# Patient Record
Sex: FEMALE | Race: WHITE | Employment: OTHER | URBAN - METROPOLITAN AREA
[De-identification: names, ages, dates, MRNs, and addresses within clinical notes are randomized per-mention and may not be internally consistent; named-entity substitution may affect disease eponyms.]

---

## 2018-02-07 PROBLEM — G47.33 OBSTRUCTIVE SLEEP APNEA SYNDROME: Status: ACTIVE | Noted: 2018-02-07

## 2018-02-07 PROBLEM — M54.16 LUMBAR RADICULAR PAIN: Status: ACTIVE | Noted: 2018-02-07

## 2018-02-07 PROBLEM — G44.229 CHRONIC TENSION-TYPE HEADACHE, NOT INTRACTABLE: Status: ACTIVE | Noted: 2018-02-07

## 2018-02-07 PROBLEM — F33.41 RECURRENT MAJOR DEPRESSIVE DISORDER, IN PARTIAL REMISSION (HCC): Status: ACTIVE | Noted: 2018-02-07

## 2018-02-07 PROBLEM — E11.9 TYPE 2 DIABETES MELLITUS WITHOUT COMPLICATION, WITHOUT LONG-TERM CURRENT USE OF INSULIN (HCC): Status: ACTIVE | Noted: 2018-02-07

## 2018-02-07 PROBLEM — K21.9 GASTROESOPHAGEAL REFLUX DISEASE: Status: ACTIVE | Noted: 2018-02-07

## 2018-05-09 PROBLEM — E66.09 CLASS 2 OBESITY DUE TO EXCESS CALORIES WITHOUT SERIOUS COMORBIDITY WITH BODY MASS INDEX (BMI) OF 37.0 TO 37.9 IN ADULT: Status: ACTIVE | Noted: 2018-05-09

## 2018-05-09 PROBLEM — I10 ESSENTIAL HYPERTENSION: Status: ACTIVE | Noted: 2018-05-09

## 2018-05-09 PROBLEM — E66.812 CLASS 2 OBESITY DUE TO EXCESS CALORIES WITHOUT SERIOUS COMORBIDITY WITH BODY MASS INDEX (BMI) OF 37.0 TO 37.9 IN ADULT: Status: ACTIVE | Noted: 2018-05-09

## 2019-01-14 ENCOUNTER — APPOINTMENT (OUTPATIENT)
Dept: GENERAL RADIOLOGY | Age: 69
DRG: 253 | End: 2019-01-14
Payer: COMMERCIAL

## 2019-01-14 ENCOUNTER — HOSPITAL ENCOUNTER (INPATIENT)
Age: 69
LOS: 4 days | Discharge: HOME HEALTH | DRG: 253 | End: 2019-01-18
Attending: EMERGENCY MEDICINE | Admitting: INTERNAL MEDICINE
Payer: COMMERCIAL

## 2019-01-14 DIAGNOSIS — M54.16 LUMBAR RADICULAR PAIN: ICD-10-CM

## 2019-01-14 DIAGNOSIS — K92.2 GASTROINTESTINAL HEMORRHAGE, UNSPECIFIED GASTROINTESTINAL HEMORRHAGE TYPE: ICD-10-CM

## 2019-01-14 DIAGNOSIS — D64.9 SYMPTOMATIC ANEMIA: Primary | ICD-10-CM

## 2019-01-14 DIAGNOSIS — K29.01 GASTROINTESTINAL HEMORRHAGE ASSOCIATED WITH ACUTE GASTRITIS: ICD-10-CM

## 2019-01-14 LAB
ABO/RH: NORMAL
ALBUMIN SERPL-MCNC: 3.9 G/DL (ref 3.5–5.2)
ALP BLD-CCNC: 61 U/L (ref 35–104)
ALT SERPL-CCNC: 23 U/L (ref 0–32)
ANION GAP SERPL CALCULATED.3IONS-SCNC: 15 MMOL/L (ref 7–16)
ANISOCYTOSIS: ABNORMAL
ANISOCYTOSIS: ABNORMAL
ANTIBODY SCREEN: NORMAL
AST SERPL-CCNC: 59 U/L (ref 0–31)
BASOPHILS ABSOLUTE: 0 E9/L (ref 0–0.2)
BASOPHILS ABSOLUTE: 0 E9/L (ref 0–0.2)
BASOPHILS RELATIVE PERCENT: 0 % (ref 0–2)
BASOPHILS RELATIVE PERCENT: 0.4 % (ref 0–2)
BILIRUB SERPL-MCNC: 0.2 MG/DL (ref 0–1.2)
BUN BLDV-MCNC: 18 MG/DL (ref 8–23)
BURR CELLS: ABNORMAL
CALCIUM SERPL-MCNC: 9.2 MG/DL (ref 8.6–10.2)
CHLORIDE BLD-SCNC: 100 MMOL/L (ref 98–107)
CO2: 17 MMOL/L (ref 22–29)
CREAT SERPL-MCNC: 0.7 MG/DL (ref 0.5–1)
EKG ATRIAL RATE: 81 BPM
EKG P AXIS: 56 DEGREES
EKG P-R INTERVAL: 166 MS
EKG Q-T INTERVAL: 382 MS
EKG QRS DURATION: 66 MS
EKG QTC CALCULATION (BAZETT): 443 MS
EKG T AXIS: 14 DEGREES
EKG VENTRICULAR RATE: 81 BPM
EOSINOPHILS ABSOLUTE: 0.49 E9/L (ref 0.05–0.5)
EOSINOPHILS ABSOLUTE: 0.82 E9/L (ref 0.05–0.5)
EOSINOPHILS RELATIVE PERCENT: 3.5 % (ref 0–6)
EOSINOPHILS RELATIVE PERCENT: 6 % (ref 0–6)
FERRITIN: 6 NG/ML
FOLATE: 17 NG/ML (ref 4.8–24.2)
GFR AFRICAN AMERICAN: >60
GFR NON-AFRICAN AMERICAN: >60 ML/MIN/1.73
GLUCOSE BLD-MCNC: 171 MG/DL (ref 74–99)
HBA1C MFR BLD: 7.8 % (ref 4–5.6)
HCT VFR BLD CALC: 18.1 % (ref 34–48)
HCT VFR BLD CALC: 19.3 % (ref 34–48)
HCT VFR BLD CALC: 19.4 % (ref 34–48)
HEMOGLOBIN: 4.9 G/DL (ref 11.5–15.5)
HEMOGLOBIN: 5.4 G/DL (ref 11.5–15.5)
HEMOGLOBIN: 5.6 G/DL (ref 11.5–15.5)
HYPOCHROMIA: ABNORMAL
HYPOCHROMIA: ABNORMAL
INR BLD: 1.1
IRON SATURATION: 21 % (ref 15–50)
IRON: 101 MCG/DL (ref 37–145)
LYMPHOCYTES ABSOLUTE: 0.84 E9/L (ref 1.5–4)
LYMPHOCYTES ABSOLUTE: 1.22 E9/L (ref 1.5–4)
LYMPHOCYTES RELATIVE PERCENT: 6.1 % (ref 20–42)
LYMPHOCYTES RELATIVE PERCENT: 9 % (ref 20–42)
MAGNESIUM: 1.8 MG/DL (ref 1.6–2.6)
MCH RBC QN AUTO: 17.9 PG (ref 26–35)
MCH RBC QN AUTO: 19.1 PG (ref 26–35)
MCHC RBC AUTO-ENTMCNC: 27.1 % (ref 32–34.5)
MCHC RBC AUTO-ENTMCNC: 27.8 % (ref 32–34.5)
MCV RBC AUTO: 66.3 FL (ref 80–99.9)
MCV RBC AUTO: 68.6 FL (ref 80–99.9)
METER GLUCOSE: 132 MG/DL (ref 74–99)
METER GLUCOSE: 204 MG/DL (ref 74–99)
MONOCYTES ABSOLUTE: 0.56 E9/L (ref 0.1–0.95)
MONOCYTES ABSOLUTE: 0.68 E9/L (ref 0.1–0.95)
MONOCYTES RELATIVE PERCENT: 4.3 % (ref 2–12)
MONOCYTES RELATIVE PERCENT: 5 % (ref 2–12)
NEUTROPHILS ABSOLUTE: 10.88 E9/L (ref 1.8–7.3)
NEUTROPHILS ABSOLUTE: 12.04 E9/L (ref 1.8–7.3)
NEUTROPHILS RELATIVE PERCENT: 80 % (ref 43–80)
NEUTROPHILS RELATIVE PERCENT: 86.1 % (ref 43–80)
NUCLEATED RED BLOOD CELLS: 0.9 /100 WBC
NUCLEATED RED BLOOD CELLS: 3 /100 WBC
OVALOCYTES: ABNORMAL
OVALOCYTES: ABNORMAL
PDW BLD-RTO: 18.9 FL (ref 11.5–15)
PDW BLD-RTO: 21.6 FL (ref 11.5–15)
PLATELET # BLD: 304 E9/L (ref 130–450)
PLATELET # BLD: 349 E9/L (ref 130–450)
PMV BLD AUTO: 9.6 FL (ref 7–12)
PMV BLD AUTO: 9.9 FL (ref 7–12)
POIKILOCYTES: ABNORMAL
POIKILOCYTES: ABNORMAL
POLYCHROMASIA: ABNORMAL
POLYCHROMASIA: ABNORMAL
POTASSIUM SERPL-SCNC: 4.9 MMOL/L (ref 3.5–5)
PROTHROMBIN TIME: 13 SEC (ref 9.3–12.4)
RBC # BLD: 2.73 E12/L (ref 3.5–5.5)
RBC # BLD: 2.83 E12/L (ref 3.5–5.5)
SCHISTOCYTES: ABNORMAL
SCHISTOCYTES: ABNORMAL
SODIUM BLD-SCNC: 132 MMOL/L (ref 132–146)
TARGET CELLS: ABNORMAL
TOTAL IRON BINDING CAPACITY: 485 MCG/DL (ref 250–450)
TOTAL PROTEIN: 7.5 G/DL (ref 6.4–8.3)
TROPONIN: <0.01 NG/ML (ref 0–0.03)
TSH SERPL DL<=0.05 MIU/L-ACNC: 2.68 UIU/ML (ref 0.27–4.2)
VITAMIN B-12: 713 PG/ML (ref 211–946)
WBC # BLD: 13.6 E9/L (ref 4.5–11.5)
WBC # BLD: 14 E9/L (ref 4.5–11.5)

## 2019-01-14 PROCEDURE — 86900 BLOOD TYPING SEROLOGIC ABO: CPT

## 2019-01-14 PROCEDURE — 82607 VITAMIN B-12: CPT

## 2019-01-14 PROCEDURE — 2580000003 HC RX 258

## 2019-01-14 PROCEDURE — 85025 COMPLETE CBC W/AUTO DIFF WBC: CPT

## 2019-01-14 PROCEDURE — 2580000003 HC RX 258: Performed by: NURSE PRACTITIONER

## 2019-01-14 PROCEDURE — 83735 ASSAY OF MAGNESIUM: CPT

## 2019-01-14 PROCEDURE — 85018 HEMOGLOBIN: CPT

## 2019-01-14 PROCEDURE — 82746 ASSAY OF FOLIC ACID SERUM: CPT

## 2019-01-14 PROCEDURE — 6360000002 HC RX W HCPCS: Performed by: INTERNAL MEDICINE

## 2019-01-14 PROCEDURE — 83550 IRON BINDING TEST: CPT

## 2019-01-14 PROCEDURE — 82962 GLUCOSE BLOOD TEST: CPT

## 2019-01-14 PROCEDURE — 36415 COLL VENOUS BLD VENIPUNCTURE: CPT

## 2019-01-14 PROCEDURE — 82728 ASSAY OF FERRITIN: CPT

## 2019-01-14 PROCEDURE — 74019 RADEX ABDOMEN 2 VIEWS: CPT

## 2019-01-14 PROCEDURE — 85610 PROTHROMBIN TIME: CPT

## 2019-01-14 PROCEDURE — C9113 INJ PANTOPRAZOLE SODIUM, VIA: HCPCS | Performed by: INTERNAL MEDICINE

## 2019-01-14 PROCEDURE — 84484 ASSAY OF TROPONIN QUANT: CPT

## 2019-01-14 PROCEDURE — 86901 BLOOD TYPING SEROLOGIC RH(D): CPT

## 2019-01-14 PROCEDURE — 93005 ELECTROCARDIOGRAM TRACING: CPT | Performed by: NURSE PRACTITIONER

## 2019-01-14 PROCEDURE — 2580000003 HC RX 258: Performed by: INTERNAL MEDICINE

## 2019-01-14 PROCEDURE — P9016 RBC LEUKOCYTES REDUCED: HCPCS

## 2019-01-14 PROCEDURE — 6370000000 HC RX 637 (ALT 250 FOR IP): Performed by: INTERNAL MEDICINE

## 2019-01-14 PROCEDURE — 85014 HEMATOCRIT: CPT

## 2019-01-14 PROCEDURE — 86850 RBC ANTIBODY SCREEN: CPT

## 2019-01-14 PROCEDURE — 2060000000 HC ICU INTERMEDIATE R&B

## 2019-01-14 PROCEDURE — 83540 ASSAY OF IRON: CPT

## 2019-01-14 PROCEDURE — 84443 ASSAY THYROID STIM HORMONE: CPT

## 2019-01-14 PROCEDURE — 83036 HEMOGLOBIN GLYCOSYLATED A1C: CPT

## 2019-01-14 PROCEDURE — 80053 COMPREHEN METABOLIC PANEL: CPT

## 2019-01-14 PROCEDURE — 86923 COMPATIBILITY TEST ELECTRIC: CPT

## 2019-01-14 PROCEDURE — 99285 EMERGENCY DEPT VISIT HI MDM: CPT

## 2019-01-14 RX ORDER — ACETAMINOPHEN 325 MG/1
650 TABLET ORAL EVERY 4 HOURS PRN
Status: DISCONTINUED | OUTPATIENT
Start: 2019-01-14 | End: 2019-01-18 | Stop reason: HOSPADM

## 2019-01-14 RX ORDER — ONDANSETRON 2 MG/ML
4 INJECTION INTRAMUSCULAR; INTRAVENOUS EVERY 6 HOURS PRN
Status: DISCONTINUED | OUTPATIENT
Start: 2019-01-14 | End: 2019-01-18 | Stop reason: HOSPADM

## 2019-01-14 RX ORDER — DEXTROSE MONOHYDRATE 25 G/50ML
12.5 INJECTION, SOLUTION INTRAVENOUS PRN
Status: DISCONTINUED | OUTPATIENT
Start: 2019-01-14 | End: 2019-01-18 | Stop reason: HOSPADM

## 2019-01-14 RX ORDER — DOCUSATE SODIUM 100 MG/1
100 CAPSULE, LIQUID FILLED ORAL NIGHTLY
Status: DISCONTINUED | OUTPATIENT
Start: 2019-01-14 | End: 2019-01-18 | Stop reason: HOSPADM

## 2019-01-14 RX ORDER — SODIUM CHLORIDE 0.9 % (FLUSH) 0.9 %
SYRINGE (ML) INJECTION
Status: COMPLETED
Start: 2019-01-14 | End: 2019-01-14

## 2019-01-14 RX ORDER — LISINOPRIL 20 MG/1
20 TABLET ORAL DAILY
Status: DISCONTINUED | OUTPATIENT
Start: 2019-01-14 | End: 2019-01-18 | Stop reason: HOSPADM

## 2019-01-14 RX ORDER — ROPINIROLE 0.5 MG/1
0.5 TABLET, FILM COATED ORAL 3 TIMES DAILY
Status: DISCONTINUED | OUTPATIENT
Start: 2019-01-14 | End: 2019-01-18 | Stop reason: HOSPADM

## 2019-01-14 RX ORDER — PANTOPRAZOLE SODIUM 40 MG/10ML
40 INJECTION, POWDER, LYOPHILIZED, FOR SOLUTION INTRAVENOUS DAILY
Status: DISCONTINUED | OUTPATIENT
Start: 2019-01-14 | End: 2019-01-18 | Stop reason: HOSPADM

## 2019-01-14 RX ORDER — 0.9 % SODIUM CHLORIDE 0.9 %
250 INTRAVENOUS SOLUTION INTRAVENOUS ONCE
Status: DISCONTINUED | OUTPATIENT
Start: 2019-01-14 | End: 2019-01-18 | Stop reason: HOSPADM

## 2019-01-14 RX ORDER — GABAPENTIN 300 MG/1
1 CAPSULE ORAL 2 TIMES DAILY
Refills: 3 | COMMUNITY
Start: 2019-01-07 | End: 2019-01-25 | Stop reason: DRUGHIGH

## 2019-01-14 RX ORDER — DEXTROSE MONOHYDRATE 50 MG/ML
100 INJECTION, SOLUTION INTRAVENOUS PRN
Status: DISCONTINUED | OUTPATIENT
Start: 2019-01-14 | End: 2019-01-18 | Stop reason: HOSPADM

## 2019-01-14 RX ORDER — TOPIRAMATE 25 MG/1
25 TABLET ORAL 2 TIMES DAILY
Status: DISCONTINUED | OUTPATIENT
Start: 2019-01-14 | End: 2019-01-18 | Stop reason: HOSPADM

## 2019-01-14 RX ORDER — FLUOXETINE HYDROCHLORIDE 20 MG/1
40 CAPSULE ORAL DAILY
Status: DISCONTINUED | OUTPATIENT
Start: 2019-01-14 | End: 2019-01-18 | Stop reason: HOSPADM

## 2019-01-14 RX ORDER — OMEGA-3S/DHA/EPA/FISH OIL/D3 300MG-1000
400 CAPSULE ORAL DAILY
Status: DISCONTINUED | OUTPATIENT
Start: 2019-01-14 | End: 2019-01-18 | Stop reason: HOSPADM

## 2019-01-14 RX ORDER — 0.9 % SODIUM CHLORIDE 0.9 %
250 INTRAVENOUS SOLUTION INTRAVENOUS ONCE
Status: COMPLETED | OUTPATIENT
Start: 2019-01-14 | End: 2019-01-15

## 2019-01-14 RX ORDER — NICOTINE POLACRILEX 4 MG
15 LOZENGE BUCCAL PRN
Status: DISCONTINUED | OUTPATIENT
Start: 2019-01-14 | End: 2019-01-18 | Stop reason: HOSPADM

## 2019-01-14 RX ORDER — 0.9 % SODIUM CHLORIDE 0.9 %
10 VIAL (ML) INJECTION DAILY
Status: DISCONTINUED | OUTPATIENT
Start: 2019-01-14 | End: 2019-01-18 | Stop reason: HOSPADM

## 2019-01-14 RX ADMIN — ROPINIROLE HYDROCHLORIDE 0.5 MG: 0.5 TABLET, FILM COATED ORAL at 23:04

## 2019-01-14 RX ADMIN — ACETAMINOPHEN 650 MG: 325 TABLET ORAL at 23:02

## 2019-01-14 RX ADMIN — CHOLECALCIFEROL TAB 10 MCG (400 UNIT) 400 UNITS: 10 TAB at 17:17

## 2019-01-14 RX ADMIN — LISINOPRIL 20 MG: 20 TABLET ORAL at 17:17

## 2019-01-14 RX ADMIN — TOPIRAMATE 25 MG: 25 TABLET, FILM COATED ORAL at 23:04

## 2019-01-14 RX ADMIN — Medication 10 ML: at 23:01

## 2019-01-14 RX ADMIN — ROPINIROLE HYDROCHLORIDE 0.5 MG: 0.5 TABLET, FILM COATED ORAL at 17:17

## 2019-01-14 RX ADMIN — Medication 10 ML: at 23:05

## 2019-01-14 RX ADMIN — PANTOPRAZOLE SODIUM 40 MG: 40 INJECTION, POWDER, FOR SOLUTION INTRAVENOUS at 23:01

## 2019-01-14 RX ADMIN — DOCUSATE SODIUM 100 MG: 100 CAPSULE, LIQUID FILLED ORAL at 23:02

## 2019-01-14 RX ADMIN — LINAGLIPTIN 5 MG: 5 TABLET, FILM COATED ORAL at 17:17

## 2019-01-14 RX ADMIN — INSULIN LISPRO 4 UNITS: 100 INJECTION, SOLUTION INTRAVENOUS; SUBCUTANEOUS at 18:12

## 2019-01-14 RX ADMIN — SODIUM CHLORIDE 250 ML: 9 INJECTION, SOLUTION INTRAVENOUS at 17:50

## 2019-01-14 ASSESSMENT — PAIN SCALES - GENERAL
PAINLEVEL_OUTOF10: 4
PAINLEVEL_OUTOF10: 4
PAINLEVEL_OUTOF10: 0

## 2019-01-14 ASSESSMENT — PAIN DESCRIPTION - LOCATION
LOCATION: GENERALIZED
LOCATION: HEAD

## 2019-01-14 ASSESSMENT — PAIN DESCRIPTION - FREQUENCY: FREQUENCY: INTERMITTENT

## 2019-01-14 ASSESSMENT — PAIN DESCRIPTION - DESCRIPTORS
DESCRIPTORS: HEADACHE
DESCRIPTORS: ACHING

## 2019-01-14 ASSESSMENT — PAIN DESCRIPTION - PAIN TYPE
TYPE: ACUTE PAIN
TYPE: ACUTE PAIN;CHRONIC PAIN

## 2019-01-15 ENCOUNTER — APPOINTMENT (OUTPATIENT)
Dept: NUCLEAR MEDICINE | Age: 69
DRG: 253 | End: 2019-01-15
Payer: COMMERCIAL

## 2019-01-15 LAB
AMMONIA: 40 UMOL/L (ref 11–51)
ANION GAP SERPL CALCULATED.3IONS-SCNC: 15 MMOL/L (ref 7–16)
BACTERIA: NORMAL /HPF
BILIRUBIN URINE: NEGATIVE
BLOOD BANK DISPENSE STATUS: NORMAL
BLOOD BANK PRODUCT CODE: NORMAL
BLOOD, URINE: NEGATIVE
BPU ID: NORMAL
BUN BLDV-MCNC: 13 MG/DL (ref 8–23)
CALCIUM SERPL-MCNC: 8.5 MG/DL (ref 8.6–10.2)
CHLORIDE BLD-SCNC: 103 MMOL/L (ref 98–107)
CLARITY: CLEAR
CO2: 18 MMOL/L (ref 22–29)
COLOR: YELLOW
CREAT SERPL-MCNC: 0.7 MG/DL (ref 0.5–1)
DESCRIPTION BLOOD BANK: NORMAL
EPITHELIAL CELLS, UA: NORMAL /HPF
GFR AFRICAN AMERICAN: >60
GFR NON-AFRICAN AMERICAN: >60 ML/MIN/1.73
GLUCOSE BLD-MCNC: 143 MG/DL (ref 74–99)
GLUCOSE URINE: NEGATIVE MG/DL
HCT VFR BLD CALC: 21.1 % (ref 34–48)
HCT VFR BLD CALC: 24.8 % (ref 34–48)
HEMOGLOBIN: 6.2 G/DL (ref 11.5–15.5)
HEMOGLOBIN: 7.5 G/DL (ref 11.5–15.5)
KETONES, URINE: NEGATIVE MG/DL
LEUKOCYTE ESTERASE, URINE: ABNORMAL
MAGNESIUM: 2.1 MG/DL (ref 1.6–2.6)
METER GLUCOSE: 157 MG/DL (ref 74–99)
METER GLUCOSE: 171 MG/DL (ref 74–99)
METER GLUCOSE: 181 MG/DL (ref 74–99)
METER GLUCOSE: 250 MG/DL (ref 74–99)
NITRITE, URINE: NEGATIVE
PATHOLOGIST REVIEW: NORMAL
PH UA: 7 (ref 5–9)
POTASSIUM SERPL-SCNC: 4.3 MMOL/L (ref 3.5–5)
PROTEIN UA: NEGATIVE MG/DL
RBC UA: NORMAL /HPF (ref 0–2)
SODIUM BLD-SCNC: 136 MMOL/L (ref 132–146)
SPECIFIC GRAVITY UA: <=1.005 (ref 1–1.03)
UROBILINOGEN, URINE: 0.2 E.U./DL
WBC UA: NORMAL /HPF (ref 0–5)

## 2019-01-15 PROCEDURE — 81001 URINALYSIS AUTO W/SCOPE: CPT

## 2019-01-15 PROCEDURE — 6360000002 HC RX W HCPCS: Performed by: INTERNAL MEDICINE

## 2019-01-15 PROCEDURE — C9113 INJ PANTOPRAZOLE SODIUM, VIA: HCPCS | Performed by: INTERNAL MEDICINE

## 2019-01-15 PROCEDURE — P9016 RBC LEUKOCYTES REDUCED: HCPCS

## 2019-01-15 PROCEDURE — 82140 ASSAY OF AMMONIA: CPT

## 2019-01-15 PROCEDURE — 85018 HEMOGLOBIN: CPT

## 2019-01-15 PROCEDURE — 6370000000 HC RX 637 (ALT 250 FOR IP): Performed by: INTERNAL MEDICINE

## 2019-01-15 PROCEDURE — 85014 HEMATOCRIT: CPT

## 2019-01-15 PROCEDURE — 86923 COMPATIBILITY TEST ELECTRIC: CPT

## 2019-01-15 PROCEDURE — A9560 TC99M LABELED RBC: HCPCS | Performed by: RADIOLOGY

## 2019-01-15 PROCEDURE — 2580000003 HC RX 258: Performed by: INTERNAL MEDICINE

## 2019-01-15 PROCEDURE — 3430000000 HC RX DIAGNOSTIC RADIOPHARMACEUTICAL: Performed by: RADIOLOGY

## 2019-01-15 PROCEDURE — 2060000000 HC ICU INTERMEDIATE R&B

## 2019-01-15 PROCEDURE — 2580000003 HC RX 258: Performed by: HOSPITALIST

## 2019-01-15 PROCEDURE — 6370000000 HC RX 637 (ALT 250 FOR IP): Performed by: HOSPITALIST

## 2019-01-15 PROCEDURE — 80048 BASIC METABOLIC PNL TOTAL CA: CPT

## 2019-01-15 PROCEDURE — 6370000000 HC RX 637 (ALT 250 FOR IP): Performed by: FAMILY MEDICINE

## 2019-01-15 PROCEDURE — 36430 TRANSFUSION BLD/BLD COMPNT: CPT

## 2019-01-15 PROCEDURE — 83735 ASSAY OF MAGNESIUM: CPT

## 2019-01-15 PROCEDURE — 78278 ACUTE GI BLOOD LOSS IMAGING: CPT

## 2019-01-15 PROCEDURE — 82962 GLUCOSE BLOOD TEST: CPT

## 2019-01-15 PROCEDURE — 36415 COLL VENOUS BLD VENIPUNCTURE: CPT

## 2019-01-15 RX ORDER — FUROSEMIDE 10 MG/ML
20 INJECTION INTRAMUSCULAR; INTRAVENOUS ONCE
Status: DISCONTINUED | OUTPATIENT
Start: 2019-01-15 | End: 2019-01-18 | Stop reason: HOSPADM

## 2019-01-15 RX ORDER — BENZONATATE 100 MG/1
100 CAPSULE ORAL 3 TIMES DAILY PRN
Status: DISCONTINUED | OUTPATIENT
Start: 2019-01-15 | End: 2019-01-18 | Stop reason: HOSPADM

## 2019-01-15 RX ORDER — HYDROCODONE BITARTRATE AND ACETAMINOPHEN 5; 325 MG/1; MG/1
1 TABLET ORAL EVERY 6 HOURS PRN
Status: DISCONTINUED | OUTPATIENT
Start: 2019-01-15 | End: 2019-01-18 | Stop reason: HOSPADM

## 2019-01-15 RX ORDER — HYDROCODONE BITARTRATE AND ACETAMINOPHEN 5; 325 MG/1; MG/1
1 TABLET ORAL ONCE
Status: COMPLETED | OUTPATIENT
Start: 2019-01-15 | End: 2019-01-15

## 2019-01-15 RX ORDER — LANOLIN ALCOHOL/MO/W.PET/CERES
3 CREAM (GRAM) TOPICAL NIGHTLY PRN
Status: DISCONTINUED | OUTPATIENT
Start: 2019-01-15 | End: 2019-01-18 | Stop reason: HOSPADM

## 2019-01-15 RX ORDER — TRAMADOL HYDROCHLORIDE 50 MG/1
50 TABLET ORAL ONCE
Status: COMPLETED | OUTPATIENT
Start: 2019-01-15 | End: 2019-01-15

## 2019-01-15 RX ORDER — LACTULOSE 10 G/15ML
20 SOLUTION ORAL 3 TIMES DAILY
Status: DISCONTINUED | OUTPATIENT
Start: 2019-01-15 | End: 2019-01-18 | Stop reason: HOSPADM

## 2019-01-15 RX ORDER — 0.9 % SODIUM CHLORIDE 0.9 %
250 INTRAVENOUS SOLUTION INTRAVENOUS ONCE
Status: COMPLETED | OUTPATIENT
Start: 2019-01-15 | End: 2019-01-16

## 2019-01-15 RX ORDER — 0.9 % SODIUM CHLORIDE 0.9 %
250 INTRAVENOUS SOLUTION INTRAVENOUS ONCE
Status: COMPLETED | OUTPATIENT
Start: 2019-01-15 | End: 2019-01-15

## 2019-01-15 RX ADMIN — LISINOPRIL 20 MG: 20 TABLET ORAL at 10:10

## 2019-01-15 RX ADMIN — HYDROCODONE BITARTRATE AND ACETAMINOPHEN 1 TABLET: 5; 325 TABLET ORAL at 21:51

## 2019-01-15 RX ADMIN — HYDROCODONE BITARTRATE AND ACETAMINOPHEN 1 TABLET: 5; 325 TABLET ORAL at 13:08

## 2019-01-15 RX ADMIN — Medication 26 MILLICURIE: at 11:14

## 2019-01-15 RX ADMIN — ROPINIROLE HYDROCHLORIDE 0.5 MG: 0.5 TABLET, FILM COATED ORAL at 14:05

## 2019-01-15 RX ADMIN — SODIUM CHLORIDE 250 ML: 9 INJECTION, SOLUTION INTRAVENOUS at 02:15

## 2019-01-15 RX ADMIN — SODIUM CHLORIDE 100 ML: 9 INJECTION, SOLUTION INTRAVENOUS at 14:02

## 2019-01-15 RX ADMIN — HYDROCODONE BITARTRATE AND ACETAMINOPHEN 1 TABLET: 5; 325 TABLET ORAL at 06:36

## 2019-01-15 RX ADMIN — HYDROCODONE BITARTRATE AND ACETAMINOPHEN 1 TABLET: 5; 325 TABLET ORAL at 13:07

## 2019-01-15 RX ADMIN — ROPINIROLE HYDROCHLORIDE 0.5 MG: 0.5 TABLET, FILM COATED ORAL at 10:11

## 2019-01-15 RX ADMIN — INSULIN LISPRO 2 UNITS: 100 INJECTION, SOLUTION INTRAVENOUS; SUBCUTANEOUS at 13:14

## 2019-01-15 RX ADMIN — BENZONATATE 100 MG: 100 CAPSULE ORAL at 21:51

## 2019-01-15 RX ADMIN — INSULIN LISPRO 4 UNITS: 100 INJECTION, SOLUTION INTRAVENOUS; SUBCUTANEOUS at 21:18

## 2019-01-15 RX ADMIN — ROPINIROLE HYDROCHLORIDE 0.5 MG: 0.5 TABLET, FILM COATED ORAL at 21:18

## 2019-01-15 RX ADMIN — TRAMADOL HYDROCHLORIDE 50 MG: 50 TABLET, FILM COATED ORAL at 01:30

## 2019-01-15 RX ADMIN — TOPIRAMATE 25 MG: 25 TABLET, FILM COATED ORAL at 10:10

## 2019-01-15 RX ADMIN — INSULIN LISPRO 2 UNITS: 100 INJECTION, SOLUTION INTRAVENOUS; SUBCUTANEOUS at 16:45

## 2019-01-15 RX ADMIN — TOPIRAMATE 25 MG: 25 TABLET, FILM COATED ORAL at 21:18

## 2019-01-15 RX ADMIN — PANTOPRAZOLE SODIUM 40 MG: 40 INJECTION, POWDER, FOR SOLUTION INTRAVENOUS at 10:10

## 2019-01-15 RX ADMIN — Medication 10 ML: at 14:11

## 2019-01-15 RX ADMIN — BENZONATATE 100 MG: 100 CAPSULE ORAL at 01:30

## 2019-01-15 RX ADMIN — BENZONATATE 100 MG: 100 CAPSULE ORAL at 10:11

## 2019-01-15 RX ADMIN — Medication 3 MG: at 22:14

## 2019-01-15 RX ADMIN — LINAGLIPTIN 5 MG: 5 TABLET, FILM COATED ORAL at 10:11

## 2019-01-15 RX ADMIN — CHOLECALCIFEROL TAB 10 MCG (400 UNIT) 400 UNITS: 10 TAB at 10:11

## 2019-01-15 ASSESSMENT — PAIN SCALES - GENERAL
PAINLEVEL_OUTOF10: 8
PAINLEVEL_OUTOF10: 8
PAINLEVEL_OUTOF10: 0
PAINLEVEL_OUTOF10: 8
PAINLEVEL_OUTOF10: 0
PAINLEVEL_OUTOF10: 8

## 2019-01-15 ASSESSMENT — PAIN - FUNCTIONAL ASSESSMENT: PAIN_FUNCTIONAL_ASSESSMENT: ACTIVITIES ARE NOT PREVENTED

## 2019-01-15 ASSESSMENT — PAIN DESCRIPTION - PROGRESSION: CLINICAL_PROGRESSION: GRADUALLY WORSENING

## 2019-01-15 ASSESSMENT — PAIN DESCRIPTION - FREQUENCY
FREQUENCY: INTERMITTENT
FREQUENCY: CONTINUOUS

## 2019-01-15 ASSESSMENT — PAIN DESCRIPTION - PAIN TYPE
TYPE: ACUTE PAIN
TYPE: CHRONIC PAIN

## 2019-01-15 ASSESSMENT — PAIN DESCRIPTION - ONSET: ONSET: ON-GOING

## 2019-01-15 ASSESSMENT — PAIN DESCRIPTION - LOCATION
LOCATION: BACK;FACE
LOCATION: EAR;EYE

## 2019-01-15 ASSESSMENT — PAIN DESCRIPTION - DESCRIPTORS
DESCRIPTORS: ACHING;SORE;DISCOMFORT
DESCRIPTORS: ACHING;HEADACHE

## 2019-01-16 LAB
ANION GAP SERPL CALCULATED.3IONS-SCNC: 13 MMOL/L (ref 7–16)
BUN BLDV-MCNC: 9 MG/DL (ref 8–23)
CALCIUM SERPL-MCNC: 8.7 MG/DL (ref 8.6–10.2)
CHLORIDE BLD-SCNC: 103 MMOL/L (ref 98–107)
CO2: 20 MMOL/L (ref 22–29)
CREAT SERPL-MCNC: 0.7 MG/DL (ref 0.5–1)
GFR AFRICAN AMERICAN: >60
GFR NON-AFRICAN AMERICAN: >60 ML/MIN/1.73
GLUCOSE BLD-MCNC: 149 MG/DL (ref 74–99)
HCT VFR BLD CALC: 24.7 % (ref 34–48)
HEMOGLOBIN: 7.5 G/DL (ref 11.5–15.5)
MAGNESIUM: 2 MG/DL (ref 1.6–2.6)
METER GLUCOSE: 152 MG/DL (ref 74–99)
METER GLUCOSE: 158 MG/DL (ref 74–99)
METER GLUCOSE: 164 MG/DL (ref 74–99)
METER GLUCOSE: 197 MG/DL (ref 74–99)
POTASSIUM SERPL-SCNC: 3.6 MMOL/L (ref 3.5–5)
SODIUM BLD-SCNC: 136 MMOL/L (ref 132–146)

## 2019-01-16 PROCEDURE — 83735 ASSAY OF MAGNESIUM: CPT

## 2019-01-16 PROCEDURE — 36415 COLL VENOUS BLD VENIPUNCTURE: CPT

## 2019-01-16 PROCEDURE — 2060000000 HC ICU INTERMEDIATE R&B

## 2019-01-16 PROCEDURE — 7100000001 HC PACU RECOVERY - ADDTL 15 MIN: Performed by: SURGERY

## 2019-01-16 PROCEDURE — 82962 GLUCOSE BLOOD TEST: CPT

## 2019-01-16 PROCEDURE — 85018 HEMOGLOBIN: CPT

## 2019-01-16 PROCEDURE — 6360000002 HC RX W HCPCS: Performed by: SURGERY

## 2019-01-16 PROCEDURE — 2709999900 HC NON-CHARGEABLE SUPPLY: Performed by: SURGERY

## 2019-01-16 PROCEDURE — 6370000000 HC RX 637 (ALT 250 FOR IP): Performed by: SURGERY

## 2019-01-16 PROCEDURE — 88342 IMHCHEM/IMCYTCHM 1ST ANTB: CPT

## 2019-01-16 PROCEDURE — 2580000003 HC RX 258: Performed by: SURGERY

## 2019-01-16 PROCEDURE — 6370000000 HC RX 637 (ALT 250 FOR IP): Performed by: INTERNAL MEDICINE

## 2019-01-16 PROCEDURE — C9113 INJ PANTOPRAZOLE SODIUM, VIA: HCPCS | Performed by: INTERNAL MEDICINE

## 2019-01-16 PROCEDURE — 88305 TISSUE EXAM BY PATHOLOGIST: CPT

## 2019-01-16 PROCEDURE — 2580000003 HC RX 258: Performed by: INTERNAL MEDICINE

## 2019-01-16 PROCEDURE — 6370000000 HC RX 637 (ALT 250 FOR IP): Performed by: FAMILY MEDICINE

## 2019-01-16 PROCEDURE — 0DB78ZX EXCISION OF STOMACH, PYLORUS, VIA NATURAL OR ARTIFICIAL OPENING ENDOSCOPIC, DIAGNOSTIC: ICD-10-PCS | Performed by: SURGERY

## 2019-01-16 PROCEDURE — 7100000000 HC PACU RECOVERY - FIRST 15 MIN: Performed by: SURGERY

## 2019-01-16 PROCEDURE — 80048 BASIC METABOLIC PNL TOTAL CA: CPT

## 2019-01-16 PROCEDURE — 6370000000 HC RX 637 (ALT 250 FOR IP): Performed by: HOSPITALIST

## 2019-01-16 PROCEDURE — 3609012400 HC EGD TRANSORAL BIOPSY SINGLE/MULTIPLE: Performed by: SURGERY

## 2019-01-16 PROCEDURE — 85014 HEMATOCRIT: CPT

## 2019-01-16 PROCEDURE — 6360000002 HC RX W HCPCS: Performed by: INTERNAL MEDICINE

## 2019-01-16 PROCEDURE — 99152 MOD SED SAME PHYS/QHP 5/>YRS: CPT | Performed by: SURGERY

## 2019-01-16 RX ORDER — MIDAZOLAM HYDROCHLORIDE 1 MG/ML
INJECTION INTRAMUSCULAR; INTRAVENOUS PRN
Status: DISCONTINUED | OUTPATIENT
Start: 2019-01-16 | End: 2019-01-16 | Stop reason: HOSPADM

## 2019-01-16 RX ORDER — POTASSIUM CHLORIDE 20 MEQ/1
20 TABLET, EXTENDED RELEASE ORAL ONCE
Status: COMPLETED | OUTPATIENT
Start: 2019-01-16 | End: 2019-01-16

## 2019-01-16 RX ORDER — SODIUM CHLORIDE 9 MG/ML
INJECTION, SOLUTION INTRAVENOUS CONTINUOUS PRN
Status: COMPLETED | OUTPATIENT
Start: 2019-01-16 | End: 2019-01-16

## 2019-01-16 RX ORDER — MEPERIDINE HYDROCHLORIDE 50 MG/ML
INJECTION INTRAMUSCULAR; INTRAVENOUS; SUBCUTANEOUS PRN
Status: DISCONTINUED | OUTPATIENT
Start: 2019-01-16 | End: 2019-01-16 | Stop reason: HOSPADM

## 2019-01-16 RX ADMIN — ROPINIROLE HYDROCHLORIDE 0.5 MG: 0.5 TABLET, FILM COATED ORAL at 15:29

## 2019-01-16 RX ADMIN — BENZONATATE 100 MG: 100 CAPSULE ORAL at 07:54

## 2019-01-16 RX ADMIN — CHOLECALCIFEROL TAB 10 MCG (400 UNIT) 400 UNITS: 10 TAB at 07:55

## 2019-01-16 RX ADMIN — LINAGLIPTIN 5 MG: 5 TABLET, FILM COATED ORAL at 07:55

## 2019-01-16 RX ADMIN — INSULIN LISPRO 2 UNITS: 100 INJECTION, SOLUTION INTRAVENOUS; SUBCUTANEOUS at 17:59

## 2019-01-16 RX ADMIN — FLUOXETINE HYDROCHLORIDE 40 MG: 20 CAPSULE ORAL at 07:55

## 2019-01-16 RX ADMIN — Medication 10 ML: at 20:15

## 2019-01-16 RX ADMIN — LACTULOSE 20 G: 20 SOLUTION ORAL at 15:29

## 2019-01-16 RX ADMIN — Medication 10 ML: at 07:56

## 2019-01-16 RX ADMIN — TOPIRAMATE 25 MG: 25 TABLET, FILM COATED ORAL at 07:55

## 2019-01-16 RX ADMIN — PANTOPRAZOLE SODIUM 40 MG: 40 INJECTION, POWDER, FOR SOLUTION INTRAVENOUS at 07:55

## 2019-01-16 RX ADMIN — POTASSIUM CHLORIDE 20 MEQ: 20 TABLET, EXTENDED RELEASE ORAL at 10:23

## 2019-01-16 RX ADMIN — HYDROCODONE BITARTRATE AND ACETAMINOPHEN 1 TABLET: 5; 325 TABLET ORAL at 03:54

## 2019-01-16 RX ADMIN — ROPINIROLE HYDROCHLORIDE 0.5 MG: 0.5 TABLET, FILM COATED ORAL at 07:55

## 2019-01-16 RX ADMIN — INSULIN LISPRO 2 UNITS: 100 INJECTION, SOLUTION INTRAVENOUS; SUBCUTANEOUS at 10:27

## 2019-01-16 RX ADMIN — ROPINIROLE HYDROCHLORIDE 0.5 MG: 0.5 TABLET, FILM COATED ORAL at 20:15

## 2019-01-16 RX ADMIN — HYDROCODONE BITARTRATE AND ACETAMINOPHEN 1 TABLET: 5; 325 TABLET ORAL at 10:27

## 2019-01-16 RX ADMIN — INSULIN LISPRO 2 UNITS: 100 INJECTION, SOLUTION INTRAVENOUS; SUBCUTANEOUS at 06:59

## 2019-01-16 RX ADMIN — HYDROCODONE BITARTRATE AND ACETAMINOPHEN 1 TABLET: 5; 325 TABLET ORAL at 22:40

## 2019-01-16 RX ADMIN — BENZONATATE 100 MG: 100 CAPSULE ORAL at 20:24

## 2019-01-16 RX ADMIN — Medication 3 MG: at 22:40

## 2019-01-16 RX ADMIN — LISINOPRIL 20 MG: 20 TABLET ORAL at 07:54

## 2019-01-16 RX ADMIN — LIDOCAINE HYDROCHLORIDE 15 ML: 20 SOLUTION ORAL; TOPICAL at 20:24

## 2019-01-16 RX ADMIN — INSULIN LISPRO 2 UNITS: 100 INJECTION, SOLUTION INTRAVENOUS; SUBCUTANEOUS at 20:24

## 2019-01-16 RX ADMIN — TOPIRAMATE 25 MG: 25 TABLET, FILM COATED ORAL at 20:15

## 2019-01-16 ASSESSMENT — PAIN SCALES - GENERAL
PAINLEVEL_OUTOF10: 0
PAINLEVEL_OUTOF10: 0
PAINLEVEL_OUTOF10: 7
PAINLEVEL_OUTOF10: 3
PAINLEVEL_OUTOF10: 7
PAINLEVEL_OUTOF10: 0
PAINLEVEL_OUTOF10: 0
PAINLEVEL_OUTOF10: 8
PAINLEVEL_OUTOF10: 0

## 2019-01-17 LAB
ANION GAP SERPL CALCULATED.3IONS-SCNC: 15 MMOL/L (ref 7–16)
BLOOD BANK DISPENSE STATUS: NORMAL
BLOOD BANK PRODUCT CODE: NORMAL
BPU ID: NORMAL
BUN BLDV-MCNC: 13 MG/DL (ref 8–23)
CALCIUM SERPL-MCNC: 9.2 MG/DL (ref 8.6–10.2)
CHLORIDE BLD-SCNC: 102 MMOL/L (ref 98–107)
CO2: 20 MMOL/L (ref 22–29)
CREAT SERPL-MCNC: 0.7 MG/DL (ref 0.5–1)
DESCRIPTION BLOOD BANK: NORMAL
FILM ARRAY ADENOVIRUS: NORMAL
FILM ARRAY BORDETELLA PERTUSSIS: NORMAL
FILM ARRAY CHLAMYDOPHILIA PNEUMONIAE: NORMAL
FILM ARRAY CORONAVIRUS 229E: NORMAL
FILM ARRAY CORONAVIRUS HKU1: NORMAL
FILM ARRAY CORONAVIRUS NL63: NORMAL
FILM ARRAY CORONAVIRUS OC43: NORMAL
FILM ARRAY INFLUENZA A VIRUS 09H1: NORMAL
FILM ARRAY INFLUENZA A VIRUS H1: NORMAL
FILM ARRAY INFLUENZA A VIRUS H3: NORMAL
FILM ARRAY INFLUENZA A VIRUS: NORMAL
FILM ARRAY INFLUENZA B: NORMAL
FILM ARRAY METAPNEUMOVIRUS: NORMAL
FILM ARRAY MYCOPLASMA PNEUMONIAE: NORMAL
FILM ARRAY PARAINFLUENZA VIRUS 1: NORMAL
FILM ARRAY PARAINFLUENZA VIRUS 2: NORMAL
FILM ARRAY PARAINFLUENZA VIRUS 3: NORMAL
FILM ARRAY PARAINFLUENZA VIRUS 4: NORMAL
FILM ARRAY RESPIRATORY SYNCITIAL VIRUS: NORMAL
FILM ARRAY RHINOVIRUS/ENTEROVIRUS: NORMAL
GFR AFRICAN AMERICAN: >60
GFR NON-AFRICAN AMERICAN: >60 ML/MIN/1.73
GLUCOSE BLD-MCNC: 155 MG/DL (ref 74–99)
HCT VFR BLD CALC: 24.7 % (ref 34–48)
HEMOGLOBIN: 7.4 G/DL (ref 11.5–15.5)
MAGNESIUM: 2 MG/DL (ref 1.6–2.6)
MCH RBC QN AUTO: 22.2 PG (ref 26–35)
MCHC RBC AUTO-ENTMCNC: 30 % (ref 32–34.5)
MCV RBC AUTO: 74.2 FL (ref 80–99.9)
METER GLUCOSE: 223 MG/DL (ref 74–99)
METER GLUCOSE: 231 MG/DL (ref 74–99)
METER GLUCOSE: 251 MG/DL (ref 74–99)
METER GLUCOSE: 275 MG/DL (ref 74–99)
PDW BLD-RTO: 24.9 FL (ref 11.5–15)
PLATELET # BLD: 262 E9/L (ref 130–450)
PMV BLD AUTO: 9.8 FL (ref 7–12)
POTASSIUM SERPL-SCNC: 3.8 MMOL/L (ref 3.5–5)
RBC # BLD: 3.33 E12/L (ref 3.5–5.5)
SODIUM BLD-SCNC: 137 MMOL/L (ref 132–146)
WBC # BLD: 9.9 E9/L (ref 4.5–11.5)

## 2019-01-17 PROCEDURE — 97165 OT EVAL LOW COMPLEX 30 MIN: CPT

## 2019-01-17 PROCEDURE — 87798 DETECT AGENT NOS DNA AMP: CPT

## 2019-01-17 PROCEDURE — 80048 BASIC METABOLIC PNL TOTAL CA: CPT

## 2019-01-17 PROCEDURE — 2060000000 HC ICU INTERMEDIATE R&B

## 2019-01-17 PROCEDURE — 85027 COMPLETE CBC AUTOMATED: CPT

## 2019-01-17 PROCEDURE — 6370000000 HC RX 637 (ALT 250 FOR IP): Performed by: INTERNAL MEDICINE

## 2019-01-17 PROCEDURE — 87486 CHLMYD PNEUM DNA AMP PROBE: CPT

## 2019-01-17 PROCEDURE — P9016 RBC LEUKOCYTES REDUCED: HCPCS

## 2019-01-17 PROCEDURE — 97161 PT EVAL LOW COMPLEX 20 MIN: CPT

## 2019-01-17 PROCEDURE — 6370000000 HC RX 637 (ALT 250 FOR IP): Performed by: FAMILY MEDICINE

## 2019-01-17 PROCEDURE — 97530 THERAPEUTIC ACTIVITIES: CPT

## 2019-01-17 PROCEDURE — 87633 RESP VIRUS 12-25 TARGETS: CPT

## 2019-01-17 PROCEDURE — 83735 ASSAY OF MAGNESIUM: CPT

## 2019-01-17 PROCEDURE — C9113 INJ PANTOPRAZOLE SODIUM, VIA: HCPCS | Performed by: INTERNAL MEDICINE

## 2019-01-17 PROCEDURE — 6370000000 HC RX 637 (ALT 250 FOR IP): Performed by: HOSPITALIST

## 2019-01-17 PROCEDURE — 36415 COLL VENOUS BLD VENIPUNCTURE: CPT

## 2019-01-17 PROCEDURE — 86923 COMPATIBILITY TEST ELECTRIC: CPT

## 2019-01-17 PROCEDURE — 82962 GLUCOSE BLOOD TEST: CPT

## 2019-01-17 PROCEDURE — 87581 M.PNEUMON DNA AMP PROBE: CPT

## 2019-01-17 PROCEDURE — 6360000002 HC RX W HCPCS: Performed by: INTERNAL MEDICINE

## 2019-01-17 PROCEDURE — 2580000003 HC RX 258: Performed by: INTERNAL MEDICINE

## 2019-01-17 RX ORDER — BENZONATATE 100 MG/1
100 CAPSULE ORAL 3 TIMES DAILY PRN
Qty: 30 CAPSULE | Refills: 0 | Status: SHIPPED | OUTPATIENT
Start: 2019-01-17 | End: 2019-01-24

## 2019-01-17 RX ORDER — LIDOCAINE 50 MG/G
1 PATCH TOPICAL DAILY
Qty: 30 PATCH | Refills: 0 | Status: SHIPPED | OUTPATIENT
Start: 2019-01-17 | End: 2019-02-16

## 2019-01-17 RX ORDER — 0.9 % SODIUM CHLORIDE 0.9 %
250 INTRAVENOUS SOLUTION INTRAVENOUS ONCE
Status: DISCONTINUED | OUTPATIENT
Start: 2019-01-17 | End: 2019-01-18 | Stop reason: HOSPADM

## 2019-01-17 RX ORDER — GUAIFENESIN/DEXTROMETHORPHAN 100-10MG/5
5 SYRUP ORAL EVERY 4 HOURS PRN
Status: DISCONTINUED | OUTPATIENT
Start: 2019-01-17 | End: 2019-01-18 | Stop reason: HOSPADM

## 2019-01-17 RX ADMIN — INSULIN LISPRO 4 UNITS: 100 INJECTION, SOLUTION INTRAVENOUS; SUBCUTANEOUS at 11:53

## 2019-01-17 RX ADMIN — LACTULOSE 20 G: 20 SOLUTION ORAL at 08:11

## 2019-01-17 RX ADMIN — INSULIN LISPRO 4 UNITS: 100 INJECTION, SOLUTION INTRAVENOUS; SUBCUTANEOUS at 17:06

## 2019-01-17 RX ADMIN — BENZONATATE 100 MG: 100 CAPSULE ORAL at 03:19

## 2019-01-17 RX ADMIN — HYDROCODONE BITARTRATE AND ACETAMINOPHEN 1 TABLET: 5; 325 TABLET ORAL at 18:17

## 2019-01-17 RX ADMIN — TOPIRAMATE 25 MG: 25 TABLET, FILM COATED ORAL at 21:18

## 2019-01-17 RX ADMIN — BENZONATATE 100 MG: 100 CAPSULE ORAL at 21:18

## 2019-01-17 RX ADMIN — INSULIN LISPRO 6 UNITS: 100 INJECTION, SOLUTION INTRAVENOUS; SUBCUTANEOUS at 06:58

## 2019-01-17 RX ADMIN — FLUOXETINE HYDROCHLORIDE 40 MG: 20 CAPSULE ORAL at 08:11

## 2019-01-17 RX ADMIN — LISINOPRIL 20 MG: 20 TABLET ORAL at 08:11

## 2019-01-17 RX ADMIN — HYDROCODONE BITARTRATE AND ACETAMINOPHEN 1 TABLET: 5; 325 TABLET ORAL at 05:25

## 2019-01-17 RX ADMIN — INSULIN LISPRO 6 UNITS: 100 INJECTION, SOLUTION INTRAVENOUS; SUBCUTANEOUS at 22:32

## 2019-01-17 RX ADMIN — BENZONATATE 100 MG: 100 CAPSULE ORAL at 18:17

## 2019-01-17 RX ADMIN — ROPINIROLE HYDROCHLORIDE 0.5 MG: 0.5 TABLET, FILM COATED ORAL at 14:04

## 2019-01-17 RX ADMIN — TOPIRAMATE 25 MG: 25 TABLET, FILM COATED ORAL at 08:12

## 2019-01-17 RX ADMIN — LIDOCAINE HYDROCHLORIDE 15 ML: 20 SOLUTION ORAL; TOPICAL at 03:20

## 2019-01-17 RX ADMIN — GUAIFENESIN AND DEXTROMETHORPHAN 5 ML: 100; 10 SYRUP ORAL at 00:31

## 2019-01-17 RX ADMIN — LINAGLIPTIN 5 MG: 5 TABLET, FILM COATED ORAL at 08:11

## 2019-01-17 RX ADMIN — CHOLECALCIFEROL TAB 10 MCG (400 UNIT) 400 UNITS: 10 TAB at 08:11

## 2019-01-17 RX ADMIN — Medication 10 ML: at 08:12

## 2019-01-17 RX ADMIN — ROPINIROLE HYDROCHLORIDE 0.5 MG: 0.5 TABLET, FILM COATED ORAL at 08:12

## 2019-01-17 RX ADMIN — HYDROCODONE BITARTRATE AND ACETAMINOPHEN 1 TABLET: 5; 325 TABLET ORAL at 11:55

## 2019-01-17 RX ADMIN — ROPINIROLE HYDROCHLORIDE 0.5 MG: 0.5 TABLET, FILM COATED ORAL at 21:18

## 2019-01-17 RX ADMIN — PANTOPRAZOLE SODIUM 40 MG: 40 INJECTION, POWDER, FOR SOLUTION INTRAVENOUS at 08:12

## 2019-01-17 RX ADMIN — LACTULOSE 20 G: 20 SOLUTION ORAL at 14:04

## 2019-01-17 ASSESSMENT — PAIN DESCRIPTION - LOCATION
LOCATION: GENERALIZED
LOCATION: GENERALIZED

## 2019-01-17 ASSESSMENT — PAIN SCALES - GENERAL
PAINLEVEL_OUTOF10: 6
PAINLEVEL_OUTOF10: 0
PAINLEVEL_OUTOF10: 0
PAINLEVEL_OUTOF10: 8
PAINLEVEL_OUTOF10: 6
PAINLEVEL_OUTOF10: 8
PAINLEVEL_OUTOF10: 0
PAINLEVEL_OUTOF10: 6
PAINLEVEL_OUTOF10: 2

## 2019-01-17 ASSESSMENT — PAIN DESCRIPTION - ONSET
ONSET: ON-GOING
ONSET: ON-GOING

## 2019-01-17 ASSESSMENT — PAIN DESCRIPTION - DESCRIPTORS
DESCRIPTORS: ACHING;DISCOMFORT
DESCRIPTORS: ACHING;DISCOMFORT

## 2019-01-17 ASSESSMENT — PAIN DESCRIPTION - PAIN TYPE
TYPE: CHRONIC PAIN
TYPE: CHRONIC PAIN

## 2019-01-17 ASSESSMENT — PAIN DESCRIPTION - FREQUENCY
FREQUENCY: INTERMITTENT
FREQUENCY: INTERMITTENT

## 2019-01-18 VITALS
OXYGEN SATURATION: 97 % | TEMPERATURE: 98.8 F | HEIGHT: 64 IN | WEIGHT: 220.5 LBS | DIASTOLIC BLOOD PRESSURE: 63 MMHG | SYSTOLIC BLOOD PRESSURE: 138 MMHG | RESPIRATION RATE: 18 BRPM | BODY MASS INDEX: 37.65 KG/M2 | HEART RATE: 97 BPM

## 2019-01-18 LAB
ANION GAP SERPL CALCULATED.3IONS-SCNC: 16 MMOL/L (ref 7–16)
BUN BLDV-MCNC: 12 MG/DL (ref 8–23)
CALCIUM SERPL-MCNC: 9.5 MG/DL (ref 8.6–10.2)
CHLORIDE BLD-SCNC: 102 MMOL/L (ref 98–107)
CO2: 19 MMOL/L (ref 22–29)
CREAT SERPL-MCNC: 0.7 MG/DL (ref 0.5–1)
GFR AFRICAN AMERICAN: >60
GFR NON-AFRICAN AMERICAN: >60 ML/MIN/1.73
GLUCOSE BLD-MCNC: 189 MG/DL (ref 74–99)
HCT VFR BLD CALC: 29.1 % (ref 34–48)
HEMOGLOBIN: 9 G/DL (ref 11.5–15.5)
MAGNESIUM: 1.8 MG/DL (ref 1.6–2.6)
METER GLUCOSE: 170 MG/DL (ref 74–99)
METER GLUCOSE: 290 MG/DL (ref 74–99)
POTASSIUM SERPL-SCNC: 3.8 MMOL/L (ref 3.5–5)
SODIUM BLD-SCNC: 137 MMOL/L (ref 132–146)

## 2019-01-18 PROCEDURE — 82962 GLUCOSE BLOOD TEST: CPT

## 2019-01-18 PROCEDURE — 80048 BASIC METABOLIC PNL TOTAL CA: CPT

## 2019-01-18 PROCEDURE — 85014 HEMATOCRIT: CPT

## 2019-01-18 PROCEDURE — C9113 INJ PANTOPRAZOLE SODIUM, VIA: HCPCS | Performed by: INTERNAL MEDICINE

## 2019-01-18 PROCEDURE — 6370000000 HC RX 637 (ALT 250 FOR IP): Performed by: HOSPITALIST

## 2019-01-18 PROCEDURE — 36415 COLL VENOUS BLD VENIPUNCTURE: CPT

## 2019-01-18 PROCEDURE — 6360000002 HC RX W HCPCS: Performed by: INTERNAL MEDICINE

## 2019-01-18 PROCEDURE — 85018 HEMOGLOBIN: CPT

## 2019-01-18 PROCEDURE — 83735 ASSAY OF MAGNESIUM: CPT

## 2019-01-18 PROCEDURE — 6370000000 HC RX 637 (ALT 250 FOR IP): Performed by: INTERNAL MEDICINE

## 2019-01-18 PROCEDURE — 2580000003 HC RX 258: Performed by: INTERNAL MEDICINE

## 2019-01-18 PROCEDURE — 6370000000 HC RX 637 (ALT 250 FOR IP): Performed by: FAMILY MEDICINE

## 2019-01-18 RX ORDER — DIPHENHYDRAMINE HCL 25 MG
25 TABLET ORAL EVERY 6 HOURS PRN
Status: DISCONTINUED | OUTPATIENT
Start: 2019-01-18 | End: 2019-01-18 | Stop reason: HOSPADM

## 2019-01-18 RX ADMIN — BENZONATATE 100 MG: 100 CAPSULE ORAL at 01:26

## 2019-01-18 RX ADMIN — HYDROCODONE BITARTRATE AND ACETAMINOPHEN 1 TABLET: 5; 325 TABLET ORAL at 08:44

## 2019-01-18 RX ADMIN — LISINOPRIL 20 MG: 20 TABLET ORAL at 08:44

## 2019-01-18 RX ADMIN — LINAGLIPTIN 5 MG: 5 TABLET, FILM COATED ORAL at 08:44

## 2019-01-18 RX ADMIN — ROPINIROLE HYDROCHLORIDE 0.5 MG: 0.5 TABLET, FILM COATED ORAL at 08:44

## 2019-01-18 RX ADMIN — DIPHENHYDRAMINE HCL 25 MG: 25 TABLET ORAL at 05:54

## 2019-01-18 RX ADMIN — HYDROCODONE BITARTRATE AND ACETAMINOPHEN 1 TABLET: 5; 325 TABLET ORAL at 01:26

## 2019-01-18 RX ADMIN — FLUOXETINE HYDROCHLORIDE 40 MG: 20 CAPSULE ORAL at 08:44

## 2019-01-18 RX ADMIN — TOPIRAMATE 25 MG: 25 TABLET, FILM COATED ORAL at 08:44

## 2019-01-18 RX ADMIN — INSULIN LISPRO 6 UNITS: 100 INJECTION, SOLUTION INTRAVENOUS; SUBCUTANEOUS at 11:56

## 2019-01-18 RX ADMIN — INSULIN LISPRO 2 UNITS: 100 INJECTION, SOLUTION INTRAVENOUS; SUBCUTANEOUS at 06:47

## 2019-01-18 RX ADMIN — CHOLECALCIFEROL TAB 10 MCG (400 UNIT) 400 UNITS: 10 TAB at 08:44

## 2019-01-18 RX ADMIN — Medication 10 ML: at 08:43

## 2019-01-18 RX ADMIN — PANTOPRAZOLE SODIUM 40 MG: 40 INJECTION, POWDER, FOR SOLUTION INTRAVENOUS at 08:43

## 2019-01-18 ASSESSMENT — PAIN DESCRIPTION - ONSET: ONSET: ON-GOING

## 2019-01-18 ASSESSMENT — PAIN DESCRIPTION - ORIENTATION: ORIENTATION: MID

## 2019-01-18 ASSESSMENT — PAIN SCALES - GENERAL
PAINLEVEL_OUTOF10: 4
PAINLEVEL_OUTOF10: 8
PAINLEVEL_OUTOF10: 3
PAINLEVEL_OUTOF10: 0

## 2019-01-18 ASSESSMENT — PAIN DESCRIPTION - PAIN TYPE
TYPE: ACUTE PAIN
TYPE: ACUTE PAIN

## 2019-01-18 ASSESSMENT — PAIN DESCRIPTION - FREQUENCY: FREQUENCY: INTERMITTENT

## 2019-01-18 ASSESSMENT — PAIN DESCRIPTION - PROGRESSION: CLINICAL_PROGRESSION: GRADUALLY WORSENING

## 2019-01-18 ASSESSMENT — PAIN DESCRIPTION - LOCATION
LOCATION: HEAD
LOCATION: HEAD

## 2019-01-18 ASSESSMENT — PAIN DESCRIPTION - DESCRIPTORS: DESCRIPTORS: ACHING;DULL;DISCOMFORT

## 2020-06-30 PROBLEM — G44.229 CHRONIC TENSION-TYPE HEADACHE, NOT INTRACTABLE: Status: RESOLVED | Noted: 2018-02-07 | Resolved: 2020-06-30

## 2020-06-30 PROBLEM — K92.2 GIB (GASTROINTESTINAL BLEEDING): Status: RESOLVED | Noted: 2019-01-14 | Resolved: 2020-06-30

## 2020-08-13 ENCOUNTER — HOSPITAL ENCOUNTER (OUTPATIENT)
Age: 70
Discharge: HOME | End: 2020-08-15
Payer: MEDICARE

## 2020-08-13 ENCOUNTER — OFFICE VISIT (OUTPATIENT)
Dept: PAIN MANAGEMENT | Age: 70
End: 2020-08-13
Payer: MEDICARE

## 2020-08-13 VITALS
DIASTOLIC BLOOD PRESSURE: 78 MMHG | OXYGEN SATURATION: 98 % | HEIGHT: 64 IN | TEMPERATURE: 97.8 F | BODY MASS INDEX: 33.46 KG/M2 | WEIGHT: 196 LBS | RESPIRATION RATE: 16 BRPM | SYSTOLIC BLOOD PRESSURE: 122 MMHG | HEART RATE: 88 BPM

## 2020-08-13 PROBLEM — M47.816 LUMBAR FACET ARTHROPATHY: Status: ACTIVE | Noted: 2020-08-13

## 2020-08-13 PROBLEM — M51.9 LUMBAR DISC DISORDER: Status: ACTIVE | Noted: 2020-08-13

## 2020-08-13 PROBLEM — G89.4 CHRONIC PAIN SYNDROME: Status: ACTIVE | Noted: 2020-08-13

## 2020-08-13 PROBLEM — M47.816 LUMBAR SPONDYLOSIS: Status: ACTIVE | Noted: 2020-08-13

## 2020-08-13 PROBLEM — G43.009 MIGRAINE WITHOUT AURA AND WITHOUT STATUS MIGRAINOSUS, NOT INTRACTABLE: Status: ACTIVE | Noted: 2020-08-13

## 2020-08-13 PROBLEM — M48.061 SPINAL STENOSIS OF LUMBAR REGION WITHOUT NEUROGENIC CLAUDICATION: Status: ACTIVE | Noted: 2020-08-13

## 2020-08-13 PROCEDURE — G8400 PT W/DXA NO RESULTS DOC: HCPCS | Performed by: PAIN MEDICINE

## 2020-08-13 PROCEDURE — 3017F COLORECTAL CA SCREEN DOC REV: CPT | Performed by: PAIN MEDICINE

## 2020-08-13 PROCEDURE — 99204 OFFICE O/P NEW MOD 45 MIN: CPT | Performed by: PAIN MEDICINE

## 2020-08-13 PROCEDURE — 1090F PRES/ABSN URINE INCON ASSESS: CPT | Performed by: PAIN MEDICINE

## 2020-08-13 PROCEDURE — 1036F TOBACCO NON-USER: CPT | Performed by: PAIN MEDICINE

## 2020-08-13 PROCEDURE — 1123F ACP DISCUSS/DSCN MKR DOCD: CPT | Performed by: PAIN MEDICINE

## 2020-08-13 PROCEDURE — G8427 DOCREV CUR MEDS BY ELIG CLIN: HCPCS | Performed by: PAIN MEDICINE

## 2020-08-13 PROCEDURE — G8417 CALC BMI ABV UP PARAM F/U: HCPCS | Performed by: PAIN MEDICINE

## 2020-08-13 PROCEDURE — 4040F PNEUMOC VAC/ADMIN/RCVD: CPT | Performed by: PAIN MEDICINE

## 2020-08-13 NOTE — PROGRESS NOTES
Do you currently have any of the following:    Fever: No  Headache:  No  Cough: No  Shortness of breath: No  Exposed to anyone with these symptoms: No                                                                                                                Alfredito Jimenez presents to the [facility] on 8/13/2020. Sonja Burgos is complaining of pain is from the neck down with bilateral sciatica. . The pain is constant. The pain is described as aching, throbbing, shooting, stabbing, sharp, burning, nagging and numb. Pain is rated on her best day at a 7, on her worst day at a 9, and on average at a 8 on the VAS scale. She took her last dose of Neurontin this morning. Lizzy Castillo does not have issues with constipation. Any procedures since your last visit: No,       She is not on NSAIDS and  is not on anticoagulation medications to include none and is managed by NA. Pacemaker or defibrilator: No Physician managing device is NA.       /78   Pulse 88   Temp 97.8 °F (36.6 °C) (Infrared)   Resp 16   Ht 5' 3.5\" (1.613 m)   Wt 196 lb (88.9 kg)   LMP  (LMP Unknown)   SpO2 98%   BMI 34.18 kg/m²      No LMP recorded (lmp unknown).  Patient is postmenopausal.

## 2020-08-13 NOTE — PROGRESS NOTES
[facility]        [address] [facility], [address] [facility] ([place]      [phone]          Consult Note      Patient:  Tosha Gil DOB 1950    Date of Service:  20    Requesting Physician:  Vita Bishop DO    Reason for Consult:      Patient presents with complaints of low back pain that started a long time ago and has been progressively getting worse. HISTORY OF PRESENT ILLNESS:      Pain does radiate to both lower extremities in a non dermatomal pattern. She  does not have numbness, tingling and does not have bladder or bowel dysfunction. She has not been on anticoagulation medications to include none. The patient  has not been on herbal supplements. The patient is diabetic. Imaging:   Lumbar spine MRI   1.  Marked disc degeneration at L2-3, L3-4 and L4-5. 2.  Central/right subarticular disc protrusion at L2-3, displacing the   right L3 nerve root.  Mild to moderate spinal stenosis at this level. 3.  Right extraforaminal disc protrusion at L3-4, displacing the right L3   nerve root. 4.  Left lateral recess stenosis at L4-5 with medial displacement of the   left L5 nerve root. 5.  Multilevel facet arthropathy. 6.  Lumbar levoscoliosis. 7.  Neural foraminal stenosis as above. Previous treatments: Physical Therapy, Epidural Steroid Injection, facet RFA and medications. .      Past Medical History:   Diagnosis Date    Arthritis     Cataract     Depression     Diabetes mellitus ([address]     Eczema     Hypertension     Memory loss     Vertigo      Past Surgical History:   Procedure Laterality Date    CARPAL TUNNEL RELEASE      TONSILLECTOMY AND ADENOIDECTOMY      UPPER GASTROINTESTINAL ENDOSCOPY N/A 2019    EGD BIOPSY performed by Marcia Smith MD at [address] [place] [address]     Prior to Admission medications    Medication Sig Start Date End Date Taking?  Authorizing Provider   FLUoxetine (PROZAC) 40 MG capsule TAKE ONE CAPSULE Last attempt to quit:      Years since quittin.6    Smokeless tobacco: Never Used   Substance and Sexual Activity    Alcohol use: Yes     Alcohol/week: 1.0 standard drinks     Types: 1 Glasses of wine per week    Drug use: No    Sexual activity: Never   Lifestyle    Physical activity     Days per week: Not on file     Minutes per session: Not on file    Stress: Not on file   Relationships    Social connections     Talks on phone: Not on file     Gets together: Not on file     Attends [employer] service: Not on file     Active member of club or organization: Not on file     Attends meetings of clubs or organizations: Not on file     Relationship status: Not on file    Intimate partner violence     Fear of current or ex partner: Not on file     Emotionally abused: Not on file     Physically abused: Not on file     Forced sexual activity: Not on file   Other Topics Concern    Not on file   Social History Narrative    Not on file       Family History   Problem Relation Age of Onset    Hypertension Father     Heart Disease Father     Hypertension Paternal Grandfather     Heart Disease Paternal Grandfather        REVIEW OF SYSTEMS:     Patient specifically denies fever/chills, chest pain, shortness of breath, new bowel or bladder complaints. All other review of systems was negative. PHYSICAL EXAMINATION:      /78   Pulse 88   Temp 97.8 °F (36.6 °C) (Infrared)   Resp 16   Ht 5' 3.5\" (1.613 m)   Wt 196 lb (88.9 kg)   LMP  (LMP Unknown)   SpO2 98%   BMI 34.18 kg/m²     General:      General appearance:   elderly, pleasant and well-hydrated.    , in moderate discomfort and A & O x3  Build:Overweight    HEENT:    Head:normocephalic and atraumatic  Sclera: icterus absent,     Lungs:    Breathing:Breathing Pattern: regular, no distress    Abdomen:    Shape:obese, non-distended and normal  Tenderness:none    Lumbar spine:    Spine inspection:normal   CVA tenderness:No Palpation:tenderness paravertebral muscles, facet loading, left, right and positive. Right worse than Left  Range of motion:abnormal moderately Lateral bending, flexion, extension rotation bilateral and is  painful. Musculoskeletal:    Trigger points in Paraveteral:absent bilaterally  SI joint tenderness:negative right, negative left              GAL test:negative right, negative             left  Piriformis tenderness:negative right, negative left  Trochanteric bursa tenderness:negative right, negative left  SLR:negative right, negative left, sitting     Extremities:    Tremors:None bilaterally upper and lower  Range of motion:pain with internal rotation of hips negative. Intact:Yes  Edema:Normal    Neurological:    Sensory:normal to light touch bilateral lower extremities. Motor:                             Right Quadriceps5/5          Left Quadriceps5/5           Right Gastrocnemius5/5    Left Gastrocnemius5/5  Right Ant Tibialis5/5  Left Ant Tibialis5/5  Reflexes:    Right Quadriceps reflex2+  Left Quadriceps reflex2+  Right Achilles reflex2+  Left Achilles reflex2+  Gait:normal    Dermatology:    Skin:no unusual rashes and no skin lesions    Impression:  Chronic low back pain with non dermatomal radiation to bilateral lower extremities. Lumbar spine MRI 2014 multilevel degenerative disc disease with multilevel facet arthropathy. Patient had LESI and lumbar facet RFA, per patient RFA had lasted for 5 month. Plan:  Axial low back pain. Reviewed lumbar spine [id number]) and discussed with the patient. Will schedule patient for lumbar spine MRI. Patient is scheduled to see a neurologist because of migraines. Urine screen today. OARRS report reviewed 08/2020. Patient encouraged to stay active and to lose weight. Patient mentioned that she had physical therapy before which had aggravated her pain. Treatment plan discussed with the patient.     We discussed with the patient that combining opioids, benzodiazepines, alcohol, illicit drugs or sleep aids increases the risk of respiratory depression including death. We discussed that these medications may cause drowsiness, sedation or dizziness and have counseled the patient not to drive or operate machinery. We have discussed that these medications will be prescribed only by one provider. We have discussed with the patient about age related risk factors and have thoroughly discussed the importance of taking these medications as prescribed. The patient verbalizes understanding. ирина Rangel M.D.

## 2020-09-10 ENCOUNTER — OFFICE VISIT (OUTPATIENT)
Dept: PAIN MANAGEMENT | Age: 70
End: 2020-09-10
Payer: MEDICARE

## 2020-09-10 ENCOUNTER — TELEPHONE (OUTPATIENT)
Dept: PAIN MANAGEMENT | Age: 70
End: 2020-09-10

## 2020-09-10 VITALS
BODY MASS INDEX: 33.46 KG/M2 | RESPIRATION RATE: 16 BRPM | TEMPERATURE: 96 F | WEIGHT: 196 LBS | DIASTOLIC BLOOD PRESSURE: 68 MMHG | OXYGEN SATURATION: 97 % | SYSTOLIC BLOOD PRESSURE: 114 MMHG | HEIGHT: 64 IN | HEART RATE: 97 BPM

## 2020-09-10 PROCEDURE — 3017F COLORECTAL CA SCREEN DOC REV: CPT | Performed by: PAIN MEDICINE

## 2020-09-10 PROCEDURE — 1123F ACP DISCUSS/DSCN MKR DOCD: CPT | Performed by: PAIN MEDICINE

## 2020-09-10 PROCEDURE — 4040F PNEUMOC VAC/ADMIN/RCVD: CPT | Performed by: PAIN MEDICINE

## 2020-09-10 PROCEDURE — G8427 DOCREV CUR MEDS BY ELIG CLIN: HCPCS | Performed by: PAIN MEDICINE

## 2020-09-10 PROCEDURE — 1090F PRES/ABSN URINE INCON ASSESS: CPT | Performed by: PAIN MEDICINE

## 2020-09-10 PROCEDURE — 1036F TOBACCO NON-USER: CPT | Performed by: PAIN MEDICINE

## 2020-09-10 PROCEDURE — 99213 OFFICE O/P EST LOW 20 MIN: CPT | Performed by: PAIN MEDICINE

## 2020-09-10 PROCEDURE — G8400 PT W/DXA NO RESULTS DOC: HCPCS | Performed by: PAIN MEDICINE

## 2020-09-10 PROCEDURE — G8417 CALC BMI ABV UP PARAM F/U: HCPCS | Performed by: PAIN MEDICINE

## 2020-09-10 PROCEDURE — 99214 OFFICE O/P EST MOD 30 MIN: CPT | Performed by: PAIN MEDICINE

## 2020-09-10 RX ORDER — HYDROCODONE BITARTRATE AND ACETAMINOPHEN 5; 325 MG/1; MG/1
1 TABLET ORAL DAILY PRN
Qty: 30 TABLET | Refills: 0 | Status: SHIPPED | OUTPATIENT
Start: 2020-09-10 | End: 2020-10-10

## 2020-09-10 NOTE — PROGRESS NOTES
Do you currently have any of the following:    Fever: No  Headache:  No  Cough: No  Shortness of breath: No  Exposed to anyone with these symptoms: No                                                                                                                Jaylon Galan presents to the [facility] on 9/10/2020. Grecia Villanueva is complaining of pain in lower back and upper back. . The pain is constant. The pain is described as shooting, burning and feeels electrical.. Pain is rated on her best day at a 5, on her worst day at a 10, and on average at a 7 on the VAS scale. She took her last dose of Tylenol and Ibuprofen. Aramis Marc does not have issues with constipation. Any procedures since your last visit: No,    She is  on NSAIDS and  is not on anticoagulation medications to include none and is managed by Verito Moreno DO  . Pacemaker or defibrilator: No Physician managing device is NA.       /68   Pulse 97   Temp 96 °F (35.6 °C) (Infrared)   Resp 16   Ht 5' 3.5\" (1.613 m)   Wt 196 lb (88.9 kg)   LMP  (LMP Unknown)   SpO2 97%   BMI 34.18 kg/m²      No LMP recorded (lmp unknown).  Patient is postmenopausal.

## 2020-09-10 NOTE — PROGRESS NOTES
mellitus ([address]     Eczema     Hypertension     Memory loss     Vertigo      Past Surgical History:   Procedure Laterality Date    CARPAL TUNNEL RELEASE      TONSILLECTOMY AND ADENOIDECTOMY      UPPER GASTROINTESTINAL ENDOSCOPY N/A 1/16/2019    EGD BIOPSY performed by Rodney Delatorre MD at [address] [place] [address]     Prior to Admission medications    Medication Sig Start Date End Date Taking? Authorizing Provider   FLUoxetine (PROZAC) 40 MG capsule TAKE ONE CAPSULE BY MOUTH DAILY 8/4/20  Yes Danny Grace, DO   metFORMIN (GLUCOPHAGE) 500 MG tablet TAKE ONE TABLET BY MOUTH TWICE A DAY WITH MEALS 8/4/20  Yes Danny Grace, DO   omeprazole (PRILOSEC) 20 MG delayed release capsule Take 1 capsule by mouth Daily 8/4/20  Yes Danny Grace, DO   TRULICITY 1.5 EL/2.4CZ SOPN INJECT 1.5MG SUBCUTANEOUSLY ONCE WEEKLY. 7/14/20  Yes Danny Grace, DO   rOPINIRole (REQUIP) 0.5 MG tablet TAKE ONE TABLET BY MOUTH THREE TIMES A DAY 12/2/19  Yes Danny Grace, DO   diclofenac sodium 1 % GEL Apply 2 g topically 4 times daily 8/20/19  Yes Danny Grace, DO   lisinopril (PRINIVIL;ZESTRIL) 20 MG tablet TAKE ONE TABLET BY MOUTH EVERY DAY 1/7/19  Yes Cristiano Andersonosta, DO   vitamin D3 (CHOLECALCIFEROL) 400 units TABS tablet Take 1 tablet by mouth daily 12/12/18  Yes Danny Grace, DO   gabapentin (NEURONTIN) 300 MG capsule Take 1 capsule by mouth 4 times daily for 30 days.  8/4/20 9/3/20  Graydon Cheng, DO   SUMAtriptan (IMITREX) 100 MG tablet Take 1 tablet by mouth once as needed for Migraine 8/23/19 6/30/20  Graydon Cheng, DO     Allergies   Allergen Reactions    Baclofen      Dry mouth    Ibuprofen      Acute bleeding    Nickel      Surgical steel    Penicillins Hives     Social History     Socioeconomic History    Marital status:      Spouse name: Not on file    Number of children: Not on file    Years of education: Not on file    Highest education level: Not on file   Occupational History    Not on file Social Needs    Financial resource strain: Not on file    Food insecurity     Worry: Not on file     Inability: Not on file    Transportation needs     Medical: Not on file     Non-medical: Not on file   Tobacco Use    Smoking status: Former Smoker     Packs/day: 2.00     Years: 10.00     Pack years: 20.00     Types: Cigarettes     Start date:      Last attempt to quit:      Years since quittin.7    Smokeless tobacco: Never Used   Substance and Sexual Activity    Alcohol use: Yes     Alcohol/week: 1.0 standard drinks     Types: 1 Glasses of wine per week    Drug use: No    Sexual activity: Never   Lifestyle    Physical activity     Days per week: Not on file     Minutes per session: Not on file    Stress: Not on file   Relationships    Social connections     Talks on phone: Not on file     Gets together: Not on file     Attends Congregation service: Not on file     Active member of club or organization: Not on file     Attends meetings of clubs or organizations: Not on file     Relationship status: Not on file    Intimate partner violence     Fear of current or ex partner: Not on file     Emotionally abused: Not on file     Physically abused: Not on file     Forced sexual activity: Not on file   Other Topics Concern    Not on file   Social History Narrative    Not on file     Family History   Problem Relation Age of Onset    Hypertension Father     Heart Disease Father     Hypertension Paternal Grandfather     Heart Disease Paternal Grandfather      REVIEW OF SYSTEMS:     Bisi Kumardominic denies fever/chills, chest pain, shortness of breath, new bowel or bladder complaints. All other review of systems was negative. PHYSICAL EXAMINATION:      /68   Pulse 97   Temp 96 °F (35.6 °C) (Infrared)   Resp 16   Ht 5' 3.5\" (1.613 m)   Wt 196 lb (88.9 kg)   LMP  (LMP Unknown)   SpO2 97%   BMI 34.18 kg/m²     General:       General appearance:   elderly, pleasant and well-hydrated.    , in moderate discomfort and A & O x3  Build:Overweight     HEENT:     Head:normocephalic and atraumatic  Sclera: icterus absent,      Lungs:     Breathing:Breathing Pattern: regular, no distress     Abdomen:     Shape:obese, non-distended and normal  Tenderness:none     Lumbar spine:     Spine inspection:normal   CVA tenderness:No   Palpation:tenderness paravertebral muscles, facet loading, left, right and positive. Right worse than Left  Range of motion:abnormal moderately Lateral bending, flexion, extension rotation bilateral and is  painful.     Musculoskeletal:     Trigger points in Paraveteral:absent bilaterally  SI joint tenderness:negative right, negative left              GAL test:negative right, negative             left  Piriformis tenderness:negative right, negative left  Trochanteric bursa tenderness:negative right, negative left  SLR:negative right, negative left, sitting      Extremities:     Tremors:None bilaterally upper and lower  Range of motion:pain with internal rotation of hips negative. Intact:Yes  Edema:Normal     Neurological:     Sensory:normal to light touch bilateral lower extremities. Motor:                             Right Quadriceps5/5          Left Quadriceps5/5           Right Gastrocnemius5/5    Left Gastrocnemius5/5  Right Ant Tibialis5/5  Left Ant Tibialis5/5  Reflexes:    Right Quadriceps reflex2+  Left Quadriceps reflex2+  Right Achilles reflex2+  Left Achilles reflex2+  Gait:normal     Dermatology:     Skin:no unusual rashes and no skin lesions     Impression:  Chronic low back pain with non dermatomal radiation to bilateral lower extremities. Lumbar spine MRI 2014 multilevel degenerative disc disease with multilevel facet arthropathy. Patient had LESI and lumbar facet RFA, per patient RFA had lasted for 5 month. Plan:  Follow up on her low back pain with no acute issues. Results of lumbar spine MRI were discussed with the patient.   Will schedule patient for bilateral lower extremity EMG. Will consider referral to neurosurgery vs possible repeating interventional procedures. Will revisit next office visit. Will start patient on [place] 5/325 QD PRN. Re-discussed long term side effects of opioids and expectations from pain management. Patient is scheduled to see a neurologist because of migraines. Awaiting results of first office visit urine screen. OARRS report reviewed 08/2020. Patient encouraged to stay active and to lose weight. Patient mentioned that she had physical therapy before which had aggravated her pain. Treatment plan discussed with the patient including medications side effects. We discussed with the patient that combining opioids, benzodiazepines, alcohol, illicit drugs or sleep aids increases the risk of respiratory depression including death. We discussed that these medications may cause drowsiness, sedation or dizziness and have counseled the patient not to drive or operate machinery. We have discussed that these medications will be prescribed only by one provider. We have discussed with the patient about age related risk factors and have thoroughly discussed the importance of taking these medications as prescribed. The patient verbalizes understanding. ccropal Rangel M.D.

## 2020-10-07 ENCOUNTER — TELEPHONE (OUTPATIENT)
Dept: FAMILY MEDICINE CLINIC | Age: 70
End: 2020-10-07

## 2020-10-07 NOTE — TELEPHONE ENCOUNTER
Respectfully decline; refer to [facility] where there are two female physicians accepting new patients

## 2020-10-07 NOTE — TELEPHONE ENCOUNTER
Pt is requesting to establish Dr Daljit Santos as PCP. She is being referred by Ileana Sandoval. Please Advise.

## 2020-10-08 ENCOUNTER — OFFICE VISIT (OUTPATIENT)
Dept: NEUROLOGY | Age: 70
End: 2020-10-08
Payer: MEDICARE

## 2020-10-08 VITALS
HEART RATE: 104 BPM | WEIGHT: 194 LBS | TEMPERATURE: 97.9 F | DIASTOLIC BLOOD PRESSURE: 113 MMHG | BODY MASS INDEX: 34.38 KG/M2 | OXYGEN SATURATION: 97 % | SYSTOLIC BLOOD PRESSURE: 149 MMHG | HEIGHT: 63 IN

## 2020-10-08 PROCEDURE — G8417 CALC BMI ABV UP PARAM F/U: HCPCS | Performed by: NURSE PRACTITIONER

## 2020-10-08 PROCEDURE — G8400 PT W/DXA NO RESULTS DOC: HCPCS | Performed by: NURSE PRACTITIONER

## 2020-10-08 PROCEDURE — G8484 FLU IMMUNIZE NO ADMIN: HCPCS | Performed by: NURSE PRACTITIONER

## 2020-10-08 PROCEDURE — 1036F TOBACCO NON-USER: CPT | Performed by: NURSE PRACTITIONER

## 2020-10-08 PROCEDURE — G8427 DOCREV CUR MEDS BY ELIG CLIN: HCPCS | Performed by: NURSE PRACTITIONER

## 2020-10-08 PROCEDURE — 99205 OFFICE O/P NEW HI 60 MIN: CPT | Performed by: NURSE PRACTITIONER

## 2020-10-08 PROCEDURE — 1090F PRES/ABSN URINE INCON ASSESS: CPT | Performed by: NURSE PRACTITIONER

## 2020-10-08 PROCEDURE — 3017F COLORECTAL CA SCREEN DOC REV: CPT | Performed by: NURSE PRACTITIONER

## 2020-10-08 PROCEDURE — 1123F ACP DISCUSS/DSCN MKR DOCD: CPT | Performed by: NURSE PRACTITIONER

## 2020-10-08 PROCEDURE — 4040F PNEUMOC VAC/ADMIN/RCVD: CPT | Performed by: NURSE PRACTITIONER

## 2020-10-08 RX ORDER — LORATADINE 10 MG/1
10 TABLET ORAL DAILY
Qty: 30 TABLET | Refills: 0 | Status: SHIPPED | OUTPATIENT
Start: 2020-10-08 | End: 2020-11-07

## 2020-10-08 RX ORDER — TIZANIDINE HYDROCHLORIDE 2 MG/1
2 CAPSULE, GELATIN COATED ORAL 2 TIMES DAILY
Qty: 60 CAPSULE | Refills: 0 | Status: SHIPPED
Start: 2020-10-08 | End: 2020-10-28

## 2020-10-08 NOTE — PROGRESS NOTES
[facility]. Ramirez Escobar M.D., F.A.C.P. Zaira Reid, ROULA, APRN, CNS  George Bobbyr. Ayala Eastman, MSN, APRN-FNP-C  Karrie Agrawal MSN, APRN, FNP-C  Priscilla HUMPHREYS PA-C  [address] MSN, APRN, FNP-C  [address], [facility]  [place], [address]  Phone: [phone]  Fax: [phone]       Marleni Luis is a 72 y.o. right handed female     Patient presents to neurology office today for evaluation and management of headaches and dizziness. Patient presents alone and is deemed a good historian. Past Medical History:     Past Medical History:   Diagnosis Date    Arthritis     Cataract     right    Chronic fatigue syndrome     Depression     Diabetes mellitus (HCC)     Eczema     Fibromyalgia     Hypertension     Memory loss     Mononucleosis     Sciatica     Vertigo        Past Surgical History:       Past Surgical History:   Procedure Laterality Date    CARPAL TUNNEL RELEASE Bilateral     CATARACT REMOVAL Right     TONSILLECTOMY AND ADENOIDECTOMY      UPPER GASTROINTESTINAL ENDOSCOPY N/A 1/16/2019    EGD BIOPSY performed by Lord Aftab [facility]       Allergies:       Baclofen; Ibuprofen; Nickel; and Penicillins    Medications:     Prior to Admission medications    Medication Sig Start Date End Date Taking? Authorizing Provider   loratadine (CLARITIN) 10 MG tablet Take 1 tablet by mouth daily 10/8/20 11/7/20 Yes MORALES Lama - NP   tiZANidine (ZANAFLEX) 2 MG capsule Take 1 capsule by mouth 2 times daily 10/8/20 11/7/20 Yes MORALES Lama - JAMI   HYDROcodone-acetaminophen (NORCO) 5-325 MG per tablet Take 1 tablet by mouth daily as needed for Pain for up to 30 days.  9/10/20 10/10/20 Yes Leonor Rojo MD   FLUoxetine (PROZAC) 40 MG capsule TAKE ONE CAPSULE BY MOUTH DAILY 8/4/20  Yes Danny Grace DO   metFORMIN (GLUCOPHAGE) 500 MG tablet TAKE ONE TABLET BY MOUTH TWICE A DAY WITH MEALS 8/4/20  Yes Issac Dickinson DO Les[place]   omeprazole (PRILOSEC) 20 MG delayed release capsule Take 1 capsule by mouth Daily 8/4/20  Yes Danny Grace DO   TRULICITY 1.5 NS/5.8GL SOPN INJECT 1.5MG SUBCUTANEOUSLY ONCE WEEKLY. 7/14/20  Yes Danny Grace DO   rOPINIRole (REQUIP) 0.5 MG tablet TAKE ONE TABLET BY MOUTH THREE TIMES A DAY 12/2/19  Yes Danny Grace DO   diclofenac sodium 1 % GEL Apply 2 g topically 4 times daily 8/20/19  Yes Danny Grace DO   lisinopril (PRINIVIL;ZESTRIL) 20 MG tablet TAKE ONE TABLET BY MOUTH EVERY DAY 1/7/19  Yes Danny Grace DO   gabapentin (NEURONTIN) 300 MG capsule Take 1 capsule by mouth 4 times daily for 30 days. 8/4/20 9/3/20  Ted Daly DO   SUMAtriptan (IMITREX) 100 MG tablet Take 1 tablet by mouth once as needed for Migraine 8/23/19 6/30/20  Ted Daly DO       Social History:       Patient reports that she quit smoking about 35 years ago, approximately 1985. Her smoking use included cigarettes. She started smoking about 48 years ago. She has a 20.00 pack-year smoking history. She has never used smokeless tobacco. She reports current alcohol use of about 1.0 standard drinks of alcohol per week. She reports that she does not use drugs. Patient is a retired nurse. Patient is . Patient has 1 son this 36 that lives in [place]; no grandkids.     Review of Systems:     (+) Lightheadedness, room spinning, unbalanced  (+) Headache  No chest pain or palpitations  No SOB  No loss of consciousness  No falls, tripping or stumbling  No incontinence of bowels or bladder  No itching or bruising appreciated  No numbness, tingling or focal arm/leg weakness    ROS is otherwise negative    Family History:     Family History   Problem Relation Age of Onset    Hypertension Father     Heart Disease Father     Hypertension Paternal Grandfather     Heart Disease Paternal Grandfather         History of Present Illness:     Patient presents to neurology office today for evaluation of years ago. This dizziness does not occur daily and now occurs randomly with no association to anything except standing or walking. Patient has chronic back pain, sciatica, chronic pain syndrome and fibromyalgia. Patient currently follows with Dr. Portia Connell in [facility] for pain management; repeat intervention versus neurosurgery is being debated. EMG studies of BLE are pending. Patient also suffers from hypertension, diabetes mellitus 2, memory loss, chronic arthritis, history of mononucleosis, eczema and depression. Patient admits today she is depressed due to loss of inability to help her self and chronic pain. Patient says her house is filthy as she is unable to clean it and she qualifies for assistance in her home but due to COVID-19 she is unable to get help. Patient does get Meals on Wheels daily to eat. Patient reports at least 8 hours of sleep per night. Patient drinks 2 to 3 cups of tea a day. Patient drinks 1 L water a day. Patient reports 2-3 meals through Me[employer] Whe[employer]. Patient lost over 70 pounds in 2015 after going to have gastric bypass evaluation--- ultimately losing the weight on her own and not requiring gastric bypass. Patient reports a high stress level due to her pain. Patient does not exercise. Patient's last physical therapy was approximately 3 years ago. Objective:       BP (!) 149/113 (Site: Right Upper Arm)   Pulse 104   Temp 97.9 °F (36.6 °C)   Ht 5' 3\" (1.6 m)   Wt 194 lb (88 kg)   LMP  (LMP Unknown)   SpO2 97%   BMI 34.37 kg/m²     General appearance: alert, obese, appears stated age, cooperative and in no distress  Head: normocephalic, without obvious abnormality, atraumatic. No sinus or frontal tenderness; Bilateral temple and occipital tenderness  Eyes: conjunctivae/corneas clear; no drainage  Neck: supple, symmetrical, trachea midline. Bilateral trapezius tenderness  Back: symmetric. ROM normal.  Mild kyphosis.   Tenderness throughout to palpation  Lungs: clear to auscultation bilaterally  Heart: regular rate and rhythm  Abdomen: soft, non-tender; bowel sounds normal  Extremities: normal, atraumatic, no cyanosis or edema  Pulses: 2+ and symmetric  Skin:  color, texture, turgor normal--no rashes or lesions      Mental Status: alert and oriented x 4, follows commands well, very conversant    Has to be redirected multiple times during visit--- strays off topic and does not answer directly    Appropriate attention/concentration  Intact fundus of knowledge  Memories intact    Speech: no dysarthria  Language: no aphasias---reading, writing, repetition, and object identification intact    Cranial Nerves:  I: smell  intact   II: visual acuity     II: visual fields Full to finger counting bilaterally   II: pupils PERRL   III,VII: ptosis None   III,IV,VI: extraocular muscles  EOMI without nystagmus   V: mastication Normal   V: facial light touch sensation  Normal   V,VII: corneal reflex     VII: facial muscle function - upper  Normal   VII: facial muscle function - lower Normal   VIII: hearing Normal   IX: soft palate elevation  Normal   IX,X: gag reflex    XI: trapezius strength  5/5   XI: sternocleidomastoid strength 5/5   XI: neck extension strength  5/5   XII: tongue strength  Normal     Motor:  5/5 to RUE and RLE  LUE -5/5, LLE +4/5  Normal bulk and tone  No drift   No abnormal movements    Sensory:  LT and PP normal  Vibration normal    Coordination:   FN, FFM and TORREY normal  HS normal    Gait:  Normal    DTR:   Right Brachioradialis reflex 1+  Left Brachioradialis reflex 1+  Right Biceps reflex 1+  Left Biceps reflex 1+  Right Triceps reflex 1+  Left Triceps reflex 1+  Right Quadriceps reflex 1+  Left Quadriceps reflex 1+  Right Achilles reflex 1+  Left Achilles reflex 1+    L Babinskis  No Burgess's    No other pathological reflexes    Laboratory/Radiology:  ry/Radiology:     CBC:   Lab Results   Component Value Date    WBC 9.9 01/17/2019    RBC 3.33 01/17/2019    HGB 9.0 01/18/2019    HCT 29.1 01/18/2019    MCV 74.2 01/17/2019    MCH 22.2 01/17/2019    MCHC 30.0 01/17/2019    RDW 24.9 01/17/2019     01/17/2019    MPV 9.8 01/17/2019     CMP:    Lab Results   Component Value Date     01/18/2019    K 3.8 01/18/2019     01/18/2019    CO2 19 01/18/2019    BUN 12 01/18/2019    CREATININE 0.7 01/18/2019    GFRAA >60 01/18/2019    LABGLOM >60 01/18/2019    GLUCOSE 364 08/20/2019    PROT 7.5 01/14/2019    LABALBU 3.9 01/14/2019    CALCIUM 9.5 01/18/2019    BILITOT 0.2 01/14/2019    ALKPHOS 61 01/14/2019    AST 59 01/14/2019    ALT 23 01/14/2019     Hepatic Function Panel:    Lab Results   Component Value Date    ALKPHOS 61 01/14/2019    ALT 23 01/14/2019    AST 59 01/14/2019    PROT 7.5 01/14/2019    BILITOT 0.2 01/14/2019    LABALBU 3.9 01/14/2019     U/A:    Lab Results   Component Value Date    COLORU Yellow 01/15/2019    PROTEINU Negative 01/15/2019    PHUR 7.0 01/15/2019    WBCUA 0-1 01/15/2019    RBCUA 0-1 01/15/2019    BACTERIA NONE 01/15/2019    CLARITYU Clear 01/15/2019    SPECGRAV <=1.005 01/15/2019    LEUKOCYTESUR TRACE 01/15/2019    UROBILINOGEN 0.2 01/15/2019    BILIRUBINUR Negative 01/15/2019    BLOODU Negative 01/15/2019    GLUCOSEU Negative 01/15/2019     HgBA1c:    Lab Results   Component Value Date    LABA1C 7.8 01/14/2019     Lab Results   Component Value Date    TSH 2.680 01/14/2019   FOLATE:    Lab Results   Component Value Date    FOLATE 17.0 01/14/2019     IRON:    Lab Results   Component Value Date    IRON 101 01/14/2019   TIBC:    Lab Results   Component Value Date    TIBC 485 01/14/2019     FERRITIN:    Lab Results   Component Value Date    FERRITIN 6 01/14/2019     MRI lumbar spine 9/3/2020: Mild levoconvex lumbar scoliosis. Mild central canal stenosis and moderate right foraminal stenosis at L2-L3. Moderate right foraminal stenosis and mild left foraminal stenosis at L3-L4.   Severe left foraminal stenosis and left lateral diary  Call with any issues  Return office in 3 months      MORALES Linares NP-C  3:53 PM  10/8/2020    I spent 60 minutes with this patient obtaining the HPI and discussing the exam with greater than 50% of the time providing counseling and education on medications and other treatment plans. All questions were answered prior to leaving my office.

## 2020-10-08 NOTE — PATIENT INSTRUCTIONS
Patient Education        loratadine  Pronunciation:  rony AT a jim  Brand:  Alavert, Claritin, Claritin Reditab, Clear-Atadine, Dimetapp ND, ohm Allergy Relief, QlearQuil All Day & Night, Tavist ND, Juliaitin  What is the most important information I should know about loratadine? Follow all directions on your medicine label and package. Tell each of your healthcare providers about all your medical conditions, allergies, and all medicines you use. What is loratadine? Loratadine is an antihistamine that reduces the effects of natural chemical histamine in the body. Histamine can produce symptoms of sneezing, itching, watery eyes, and runny nose. Loratadine is used to treat sneezing, runny nose, watery eyes, hives, skin rash, itching, and other cold or allergy symptoms. Loratadine is also used to treat skin hives and itching in people with chronic skin reactions. Loratadine may also be used for purposes not listed in this medication guide. What should I discuss with my healthcare provider before taking loratadine? You should not take this medicine if you are allergic to loratadine or to desloratadine (Clarinex). Ask a doctor or pharmacist if it is safe for you to use this medicine if you have other medical conditions, especially:  · asthma;  · kidney disease; or  · liver disease. This medicine is not expected to harm an unborn baby. Tell your doctor if you are pregnant or plan to become pregnant. Loratadine can pass into breast milk and may harm a nursing baby. Tell your doctor if you are breast-feeding a baby. Some forms of loratadine may contain phenylalanine. Talk to your doctor before taking loratadine if you have phenylketonuria (PKU). Do not give this medicine to a child younger than 10years old without the advice of a doctor. How should I take loratadine? Use exactly as directed on the label, or as prescribed by your doctor. Do not use in larger or smaller amounts or for longer than recommended. Cold or allergy medicine is usually taken only for a short time until your symptoms clear up. Do not give this medicine to a child younger than 3years old. Always ask a doctor before giving a cough or cold medicine to a child. Death can occur from the misuse of cough and cold medicines in very young children. Loratadine is usually taken once per day. Follow your doctor's instructions. Do not crush, chew, or break the regular tablet. Swallow the pill whole. Measure liquid medicine with the dosing syringe provided, or with a special dose-measuring spoon or medicine cup. If you do not have a dose-measuring device, ask your pharmacist for one. The chewable tablet must be chewed before you swallow it. To take the orally disintegrating tablet (Claritin RediTab, Alavert):  · Keep the tablet in its blister pack until you are ready to take it. Open the package and peel back the foil. Do not push a tablet through the foil or you may damage the tablet. · Use dry hands to remove the tablet and place it in your mouth. · Do not swallow the tablet whole. Allow it to dissolve in your mouth without chewing. If desired, you may drink liquid to help swallow the dissolved tablet. Call your doctor if your symptoms do not improve, or if they get worse. Store at room temperature away from moisture and heat. What happens if I miss a dose? Take the missed dose as soon as you remember. Skip the missed dose if it is almost time for your next scheduled dose. Do not  take extra medicine to make up the missed dose. What happens if I overdose? Seek emergency medical attention or call the Poison Help line at 1-661.985.2408. Overdose symptoms may include headache, drowsiness, and fast or pounding heartbeat. What should I avoid while taking loratadine? Follow your doctor's instructions about any restrictions on food, beverages, or activity. What are the possible side effects of loratadine?   Get emergency medical help if you have signs of an allergic reaction:  hives; difficult breathing; swelling of your face, lips, tongue, or throat. Stop using loratadine and call your doctor at once if you have:  · fast or uneven heart rate;  · severe headache; or  · a light-headed feeling, like you might pass out;  Common side effects may include:  · headache;  · feeling tired or drowsy;  · stomach pain, vomiting;  · dry mouth; or  · feeling nervous or hyperactive. This is not a complete list of side effects and others may occur. Call your doctor for medical advice about side effects. You may report side effects to FDA at 3-096-FDA-9631. What other drugs will affect loratadine? Other drugs may interact with loratadine, including prescription and over-the-counter medicines, vitamins, and herbal products. Tell each of your health care providers about all medicines you use now and any medicine you start or stop using. Where can I get more information? Your pharmacist can provide more information about loratadine. Remember, keep this and all other medicines out of the reach of children, never share your medicines with others, and use this medication only for the indication prescribed. Every effort has been made to ensure that the information provided by [address] is accurate, up-to-date, and complete, but no guarantee is made to that effect. Drug information contained herein may be time sensitive. [employer] information has been compiled for use by healthcare practitioners and consumers in the [place] and therefore [employer] does not warrant that uses outside of the [place] are appropriate, unless specifically indicated otherwise. [employer]'s drug information does not endorse drugs, diagnose patients or recommend therapy.  [employer] drug information is an informational resource designed to assist licensed healthcare practitioners in caring for their patients and/or to serve consumers viewing this service as a supplement to, and not a substitute for, the expertise, skill, knowledge and judgment of healthcare practitioners. The absence of a warning for a given drug or drug combination in no way should be construed to indicate that the drug or drug combination is safe, effective or appropriate for any given patient. [facility] does not assume any responsibility for any aspect of healthcare administered with the aid of information [facility] provides. The information contained herein is not intended to cover all possible uses, directions, precautions, warnings, drug interactions, allergic reactions, or adverse effects. If you have questions about the drugs you are taking, check with your doctor, nurse or pharmacist.  Copyright 9340-5448 [facility] [address] [place]. Version: 9.02. Revision date: 2/20/2015. Care instructions adapted under license by [facility] ([facility]). If you have questions about a medical condition or this instruction, always ask your healthcare professional. [place] [address] any warranty or liability for your use of this information.

## 2020-10-12 ENCOUNTER — TELEPHONE (OUTPATIENT)
Dept: NEUROLOGY | Age: 70
End: 2020-10-12

## 2020-10-12 NOTE — TELEPHONE ENCOUNTER
[facility] @ [facility] Help [phone] MRI Brain Approved for [facility] 0 #[id number], Valid 10/12/2020 - 11/11/2020. Scheduled by JEFFERSON Long @ [place] for 10/26 @ 2:30 pm.  Electronically signed by Sweta Perez on 10/12/20 at 3:35 PM EDT

## 2020-10-12 NOTE — TELEPHONE ENCOUNTER
Jorje denied the Tizanidine stating before this can be approved, they need and explanation as to why the preferred drugs have not worked for her condition and/or would have bad side effects  In Media  Electronically signed by Edouard Wang on 10/12/20 at 2:06 PM EDT

## 2020-10-15 NOTE — TELEPHONE ENCOUNTER
Tizanidine was ordered as patient has allergy to baclofen. Flexeril comes with more side effects then baclofen and tizanidine so tizanidine was the preferred choice for now. Please have tizanidine authorized for approval.  Thank you.

## 2020-10-16 ENCOUNTER — HOSPITAL ENCOUNTER (OUTPATIENT)
Age: 70
Discharge: HOME | End: 2020-10-18
Payer: MEDICARE

## 2020-10-16 ENCOUNTER — OFFICE VISIT (OUTPATIENT)
Dept: FAMILY MEDICINE CLINIC | Age: 70
End: 2020-10-16
Payer: MEDICARE

## 2020-10-16 VITALS
OXYGEN SATURATION: 96 % | WEIGHT: 202.8 LBS | RESPIRATION RATE: 16 BRPM | DIASTOLIC BLOOD PRESSURE: 58 MMHG | HEART RATE: 93 BPM | TEMPERATURE: 97.2 F | SYSTOLIC BLOOD PRESSURE: 110 MMHG | BODY MASS INDEX: 35.93 KG/M2 | HEIGHT: 63 IN

## 2020-10-16 LAB
ALBUMIN SERPL-MCNC: 4.1 G/DL (ref 3.5–5.2)
ALP BLD-CCNC: 64 U/L (ref 35–104)
ALT SERPL-CCNC: 47 U/L (ref 0–32)
ANION GAP SERPL CALCULATED.3IONS-SCNC: 16 MMOL/L (ref 7–16)
AST SERPL-CCNC: 65 U/L (ref 0–31)
BASOPHILS ABSOLUTE: 0.06 E9/L (ref 0–0.2)
BASOPHILS RELATIVE PERCENT: 0.7 % (ref 0–2)
BILIRUB SERPL-MCNC: 0.3 MG/DL (ref 0–1.2)
BUN BLDV-MCNC: 16 MG/DL (ref 8–23)
CALCIUM SERPL-MCNC: 9.7 MG/DL (ref 8.6–10.2)
CHLORIDE BLD-SCNC: 99 MMOL/L (ref 98–107)
CHOLESTEROL, FASTING: 186 MG/DL (ref 0–199)
CO2: 18 MMOL/L (ref 22–29)
CREAT SERPL-MCNC: 0.7 MG/DL (ref 0.5–1)
CREATININE URINE: 125 MG/DL (ref 29–226)
EOSINOPHILS ABSOLUTE: 0.37 E9/L (ref 0.05–0.5)
EOSINOPHILS RELATIVE PERCENT: 4.1 % (ref 0–6)
GFR AFRICAN AMERICAN: >60
GFR NON-AFRICAN AMERICAN: >60 ML/MIN/1.73
GLUCOSE BLD-MCNC: 167 MG/DL (ref 74–99)
HBA1C MFR BLD: 8.3 % (ref 4–5.6)
HCT VFR BLD CALC: 29.2 % (ref 34–48)
HDLC SERPL-MCNC: 59 MG/DL
HEMOGLOBIN: 8.6 G/DL (ref 11.5–15.5)
IMMATURE GRANULOCYTES #: 0.03 E9/L
IMMATURE GRANULOCYTES %: 0.3 % (ref 0–5)
LDL CHOLESTEROL CALCULATED: 104 MG/DL (ref 0–99)
LYMPHOCYTES ABSOLUTE: 2.5 E9/L (ref 1.5–4)
LYMPHOCYTES RELATIVE PERCENT: 27.6 % (ref 20–42)
MCH RBC QN AUTO: 22.5 PG (ref 26–35)
MCHC RBC AUTO-ENTMCNC: 29.5 % (ref 32–34.5)
MCV RBC AUTO: 76.2 FL (ref 80–99.9)
MICROALBUMIN UR-MCNC: 14.8 MG/L
MICROALBUMIN/CREAT UR-RTO: 11.8 (ref 0–30)
MONOCYTES ABSOLUTE: 0.8 E9/L (ref 0.1–0.95)
MONOCYTES RELATIVE PERCENT: 8.8 % (ref 2–12)
NEUTROPHILS ABSOLUTE: 5.3 E9/L (ref 1.8–7.3)
NEUTROPHILS RELATIVE PERCENT: 58.5 % (ref 43–80)
PDW BLD-RTO: 18.1 FL (ref 11.5–15)
PLATELET # BLD: 277 E9/L (ref 130–450)
PMV BLD AUTO: 10.3 FL (ref 7–12)
POTASSIUM SERPL-SCNC: 4.1 MMOL/L (ref 3.5–5)
RBC # BLD: 3.83 E12/L (ref 3.5–5.5)
SODIUM BLD-SCNC: 133 MMOL/L (ref 132–146)
TOTAL PROTEIN: 7.6 G/DL (ref 6.4–8.3)
TRIGLYCERIDE, FASTING: 116 MG/DL (ref 0–149)
VLDLC SERPL CALC-MCNC: 23 MG/DL
WBC # BLD: 9.1 E9/L (ref 4.5–11.5)

## 2020-10-16 PROCEDURE — 90694 VACC AIIV4 NO PRSRV 0.5ML IM: CPT | Performed by: STUDENT IN AN ORGANIZED HEALTH CARE EDUCATION/TRAINING PROGRAM

## 2020-10-16 PROCEDURE — 86703 HIV-1/HIV-2 1 RESULT ANTBDY: CPT

## 2020-10-16 PROCEDURE — G0008 ADMIN INFLUENZA VIRUS VAC: HCPCS | Performed by: STUDENT IN AN ORGANIZED HEALTH CARE EDUCATION/TRAINING PROGRAM

## 2020-10-16 PROCEDURE — G0444 DEPRESSION SCREEN ANNUAL: HCPCS | Performed by: STUDENT IN AN ORGANIZED HEALTH CARE EDUCATION/TRAINING PROGRAM

## 2020-10-16 PROCEDURE — 82044 UR ALBUMIN SEMIQUANTITATIVE: CPT

## 2020-10-16 PROCEDURE — 86803 HEPATITIS C AB TEST: CPT

## 2020-10-16 PROCEDURE — 83036 HEMOGLOBIN GLYCOSYLATED A1C: CPT

## 2020-10-16 PROCEDURE — 99204 OFFICE O/P NEW MOD 45 MIN: CPT | Performed by: STUDENT IN AN ORGANIZED HEALTH CARE EDUCATION/TRAINING PROGRAM

## 2020-10-16 PROCEDURE — 82570 ASSAY OF URINE CREATININE: CPT

## 2020-10-16 PROCEDURE — 80053 COMPREHEN METABOLIC PANEL: CPT

## 2020-10-16 PROCEDURE — 80061 LIPID PANEL: CPT

## 2020-10-16 PROCEDURE — 85025 COMPLETE CBC W/AUTO DIFF WBC: CPT

## 2020-10-16 RX ORDER — FLUOXETINE HYDROCHLORIDE 20 MG/1
60 CAPSULE ORAL DAILY
Qty: 270 CAPSULE | Refills: 1 | Status: SHIPPED
Start: 2020-10-16 | End: 2021-04-06 | Stop reason: SDUPTHER

## 2020-10-16 RX ORDER — GLIPIZIDE 5 MG/1
5 TABLET ORAL DAILY
Qty: 30 TABLET | Refills: 3 | Status: SHIPPED
Start: 2020-10-16 | End: 2020-11-17 | Stop reason: SINTOL

## 2020-10-16 RX ORDER — LANOLIN ALCOHOL/MO/W.PET/CERES
3 CREAM (GRAM) TOPICAL NIGHTLY
COMMUNITY
End: 2022-09-27

## 2020-10-16 RX ORDER — HYDROCODONE BITARTRATE AND ACETAMINOPHEN 5; 325 MG/1; MG/1
1 TABLET ORAL 2 TIMES DAILY
COMMUNITY
End: 2020-10-23 | Stop reason: SDUPTHER

## 2020-10-16 ASSESSMENT — ENCOUNTER SYMPTOMS
BACK PAIN: 1
RHINORRHEA: 0
DIARRHEA: 0
VOMITING: 0
SORE THROAT: 0
ABDOMINAL PAIN: 0
EYE REDNESS: 0
SINUS PRESSURE: 0
SINUS PAIN: 0
CONSTIPATION: 0
EYE PAIN: 0
COUGH: 0
NAUSEA: 0
SHORTNESS OF BREATH: 0

## 2020-10-16 ASSESSMENT — PATIENT HEALTH QUESTIONNAIRE - PHQ9
10. IF YOU CHECKED OFF ANY PROBLEMS, HOW DIFFICULT HAVE THESE PROBLEMS MADE IT FOR YOU TO DO YOUR WORK, TAKE CARE OF THINGS AT HOME, OR GET ALONG WITH OTHER PEOPLE: 3
9. THOUGHTS THAT YOU WOULD BE BETTER OFF DEAD, OR OF HURTING YOURSELF: 0
SUM OF ALL RESPONSES TO PHQ9 QUESTIONS 1 & 2: 6
1. LITTLE INTEREST OR PLEASURE IN DOING THINGS: 3
7. TROUBLE CONCENTRATING ON THINGS, SUCH AS READING THE NEWSPAPER OR WATCHING TELEVISION: 1
4. FEELING TIRED OR HAVING LITTLE ENERGY: 3
3. TROUBLE FALLING OR STAYING ASLEEP: 3
8. MOVING OR SPEAKING SO SLOWLY THAT OTHER PEOPLE COULD HAVE NOTICED. OR THE OPPOSITE, BEING SO FIGETY OR RESTLESS THAT YOU HAVE BEEN MOVING AROUND A LOT MORE THAN USUAL: 1
6. FEELING BAD ABOUT YOURSELF - OR THAT YOU ARE A FAILURE OR HAVE LET YOURSELF OR YOUR FAMILY DOWN: 0
2. FEELING DOWN, DEPRESSED OR HOPELESS: 3
5. POOR APPETITE OR OVEREATING: 0
SUM OF ALL RESPONSES TO PHQ QUESTIONS 1-9: 14

## 2020-10-16 NOTE — PATIENT INSTRUCTIONS
Patient Education        Recovering From Depression: Care Instructions  Your Care Instructions     Taking good care of yourself is important as you recover from depression. In time, your symptoms will fade as your treatment takes hold. Do not give up. Instead, focus your energy on getting better. Your mood will improve. It just takes some time. Focus on things that can help you feel better, such as being with friends and family, eating well, and getting enough rest. But take things slowly. Do not do too much too soon. You will begin to feel better gradually. Follow-up care is a key part of your treatment and safety. Be sure to make and go to all appointments, and call your doctor if you are having problems. It's also a good idea to know your test results and keep a list of the medicines you take. How can you care for yourself at home? Be realistic  · If you have a large task to do, break it up into smaller steps you can handle, and just do what you can. · You may want to put off important decisions until your depression has lifted. If you have plans that will have a major impact on your life, such as marriage, divorce, or a job change, try to wait a bit. Talk it over with friends and loved ones who can help you look at the overall picture first.  · Reaching out to people for help is important. Do not isolate yourself. Let your family and friends help you. Find someone you can trust and confide in, and talk to that person. · Be patient, and be kind to yourself. Remember that depression is not your fault and is not something you can overcome with willpower alone. Treatment is important for depression, just like for any other illness. Feeling better takes time, and your mood will improve little by little. Stay active  · Stay busy and get outside. Take a walk, or try some other light exercise. · Talk with your doctor about an exercise program. Exercise can help with mild depression.   · Go to a movie or concert. Take part in a Hinduism activity or other social gathering. Go to a [employer] game. · Ask a friend to have dinner with you. Take care of yourself  · Eat a balanced diet with plenty of fresh fruits and vegetables, whole grains, and lean protein. If you have lost your appetite, eat small snacks rather than large meals. · Avoid using illegal drugs or marijuana and drinking alcohol. Do not take medicines that have not been prescribed for you. They may interfere with medicines you may be taking for depression, or they may make your depression worse. · Take your medicines exactly as they are prescribed. You may start to feel better within 1 to 3 weeks of taking antidepressant medicine. But it can take as many as 6 to 8 weeks to see more improvement. If you have questions or concerns about your medicines, or if you do not notice any improvement by 3 weeks, talk to your doctor. · Continue to take your medicine after your symptoms improve. Taking your medicine for at least 6 months after you feel better can help keep you from getting depressed again. If this isn't the first time you have been depressed, your doctor may recommend you to take medicine even longer. · If you have any side effects from your medicine, tell your doctor. Many side effects are mild and will go away on their own after you have been taking the medicine for a few weeks. Some may last longer. Talk to your doctor if side effects are bothering you too much. You might be able to try a different medicine. · Continue counseling. It may help prevent depression from returning, especially if you've had multiple episodes of depression. Talk with your counselor if you are having a hard time attending your sessions or you think the sessions aren't working. Don't just stop going. · Get enough sleep. Talk to your doctor if you are having problems sleeping. · Avoid sleeping pills unless they are prescribed by the doctor treating your depression.  Sleeping pills may make you groggy during the day, and they may interact with other medicine you are taking. · If you have any other illnesses, such as diabetes, heart disease, or high blood pressure, make sure to continue with your treatment. Tell your doctor about all of the medicines you take, including those with or without a prescription. · If you or someone you know talks about suicide, self-harm, or feeling hopeless, get help right away. Call the [address] [place] [address] at 1-800-273-talk (9-591.983.2817) or text HOME to 503600 to access the Crisis Text Line. Consider saving these numbers in your phone. When should you call for help? Call 021 anytime you think you may need emergency care. For example, call if:    · You feel like hurting yourself or someone else.     · Someone you know has depression and is about to attempt or is attempting suicide. Call your doctor now or seek immediate medical care if:    · You hear voices.     · Someone you know has depression and:  ? Starts to give away his or her possessions. ? Uses illegal drugs or drinks alcohol heavily. ? Talks or writes about death, including writing suicide notes or talking about guns, knives, or pills. ? Starts to spend a lot of time alone. ? Acts very aggressively or suddenly appears calm. Watch closely for changes in your health, and be sure to contact your doctor if:    · You do not get better as expected. Where can you learn more? Go to https://Ayla NetworksalishaCrowdFeed.[employer]. org and sign in to your [employer] account. Enter E722 in the [facility] box to learn more about \"Recovering From Depression: Care Instructions. \"     If you do not have an account, please click on the \"Sign Up Now\" link. Current as of: January 31, 2020               Content Version: 12.6  © 8894-6002 [employer]. Care instructions adapted under license by [facility] ([facility]).  If you have questions about a medical condition or this instruction, always ask your healthcare professional. [place] [address] any warranty or liability for your use of this information. Patient Education        Learning About Diabetes Food Guidelines  Your Care Instructions     Meal planning is important to manage diabetes. It helps keep your blood sugar at a target level (which you set with your doctor). You don't have to eat special foods. You can eat what your family eats, including sweets once in a while. But you do have to pay attention to how often you eat and how much you eat of certain foods. You may want to work with a dietitian or a certified diabetes educator (CDE) to help you plan meals and snacks. A dietitian or CDE can also help you lose weight if that is one of your goals. What should you know about eating carbs? Managing the amount of carbohydrate (carbs) you eat is an important part of healthy meals when you have diabetes. Carbohydrate is found in many foods. · Learn which foods have carbs. And learn the amounts of carbs in different foods. ? Bread, cereal, pasta, and rice have about 15 grams of carbs in a serving. A serving is 1 slice of bread (1 ounce), ½ cup of cooked cereal, or 1/3 cup of cooked pasta or rice. ? Fruits have 15 grams of carbs in a serving. A serving is 1 small fresh fruit, such as an apple or orange; ½ of a banana; ½ cup of cooked or canned fruit; ½ cup of fruit juice; 1 cup of melon or raspberries; or 2 tablespoons of dried fruit. ? Milk and no-sugar-added yogurt have 15 grams of carbs in a serving. A serving is 1 cup of milk or 2/3 cup of no-sugar-added yogurt. ? Starchy vegetables have 15 grams of carbs in a serving. A serving is ½ cup of mashed potatoes or sweet potato; 1 cup winter squash; ½ of a small baked potato; ½ cup of cooked beans; or ½ cup cooked corn or green peas.   · Learn how much carbs to eat each day and at each meal. A dietitian or CDE can teach you how to keep track of the amount of carbs you eat. This is called carbohydrate counting. · If you are not sure how to count carbohydrate grams, use the Plate Method to plan meals. It is a good, quick way to make sure that you have a balanced meal. It also helps you spread carbs throughout the day. ? Divide your plate by types of foods. Put non-starchy vegetables on half the plate, meat or other protein food on one-quarter of the plate, and a grain or starchy vegetable in the final quarter of the plate. To this you can add a small piece of fruit and 1 cup of milk or yogurt, depending on how many carbs you are supposed to eat at a meal.  · Try to eat about the same amount of carbs at each meal. Do not \"save up\" your daily allowance of carbs to eat at one meal.  · Proteins have very little or no carbs per serving. Examples of proteins are beef, chicken, turkey, fish, eggs, tofu, cheese, cottage cheese, and peanut butter. A serving size of meat is 3 ounces, which is about the size of a deck of cards. Examples of meat substitute serving sizes (equal to 1 ounce of meat) are 1/4 cup of cottage cheese, 1 egg, 1 tablespoon of peanut butter, and ½ cup of tofu. How can you eat out and still eat healthy? · Learn to estimate the serving sizes of foods that have carbohydrate. If you measure food at home, it will be easier to estimate the amount in a serving of restaurant food. · If the meal you order has too much carbohydrate (such as potatoes, corn, or baked beans), ask to have a low-carbohydrate food instead. Ask for a salad or green vegetables. · If you use insulin, check your blood sugar before and after eating out to help you plan how much to eat in the future. · If you eat more carbohydrate at a meal than you had planned, take a walk or do other exercise. This will help lower your blood sugar. What else should you know? · Limit saturated fat, such as the fat from meat and dairy products.  This is a healthy choice because people who have diabetes are at higher risk of heart disease. So choose lean cuts of meat and nonfat or low-fat dairy products. Use olive or canola oil instead of butter or shortening when cooking. · Don't skip meals. Your blood sugar may drop too low if you skip meals and take insulin or certain medicines for diabetes. · Check with your doctor before you drink alcohol. Alcohol can cause your blood sugar to drop too low. Alcohol can also cause a bad reaction if you take certain diabetes medicines. Follow-up care is a key part of your treatment and safety. Be sure to make and go to all appointments, and call your doctor if you are having problems. It's also a good idea to know your test results and keep a list of the medicines you take. Where can you learn more? Go to https://[email or web address] [employer]mit [employer]band.[email or web address]. org and sign in to your [employer] account. Enter X990 in the [employer] box to learn more about \"Learning About Diabetes Food Guidelines. \"     If you do not have an account, please click on the \"Sign Up Now\" link. Current as of: December 20, 2019               Content Version: 12.6  © 3133-3873 [employer]. Care instructions adapted under license by [facility] ([facility]). If you have questions about a medical condition or this instruction, always ask your healthcare professional. [address] any warranty or liability for your use of this information.

## 2020-10-16 NOTE — PROGRESS NOTES
10/16/2020    Cal Barber (:  1950) is a 72 y.o. female, here for evaluation of the following medical concerns:    Patient is a 72year old female with a PMH of depression fibromyalgia HTN chronic back pain migraines diabetes and GERD. Patient saw neurology on 10/8 for migraines which they ordered an MRI for her. They also gave her claritin and zanaflex. She is also on imitrex for abortive therapy. zanaflex is not covered by her insurance and she has to call neuro. She sees them again after the MRI     Patient saw pain management on 9/10 which she was started on norco for the pain. They have ordered an EMG for the patient as well. This is going to be done next week also. They discussed possibly sending to neurosurgery as well in the future. She sees pain management once a month. Last foot exam was in July. She has to get her eyes checked and she will make an appointment. Her last colonoscopy was 10 years ago and she does not want one at this time. Discussed cologuard with the patient. She is not interested in having a mammogram and DEXA scan. Patient declines the shingles vaccine. Will print script for the tetanus vaccine. She does want flu shot today. Patient thinks that she has indigestion with metformin and has hyper-parastalsis with the metformin. She would like to try something else. Chief Complaint   Patient presents with    New Patient    Migraine     having MRI next week    Back Pain       Review of Systems   Constitutional: Negative for chills, fatigue and fever. HENT: Negative for congestion, ear pain, postnasal drip, rhinorrhea, sinus pressure, sinus pain and sore throat. Eyes: Negative for pain and redness. Respiratory: Negative for cough and shortness of breath. Cardiovascular: Negative for chest pain. Gastrointestinal: Negative for abdominal pain, constipation, diarrhea, nausea and vomiting. Genitourinary: Negative for difficulty urinating and dysuria. partner: Not on file     Emotionally abused: Not on file     Physically abused: Not on file     Forced sexual activity: Not on file   Other Topics Concern    Not on file   Social History Narrative    Not on file        Family History   Problem Relation Age of Onset    Hypertension Father     Heart Disease Father     Hypertension Paternal Grandfather     Heart Disease Paternal Grandfather            Vitals:    10/16/20 1149   BP: (!) 110/58   Pulse: 93   Resp: 16   Temp: 97.2 °F (36.2 °C)   TempSrc: Infrared   SpO2: 96%   Weight: 202 lb 12.8 oz (92 kg)   Height: 5' 3\" (1.6 m)     Estimated body mass index is 35.92 kg/m² as calculated from the following:    Height as of this encounter: 5' 3\" (1.6 m). Weight as of this encounter: 202 lb 12.8 oz (92 kg).     Most Recent Labs  CBC  Lab Results   Component Value Date    WBC 9.9 01/17/2019    WBC 14.0 01/14/2019    WBC 13.6 01/14/2019    RBC 3.33 01/17/2019    RBC 2.83 01/14/2019    RBC 2.73 01/14/2019    HGB 9.0 01/18/2019    HGB 7.4 01/17/2019    HGB 7.5 01/16/2019    HCT 29.1 01/18/2019    HCT 24.7 01/17/2019    HCT 24.7 01/16/2019    MCV 74.2 01/17/2019    MCV 68.6 01/14/2019    MCV 66.3 01/14/2019     01/17/2019     01/14/2019     01/14/2019      CMP  Lab Results   Component Value Date     01/18/2019     01/17/2019     01/16/2019    K 3.8 01/18/2019    K 3.8 01/17/2019    K 3.6 01/16/2019     01/18/2019     01/17/2019     01/16/2019    CO2 19 01/18/2019    CO2 20 01/17/2019    CO2 20 01/16/2019    ANIONGAP 16 01/18/2019    ANIONGAP 15 01/17/2019    ANIONGAP 13 01/16/2019    GLUCOSE 364 08/20/2019    GLUCOSE 151 03/25/2019    GLUCOSE 319 03/08/2019    BUN 12 01/18/2019    BUN 13 01/17/2019    BUN 9 01/16/2019    CREATININE 0.7 01/18/2019    CREATININE 0.7 01/17/2019    CREATININE 0.7 01/16/2019    LABGLOM >60 01/18/2019    LABGLOM >60 01/17/2019    LABGLOM >60 01/16/2019    GFRAA >60 01/18/2019    GFRAA >60 01/17/2019    GFRAA >60 01/16/2019    CALCIUM 9.5 01/18/2019    CALCIUM 9.2 01/17/2019    CALCIUM 8.7 01/16/2019    PROT 7.5 01/14/2019    LABALBU 3.9 01/14/2019    BILITOT 0.2 01/14/2019    ALKPHOS 61 01/14/2019    AST 59 01/14/2019    ALT 23 01/14/2019     A1C  Lab Results   Component Value Date    LABA1C 7.8 01/14/2019    LABA1C 9.8 11/29/2018    LABA1C 7.6 12/01/2017     TSH  Lab Results   Component Value Date    TSH 2.680 01/14/2019     FREET4  No results found for: F6LREUU  LIPID  Lab Results   Component Value Date    CHOL 169 11/29/2018    HDL 46 11/29/2018    LDLCALC 94 11/29/2018    TRIG 147 11/29/2018    CHOLHDLRATIO 3.7 11/29/2018    VLDL 29 11/29/2018     VITAMIN D  No results found for: VITD25  MAGNESIUM  Lab Results   Component Value Date    MG 1.8 01/18/2019    MG 2.0 01/17/2019    MG 2.0 01/16/2019      PHOS  No results found for: PHOS   APRYL   No results found for: APRYL  RHEUMATOID FACTOR  No results found for: RF   HEPATITIS C  No results found for: HCVABI  HIV  No results found for: QNB6VQO, HIV1QT  UA  Lab Results   Component Value Date    COLORU Yellow 01/15/2019    CLARITYU Clear 01/15/2019    GLUCOSEU Negative 01/15/2019    BILIRUBINUR Negative 01/15/2019    KETUA Negative 01/15/2019    SPECGRAV <=1.005 01/15/2019    BLOODU Negative 01/15/2019    PHUR 7.0 01/15/2019    PROTEINU Negative 01/15/2019    UROBILINOGEN 0.2 01/15/2019    NITRU Negative 01/15/2019    LEUKOCYTESUR TRACE 01/15/2019     Urine Micro/Albumin Ratio  No results found for: MALBCR     Physical Exam  Constitutional:       Appearance: Normal appearance. HENT:      Head: Normocephalic and atraumatic. Right Ear: Tympanic membrane, ear canal and external ear normal.      Left Ear: Tympanic membrane, ear canal and external ear normal.      Nose: Nose normal.      Mouth/Throat:      Mouth: Mucous membranes are moist.      Pharynx: Oropharynx is clear. Eyes:      Extraocular Movements: Extraocular movements intact. Conjunctiva/sclera: Conjunctivae normal.      Pupils: Pupils are equal, round, and reactive to light. Neck:      Musculoskeletal: Normal range of motion and neck supple. Cardiovascular:      Rate and Rhythm: Normal rate and regular rhythm. Pulses: Normal pulses. Heart sounds: Normal heart sounds. Pulmonary:      Effort: Pulmonary effort is normal.      Breath sounds: Normal breath sounds. Abdominal:      General: Bowel sounds are normal.      Palpations: Abdomen is soft. Musculoskeletal: Normal range of motion. Skin:     General: Skin is warm and dry. Capillary Refill: Capillary refill takes less than 2 seconds. Neurological:      General: No focal deficit present. Mental Status: She is alert and oriented to person, place, and time. Psychiatric:         Mood and Affect: Mood normal.         Behavior: Behavior normal.         Thought Content: Thought content normal.         ASSESSMENT/PLAN:  Inga Osgood was seen today for new patient, migraine and back pain. Diagnoses and all orders for this visit:    Type 2 diabetes mellitus without complication, without long-term current use of insulin (HCC)  Will d/c metformin and start glipizide due to side effects  -     Hemoglobin A1C; Future  -     Microalbumin / creatinine urine ratio; Future  -     CBC Auto Differential; Future  -     Comprehensive Metabolic Panel; Future  -     glipiZIDE (GLUCOTROL) 5 MG tablet; Take 1 tablet by mouth daily    Intractable migraine without aura and without status migrainosus  Follow neurology     Essential hypertension  Stable     Recurrent major depressive disorder, in partial remission (HCC)  -     FLUoxetine (PROZAC) 20 MG capsule; Take 3 capsules by mouth daily    Screening for hyperlipidemia  -     Lipid, Fasting; Future    Need for prophylactic vaccination against diphtheria-tetanus-pertussis (DTP)  -     Tdap (ADACEL) 5-2-15.5 LF-MCG/0.5 injection;  Inject 0.5 mLs into the muscle once for 1 dose    Need for prophylactic vaccination and inoculation against varicella  Patient declines shingles vaccine     Colon cancer screening  -     Cologuard (For External Results Only); Future    Flu vaccine need    Encounter for hepatitis C screening test for low risk patient  -     HEPATITIS C ANTIBODY; Future    Encounter for screening mammogram for malignant neoplasm of breast  Patient declines     Osteoporosis screening  Patient declines    BMI 35.0-35.9,adult  Discussed healthy eating habits     Encounter for screening for HIV  -     HIV-1 AND HIV-2 ANTIBODIES; Future    Arthritis    patient declines shingles vaccine; patient declines mammogram and DEXA scan      Will stop metformin due to side effects and will start glipizide       Educational materials and/or home exercises printed for patient's review and were included in patient instructions on his/her After Visit Summary and given to patient at the end of visit. Counseled regarding above diagnosis, including possible risks and complications,  especially if left uncontrolled. Counseled regarding the possible side effects, risks, benefits and alternatives to treatment; patient and/or guardian verbalizes understanding, agrees, feels comfortable with and wishes to proceed with above treatment plan. Advised patient to call with any new medication issues, and read all Rx info from pharmacy to assure aware of all possible risks and side effects of medication before taking. Reviewed age and gender appropriate health screening exams and vaccinations. Advised patient regarding importance of keeping up with recommended health maintenance and to schedule as soon as possible if overdue, as this is important in assessing for undiagnosed pathology, especially cancer, as well as protecting against potentially harmful/life threatening disease. Patient and/or guardian verbalizes understanding and agrees with above counseling, assessment and plan.      All questions answered. Return in about 4 weeks (around 11/13/2020) for depression. An  electronic signature was used to authenticate this note.     --Tono Ghotra DO on 10/16/2020 at 12:40 PM

## 2020-10-19 LAB
HEPATITIS C ANTIBODY INTERPRETATION: NORMAL
HIV-1 AND HIV-2 ANTIBODIES: NORMAL

## 2020-10-22 ENCOUNTER — TELEPHONE (OUTPATIENT)
Dept: NEUROLOGY | Age: 70
End: 2020-10-22

## 2020-10-22 NOTE — TELEPHONE ENCOUNTER
Spoke to [employer] @ [facility] gave new order for Tizanidine 2 mg tablet, take 1 tablet by mouth 2 times daily. Script will be on hold until next due date.   Electronically signed by Edouard Wang on 10/22/20 at 3:41 PM EDT

## 2020-10-22 NOTE — TELEPHONE ENCOUNTER
Message left to call office to set up Jan. 2021 w/Teresa Prather in [place]  Electronically signed by Shahbaz Cat on 10/22/20 at 10:11 AM EDT

## 2020-10-23 ENCOUNTER — OFFICE VISIT (OUTPATIENT)
Dept: PAIN MANAGEMENT | Age: 70
End: 2020-10-23
Payer: MEDICARE

## 2020-10-23 ENCOUNTER — PREP FOR PROCEDURE (OUTPATIENT)
Dept: PAIN MANAGEMENT | Age: 70
End: 2020-10-23

## 2020-10-23 VITALS
SYSTOLIC BLOOD PRESSURE: 124 MMHG | DIASTOLIC BLOOD PRESSURE: 66 MMHG | RESPIRATION RATE: 18 BRPM | HEART RATE: 76 BPM | TEMPERATURE: 97.7 F

## 2020-10-23 PROCEDURE — 99213 OFFICE O/P EST LOW 20 MIN: CPT

## 2020-10-23 PROCEDURE — 1036F TOBACCO NON-USER: CPT | Performed by: PAIN MEDICINE

## 2020-10-23 PROCEDURE — 1123F ACP DISCUSS/DSCN MKR DOCD: CPT | Performed by: PAIN MEDICINE

## 2020-10-23 PROCEDURE — 1090F PRES/ABSN URINE INCON ASSESS: CPT | Performed by: PAIN MEDICINE

## 2020-10-23 PROCEDURE — G8427 DOCREV CUR MEDS BY ELIG CLIN: HCPCS | Performed by: PAIN MEDICINE

## 2020-10-23 PROCEDURE — G8400 PT W/DXA NO RESULTS DOC: HCPCS | Performed by: PAIN MEDICINE

## 2020-10-23 PROCEDURE — 99213 OFFICE O/P EST LOW 20 MIN: CPT | Performed by: PAIN MEDICINE

## 2020-10-23 PROCEDURE — 4040F PNEUMOC VAC/ADMIN/RCVD: CPT | Performed by: PAIN MEDICINE

## 2020-10-23 PROCEDURE — G8417 CALC BMI ABV UP PARAM F/U: HCPCS | Performed by: PAIN MEDICINE

## 2020-10-23 PROCEDURE — 3017F COLORECTAL CA SCREEN DOC REV: CPT | Performed by: PAIN MEDICINE

## 2020-10-23 PROCEDURE — G8484 FLU IMMUNIZE NO ADMIN: HCPCS | Performed by: PAIN MEDICINE

## 2020-10-23 RX ORDER — HYDROCODONE BITARTRATE AND ACETAMINOPHEN 5; 325 MG/1; MG/1
1 TABLET ORAL DAILY PRN
Qty: 30 TABLET | Refills: 0 | Status: SHIPPED
Start: 2020-10-30 | End: 2020-12-16 | Stop reason: SDUPTHER

## 2020-10-23 NOTE — PROGRESS NOTES
[facility], [address] [place] [address] Jhon  [phone]    Follow up Note      Beau Barber     Date of Visit:  10/23/2020    CC:  Patient presents for follow up   Chief Complaint   Patient presents with    Follow-up    Lower Back Pain     from feet to her neck    Neck Pain     HPI:    Follow up on her low back pain with complaints of increased low back pain with no radiculopathy  Appropriate analgesia with current medications regimen:Fair  Change in quality of symptoms:no. Medication side effects:none  Recent diagnostic testing:none  Recent interventional procedures:none. She has not been on anticoagulation medications to include none. The patient  has not been on herbal supplements. The patient is diabetic.     Imaging:   Lumbar spine MRI 2020:    Mild levoconvex lumbar scoliosis.         Mild central canal stenosis and moderate right foraminal stenosis at L2-L3.         Moderate right foraminal stenosis and mild left foraminal stenosis at L3-L4.         Severe left foraminal stenosis and left lateral recess stenosis at L4-L5.         Mild bilateral foraminal stenosis at L5-S1 greater on the right. Lumbar spine MRI 2014  1.  Marked disc degeneration at L2-3, L3-4 and L4-5. 2.  Central/right subarticular disc protrusion at L2-3, displacing the   right L3 nerve root.  Mild to moderate spinal stenosis at this level. 3.  Right extraforaminal disc protrusion at L3-4, displacing the right L3   nerve root. 4.  Left lateral recess stenosis at L4-5 with medial displacement of the   left L5 nerve root. 5.  Multilevel facet arthropathy. 6.  Lumbar levoscoliosis. 7.  Neural foraminal stenosis as above.      Previous treatments: Physical Therapy, Epidural Steroid Injection, facet RFA and medications. .         Potential Aberrant Drug-Related Behavior:    None    Urine Drug Screening:  Saliva screen 08/2020 showed no narcotics which is consistent    OARRS report:  10/2020 consistent. Past Medical History:   Diagnosis Date    Arthritis     Cataract     right    Chronic fatigue syndrome     Depression     Diabetes mellitus (HCC)     Eczema     Fibromyalgia     Hypertension     Memory loss     Mononucleosis     Sciatica     Vertigo      Past Surgical History:   Procedure Laterality Date    CARPAL TUNNEL RELEASE Bilateral     CATARACT REMOVAL Right     TONSILLECTOMY AND ADENOIDECTOMY      UPPER GASTROINTESTINAL ENDOSCOPY N/A 1/16/2019    EGD BIOPSY performed by Shanna Bravo MD at [address] [place] [address]     Prior to Admission medications    Medication Sig Start Date End Date Taking? Authorizing Provider   HYDROcodone-acetaminophen (NORCO) 5-325 MG per tablet Take 1 tablet by mouth 2 times daily. Yes Historical Provider, MD   melatonin 3 MG TABS tablet Take 3 mg by mouth nightly   Yes Historical Provider, MD   FLUoxetine (PROZAC) 20 MG capsule Take 3 capsules by mouth daily 10/16/20 11/15/20 Yes Kimberley Berman DO   glipiZIDE (GLUCOTROL) 5 MG tablet Take 1 tablet by mouth daily 10/16/20  Yes Kimberley Berman DO   loratadine (CLARITIN) 10 MG tablet Take 1 tablet by mouth daily 10/8/20 11/7/20 Yes MORALES Mcdermott - NP   omeprazole (PRILOSEC) 20 MG delayed release capsule Take 1 capsule by mouth Daily 8/4/20  Yes Danny Grace, DO   TRULICITY 1.5 DQ/3.8PY SOPN INJECT 1.5MG SUBCUTANEOUSLY ONCE WEEKLY.   7/14/20  Yes Danny Grace, DO   rOPINIRole (REQUIP) 0.5 MG tablet TAKE ONE TABLET BY MOUTH THREE TIMES A DAY 12/2/19  Yes Danny Grace, DO   diclofenac sodium 1 % GEL Apply 2 g topically 4 times daily 8/20/19  Yes Danny Grace, DO   lisinopril (PRINIVIL;ZESTRIL) 20 MG tablet TAKE ONE TABLET BY MOUTH EVERY DAY 1/7/19  Yes Annie Amaya, DO   tiZANidine (ZANAFLEX) 2 MG capsule Take 1 capsule by mouth 2 times daily  Patient not taking: Reported on 10/23/2020 10/8/20 11/7/20  MORALES Gonzalez NP   gabapentin (NEURONTIN) 300 MG capsule Take 1 capsule by mouth 4 times daily for 30 days. 8/4/20 10/16/20  Gama Ballard,    SUMAtriptan (IMITREX) 100 MG tablet Take 1 tablet by mouth once as needed for Migraine 8/23/19 10/16/20  Gama Ballard DO     Allergies   Allergen Reactions    Baclofen Other (See Comments)     Dry mouth, abd pain    Ibuprofen      Acute bleeding    Nickel      Surgical steel    Penicillins Hives     Social History     Socioeconomic History    Marital status:      Spouse name: Not on file    Number of children: Not on file    Years of education: Not on file    Highest education level: Not on file   Occupational History    Not on file   Social Needs    Financial resource strain: Not on file    Food insecurity     Worry: Not on file     Inability: Not on file   [employer] needs     Medical: Not on file     Non-medical: Not on file   Tobacco Use    Smoking status: Former Smoker     Packs/day: 2.00     Years: 10.00     Pack years: 20.00     Types: Cigarettes     Start date:      Last attempt to quit:      Years since quittin.8    Smokeless tobacco: Never Used   Substance and Sexual Activity    Alcohol use:  Yes     Alcohol/week: 1.0 standard drinks     Types: 1 Glasses of wine per week    Drug use: No    Sexual activity: Never   Lifestyle    Physical activity     Days per week: Not on file     Minutes per session: Not on file    Stress: Not on file   Relationships    Social connections     Talks on phone: Not on file     Gets together: Not on file     Attends Jew service: Not on file     Active member of club or organization: Not on file     Attends meetings of clubs or organizations: Not on file     Relationship status: Not on file    Intimate partner violence     Fear of current or ex partner: Not on file     Emotionally abused: Not on file     Physically abused: Not on file     Forced sexual activity: Not on file   Other Topics Concern    Not on file   Social History Narrative    Not on file     Family History   Problem Relation Age of Onset    Hypertension Father     Heart Disease Father     Hypertension Paternal Grandfather     Heart Disease Paternal Grandfather      REVIEW OF SYSTEMS:     Swathi Perry denies fever/chills, chest pain, shortness of breath, new bowel or bladder complaints. All other review of systems was negative. PHYSICAL EXAMINATION:      /66   Pulse 76   Temp 97.7 °F (36.5 °C)   Resp 18   LMP  (LMP Unknown)     General:       General appearance:   elderly, pleasant and well-hydrated. , in moderate discomfort and A & O x3  Build:Overweight     HEENT:     Head:normocephalic and atraumatic  Sclera: icterus absent,      Lungs:     Breathing:Breathing Pattern: regular, no distress     Abdomen:     Shape:obese, non-distended and normal  Tenderness:none     Lumbar spine:     Spine inspection:normal   CVA tenderness:No   Palpation:tenderness paravertebral muscles, facet loading, left, right and positive. Right worse than Left  Range of motion:abnormal moderately Lateral bending, flexion, extension rotation bilateral and is  painful.     Musculoskeletal:     Trigger points in Paraveteral:absent bilaterally  SI joint tenderness:negative right, negative left              GAL test:negative right, negative  left  Piriformis tenderness:negative right, negative left  Trochanteric bursa tenderness:negative right, negative left  SLR:negative right, negative left, sitting      Extremities:     Tremors:None bilaterally upper and lower  Range of motion:pain with internal rotation of hips negative. Intact:Yes  Edema:Normal     Neurological:     Sensory:normal to light touch bilateral lower extremities.   Motor:                             Right Quadriceps5/5          Left Quadriceps5/5           Right Gastrocnemius5/5    Left Gastrocnemius5/5  Right Ant Tibialis5/5  Left Ant Tibialis5/5  Reflexes:    Right Quadriceps reflex2+  Left Quadriceps reflex2+  Right Achilles

## 2020-10-23 NOTE — PROGRESS NOTES
Do you currently have any of the following:    Fever: No  Headache:  No  Cough: No  Shortness of breath: No  Exposed to anyone with these symptoms: No                                                                                                                Maddy Jane presents to the [facility] 10/23/2020. Adair Ganser is complaining of pain from her feet to her neck. The pain is persistent. The pain is described as aching, throbbing, shooting, stabbing, sharp and burning. Pain is rated on her best day at a 4, on her worst day at a 8, and on average at a 6 on the VAS scale. She took her last dose of Norco yesterday. Adair Ganser does not have issues with constipation. Any procedures since your last visit: No.    She is  on NSAIDS and  is not on anticoagulation medications. Pacemaker or defibrilator: No Physician managing device is N/A.       /66   Pulse 76   Temp 97.7 °F (36.5 °C)   Resp 18   LMP  (LMP Unknown)      No LMP recorded (lmp unknown).  Patient is postmenopausal.

## 2020-10-26 ENCOUNTER — TELEPHONE (OUTPATIENT)
Dept: PAIN MANAGEMENT | Age: 70
End: 2020-10-26

## 2020-10-26 NOTE — TELEPHONE ENCOUNTER
Patient called in because she said doctor forgot to order her medication. Upon review I instructed the patient that he did order it to start on the 30th. She will try to wait until the 30th, but would appreciate it being released early if possible. Please advise.

## 2020-10-28 RX ORDER — METHOCARBAMOL 500 MG/1
500 TABLET, FILM COATED ORAL 2 TIMES DAILY
Qty: 60 TABLET | Refills: 0 | Status: SHIPPED | OUTPATIENT
Start: 2020-10-28 | End: 2021-01-26

## 2020-11-02 ENCOUNTER — TELEPHONE (OUTPATIENT)
Dept: NEUROLOGY | Age: 70
End: 2020-11-02

## 2020-11-02 NOTE — TELEPHONE ENCOUNTER
Letter sent to call office to set up Jan 2021 appointment w/UNRULY Rand in [place]  Electronically signed by Gloria Bajwa on 11/2/20 at 2:45 PM EST

## 2020-11-03 PROBLEM — M47.816 LUMBAR SPONDYLOSIS: Status: RESOLVED | Noted: 2020-08-13 | Resolved: 2020-11-03

## 2020-11-05 ENCOUNTER — TELEPHONE (OUTPATIENT)
Dept: PAIN MANAGEMENT | Age: 70
End: 2020-11-05

## 2020-11-05 NOTE — TELEPHONE ENCOUNTER
11-3-20-received a call from [facility] that they need the noted from the pt's last Radiofrequency in 2015. Call to Matthew Briceno, she states she went to Dr Anabell Campo in 2015 and had a Radiofrequency. Call to their office, Matthew Briceno was a pt there in 2015, will need a medical release to get further information. Matthew Briceno states she goes to Physical therapy at the [facility], will stop in tomorrow and fill out release. 11-4-20-[facility] did not stop to sign medical release. Called her and left voice mail message. 11-5-20-Received a letter from [facility] that they still need the surgical notes from 2015, call to Matthew Briceno, left voice mail message that procedure will be denied if the insurance company does not get the paperwork they want. Deadline is 11-6-20.     Edil Mcclellan RN  Pain Management

## 2020-11-11 ENCOUNTER — TELEPHONE (OUTPATIENT)
Dept: PAIN MANAGEMENT | Age: 70
End: 2020-11-11

## 2020-11-11 NOTE — TELEPHONE ENCOUNTER
11-10-20-received a call again from [facility]. Advised her that Jorgito Garcia is not returning phone calls and she has not come into the office to sign release of information. The [facility]  states \"I need the information by 11-12-20, if not I will close the case\". Will call Jorgito Garcia and discuss this.     Paco Mejia RN  Pain Management

## 2020-11-12 ENCOUNTER — TELEPHONE (OUTPATIENT)
Dept: PAIN MANAGEMENT | Age: 70
End: 2020-11-12

## 2020-11-17 ENCOUNTER — OFFICE VISIT (OUTPATIENT)
Dept: FAMILY MEDICINE CLINIC | Age: 70
End: 2020-11-17
Payer: MEDICARE

## 2020-11-17 VITALS
SYSTOLIC BLOOD PRESSURE: 120 MMHG | TEMPERATURE: 97.2 F | HEIGHT: 63 IN | HEART RATE: 92 BPM | RESPIRATION RATE: 16 BRPM | OXYGEN SATURATION: 97 % | DIASTOLIC BLOOD PRESSURE: 62 MMHG | BODY MASS INDEX: 35.75 KG/M2 | WEIGHT: 201.8 LBS

## 2020-11-17 PROCEDURE — 3052F HG A1C>EQUAL 8.0%<EQUAL 9.0%: CPT | Performed by: STUDENT IN AN ORGANIZED HEALTH CARE EDUCATION/TRAINING PROGRAM

## 2020-11-17 PROCEDURE — G0438 PPPS, INITIAL VISIT: HCPCS | Performed by: STUDENT IN AN ORGANIZED HEALTH CARE EDUCATION/TRAINING PROGRAM

## 2020-11-17 RX ORDER — CANAGLIFLOZIN 100 MG/1
100 TABLET, FILM COATED ORAL
Qty: 90 TABLET | Refills: 1 | Status: SHIPPED
Start: 2020-11-17 | End: 2020-12-03

## 2020-11-17 RX ORDER — DULAGLUTIDE 1.5 MG/.5ML
INJECTION, SOLUTION SUBCUTANEOUS
Qty: 2 ML | Refills: 5 | Status: SHIPPED
Start: 2020-11-17 | End: 2020-12-01 | Stop reason: DRUGHIGH

## 2020-11-17 ASSESSMENT — ANXIETY QUESTIONNAIRES
GAD7 TOTAL SCORE: 0
1. FEELING NERVOUS, ANXIOUS, OR ON EDGE: 0-NOT AT ALL
3. WORRYING TOO MUCH ABOUT DIFFERENT THINGS: 0-NOT AT ALL
5. BEING SO RESTLESS THAT IT IS HARD TO SIT STILL: 0-NOT AT ALL
7. FEELING AFRAID AS IF SOMETHING AWFUL MIGHT HAPPEN: 0-NOT AT ALL
2. NOT BEING ABLE TO STOP OR CONTROL WORRYING: 0-NOT AT ALL
4. TROUBLE RELAXING: 0-NOT AT ALL
6. BECOMING EASILY ANNOYED OR IRRITABLE: 0-NOT AT ALL

## 2020-11-17 ASSESSMENT — PATIENT HEALTH QUESTIONNAIRE - PHQ9
SUM OF ALL RESPONSES TO PHQ QUESTIONS 1-9: 1
SUM OF ALL RESPONSES TO PHQ QUESTIONS 1-9: 1
SUM OF ALL RESPONSES TO PHQ9 QUESTIONS 1 & 2: 1
1. LITTLE INTEREST OR PLEASURE IN DOING THINGS: 1
2. FEELING DOWN, DEPRESSED OR HOPELESS: 0
SUM OF ALL RESPONSES TO PHQ QUESTIONS 1-9: 1

## 2020-11-17 ASSESSMENT — LIFESTYLE VARIABLES: HOW OFTEN DO YOU HAVE A DRINK CONTAINING ALCOHOL: 0

## 2020-11-17 NOTE — PROGRESS NOTES
Medicare Annual Wellness Visit  Name: Angel Garcia Date: 2020   MRN: <[id number]> Sex: Female   Age: 72 y.o. Ethnicity: Non-/Non    : 1950 Race: Jessica Barber is here for Medicare AWV (diabetic meds giving diarrhea)    Screenings for behavioral, psychosocial and functional/safety risks, and cognitive dysfunction are all negative except as indicated below. These results, as well as other patient data from the [address] form, are documented in Flowsheets linked to this Encounter. Allergies   Allergen Reactions    Baclofen Other (See Comments)     Dry mouth, abd pain    Ibuprofen      Acute bleeding    Nickel      Surgical steel    Penicillins Hives         Prior to Visit Medications    Medication Sig Taking? Authorizing Provider   canagliflozin (INVOKANA) 100 MG TABS tablet Take 1 tablet by mouth every morning (before breakfast) Yes Kimberley Berman DO   Dulaglutide (TRULICITY) 1.5 UO/8.3JC SOPN INJECT 1.5MG SUBCUTANEOUSLY ONCE WEEKLY. Yes Kimberley Berman DO   ROBAXIN 500 MG tablet Take 1 tablet by mouth 2 times daily Yes MORALES Elise NP   HYDROcodone-acetaminophen (NORCO) 5-325 MG per tablet Take 1 tablet by mouth daily as needed for Pain for up to 30 days. Yes Johnny Glez MD   melatonin 3 MG TABS tablet Take 3 mg by mouth nightly Yes Historical Provider, MD   FLUoxetine (PROZAC) 20 MG capsule Take 3 capsules by mouth daily Yes Kimberley Berman DO   gabapentin (NEURONTIN) 300 MG capsule Take 1 capsule by mouth 4 times daily for 30 days.  Yes Bruce Wayne Claude,    omeprazole (PRILOSEC) 20 MG delayed release capsule Take 1 capsule by mouth Daily Yes Danny Grace,    rOPINIRole (REQUIP) 0.5 MG tablet TAKE ONE TABLET BY MOUTH THREE TIMES A DAY Yes Danny Grace DO   diclofenac sodium 1 % GEL Apply 2 g topically 4 times daily Yes Danny Grace,    lisinopril (PRINIVIL;ZESTRIL) 20 MG tablet TAKE ONE TABLET BY MOUTH EVERY DAY Yes Leo Wang DO   SUMAtriptan (IMITREX) 100 MG tablet Take 1 tablet by mouth once as needed for Migraine  Patient not taking: Reported on 11/17/2020  Leo Wang DO         Past Medical History:   Diagnosis Date    Arthritis     Cataract     right    Chronic fatigue syndrome     Depression     Diabetes mellitus ([address]     Eczema     Fibromyalgia     Hypertension     Memory loss     Mononucleosis     Sciatica     Vertigo        Past Surgical History:   Procedure Laterality Date    CARPAL TUNNEL RELEASE Bilateral     CATARACT REMOVAL Right     TONSILLECTOMY AND ADENOIDECTOMY      UPPER GASTROINTESTINAL ENDOSCOPY N/A 1/16/2019    EGD BIOPSY performed by Igor Plummer MD at [facility] END[facility]COPY         Family History   Problem Relation Age of Onset    Hypertension Father     Heart Disease Father     Hypertension Paternal Grandfather     Heart Disease Paternal Grandfather        CareTeam (Including outside providers/suppliers regularly involved in providing care):   Patient Care Team:  Neisha Fair DO as PCP - General (Family Medicine)  Neisha Fair DO as PCP - [facility] Edmundo Dangled Provider    Wt Readings from Last 3 Encounters:   11/17/20 201 lb 12.8 oz (91.5 kg)   10/16/20 202 lb 12.8 oz (92 kg)   10/08/20 194 lb (88 kg)     Vitals:    11/17/20 1347   BP: 120/62   Pulse: 92   Resp: 16   Temp: 97.2 °F (36.2 °C)   TempSrc: Infrared   SpO2: 97%   Weight: 201 lb 12.8 oz (91.5 kg)   Height: 5' 3\" (1.6 m)     Body mass index is 35.75 kg/m². Based upon direct observation of the patient, evaluation of cognition reveals patient has history of short term memory loss.     General Appearance: alert and oriented to person, place and time, well developed and well- nourished, in no acute distress  Skin: warm and dry, no rash or erythema  Head: normocephalic and atraumatic  Eyes: pupils equal, round, and reactive to light, extraocular eye movements intact, conjunctivae normal  ENT: tympanic membrane, external ear and ear canal normal bilaterally, nose without deformity, nasal mucosa and turbinates normal without polyps  Neck: supple and non-tender without mass, no thyromegaly or thyroid nodules, no cervical lymphadenopathy  Pulmonary/Chest: clear to auscultation bilaterally- no wheezes, rales or rhonchi, normal air movement, no respiratory distress  Cardiovascular: normal rate, regular rhythm, normal S1 and S2, no murmurs, rubs, clicks, or gallops, distal pulses intact, no carotid bruits  Abdomen: soft, non-tender, non-distended, normal bowel sounds, no masses or organomegaly  Extremities: no cyanosis, clubbing or edema  Musculoskeletal: normal range of motion, no joint swelling, deformity or tenderness  Neurologic: reflexes normal and symmetric, no cranial nerve deficit, gait, coordination and speech normal    Patient's complete Health Risk Assessment and screening values have been reviewed and are found in Flowsheets. The following problems were reviewed today and where indicated follow up appointments were made and/or referrals ordered. Positive Risk Factor Screenings with Interventions:     Cognitive: Words recalled: 0 Words Recalled  Clock Drawing Test (CDT) Score: Normal  Total Score Interpretation: Positive Mini-Cog  Cognitive Impairment Interventions:  · geovanna going to see neurology for this and migraines    General Health and ACP:  General  In general, how would you say your health is?: Fair  In the past 7 days, have you experienced any of the following?  New or Increased Pain, New or Increased Fatigue, Loneliness, Social Isolation, Stress or Anger?: (!) New or Increased Pain  Do you get the social and emotional support that you need?: Yes  Do you have a Living Will?: (!) No  Advance Directives     Power of  Living Will ACP-Advance Directive ACP-Power of     Not on File Not on File [address] [facility] Risk Interventions:  · Pain issues: home exercises provided    Health Habits/Nutrition:  Health Habits/Nutrition  Do you exercise for at least 20 minutes 2-3 times per week?: (!) No  Have you lost any weight without trying in the past 3 months?: (!) Yes  Do you eat fewer than 2 meals per day?: No  Have you seen a dentist within the past year?: (!) No  Body mass index: (!) 35.74  Health Habits/Nutrition Interventions:  · Inadequate physical activity:  patient agrees to exercise for at least 150 minutes/week    Hearing/Vision:  No exam data present  Hearing/Vision  Do you or your family notice any trouble with your hearing?: No  Do you have difficulty driving, watching TV, or doing any of your daily activities because of your eyesight?: No  Have you had an eye exam within the past year?: (!) No  Hearing/Vision Interventions:  · Vision concerns:  patient encouraged to make appointment with his/her eye specialist, she wants to go after the holidays     Safety:  Safety  Do you have working smoke detectors?: Yes  Have all throw rugs been removed or fastened?: Yes  Do you have non-slip mats or surfaces in all bathtubs/showers?: Yes  Do all of your stairways have a railing or banister?: (!) No  Are your doorways, halls and stairs free of clutter?: Yes  Do you always fasten your seatbelt when you are in a car?: Yes  Safety Interventions:  · Home safety tips provided    ADL:  ADLs  In the past 7 days, did you need help from others to perform any of the following everyday activities? Eating, dressing, grooming, bathing, toileting, or walking/balance?: None  In the past 7 days, did you need help from others to take care of any of the following? Laundry, housekeeping, banking/finances, shopping, telephone use, food preparation, transportation, or taking medications?: [employer], Housekeeping  ADL Interventions:  · Patient does need help, patient states she cannot get home care due to covid. She is going to try to call them again.      Personalized Preventive Plan   Current Health Maintenance Status  Immunization History   Administered Date(s) Administered    Influenza Virus Vaccine 02/07/2018, 12/12/2018    Influenza, Quadv, adjuvanted, 65 yrs +, IM, PF (Fluad) 10/16/2020    Pneumococcal Conjugate 13-valent ([id number]) 02/07/2018    Pneumococcal Polysaccharide ([id number]) 01/01/2016        Health Maintenance   Topic Date Due    Diabetic retinal exam  03/26/1961    DTaP/Tdap/Td vaccine (1 - Tdap) 03/26/1970    Breast cancer screen  03/26/2001    Colon cancer screen colonoscopy  03/26/2001    Annual Wellness Visit (AWV)  06/30/2020    DEXA (modify frequency per FRAX score)  10/16/2021 (Originally 3/26/2006)    Shingles Vaccine (1 of 2) 10/16/2021 (Originally 3/26/2001)    Pneumococcal 65+ years Vaccine (2 of 2 - PPSV23) 01/01/2021    Diabetic foot exam  06/30/2021    A1C test (Diabetic or Prediabetic)  10/16/2021    Diabetic microalbuminuria test  10/16/2021    Lipid screen  10/16/2021    Potassium monitoring  10/16/2021    Creatinine monitoring  10/16/2021    Flu vaccine  Completed    Hepatitis C screen  Completed    Hepatitis A vaccine  Aged Out    Hib vaccine  Aged Out    Meningococcal (ACWY) vaccine  Aged Out     Recommendations for [employer] Due: see orders and patient instructions/AVS.  . Recommended screening schedule for the next 5-10 years is provided to the patient in written form: see Patient Instructions/AVS.    Tobias Flores was seen today for medicare awv. Diagnoses and all orders for this visit:    Routine general medical examination at a health care facility    Type 2 diabetes mellitus without complication, without long-term current use of insulin (HCC)  -     canagliflozin (INVOKANA) 100 MG TABS tablet; Take 1 tablet by mouth every morning (before breakfast)  -     Dulaglutide (TRULICITY) 1.5 RQ/6.2BX SOPN; INJECT 1.5MG SUBCUTANEOUSLY ONCE WEEKLY. patient had diarrhea with both metformin and glipizide, will switch to invokana and see if this help.  Side effects were explained to patient     **addendum 12/3/2020- due to insurance not covering invokana and patient having side effects to other medications, I will need to increase her trulicity to 3 mg rather than 1.5. Hopefully this increase will decrease her A1c. Educational materials and/or home exercises printed for patient's review and were included in patient instructions on his/her After Visit Summary and given to patient at the end of visit. Counseled regarding above diagnosis, including possible risks and complications,  especially if left uncontrolled. Counseled regarding the possible side effects, risks, benefits and alternatives to treatment; patient and/or guardian verbalizes understanding, agrees, feels comfortable with and wishes to proceed with above treatment plan. Advised patient to call with any new medication issues, and read all Rx info from pharmacy to assure aware of all possible risks and side effects of medication before taking. Reviewed age and gender appropriate health screening exams and vaccinations. Advised patient regarding importance of keeping up with recommended health maintenance and to schedule as soon as possible if overdue, as this is important in assessing for undiagnosed pathology, especially cancer, as well as protecting against potentially harmful/life threatening disease. Patient and/or guardian verbalizes understanding and agrees with above counseling, assessment and plan. All questions answered.     Kimberley Berman DO

## 2020-11-17 NOTE — PATIENT INSTRUCTIONS
Personalized Preventive Plan for Lavell Barber - 11/17/2020  Medicare offers a range of preventive health benefits. Some of the tests and screenings are paid in full while other may be subject to a deductible, co-insurance, and/or copay. Some of these benefits include a comprehensive review of your medical history including lifestyle, illnesses that may run in your family, and various assessments and screenings as appropriate. After reviewing your medical record and screening and assessments performed today your provider may have ordered immunizations, labs, imaging, and/or referrals for you. A list of these orders (if applicable) as well as your Preventive Care list are included within your After Visit Summary for your review. Other Preventive Recommendations:    · A preventive eye exam performed by an eye specialist is recommended every 1-2 years to screen for glaucoma; cataracts, macular degeneration, and other eye disorders. · A preventive dental visit is recommended every 6 months. · Try to get at least 150 minutes of exercise per week or 10,000 steps per day on a pedometer . · Order or download the FREE \"Exercise & Physical Activity: Your Everyday Guide\" from [employer]. Call 8-950.346.7165 or search [employer] online. · You need 0909-5313 mg of calcium and 1178-4393 IU of vitamin D per day. It is possible to meet your calcium requirement with diet alone, but a vitamin D supplement is usually necessary to meet this goal.  · When exposed to the sun, use a sunscreen that protects against both UVA and UVB radiation with an SPF of 30 or greater. Reapply every 2 to 3 hours or after sweating, drying off with a towel, or swimming. · Always wear a seat belt when traveling in a car. Always wear a helmet when riding a bicycle or motorcycle.   Patient Education        DASH Diet: Care Instructions  Your Care Instructions     The DASH diet is an eating plan that can help lower your blood pressure. DASH stands for Dietary Approaches to Stop Hypertension. Hypertension is high blood pressure. The DASH diet focuses on eating foods that are high in calcium, potassium, and magnesium. These nutrients can lower blood pressure. The foods that are highest in these nutrients are fruits, vegetables, low-fat dairy products, nuts, seeds, and legumes. But taking calcium, potassium, and magnesium supplements instead of eating foods that are high in those nutrients does not have the same effect. The DASH diet also includes whole grains, fish, and poultry. The DASH diet is one of several lifestyle changes your doctor may recommend to lower your high blood pressure. Your doctor may also want you to decrease the amount of sodium in your diet. Lowering sodium while following the DASH diet can lower blood pressure even further than just the DASH diet alone. Follow-up care is a key part of your treatment and safety. Be sure to make and go to all appointments, and call your doctor if you are having problems. It's also a good idea to know your test results and keep a list of the medicines you take. How can you care for yourself at home? Following the DASH diet  Eat 4 to 5 servings of fruit each day. A serving is 1 medium-sized piece of fruit, ½ cup chopped or canned fruit, 1/4 cup dried fruit, or 4 ounces (½ cup) of fruit juice. Choose fruit more often than fruit juice. Eat 4 to 5 servings of vegetables each day. A serving is 1 cup of lettuce or raw leafy vegetables, ½ cup of chopped or cooked vegetables, or 4 ounces (½ cup) of vegetable juice. Choose vegetables more often than vegetable juice. Get 2 to 3 servings of low-fat and fat-free dairy each day. A serving is 8 ounces of milk, 1 cup of yogurt, or 1 ½ ounces of cheese. Eat 6 to 8 servings of grains each day.  A serving is 1 slice of bread, 1 ounce of dry cereal, or ½ cup of cooked rice, pasta, or cooked cereal. Try to choose whole-grain products as much as possible. Limit lean meat, poultry, and fish to 2 servings each day. A serving is 3 ounces, about the size of a deck of cards. Eat 4 to 5 servings of nuts, seeds, and legumes (cooked dried beans, lentils, and split peas) each week. A serving is 1/3 cup of nuts, 2 tablespoons of seeds, or ½ cup of cooked beans or peas. Limit fats and oils to 2 to 3 servings each day. A serving is 1 teaspoon of vegetable oil or 2 tablespoons of salad dressing. Limit sweets and added sugars to 5 servings or less a week. A serving is 1 tablespoon jelly or jam, ½ cup sorbet, or 1 cup of lemonade. Eat less than 2,300 milligrams (mg) of sodium a day. If you limit your sodium to 1,500 mg a day, you can lower your blood pressure even more. Tips for success  Start small. Do not try to make dramatic changes to your diet all at once. You might feel that you are missing out on your favorite foods and then be more likely to not follow the plan. Make small changes, and stick with them. Once those changes become habit, add a few more changes. Try some of the following:  Make it a goal to eat a fruit or vegetable at every meal and at snacks. This will make it easy to get the recommended amount of fruits and vegetables each day. Try yogurt topped with fruit and nuts for a snack or healthy dessert. Add lettuce, tomato, cucumber, and onion to sandwiches. Combine a ready-made pizza crust with low-fat mozzarella cheese and lots of vegetable toppings. Try using tomatoes, squash, spinach, broccoli, carrots, cauliflower, and onions. Have a variety of cut-up vegetables with a low-fat dip as an appetizer instead of chips and dip. Sprinkle sunflower seeds or chopped almonds over salads. Or try adding chopped walnuts or almonds to cooked vegetables. Try some vegetarian meals using beans and peas. Add garbanzo or kidney beans to salads. Make burritos and tacos with mashed zamorano beans or black beans.   Where can you learn more? Go to [email or web address] org and sign in to your gdgt account. Enter J431 in the [employer] box to learn more about \"DASH Diet: Care Instructions. \"     If you do not have an account, please click on the \"Sign Up Now\" link. Current as of: December 16, 2019               Content Version: 12.6  © 1995-4607 [employer]. Care instructions adapted under license by [facility] ([facility]). If you have questions about a medical condition or this instruction, always ask your healthcare professional. [place] [address] any warranty or liability for your use of this information.

## 2020-11-20 ENCOUNTER — TELEPHONE (OUTPATIENT)
Dept: FAMILY MEDICINE CLINIC | Age: 70
End: 2020-11-20

## 2020-11-20 NOTE — TELEPHONE ENCOUNTER
Pt called stating Invokana script is too expensive $400 copay will need to switch medication, pt is also inquiring concerning samples of Trulicity please advise

## 2020-12-01 RX ORDER — DULAGLUTIDE 3 MG/.5ML
3 INJECTION, SOLUTION SUBCUTANEOUS WEEKLY
Qty: 4 PEN | Refills: 2 | Status: SHIPPED | OUTPATIENT
Start: 2020-12-01 | End: 2020-12-31

## 2020-12-02 ENCOUNTER — TELEPHONE (OUTPATIENT)
Dept: FAMILY MEDICINE CLINIC | Age: 70
End: 2020-12-02

## 2020-12-02 NOTE — TELEPHONE ENCOUNTER
Dr. Fuchs Backer please put addendum in [address] from 11/23/20 for increase in Trulicity needing to do prior auth and there isn't reason increase to Trulicity 5ZU/1.8VI

## 2020-12-03 ENCOUNTER — OFFICE VISIT (OUTPATIENT)
Dept: NEUROLOGY | Age: 70
End: 2020-12-03
Payer: MEDICARE

## 2020-12-03 VITALS
HEART RATE: 96 BPM | SYSTOLIC BLOOD PRESSURE: 130 MMHG | DIASTOLIC BLOOD PRESSURE: 74 MMHG | OXYGEN SATURATION: 97 % | TEMPERATURE: 97.2 F

## 2020-12-03 PROCEDURE — 1123F ACP DISCUSS/DSCN MKR DOCD: CPT | Performed by: NURSE PRACTITIONER

## 2020-12-03 PROCEDURE — 3017F COLORECTAL CA SCREEN DOC REV: CPT | Performed by: NURSE PRACTITIONER

## 2020-12-03 PROCEDURE — 4040F PNEUMOC VAC/ADMIN/RCVD: CPT | Performed by: NURSE PRACTITIONER

## 2020-12-03 PROCEDURE — G8417 CALC BMI ABV UP PARAM F/U: HCPCS | Performed by: NURSE PRACTITIONER

## 2020-12-03 PROCEDURE — 1090F PRES/ABSN URINE INCON ASSESS: CPT | Performed by: NURSE PRACTITIONER

## 2020-12-03 PROCEDURE — G8484 FLU IMMUNIZE NO ADMIN: HCPCS | Performed by: NURSE PRACTITIONER

## 2020-12-03 PROCEDURE — 1036F TOBACCO NON-USER: CPT | Performed by: NURSE PRACTITIONER

## 2020-12-03 PROCEDURE — G8400 PT W/DXA NO RESULTS DOC: HCPCS | Performed by: NURSE PRACTITIONER

## 2020-12-03 PROCEDURE — G8427 DOCREV CUR MEDS BY ELIG CLIN: HCPCS | Performed by: NURSE PRACTITIONER

## 2020-12-03 PROCEDURE — 99215 OFFICE O/P EST HI 40 MIN: CPT | Performed by: NURSE PRACTITIONER

## 2020-12-03 RX ORDER — LORATADINE 10 MG/1
10 TABLET ORAL DAILY
Qty: 30 TABLET | Refills: 0 | Status: SHIPPED | OUTPATIENT
Start: 2020-12-03 | End: 2021-01-02

## 2020-12-03 RX ORDER — METOPROLOL SUCCINATE 25 MG/1
25 TABLET, EXTENDED RELEASE ORAL NIGHTLY
Qty: 30 TABLET | Refills: 0 | Status: SHIPPED
Start: 2020-12-03 | End: 2021-01-12 | Stop reason: ALTCHOICE

## 2020-12-03 NOTE — PROGRESS NOTES
changes with these headaches. In the past patient has overdosed herself taking ibuprofen due to her pain requiring multiple transfusions due to low hemoglobin; now on Vicodin through pain management doing well. Patient also complains of dizziness described as lightheaded, room spinning and unbalanced. Patient reports the feeling of inner ear pain and fluid at times. Patient notices the dizziness provoked mostly when standing and she begins to walk; denies orthostatic hypotension and does not associate this dizziness with lying, sitting and standing. In the past patient has fallen however last fall was over 3 years ago. This dizziness does not occur daily and now occurs randomly with no association to anything except standing or walking. Patient was started on tizanidine in October during her initial visit and since that she was \"loopy, barely able to function and it felt unsafe to walk\" however she continues to take tizanidine nightly as it helps her sleep. Since her last visit, she has had headaches every day except 3 days--- no headache diary presented today. Patient's last A1c 8.3--- she states she goes next week for repeat lab. MRI brain without contrast completed October 29 showed no acute intracranial process with mild chronic white matter ischemic changes consistent with partial empty sella syndrome. Patient denies any new medical issues or falls. Patient has chronic back pain, sciatica, chronic pain syndrome and fibromyalgia. Patient currently follows with [facility] Vonnie Anderson in [facility] for pain management; repeat intervention versus neurosurgery is being debated. EMG studies of BLE are pending. Patient also suffers from hypertension, uncontrolled diabetes mellitus 2, memory loss, chronic arthritis, history of mononucleosis, eczema and depression. Patient admitted on initial visit she is depressed due to loss of inability to help her self and chronic pain.   Patient says her house is filthy as kyphosis.   Tenderness throughout to palpation  Lungs: clear to auscultation bilaterally  Heart: regular rate and rhythm  Abdomen: soft, non-tender; bowel sounds normal  Extremities: normal, atraumatic, no cyanosis or edema  Pulses: 2+ and symmetric  Skin:  color, texture, turgor normal--no rashes or lesions      Mental Status: alert and oriented x 4, follows commands well, very conversant yet irritable today compared to last visit    Has to be redirected multiple times during visit--- strays off topic and does not answer directly; interrupts this examiner frequently during visit    Appropriate attention/concentration  Intact fundus of knowledge  Memories intact    Speech: no dysarthria  Language: no aphasias---reading, writing, repetition, and object identification intact    Cranial Nerves:  I: smell  intact   II: visual acuity     II: visual fields Full to finger counting bilaterally   II: pupils PERRL   III,VII: ptosis None   III,IV,VI: extraocular muscles  EOMI without nystagmus   V: mastication Normal   V: facial light touch sensation  Normal   V,VII: corneal reflex     VII: facial muscle function - upper  Normal   VII: facial muscle function - lower Normal   VIII: hearing Normal   IX: soft palate elevation  Normal   IX,X: gag reflex    XI: trapezius strength  5/5   XI: sternocleidomastoid strength 5/5   XI: neck extension strength  5/5   XII: tongue strength  Normal     Motor:   strong equally   5/5 to RUE and RLE  LUE -5/5, LLE +4/5  Normal bulk and tone  No drift   No abnormal movements    Sensory:  LT and PP normal  Vibration normal    Coordination:   FN, FFM and TORREY normal  HS normal    Gait:  Normal    DTR:   Right Brachioradialis reflex 1+  Left Brachioradialis reflex 1+  Right Biceps reflex 1+  Left Biceps reflex 1+  Right Triceps reflex 1+  Left Triceps reflex 1+  Right Quadriceps reflex 1+  Left Quadriceps reflex 1+  Right Achilles reflex 1+  Left Achilles reflex 1+    L Babinskis  No Burgess's    No other pathological reflexes    Laboratory/Radiology:  ry/Radiology:     CBC:   Lab Results   Component Value Date    WBC 9.1 10/16/2020    RBC 3.83 10/16/2020    HGB 8.6 10/16/2020    HCT 29.2 10/16/2020    MCV 76.2 10/16/2020    MCH 22.5 10/16/2020    MCHC 29.5 10/16/2020    RDW 18.1 10/16/2020     10/16/2020    MPV 10.3 10/16/2020     CMP:    Lab Results   Component Value Date     10/16/2020    K 4.1 10/16/2020    CL 99 10/16/2020    CO2 18 10/16/2020    BUN 16 10/16/2020    CREATININE 0.7 10/16/2020    GFRAA >60 10/16/2020    LABGLOM >60 10/16/2020    GLUCOSE 167 10/16/2020    PROT 7.6 10/16/2020    LABALBU 4.1 10/16/2020    CALCIUM 9.7 10/16/2020    BILITOT 0.3 10/16/2020    ALKPHOS 64 10/16/2020    AST 65 10/16/2020    ALT 47 10/16/2020     Hepatic Function Panel:    Lab Results   Component Value Date    ALKPHOS 64 10/16/2020    ALT 47 10/16/2020    AST 65 10/16/2020    PROT 7.6 10/16/2020    BILITOT 0.3 10/16/2020    LABALBU 4.1 10/16/2020     U/A:    Lab Results   Component Value Date    COLORU Yellow 01/15/2019    PROTEINU Negative 01/15/2019    PHUR 7.0 01/15/2019    WBCUA 0-1 01/15/2019    RBCUA 0-1 01/15/2019    BACTERIA NONE 01/15/2019    CLARITYU Clear 01/15/2019    SPECGRAV <=1.005 01/15/2019    LEUKOCYTESUR TRACE 01/15/2019    UROBILINOGEN 0.2 01/15/2019    BILIRUBINUR Negative 01/15/2019    BLOODU Negative 01/15/2019    GLUCOSEU Negative 01/15/2019     HgBA1c:    Lab Results   Component Value Date    LABA1C 8.3 10/16/2020     Lab Results   Component Value Date    TSH 2.680 01/14/2019   FOLATE:    Lab Results   Component Value Date    FOLATE 17.0 01/14/2019     IRON:    Lab Results   Component Value Date    IRON 101 01/14/2019   TIBC:    Lab Results   Component Value Date    TIBC 485 01/14/2019     FERRITIN:    Lab Results   Component Value Date    FERRITIN 6 01/14/2019     MRI brain without contrast 10/29/2020: 1. No evidence of acute intracranial process.   2.  Mild chronic white matter ischemic changes. Findings consistent with partial empty sella syndrome. MRI lumbar spine 9/3/2020: Mild levoconvex lumbar scoliosis. Mild central canal stenosis and moderate right foraminal stenosis at L2-L3. Moderate right foraminal stenosis and mild left foraminal stenosis at L3-L4. Severe left foraminal stenosis and left lateral recess stenosis at L4-L5. Mild bilateral foraminal stenosis at L5- S1 greater on the right.     All labs and images were personally reviewed at the time of this visit    Assessment:     Chronic daily headaches   Provoked with weather changes--- worsening in the summer and fall improving in the winter   Associated symptoms of ear fullness; no nausea, vomiting, light or sound sensitivity or vision changes   Described as sinus and occipital pain radiating up to mouth and teeth--achy and throbbing    starting mild and increasing throughout the day   Has tried Topamax, baclofen, and Imitrex in the past which were unsuccessful; now OTCs with minimal relief   On exam left-sided weakness and left Babinski with sinus, frontal, temporal, occipital, back and trapezius muscle tenderness    MRI brain completed ruled out other etiologies and acute processes   Unfortunately patient was unable to tolerate tizanidine so it is discontinued now;     allergy to baclofen and due to history of depression currently on Prozac which limits other modalities   I suspect these headaches are from chronic sinus and allergies in addition to tension from chronic pain    Will start daily Claritin and low-dose metoprolol --- side effects reviewed and oarrs report done;      patient did not start daily Claritin last visit as recommended      Dizziness   Described as room spinning, lightheaded and unbalanced   Provoked mostly when standing and walking   Dizziness occurs more in the summer months; these occurrences are not daily   I suspect dizziness is from chronic sinus and allergies with intermittent ear fullness reported    Chronic back pain   On exam tenderness to palpation throughout entire back, occipital, trapezius and temporal areas   Follows with pain management   MRI L-spine confirms multiple areas of central canal stenosis, foraminal stenosis and scoliosis   EMG studies from September and October are pending    Uncontrolled diabetes   I suspect is contributing to daily headaches and dizziness   Last A1c 8.3--- to be checked next week per patient   Continue management per PCP    Depression    Currently on Prozac 40 mg daily   Monitor for worsening   Patient admits she is depressed due to chronic pain and decline in physical condition    Plan:     Start low-dose metoprolol  Start Claritin daily  Discontinue tizanidine due to side effects reported  Unfortunately due to Prozac unable to start other antidepressants  Unfortunately patient has poor tolerance to muscle relaxants  Keep headache diary  Call with any issues  Return office in 3 months      MORALES Breen NP-C  12:18 PM  12/3/2020    I spent 40 minutes with this patient obtaining the HPI and discussing the exam with greater than 50% of the time providing counseling and education on medications and other treatment plans. All questions were answered prior to leaving my office.

## 2020-12-04 ENCOUNTER — TELEPHONE (OUTPATIENT)
Dept: PAIN MANAGEMENT | Age: 70
End: 2020-12-04

## 2020-12-04 NOTE — TELEPHONE ENCOUNTER
[id number] Horseman came into the office to sign a medical release to send to Dr Shilpi Henriquez for previous office notes. Release sent to Dr Shilpi Henriquez.     Nathan Guidry RN  Pain Management

## 2020-12-15 ENCOUNTER — TELEPHONE (OUTPATIENT)
Dept: PAIN MANAGEMENT | Age: 70
End: 2020-12-15

## 2020-12-15 ENCOUNTER — TELEPHONE (OUTPATIENT)
Dept: NEUROLOGY | Age: 70
End: 2020-12-15

## 2020-12-15 NOTE — TELEPHONE ENCOUNTER
Ke Palomo called in requesting a refill of Norco  Last refilled on 10-27-20 for a 30 day supply. OARRS is consistent. Last office visit 10-23-20  Future office visit is 12-23-20  Reason for refill:  Out of medication  Patient also was checking on approval for her procedure. I did check with [facility], they are awaiting notes from Dr. Moi Carrillo who had done the previous ablation and insurance will not give approval without them. I did make the patient aware.

## 2020-12-15 NOTE — TELEPHONE ENCOUNTER
Patient called in today. She states that her migraines are worse. Meds are not working. Please advise.   Electronically signed by Óscar Guan MA on 12/15/20 at 3:13 PM EST

## 2020-12-16 RX ORDER — HYDROCODONE BITARTRATE AND ACETAMINOPHEN 5; 325 MG/1; MG/1
1 TABLET ORAL DAILY PRN
Qty: 30 TABLET | Refills: 0 | Status: SHIPPED
Start: 2020-12-16 | End: 2021-01-22 | Stop reason: SDUPTHER

## 2020-12-16 NOTE — TELEPHONE ENCOUNTER
Yes I will send refill to pharmacy on file. She can keep appointment with Dr Altamese Sever next week if she has any concerns that need to be addressed or reschedule. But next refill will require a virtual or in office visit.  Thank you

## 2020-12-16 NOTE — TELEPHONE ENCOUNTER
Please advise patient to take 2--- 25 tablets nightly. Please have her call back with tolerance/effectiveness. Also advise her she has to give medications time to work and work on lifestyle modifications as advised at her last visit. Thanks.

## 2020-12-22 ENCOUNTER — TELEPHONE (OUTPATIENT)
Dept: FAMILY MEDICINE CLINIC | Age: 70
End: 2020-12-22

## 2020-12-22 NOTE — TELEPHONE ENCOUNTER
Spoke with patient re: letter we received from [facility] stating that they unable to reach her. She states that she will follow thru with it.

## 2020-12-23 ENCOUNTER — VIRTUAL VISIT (OUTPATIENT)
Dept: PAIN MANAGEMENT | Age: 70
End: 2020-12-23
Payer: MEDICARE

## 2020-12-23 PROCEDURE — G8400 PT W/DXA NO RESULTS DOC: HCPCS | Performed by: PAIN MEDICINE

## 2020-12-23 PROCEDURE — 1123F ACP DISCUSS/DSCN MKR DOCD: CPT | Performed by: PAIN MEDICINE

## 2020-12-23 PROCEDURE — 99213 OFFICE O/P EST LOW 20 MIN: CPT | Performed by: PAIN MEDICINE

## 2020-12-23 PROCEDURE — 1090F PRES/ABSN URINE INCON ASSESS: CPT | Performed by: PAIN MEDICINE

## 2020-12-23 PROCEDURE — G8427 DOCREV CUR MEDS BY ELIG CLIN: HCPCS | Performed by: PAIN MEDICINE

## 2020-12-23 PROCEDURE — 3017F COLORECTAL CA SCREEN DOC REV: CPT | Performed by: PAIN MEDICINE

## 2020-12-23 PROCEDURE — 4040F PNEUMOC VAC/ADMIN/RCVD: CPT | Performed by: PAIN MEDICINE

## 2020-12-23 NOTE — PROGRESS NOTES
[facility]  [address], [address] [place] [address]  [phone]    Tele health follow up Note      Juan Barber     Date of Visit:  12/23/2020    CC:  Tele health follow up   Chief Complaint   Patient presents with    Back Pain     pain level 0     Consent:  Telehealth Visit due to [place] 19 pandemic  The patient and/or health care decision maker is aware that he/she may receive a bill for this tele-health service D[facility] Me, depending on his insurance coverage, and has provided verbal consent to proceed: Yes  I have considered the risks of abuse, dependence, addiction and diversion. My patient is aware that they will need a follow-up visit (in-person or virtually) at the appropriate time indicated for continued medications. Further, my patient is aware that when this acute crisis has lifted, they will be expected to return for an in-person visit and all elements of standard local and hospital guidelines in order to continue this medication. Patient Location:Home   Physician Location: Other address in [place]    HPI:  Follow up on her low back pain with complaints of increased low back pain radiating down the right lower extremity. Appropriate analgesia with current medications regimen:Fair  Change in quality of symptoms:no. Medication side effects:none  Recent diagnostic testing:none  Recent interventional procedures:none.     She has not been on anticoagulation medications to include none. The patient  has not been on herbal supplements.  The patient is diabetic.     Imaging:   Lumbar spine MRI 2020:     Mild levoconvex lumbar scoliosis.         Mild central canal stenosis and moderate right foraminal stenosis at L2-L3.         Moderate right foraminal stenosis and mild left foraminal stenosis at L3-L4.         Severe left foraminal stenosis and left lateral recess stenosis at L4-L5.         Mild bilateral foraminal stenosis at L5-S1 greater on the right.

## 2021-01-05 NOTE — TELEPHONE ENCOUNTER
Last Appointment:  11/17/2020  Future Appointments   Date Time Provider Ana Basilio   1/12/2021 11:00 AM MORALES Boo NP [facility]   1/18/2021  9:45 AM DO JENIFFER Kate [facility]

## 2021-01-06 RX ORDER — OMEPRAZOLE 20 MG/1
20 CAPSULE, DELAYED RELEASE ORAL DAILY
Qty: 30 CAPSULE | Refills: 3 | Status: SHIPPED
Start: 2021-01-06 | End: 2021-04-06 | Stop reason: SDUPTHER

## 2021-01-12 ENCOUNTER — OFFICE VISIT (OUTPATIENT)
Dept: NEUROLOGY | Age: 71
End: 2021-01-12
Payer: MEDICARE

## 2021-01-12 VITALS
HEIGHT: 63 IN | OXYGEN SATURATION: 97 % | DIASTOLIC BLOOD PRESSURE: 74 MMHG | BODY MASS INDEX: 35.61 KG/M2 | RESPIRATION RATE: 20 BRPM | SYSTOLIC BLOOD PRESSURE: 154 MMHG | WEIGHT: 201 LBS | HEART RATE: 114 BPM

## 2021-01-12 DIAGNOSIS — G89.29 CHRONIC INTRACTABLE HEADACHE, UNSPECIFIED HEADACHE TYPE: ICD-10-CM

## 2021-01-12 DIAGNOSIS — R42 DIZZINESS: Primary | ICD-10-CM

## 2021-01-12 DIAGNOSIS — R51.9 CHRONIC DAILY HEADACHE: ICD-10-CM

## 2021-01-12 DIAGNOSIS — R51.9 INTRACTABLE HEADACHE, UNSPECIFIED CHRONICITY PATTERN, UNSPECIFIED HEADACHE TYPE: ICD-10-CM

## 2021-01-12 DIAGNOSIS — R51.9 CHRONIC INTRACTABLE HEADACHE, UNSPECIFIED HEADACHE TYPE: ICD-10-CM

## 2021-01-12 PROCEDURE — 1036F TOBACCO NON-USER: CPT | Performed by: NURSE PRACTITIONER

## 2021-01-12 PROCEDURE — 1090F PRES/ABSN URINE INCON ASSESS: CPT | Performed by: NURSE PRACTITIONER

## 2021-01-12 PROCEDURE — G8400 PT W/DXA NO RESULTS DOC: HCPCS | Performed by: NURSE PRACTITIONER

## 2021-01-12 PROCEDURE — G8417 CALC BMI ABV UP PARAM F/U: HCPCS | Performed by: NURSE PRACTITIONER

## 2021-01-12 PROCEDURE — G8427 DOCREV CUR MEDS BY ELIG CLIN: HCPCS | Performed by: NURSE PRACTITIONER

## 2021-01-12 PROCEDURE — G8484 FLU IMMUNIZE NO ADMIN: HCPCS | Performed by: NURSE PRACTITIONER

## 2021-01-12 PROCEDURE — 99215 OFFICE O/P EST HI 40 MIN: CPT | Performed by: NURSE PRACTITIONER

## 2021-01-12 PROCEDURE — 3017F COLORECTAL CA SCREEN DOC REV: CPT | Performed by: NURSE PRACTITIONER

## 2021-01-12 PROCEDURE — 4040F PNEUMOC VAC/ADMIN/RCVD: CPT | Performed by: NURSE PRACTITIONER

## 2021-01-12 PROCEDURE — 1123F ACP DISCUSS/DSCN MKR DOCD: CPT | Performed by: NURSE PRACTITIONER

## 2021-01-12 RX ORDER — ERENUMAB-AOOE 140 MG/ML
140 INJECTION, SOLUTION SUBCUTANEOUS
Qty: 1 PEN | Refills: 0
Start: 2021-01-12 | End: 2021-01-13

## 2021-01-12 NOTE — PROGRESS NOTES
[address] [facility]. Bhavesh Wise M.D., F.A.C.P. Janusz Lyon, ROULA, APRN, CNS  Saarh Reagan. Pinky Manriquez, MSN, APRN-FNP-C  Maik Salter MSN, APRN, FNP-C  Jaxon HUMPHREYS PA-C  [address] MSN, APRN, FNP-C  [address], [address]  L' [facility], [address] [address]  Phone: [phone]  Fax: [phone]       Krish Pastrana is a 72 y.o. right handed female     Patient presents to neurology office today for evaluation and management of headaches and dizziness. Patient presents alone and is deemed a good historian. Patient presents to neurology office today for evaluation of chronic headaches and dizziness. Patient states she began having headaches in the early 2000's. Patient describes her headaches as sinus pain and occipital pain that radiates all the way up to her mouth and her teeth. Pain is described as aching and throbbing normally starting off mild and worsening throughout the day. Patient said initially back in the early 2000's, it was so severe when she turns her head too fast she would almost blackout. Over the years patient has noticed that her headaches are worsened with \"pressure changes\" and weather changes. When it is going to storm or snow these headaches increase. During the summer and fall these headaches are daily; they improve significantly in the winter. These headaches also typically start in the afternoons. Patient has no auras with these headaches. These headaches can last up to 24 hours in various degrees of pain. Patient's last MRI was in 2003 and was reportedly negative. Over the years patient has tried Imitrex which was unsuccessful and Topamax which initially worked for approximately 1 year and then it stopped working. Patient takes over-the-counter such as Advil Cold and Sinus or Advil migraine and has some relief with these. Patient has no nausea, vomiting, light or sound sensitivity, speech or swallowing difficulties, or vision changes with these headaches. In the past patient has overdosed herself taking ibuprofen due to her pain requiring multiple transfusions due to low hemoglobin; now on Vicodin through pain management. Patient also complains of dizziness described as lightheaded, room spinning and unbalanced. Patient reports the feeling of inner ear pain and fluid at times. Patient notices the dizziness provoked mostly when standing and when she begins to walk; denies orthostatic hypotension and does not associate this dizziness with lying, sitting and standing. In the past patient has fallen however last fall was over 3 years ago. This dizziness does not occur daily and now occurs randomly with no association to anything except standing or walking. Patient was started on tizanidine in October during her initial visit and since that she was \"loopy, barely able to function and it felt unsafe to walk\" however she continues to take tizanidine nightly as it helps her sleep. During her last visit, she continued to have headaches every day except 3 days--- no headache diary presented today. Patient's last A1c 8.3--- she states she goes next week for repeat lab. MRI brain without contrast completed October 29 showed no acute intracranial process with mild chronic white matter ischemic changes consistent with partial empty sella syndrome. Patient denies any new medical issues or falls. Today patient complains of continued headaches described as generalized headaches including a frontal, occipital and sinuses which happens to be 8/10 in pain today. Patient says these headaches are daily. These headaches are not associated with dizziness that occurs with them described as lightheaded and imbalance. Last visit patient states she had side effects due to tizanidine but continue to take it at night. Patient was put on metoprolol for migraine prophylaxis and that was increased after she had no side effects with this. Today patient states that she stopped taking metoprolol few days ago as she was concerned with taking lisinopril and metoprolol together although no side effects were reported when doing this. Patient reports she has been taking Claritin daily but it is not helping. Patient has not been to the dentist in several years. Patient has not seen ophthalmology in quite a while however has an appointment in a few weeks. Patient has not seen ENT in several years. Patient's last sleep study was over 5 years ago. Patient has chronic back pain, sciatica, chronic pain syndrome and fibromyalgia. Patient currently follows with Dr. Baudilio Salas in [facility] for pain management; repeat intervention versus neurosurgery is being debated. Patient reports her last injection with bilateral ablation was approximately 2015. EMG studies of BLE were pending however she states that that has been decided against.  Patient also suffers from hypertension, uncontrolled diabetes mellitus 2, memory loss, chronic arthritis, history of mononucleosis, eczema and depression. Patient admitted on initial visit she is depressed due to loss of inability to help her self and chronic pain. Patient says her house is filthy as she is unable to clean it and she qualifies for assistance in her home but due to COVID-19 she is unable to get help. Patient does get Meals on Wheels from the [facility] daily to eat. Patient reports at least 8 hours of sleep per night. Patient drinks 2 to 3 cups of tea a day. Patient drinks 1 L water a day. Patient reports 2-3 meals, meals obtained through the [facility]. Patient lost over 70 pounds in 2015 after going to have gastric bypass evaluation--- ultimately losing the weight on her own and not requiring gastric bypass. Patient reports a high stress level due to her pain. Patient does not exercise. Patient's last physical therapy was approximately 3 years ago. Patient reports that she quit smoking about 35 years ago, approximately 1985. Her smoking use included cigarettes. She started smoking about 48 years ago. She has a 20.00 pack-year smoking history. She has never used smokeless tobacco. She reports current alcohol use of about 1.0 standard drinks of alcohol per week. She reports that she does not use drugs. Patient is a retired nurse. Patient is .   Patient has 1 son this 36 that lives in [place]; no grandkids.    (+) Lightheadedness, room spinning, unbalanced (+) Headache  No chest pain or palpitations  No SOB  No loss of consciousness  No falls, tripping or stumbling  No incontinence of bowels or bladder  No itching or bruising appreciated  No numbness, tingling or focal arm/leg weakness    ROS is otherwise negative    Objective:       BP (!) 154/74 (Site: Left Lower Arm, Position: Sitting, Cuff Size: Medium Adult)   Pulse 114   Resp 20   Ht 5' 3\" (1.6 m)   Wt 201 lb (91.2 kg)   LMP  (LMP Unknown)   SpO2 97%   BMI 35.61 kg/m²     General appearance: alert, obese, appears stated age, cooperative and in no distress  Head: normocephalic, without obvious abnormality, atraumatic. Bilateral frontal, sinus, tem no poral and occipital tenderness  Eyes: conjunctivae/corneas clear; no drainage  Neck: supple, symmetrical, trachea midline. Bilateral trapezius tenderness  Back: symmetric. ROM normal.  Mild kyphosis.   Tenderness throughout to palpation  Lungs: clear to auscultation bilaterally  Heart: regular rate and rhythm  Abdomen: soft, non-tender; bowel sounds normal  Extremities: normal, atraumatic, no cyanosis or edema  Pulses: 2+ and symmetric  Skin:  color, texture, turgor normal--no rashes or lesions      Mental Status: alert and oriented x 4, follows commands well, very conversant yet irritable again today    Has to be redirected multiple times during visit--- strays off topic and does not answer directly; interrupts this examiner frequently during visit Very agitated insisting that she is in pain and that nothing is working, raises voice quite often during visit    Appropriate attention/concentration  Intact fundus of knowledge  Memories intact    Speech: no dysarthria  Language: no aphasias---reading, writing, repetition, and object identification intact    Cranial Nerves:  I: smell  intact   II: visual acuity     II: visual fields Full to finger counting bilaterally   II: pupils PERRL   III,VII: ptosis None III,IV,VI: extraocular muscles  EOMI without nystagmus   V: mastication Normal   V: facial light touch sensation  Normal   V,VII: corneal reflex     VII: facial muscle function - upper  Normal   VII: facial muscle function - lower Normal   VIII: hearing Normal   IX: soft palate elevation  Normal   IX,X: gag reflex    XI: trapezius strength  5/5   XI: sternocleidomastoid strength 5/5   XI: neck extension strength  5/5   XII: tongue strength  Normal     Motor:   strong equally   5/5 to RUE and RLE  LUE -5/5, LLE +4/5  Normal bulk and tone  No drift   No abnormal movements    Sensory:  LT and PP normal  Vibration normal however markedly decreased to LUE    Coordination:   FN, FFM and TORREY normal  HS normal    Gait:  Normal    DTR:   Right Brachioradialis reflex 1+  Left Brachioradialis reflex 1+  Right Biceps reflex 1+  Left Biceps reflex 1+  Right Triceps reflex 1+  Left Triceps reflex 1+  Right Quadriceps reflex 1+  Left Quadriceps reflex 1+  Right Achilles reflex 1+  Left Achilles reflex 1+    L Babinskis  No Burgess's    No other pathological reflexes    Laboratory/Radiology:  ry/Radiology:     CBC:   Lab Results   Component Value Date    WBC 9.1 10/16/2020    RBC 3.83 10/16/2020    HGB 8.6 10/16/2020    HCT 29.2 10/16/2020    MCV 76.2 10/16/2020    MCH 22.5 10/16/2020    MCHC 29.5 10/16/2020    RDW 18.1 10/16/2020     10/16/2020    MPV 10.3 10/16/2020     CMP:    Lab Results   Component Value Date     10/16/2020    K 4.1 10/16/2020    CL 99 10/16/2020    CO2 18 10/16/2020    BUN 16 10/16/2020    CREATININE 0.7 10/16/2020    GFRAA >60 10/16/2020    LABGLOM >60 10/16/2020    GLUCOSE 167 10/16/2020    PROT 7.6 10/16/2020    LABALBU 4.1 10/16/2020    CALCIUM 9.7 10/16/2020    BILITOT 0.3 10/16/2020    ALKPHOS 64 10/16/2020    AST 65 10/16/2020    ALT 47 10/16/2020     Hepatic Function Panel:    Lab Results   Component Value Date    ALKPHOS 64 10/16/2020    ALT 47 10/16/2020    AST 65 10/16/2020 Categorized as achy and throbbing    Starting mild and increasing throughout the day   Has tried Topamax, baclofen, metoprolol and Imitrex in the past which were unsuccessful; now OTCs with minimal relief   On exam, left Babinski with sinus, frontal, temporal, occipital, back and trapezius muscle tenderness    MRI brain completed ruled out other etiologies and acute processes   Patient takes tizanidine at night due to the side effects however it does help her sleep    allergy to baclofen and due to history of depression currently on Prozac which limits other modalities   I suspect these headaches are from chronic sinus and allergies in addition to tension from chronic pain    Patient takes daily Claritin which she states does not help   Patient was started on metoprolol last visit and increased over the phone however today patient states she does not take it anymore due to concerns of taking it with lisinopril   Will obtain TSH, CRP, ESR   Aimovig sample provided today--- patient will call with tolerability   Again patient will keep headache diary (provided today)--- not to brought in the last 2 visits   Will also obtain sleep study as I suspect daily headaches are from poor sleep quality, medication overuse, stress, etc.      Dizziness   Described as room spinning, lightheaded and unbalanced   Provoked mostly when standing and walking   Dizziness occurs more in the summer months;     these occurrences were initially were reported as not daily however today they are reported as daily and with headaches   I suspect dizziness is from chronic sinus and allergies with intermittent ear fullness reported   Will obtain CTA head and neck to rule out intracranial pathology   ENT referral may be needed in the near future    Chronic back pain   On exam tenderness to palpation throughout entire back, occipital, trapezius and temporal areas   Follows with pain management

## 2021-01-15 ENCOUNTER — TELEPHONE (OUTPATIENT)
Dept: NEUROLOGY | Age: 71
End: 2021-01-15

## 2021-01-15 ENCOUNTER — HOSPITAL ENCOUNTER (OUTPATIENT)
Age: 71
Discharge: HOME | End: 2021-01-17
Payer: MEDICARE

## 2021-01-15 DIAGNOSIS — M43.06 LUMBAR SPONDYLOLYSIS: ICD-10-CM

## 2021-01-15 PROCEDURE — U0003 INFECTIOUS AGENT DETECTION BY NUCLEIC ACID (DNA OR RNA); SEVERE ACUTE RESPIRATORY SYNDROME CORONAVIRUS 2 (SARS-COV-2) (CORONAVIRUS DISEASE [COVID-19]), AMPLIFIED PROBE TECHNIQUE, MAKING USE OF HIGH THROUGHPUT TECHNOLOGIES AS DESCRIBED BY CMS-2020-01-R: HCPCS

## 2021-01-15 RX ORDER — DULAGLUTIDE 3 MG/.5ML
3 INJECTION, SOLUTION SUBCUTANEOUS WEEKLY
COMMUNITY
End: 2021-03-26

## 2021-01-15 NOTE — TELEPHONE ENCOUNTER
Patient called in stating that she wanted to let Jordan Ambriz know that she took the sample of Aimovig on Tuesday and she has not had a headache or vertigo since. Are you sending order to pharmacy?

## 2021-01-16 LAB
SARS-COV-2: NOT DETECTED
SOURCE: NORMAL

## 2021-01-20 ENCOUNTER — ANESTHESIA EVENT (OUTPATIENT)
Dept: OPERATING ROOM | Age: 71
End: 2021-01-20
Payer: MEDICARE

## 2021-01-20 NOTE — ANESTHESIA PRE PROCEDURE
 gabapentin (NEURONTIN) 300 MG capsule Take 1 capsule by mouth 4 times daily for 30 days. 360 capsule 3    rOPINIRole (REQUIP) 0.5 MG tablet TAKE ONE TABLET BY MOUTH THREE TIMES A DAY 90 tablet 2    lisinopril (PRINIVIL;ZESTRIL) 20 MG tablet TAKE ONE TABLET BY MOUTH EVERY DAY (Patient taking differently: am) 30 tablet 3    omeprazole (PRILOSEC) 20 MG delayed release capsule Take 1 capsule by mouth Daily (Patient taking differently: Take 20 mg by mouth Daily am) 30 capsule 3    ROBAXIN 500 MG tablet Take 1 tablet by mouth 2 times daily 60 tablet 0       Allergies:     Allergies   Allergen Reactions    Baclofen Other (See Comments)     Dry mouth, abd pain    Ibuprofen      Acute bleeding    Nickel      Surgical steel    Penicillins Hives       Problem List:    Patient Active Problem List   Diagnosis Code    Obstructive sleep apnea syndrome G47.33    Lumbar radicular pain M54.16    Recurrent major depressive disorder, in partial remission (HCC) F33.41    Type 2 diabetes mellitus without complication, without long-term current use of insulin (HCC) E11.9    Gastroesophageal reflux disease K21.9    Essential hypertension I10    Class 2 obesity due to excess calories without serious comorbidity with body mass index (BMI) of 37.0 to 37.9 in adult E66.09, Z68.37    Chronic pain syndrome G89.4    Lumbar disc disorder M51.9    Lumbar facet arthropathy M47.816    Spinal stenosis of lumbar region without neurogenic claudication M48.061    Migraine without aura and without status migrainosus, not intractable G43.009       Past Medical History:        Diagnosis Date    Arthritis     Cataract     right    Chronic fatigue syndrome     Depression     Diabetes mellitus (HCC)     SC injection weekly    Eczema     Fibromyalgia     Hypertension     Memory loss     Mononucleosis     Sciatica     Sleep apnea     no c-pap    Vertigo        Past Surgical History:        Procedure Laterality Date  CARPAL TUNNEL RELEASE Bilateral     CATARACT REMOVAL Right     TONSILLECTOMY AND ADENOIDECTOMY      UPPER GASTROINTESTINAL ENDOSCOPY N/A 2019    EGD BIOPSY performed by Mele Avalos MD at [facility] History:    Social History     Tobacco Use    Smoking status: Former Smoker     Packs/day: 2.00     Years: 10.00     Pack years: 20.00     Types: Cigarettes     Start date: 0     Quit date:      Years since quittin.0    Smokeless tobacco: Never Used   Substance Use Topics    Alcohol use: Not Currently     Alcohol/week: 1.0 standard drinks     Types: 1 Glasses of wine per week                                Counseling given: Not Answered      Vital Signs (Current):   Vitals:    01/15/21 1104   Weight: 201 lb (91.2 kg)   Height: 5' 3\" (1.6 m)                                              BP Readings from Last 3 Encounters:   21 (!) 154/74   20 130/74   20 120/62       NPO Status:                                                                                 BMI:   Wt Readings from Last 3 Encounters:   21 201 lb (91.2 kg)   20 201 lb 12.8 oz (91.5 kg)   10/16/20 202 lb 12.8 oz (92 kg)     Body mass index is 35.61 kg/m². CBC:   Lab Results   Component Value Date    WBC 9.1 10/16/2020    RBC 3.83 10/16/2020    HGB 8.6 10/16/2020    HCT 29.2 10/16/2020    MCV 76.2 10/16/2020    RDW 18.1 10/16/2020     10/16/2020       CMP:   Lab Results   Component Value Date     10/16/2020    K 4.1 10/16/2020    CL 99 10/16/2020    CO2 18 10/16/2020    BUN 16 10/16/2020    CREATININE 0.7 10/16/2020    GFRAA >60 10/16/2020    LABGLOM >60 10/16/2020    GLUCOSE 167 10/16/2020    PROT 7.6 10/16/2020    CALCIUM 9.7 10/16/2020    BILITOT 0.3 10/16/2020    ALKPHOS 64 10/16/2020    AST 65 10/16/2020    ALT 47 10/16/2020       POC Tests: No results for input(s): POCGLU, POCNA, POCK, POCCL, POCBUN, POCHEMO, POCHCT in the last 72 hours.     Coags:   Lab Results Component Value Date    PROTIME 13.0 01/14/2019    INR 1.1 01/14/2019       HCG (If Applicable): No results found for: PREGTESTUR, PREGSERUM, HCG, HCGQUANT     ABGs: No results found for: PHART, PO2ART, VSY2BQV, UZT4VRA, BEART, L6GYUNIW     Type & Screen (If Applicable):  No results found for: LABABO, LABRH    Drug/Infectious Status (If Applicable):  No results found for: HIV, HEPCAB    COVID-19 Screening (If Applicable):   Lab Results   Component Value Date    COVID19 Not Detected 01/15/2021         Anesthesia Evaluation  Patient summary reviewed no history of anesthetic complications:   Airway: Mallampati: II  TM distance: >3 FB   Neck ROM: full  Mouth opening: > = 3 FB Dental: normal exam         Pulmonary: breath sounds clear to auscultation  (+) sleep apnea: on noncompliant and CPAP,                             Cardiovascular:    (+) hypertension:,         Rhythm: regular  Rate: normal                    Neuro/Psych:   (+) neuromuscular disease ( Lumbar disc disorder, Lumbar facet arthropathy):, headaches: migraine headaches, psychiatric history:depression/anxiety             GI/Hepatic/Renal:   (+) GERD:,      (-) no morbid obesity       Endo/Other:    (+) DiabetesType II DM, , : arthritis:., .                  ROS comment: Chronic fatigue syndrome Abdominal:   (+) obese,         Vascular: negative vascular ROS. Anesthesia Plan      MAC     ASA 3       Induction: intravenous. Plan discussed with CRNA. PAT Chart Review:  Chart reviewed per routine by Madison Blake MD.  Above represents information available via shared medical record including previous anesthesia history, drug and allergy history. Confirmation of above and final plan per Day of Surgery (DOS) anesthesiologist.        Madison Blake MD   1/20/2021      PT SEEN QUESTIONS ANSWERED PLAN OUTLINED ACCEPTS MAC. Rashaun Persaud M.D 01/21/2021.

## 2021-01-21 ENCOUNTER — HOSPITAL ENCOUNTER (OUTPATIENT)
Dept: OPERATING ROOM | Age: 71
Setting detail: OUTPATIENT SURGERY
Discharge: HOME | End: 2021-01-21
Attending: PAIN MEDICINE
Payer: MEDICARE

## 2021-01-21 ENCOUNTER — HOSPITAL ENCOUNTER (OUTPATIENT)
Age: 71
Setting detail: OUTPATIENT SURGERY
Discharge: HOME | End: 2021-01-21
Attending: PAIN MEDICINE | Admitting: PAIN MEDICINE
Payer: MEDICARE

## 2021-01-21 ENCOUNTER — ANESTHESIA (OUTPATIENT)
Dept: OPERATING ROOM | Age: 71
End: 2021-01-21
Payer: MEDICARE

## 2021-01-21 VITALS
OXYGEN SATURATION: 97 % | TEMPERATURE: 98.6 F | DIASTOLIC BLOOD PRESSURE: 77 MMHG | SYSTOLIC BLOOD PRESSURE: 101 MMHG | RESPIRATION RATE: 19 BRPM

## 2021-01-21 VITALS
WEIGHT: 194 LBS | DIASTOLIC BLOOD PRESSURE: 78 MMHG | OXYGEN SATURATION: 98 % | HEART RATE: 87 BPM | SYSTOLIC BLOOD PRESSURE: 116 MMHG | RESPIRATION RATE: 16 BRPM | BODY MASS INDEX: 34.38 KG/M2 | HEIGHT: 63 IN | TEMPERATURE: 98 F

## 2021-01-21 DIAGNOSIS — M43.06 LUMBAR SPONDYLOLYSIS: Primary | ICD-10-CM

## 2021-01-21 DIAGNOSIS — M47.896 OTHER OSTEOARTHRITIS OF SPINE, LUMBAR REGION: ICD-10-CM

## 2021-01-21 LAB — METER GLUCOSE: 133 MG/DL (ref 74–99)

## 2021-01-21 PROCEDURE — 82962 GLUCOSE BLOOD TEST: CPT | Performed by: PAIN MEDICINE

## 2021-01-21 PROCEDURE — 82962 GLUCOSE BLOOD TEST: CPT

## 2021-01-21 PROCEDURE — 3700000000 HC ANESTHESIA ATTENDED CARE: Performed by: PAIN MEDICINE

## 2021-01-21 PROCEDURE — 3600000015 HC SURGERY LEVEL 5 ADDTL 15MIN: Performed by: PAIN MEDICINE

## 2021-01-21 PROCEDURE — 6360000002 HC RX W HCPCS: Performed by: PAIN MEDICINE

## 2021-01-21 PROCEDURE — 2500000003 HC RX 250 WO HCPCS: Performed by: PAIN MEDICINE

## 2021-01-21 PROCEDURE — 7100000011 HC PHASE II RECOVERY - ADDTL 15 MIN: Performed by: PAIN MEDICINE

## 2021-01-21 PROCEDURE — 3700000001 HC ADD 15 MINUTES (ANESTHESIA): Performed by: PAIN MEDICINE

## 2021-01-21 PROCEDURE — 3600000005 HC SURGERY LEVEL 5 BASE: Performed by: PAIN MEDICINE

## 2021-01-21 PROCEDURE — 64636 DESTROY L/S FACET JNT ADDL: CPT | Performed by: PAIN MEDICINE

## 2021-01-21 PROCEDURE — 3209999900 FLUORO FOR SURGICAL PROCEDURES

## 2021-01-21 PROCEDURE — 2580000003 HC RX 258: Performed by: ANESTHESIOLOGY

## 2021-01-21 PROCEDURE — 7100000010 HC PHASE II RECOVERY - FIRST 15 MIN: Performed by: PAIN MEDICINE

## 2021-01-21 PROCEDURE — 64635 DESTROY LUMB/SAC FACET JNT: CPT | Performed by: PAIN MEDICINE

## 2021-01-21 PROCEDURE — 6360000002 HC RX W HCPCS: Performed by: NURSE ANESTHETIST, CERTIFIED REGISTERED

## 2021-01-21 PROCEDURE — C1713 ANCHOR/SCREW BN/BN,TIS/BN: HCPCS | Performed by: PAIN MEDICINE

## 2021-01-21 PROCEDURE — 2709999900 HC NON-CHARGEABLE SUPPLY: Performed by: PAIN MEDICINE

## 2021-01-21 RX ORDER — SODIUM CHLORIDE, SODIUM LACTATE, POTASSIUM CHLORIDE, CALCIUM CHLORIDE 600; 310; 30; 20 MG/100ML; MG/100ML; MG/100ML; MG/100ML
INJECTION, SOLUTION INTRAVENOUS CONTINUOUS
Status: DISCONTINUED | OUTPATIENT
Start: 2021-01-21 | End: 2021-01-21 | Stop reason: HOSPADM

## 2021-01-21 RX ORDER — PROPOFOL 10 MG/ML
INJECTION, EMULSION INTRAVENOUS PRN
Status: DISCONTINUED | OUTPATIENT
Start: 2021-01-21 | End: 2021-01-21 | Stop reason: SDUPTHER

## 2021-01-21 RX ORDER — LIDOCAINE HYDROCHLORIDE 20 MG/ML
INJECTION, SOLUTION EPIDURAL; INFILTRATION; INTRACAUDAL; PERINEURAL PRN
Status: DISCONTINUED | OUTPATIENT
Start: 2021-01-21 | End: 2021-01-21 | Stop reason: ALTCHOICE

## 2021-01-21 RX ORDER — MIDAZOLAM HYDROCHLORIDE 1 MG/ML
INJECTION INTRAMUSCULAR; INTRAVENOUS PRN
Status: DISCONTINUED | OUTPATIENT
Start: 2021-01-21 | End: 2021-01-21 | Stop reason: SDUPTHER

## 2021-01-21 RX ORDER — FENTANYL CITRATE 50 UG/ML
INJECTION, SOLUTION INTRAMUSCULAR; INTRAVENOUS PRN
Status: DISCONTINUED | OUTPATIENT
Start: 2021-01-21 | End: 2021-01-21 | Stop reason: SDUPTHER

## 2021-01-21 RX ADMIN — SODIUM CHLORIDE, POTASSIUM CHLORIDE, SODIUM LACTATE AND CALCIUM CHLORIDE: 600; 310; 30; 20 INJECTION, SOLUTION INTRAVENOUS at 13:37

## 2021-01-21 RX ADMIN — FENTANYL CITRATE 50 MCG: 50 INJECTION, SOLUTION INTRAMUSCULAR; INTRAVENOUS at 14:16

## 2021-01-21 RX ADMIN — FENTANYL CITRATE 50 MCG: 50 INJECTION, SOLUTION INTRAMUSCULAR; INTRAVENOUS at 14:17

## 2021-01-21 RX ADMIN — MIDAZOLAM 2 MG: 1 INJECTION INTRAMUSCULAR; INTRAVENOUS at 14:15

## 2021-01-21 RX ADMIN — PROPOFOL 30 MG: 10 INJECTION, EMULSION INTRAVENOUS at 14:50

## 2021-01-21 ASSESSMENT — PAIN SCALES - GENERAL
PAINLEVEL_OUTOF10: 0
PAINLEVEL_OUTOF10: 0

## 2021-01-21 ASSESSMENT — PAIN - FUNCTIONAL ASSESSMENT: PAIN_FUNCTIONAL_ASSESSMENT: 0-10

## 2021-01-21 NOTE — H&P
[address]  [address] [facility], [address] [facility]  [phone]    Procedure History & Physical      Joanne Hausersashaon     HPI:    Patient  is here for axial low back pain for bilateral L3,4 MB and L5 DR CHANELLE  Labs/imaging studies reviewed   All question and concerns addressed including R/B/A associated with the procedure    Past Medical History:   Diagnosis Date    Arthritis     Cataract     right    Chronic fatigue syndrome     Depression     Diabetes mellitus (HCC)     SC injection weekly    Eczema     Fibromyalgia     Hypertension     Memory loss     Mononucleosis     Sciatica     Sleep apnea     no c-pap    Vertigo        Past Surgical History:   Procedure Laterality Date    CARPAL TUNNEL RELEASE Bilateral     CATARACT REMOVAL Right     TONSILLECTOMY AND ADENOIDECTOMY      UPPER GASTROINTESTINAL ENDOSCOPY N/A 1/16/2019    EGD BIOPSY performed by Meera Cleaning MD at [address] [place] [address]       Prior to Admission medications    Medication Sig Start Date End Date Taking? Authorizing Provider   Dulaglutide (TRULICITY) 3 VG/5.9HE SOPN Inject 3 mg into the skin once a week Every Thursday   Yes Historical Provider, MD   melatonin 3 MG TABS tablet Take 3 mg by mouth nightly   Yes Historical Provider, MD   FLUoxetine (PROZAC) 20 MG capsule Take 3 capsules by mouth daily 10/16/20 1/15/21 Yes Kimberley Berman DO   gabapentin (NEURONTIN) 300 MG capsule Take 1 capsule by mouth 4 times daily for 30 days.  8/4/20 1/15/21 Yes Danny Grace DO   rOPINIRole (REQUIP) 0.5 MG tablet TAKE ONE TABLET BY MOUTH THREE TIMES A DAY 12/2/19  Yes Danny Grace DO   lisinopril (PRINIVIL;ZESTRIL) 20 MG tablet TAKE ONE TABLET BY MOUTH EVERY DAY  Patient taking differently: am 1/7/19  Yes Danny Grace DO   omeprazole (PRILOSEC) 20 MG delayed release capsule Take 1 capsule by mouth Daily  Patient taking differently: Take 20 mg by mouth Daily am 1/6/21   Kimberley Berman DO   ROBAXIN 500 MG tablet Take 1 tablet by mouth 2 times daily 10/28/20 11/27/20  MORALES Hatch NP       Allergies   Allergen Reactions    Baclofen Other (See Comments)     Dry mouth, abd pain    Ibuprofen      Acute bleeding    Nickel      Surgical steel    Penicillins Hives       Social History     Socioeconomic History    Marital status:      Spouse name: Not on file    Number of children: Not on file    Years of education: Not on file    Highest education level: Not on file   Occupational History    Not on file   Social Needs    Financial resource strain: Not on file    Food insecurity     Worry: Not on file     Inability: Not on file    Transportation needs     Medical: Not on file     Non-medical: Not on file   Tobacco Use    Smoking status: Former Smoker     Packs/day: 2.00     Years: 10.00     Pack years: 20.00     Types: Cigarettes     Start date: 0     Quit date:      Years since quittin.0    Smokeless tobacco: Never Used   Substance and Sexual Activity    Alcohol use: Not Currently     Alcohol/week: 1.0 standard drinks     Types: 1 Glasses of wine per week    Drug use: No    Sexual activity: Never   Lifestyle    Physical activity     Days per week: Not on file     Minutes per session: Not on file    Stress: Not on file   Relationships    Social connections     Talks on phone: Not on file     Gets together: Not on file     Attends Orthodox service: Not on file     Active member of club or organization: Not on file     Attends meetings of clubs or organizations: Not on file     Relationship status: Not on file    Intimate partner violence     Fear of current or ex partner: Not on file     Emotionally abused: Not on file     Physically abused: Not on file     Forced sexual activity: Not on file   Other Topics Concern    Not on file   Social History Narrative    Not on file       Family History   Problem Relation Age of Onset    Hypertension Father     Heart Disease Father     Hypertension Paternal Grandfather     Heart Disease Paternal Grandfather          REVIEW OF SYSTEMS:    CONSTITUTIONAL:  negative for  fevers, chills, sweats and fatigue    RESPIRATORY:  negative for  dry cough, cough with sputum, dyspnea, wheezing and chest pain    CARDIOVASCULAR:  negative for chest pain, dyspnea, palpitations, syncope    GASTROINTESTINAL:  negative for nausea, vomiting, change in bowel habits, diarrhea, constipation and abdominal pain    MUSCULOSKELETAL: negative for muscle weakness    SKIN: negative for itching or rashes. BEHAVIOR/PSYCH:  negative for poor appetite, increased appetite, decreased sleep and poor concentration    All other systems negative      PHYSICAL EXAM:    VITALS:  BP (!) 129/47   Pulse 100   Temp 96.8 °F (36 °C) (Skin)   Resp 16   Ht 5' 3\" (1.6 m)   Wt 194 lb (88 kg)   LMP  (LMP Unknown)   SpO2 98%   BMI 34.37 kg/m²     CONSTITUTIONAL:  awake, alert, cooperative, no apparent distress, and appears stated age    EYES: PERRLA, EOMI    LUNGS:  No increased work of breathing, no audible wheezing    CARDIOVASCULAR:  regular rate and rhythm    ABDOMEN:  Soft non tender non distended     EXTREMITIES: no signs of clubbing or cyanosis. MUSCULOSKELETAL: negative for flaccid muscle tone or spastic movements. SKIN: gross examination reveals no signs of rashes, or diaphoresis. NEURO: Cranial nerves II-XII grossly intact. No signs of agitated mood. Assessment/Plan:    Axial low back pain for bilateral L3,4 MB and L5 DR RFA.

## 2021-01-21 NOTE — OP NOTE
performed for 90 seconds at 90 degrees centigrade. Once the lesions were complete, a solution of 0.25% marcaine 3 cc and 40 mg DepoMedrol was injected and distributed equally through each probe. The probes were removed . The patient's back was cleaned and bandages were placed over the needle insertion sites. Disposition the patient tolerated the procedure well and there were no complications . Vital signs remained stable throughout the procedure. The patient was escorted to the recovery area where they remained until discharge and written discharge instructions for the procedure were given. Plan: Rudy Molina will return to our pain management center as scheduled.      Rosie Bacon MD

## 2021-01-22 ENCOUNTER — TELEPHONE (OUTPATIENT)
Dept: PAIN MANAGEMENT | Age: 71
End: 2021-01-22

## 2021-01-22 DIAGNOSIS — M47.816 LUMBAR FACET ARTHROPATHY: ICD-10-CM

## 2021-01-22 DIAGNOSIS — M48.061 SPINAL STENOSIS OF LUMBAR REGION WITHOUT NEUROGENIC CLAUDICATION: ICD-10-CM

## 2021-01-22 RX ORDER — HYDROCODONE BITARTRATE AND ACETAMINOPHEN 5; 325 MG/1; MG/1
1 TABLET ORAL DAILY PRN
Qty: 30 TABLET | Refills: 0 | Status: SHIPPED
Start: 2021-01-22 | End: 2021-02-17

## 2021-01-25 ENCOUNTER — TELEPHONE (OUTPATIENT)
Dept: NEUROLOGY | Age: 71
End: 2021-01-25

## 2021-01-26 ENCOUNTER — OFFICE VISIT (OUTPATIENT)
Dept: FAMILY MEDICINE CLINIC | Age: 71
End: 2021-01-26
Payer: MEDICARE

## 2021-01-26 ENCOUNTER — TELEPHONE (OUTPATIENT)
Dept: NEUROLOGY | Age: 71
End: 2021-01-26

## 2021-01-26 VITALS
RESPIRATION RATE: 18 BRPM | TEMPERATURE: 97.2 F | BODY MASS INDEX: 34.38 KG/M2 | DIASTOLIC BLOOD PRESSURE: 74 MMHG | HEIGHT: 63 IN | WEIGHT: 194 LBS | OXYGEN SATURATION: 98 % | HEART RATE: 85 BPM | SYSTOLIC BLOOD PRESSURE: 136 MMHG

## 2021-01-26 DIAGNOSIS — R42 DIZZINESS: ICD-10-CM

## 2021-01-26 DIAGNOSIS — M54.16 LUMBAR RADICULAR PAIN: ICD-10-CM

## 2021-01-26 DIAGNOSIS — G89.29 CHRONIC INTRACTABLE HEADACHE, UNSPECIFIED HEADACHE TYPE: Primary | ICD-10-CM

## 2021-01-26 DIAGNOSIS — R51.9 CHRONIC INTRACTABLE HEADACHE, UNSPECIFIED HEADACHE TYPE: Primary | ICD-10-CM

## 2021-01-26 DIAGNOSIS — M54.16 LUMBAR RADICULOPATHY: ICD-10-CM

## 2021-01-26 DIAGNOSIS — E11.9 TYPE 2 DIABETES MELLITUS WITHOUT COMPLICATION, WITHOUT LONG-TERM CURRENT USE OF INSULIN (HCC): Primary | ICD-10-CM

## 2021-01-26 LAB — HBA1C MFR BLD: 6.7 %

## 2021-01-26 PROCEDURE — G8417 CALC BMI ABV UP PARAM F/U: HCPCS | Performed by: STUDENT IN AN ORGANIZED HEALTH CARE EDUCATION/TRAINING PROGRAM

## 2021-01-26 PROCEDURE — G8400 PT W/DXA NO RESULTS DOC: HCPCS | Performed by: STUDENT IN AN ORGANIZED HEALTH CARE EDUCATION/TRAINING PROGRAM

## 2021-01-26 PROCEDURE — G8427 DOCREV CUR MEDS BY ELIG CLIN: HCPCS | Performed by: STUDENT IN AN ORGANIZED HEALTH CARE EDUCATION/TRAINING PROGRAM

## 2021-01-26 PROCEDURE — 83036 HEMOGLOBIN GLYCOSYLATED A1C: CPT | Performed by: STUDENT IN AN ORGANIZED HEALTH CARE EDUCATION/TRAINING PROGRAM

## 2021-01-26 PROCEDURE — 3017F COLORECTAL CA SCREEN DOC REV: CPT | Performed by: STUDENT IN AN ORGANIZED HEALTH CARE EDUCATION/TRAINING PROGRAM

## 2021-01-26 PROCEDURE — 2022F DILAT RTA XM EVC RTNOPTHY: CPT | Performed by: STUDENT IN AN ORGANIZED HEALTH CARE EDUCATION/TRAINING PROGRAM

## 2021-01-26 PROCEDURE — G8484 FLU IMMUNIZE NO ADMIN: HCPCS | Performed by: STUDENT IN AN ORGANIZED HEALTH CARE EDUCATION/TRAINING PROGRAM

## 2021-01-26 PROCEDURE — 4040F PNEUMOC VAC/ADMIN/RCVD: CPT | Performed by: STUDENT IN AN ORGANIZED HEALTH CARE EDUCATION/TRAINING PROGRAM

## 2021-01-26 PROCEDURE — 3044F HG A1C LEVEL LT 7.0%: CPT | Performed by: STUDENT IN AN ORGANIZED HEALTH CARE EDUCATION/TRAINING PROGRAM

## 2021-01-26 PROCEDURE — 99213 OFFICE O/P EST LOW 20 MIN: CPT | Performed by: STUDENT IN AN ORGANIZED HEALTH CARE EDUCATION/TRAINING PROGRAM

## 2021-01-26 PROCEDURE — 1090F PRES/ABSN URINE INCON ASSESS: CPT | Performed by: STUDENT IN AN ORGANIZED HEALTH CARE EDUCATION/TRAINING PROGRAM

## 2021-01-26 PROCEDURE — 1123F ACP DISCUSS/DSCN MKR DOCD: CPT | Performed by: STUDENT IN AN ORGANIZED HEALTH CARE EDUCATION/TRAINING PROGRAM

## 2021-01-26 PROCEDURE — 1036F TOBACCO NON-USER: CPT | Performed by: STUDENT IN AN ORGANIZED HEALTH CARE EDUCATION/TRAINING PROGRAM

## 2021-01-26 RX ORDER — TIZANIDINE 2 MG/1
2 TABLET ORAL NIGHTLY PRN
Qty: 30 TABLET | Refills: 3 | Status: SHIPPED
Start: 2021-01-26 | End: 2021-02-08 | Stop reason: SDUPTHER

## 2021-01-26 RX ORDER — GABAPENTIN 300 MG/1
300 CAPSULE ORAL 4 TIMES DAILY
Qty: 360 CAPSULE | Refills: 3 | Status: SHIPPED
Start: 2021-01-26 | End: 2022-02-17 | Stop reason: SDUPTHER

## 2021-01-26 RX ORDER — TIZANIDINE 2 MG/1
2 TABLET ORAL EVERY 6 HOURS PRN
COMMUNITY
End: 2021-01-26 | Stop reason: ALTCHOICE

## 2021-01-26 RX ORDER — ROPINIROLE 0.5 MG/1
TABLET, FILM COATED ORAL
Qty: 90 TABLET | Refills: 2 | Status: SHIPPED
Start: 2021-01-26 | End: 2021-04-06 | Stop reason: SDUPTHER

## 2021-01-26 ASSESSMENT — ENCOUNTER SYMPTOMS
EYE PAIN: 0
DIARRHEA: 0
VOMITING: 0
COUGH: 0
ABDOMINAL PAIN: 0
NAUSEA: 0
EYE REDNESS: 0
SHORTNESS OF BREATH: 0
RHINORRHEA: 0
CONSTIPATION: 0
SINUS PAIN: 0
SINUS PRESSURE: 0
SORE THROAT: 0
BACK PAIN: 1

## 2021-01-26 NOTE — PROGRESS NOTES
2021    [place] Sher[place] Barber (:  1950) is a 72 y.o. female, here for evaluation of the following medical concerns:    HPI  Chief Complaint   Patient presents with    Check-Up    Diabetes       DM2:   Patient is here to fu regarding DM2. Patient is  Controlled with A1c of 6.7% today. Taking all medications and tolerating well. Patient is not taking ASA and is taking an Ace Inhibitor/ARB. Patient is not  on appropriately-dosed statin. LDL is not at goal.  BP is  controlled. No hypoglycemic episodes. Patient does not see Podiatry regularly. Saw an Eye Dr, she will make an eye appointment. Patient is aware that it is necessary to see an Eye Dr yearly. Patient does not smoke. Most recent labs reviewed with patient. Patient does not have complaints or concerns today. Lab Results   Component Value Date    LABA1C 6.7 2021       Lab Results   Component Value Date    LDLCALC 104 (H) 10/16/2020          Review of Systems   Constitutional: Negative for chills, fatigue and fever. HENT: Negative for congestion, ear pain, postnasal drip, rhinorrhea, sinus pressure, sinus pain and sore throat. Eyes: Negative for pain and redness. Respiratory: Negative for cough and shortness of breath. Cardiovascular: Negative for chest pain. Gastrointestinal: Negative for abdominal pain, constipation, diarrhea, nausea and vomiting. Genitourinary: Negative for difficulty urinating and dysuria. Musculoskeletal: Positive for back pain. Negative for myalgias and neck pain. Skin: Negative for rash. Neurological: Positive for numbness. Negative for dizziness and light-headedness. Psychiatric/Behavioral: The patient is not nervous/anxious. Prior to Visit Medications    Medication Sig Taking? Authorizing Provider   gabapentin (NEURONTIN) 300 MG capsule Take 1 capsule by mouth 4 times daily for 30 days.  Yes Kimberley Berman DO   rOPINIRole (REQUIP) 0.5 MG tablet TAKE ONE TABLET BY MOUTH THREE TIMES A DAY Yes Kimberley Berman DO   Erenumab-aooe (AIMOVIG SC) Inject into the skin Yes Historical Provider, MD   tiZANidine (ZANAFLEX) 2 MG tablet Take 1 tablet by mouth nightly as needed (spasm) Yes Kimberley Berman DO   HYDROcodone-acetaminophen (NORCO) 5-325 MG per tablet Take 1 tablet by mouth daily as needed for Pain for up to 30 days.  Yes Ina Hawkins, APRN - CNP   Dulaglutide (TRULICITY) 3 VG/1.2QG SOPN Inject 3 mg into the skin once a week Every Thursday Yes Historical Provider, MD   omeprazole (PRILOSEC) 20 MG delayed release capsule Take 1 capsule by mouth Daily  Patient taking differently: Take 20 mg by mouth Daily am Yes Kimberley Berman DO   melatonin 3 MG TABS tablet Take 3 mg by mouth nightly Yes Historical Provider, MD   FLUoxetine (PROZAC) 20 MG capsule Take 3 capsules by mouth daily Yes Kimberley Berman DO   lisinopril (PRINIVIL;ZESTRIL) 20 MG tablet TAKE ONE TABLET BY MOUTH EVERY DAY  Patient taking differently: am Yes Verlon Bran, DO        Allergies   Allergen Reactions    Baclofen Other (See Comments)     Dry mouth, abd pain    Ibuprofen      Acute bleeding    Nickel      Surgical steel    Penicillins Hives       Past Medical History:   Diagnosis Date    Arthritis     Cataract     right    Chronic fatigue syndrome     Depression     Diabetes mellitus (HCC)     SC injection weekly    Eczema     Fibromyalgia     Hypertension     Memory loss     Mononucleosis     Sciatica     Sleep apnea     no c-pap    Vertigo        Past Surgical History:   Procedure Laterality Date    CARPAL TUNNEL RELEASE Bilateral     CATARACT REMOVAL Right     OTHER SURGICAL HISTORY Bilateral 01/21/2021    BILATERAL L3,L4 MEDIAL BRANCH AND L5 DORSAL RAMUS RADIOFREQUENCY    PAIN MANAGEMENT PROCEDURE Bilateral 1/21/2021    BILATERAL L3,4 MEDIAL BRANCH AND L5 DORSAL RAMUS  RADIOFREQUENCY ABLATION WITH SEDATION  (CPT Z4308060) performed by Shae Oswald MD at [address] [place] [address] ADENOIDECTOMY      UPPER GASTROINTESTINAL ENDOSCOPY N/A 2019    EGD BIOPSY performed by Lisseth Forbes MD at [facility] History     Socioeconomic History    Marital status:      Spouse name: Not on file    Number of children: Not on file    Years of education: Not on file    Highest education level: Not on file   Occupational History    Not on file   Social Needs    Financial resource strain: Not on file    Food insecurity     Worry: Not on file     Inability: Not on file    Transportation needs     Medical: Not on file     Non-medical: Not on file   Tobacco Use    Smoking status: Former Smoker     Packs/day: 2.00     Years: 10.00     Pack years: 20.00     Types: Cigarettes     Start date: 0     Quit date:      Years since quittin.0    Smokeless tobacco: Never Used   Substance and Sexual Activity    Alcohol use: Not Currently     Alcohol/week: 1.0 standard drinks     Types: 1 Glasses of wine per week    Drug use: No    Sexual activity: Never   Lifestyle    Physical activity     Days per week: Not on file     Minutes per session: Not on file    Stress: Not on file   Relationships    Social connections     Talks on phone: Not on file     Gets together: Not on file     Attends Congregation service: Not on file     Active member of club or organization: Not on file     Attends meetings of clubs or organizations: Not on file     Relationship status: Not on file    Intimate partner violence     Fear of current or ex partner: Not on file     Emotionally abused: Not on file     Physically abused: Not on file     Forced sexual activity: Not on file   Other Topics Concern    Not on file   Social History Narrative    Not on file        Family History   Problem Relation Age of Onset    Hypertension Father     Heart Disease Father     Hypertension Paternal Grandfather     Heart Disease Paternal Grandfather            Vitals:    21 1306   BP: 136/74   Site: Left Upper Arm   Position: Sitting   Cuff Size: Large Adult   Pulse: 85   Resp: 18   Temp: 97.2 °F (36.2 °C)   TempSrc: Temporal   SpO2: 98%   Weight: 194 lb (88 kg)   Height: 5' 3\" (1.6 m)     Estimated body mass index is 34.37 kg/m² as calculated from the following:    Height as of this encounter: 5' 3\" (1.6 m). Weight as of this encounter: 194 lb (88 kg).     Most Recent Labs  CBC  Lab Results   Component Value Date    WBC 9.1 10/16/2020    WBC 9.9 01/17/2019    WBC 14.0 01/14/2019    RBC 3.83 10/16/2020    RBC 3.33 01/17/2019    RBC 2.83 01/14/2019    HGB 8.6 10/16/2020    HGB 9.0 01/18/2019    HGB 7.4 01/17/2019    HCT 29.2 10/16/2020    HCT 29.1 01/18/2019    HCT 24.7 01/17/2019    MCV 76.2 10/16/2020    MCV 74.2 01/17/2019    MCV 68.6 01/14/2019     10/16/2020     01/17/2019     01/14/2019      CMP  Lab Results   Component Value Date     10/16/2020     01/18/2019     01/17/2019    K 4.1 10/16/2020    K 3.8 01/18/2019    K 3.8 01/17/2019    CL 99 10/16/2020     01/18/2019     01/17/2019    CO2 18 10/16/2020    CO2 19 01/18/2019    CO2 20 01/17/2019    ANIONGAP 16 10/16/2020    ANIONGAP 16 01/18/2019    ANIONGAP 15 01/17/2019    GLUCOSE 167 10/16/2020    GLUCOSE 364 08/20/2019    GLUCOSE 151 03/25/2019    BUN 16 10/16/2020    BUN 12 01/18/2019    BUN 13 01/17/2019    CREATININE 0.7 10/16/2020    CREATININE 0.7 01/18/2019    CREATININE 0.7 01/17/2019    LABGLOM >60 10/16/2020    LABGLOM >60 01/18/2019    LABGLOM >60 01/17/2019    GFRAA >60 10/16/2020    GFRAA >60 01/18/2019    GFRAA >60 01/17/2019    CALCIUM 9.7 10/16/2020    CALCIUM 9.5 01/18/2019    CALCIUM 9.2 01/17/2019    PROT 7.6 10/16/2020    PROT 7.5 01/14/2019    LABALBU 4.1 10/16/2020    LABALBU 3.9 01/14/2019    BILITOT 0.3 10/16/2020    BILITOT 0.2 01/14/2019    ALKPHOS 64 10/16/2020    ALKPHOS 61 01/14/2019    AST 65 10/16/2020    AST 59 01/14/2019    ALT 47 10/16/2020    ALT 23 01/14/2019 A1C  Lab Results   Component Value Date    LABA1C 6.7 01/26/2021    LABA1C 8.3 10/16/2020    LABA1C 7.8 01/14/2019     TSH  Lab Results   Component Value Date    TSH 2.680 01/14/2019     LIPID  Lab Results   Component Value Date    CHOL 169 11/29/2018    HDL 59 10/16/2020    HDL 46 11/29/2018    LDLCALC 104 10/16/2020    LDLCALC 94 11/29/2018    TRIG 147 11/29/2018    CHOLHDLRATIO 3.7 11/29/2018    VLDL 29 11/29/2018     MAGNESIUM  Lab Results   Component Value Date    MG 1.8 01/18/2019    MG 2.0 01/17/2019    MG 2.0 01/16/2019       HEPATITIS C  Lab Results   Component Value Date    HCVABI Non-Reactive 10/16/2020     UA  Lab Results   Component Value Date    COLORU Yellow 01/15/2019    CLARITYU Clear 01/15/2019    GLUCOSEU Negative 01/15/2019    BILIRUBINUR Negative 01/15/2019    KETUA Negative 01/15/2019    SPECGRAV <=1.005 01/15/2019    BLOODU Negative 01/15/2019    PHUR 7.0 01/15/2019    PROTEINU Negative 01/15/2019    UROBILINOGEN 0.2 01/15/2019    NITRU Negative 01/15/2019    LEUKOCYTESUR TRACE 01/15/2019     Urine Micro/Albumin Ratio  Lab Results   Component Value Date    MALBCR 11.8 10/16/2020        Physical Exam  Vitals signs and nursing note reviewed. Constitutional:       Appearance: Normal appearance. HENT:      Head: Normocephalic and atraumatic. Right Ear: Tympanic membrane, ear canal and external ear normal.      Left Ear: Tympanic membrane, ear canal and external ear normal.      Nose: Nose normal.      Mouth/Throat:      Mouth: Mucous membranes are moist.      Pharynx: Oropharynx is clear. Eyes:      Extraocular Movements: Extraocular movements intact. Conjunctiva/sclera: Conjunctivae normal.      Pupils: Pupils are equal, round, and reactive to light. Neck:      Musculoskeletal: Normal range of motion and neck supple. Cardiovascular:      Rate and Rhythm: Normal rate and regular rhythm. Pulses: Normal pulses. Heart sounds: Normal heart sounds.    Pulmonary: Effort: Pulmonary effort is normal.      Breath sounds: Normal breath sounds. Abdominal:      General: Bowel sounds are normal.      Palpations: Abdomen is soft. Musculoskeletal: Normal range of motion. Skin:     General: Skin is warm and dry. Capillary Refill: Capillary refill takes less than 2 seconds. Neurological:      General: No focal deficit present. Mental Status: She is alert and oriented to person, place, and time. Psychiatric:         Mood and Affect: Mood normal.         Behavior: Behavior normal.         Thought Content: Thought content normal.         ASSESSMENT/PLAN:  Asif Davis was seen today for check-up and diabetes. Diagnoses and all orders for this visit:    Type 2 diabetes mellitus without complication, without long-term current use of insulin (HCC)  -     POCT glycosylated hemoglobin (Hb A1C)    Lumbar radiculopathy  -     gabapentin (NEURONTIN) 300 MG capsule; Take 1 capsule by mouth 4 times daily for 30 days. Lumbar radicular pain  -     rOPINIRole (REQUIP) 0.5 MG tablet; TAKE ONE TABLET BY MOUTH THREE TIMES A DAY  -     tiZANidine (ZANAFLEX) 2 MG tablet; Take 1 tablet by mouth nightly as needed (spasm)       Will do a foot exam at next visit. Educational materials and/or home exercises printed for patient's review and were included in patient instructions on his/her After Visit Summary and given to patient at the end of visit. Counseled regarding above diagnosis, including possible risks and complications,  especially if left uncontrolled. Counseled regarding the possible side effects, risks, benefits and alternatives to treatment; patient and/or guardian verbalizes understanding, agrees, feels comfortable with and wishes to proceed with above treatment plan. Advised patient to call with any new medication issues, and read all Rx info from pharmacy to assure aware of all possible risks and side effects of medication before taking.      Reviewed age and gender appropriate health screening exams and vaccinations. Advised patient regarding importance of keeping up with recommended health maintenance and to schedule as soon as possible if overdue, as this is important in assessing for undiagnosed pathology, especially cancer, as well as protecting against potentially harmful/life threatening disease. Patient and/or guardian verbalizes understanding and agrees with above counseling, assessment and plan. All questions answered. Return in about 3 months (around 4/26/2021) for diabetes. An  electronic signature was used to authenticate this note.     --Madhu Whaley, DO on 1/26/2021 at 1:43 PM

## 2021-01-26 NOTE — TELEPHONE ENCOUNTER
Please place Lab order for BUN/Creatinine having scans done tomorrow at [facility]  Electronically signed by Dandre Pineda on 1/26/21 at 9:36 AM EST

## 2021-01-29 ENCOUNTER — TELEPHONE (OUTPATIENT)
Dept: FAMILY MEDICINE CLINIC | Age: 71
End: 2021-01-29

## 2021-01-29 NOTE — TELEPHONE ENCOUNTER
Gary Ma forgot to tell you Monday that she has a sore and tender tongue. She said it has felt like that for 2 weeks or so. Also dry mouth. She wants to know if you have any suggestions?

## 2021-02-01 NOTE — TELEPHONE ENCOUNTER
She should probably be seen as I am not sure if there is anything on the tongue or not.  She can come to the walk in if she has to

## 2021-02-01 NOTE — TELEPHONE ENCOUNTER
Per Dr. Darci Murillo patient to gargle with salt and water.   She is unable to come in due to a fall and hurting her back

## 2021-02-04 ENCOUNTER — TELEPHONE (OUTPATIENT)
Dept: PAIN MANAGEMENT | Age: 71
End: 2021-02-04

## 2021-02-04 NOTE — TELEPHONE ENCOUNTER
Patient called and stated that someone had called her. I did call her back and explain that it was a reminder for her appointment tomorrow.

## 2021-02-05 ENCOUNTER — VIRTUAL VISIT (OUTPATIENT)
Dept: PAIN MANAGEMENT | Age: 71
End: 2021-02-05
Payer: MEDICARE

## 2021-02-05 DIAGNOSIS — M47.816 LUMBAR FACET ARTHROPATHY: ICD-10-CM

## 2021-02-05 DIAGNOSIS — M47.816 LUMBAR SPONDYLOSIS: ICD-10-CM

## 2021-02-05 DIAGNOSIS — M48.061 SPINAL STENOSIS OF LUMBAR REGION WITHOUT NEUROGENIC CLAUDICATION: ICD-10-CM

## 2021-02-05 DIAGNOSIS — G89.4 CHRONIC PAIN SYNDROME: ICD-10-CM

## 2021-02-05 DIAGNOSIS — M51.9 LUMBAR DISC DISORDER: Primary | ICD-10-CM

## 2021-02-05 PROCEDURE — G8400 PT W/DXA NO RESULTS DOC: HCPCS | Performed by: PAIN MEDICINE

## 2021-02-05 PROCEDURE — 4040F PNEUMOC VAC/ADMIN/RCVD: CPT | Performed by: PAIN MEDICINE

## 2021-02-05 PROCEDURE — 3017F COLORECTAL CA SCREEN DOC REV: CPT | Performed by: PAIN MEDICINE

## 2021-02-05 PROCEDURE — G8427 DOCREV CUR MEDS BY ELIG CLIN: HCPCS | Performed by: PAIN MEDICINE

## 2021-02-05 PROCEDURE — 99213 OFFICE O/P EST LOW 20 MIN: CPT | Performed by: PAIN MEDICINE

## 2021-02-05 PROCEDURE — 1123F ACP DISCUSS/DSCN MKR DOCD: CPT | Performed by: PAIN MEDICINE

## 2021-02-05 PROCEDURE — 1090F PRES/ABSN URINE INCON ASSESS: CPT | Performed by: PAIN MEDICINE

## 2021-02-05 NOTE — PROGRESS NOTES
Kumar Pozo was read the following message We want to confirm that, for purposes of billing, this is a virtual visit with your provider for which we will submit a claim for reimbursement with your insurance company. You will be responsible for any copays, coinsurance amounts or other amounts not covered by your insurance company. If you do not accept this, unfortunately we will not be able to schedule or proceed with a virtual visit with the provider. Do you accept? Joanne Marie responded Yes .

## 2021-02-05 NOTE — PROGRESS NOTES
[address] [facility], [address] [place] [address] Jhon  [phone]    Tele health follow up Note      Andrew Barebr     Date of Visit:  2/5/2021    CC:  Tele health follow up   Chief Complaint   Patient presents with    Other     upper back due to fall pain level 8     Consent:  Telehealth Visit due to [place] 19 pandemic  The patient and/or health care decision maker is aware that he/she may receive a bill for this tele-health service Doxy Me, depending on his insurance coverage, and has provided verbal consent to proceed: Yes  I have considered the risks of abuse, dependence, addiction and diversion. My patient is aware that they will need a follow-up visit (in-person or virtually) at the appropriate time indicated for continued medications. Further, my patient is aware that when this acute crisis has lifted, they will be expected to return for an in-person visit and all elements of standard local and hospital guidelines in order to continue this medication. Patient Location:Home   Physician Location: Other address in [place]    HPI:  Follow up on her low back pain with complaints of increased mid back pain after a fall  Appropriate analgesia with current medications regimen:Fair  Change in quality of symptoms:no. Medication side effects:none  Recent diagnostic testing:none  Recent interventional procedures:Bilateral L3,4 MB and L5 DR RFA excellent relief     She has not been on anticoagulation medications to include none.  The patient  has not been on herbal supplements.  The patient is diabetic.     Imaging:   Lumbar spine MRI 2020:     Mild levoconvex lumbar scoliosis.         Mild central canal stenosis and moderate right foraminal stenosis at L2-L3.         Moderate right foraminal stenosis and mild left foraminal stenosis at L3-L4.         Severe left foraminal stenosis and left lateral recess stenosis at L4-L5.      Mild bilateral foraminal stenosis at L5-S1 greater on the right. Lumbar spine MRI 2014  1.  Marked disc degeneration at L2-3, L3-4 and L4-5. 2.  Central/right subarticular disc protrusion at L2-3, displacing the   right L3 nerve root.  Mild to moderate spinal stenosis at this level. 3.  Right extraforaminal disc protrusion at L3-4, displacing the right L3   nerve root. 4.  Left lateral recess stenosis at L4-5 with medial displacement of the   left L5 nerve root. 5.  Multilevel facet arthropathy. 6.  Lumbar levoscoliosis. 7.  Neural foraminal stenosis as above.      Previous treatments: Physical Therapy, Epidural Steroid Injection, facet RFA and medications. Arsh Santos                                        Potential Aberrant Drug-Related Behavior:    None     Urine Drug Screening:  Saliva screen 08/2020 showed no narcotics which is consistent     OARRS report:  02/2021 consistent. Opioid agreement:  Date started 08/2020  Renewal date:08/2021    Past Medical History: Reviewed    Past Surgical History: Reviewed     Home Medications: Reviewed    Allergies: Reviewed     Social History: Reviewed     REVIEW OF SYSTEMS:     Tr kee denies fever/chills, chest pain, shortness of breath, new bowel or bladder complaints. All other review of systems was negative. PHYSICAL EXAMINATION:      General:       A & O x3    Lungs:    Breathing:Breathing Pattern: regular, no distress    Lumbar spine:    Range of motion:not done    Musculoskeletal:    SLR:not done right, not done left, sitting     Extremities:    Tremors:None bilaterally upper and lower  Intact:Yes    Neurological:     Motor:          No apparent weakness per patient         Impression:    Chronic low back pain with non dermatomal radiation to bilateral lower extremities. Lumbar spine MRI 2014 multilevel degenerative disc disease with multilevel facet arthropathy. Patient had LESI and lumbar facet RFA, per patient RFA had lasted for 5 month. Patient mentioned that she had physical therapy before which had aggravated her pain. Plan:  Follow up on her low back pain. Patient is s/p bilateral L3,4 MB and L5 DR ROCA with excellent relief. Patient is complaining of increased mid back pain s/p fall. Advised patient she will need an xray. Patient mentioned that she will discuss it with her PCP. Continue with Norco 5/325 QD PRN (no refill needed)  Currently on Gabapentin 300 mg QID  OARRS report reviewed 02/2021. Patient encouraged to stay active and to lose weight. Treatment plan discussed with the patient including medications side effects. We discussed with the patient that combining opioids, benzodiazepines, alcohol, illicit drugs or sleep aids increases the risk of respiratory depression including death. We discussed that these medications may cause drowsiness, sedation or dizziness and have counseled the patient not to drive or operate machinery. We have discussed that these medications will be prescribed only by one provider. We have discussed with the patient about age related risk factors and have thoroughly discussed the importance of taking these medications as prescribed. The patient verbalizes understanding. Patient advised regarding steps to help prevent the spread of COVID-19   SOURCE - https://padron-stern.info/. html     1-Stay home except to get medical care  2-Clean your hands often for atleast 20 seconds, avoid touching: Avoid touching your eyes, nose, and mouth with unwashed hands. 3-Seek medical attention: Seek prompt medical attention if your illness is worsening (e.g., difficulty breathing). Call you doctor first.  3-Wear a facemask if you are sick   4-Cover your coughs and sneezes         I affirm this is a Patient Initiated Episode with an established Patient who has not had a related appointment within my department in the past 7 days or scheduled within the next 24 hours.

## 2021-02-08 ENCOUNTER — OFFICE VISIT (OUTPATIENT)
Dept: FAMILY MEDICINE CLINIC | Age: 71
End: 2021-02-08
Payer: MEDICARE

## 2021-02-08 VITALS
HEART RATE: 97 BPM | RESPIRATION RATE: 19 BRPM | OXYGEN SATURATION: 98 % | SYSTOLIC BLOOD PRESSURE: 138 MMHG | TEMPERATURE: 97 F | HEIGHT: 64 IN | DIASTOLIC BLOOD PRESSURE: 82 MMHG | BODY MASS INDEX: 33.12 KG/M2 | WEIGHT: 194 LBS

## 2021-02-08 DIAGNOSIS — M54.16 LUMBAR RADICULAR PAIN: ICD-10-CM

## 2021-02-08 DIAGNOSIS — M54.6 ACUTE THORACIC BACK PAIN, UNSPECIFIED BACK PAIN LATERALITY: Primary | ICD-10-CM

## 2021-02-08 DIAGNOSIS — R39.9 UTI SYMPTOMS: ICD-10-CM

## 2021-02-08 LAB
BILIRUBIN, POC: NORMAL
BLOOD URINE, POC: NORMAL
CLARITY, POC: CLEAR
COLOR, POC: YELLOW
GLUCOSE URINE, POC: NORMAL
KETONES, POC: NORMAL
LEUKOCYTE EST, POC: NORMAL
NITRITE, POC: NORMAL
PH, POC: 5
PROTEIN, POC: NORMAL
SPECIFIC GRAVITY, POC: 1.01
UROBILINOGEN, POC: NORMAL

## 2021-02-08 PROCEDURE — 1036F TOBACCO NON-USER: CPT | Performed by: STUDENT IN AN ORGANIZED HEALTH CARE EDUCATION/TRAINING PROGRAM

## 2021-02-08 PROCEDURE — 1123F ACP DISCUSS/DSCN MKR DOCD: CPT | Performed by: STUDENT IN AN ORGANIZED HEALTH CARE EDUCATION/TRAINING PROGRAM

## 2021-02-08 PROCEDURE — G8400 PT W/DXA NO RESULTS DOC: HCPCS | Performed by: STUDENT IN AN ORGANIZED HEALTH CARE EDUCATION/TRAINING PROGRAM

## 2021-02-08 PROCEDURE — G8417 CALC BMI ABV UP PARAM F/U: HCPCS | Performed by: STUDENT IN AN ORGANIZED HEALTH CARE EDUCATION/TRAINING PROGRAM

## 2021-02-08 PROCEDURE — 96372 THER/PROPH/DIAG INJ SC/IM: CPT | Performed by: STUDENT IN AN ORGANIZED HEALTH CARE EDUCATION/TRAINING PROGRAM

## 2021-02-08 PROCEDURE — 81002 URINALYSIS NONAUTO W/O SCOPE: CPT | Performed by: STUDENT IN AN ORGANIZED HEALTH CARE EDUCATION/TRAINING PROGRAM

## 2021-02-08 PROCEDURE — G8427 DOCREV CUR MEDS BY ELIG CLIN: HCPCS | Performed by: STUDENT IN AN ORGANIZED HEALTH CARE EDUCATION/TRAINING PROGRAM

## 2021-02-08 PROCEDURE — G8484 FLU IMMUNIZE NO ADMIN: HCPCS | Performed by: STUDENT IN AN ORGANIZED HEALTH CARE EDUCATION/TRAINING PROGRAM

## 2021-02-08 PROCEDURE — 3017F COLORECTAL CA SCREEN DOC REV: CPT | Performed by: STUDENT IN AN ORGANIZED HEALTH CARE EDUCATION/TRAINING PROGRAM

## 2021-02-08 PROCEDURE — 99213 OFFICE O/P EST LOW 20 MIN: CPT | Performed by: STUDENT IN AN ORGANIZED HEALTH CARE EDUCATION/TRAINING PROGRAM

## 2021-02-08 PROCEDURE — 1090F PRES/ABSN URINE INCON ASSESS: CPT | Performed by: STUDENT IN AN ORGANIZED HEALTH CARE EDUCATION/TRAINING PROGRAM

## 2021-02-08 PROCEDURE — 4040F PNEUMOC VAC/ADMIN/RCVD: CPT | Performed by: STUDENT IN AN ORGANIZED HEALTH CARE EDUCATION/TRAINING PROGRAM

## 2021-02-08 RX ORDER — KETOROLAC TROMETHAMINE 30 MG/ML
30 INJECTION, SOLUTION INTRAMUSCULAR; INTRAVENOUS ONCE
Status: COMPLETED | OUTPATIENT
Start: 2021-02-08 | End: 2021-02-08

## 2021-02-08 RX ORDER — TRIAMCINOLONE ACETONIDE 40 MG/ML
40 INJECTION, SUSPENSION INTRA-ARTICULAR; INTRAMUSCULAR ONCE
Status: COMPLETED | OUTPATIENT
Start: 2021-02-08 | End: 2021-02-08

## 2021-02-08 RX ORDER — TIZANIDINE 2 MG/1
2 TABLET ORAL 2 TIMES DAILY
Qty: 60 TABLET | Refills: 3 | Status: SHIPPED | OUTPATIENT
Start: 2021-02-08 | End: 2021-03-10

## 2021-02-08 RX ORDER — SULFAMETHOXAZOLE AND TRIMETHOPRIM 800; 160 MG/1; MG/1
1 TABLET ORAL 2 TIMES DAILY
Qty: 6 TABLET | Refills: 0 | Status: SHIPPED | OUTPATIENT
Start: 2021-02-08 | End: 2021-02-11

## 2021-02-08 RX ORDER — METHYLPREDNISOLONE 4 MG/1
TABLET ORAL
Qty: 1 KIT | Refills: 0 | Status: SHIPPED
Start: 2021-02-08 | End: 2021-02-19

## 2021-02-08 RX ADMIN — TRIAMCINOLONE ACETONIDE 40 MG: 40 INJECTION, SUSPENSION INTRA-ARTICULAR; INTRAMUSCULAR at 14:55

## 2021-02-08 RX ADMIN — KETOROLAC TROMETHAMINE 30 MG: 30 INJECTION, SOLUTION INTRAMUSCULAR; INTRAVENOUS at 14:53

## 2021-02-08 ASSESSMENT — ENCOUNTER SYMPTOMS
NAUSEA: 0
BACK PAIN: 1
ABDOMINAL PAIN: 0
EYE REDNESS: 0
EYE PAIN: 0
COUGH: 0
DIARRHEA: 0
SHORTNESS OF BREATH: 0

## 2021-02-08 NOTE — PATIENT INSTRUCTIONS
Patient Education        Back Pain, Emergency or Urgent Symptoms: Care Instructions  Your Care Instructions     Many people have back pain at one time or another. In most cases, pain gets better with self-care that includes over-the-counter pain medicine, ice, heat, and exercises. Unless you have symptoms of a severe injury or heart attack, you may be able to give yourself a few days before you call a doctor. But some back problems are very serious. Do not ignore symptoms that need to be checked right away. Follow-up care is a key part of your treatment and safety. Be sure to make and go to all appointments, and call your doctor if you are having problems. It's also a good idea to know your test results and keep a list of the medicines you take. How can you care for yourself at home? · Sit or lie in positions that are most comfortable and that reduce your pain. Try one of these positions when you lie down:  ? Lie on your back with your knees bent and supported by large pillows. ? Lie on the floor with your legs on the seat of a sofa or chair. ? Lie on your side with your knees and hips bent and a pillow between your legs. ? Lie on your stomach if it does not make pain worse. · Do not sit up in bed, and avoid soft couches and twisted positions. Bed rest can help relieve pain at first, but it delays healing. Avoid bed rest after the first day. · Change positions every 30 minutes. If you must sit for long periods of time, take breaks from sitting. Get up and walk around, or lie flat. · Try using a heating pad on a low or medium setting, for 15 to 20 minutes every 2 or 3 hours. Try a warm shower in place of one session with the heating pad. You can also buy single-use heat wraps that last up to 8 hours. You can also try ice or cold packs on your back for 10 to 20 minutes at a time, several times a day.  (Put a thin cloth between the ice pack and your skin.) This reduces pain and makes it easier to be active and exercise. · Take pain medicines exactly as directed. ? If the doctor gave you a prescription medicine for pain, take it as prescribed. ? If you are not taking a prescription pain medicine, ask your doctor if you can take an over-the-counter medicine. When should you call for help? Call 911 anytime you think you may need emergency care. For example, call if:    · You are unable to move a leg at all.     · You have back pain with severe belly pain.     · You have symptoms of a heart attack. These may include:  ? Chest pain or pressure, or a strange feeling in the chest.  ? Sweating. ? Shortness of breath. ? Nausea or vomiting. ? Pain, pressure, or a strange feeling in the back, neck, jaw, or upper belly or in one or both shoulders or arms. ? Lightheadedness or sudden weakness. ? A fast or irregular heartbeat. After you call 911, the  may tell you to chew 1 adult-strength or 2 to 4 low-dose aspirin. Wait for an ambulance. Do not try to drive yourself. Call your doctor now or seek immediate medical care if:    · You have new or worse symptoms in your arms, legs, chest, belly, or buttocks. Symptoms may include:  ? Numbness or tingling. ? Weakness. ? Pain.     · You lose bladder or bowel control.     · You have back pain and:  ? You have injured your back while lifting or doing some other activity. Call if the pain is severe, has not gone away after 1 or 2 days, and you cannot do your normal daily activities. ? You have had a back injury before that needed treatment. ? Your pain has lasted longer than 4 weeks. ? You have had weight loss you cannot explain. ? You have a fever. ? You are age 48 or older. ? You have cancer now or have had it before. Watch closely for changes in your health, and be sure to contact your doctor if you are not getting better as expected. Where can you learn more? Go to https://debi.[employer]. org and sign in to your [employer] account.  Enter Y320 in the Search Health Information box to learn more about \"Back Pain, Emergency or Urgent Symptoms: Care Instructions. \"     If you do not have an account, please click on the \"Sign Up Now\" link. Current as of: June 26, 2019               Content Version: 12.6  © 0941-4531 [employer]. Care instructions adapted under license by [facility] ([facility]). If you have questions about a medical condition or this instruction, always ask your healthcare professional. [address] any warranty or liability for your use of this information. Patient Education        Therapeutic Ball: Back Exercises  Introduction  Here are some examples of typical exercises for your condition. Start each exercise slowly. Ease off the exercise if you start to have pain. Your doctor or physical therapist will tell you when you can start these exercises and which ones will work best for you. To prepare, make sure that your ball is the right size for you. When inflated and firm, it should allow you to sit with your hips and knees bent at about a 90-degree angle (like the letter L). How to do the exercises  Seated position on ball   1. Use this exercise to get used to moving on the ball and to find your best sitting position. 2. Sit comfortably on the ball with your feet about hip-width apart. If you feel unsteady, rest your hands on the ball near your hips. 3. As you do this exercise, try to keep your shoulders and upper body relaxed and still. 4. Using your stomach and back muscles to move your pelvis, roll the ball forward. This will round your back. 5. Still using your stomach and back muscles, roll the ball back. You will arch your back. 6. Repeat this rounding-arching motion a few times. 7. Stop in between the two positions, where your back is not rounded or arched. This is called your neutral position. Pelvic rotation   1. Sit tall on the ball. 2. Slowly rotate your hips in a [place] pattern.  Keep the movement focused at your hips. 3. Repeat, but [place] in the other direction. 4. Repeat 8 to 12 times. Postural sitting   1. Use this position to find a stable, relaxed posture on the ball. You can use this position as your starting point for other ball exercises. If you feel unsteady on the ball, start on a chair first.  2. Sit on a ball or chair, with your feet planted straight in front of you. 3. Imagine that a string at the top of your head is pulling you straight up. Think of yourself as 2 inches taller than you are.  4. Slightly tuck your chin. 5. Keep your shoulders back and relaxed. Knee extension   1. Sit tall on the ball with your feet planted in front of you, hip-width apart. As you do this exercise, avoid slumping your shoulders and arching your back. 2. Rest your hands on the ball near your hip or a steady object next to you. (If you feel very stable on the ball, rest your hands in your lap or at your side.)  3. Slowly straighten one leg at the knee. Slowly lower it back down. Repeat with the other leg. 4. Repeat this exercise 8 to 12 times. Roll-ups   1. Lie on your back with your knees bent, feet resting on the floor. 2. Lay the ball on your thighs. Rest your hands up high on the ball. 3. Raising your head and shoulder blades, roll the ball up your thighs. Exhale as you roll up. 4. If this is hard on your neck, gently support your lower head and upper neck with one hand. Don't use that hand to pull your head up. 5. Repeat 8 to 12 times. Ball curls   1. Lie on your back with your ankles resting on the ball, knees straight. 2. Use your legs to roll the exercise ball toward you. Allow your knees to bend and move closer to your chest.  3. Pause briefly, and then roll the ball to the starting position. Try to keep the ball rolling straight. You will feel the muscles in your lower belly working. 4. Repeat 8 to 12 times. Bridge with ball under legs   1.  Lie on your back with your hands together. Rest them on the ball in front of you. 3. As you do this exercise, keep your back and hips straight and tighten your belly and buttocks muscles. Keep your knees in place. 4. Press on the ball with your arms. Lean forward from the knees. This rolls the ball forward. You will bear most of your weight on your arms. 5. If your back starts to ache, you've gone too far. Pull back a bit. 6. Roll back to the start position. 7. Repeat 8 to 12 times. Walk-out plank on ball   1. Kneel over the ball. Place your hands on the floor in front of you. 2. Walk your hands forward until your legs are straight on the ball. This is the plank position. 3. When in plank position, hold your body straight and tighten your belly and buttocks muscles. Keep your chin slightly tucked. 4. Roll as far forward as you can without losing your balance or letting your hips drop. You may stop with the ball under your thighs, or even under your knees or shins. 5. Hold a few seconds, then walk your hands back and return to the start position. 6. Repeat 8 to 12 times. Push-up with thighs on ball   1. Kneel over the ball. Place your hands on the floor in front of you. 2. Walk your hands forward until your legs are straight on the ball. This is the plank position. 3. When in plank position, hold your body straight and tighten your belly and buttocks muscles. Keep your chin slightly tucked. 4. Roll as far forward as you can without losing your balance or letting your hips drop. You may stop with the ball under your thighs, or even under your knees or shins. 5. Bend your elbows. Slowly lower your body toward the ground as far as you can without losing your balance. 6. If your wrists hurt, try moving your hands a little farther apart so they're not right under your shoulders. 7. Slowly straighten your arms. 8. Do 8 to 12 of these push-ups. Wall squat with ball   1. Stand facing away from a wall.  Place your feet about shoulder-width apart. 2. Place the ball between your middle back and the wall. Move your feet out in front of you so they are about a foot in front of your hips. 3. Keep your arms at your sides, or put your hands on your hips. 4. Slowly squat down as if you are going to sit in a chair, rolling your back over the ball as you squat. The ball should move with you but stay pressed into the wall. 5. Be sure that your knees do not go in front of your toes as you squat. 6. Hold for 6 seconds. 7. Slowly rise to your standing position. 8. Repeat 8 to 12 times. Child's pose with ball   1. Kneeling upright with your back straight, rest your hands on the ball in front of you. 2. Breathe out as you bend at the hips, and roll the ball forward. Lower your chest toward the ground, and drop your hips back toward your heels. 3. To stretch your upper back and shoulders, hold this position for 15 to 30 seconds. 4. Repeat 2 to 4 times. Follow-up care is a key part of your treatment and safety. Be sure to make and go to all appointments, and call your doctor if you are having problems. It's also a good idea to know your test results and keep a list of the medicines you take. Where can you learn more? Go to https://StatSocialromeoeb.[employer]. org and sign in to your [employer] account. Enter X671 in the [employer] box to learn more about \"Therapeutic Ball: Back Exercises. \"     If you do not have an account, please click on the \"Sign Up Now\" link. Current as of: March 2, 2020               Content Version: 12.6  © 1531-0670 [employer]. Care instructions adapted under license by [facility] [employer] [facility] ([facility]). If you have questions about a medical condition or this instruction, always ask your healthcare professional. [address] any warranty or liability for your use of this information.          Patient Education        Low Back Pain: Exercises  Introduction  Here are some examples of exercises for you to try. The exercises may be suggested for a condition or for rehabilitation. Start each exercise slowly. Ease off the exercises if you start to have pain. You will be told when to start these exercises and which ones will work best for you. How to do the exercises  Press-up   1. Lie on your stomach, supporting your body with your forearms. 2. Press your elbows down into the floor to raise your upper back. As you do this, relax your stomach muscles and allow your back to arch without using your back muscles. As your press up, do not let your hips or pelvis come off the floor. 3. Hold for 15 to 30 seconds, then relax. 4. Repeat 2 to 4 times. Alternate arm and leg (bird dog) exercise   Do this exercise slowly. Try to keep your body straight at all times, and do not let one hip drop lower than the other. 1. Start on the floor, on your hands and knees. 2. Tighten your belly muscles. 3. Raise one leg off the floor, and hold it straight out behind you. Be careful not to let your hip drop down, because that will twist your trunk. 4. Hold for about 6 seconds, then lower your leg and switch to the other leg. 5. Repeat 8 to 12 times on each leg. 6. Over time, work up to holding for 10 to 30 seconds each time. 7. If you feel stable and secure with your leg raised, try raising the opposite arm straight out in front of you at the same time. Knee-to-chest exercise   1. Lie on your back with your knees bent and your feet flat on the floor. 2. Bring one knee to your chest, keeping the other foot flat on the floor (or keeping the other leg straight, whichever feels better on your lower back). 3. Keep your lower back pressed to the floor. Hold for at least 15 to 30 seconds. 4. Relax, and lower the knee to the starting position. 5. Repeat with the other leg. Repeat 2 to 4 times with each leg.   6. To get more stretch, put your other leg flat on the floor while pulling your knee to your chest. Curl-ups   1. Lie on the floor on your back with your knees bent at a 90-degree angle. Your feet should be flat on the floor, about 12 inches from your buttocks. 2. Cross your arms over your chest. If this bothers your neck, try putting your hands behind your neck (not your head), with your elbows spread apart. 3. Slowly tighten your belly muscles and raise your shoulder blades off the floor. 4. Keep your head in line with your body, and do not press your chin to your chest.  5. Hold this position for 1 or 2 seconds, then slowly lower yourself back down to the floor. 6. Repeat 8 to 12 times. Pelvic tilt exercise   1. Lie on your back with your knees bent. 2. \"Brace\" your stomach. This means to tighten your muscles by pulling in and imagining your belly button moving toward your spine. You should feel like your back is pressing to the floor and your hips and pelvis are rocking back. 3. Hold for about 6 seconds while you breathe smoothly. 4. Repeat 8 to 12 times. Heel dig bridging   1. Lie on your back with both knees bent and your ankles bent so that only your heels are digging into the floor. Your knees should be bent about 90 degrees. 2. Then push your heels into the floor, squeeze your buttocks, and lift your hips off the floor until your shoulders, hips, and knees are all in a straight line. 3. Hold for about 6 seconds as you continue to breathe normally, and then slowly lower your hips back down to the floor and rest for up to 10 seconds. 4. Do 8 to 12 repetitions. Hamstring stretch in doorway   1. Lie on your back in a doorway, with one leg through the open door. 2. Slide your leg up the wall to straighten your knee. You should feel a gentle stretch down the back of your leg. 3. Hold the stretch for at least 15 to 30 seconds. Do not arch your back, point your toes, or bend either knee. Keep one heel touching the floor and the other heel touching the wall.   4. Repeat with your other leg.  5. Do 2 to 4 times for each leg. Hip flexor stretch   1. Kneel on the floor with one knee bent and one leg behind you. Place your forward knee over your foot. Keep your other knee touching the floor. 2. Slowly push your hips forward until you feel a stretch in the upper thigh of your rear leg. 3. Hold the stretch for at least 15 to 30 seconds. Repeat with your other leg. 4. Do 2 to 4 times on each side. Wall sit   1. Stand with your back 10 to 12 inches away from a wall. 2. Lean into the wall until your back is flat against it. 3. Slowly slide down until your knees are slightly bent, pressing your lower back into the wall. 4. Hold for about 6 seconds, then slide back up the wall. 5. Repeat 8 to 12 times. Follow-up care is a key part of your treatment and safety. Be sure to make and go to all appointments, and call your doctor if you are having problems. It's also a good idea to know your test results and keep a list of the medicines you take. Where can you learn more? Go to https://[employer].Listiki. org and sign in to your [employer] account. Enter L641 in the [facility] box to learn more about \"Low Back Pain: Exercises. \"     If you do not have an account, please click on the \"Sign Up Now\" link. Current as of: March 2, 2020               Content Version: 12.6  © 6276-2138 [employer]. Care instructions adapted under license by [facility] ([facility]). If you have questions about a medical condition or this instruction, always ask your healthcare professional. [place] [address] any warranty or liability for your use of this information.

## 2021-02-08 NOTE — PROGRESS NOTES
2021    Juan M Barber (:  1950) is a 72 y.o. female, here for evaluation of the following medical concerns:    HPI  Chief Complaint   Patient presents with    Back Pain     Thoracic pain x 1 week / slipped and fell in back yard    Mart Benítez for \"thudding sensation\" Lt > RT    Oral Swelling     Not swelling but very dry    Urinary Tract Infection     Possible uti \"urine is just falling out of her\" x since last Tuesday     Back Pain  Patient presents for evaluation of low back problems. Symptoms have been present for 1 week and include pain in thoracic spine (aching in character; 8/10 in severity). Initial inciting event: fall in her backyard. Symptoms are worse in the afternoon. Alleviating factors identifiable by the patient are taking her vicodin and zanaflex. Aggravating factors identifiable by the patient are bending backwards, bending forwards, bending sideways, sitting, standing and walking. Treatments initiated by the patient: vicodin and zanaflex. Previous lower back problems: lumbar radiculopathy and severe disc disease. Previous work up: none. Previous treatments: vicodin and zanaflex. Urinary Tract Infection  Patient complains of having to go and woke her up and it falls out of her. She has had symptoms for 6 days. Patient also complains of back pain. Patient denies congestion, cough, fever and headache. Patient does not have a history of recurrent UTI. Patient does not have a history of pyelonephritis. Review of Systems   Constitutional: Negative for chills, fatigue and fever. HENT:        Dry tongue   Eyes: Negative for pain and redness. Respiratory: Negative for cough and shortness of breath. Cardiovascular: Negative for chest pain. Gastrointestinal: Negative for abdominal pain, diarrhea and nausea. Genitourinary: Negative for difficulty urinating and dysuria.         Increased urination with it \"falling out\"   Musculoskeletal: Positive for back pain. Negative for myalgias and neck pain. Skin: Negative for rash. Neurological: Negative for dizziness, light-headedness and numbness. Psychiatric/Behavioral: The patient is not nervous/anxious. Prior to Visit Medications    Medication Sig Taking? Authorizing Provider   sulfamethoxazole-trimethoprim (BACTRIM DS;SEPTRA DS) 800-160 MG per tablet Take 1 tablet by mouth 2 times daily for 3 days Yes Kimberley Berman DO   tiZANidine (ZANAFLEX) 2 MG tablet Take 1 tablet by mouth 2 times daily Yes Kimberley Berman DO   methylPREDNISolone (MEDROL DOSEPACK) 4 MG tablet Take by mouth. Yes Kimberley Berman DO   gabapentin (NEURONTIN) 300 MG capsule Take 1 capsule by mouth 4 times daily for 30 days. Kimberley Berman DO   rOPINIRole (REQUIP) 0.5 MG tablet TAKE ONE TABLET BY MOUTH THREE TIMES A DAY  Kimberley Berman DO   Erenumab-aooe (AIMOVIG SC) Inject into the skin  Historical Provider, MD   HYDROcodone-acetaminophen (NORCO) 5-325 MG per tablet Take 1 tablet by mouth daily as needed for Pain for up to 30 days.   Wade Hernandez, APRN - CNP   Dulaglutide (TRULICITY) 3 YM/0.6XX SOPN Inject 3 mg into the skin once a week Every Thursday  Historical Provider, MD   omeprazole (PRILOSEC) 20 MG delayed release capsule Take 1 capsule by mouth Daily  Patient taking differently: Take 20 mg by mouth Daily am  Kimberley Berman DO   melatonin 3 MG TABS tablet Take 3 mg by mouth nightly  Historical Provider, MD   FLUoxetine (PROZAC) 20 MG capsule Take 3 capsules by mouth daily  Kimberley Berman DO   lisinopril (PRINIVIL;ZESTRIL) 20 MG tablet TAKE ONE TABLET BY MOUTH EVERY DAY  Patient taking differently: am  Frosty Jodie, DO        Allergies   Allergen Reactions    Baclofen Other (See Comments)     Dry mouth, abd pain    Ibuprofen      Acute bleeding    Nickel      Surgical steel    Penicillins Hives       Past Medical History:   Diagnosis Date    Arthritis     Cataract     right    Chronic fatigue syndrome Not on file     Active member of club or organization: Not on file     Attends meetings of clubs or organizations: Not on file     Relationship status: Not on file    Intimate partner violence     Fear of current or ex partner: Not on file     Emotionally abused: Not on file     Physically abused: Not on file     Forced sexual activity: Not on file   Other Topics Concern    Not on file   Social History Narrative    Not on file        Family History   Problem Relation Age of Onset    Hypertension Father     Heart Disease Father     Hypertension Paternal Grandfather     Heart Disease Paternal Grandfather            Vitals:    02/08/21 1359   BP: 138/82   Pulse: 97   Resp: 19   Temp: 97 °F (36.1 °C)   SpO2: 98%   Weight: 194 lb (88 kg)  Comment: per patient last o.v./ declined today   Height: 5' 4\" (1.626 m)     Estimated body mass index is 33.3 kg/m² as calculated from the following:    Height as of this encounter: 5' 4\" (1.626 m). Weight as of this encounter: 194 lb (88 kg).     Most Recent Labs  CBC  Lab Results   Component Value Date    WBC 9.1 10/16/2020    WBC 9.9 01/17/2019    WBC 14.0 01/14/2019    RBC 3.83 10/16/2020    RBC 3.33 01/17/2019    RBC 2.83 01/14/2019    HGB 8.6 10/16/2020    HGB 9.0 01/18/2019    HGB 7.4 01/17/2019    HCT 29.2 10/16/2020    HCT 29.1 01/18/2019    HCT 24.7 01/17/2019    MCV 76.2 10/16/2020    MCV 74.2 01/17/2019    MCV 68.6 01/14/2019     10/16/2020     01/17/2019     01/14/2019      CMP  Lab Results   Component Value Date     10/16/2020     01/18/2019     01/17/2019    K 4.1 10/16/2020    K 3.8 01/18/2019    K 3.8 01/17/2019    CL 99 10/16/2020     01/18/2019     01/17/2019    CO2 18 10/16/2020    CO2 19 01/18/2019    CO2 20 01/17/2019    ANIONGAP 16 10/16/2020    ANIONGAP 16 01/18/2019    ANIONGAP 15 01/17/2019    GLUCOSE 167 10/16/2020    GLUCOSE 364 08/20/2019    GLUCOSE 151 03/25/2019    BUN 16 10/16/2020    BUN 12 01/18/2019    BUN 13 01/17/2019    CREATININE 0.7 10/16/2020    CREATININE 0.7 01/18/2019    CREATININE 0.7 01/17/2019    LABGLOM >60 10/16/2020    LABGLOM >60 01/18/2019    LABGLOM >60 01/17/2019    GFRAA >60 10/16/2020    GFRAA >60 01/18/2019    GFRAA >60 01/17/2019    CALCIUM 9.7 10/16/2020    CALCIUM 9.5 01/18/2019    CALCIUM 9.2 01/17/2019    PROT 7.6 10/16/2020    PROT 7.5 01/14/2019    LABALBU 4.1 10/16/2020    LABALBU 3.9 01/14/2019    BILITOT 0.3 10/16/2020    BILITOT 0.2 01/14/2019    ALKPHOS 64 10/16/2020    ALKPHOS 61 01/14/2019    AST 65 10/16/2020    AST 59 01/14/2019    ALT 47 10/16/2020    ALT 23 01/14/2019     A1C  Lab Results   Component Value Date    LABA1C 6.7 01/26/2021    LABA1C 8.3 10/16/2020    LABA1C 7.8 01/14/2019     TSH  Lab Results   Component Value Date    TSH 2.680 01/14/2019     LIPID  Lab Results   Component Value Date    CHOL 169 11/29/2018    HDL 59 10/16/2020    HDL 46 11/29/2018    LDLCALC 104 10/16/2020    LDLCALC 94 11/29/2018    TRIG 147 11/29/2018    CHOLHDLRATIO 3.7 11/29/2018    VLDL 29 11/29/2018     MAGNESIUM  Lab Results   Component Value Date    MG 1.8 01/18/2019    MG 2.0 01/17/2019    MG 2.0 01/16/2019       HEPATITIS C  Lab Results   Component Value Date    HCVABI Non-Reactive 10/16/2020     UA  Lab Results   Component Value Date    COLORU yellow 02/08/2021    COLORU Yellow 01/15/2019    CLARITYU clear 02/08/2021    CLARITYU Clear 01/15/2019    GLUCOSEU 250mg/dl 02/08/2021    GLUCOSEU Negative 01/15/2019    BILIRUBINUR neg 02/08/2021    BILIRUBINUR Negative 01/15/2019    KETUA neg 02/08/2021    KETUA Negative 01/15/2019    SPECGRAV 1.015 02/08/2021    SPECGRAV <=1.005 01/15/2019    BLOODU neg 02/08/2021    BLOODU Negative 01/15/2019    PHUR 5.0 02/08/2021    PHUR 7.0 01/15/2019    PROTEINU neg 02/08/2021    PROTEINU Negative 01/15/2019    UROBILINOGEN 0.2 01/15/2019    NITRU Negative 01/15/2019    LEUKOCYTESUR small 02/08/2021    LEUKOCYTESUR TRACE 01/15/2019 Urine Micro/Albumin Ratio  Lab Results   Component Value Date    MALBCR 11.8 10/16/2020        Physical Exam  Vitals signs and nursing note reviewed. Constitutional:       Appearance: Normal appearance. HENT:      Head: Normocephalic and atraumatic. Right Ear: Tympanic membrane, ear canal and external ear normal.      Left Ear: Tympanic membrane, ear canal and external ear normal.      Nose: Nose normal.      Mouth/Throat:      Mouth: Mucous membranes are moist.      Pharynx: Oropharynx is clear. Eyes:      Extraocular Movements: Extraocular movements intact. Conjunctiva/sclera: Conjunctivae normal.      Pupils: Pupils are equal, round, and reactive to light. Neck:      Musculoskeletal: Normal range of motion and neck supple. Cardiovascular:      Rate and Rhythm: Normal rate and regular rhythm. Pulses: Normal pulses. Heart sounds: Murmur present. Pulmonary:      Effort: Pulmonary effort is normal.      Breath sounds: Normal breath sounds. Abdominal:      General: Bowel sounds are normal.      Palpations: Abdomen is soft. Musculoskeletal:         General: Tenderness present. Comments: Tenderness to palpation, paraspinal muscle spasm and tenderness   Skin:     General: Skin is warm and dry. Capillary Refill: Capillary refill takes less than 2 seconds. Neurological:      General: No focal deficit present. Mental Status: She is alert and oriented to person, place, and time. Psychiatric:         Mood and Affect: Mood normal.         Behavior: Behavior normal.         Thought Content: Thought content normal.         ASSESSMENT/PLAN:  Hardy Marmolejo was seen today for back pain, ear problem, oral swelling and urinary tract infection. Diagnoses and all orders for this visit:    Acute thoracic back pain, unspecified back pain laterality  -     XR THORACIC SPINE (3 VIEWS);  Future  -     triamcinolone acetonide (KENALOG-40) injection 40 mg  -     ketorolac (TORADOL) injection 30 mg  -     methylPREDNISolone (MEDROL DOSEPACK) 4 MG tablet; Take by mouth. UTI symptoms  -     POCT Urinalysis no Micro  -     sulfamethoxazole-trimethoprim (BACTRIM DS;SEPTRA DS) 800-160 MG per tablet; Take 1 tablet by mouth 2 times daily for 3 days    Lumbar radicular pain  -     tiZANidine (ZANAFLEX) 2 MG tablet; Take 1 tablet by mouth 2 times daily    patient states that she can tolerate the ibuprofen if she does not take too much. Patient declines ECHO at this time for possible murmur heard on physical exam     Educational materials and/or home exercises printed for patient's review and were included in patient instructions on his/her After Visit Summary and given to patient at the end of visit. Counseled regarding above diagnosis, including possible risks and complications,  especially if left uncontrolled. Counseled regarding the possible side effects, risks, benefits and alternatives to treatment; patient and/or guardian verbalizes understanding, agrees, feels comfortable with and wishes to proceed with above treatment plan. Advised patient to call with any new medication issues, and read all Rx info from pharmacy to assure aware of all possible risks and side effects of medication before taking. Reviewed age and gender appropriate health screening exams and vaccinations. Advised patient regarding importance of keeping up with recommended health maintenance and to schedule as soon as possible if overdue, as this is important in assessing for undiagnosed pathology, especially cancer, as well as protecting against potentially harmful/life threatening disease. Patient and/or guardian verbalizes understanding and agrees with above counseling, assessment and plan. All questions answered. Return if symptoms worsen or fail to improve. An  electronic signature was used to authenticate this note.     --Tae Daigle DO on 2/8/2021 at 2:40 PM

## 2021-02-09 ENCOUNTER — TELEPHONE (OUTPATIENT)
Dept: NEUROLOGY | Age: 71
End: 2021-02-09

## 2021-02-09 ENCOUNTER — TELEPHONE (OUTPATIENT)
Dept: PAIN MANAGEMENT | Age: 71
End: 2021-02-09

## 2021-02-09 RX ORDER — ERENUMAB-AOOE 70 MG/ML
70 INJECTION SUBCUTANEOUS
Qty: 1 PEN | Refills: 3 | Status: SHIPPED | OUTPATIENT
Start: 2021-02-09 | End: 2021-02-10

## 2021-02-10 ENCOUNTER — TELEPHONE (OUTPATIENT)
Dept: NEUROLOGY | Age: 71
End: 2021-02-10

## 2021-02-10 NOTE — TELEPHONE ENCOUNTER
[employer] Approval [id number] Valid 2/9/2021 - 5/10/2021.   In Media  Electronically signed by Julio Moody on 2/10/21 at 7:41 AM EST

## 2021-02-11 DIAGNOSIS — E11.9 TYPE 2 DIABETES MELLITUS WITHOUT COMPLICATION, WITHOUT LONG-TERM CURRENT USE OF INSULIN (HCC): ICD-10-CM

## 2021-02-11 DIAGNOSIS — I10 ESSENTIAL HYPERTENSION: Primary | ICD-10-CM

## 2021-02-12 RX ORDER — LISINOPRIL 20 MG/1
TABLET ORAL
Qty: 30 TABLET | Refills: 3 | Status: SHIPPED
Start: 2021-02-12 | End: 2021-08-18 | Stop reason: SDUPTHER

## 2021-02-16 ENCOUNTER — TELEPHONE (OUTPATIENT)
Dept: PAIN MANAGEMENT | Age: 71
End: 2021-02-16

## 2021-02-17 ENCOUNTER — TELEPHONE (OUTPATIENT)
Dept: PAIN MANAGEMENT | Age: 71
End: 2021-02-17

## 2021-02-17 DIAGNOSIS — M47.816 LUMBAR FACET ARTHROPATHY: ICD-10-CM

## 2021-02-17 DIAGNOSIS — M48.061 SPINAL STENOSIS OF LUMBAR REGION WITHOUT NEUROGENIC CLAUDICATION: ICD-10-CM

## 2021-02-17 RX ORDER — HYDROCODONE BITARTRATE AND ACETAMINOPHEN 5; 325 MG/1; MG/1
0.5 TABLET ORAL EVERY 12 HOURS PRN
Qty: 21 TABLET | Refills: 0 | Status: SHIPPED | OUTPATIENT
Start: 2021-02-17 | End: 2021-03-03

## 2021-02-17 NOTE — TELEPHONE ENCOUNTER
Wilfredo January states she will be out of her medication before her appt on the 19th of Feb.  Please advise.

## 2021-02-17 NOTE — TELEPHONE ENCOUNTER
I sent in medication Norco 1/2 tab to take tid prn pain temporary x 14 day supply temporarily due to increased pain s/p fall. Pt was previously taking one tablet po daily. Pt okay to fill today. Discussed with Dr Farhat Ma who will see the patient in office as scheduled 2/19/21.

## 2021-02-19 ENCOUNTER — OFFICE VISIT (OUTPATIENT)
Dept: PAIN MANAGEMENT | Age: 71
End: 2021-02-19
Payer: MEDICARE

## 2021-02-19 VITALS
HEART RATE: 90 BPM | BODY MASS INDEX: 33.12 KG/M2 | HEIGHT: 64 IN | WEIGHT: 194 LBS | DIASTOLIC BLOOD PRESSURE: 68 MMHG | OXYGEN SATURATION: 98 % | TEMPERATURE: 96.8 F | RESPIRATION RATE: 16 BRPM | SYSTOLIC BLOOD PRESSURE: 150 MMHG

## 2021-02-19 DIAGNOSIS — M47.894 THORACIC FACET JOINT SYNDROME: ICD-10-CM

## 2021-02-19 DIAGNOSIS — M48.061 SPINAL STENOSIS OF LUMBAR REGION WITHOUT NEUROGENIC CLAUDICATION: Primary | ICD-10-CM

## 2021-02-19 DIAGNOSIS — M51.9 LUMBAR DISC DISORDER: ICD-10-CM

## 2021-02-19 DIAGNOSIS — M51.9 THORACIC DISC DISEASE: ICD-10-CM

## 2021-02-19 DIAGNOSIS — M47.816 LUMBAR FACET ARTHROPATHY: ICD-10-CM

## 2021-02-19 DIAGNOSIS — M47.814 THORACIC SPONDYLOSIS: ICD-10-CM

## 2021-02-19 DIAGNOSIS — G89.4 CHRONIC PAIN SYNDROME: ICD-10-CM

## 2021-02-19 DIAGNOSIS — M47.816 LUMBAR SPONDYLOSIS: ICD-10-CM

## 2021-02-19 PROCEDURE — 99213 OFFICE O/P EST LOW 20 MIN: CPT | Performed by: PAIN MEDICINE

## 2021-02-19 PROCEDURE — 1090F PRES/ABSN URINE INCON ASSESS: CPT | Performed by: PAIN MEDICINE

## 2021-02-19 PROCEDURE — 1123F ACP DISCUSS/DSCN MKR DOCD: CPT | Performed by: PAIN MEDICINE

## 2021-02-19 PROCEDURE — G8484 FLU IMMUNIZE NO ADMIN: HCPCS | Performed by: PAIN MEDICINE

## 2021-02-19 PROCEDURE — 3017F COLORECTAL CA SCREEN DOC REV: CPT | Performed by: PAIN MEDICINE

## 2021-02-19 PROCEDURE — G8417 CALC BMI ABV UP PARAM F/U: HCPCS | Performed by: PAIN MEDICINE

## 2021-02-19 PROCEDURE — 1036F TOBACCO NON-USER: CPT | Performed by: PAIN MEDICINE

## 2021-02-19 PROCEDURE — 4040F PNEUMOC VAC/ADMIN/RCVD: CPT | Performed by: PAIN MEDICINE

## 2021-02-19 PROCEDURE — G8400 PT W/DXA NO RESULTS DOC: HCPCS | Performed by: PAIN MEDICINE

## 2021-02-19 PROCEDURE — 99214 OFFICE O/P EST MOD 30 MIN: CPT | Performed by: PAIN MEDICINE

## 2021-02-19 PROCEDURE — G8427 DOCREV CUR MEDS BY ELIG CLIN: HCPCS | Performed by: PAIN MEDICINE

## 2021-02-19 RX ORDER — ERENUMAB-AOOE 70 MG/ML
INJECTION SUBCUTANEOUS
COMMUNITY
Start: 2021-02-10 | End: 2021-05-11 | Stop reason: SDUPTHER

## 2021-02-19 NOTE — PROGRESS NOTES
Do you currently have any of the following:    Fever: No  Headache:  No  Cough: No  Shortness of breath: No  Exposed to anyone with these symptoms: No                                                                                                                Edel Whiteside presents to the [facility] on 2/19/2021. Ines Barrera is complaining of pain in lower back. . The pain is constant. The pain is described as aching, throbbing, shooting, stabbing and sharp. Pain is rated on her best day at a 5, on her worst day at a 9, and on average at a 7 on the VAS scale. She took her last dose of [place] this AM.. Ines Barrera does have issues with constipation. Any procedures since your last visit: No,      She is not on NSAIDS and  is  on anticoagulation medications to include none and is managed by Kevin Francois DO  . Pacemaker or defibrillator: No Physician managing device is NA. Medication Contract and Consent for Opioid Use Documents Filed      No documents found                   BP (!) 150/68   Pulse 90   Temp 96.8 °F (36 °C) (Infrared)   Resp 16   Ht 5' 4\" (1.626 m)   Wt 194 lb (88 kg)   LMP  (LMP Unknown)   SpO2 98%   BMI 33.30 kg/m²      No LMP recorded (lmp unknown).  Patient is postmenopausal.

## 2021-02-19 NOTE — PROGRESS NOTES
[facility], [address] [place] [address]  [phone]    Follow up Note      Patito Barber     Date of Visit:  2/19/2021    CC:  Patient presents for follow up   Chief Complaint   Patient presents with    Lower Back Pain     fell 3 weeks ago     HPI:  Increased mid back pain s/p fall  Appropriate analgesia with current medications regimen:Fair  Change in quality of symptoms:no.    Medication side effects:none  Recent diagnostic testing:Thoracic spine Xray  Recent interventional procedures:none    She has not been on anticoagulation medications to include none. The patient  has not been on herbal supplements.  The patient is diabetic. Imaging:   Lumbar spine MRI 2020:  Mild levoconvex lumbar scoliosis.      Mild central canal stenosis and moderate right foraminal stenosis at L2-L3.         Moderate right foraminal stenosis and mild left foraminal stenosis at L3-L4.         Severe left foraminal stenosis and left lateral recess stenosis at L4-L5.         Mild bilateral foraminal stenosis at L5-S1 greater on the right. Lumbar spine MRI 2014  1.  Marked disc degeneration at L2-3, L3-4 and L4-5. 2.  Central/right subarticular disc protrusion at L2-3, displacing the   right L3 nerve root.  Mild to moderate spinal stenosis at this level. 3.  Right extraforaminal disc protrusion at L3-4, displacing the right L3   nerve root. 4.  Left lateral recess stenosis at L4-5 with medial displacement of the   left L5 nerve root. 5.  Multilevel facet arthropathy. 6.  Lumbar levoscoliosis. 7.  Neural foraminal stenosis as above. Thoracic spine Xray 2021:   Multilevel degenerative disc disease. Previous treatments: Physical Therapy, Epidural Steroid Injection, facet RFA and medications. Renetta Rossi    Potential Aberrant Drug-Related Behavior:    None    Urine Drug Screening:  Saliva screen 08/2020 showed no narcotics which is consistent    OARRS report:  02/2021 consistent. Opioid agreement:  Date started 08/2020  Renewal date:08/2021    Past Medical History:   Diagnosis Date    Arthritis     Cataract     right    Chronic fatigue syndrome     Depression     Diabetes mellitus (HCC)     SC injection weekly    Eczema     Fibromyalgia     Hypertension     Memory loss     Mononucleosis     Sciatica     Sleep apnea     no c-pap    Vertigo      Past Surgical History:   Procedure Laterality Date    CARPAL TUNNEL RELEASE Bilateral     CATARACT REMOVAL Right     OTHER SURGICAL HISTORY Bilateral 01/21/2021    BILATERAL L3,L4 MEDIAL BRANCH AND L5 DORSAL RAMUS RADIOFREQUENCY    PAIN MANAGEMENT PROCEDURE Bilateral 1/21/2021    BILATERAL L3,4 MEDIAL BRANCH AND L5 DORSAL RAMUS  RADIOFREQUENCY ABLATION WITH SEDATION  (CPT R7269212) performed by Gwen Gallardo MD at [facility] ENDOSCOPY N/A 1/16/2019    EGD BIOPSY performed by Angel Dallas MD at [facility]     Prior to Admission medications    Medication Sig Start Date End Date Taking? Authorizing Provider   AIMOVIG 70 MG/ML SOAJ inject 70mg into the skin once every 30 days 2/10/21  Yes Historical Provider, MD   HYDROcodone-acetaminophen (NORCO) 5-325 MG per tablet Take 0.5 tablets by mouth every 12 hours as needed for Pain for up to 14 days. 2/17/21 3/3/21 Yes Tildon Duane, APRN - CNP   lisinopril (PRINIVIL;ZESTRIL) 20 MG tablet Take one tablet po daily in the a.m. 2/12/21  Yes Kimberley Berman DO   tiZANidine (ZANAFLEX) 2 MG tablet Take 1 tablet by mouth 2 times daily 2/8/21 3/10/21 Yes Kimberley Berman DO   gabapentin (NEURONTIN) 300 MG capsule Take 1 capsule by mouth 4 times daily for 30 days.  1/26/21 2/25/21 Yes Kimberley Berman DO   rOPINIRole (REQUIP) 0.5 MG tablet TAKE ONE TABLET BY MOUTH THREE TIMES A DAY 1/26/21  Yes Kimberley Berman DO   Dulaglutide (TRULICITY) 3 XY/1.0JM SOPN Inject 3 mg into the skin once a week Every Thursday   Yes Historical Provider, MD   omeprazole (PRILOSEC) 20 MG delayed release capsule Take 1 capsule by mouth Daily  Patient taking differently: Take 20 mg by mouth Daily am 21  Yes Kimberley Berman DO   melatonin 3 MG TABS tablet Take 3 mg by mouth nightly   Yes Historical Provider, MD   FLUoxetine (PROZAC) 20 MG capsule Take 3 capsules by mouth daily 10/16/20 1/26/21  Kimberley Berman DO     Allergies   Allergen Reactions    Baclofen Other (See Comments)     Dry mouth, abd pain    Ibuprofen      Acute bleeding    Nickel      Surgical steel    Penicillins Hives     Social History     Socioeconomic History    Marital status:      Spouse name: Not on file    Number of children: Not on file    Years of education: Not on file    Highest education level: Not on file   Occupational History    Not on file   Social Needs    Financial resource strain: Not on file    Food insecurity     Worry: Not on file     Inability: Not on file   [employer] needs     Medical: Not on file     Non-medical: Not on file   Tobacco Use    Smoking status: Former Smoker     Packs/day: 2.00     Years: 10.00     Pack years: 20.00     Types: Cigarettes     Start date: 0     Quit date:      Years since quittin.1    Smokeless tobacco: Never Used   Substance and Sexual Activity    Alcohol use: Not Currently     Alcohol/week: 1.0 standard drinks     Types: 1 Glasses of wine per week    Drug use: No    Sexual activity: Never   Lifestyle    Physical activity     Days per week: Not on file     Minutes per session: Not on file    Stress: Not on file   Relationships    Social connections     Talks on phone: Not on file     Gets together: Not on file     Attends Zoroastrian service: Not on file     Active member of club or organization: Not on file     Attends meetings of clubs or organizations: Not on file     Relationship status: Not on file    Intimate partner violence     Fear of current or ex partner: Not on file Emotionally abused: Not on file     Physically abused: Not on file     Forced sexual activity: Not on file   Other Topics Concern    Not on file   Social History Narrative    Not on file     Family History   Problem Relation Age of Onset    Hypertension Father     Heart Disease Father     Hypertension Paternal Grandfather     Heart Disease Paternal Grandfather      REVIEW OF SYSTEMS:     Zahira Mccall denies fever/chills, chest pain, shortness of breath, new bowel or bladder complaints. All other review of systems was negative. PHYSICAL EXAMINATION:      BP (!) 150/68   Pulse 90   Temp 96.8 °F (36 °C) (Infrared)   Resp 16   Ht 5' 4\" (1.626 m)   Wt 194 lb (88 kg)   LMP  (LMP Unknown)   SpO2 98%   BMI 33.30 kg/m²     General:       General appearance:   elderly, pleasant and well-hydrated. , in moderate discomfort and A & O x3  Build:Overweight     HEENT:     Head:normocephalic and atraumatic  Sclera: icterus absent,      Lungs:     Breathing:Breathing Pattern: regular, no distress     Abdomen:     Shape:obese, non-distended and normal  Tenderness:none    Thoracic spine:    Spine inspection:exaggerated kyphosis   pationPal:tenderness paravertebral muscles, midline tenderness, facet loading, left, right, positive and tenderness. Range of motion:abnormal moderately flexion, extension rotation bilateral and is  painful.     Lumbar spine:     Spine inspection:normal   CVA tenderness:No   Palpation:tenderness paravertebral muscles, facet loading, left, right and positive. Right worse than Left  Range of motion:abnormal moderately Lateral bending, flexion, extension rotation bilateral and is  painful.     Musculoskeletal:     Trigger points in Paraveteral:absent bilaterally  SI joint tenderness:negative right, negative left              GAL test:negative right, negative  left  Piriformis tenderness:negative right, negative left  Trochanteric bursa tenderness:negative right, negative left  SLR:negative right, the importance of taking these medications as prescribed. The patient verbalizes understanding. ирина Bose M.D.

## 2021-03-01 ENCOUNTER — HOSPITAL ENCOUNTER (OUTPATIENT)
Dept: MRI IMAGING | Age: 71
Discharge: HOME | End: 2021-03-03
Payer: MEDICARE

## 2021-03-01 DIAGNOSIS — M47.814 THORACIC SPONDYLOSIS: ICD-10-CM

## 2021-03-01 PROCEDURE — 72146 MRI CHEST SPINE W/O DYE: CPT

## 2021-03-02 ENCOUNTER — PREP FOR PROCEDURE (OUTPATIENT)
Dept: PAIN MANAGEMENT | Age: 71
End: 2021-03-02

## 2021-03-08 ENCOUNTER — HOSPITAL ENCOUNTER (OUTPATIENT)
Age: 71
Discharge: HOME | End: 2021-03-10
Payer: MEDICARE

## 2021-03-08 ENCOUNTER — HOSPITAL ENCOUNTER (INPATIENT)
Age: 71
LOS: 2 days | Discharge: HOME | DRG: 812 | End: 2021-03-10
Attending: EMERGENCY MEDICINE | Admitting: INTERNAL MEDICINE
Payer: MEDICARE

## 2021-03-08 ENCOUNTER — OFFICE VISIT (OUTPATIENT)
Dept: FAMILY MEDICINE CLINIC | Age: 71
End: 2021-03-08
Payer: MEDICARE

## 2021-03-08 ENCOUNTER — HOSPITAL ENCOUNTER (OUTPATIENT)
Dept: PREADMISSION TESTING | Age: 71
Discharge: HOME | End: 2021-03-08
Payer: MEDICARE

## 2021-03-08 ENCOUNTER — APPOINTMENT (OUTPATIENT)
Dept: GENERAL RADIOLOGY | Age: 71
DRG: 812 | End: 2021-03-08
Payer: MEDICARE

## 2021-03-08 VITALS
RESPIRATION RATE: 20 BRPM | TEMPERATURE: 97.3 F | BODY MASS INDEX: 32.78 KG/M2 | DIASTOLIC BLOOD PRESSURE: 82 MMHG | OXYGEN SATURATION: 98 % | WEIGHT: 192 LBS | HEIGHT: 64 IN | HEART RATE: 85 BPM | SYSTOLIC BLOOD PRESSURE: 134 MMHG

## 2021-03-08 VITALS
DIASTOLIC BLOOD PRESSURE: 58 MMHG | BODY MASS INDEX: 32.44 KG/M2 | WEIGHT: 190 LBS | HEART RATE: 85 BPM | RESPIRATION RATE: 16 BRPM | TEMPERATURE: 97.4 F | SYSTOLIC BLOOD PRESSURE: 120 MMHG | HEIGHT: 64 IN

## 2021-03-08 DIAGNOSIS — D64.9 ANEMIA, UNSPECIFIED TYPE: Primary | ICD-10-CM

## 2021-03-08 DIAGNOSIS — Z01.818 PREOP EXAMINATION: Primary | ICD-10-CM

## 2021-03-08 DIAGNOSIS — Z01.818 PRE-OP TESTING: Primary | ICD-10-CM

## 2021-03-08 DIAGNOSIS — Z01.818 PREOP TESTING: ICD-10-CM

## 2021-03-08 LAB
ABO/RH: NORMAL
ALBUMIN SERPL-MCNC: 3.7 G/DL (ref 3.5–5.2)
ALP BLD-CCNC: 101 U/L (ref 35–104)
ALT SERPL-CCNC: 26 U/L (ref 0–32)
ANION GAP SERPL CALCULATED.3IONS-SCNC: 11 MMOL/L (ref 7–16)
ANION GAP SERPL CALCULATED.3IONS-SCNC: 12 MMOL/L (ref 7–16)
ANTIBODY SCREEN: NORMAL
APTT: 23.7 SEC (ref 24.5–35.1)
APTT: 25.8 SEC (ref 24.5–35.1)
AST SERPL-CCNC: 46 U/L (ref 0–31)
BASOPHILS ABSOLUTE: 0.04 E9/L (ref 0–0.2)
BASOPHILS RELATIVE PERCENT: 0.4 % (ref 0–2)
BILIRUB SERPL-MCNC: 0.2 MG/DL (ref 0–1.2)
BUN BLDV-MCNC: 18 MG/DL (ref 8–23)
BUN BLDV-MCNC: 18 MG/DL (ref 8–23)
CALCIUM SERPL-MCNC: 9 MG/DL (ref 8.6–10.2)
CALCIUM SERPL-MCNC: 9.5 MG/DL (ref 8.6–10.2)
CHLORIDE BLD-SCNC: 103 MMOL/L (ref 98–107)
CHLORIDE BLD-SCNC: 99 MMOL/L (ref 98–107)
CO2: 20 MMOL/L (ref 22–29)
CO2: 21 MMOL/L (ref 22–29)
CREAT SERPL-MCNC: 0.7 MG/DL (ref 0.5–1)
CREAT SERPL-MCNC: 0.8 MG/DL (ref 0.5–1)
EOSINOPHILS ABSOLUTE: 0.36 E9/L (ref 0.05–0.5)
EOSINOPHILS RELATIVE PERCENT: 3.8 % (ref 0–6)
GFR AFRICAN AMERICAN: >60
GFR AFRICAN AMERICAN: >60
GFR NON-AFRICAN AMERICAN: >60 ML/MIN/1.73
GFR NON-AFRICAN AMERICAN: >60 ML/MIN/1.73
GLUCOSE BLD-MCNC: 225 MG/DL (ref 74–99)
GLUCOSE BLD-MCNC: 237 MG/DL (ref 74–99)
HCT VFR BLD CALC: 19.8 % (ref 34–48)
HCT VFR BLD CALC: 19.9 % (ref 34–48)
HEMOGLOBIN: 5.5 G/DL (ref 11.5–15.5)
HEMOGLOBIN: 5.5 G/DL (ref 11.5–15.5)
IMMATURE GRANULOCYTES #: 0.07 E9/L
IMMATURE GRANULOCYTES %: 0.7 % (ref 0–5)
INR BLD: 1.1
INR BLD: 1.2
LYMPHOCYTES ABSOLUTE: 2.47 E9/L (ref 1.5–4)
LYMPHOCYTES RELATIVE PERCENT: 25.9 % (ref 20–42)
MCH RBC QN AUTO: 18.6 PG (ref 26–35)
MCH RBC QN AUTO: 18.6 PG (ref 26–35)
MCHC RBC AUTO-ENTMCNC: 27.6 % (ref 32–34.5)
MCHC RBC AUTO-ENTMCNC: 27.8 % (ref 32–34.5)
MCV RBC AUTO: 66.9 FL (ref 80–99.9)
MCV RBC AUTO: 67.2 FL (ref 80–99.9)
MONOCYTES ABSOLUTE: 1.06 E9/L (ref 0.1–0.95)
MONOCYTES RELATIVE PERCENT: 11.1 % (ref 2–12)
NEUTROPHILS ABSOLUTE: 5.55 E9/L (ref 1.8–7.3)
NEUTROPHILS RELATIVE PERCENT: 58.1 % (ref 43–80)
PDW BLD-RTO: 19.1 FL (ref 11.5–15)
PDW BLD-RTO: 19.5 FL (ref 11.5–15)
PLATELET # BLD: 255 E9/L (ref 130–450)
PLATELET # BLD: 258 E9/L (ref 130–450)
PMV BLD AUTO: 9.8 FL (ref 7–12)
PMV BLD AUTO: 9.8 FL (ref 7–12)
POTASSIUM REFLEX MAGNESIUM: 3.6 MMOL/L (ref 3.5–5)
POTASSIUM REFLEX MAGNESIUM: 3.9 MMOL/L (ref 3.5–5)
PROTHROMBIN TIME: 12.3 SEC (ref 9.3–12.4)
PROTHROMBIN TIME: 13.6 SEC (ref 9.3–12.4)
RBC # BLD: 2.96 E12/L (ref 3.5–5.5)
RBC # BLD: 2.96 E12/L (ref 3.5–5.5)
SODIUM BLD-SCNC: 132 MMOL/L (ref 132–146)
SODIUM BLD-SCNC: 134 MMOL/L (ref 132–146)
TOTAL PROTEIN: 7 G/DL (ref 6.4–8.3)
WBC # BLD: 8.7 E9/L (ref 4.5–11.5)
WBC # BLD: 9.6 E9/L (ref 4.5–11.5)

## 2021-03-08 PROCEDURE — 4040F PNEUMOC VAC/ADMIN/RCVD: CPT | Performed by: STUDENT IN AN ORGANIZED HEALTH CARE EDUCATION/TRAINING PROGRAM

## 2021-03-08 PROCEDURE — 99213 OFFICE O/P EST LOW 20 MIN: CPT | Performed by: STUDENT IN AN ORGANIZED HEALTH CARE EDUCATION/TRAINING PROGRAM

## 2021-03-08 PROCEDURE — 86901 BLOOD TYPING SEROLOGIC RH(D): CPT

## 2021-03-08 PROCEDURE — 93005 ELECTROCARDIOGRAM TRACING: CPT | Performed by: ANESTHESIOLOGY

## 2021-03-08 PROCEDURE — G8400 PT W/DXA NO RESULTS DOC: HCPCS | Performed by: STUDENT IN AN ORGANIZED HEALTH CARE EDUCATION/TRAINING PROGRAM

## 2021-03-08 PROCEDURE — 3017F COLORECTAL CA SCREEN DOC REV: CPT | Performed by: STUDENT IN AN ORGANIZED HEALTH CARE EDUCATION/TRAINING PROGRAM

## 2021-03-08 PROCEDURE — 2060000000 HC ICU INTERMEDIATE R&B

## 2021-03-08 PROCEDURE — 82728 ASSAY OF FERRITIN: CPT

## 2021-03-08 PROCEDURE — 36415 COLL VENOUS BLD VENIPUNCTURE: CPT

## 2021-03-08 PROCEDURE — 1090F PRES/ABSN URINE INCON ASSESS: CPT | Performed by: STUDENT IN AN ORGANIZED HEALTH CARE EDUCATION/TRAINING PROGRAM

## 2021-03-08 PROCEDURE — U0003 INFECTIOUS AGENT DETECTION BY NUCLEIC ACID (DNA OR RNA); SEVERE ACUTE RESPIRATORY SYNDROME CORONAVIRUS 2 (SARS-COV-2) (CORONAVIRUS DISEASE [COVID-19]), AMPLIFIED PROBE TECHNIQUE, MAKING USE OF HIGH THROUGHPUT TECHNOLOGIES AS DESCRIBED BY CMS-2020-01-R: HCPCS

## 2021-03-08 PROCEDURE — 80053 COMPREHEN METABOLIC PANEL: CPT

## 2021-03-08 PROCEDURE — 85610 PROTHROMBIN TIME: CPT

## 2021-03-08 PROCEDURE — G8417 CALC BMI ABV UP PARAM F/U: HCPCS | Performed by: STUDENT IN AN ORGANIZED HEALTH CARE EDUCATION/TRAINING PROGRAM

## 2021-03-08 PROCEDURE — 86850 RBC ANTIBODY SCREEN: CPT

## 2021-03-08 PROCEDURE — 71045 X-RAY EXAM CHEST 1 VIEW: CPT

## 2021-03-08 PROCEDURE — G8427 DOCREV CUR MEDS BY ELIG CLIN: HCPCS | Performed by: STUDENT IN AN ORGANIZED HEALTH CARE EDUCATION/TRAINING PROGRAM

## 2021-03-08 PROCEDURE — 85025 COMPLETE CBC W/AUTO DIFF WBC: CPT

## 2021-03-08 PROCEDURE — 85730 THROMBOPLASTIN TIME PARTIAL: CPT

## 2021-03-08 PROCEDURE — P9016 RBC LEUKOCYTES REDUCED: HCPCS

## 2021-03-08 PROCEDURE — 86900 BLOOD TYPING SEROLOGIC ABO: CPT

## 2021-03-08 PROCEDURE — 1036F TOBACCO NON-USER: CPT | Performed by: STUDENT IN AN ORGANIZED HEALTH CARE EDUCATION/TRAINING PROGRAM

## 2021-03-08 PROCEDURE — G8484 FLU IMMUNIZE NO ADMIN: HCPCS | Performed by: STUDENT IN AN ORGANIZED HEALTH CARE EDUCATION/TRAINING PROGRAM

## 2021-03-08 PROCEDURE — 80048 BASIC METABOLIC PNL TOTAL CA: CPT

## 2021-03-08 PROCEDURE — 85045 AUTOMATED RETICULOCYTE COUNT: CPT

## 2021-03-08 PROCEDURE — 2580000003 HC RX 258: Performed by: EMERGENCY MEDICINE

## 2021-03-08 PROCEDURE — 99283 EMERGENCY DEPT VISIT LOW MDM: CPT

## 2021-03-08 PROCEDURE — 1123F ACP DISCUSS/DSCN MKR DOCD: CPT | Performed by: STUDENT IN AN ORGANIZED HEALTH CARE EDUCATION/TRAINING PROGRAM

## 2021-03-08 PROCEDURE — 83540 ASSAY OF IRON: CPT

## 2021-03-08 PROCEDURE — 85027 COMPLETE CBC AUTOMATED: CPT

## 2021-03-08 PROCEDURE — 86923 COMPATIBILITY TEST ELECTRIC: CPT

## 2021-03-08 PROCEDURE — 84466 ASSAY OF TRANSFERRIN: CPT

## 2021-03-08 PROCEDURE — 83550 IRON BINDING TEST: CPT

## 2021-03-08 PROCEDURE — 36430 TRANSFUSION BLD/BLD COMPNT: CPT

## 2021-03-08 RX ORDER — SODIUM CHLORIDE 0.9 % (FLUSH) 0.9 %
10 SYRINGE (ML) INJECTION PRN
Status: DISCONTINUED | OUTPATIENT
Start: 2021-03-08 | End: 2021-03-10 | Stop reason: HOSPADM

## 2021-03-08 RX ORDER — LANOLIN ALCOHOL/MO/W.PET/CERES
3 CREAM (GRAM) TOPICAL NIGHTLY
Status: DISCONTINUED | OUTPATIENT
Start: 2021-03-08 | End: 2021-03-10 | Stop reason: HOSPADM

## 2021-03-08 RX ORDER — ONDANSETRON 2 MG/ML
4 INJECTION INTRAMUSCULAR; INTRAVENOUS EVERY 6 HOURS PRN
Status: DISCONTINUED | OUTPATIENT
Start: 2021-03-08 | End: 2021-03-10 | Stop reason: HOSPADM

## 2021-03-08 RX ORDER — POLYETHYLENE GLYCOL 3350 17 G/17G
17 POWDER, FOR SOLUTION ORAL DAILY PRN
Status: DISCONTINUED | OUTPATIENT
Start: 2021-03-08 | End: 2021-03-10 | Stop reason: HOSPADM

## 2021-03-08 RX ORDER — ROPINIROLE 0.5 MG/1
0.5 TABLET, FILM COATED ORAL 3 TIMES DAILY
Status: DISCONTINUED | OUTPATIENT
Start: 2021-03-08 | End: 2021-03-10 | Stop reason: HOSPADM

## 2021-03-08 RX ORDER — FLUOXETINE HYDROCHLORIDE 20 MG/1
60 CAPSULE ORAL DAILY
Status: DISCONTINUED | OUTPATIENT
Start: 2021-03-09 | End: 2021-03-10 | Stop reason: HOSPADM

## 2021-03-08 RX ORDER — ACETAMINOPHEN 650 MG/1
650 SUPPOSITORY RECTAL EVERY 6 HOURS PRN
Status: DISCONTINUED | OUTPATIENT
Start: 2021-03-08 | End: 2021-03-10 | Stop reason: HOSPADM

## 2021-03-08 RX ORDER — GABAPENTIN 300 MG/1
300 CAPSULE ORAL 4 TIMES DAILY
Status: DISCONTINUED | OUTPATIENT
Start: 2021-03-08 | End: 2021-03-10 | Stop reason: HOSPADM

## 2021-03-08 RX ORDER — PANTOPRAZOLE SODIUM 40 MG/1
40 TABLET, DELAYED RELEASE ORAL
Status: DISCONTINUED | OUTPATIENT
Start: 2021-03-09 | End: 2021-03-10 | Stop reason: HOSPADM

## 2021-03-08 RX ORDER — TIZANIDINE 4 MG/1
2 TABLET ORAL 2 TIMES DAILY
Status: DISCONTINUED | OUTPATIENT
Start: 2021-03-08 | End: 2021-03-10 | Stop reason: HOSPADM

## 2021-03-08 RX ORDER — ACETAMINOPHEN 325 MG/1
650 TABLET ORAL EVERY 6 HOURS PRN
Status: DISCONTINUED | OUTPATIENT
Start: 2021-03-08 | End: 2021-03-10 | Stop reason: HOSPADM

## 2021-03-08 RX ORDER — HYDROCODONE BITARTRATE AND ACETAMINOPHEN 5; 325 MG/1; MG/1
1 TABLET ORAL EVERY 6 HOURS PRN
COMMUNITY
End: 2021-03-11 | Stop reason: SDUPTHER

## 2021-03-08 RX ORDER — SODIUM CHLORIDE 9 MG/ML
INJECTION, SOLUTION INTRAVENOUS PRN
Status: DISCONTINUED | OUTPATIENT
Start: 2021-03-08 | End: 2021-03-10 | Stop reason: HOSPADM

## 2021-03-08 RX ORDER — 0.9 % SODIUM CHLORIDE 0.9 %
1000 INTRAVENOUS SOLUTION INTRAVENOUS ONCE
Status: COMPLETED | OUTPATIENT
Start: 2021-03-08 | End: 2021-03-08

## 2021-03-08 RX ORDER — PROMETHAZINE HYDROCHLORIDE 25 MG/1
12.5 TABLET ORAL EVERY 6 HOURS PRN
Status: DISCONTINUED | OUTPATIENT
Start: 2021-03-08 | End: 2021-03-10 | Stop reason: HOSPADM

## 2021-03-08 RX ORDER — SODIUM CHLORIDE 0.9 % (FLUSH) 0.9 %
10 SYRINGE (ML) INJECTION EVERY 12 HOURS SCHEDULED
Status: DISCONTINUED | OUTPATIENT
Start: 2021-03-08 | End: 2021-03-10 | Stop reason: HOSPADM

## 2021-03-08 RX ORDER — HYDROCODONE BITARTRATE AND ACETAMINOPHEN 5; 325 MG/1; MG/1
1 TABLET ORAL EVERY 6 HOURS PRN
Status: DISCONTINUED | OUTPATIENT
Start: 2021-03-08 | End: 2021-03-09

## 2021-03-08 RX ORDER — LISINOPRIL 10 MG/1
20 TABLET ORAL DAILY
Status: DISCONTINUED | OUTPATIENT
Start: 2021-03-09 | End: 2021-03-10 | Stop reason: HOSPADM

## 2021-03-08 RX ADMIN — SODIUM CHLORIDE 1000 ML: 9 INJECTION, SOLUTION INTRAVENOUS at 21:06

## 2021-03-08 ASSESSMENT — PAIN SCALES - GENERAL: PAINLEVEL_OUTOF10: 6

## 2021-03-08 ASSESSMENT — PAIN DESCRIPTION - FREQUENCY: FREQUENCY: CONTINUOUS

## 2021-03-08 NOTE — PROGRESS NOTES
Subjective:      Mariano Alvarez is a 72 y.o.  female who presents to the office today for a preoperative consultation at the request of surgeon Dr. Zev Tam who plans on performing kyphoplasty on March 12. Planned anesthesia is General.  The patient has the following known anesthesia issues: none  Patient has a bleeding risk of : no recent abnormal bleeding  Patient does not have objection to receiving blood products if needed. Chief Complaint   Patient presents with    Pre-op Exam     Scheduled at [facility] this pm for EKG and labs / Kyphoplasty  and T11 FX      Patient's medications, allergies, past medical, surgical, social and family histories were reviewed and updated as appropriate. Review of Systems  Pertinent items are noted in HPI. Current Outpatient Medications on File Prior to Visit   Medication Sig Dispense Refill    AIMOVIG 70 MG/ML SOAJ inject 70mg into the skin once every 30 days      lisinopril (PRINIVIL;ZESTRIL) 20 MG tablet Take one tablet po daily in the a.m. 30 tablet 3    tiZANidine (ZANAFLEX) 2 MG tablet Take 1 tablet by mouth 2 times daily 60 tablet 3    gabapentin (NEURONTIN) 300 MG capsule Take 1 capsule by mouth 4 times daily for 30 days. 360 capsule 3    rOPINIRole (REQUIP) 0.5 MG tablet TAKE ONE TABLET BY MOUTH THREE TIMES A DAY 90 tablet 2    Dulaglutide (TRULICITY) 3 WL/2.4FX SOPN Inject 3 mg into the skin once a week Every Thursday      omeprazole (PRILOSEC) 20 MG delayed release capsule Take 1 capsule by mouth Daily (Patient taking differently: Take 20 mg by mouth Daily am) 30 capsule 3    melatonin 3 MG TABS tablet Take 3 mg by mouth nightly      FLUoxetine (PROZAC) 20 MG capsule Take 3 capsules by mouth daily 270 capsule 1     No current facility-administered medications on file prior to visit.           Objective:      Physical Exam  /82   Pulse 85   Temp 97.3 °F (36.3 °C) (Temporal)   Resp 20   Ht 5' 3.5\" (1.613 m)   Wt 192 lb (87.1 kg) 02/08/2021 neg     Spec Grav, UA 02/08/2021 1.015     Blood, UA POC 02/08/2021 neg     pH, UA 02/08/2021 5.0     Protein, UA POC 02/08/2021 neg     Urobilinogen, UA 02/08/2021 0.2.E.U./dl     Leukocytes, UA 02/08/2021 small     Nitrite, UA 02/08/2021 neg    Office Visit on 01/26/2021   Component Date Value    Hemoglobin A1C 01/26/2021 6.7    Admission on 01/21/2021, Discharged on 01/21/2021   Component Date Value    Meter Glucose 01/21/2021 [address] Outpatient Visit on 01/15/2021   Component Date Value    SARS-CoV-2 01/15/2021 Not Detected     Source 01/15/2021 OP swab           Assessment:      72 y.o. female with planned surgery as above. Known risk factors for perioperative complications: Diabetes mellitus    Difficulty with intubation is not anticipated. Cardiac Risk Estimation: per the Revised Cardiac Risk Index (Circ. 100:1043, 1999), the patient's risk factors for cardiac complications include none putting her in: RCI RISK CLASS I        Plan:      1. Preoperative workup as follows ECG, hemoglobin, hematocrit, coagulation studies  2. Prophylaxis for cardiac events with perioperative beta-blockers: not indicated  3. Invasive hemodynamic monitoring perioperatively: not indicated  4. Deep vein thrombosis prophylaxis postoperatively:regimen to be chosen by surgical team  5. Surveillance for postoperative MI with ECG immediately postoperatively and on postoperative days 1 and 2 AND troponin levels 24 hours postoperatively and on day 4 or hospital discharge (whichever comes first): not indicated  6. Other measures: none     CANNOT CLEAR PATIENT FOR SURGERY WITH HGB of 5.5.  Patient was notified and sent to the [facility], DO

## 2021-03-08 NOTE — PROGRESS NOTES
[facility]                                                                                                                    PRE OP INSTRUCTIONS FOR  Carlos Barber        Date: 3/8/2021    Date of surgery: 3/12/21   Arrival Time: Hospital will call you between 5pm and 7pm with your final arrival time for surgery    1. Do not eat or drink anything after midnight prior to surgery. This includes no water, chewing gum, mints or ice chips. 2. Take the following medications with a small sip of water on the morning of Surgery: gabapentin, prozac, muscle relaxant as needed, omeprazole , vicodin as needed     3. Diabetics may take evening dose of insulin but none after midnight. If you feel symptomatic or low blood sugar morning of surgery drink 1-2 ounces of apple juice only. 4. Aspirin, Ibuprofen, Advil, Naproxen, Vitamin E and other Anti-inflammatory products should be stopped  before surgery  as directed by your physician. Take Tylenol only unless instructed otherwise by your surgeon. 5. Check with your Doctor regarding stopping Plavix, Coumadin, Lovenox, Eliquis, Effient, or other blood thinners. 6. Do not smoke,use illicit drugs and do not drink any alcoholic beverages 24 hours prior to surgery. 7. You may brush your teeth the morning of surgery. DO NOT SWALLOW WATER    8. You MUST make arrangements for a responsible adult to take you home after your surgery. You will not be allowed to leave alone or drive yourself home. It is strongly suggested someone stay with you the first 24 hrs. Your surgery will be cancelled if you do not have a ride home. 9. Please wear simple, loose fitting clothing to the hospital.  Sofiya Ceballosa not bring valuables (money, credit cards, checkbooks, etc.) Do not wear any makeup (including no eye makeup) or nail polish on your fingers or toes. 10. DO NOT wear any jewelry or piercings on day of surgery.   All body piercing jewelry must be removed. 11. Shower the night before surgery with ___Antibacterial soap    12. If you have a Living Will and Durable Power of  for Healthcare, please bring in a copy. 13. If appropriate bring crutches, inspirex, WALKER, CANE etc...    15. Notify your Surgeon if you develop any illness between now and surgery time, cough, cold, fever, sore throat, nausea, vomiting, etc.  Please notify your surgeon if you experience dizziness, shortness of breath or blurred vision between now & the time of your surgery. 15. If you have ___dentures, they will be removed before going to the OR; we will provide you a container. If you wear ___contact lenses or ___glasses, they will be removed; please bring a case for them. 16. To provide excellent care visitors will be limited to 1 in the room at any given time. 17. Please bring picture ID and insurance card. 18. Sleep apnea patients need to bring CPAP AND SETTINGS to hospital on day of surgery. 23. During flu season no children under the age of 15 are permitted in the hospital for the safety of all patients. 20. Other                  Please call AMBULATORY CARE if you have any further questions.    [address] [facility]     [address]

## 2021-03-09 LAB
ALBUMIN SERPL-MCNC: 3.4 G/DL (ref 3.5–5.2)
ALP BLD-CCNC: 90 U/L (ref 35–104)
ALT SERPL-CCNC: 24 U/L (ref 0–32)
ANION GAP SERPL CALCULATED.3IONS-SCNC: 9 MMOL/L (ref 7–16)
AST SERPL-CCNC: 32 U/L (ref 0–31)
BILIRUB SERPL-MCNC: 0.5 MG/DL (ref 0–1.2)
BLOOD BANK DISPENSE STATUS: NORMAL
BLOOD BANK DISPENSE STATUS: NORMAL
BLOOD BANK PRODUCT CODE: NORMAL
BLOOD BANK PRODUCT CODE: NORMAL
BPU ID: NORMAL
BPU ID: NORMAL
BUN BLDV-MCNC: 15 MG/DL (ref 8–23)
CALCIUM SERPL-MCNC: 8.7 MG/DL (ref 8.6–10.2)
CHLORIDE BLD-SCNC: 109 MMOL/L (ref 98–107)
CHOLESTEROL, TOTAL: 148 MG/DL (ref 0–199)
CO2: 22 MMOL/L (ref 22–29)
CREAT SERPL-MCNC: 0.7 MG/DL (ref 0.5–1)
DESCRIPTION BLOOD BANK: NORMAL
DESCRIPTION BLOOD BANK: NORMAL
EKG ATRIAL RATE: 69 BPM
EKG P AXIS: 62 DEGREES
EKG P-R INTERVAL: 178 MS
EKG Q-T INTERVAL: 416 MS
EKG QRS DURATION: 84 MS
EKG QTC CALCULATION (BAZETT): 445 MS
EKG R AXIS: -6 DEGREES
EKG T AXIS: 15 DEGREES
EKG VENTRICULAR RATE: 69 BPM
FERRITIN: 8 NG/ML
FOLATE: 8.3 NG/ML (ref 4.8–24.2)
GFR AFRICAN AMERICAN: >60
GFR NON-AFRICAN AMERICAN: >60 ML/MIN/1.73
GLUCOSE BLD-MCNC: 187 MG/DL (ref 74–99)
HBA1C MFR BLD: 7 % (ref 4–5.6)
HCT VFR BLD CALC: 26.1 % (ref 34–48)
HCT VFR BLD CALC: 26.5 % (ref 34–48)
HCT VFR BLD CALC: 26.5 % (ref 34–48)
HCT VFR BLD CALC: 26.8 % (ref 34–48)
HCT VFR BLD CALC: 27 % (ref 34–48)
HDLC SERPL-MCNC: 52 MG/DL
HEMOGLOBIN: 7.8 G/DL (ref 11.5–15.5)
HEMOGLOBIN: 7.8 G/DL (ref 11.5–15.5)
HEMOGLOBIN: 7.9 G/DL (ref 11.5–15.5)
HEMOGLOBIN: 8 G/DL (ref 11.5–15.5)
IMMATURE RETIC FRACT: 27.6 % (ref 3–15.9)
IMMATURE RETIC FRACT: 35.6 % (ref 3–15.9)
IRON SATURATION: 5 % (ref 15–50)
IRON: 23 MCG/DL (ref 37–145)
LDL CHOLESTEROL CALCULATED: 85 MG/DL (ref 0–99)
MAGNESIUM: 2.2 MG/DL (ref 1.6–2.6)
MCH RBC QN AUTO: 21.4 PG (ref 26–35)
MCHC RBC AUTO-ENTMCNC: 29.8 % (ref 32–34.5)
MCV RBC AUTO: 71.6 FL (ref 80–99.9)
PDW BLD-RTO: 22.1 FL (ref 11.5–15)
PHOSPHORUS: 3.9 MG/DL (ref 2.5–4.5)
PLATELET # BLD: 214 E9/L (ref 130–450)
PMV BLD AUTO: 9.7 FL (ref 7–12)
POTASSIUM SERPL-SCNC: 4.1 MMOL/L (ref 3.5–5)
RBC # BLD: 3.7 E12/L (ref 3.5–5.5)
RETIC HGB EQUIVALENT: 16.7 PG (ref 28.2–36.6)
RETIC HGB EQUIVALENT: 17.1 PG (ref 28.2–36.6)
RETICULOCYTE ABSOLUTE COUNT: 0.04 E12/L
RETICULOCYTE ABSOLUTE COUNT: 0.05 E12/L
RETICULOCYTE COUNT PCT: 1 % (ref 0.4–1.9)
RETICULOCYTE COUNT PCT: 1.7 % (ref 0.4–1.9)
SODIUM BLD-SCNC: 140 MMOL/L (ref 132–146)
TOTAL IRON BINDING CAPACITY: 498 MCG/DL (ref 250–450)
TOTAL PROTEIN: 6.5 G/DL (ref 6.4–8.3)
TRANSFERRIN: 398 MG/DL (ref 200–360)
TRIGL SERPL-MCNC: 54 MG/DL (ref 0–149)
TSH SERPL DL<=0.05 MIU/L-ACNC: 1.63 UIU/ML (ref 0.27–4.2)
VITAMIN B-12: 635 PG/ML (ref 211–946)
VLDLC SERPL CALC-MCNC: 11 MG/DL
WBC # BLD: 7.6 E9/L (ref 4.5–11.5)

## 2021-03-09 PROCEDURE — 82607 VITAMIN B-12: CPT

## 2021-03-09 PROCEDURE — 2580000003 HC RX 258: Performed by: NURSE PRACTITIONER

## 2021-03-09 PROCEDURE — 80053 COMPREHEN METABOLIC PANEL: CPT

## 2021-03-09 PROCEDURE — 84443 ASSAY THYROID STIM HORMONE: CPT

## 2021-03-09 PROCEDURE — 82746 ASSAY OF FOLIC ACID SERUM: CPT

## 2021-03-09 PROCEDURE — 6370000000 HC RX 637 (ALT 250 FOR IP): Performed by: INTERNAL MEDICINE

## 2021-03-09 PROCEDURE — 6360000002 HC RX W HCPCS: Performed by: NURSE PRACTITIONER

## 2021-03-09 PROCEDURE — 85018 HEMOGLOBIN: CPT

## 2021-03-09 PROCEDURE — 83036 HEMOGLOBIN GLYCOSYLATED A1C: CPT

## 2021-03-09 PROCEDURE — 85014 HEMATOCRIT: CPT

## 2021-03-09 PROCEDURE — 2060000000 HC ICU INTERMEDIATE R&B

## 2021-03-09 PROCEDURE — 85027 COMPLETE CBC AUTOMATED: CPT

## 2021-03-09 PROCEDURE — 80061 LIPID PANEL: CPT

## 2021-03-09 PROCEDURE — 84100 ASSAY OF PHOSPHORUS: CPT

## 2021-03-09 PROCEDURE — 2580000003 HC RX 258: Performed by: INTERNAL MEDICINE

## 2021-03-09 PROCEDURE — 36415 COLL VENOUS BLD VENIPUNCTURE: CPT

## 2021-03-09 PROCEDURE — 83735 ASSAY OF MAGNESIUM: CPT

## 2021-03-09 PROCEDURE — 85045 AUTOMATED RETICULOCYTE COUNT: CPT

## 2021-03-09 PROCEDURE — 84165 PROTEIN E-PHORESIS SERUM: CPT

## 2021-03-09 RX ORDER — HYDROCODONE BITARTRATE AND ACETAMINOPHEN 5; 325 MG/1; MG/1
2 TABLET ORAL EVERY 4 HOURS PRN
Status: DISCONTINUED | OUTPATIENT
Start: 2021-03-09 | End: 2021-03-10 | Stop reason: HOSPADM

## 2021-03-09 RX ORDER — HYDROCODONE BITARTRATE AND ACETAMINOPHEN 5; 325 MG/1; MG/1
1 TABLET ORAL EVERY 4 HOURS PRN
Status: DISCONTINUED | OUTPATIENT
Start: 2021-03-09 | End: 2021-03-10 | Stop reason: HOSPADM

## 2021-03-09 RX ADMIN — HYDROCODONE BITARTRATE AND ACETAMINOPHEN 1 TABLET: 5; 325 TABLET ORAL at 19:06

## 2021-03-09 RX ADMIN — GABAPENTIN 300 MG: 300 CAPSULE ORAL at 13:58

## 2021-03-09 RX ADMIN — GABAPENTIN 300 MG: 300 CAPSULE ORAL at 17:52

## 2021-03-09 RX ADMIN — ROPINIROLE HYDROCHLORIDE 0.5 MG: 0.5 TABLET, FILM COATED ORAL at 23:07

## 2021-03-09 RX ADMIN — ROPINIROLE HYDROCHLORIDE 0.5 MG: 0.5 TABLET, FILM COATED ORAL at 12:12

## 2021-03-09 RX ADMIN — Medication 3 MG: at 23:07

## 2021-03-09 RX ADMIN — HYDROCODONE BITARTRATE AND ACETAMINOPHEN 2 TABLET: 5; 325 TABLET ORAL at 02:46

## 2021-03-09 RX ADMIN — SODIUM CHLORIDE, PRESERVATIVE FREE 10 ML: 5 INJECTION INTRAVENOUS at 19:58

## 2021-03-09 RX ADMIN — LISINOPRIL 20 MG: 10 TABLET ORAL at 10:33

## 2021-03-09 RX ADMIN — SODIUM CHLORIDE 125 MG: 9 INJECTION, SOLUTION INTRAVENOUS at 18:37

## 2021-03-09 RX ADMIN — TIZANIDINE 2 MG: 4 TABLET ORAL at 23:07

## 2021-03-09 RX ADMIN — SODIUM CHLORIDE, PRESERVATIVE FREE 10 ML: 5 INJECTION INTRAVENOUS at 23:07

## 2021-03-09 RX ADMIN — SODIUM CHLORIDE, PRESERVATIVE FREE 10 ML: 5 INJECTION INTRAVENOUS at 10:37

## 2021-03-09 RX ADMIN — GABAPENTIN 300 MG: 300 CAPSULE ORAL at 21:21

## 2021-03-09 RX ADMIN — FLUOXETINE HYDROCHLORIDE 60 MG: 20 CAPSULE ORAL at 10:36

## 2021-03-09 RX ADMIN — PANTOPRAZOLE SODIUM 40 MG: 40 TABLET, DELAYED RELEASE ORAL at 10:33

## 2021-03-09 ASSESSMENT — PAIN DESCRIPTION - ONSET: ONSET: ON-GOING

## 2021-03-09 ASSESSMENT — ENCOUNTER SYMPTOMS
SHORTNESS OF BREATH: 0
EYE PAIN: 0
ABDOMINAL PAIN: 0
VOMITING: 0
CONSTIPATION: 0
NAUSEA: 0
BLOOD IN STOOL: 0
RHINORRHEA: 0
COUGH: 0
ABDOMINAL DISTENTION: 0
BACK PAIN: 0
DIARRHEA: 0
EYE REDNESS: 0
WHEEZING: 0

## 2021-03-09 ASSESSMENT — PAIN DESCRIPTION - ORIENTATION
ORIENTATION: UPPER;LOWER;LEFT;RIGHT
ORIENTATION: RIGHT;LEFT
ORIENTATION: MID

## 2021-03-09 ASSESSMENT — PAIN DESCRIPTION - LOCATION
LOCATION: BACK

## 2021-03-09 ASSESSMENT — PAIN SCALES - GENERAL
PAINLEVEL_OUTOF10: 2
PAINLEVEL_OUTOF10: 6

## 2021-03-09 ASSESSMENT — PAIN - FUNCTIONAL ASSESSMENT: PAIN_FUNCTIONAL_ASSESSMENT: PREVENTS OR INTERFERES SOME ACTIVE ACTIVITIES AND ADLS

## 2021-03-09 ASSESSMENT — PAIN DESCRIPTION - PROGRESSION
CLINICAL_PROGRESSION: GRADUALLY IMPROVING
CLINICAL_PROGRESSION: GRADUALLY IMPROVING

## 2021-03-09 ASSESSMENT — PAIN DESCRIPTION - DESCRIPTORS: DESCRIPTORS: CONSTANT;DISCOMFORT;SORE

## 2021-03-09 ASSESSMENT — PAIN DESCRIPTION - PAIN TYPE: TYPE: NEUROPATHIC PAIN

## 2021-03-09 ASSESSMENT — PAIN DESCRIPTION - DIRECTION: RADIATING_TOWARDS: DOWNWARD

## 2021-03-09 NOTE — PROGRESS NOTES
Hospitalist Progress Note      Synopsis: Patient admitted on 3/8/2021 for anemia requiring 2 units prbc. She has a hx of chronic anemia, GERD, gastritis and esophagitis. Surgery is consulted and plans on endoscopy tomorrow. Hem/onc consulted, awaiting recs     Subjective    Pt reports intermittent blurred vision and lightheadedness for the week leading up to her hospitalization. 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 Obese   HEENT: Pupils equal, round, and reactive to light. Conjunctivae/corneas clear. Neck: Supple. No jugular venous distention. Trachea midline. Respiratory:  Normal respiratory effort. Clear to auscultation, bilaterally without Rales/Wheezes/Rhonchi. Cardiovascular: systolic murmur   Abdomen: Soft, non-tender, non-distended with normal bowel sounds. Musculoskeletal: No clubbing, cyanosis or edema bilaterally. Brisk capillary refill. 2+ lower extremity pulses (dorsalis pedis).    Skin:  No rashes; pale   Neurologic: awake, alert and following commands     Medications:  Reviewed    Infusion Medications    sodium chloride       Scheduled Medications    insulin lispro  0-12 Units Subcutaneous TID WC    insulin lispro  0-6 Units Subcutaneous Nightly    sodium chloride flush  10 mL Intravenous 2 times per day    FLUoxetine  60 mg Oral Daily    gabapentin  300 mg Oral 4x Daily    lisinopril  20 mg Oral Daily    melatonin  3 mg Oral Nightly    pantoprazole  40 mg Oral QAM AC    tiZANidine  2 mg Oral BID    rOPINIRole  0.5 mg Oral TID     PRN Meds: HYDROcodone 5 mg - acetaminophen **OR** HYDROcodone 5 mg - acetaminophen, sodium chloride, sodium chloride flush, promethazine **OR** ondansetron, polyethylene glycol, acetaminophen **OR** acetaminophen    I/O    Intake/Output Summary (Last 24 hours) at 3/9/2021 1520  Last data filed at 3/9/2021 8067  Gross per 24 hour   Intake 1010 ml   Output --   Net 1010 ml       Labs:   Recent Labs     03/08/21  1330 03/08/21 2025 03/09/21  0540 03/09/21  0843 03/09/21  1220   WBC 8.7 9.6 7.6  --   --    HGB 5.5* 5.5* 7.9* 7.8* 7.8*   HCT 19.8* 19.9* 26.5* 26.1* 27.0*    258 214  --   --        Recent Labs     03/08/21  1330 03/08/21 1955 03/09/21  0540    134 140   K 3.6 3.9 4.1   CL 99 103 109*   CO2 21* 20* 22   BUN 18 18 15   CREATININE 0.8 0.7 0.7   CALCIUM 9.0 9.5 8.7   PHOS  --   --  3.9       Recent Labs     03/08/21 1955 03/09/21  0540   PROT 7.0 6.5   ALKPHOS 101 90   ALT 26 24   AST 46* 32*   BILITOT 0.2 0.5       Recent Labs     03/08/21  1330 03/08/21 1955   INR 1.2 1.1       No results for input(s): Sharon Grumet in the last 72 hours. Chronic labs:  Lab Results   Component Value Date    CHOL 148 03/09/2021    TRIG 54 03/09/2021    HDL 52 03/09/2021    LDLCALC 85 03/09/2021    TSH 1.630 03/09/2021    INR 1.1 03/08/2021    LABA1C 7.0 (H) 03/09/2021       Radiology:  Imaging studies reviewed today. ASSESSMENT:  Acute on chronic anemia  HTN  DM2  T11 compression fracture  GERD  ISABEL  Fibromyalgia    PLAN:  Surgery plans on EGD in am   Transfuse as indicated, monitor H&H  Await hem/onc recs  Glycemic control  PT/OT      Diet: Diet NPO, After Midnight Exceptions are: Sips with Meds  DIET CARB CONTROL;  Code Status: Full Code  PT/OT Eval Status:   ordered  DVT Prophylaxis:   scd  Recommended disposition at discharge:  pending medical progression     +++++++++++++++++++++++++++++++++++++++++++++++++  Cheyenne Schultz MD   [facility].  +++++++++++++++++++++++++++++++++++++++++++++++++  NOTE: This report was transcribed using voice recognition software.  Every effort was made to ensure accuracy; however, inadvertent computerized transcription errors may be present.

## 2021-03-09 NOTE — CONSULTS
GENERAL SURGERY  CONSULT NOTE  3/9/2021    Physician Consulted: Dr. Marylou Friedman  Reason for Consult: Anemia    CC: Abnormal lab value    HPI  Edith Dahl is a 72 y.o. female who presents for evaluation of of low hemoglobin found on outside labs in preparation for spinal kyphoplasty. Patient has a history of chronic anemia and last underwent an EGD in 2014 which demonstrated mild active gastritis with reactive changes. Negative for H. pylori. Mild chronic esophagitis with features compatible with GERD. Patient reports that she had a colonoscopy greater than 5 years ago states it was normal.  States that she takes Prilosec daily which manages her GERD. Patient denies any nausea or vomiting, hematemesis, dark tarry stool, frankly bloody stool. Patient currently has no abdominal pain. Patient has a history of diabetes mellitus, her last hemoglobin A1c was approximately 7. Cr is 0.7, she does not have CKD.        Past Medical History:   Diagnosis Date    Arthritis     Cataract     right    Chronic fatigue syndrome     Depression     Diabetes mellitus (HCC)     SC injection weekly    Eczema     Fibromyalgia     Hypertension     Memory loss     Mononucleosis     Sciatica     Sleep apnea     no c-pap    Vertigo        Past Surgical History:   Procedure Laterality Date    CARPAL TUNNEL RELEASE Bilateral     CATARACT REMOVAL Right     OTHER SURGICAL HISTORY Bilateral 01/21/2021    BILATERAL L3,L4 MEDIAL BRANCH AND L5 DORSAL RAMUS RADIOFREQUENCY    PAIN MANAGEMENT PROCEDURE Bilateral 1/21/2021    BILATERAL L3,4 MEDIAL BRANCH AND L5 DORSAL RAMUS  RADIOFREQUENCY ABLATION WITH SEDATION  (CPT [id number]) performed by Desi Robertson MD at [address] [facility] ENDOSCOPY N/A 1/16/2019    EGD BIOPSY performed by Dominique Maxwell MD at [facility]       Medications Prior to Admission:    Prior to Admission medications    Medication Sig Start Date End Reason for exam:->Crackles at right lung base What reading provider will be dictating this exam?->CRC FINDINGS: The lungs are without acute focal process. There is no effusion or pneumothorax. The cardiomediastinal silhouette is without acute process. The osseous structures are without acute process. No acute process. Mild cardiomegaly.          ASSESSMENT:  72 y.o. female with acute on chronic anemia, GERD, Hx of gastritis and esophagititis    PLAN:    -EGD tomorrow, 3/10  -NPO after midnight  -Cont prilosec  -Overall care per primary    Plan discussed with Dr. David Cheng    Electronically signed by Jerry Gallardo DO on 3/9/21 at 2:29 PM EST

## 2021-03-09 NOTE — ED PROVIDER NOTES
BROOKE Cervantes is a 72 y.o. female with a PMHx significant for patient hypertension, ISABEL, GERD, type 2 diabetes mellitus, migraines and fibromyalgia who presents with anemia. The patient states that she was supposed to get a kyphoplasty to her T11 vertebra this week and she came in for her preprocedure labs and was found to be anemic. At that point she was directed to come to the ER for further evaluation. On arrival, the patient states that she has no complaints. She denies recent fatigue and any blood in the stool or urine. She does report having had 1 blood transfusion in the past.  She states no cause of her low blood count was ever been found and she has never been diagnosed with iron deficiency anemia or anemia of chronic disease. She states that she currently has no complaints. The patient denies recent trauma, fever, chills, fatigue, HA, dizziness, vision changes, congestion, rhinorrhea, neck pain, chest pain, palpitations, hx of MI, SOB, cough, wheezing, abdominal pain, N/V/D/C, hematemesis, hematochezia, melena, dysuria, hematuria, generalized weakness and paresthesias. The patient is currently taking no blood thinners. Tobacco Hx:   reports that she quit smoking about 36 years ago. Her smoking use included cigarettes. She started smoking about 49 years ago. She has a 20.00 pack-year smoking history. She has never used smokeless tobacco.    Alcohol Hx:   reports previous alcohol use of about 1.0 standard drinks of alcohol per week. Illicit Drug Hx:  Reports no history of illicit drug use. The history is provided by the patient. Last Tetanus (if applicable): N/A    Review of Systems   Constitutional: Negative for chills, diaphoresis, fatigue and fever. HENT: Negative for congestion, postnasal drip and rhinorrhea. Eyes: Negative for pain, redness and visual disturbance. Respiratory: Negative for cough, shortness of breath and wheezing.     Cardiovascular: Negative for chest pain, palpitations and leg swelling. Gastrointestinal: Negative for abdominal distention, abdominal pain, blood in stool, constipation, diarrhea, nausea and vomiting. Genitourinary: Negative for difficulty urinating, dysuria, flank pain, frequency and hematuria. Musculoskeletal: Negative for arthralgias, back pain, myalgias, neck pain and neck stiffness. Skin: Positive for pallor. Negative for rash and wound. Neurological: Negative for dizziness, syncope, speech difficulty, weakness, light-headedness, numbness and headaches. Hematological: Negative for adenopathy. All other systems reviewed and are negative. Physical Exam  Vitals signs and nursing note reviewed. Constitutional:       General: She is awake. She is not in acute distress. Appearance: Normal appearance. She is well-developed. She is not ill-appearing or diaphoretic. HENT:      Head: Normocephalic and atraumatic. Right Ear: External ear normal.      Left Ear: External ear normal.      Nose: Nose normal. No congestion or rhinorrhea. Mouth/Throat:      Mouth: Mucous membranes are moist.      Pharynx: Oropharynx is clear. No oropharyngeal exudate or posterior oropharyngeal erythema. Eyes:      General: No scleral icterus. Right eye: No discharge. Left eye: No discharge. Extraocular Movements: Extraocular movements intact. Conjunctiva/sclera: Conjunctivae normal.      Pupils: Pupils are equal, round, and reactive to light. Comments: Pale conjunctiva   Neck:      Musculoskeletal: Normal range of motion and neck supple. No neck rigidity or muscular tenderness. Cardiovascular:      Rate and Rhythm: Normal rate and regular rhythm. Heart sounds: Normal heart sounds. No murmur. No friction rub. No gallop. Comments: Upper extremity and lower extremity distal pulses intact bilaterally +2/4  Pulmonary:      Effort: Pulmonary effort is normal. No respiratory distress.       Breath sounds: Rales (Faint crackles noted at the right lung base) present. No wheezing or rhonchi. Comments: Good air movement noted throughout. Chest:      Chest wall: No tenderness. Abdominal:      General: Bowel sounds are normal. There is no distension. Palpations: Abdomen is soft. Tenderness: There is no abdominal tenderness. There is no guarding or rebound. Musculoskeletal: Normal range of motion. General: No tenderness or deformity. Right lower leg: No edema. Left lower leg: No edema. Lymphadenopathy:      Cervical: No cervical adenopathy. Skin:     General: Skin is warm and dry. Capillary Refill: Capillary refill takes less than 2 seconds. Coloration: Skin is pale. Findings: No erythema or rash. Neurological:      General: No focal deficit present. Mental Status: She is alert and oriented to person, place, and time. Cranial Nerves: No cranial nerve deficit. Sensory: No sensory deficit. Motor: No weakness. Coordination: Coordination normal.        --------------------------------------------- PAST HISTORY ---------------------------------------------  Past Medical History:  has a past medical history of Arthritis, Cataract, Chronic fatigue syndrome, Depression, Diabetes mellitus ([facility] [place] [address] Eczema, Fibromyalgia, Hypertension, Memory loss, Mononucleosis, Sciatica, Sleep apnea, and Vertigo. Past Surgical History:  has a past surgical history that includes Upper gastrointestinal endoscopy (N/A, 1/16/2019); Carpal tunnel release (Bilateral); Tonsillectomy and adenoidectomy; Cataract removal (Right); other surgical history (Bilateral, 01/21/2021); and Pain management procedure (Bilateral, 1/21/2021). Social History:  reports that she quit smoking about 36 years ago. Her smoking use included cigarettes. She started smoking about 49 years ago. She has a 20.00 pack-year smoking history.  She has never used smokeless tobacco. She reports previous alcohol use of about 1.0 standard drinks of alcohol per week. She reports that she does not use drugs. Family History: family history includes Heart Disease in her father and paternal grandfather; Hypertension in her father and paternal grandfather. Home Meds: Not in a hospital admission. The patients home medications have been reviewed. Allergies: Baclofen, Ibuprofen, Nickel, and Penicillins    ------------------------- NURSING NOTES AND VITALS REVIEWED ---------------------------  Date / Time Roomed:  3/8/2021  7:27 PM  ED Bed Assignment:  14A/14A-14    The nursing notes within the ED encounter and vital signs as below have been reviewed. BP (!) 161/79   Pulse 79   Temp 97.6 °F (36.4 °C)   Resp 20   Ht 5' 3\" (1.6 m)   Wt 190 lb (86.2 kg)   LMP  (LMP Unknown)   SpO2 100%   BMI 33.66 kg/m²   -------------------------------------------------- RESULTS / INTERVENTIONS -------------------------------------------------  All laboratory and radiology tests have been reviewed by this physician.     LABS:  Results for orders placed or performed during the hospital encounter of 03/08/21   Comprehensive Metabolic Panel w/ Reflex to MG   Result Value Ref Range    Sodium 134 132 - 146 mmol/L    Potassium reflex Magnesium 3.9 3.5 - 5.0 mmol/L    Chloride 103 98 - 107 mmol/L    CO2 20 (L) 22 - 29 mmol/L    Anion Gap 11 7 - 16 mmol/L    Glucose 225 (H) 74 - 99 mg/dL    BUN 18 8 - 23 mg/dL    CREATININE 0.7 0.5 - 1.0 mg/dL    GFR Non-African American >60 >=60 mL/min/1.73    GFR African American >60     Calcium 9.5 8.6 - 10.2 mg/dL    Total Protein 7.0 6.4 - 8.3 g/dL    Albumin 3.7 3.5 - 5.2 g/dL    Total Bilirubin 0.2 0.0 - 1.2 mg/dL    Alkaline Phosphatase 101 35 - 104 U/L    ALT 26 0 - 32 U/L    AST 46 (H) 0 - 31 U/L   APTT   Result Value Ref Range    aPTT 25.8 24.5 - 35.1 sec   Protime-INR   Result Value Ref Range    Protime 12.3 9.3 - 12.4 sec    INR 1.1    CBC auto differential   Result Value Ref Range    WBC 9.6 4.5 - 11.5 E9/L    RBC 2.96 (L) 3.50 - 5.50 E12/L    Hemoglobin 5.5 (LL) 11.5 - 15.5 g/dL    Hematocrit 19.9 (L) 34.0 - 48.0 %    MCV 67.2 (L) 80.0 - 99.9 fL    MCH 18.6 (L) 26.0 - 35.0 pg    MCHC 27.6 (L) 32.0 - 34.5 %    RDW 19.5 (H) 11.5 - 15.0 fL    Platelets 922 364 - 400 E9/L    MPV 9.8 7.0 - 12.0 fL    Neutrophils % 58.1 43.0 - 80.0 %    Immature Granulocytes % 0.7 0.0 - 5.0 %    Lymphocytes % 25.9 20.0 - 42.0 %    Monocytes % 11.1 2.0 - 12.0 %    Eosinophils % 3.8 0.0 - 6.0 %    Basophils % 0.4 0.0 - 2.0 %    Neutrophils Absolute 5.55 1.80 - 7.30 E9/L    Immature Granulocytes # 0.07 E9/L    Lymphocytes Absolute 2.47 1.50 - 4.00 E9/L    Monocytes Absolute 1.06 (H) 0.10 - 0.95 E9/L    Eosinophils Absolute 0.36 0.05 - 0.50 E9/L    Basophils Absolute 0.04 0.00 - 0.20 E9/L   TYPE AND SCREEN   Result Value Ref Range    ABO/Rh A POS     Antibody Screen NEG    PREPARE RBC (CROSSMATCH), 2 Units   Result Value Ref Range    Product Code Blood Bank [id number]     Description Blood Bank Red Blood Cells, Leuko-reduced     Unit Number [id number]     Dispense Status Blood Bank issued     Product Code Blood Bank [id number]     Description Blood Bank Red Blood Cells, Leuko-reduced     Unit Number [id number]     Dispense Status Blood Bank selected        RADIOLOGY: Interpreted by Radiologist unless otherwise noted. XR CHEST PORTABLE   Final Result   No acute process. Mild cardiomegaly.              Oxygen Saturation Interpretation: Normal    Meds Given:  Medications   0.9 % sodium chloride infusion (has no administration in time range)   sodium chloride flush 0.9 % injection 10 mL (has no administration in time range)   sodium chloride flush 0.9 % injection 10 mL (has no administration in time range)   promethazine (PHENERGAN) tablet 12.5 mg (has no administration in time range)     Or   ondansetron (ZOFRAN) injection 4 mg (has no administration in time range)   polyethylene glycol (GLYCOLAX) packet 17 g (has no administration in time range)   acetaminophen (TYLENOL) tablet 650 mg (has no administration in time range)     Or   acetaminophen (TYLENOL) suppository 650 mg (has no administration in time range)   FLUoxetine (PROZAC) capsule 60 mg (has no administration in time range)   gabapentin (NEURONTIN) capsule 300 mg (has no administration in time range)   HYDROcodone-acetaminophen (NORCO) 5-325 MG per tablet 1 tablet (has no administration in time range)   lisinopril (PRINIVIL;ZESTRIL) tablet 20 mg (has no administration in time range)   melatonin tablet 3 mg (has no administration in time range)   pantoprazole (PROTONIX) tablet 40 mg (has no administration in time range)   tiZANidine (ZANAFLEX) tablet 2 mg (has no administration in time range)   rOPINIRole (REQUIP) tablet 0.5 mg (has no administration in time range)   0.9 % sodium chloride bolus (0 mLs Intravenous Stopped 3/8/21 2248)       Procedures:  No procedures performed. --------------------------------- PROGRESS NOTES / ADDITIONAL PROVIDER NOTES ---------------------------------  Consultations:  As outlined below. ED Course:    ED Course as of Mar 08 2355   Juarez Canela Mar 08, 2021   2320 Reevaluation, the patient is resting comfortably in bed. I updated her on the status of her work-up and the fact that we are going to need to give her blood transfusion. Patient verbalized her understanding and agreement with the plan. To internal medicine to discuss. [ML]   6582 Case discussed with internal medicine who agreed to admit the patient for further evaluation and management. [ML]   2230 Patient is Hemoccult negative. [ML]      ED Course User Index  [ML] Jose Metz,        3029: All results were discussed with the patient and I have provided specific details regarding the plan to admit the patient. The patient was stable at the time of admission and was without objective evidence of hemodynamic instability.  She was seen in the emergency department by myself and the assigned attending physician, Dr. Valerie Crook, who agreed with the assessment, plan and decision to admit as laid out herein. The patient verbalized an understanding and agreement with the plan for admission. All questions were answered and the patient was deemed to be in stable condition at the time of transport. MDM:  Patient presented from home with an abnormal lab value showing that she was anemic. On arrival, she was hypertensive with remaining vital signs within normal limits. Physical exam was as documented above. She was Hemoccult negative. A work-up was initiated to further evaluate her anemia. Hemoglobin here was 5.5. Leukos was mildly elevated at 225. Remaining labs were within normal limits. Chest x-ray showed mild cardiomegaly, but no other acute cardiopulmonary pathology was identified. The patient was ordered 2 units to be transfused. Case discussed with internal medicine who agreed to admit. The patient was stable at the time of her disposition. This patient's ED course included: a personal history and physicial examination, re-evaluation prior to disposition, multiple bedside re-evaluations, IV medications, cardiac monitoring and continuous pulse oximetry    Diagnosis:  1. Anemia, unspecified type        Disposition:  Patient's disposition: Admit to telemetry  Patient's condition is stable. This patient was seen, examined and treated with Dr. Valerie Crook. All pertinent aspects of the patient's care were discussed with the attending physician.        Jaiden Carreon DO  Resident  03/09/21 [id number]

## 2021-03-09 NOTE — H&P
Inpatient H&P      PCP:  Agustín Dewey DO  Admitting Physician:  Son Hein DO  Consultants: Hematology  Chief Complaint:    Chief Complaint   Patient presents with    Anemia     sent in for blood transfusion, hgb 5.5       History of Present Illness  Mounika Murillo is a 72 y.o. female who presents to [facility] ER complaining of anemia. Mounika Murillo has a past medical history that includes hypertension, depression, fibromyalgia, diabetes mellitus, sleep apnea. Katherine Angel presents to the ER after being found to be anemic. She does have a T11 vertebral compression fracture for which she was undergoing preoperative clearance for kyphoplasty and she was found to have a hemoglobin of 5.5. She was told to be evaluated in the ER. She was Hemoccult negative in the ER. She is a former ICU nurse. She admits to intermittent shortness of breath. She does complain of back pain from compression fracture. She states she originally had a fall that caused this compression fracture. She states in the past she has had to have blood transfusions. She says during prior admission she was found to have a hemoglobin she says during prior admission she was found to have a hemoglobin around 4 and underwent an EGD which showed a small erosion. She says at this time she was taking a lot of Advil. She states she is not up-to-date on her colonoscopy but did have a clean colonoscopy in the past.  She denies any Advil use now. In the ER she is found to have a hemoglobin of 5.5 and Hemoccult negative and was ordered 2 units PRBC to transfuse. She denies chest pain or abdominal pain, melena. she will be admitted for acute on chronic anemia. ER Course  Upon presentation to the ER, routine labwork was performed which revealed hemoglobin 5.5, CO2 20, glucose 225. Imaging results are as outlined below in the Imaging section of this note. EKG performed yesterday showed normal sinus rhythm.   Upon arrival to the ER, patient was 130/54. The patient received 2 units PRBC in the emergency room and was admitted under the care of [place] [id number] [place] [facility] Admission -1/14/2019  SIRS  epistaxis  IIDM(UNCONTROLLED)   HTN   RLS  DEPRESSION   Chronic low back pain  Constipation   acute blood loss anemia   apthous ulcers in mouth  Weakness     Last Echocardiogram -   None in EMR     ED TRIAGE VITALS  BP: (!) 130/54, Temp: 96.9 °F (36.1 °C), Pulse: 92, Resp: 20, SpO2: 98 %    Vitals:    03/08/21 1916 03/08/21 1922   BP:  (!) 130/54   Pulse: 92    Resp:  20   Temp: 96.9 °F (36.1 °C)    SpO2: 98%    Weight:  190 lb (86.2 kg)   Height:  5' 3\" (1.6 m)         Histories  Past Medical History:   Diagnosis Date    Arthritis     Cataract     right    Chronic fatigue syndrome     Depression     Diabetes mellitus (HCC)     SC injection weekly    Eczema     Fibromyalgia     Hypertension     Memory loss     Mononucleosis     Sciatica     Sleep apnea     no c-pap    Vertigo      Past Surgical History:   Procedure Laterality Date    CARPAL TUNNEL RELEASE Bilateral     CATARACT REMOVAL Right     OTHER SURGICAL HISTORY Bilateral 01/21/2021    BILATERAL L3,L4 MEDIAL BRANCH AND L5 DORSAL RAMUS RADIOFREQUENCY    PAIN MANAGEMENT PROCEDURE Bilateral 1/21/2021    BILATERAL L3,4 MEDIAL BRANCH AND L5 DORSAL RAMUS  RADIOFREQUENCY ABLATION WITH SEDATION  (CPT [id number]) performed by Elan Kelley MD at [facility] [employer] [facility] ENDOSCOPY N/A 1/16/2019    EGD BIOPSY performed by Milana Pereyra MD at [facility] ENDOSCOPY     Family History   Problem Relation Age of Onset    Hypertension Father     Heart Disease Father     Hypertension Paternal Grandfather     Heart Disease Paternal Grandfather        Home Medications  Prior to Admission medications    Medication Sig Start Date End Date Taking?  Authorizing Provider   HYDROcodone-acetaminophen (NORCO) 5-325 MG per tablet Take 1 tablet by mouth every 6 hours as needed for Pain. Historical Provider, MD   AIMOVIG 70 MG/ML SOAJ inject 70mg into the skin once every 30 days 2/10/21   Historical Provider, MD   lisinopril (PRINIVIL;ZESTRIL) 20 MG tablet Take one tablet po daily in the a.m. 21   Kimberley Berman DO   tiZANidine (ZANAFLEX) 2 MG tablet Take 1 tablet by mouth 2 times daily 2/8/21 3/10/21  Kimberley Berman DO   gabapentin (NEURONTIN) 300 MG capsule Take 1 capsule by mouth 4 times daily for 30 days.  1/26/21 3/8/21  Kimberley Berman DO   rOPINIRole (REQUIP) 0.5 MG tablet TAKE ONE TABLET BY MOUTH THREE TIMES A DAY 21   Kimberley Berman DO   Dulaglutide (TRULICITY) 3 RV/1.8PH SOPN Inject 3 mg into the skin once a week Every Thursday    Historical Provider, MD   omeprazole (PRILOSEC) 20 MG delayed release capsule Take 1 capsule by mouth Daily  Patient taking differently: Take 20 mg by mouth Daily am 21   Kimberley Berman DO   melatonin 3 MG TABS tablet Take 3 mg by mouth nightly    Historical Provider, MD   FLUoxetine (PROZAC) 20 MG capsule Take 3 capsules by mouth daily 10/16/20 3/8/21  Kimberley Berman DO       Allergies  Baclofen, Ibuprofen, Nickel, and Penicillins    Social Hx  Social History     Socioeconomic History    Marital status:      Spouse name: Not on file    Number of children: Not on file    Years of education: Not on file    Highest education level: Not on file   Occupational History    Not on file   Social Needs    Financial resource strain: Not on file    Food insecurity     Worry: Not on file     Inability: Not on file   [employer] needs     Medical: Not on file     Non-medical: Not on file   Tobacco Use    Smoking status: Former Smoker     Packs/day: 2.00     Years: 10.00     Pack years: 20.00     Types: Cigarettes     Start date: 0     Quit date:      Years since quittin.2    Smokeless tobacco: Never Used   Substance and Sexual Activity    Alcohol use: Not Currently     Alcohol/week: 1.0 oriented to person, place, time, and purpose; appears stated age and cooperative; no apparent distress no labored breathing  HEENT:  NCAT; PERRL; conjunctiva pink, sclera clear  Neck:  no adenopathy, bruit, JVD, tenderness, masses, or nodules; supple, symmetrical, trachea midline, thyroid not enlarged  Lung:  clear to auscultation bilaterally; no use of accessory muscles; no rhonchi, rales, or crackles  Heart:  regular rate and regular rhythm without , rub, or gallop +systolic murmur  Abdomen:  soft, nontender, nondistended; normoactive bowel sounds; no organomegaly  Extremities:  extremities normal, atraumatic, no cyanosis or edema  Musculokeletal:  no joint swelling, no muscle tenderness. ROM normal in all joints of extremities.    Neurologic:  mental status A&Ox3, thought content appropriate; CN II-XII grossly intact; sensation intact, motor strength 5/5 globally; no slurred speech      Laboratory Data  Recent Results (from the past 24 hour(s))   Covid-19 Ambulatory    Collection Time: 03/08/21 10:51 AM    Specimen: Nasopharyngeal Swab   Result Value Ref Range    Source OP swab    APTT    Collection Time: 03/08/21  1:30 PM   Result Value Ref Range    aPTT 23.7 (L) 24.5 - 35.1 sec   CBC    Collection Time: 03/08/21  1:30 PM   Result Value Ref Range    WBC 8.7 4.5 - 11.5 E9/L    RBC 2.96 (L) 3.50 - 5.50 E12/L    Hemoglobin 5.5 (L) 11.5 - 15.5 g/dL    Hematocrit 19.8 (L) 34.0 - 48.0 %    MCV 66.9 (L) 80.0 - 99.9 fL    MCH 18.6 (L) 26.0 - 35.0 pg    MCHC 27.8 (L) 32.0 - 34.5 %    RDW 19.1 (H) 11.5 - 15.0 fL    Platelets 982 309 - 224 E9/L    MPV 9.8 7.0 - 12.0 fL   Protime-INR    Collection Time: 03/08/21  1:30 PM   Result Value Ref Range    Protime 13.6 (H) 9.3 - 12.4 sec    INR 1.2    Basic Metabolic Panel w/ Reflex to MG    Collection Time: 03/08/21  1:30 PM   Result Value Ref Range    Sodium 132 132 - 146 mmol/L    Potassium reflex Magnesium 3.6 3.5 - 5.0 mmol/L    Chloride 99 98 - 107 mmol/L    CO2 21 (L) 22 - 29 mmol/L    Anion Gap 12 7 - 16 mmol/L    Glucose 237 (H) 74 - 99 mg/dL    BUN 18 8 - 23 mg/dL    CREATININE 0.8 0.5 - 1.0 mg/dL    GFR Non-African American >60 >=60 mL/min/1.73    GFR African American >60     Calcium 9.0 8.6 - 10.2 mg/dL   EKG 12 lead    Collection Time: 03/08/21  2:06 PM   Result Value Ref Range    Ventricular Rate 69 BPM    Atrial Rate 69 BPM    P-R Interval 178 ms    QRS Duration 84 ms    Q-T Interval 416 ms    QTc Calculation (Bazett) 445 ms    P Axis 62 degrees    R Axis -6 degrees    T Axis 15 degrees   Comprehensive Metabolic Panel w/ Reflex to MG    Collection Time: 03/08/21  7:55 PM   Result Value Ref Range    Sodium 134 132 - 146 mmol/L    Potassium reflex Magnesium 3.9 3.5 - 5.0 mmol/L    Chloride 103 98 - 107 mmol/L    CO2 20 (L) 22 - 29 mmol/L    Anion Gap 11 7 - 16 mmol/L    Glucose 225 (H) 74 - 99 mg/dL    BUN 18 8 - 23 mg/dL    CREATININE 0.7 0.5 - 1.0 mg/dL    GFR Non-African American >60 >=60 mL/min/1.73    GFR African American >60     Calcium 9.5 8.6 - 10.2 mg/dL    Total Protein 7.0 6.4 - 8.3 g/dL    Albumin 3.7 3.5 - 5.2 g/dL    Total Bilirubin 0.2 0.0 - 1.2 mg/dL    Alkaline Phosphatase 101 35 - 104 U/L    ALT 26 0 - 32 U/L    AST 46 (H) 0 - 31 U/L   APTT    Collection Time: 03/08/21  7:55 PM   Result Value Ref Range    aPTT 25.8 24.5 - 35.1 sec   Protime-INR    Collection Time: 03/08/21  7:55 PM   Result Value Ref Range    Protime 12.3 9.3 - 12.4 sec    INR 1.1    CBC auto differential    Collection Time: 03/08/21  8:25 PM   Result Value Ref Range    WBC 9.6 4.5 - 11.5 E9/L    RBC 2.96 (L) 3.50 - 5.50 E12/L    Hemoglobin 5.5 (LL) 11.5 - 15.5 g/dL    Hematocrit 19.9 (L) 34.0 - 48.0 %    MCV 67.2 (L) 80.0 - 99.9 fL    MCH 18.6 (L) 26.0 - 35.0 pg    MCHC 27.6 (L) 32.0 - 34.5 %    RDW 19.5 (H) 11.5 - 15.0 fL    Platelets 339 178 - 111 E9/L    MPV 9.8 7.0 - 12.0 fL    Neutrophils % 58.1 43.0 - 80.0 %    Immature Granulocytes % 0.7 0.0 - 5.0 %    Lymphocytes % 25.9 20.0 - 42.0 % Monocytes % 11.1 2.0 - 12.0 %    Eosinophils % 3.8 0.0 - 6.0 %    Basophils % 0.4 0.0 - 2.0 %    Neutrophils Absolute 5.55 1.80 - 7.30 E9/L    Immature Granulocytes # 0.07 E9/L    Lymphocytes Absolute 2.47 1.50 - 4.00 E9/L    Monocytes Absolute 1.06 (H) 0.10 - 0.95 E9/L    Eosinophils Absolute 0.36 0.05 - 0.50 E9/L    Basophils Absolute 0.04 0.00 - 0.20 E9/L   TYPE AND SCREEN    Collection Time: 03/08/21  8:25 PM   Result Value Ref Range    ABO/Rh A POS     Antibody Screen NEG    PREPARE RBC (CROSSMATCH), 2 Units    Collection Time: 03/08/21  8:25 PM   Result Value Ref Range    Product Code Blood Bank [id number]     Description Blood Bank Red Blood Cells, Leuko-reduced     Unit Number [id number]     Dispense Status Blood Bank selected     Product Code Blood Bank [id number]     Description Blood Bank Red Blood Cells, Leuko-reduced     Unit Number [id number]     Dispense Status Blood Bank selected        Imaging  Xr Thoracic Spine (3 Views)    Result Date: 2/12/2021  EXAMINATION: THREE XRAY VIEWS OF THE THORACIC SPINE 2/12/2021 2:07 pm COMPARISON: None. HISTORY: ORDERING SYSTEM PROVIDED HISTORY: Acute thoracic back pain, unspecified back pain laterality FINDINGS: There is moderate degenerative disc disease most apparent in the mid T-spine. No fracture or dislocation. Normal soft tissues. Multilevel degenerative disc disease. Mri Thoracic Spine Wo Contrast    Result Date: 3/1/2021  EXAMINATION: MRI OF THE THORACIC SPINE WITHOUT CONTRAST  3/1/2021 1:21 pm TECHNIQUE: Multiplanar multisequence MRI of the thoracic spine was performed without the administration of intravenous contrast. COMPARISON: Thoracic spine radiographs 02/12/2021. HISTORY: ORDERING SYSTEM PROVIDED HISTORY: Thoracic spondylosis TECHNOLOGIST PROVIDED HISTORY: Reason for exam:->rule out compression fracture s/p fall FINDINGS: BONES/ALIGNMENT: There is normal alignment of the spine. The vertebral body heights are maintained.  The bone marrow signal appears unremarkable. Subacute compression fracture is identified at T11 with loss of 10% of vertebral body height. There is an associated hemangioma at this level. SPINAL CORD: No abnormal cord signal is seen. SOFT TISSUES: No paraspinal mass identified. DEGENERATIVE CHANGES: Small right paracentral disc protrusion is identified at T7-8. Subacute compression fracture of T11 with loss of 10% of the vertebral body height. Small right paracentral disc protrusion at T7-8. Xr Chest Portable    Result Date: 3/8/2021  EXAMINATION: ONE XRAY VIEW OF THE CHEST 3/8/2021 8:51 pm COMPARISON: None. HISTORY: ORDERING SYSTEM PROVIDED HISTORY: Crackles at right lung base TECHNOLOGIST PROVIDED HISTORY: Reason for exam:->Crackles at right lung base What reading provider will be dictating this exam?->CRC FINDINGS: The lungs are without acute focal process. There is no effusion or pneumothorax. The cardiomediastinal silhouette is without acute process. The osseous structures are without acute process. No acute process. Mild cardiomegaly. Assessment and Plan  Patient is a 72 y.o. female who presented with anemia. The active problem list is as follows:    · Acute on chronic anemia- Hemoccult negative in the ER. Ordered 2 units PRBC to transfuse from the ER. Follow H&H. Iron studies. Consult hematology. · T11 compression fracture-originally scheduled for surgery this Friday  · +Murmur- recommend outpatient echocardiogram  · Hypertension  · Non-insulin-dependent diabetes mellitus-check hemoglobin A1c. Normally takes Trulicity. Sliding scale insulin. · GERD-protonix   · ISABEL  · Fibromyalgia    · Routine labs in the morning. · DVT prophylaxis. SCDs  · Please see orders for further management and care.         Jian Rider  DO  11:35 PM  3/8/2021

## 2021-03-09 NOTE — PROGRESS NOTES
Duel RN skin check completed upon admission. No open areas or areas of potential concern noted. Form placed in pt soft chart.

## 2021-03-09 NOTE — ED TRIAGE NOTES
FIRST PROVIDER CONTACT ASSESSMENT NOTE      Department of Emergency Medicine   Admit Date: No admission date for patient encounter. Chief Complaint: Anemia (sent in for blood transfusion, hgb 5.5)      History of Present Illness:    Desire Arauz is a 72 y.o. female who presents to the ED for low hemoglobin.  She was found to have hemoglobin of 5.5  Doing pre op testing.         -----------------END OF FIRST PROVIDER CONTACT ASSESSMENT NOTE--------------  Electronically signed by Ashly Dunbar PA-C   DD: 3/8/21

## 2021-03-09 NOTE — PROGRESS NOTES
Zanaflex came up from pharmacy. Pt states she will hold off right now because she isn't having any nerve pain at this time.

## 2021-03-09 NOTE — CONSULTS
Department of Medicine  Division of Hematology/Oncology  Attending Consult Note      Reason for Consult:  Anemia, Hemoccult negative  Requesting Physician:  Rah Gee     CHIEF COMPLAINT:  Abnormal labs low hemoglobin    History Obtained From:  Patient and EMR    HISTORY OF PRESENT ILLNESS:      The patient is a 72 y.o. female with significant past medical history of depression, HTN who presented to her PCP office to obtain preoperative check-up upon the request of her surgeon . She was scheduled for kyphoplasty on March 12th, she has a T11 vertebral compression fracture. Labs revealed hemoglobin 5.5. Labs on admission show Hemoglobin WBC 9.6, 5.5, Platelets 873. CXR was negative. She is S/P 2 units of PRBCs. General surgery was consulted. Patient is alert and oriented in bed. Per patient, 3 years ago she was found with a low hemoglobin and was given 5 units of blood. She had an EGD done at that time in 2014 showing mild active gastritis with reactive changes. She has not had any problems otherwise. She admits to some lightheadedness leading up to her admission. Denies any blood in urine or stool. She denies any cancer or blood disorders in the family. Denies any alcohol use, Denies tobacco use. She is a retired ICU RN. No weight loss. No changes in appetite.       Past Medical History:        Diagnosis Date    Arthritis     Cataract     right    Chronic fatigue syndrome     Depression     Diabetes mellitus (HCC)     SC injection weekly    Eczema     Fibromyalgia     Hypertension     Memory loss     Mononucleosis     Sciatica     Sleep apnea     no c-pap    Vertigo        Past Surgical History:        Procedure Laterality Date    CARPAL TUNNEL RELEASE Bilateral     CATARACT REMOVAL Right     OTHER SURGICAL HISTORY Bilateral 01/21/2021    BILATERAL L3,L4 MEDIAL BRANCH AND L5 DORSAL RAMUS RADIOFREQUENCY    PAIN MANAGEMENT PROCEDURE Bilateral 1/21/2021    BILATERAL L3,4 MEDIAL BRANCH AND L5 DORSAL RAMUS  RADIOFREQUENCY ABLATION WITH SEDATION  (CPT 62501) performed by Braxton Amos MD at [address] [place] [address] ENDOSCOPY N/A 1/16/2019    EGD BIOPSY performed by Taylor Ludwig MD at [facility] ENDOSCOPY       Current Medications:    Current Facility-Administered Medications: HYDROcodone-acetaminophen (NORCO) 5-325 MG per tablet 1 tablet, 1 tablet, Oral, Q4H PRN **OR** HYDROcodone-acetaminophen (NORCO) 5-325 MG per tablet 2 tablet, 2 tablet, Oral, Q4H PRN  insulin lispro (HUMALOG) injection vial 0-12 Units, 0-12 Units, Subcutaneous, TID WC  insulin lispro (HUMALOG) injection vial 0-6 Units, 0-6 Units, Subcutaneous, Nightly  0.9 % sodium chloride infusion, , Intravenous, PRN  sodium chloride flush 0.9 % injection 10 mL, 10 mL, Intravenous, 2 times per day  sodium chloride flush 0.9 % injection 10 mL, 10 mL, Intravenous, PRN  promethazine (PHENERGAN) tablet 12.5 mg, 12.5 mg, Oral, Q6H PRN **OR** ondansetron (ZOFRAN) injection 4 mg, 4 mg, Intravenous, Q6H PRN  polyethylene glycol (GLYCOLAX) packet 17 g, 17 g, Oral, Daily PRN  acetaminophen (TYLENOL) tablet 650 mg, 650 mg, Oral, Q6H PRN **OR** acetaminophen (TYLENOL) suppository 650 mg, 650 mg, Rectal, Q6H PRN  FLUoxetine (PROZAC) capsule 60 mg, 60 mg, Oral, Daily  gabapentin (NEURONTIN) capsule 300 mg, 300 mg, Oral, 4x Daily  lisinopril (PRINIVIL;ZESTRIL) tablet 20 mg, 20 mg, Oral, Daily  melatonin tablet 3 mg, 3 mg, Oral, Nightly  pantoprazole (PROTONIX) tablet 40 mg, 40 mg, Oral, QAM AC  tiZANidine (ZANAFLEX) tablet 2 mg, 2 mg, Oral, BID  rOPINIRole (REQUIP) tablet 0.5 mg, 0.5 mg, Oral, TID    Allergies:  Baclofen, Ibuprofen, Nickel, and Penicillins    Social History:   Social History     Socioeconomic History    Marital status:      Spouse name: Not on file    Number of children: Not on file    Years of education: Not on file    Highest education level: Not on file   Occupational History    Not on file   Social Needs    Financial resource strain: Not on file    Food insecurity     Worry: Not on file     Inability: Not on file    Transportation needs     Medical: Not on file     Non-medical: Not on file   Tobacco Use    Smoking status: Former Smoker     Packs/day: 2.00     Years: 10.00     Pack years: 20.00     Types: Cigarettes     Start date: 0     Quit date:      Years since quittin.2    Smokeless tobacco: Never Used   Substance and Sexual Activity    Alcohol use: Not Currently     Alcohol/week: 1.0 standard drinks     Types: 1 Glasses of wine per week    Drug use: No    Sexual activity: Never   Lifestyle    Physical activity     Days per week: Not on file     Minutes per session: Not on file    Stress: Not on file   Relationships    Social connections     Talks on phone: Not on file     Gets together: Not on file     Attends Cheondoism service: Not on file     Active member of club or organization: Not on file     Attends meetings of clubs or organizations: Not on file     Relationship status: Not on file    Intimate partner violence     Fear of current or ex partner: Not on file     Emotionally abused: Not on file     Physically abused: Not on file     Forced sexual activity: Not on file   Other Topics Concern    Not on file   Social History Narrative    Not on file       Family History:         Problem Relation Age of Onset    Hypertension Father     Heart Disease Father     Hypertension Paternal Grandfather     Heart Disease Paternal Grandfather        REVIEW OF SYSTEMS:    CONSTITUTIONAL:  negative for fatigue, fevers, chills, sweats and weight loss  HEENT:  negative for hearing loss, epistaxis and sore throat  RESPIRATORY:  negative for cough with sputum, dyspnea, wheezing, hemoptysis and chest pain  CARDIOVASCULAR:  negative for chest pain, palpitations, PND, edema, syncope, dyspnea  GASTROINTESTINAL:  negative for nausea, vomiting, diarrhea, constipation, abdominal pain, jaundice, reflux, hematemesis and hemtochezia  GENITOURINARY:  negative for frequency, dysuria and urinary incontinence  INTEGUMENT/BREAST:  negative for rash and pruritus  HEMATOLOGIC/LYMPHATIC:  negative for easy bruising, bleeding, lymphadenopathy and petechiae  MUSCULOSKELETAL:  negative for myalgias, arthralgias and pain  NEUROLOGICAL:  negative for headaches, dizziness, seizures, gait problems and syncope  BEHAVIOR/PSYCH:  negative for depressed mood and anxiety    PHYSICAL EXAM:      Vitals:  /61   Pulse 76   Temp 97.4 °F (36.3 °C) (Temporal)   Resp 21   Ht 5' 3\" (1.6 m)   Wt 190 lb (86.2 kg)   LMP  (LMP Unknown)   SpO2 98%   BMI 33.66 kg/m²     CONSTITUTIONAL:  awake, alert, cooperative, no apparent distress, and appears stated age, Obese  HEENT:  Normocepalic, atraumatic, no obvious abnormality. Lids and lashes normal, PERRLA, EOMI, sclera clear, conjunctiva normal  NECK:  Supple, full ROM, symmetrical, trachea midline, no adenopathy, thyroid symmetric, not enlarged and no tenderness, skin normal  HEMATOLOGIC/LYMPHATICS:  no cervical or supraclavicular lymphadenopathy  LUNGS:  No increased work of breathing, good air exchange, clear to auscultation bilaterally, no crackles or wheezing  CARDIOVASCULAR:  Normal apical impulse, regular rate and rhythm, normal S1 and S2, no S3 or S4, and no murmur noted  ABDOMEN:  No scars, normal BS, soft, non-distended, non-tender, no masses palpated, no hepatosplenomegaly  MUSCULOSKELETAL:  No redness, warmth, or swelling of the joints; motor strength is 5 out of 5 in all extremities bilaterally; tone is normal  NEUROLOGIC:  Awake, alert, oriented to name, place and time. Cranial nerves II-XII are grossly intact. Motor is 5 out of 5 bilaterally. Sensory is intact.       DATA:      CMP:    Lab Results   Component Value Date     03/09/2021    K 4.1 03/09/2021    K 3.9 03/08/2021     03/09/2021    CO2 22 03/09/2021 BUN 15 03/09/2021    PROT 6.5 03/09/2021       CBC:    Recent Labs     03/08/21  1330 03/08/21  2025 03/09/21  0540 03/09/21  0843 03/09/21  1220   WBC 8.7 9.6 7.6  --   --    HGB 5.5* 5.5* 7.9* 7.8* 7.8*    258 214  --   --        Radiology:    Xr Thoracic Spine (3 Views)    Result Date: 2/12/2021  EXAMINATION: THREE XRAY VIEWS OF THE THORACIC SPINE 2/12/2021 2:07 pm COMPARISON: None. HISTORY: ORDERING SYSTEM PROVIDED HISTORY: Acute thoracic back pain, unspecified back pain laterality FINDINGS: There is moderate degenerative disc disease most apparent in the mid T-spine. No fracture or dislocation. Normal soft tissues. Multilevel degenerative disc disease. Mri Thoracic Spine Wo Contrast    Result Date: 3/1/2021  EXAMINATION: MRI OF THE THORACIC SPINE WITHOUT CONTRAST  3/1/2021 1:21 pm TECHNIQUE: Multiplanar multisequence MRI of the thoracic spine was performed without the administration of intravenous contrast. COMPARISON: Thoracic spine radiographs 02/12/2021. HISTORY: ORDERING SYSTEM PROVIDED HISTORY: Thoracic spondylosis TECHNOLOGIST PROVIDED HISTORY: Reason for exam:->rule out compression fracture s/p fall FINDINGS: BONES/ALIGNMENT: There is normal alignment of the spine. The vertebral body heights are maintained. The bone marrow signal appears unremarkable. Subacute compression fracture is identified at T11 with loss of 10% of vertebral body height. There is an associated hemangioma at this level. SPINAL CORD: No abnormal cord signal is seen. SOFT TISSUES: No paraspinal mass identified. DEGENERATIVE CHANGES: Small right paracentral disc protrusion is identified at T7-8. Subacute compression fracture of T11 with loss of 10% of the vertebral body height. Small right paracentral disc protrusion at T7-8. Xr Chest Portable    Result Date: 3/8/2021  EXAMINATION: ONE XRAY VIEW OF THE CHEST 3/8/2021 8:51 pm COMPARISON: None.  HISTORY: ORDERING SYSTEM PROVIDED HISTORY: Crackles at right lung base TECHNOLOGIST PROVIDED HISTORY: Reason for exam:->Crackles at right lung base What reading provider will be dictating this exam?->CRC FINDINGS: The lungs are without acute focal process. There is no effusion or pneumothorax. The cardiomediastinal silhouette is without acute process. The osseous structures are without acute process. No acute process. Mild cardiomegaly. IMPRESSION:     52-year-old female was found to have a low hemoglobin 5.5 during preoperative check-up and was sent in for evaluation. We were subsequently consulted for Anemia, Hemoccult negative. RECOMMENDATIONS:  General surgery consulted for evaluation for EGD 3/10  FOBT was negative  Maintain hemoglobin >7g/dL transfuse as needed  Iron profile showing Iron stat 5%, Iron 23  She would benefit from IV Ferrlecit x 3 doses  Vitamin B12 and Folate in normal range  Check peripheral blood smear, Retic, Check SPEP  Discussed with      Electronically signed by MORALES Madera NP on 3/9/2021 at 2:12 PM     Attending Addendum:      Patient seen and examined personally. All pertinent labs and imaging reviewed. Case discussed with NP. Agree with the consult note as above which has been updated to reflect my changes. Anemia work up as above. Thank you for allowing the [facility] for [address] to participate in care of your patient.     Tory Oro, [address] for [address]

## 2021-03-09 NOTE — CARE COORDINATION
Met with patient at bedside to discuss transition of care. She lives alone independently. 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 She is independent with ADL's. PCP Dr. Seth Travis. Pharmacy [facility]. Pt is planning to return home at discharge with no anticipated needs. She is scheduled for a Kypho 3/12 @ [facility]. Awaiting surgery consult for anemia.  CM will continue to follow for any needs that arise    Chance ORTEZN, RN  [facility] Case Management  [phone]

## 2021-03-10 ENCOUNTER — ANESTHESIA EVENT (OUTPATIENT)
Dept: ENDOSCOPY | Age: 71
DRG: 812 | End: 2021-03-10
Payer: MEDICARE

## 2021-03-10 ENCOUNTER — ANESTHESIA (OUTPATIENT)
Dept: ENDOSCOPY | Age: 71
DRG: 812 | End: 2021-03-10
Payer: MEDICARE

## 2021-03-10 VITALS — SYSTOLIC BLOOD PRESSURE: 112 MMHG | DIASTOLIC BLOOD PRESSURE: 55 MMHG | OXYGEN SATURATION: 93 %

## 2021-03-10 VITALS
BODY MASS INDEX: 34.41 KG/M2 | RESPIRATION RATE: 16 BRPM | HEART RATE: 74 BPM | WEIGHT: 194.22 LBS | HEIGHT: 63 IN | OXYGEN SATURATION: 97 % | DIASTOLIC BLOOD PRESSURE: 60 MMHG | SYSTOLIC BLOOD PRESSURE: 114 MMHG | TEMPERATURE: 97.9 F

## 2021-03-10 LAB
ALBUMIN SERPL-MCNC: 2.8 G/DL (ref 3.5–4.7)
ALBUMIN SERPL-MCNC: 3.2 G/DL (ref 3.5–5.2)
ALP BLD-CCNC: 83 U/L (ref 35–104)
ALPHA-1-GLOBULIN: 0.3 G/DL (ref 0.2–0.4)
ALPHA-2-GLOBULIN: 0.8 G/FL (ref 0.5–1)
ALT SERPL-CCNC: 23 U/L (ref 0–32)
ANION GAP SERPL CALCULATED.3IONS-SCNC: 8 MMOL/L (ref 7–16)
AST SERPL-CCNC: 35 U/L (ref 0–31)
BETA GLOBULIN: 1.2 G/DL (ref 0.8–1.3)
BILIRUB SERPL-MCNC: 0.4 MG/DL (ref 0–1.2)
BUN BLDV-MCNC: 12 MG/DL (ref 8–23)
CALCIUM SERPL-MCNC: 8.9 MG/DL (ref 8.6–10.2)
CHLORIDE BLD-SCNC: 105 MMOL/L (ref 98–107)
CO2: 25 MMOL/L (ref 22–29)
CREAT SERPL-MCNC: 0.7 MG/DL (ref 0.5–1)
ELECTROPHORESIS: ABNORMAL
GAMMA GLOBULIN: 1 G/DL (ref 0.7–1.6)
GFR AFRICAN AMERICAN: >60
GFR NON-AFRICAN AMERICAN: >60 ML/MIN/1.73
GLUCOSE BLD-MCNC: 152 MG/DL (ref 74–99)
HCT VFR BLD CALC: 25.3 % (ref 34–48)
HCT VFR BLD CALC: 25.6 % (ref 34–48)
HCT VFR BLD CALC: 26 % (ref 34–48)
HEMOGLOBIN: 7.4 G/DL (ref 11.5–15.5)
HEMOGLOBIN: 7.6 G/DL (ref 11.5–15.5)
HEMOGLOBIN: 7.6 G/DL (ref 11.5–15.5)
MCH RBC QN AUTO: 21.3 PG (ref 26–35)
MCHC RBC AUTO-ENTMCNC: 29.7 % (ref 32–34.5)
MCV RBC AUTO: 71.9 FL (ref 80–99.9)
METER GLUCOSE: 195 MG/DL (ref 74–99)
PDW BLD-RTO: 21.9 FL (ref 11.5–15)
PLATELET # BLD: 180 E9/L (ref 130–450)
PMV BLD AUTO: 9.6 FL (ref 7–12)
POTASSIUM SERPL-SCNC: 3.9 MMOL/L (ref 3.5–5)
RBC # BLD: 3.56 E12/L (ref 3.5–5.5)
SARS-COV-2: NOT DETECTED
SODIUM BLD-SCNC: 138 MMOL/L (ref 132–146)
SOURCE: NORMAL
TOTAL PROTEIN: 6 G/DL (ref 6.4–8.3)
TOTAL PROTEIN: 6 G/DL (ref 6.4–8.3)
WBC # BLD: 6.3 E9/L (ref 4.5–11.5)

## 2021-03-10 PROCEDURE — 2580000003 HC RX 258: Performed by: NURSE PRACTITIONER

## 2021-03-10 PROCEDURE — 7100000001 HC PACU RECOVERY - ADDTL 15 MIN: Performed by: SURGERY

## 2021-03-10 PROCEDURE — 85014 HEMATOCRIT: CPT

## 2021-03-10 PROCEDURE — 3700000000 HC ANESTHESIA ATTENDED CARE: Performed by: SURGERY

## 2021-03-10 PROCEDURE — 7100000000 HC PACU RECOVERY - FIRST 15 MIN: Performed by: SURGERY

## 2021-03-10 PROCEDURE — 2580000003 HC RX 258: Performed by: NURSE ANESTHETIST, CERTIFIED REGISTERED

## 2021-03-10 PROCEDURE — 3700000001 HC ADD 15 MINUTES (ANESTHESIA): Performed by: SURGERY

## 2021-03-10 PROCEDURE — 85027 COMPLETE CBC AUTOMATED: CPT

## 2021-03-10 PROCEDURE — 80053 COMPREHEN METABOLIC PANEL: CPT

## 2021-03-10 PROCEDURE — 6360000002 HC RX W HCPCS: Performed by: NURSE ANESTHETIST, CERTIFIED REGISTERED

## 2021-03-10 PROCEDURE — 2580000003 HC RX 258: Performed by: INTERNAL MEDICINE

## 2021-03-10 PROCEDURE — 2709999900 HC NON-CHARGEABLE SUPPLY: Performed by: SURGERY

## 2021-03-10 PROCEDURE — 0DJ08ZZ INSPECTION OF UPPER INTESTINAL TRACT, VIA NATURAL OR ARTIFICIAL OPENING ENDOSCOPIC: ICD-10-PCS | Performed by: SURGERY

## 2021-03-10 PROCEDURE — 82962 GLUCOSE BLOOD TEST: CPT

## 2021-03-10 PROCEDURE — 36415 COLL VENOUS BLD VENIPUNCTURE: CPT

## 2021-03-10 PROCEDURE — 85018 HEMOGLOBIN: CPT

## 2021-03-10 PROCEDURE — 3609017100 HC EGD: Performed by: SURGERY

## 2021-03-10 PROCEDURE — 6360000002 HC RX W HCPCS: Performed by: NURSE PRACTITIONER

## 2021-03-10 PROCEDURE — 6370000000 HC RX 637 (ALT 250 FOR IP): Performed by: INTERNAL MEDICINE

## 2021-03-10 RX ORDER — SODIUM CHLORIDE 9 MG/ML
INJECTION, SOLUTION INTRAVENOUS CONTINUOUS PRN
Status: DISCONTINUED | OUTPATIENT
Start: 2021-03-10 | End: 2021-03-10 | Stop reason: SDUPTHER

## 2021-03-10 RX ORDER — PROPOFOL 10 MG/ML
INJECTION, EMULSION INTRAVENOUS PRN
Status: DISCONTINUED | OUTPATIENT
Start: 2021-03-10 | End: 2021-03-10 | Stop reason: SDUPTHER

## 2021-03-10 RX ADMIN — LISINOPRIL 20 MG: 10 TABLET ORAL at 09:46

## 2021-03-10 RX ADMIN — ROPINIROLE HYDROCHLORIDE 0.5 MG: 0.5 TABLET, FILM COATED ORAL at 14:49

## 2021-03-10 RX ADMIN — SODIUM CHLORIDE 125 MG: 9 INJECTION, SOLUTION INTRAVENOUS at 09:46

## 2021-03-10 RX ADMIN — GABAPENTIN 300 MG: 300 CAPSULE ORAL at 09:45

## 2021-03-10 RX ADMIN — PROPOFOL 25 MG: 10 INJECTION, EMULSION INTRAVENOUS at 07:37

## 2021-03-10 RX ADMIN — PROPOFOL 50 MG: 10 INJECTION, EMULSION INTRAVENOUS at 07:34

## 2021-03-10 RX ADMIN — HYDROCODONE BITARTRATE AND ACETAMINOPHEN 1 TABLET: 5; 325 TABLET ORAL at 15:02

## 2021-03-10 RX ADMIN — GABAPENTIN 300 MG: 300 CAPSULE ORAL at 17:14

## 2021-03-10 RX ADMIN — ROPINIROLE HYDROCHLORIDE 0.5 MG: 0.5 TABLET, FILM COATED ORAL at 09:46

## 2021-03-10 RX ADMIN — TIZANIDINE 2 MG: 4 TABLET ORAL at 09:46

## 2021-03-10 RX ADMIN — SODIUM CHLORIDE: 9 INJECTION, SOLUTION INTRAVENOUS at 07:30

## 2021-03-10 RX ADMIN — GABAPENTIN 300 MG: 300 CAPSULE ORAL at 12:58

## 2021-03-10 RX ADMIN — PROPOFOL 25 MG: 10 INJECTION, EMULSION INTRAVENOUS at 07:40

## 2021-03-10 RX ADMIN — FLUOXETINE HYDROCHLORIDE 60 MG: 20 CAPSULE ORAL at 09:46

## 2021-03-10 RX ADMIN — PANTOPRAZOLE SODIUM 40 MG: 40 TABLET, DELAYED RELEASE ORAL at 05:07

## 2021-03-10 RX ADMIN — SODIUM CHLORIDE, PRESERVATIVE FREE 10 ML: 5 INJECTION INTRAVENOUS at 09:47

## 2021-03-10 ASSESSMENT — PAIN SCALES - GENERAL
PAINLEVEL_OUTOF10: 0

## 2021-03-10 NOTE — ANESTHESIA PRE PROCEDURE
PRELIMINARY BLOOD CULTURE SHOWS GRAM NEGATIVE RODS. FAXED RESULTS TO PTS FLOOR
@ [facility] FAX# [phone]. SPOKE WITH NURSE CONCEPCION Oral Q4H PRN Gleen Board, DO   2 tablet at 03/09/21 0246    insulin lispro (HUMALOG) injection vial 0-12 Units  0-12 Units Subcutaneous TID WC [facility] Patricia DO        insulin lispro (HUMALOG) injection vial 0-6 Units  0-6 Units Subcutaneous Nightly [facility] Patricia, DO        ferric gluconate (FERRLECIT) 125 mg in sodium chloride 0.9 % 100 mL IVPB  125 mg Intravenous Daily Michelle Crystal, APRN - NP   Stopped at 03/09/21 1954    0.9 % sodium chloride infusion   Intravenous PRN Chantal Schaeffer,         sodium chloride flush 0.9 % injection 10 mL  10 mL Intravenous 2 times per day [facility] Board, DO   10 mL at 03/09/21 2307    sodium chloride flush 0.9 % injection 10 mL  10 mL Intravenous PRN [facility] Board, DO   10 mL at 03/09/21 1958    promethazine (PHENERGAN) tablet 12.5 mg  12.5 mg Oral Q6H PRN Zaida Monet, DO        Or    [facility] injection 4 mg  4 mg Intravenous Q6H PRN [facility] DO Patricia        polyethylene glycol (GLYCOLAX) packet 17 g  17 g Oral Daily PRN Zaida Board, DO        acetaminophen (TYLENOL) tablet 650 mg  650 mg Oral Q6H PRN [facility] Board, DO        Or    acetaminophen (TYLENOL) suppository 650 mg  650 mg Rectal Q6H PRN [facility] Board, DO        FLUoxetine (PROZAC) capsule 60 mg  60 mg Oral Daily [facility] Patricia, DO   60 mg at 03/09/21 1036    gabapentin (NEURONTIN) capsule 300 mg  300 mg Oral 4x Daily [facility] Patricia, DO   300 mg at 03/09/21 2121    lisinopril (PRINIVIL;ZESTRIL) tablet 20 mg  20 mg Oral Daily [facility] Patricia, DO   20 mg at 03/09/21 1033    melatonin tablet 3 mg  3 mg Oral Nightly [facility] Patricia, DO   3 mg at 03/09/21 2307    pantoprazole (PROTONIX) tablet 40 mg  40 mg Oral QAM AC [facility] Patricia, DO   40 mg at 03/10/21 0507    tiZANidine (ZANAFLEX) tablet 2 mg  2 mg Oral BID Hungjuice Gomez DO   2 mg at 03/09/21 2021    rOPINIRole (REQUIP) tablet 0.5 mg  0.5 mg Oral TID Zaida Monet DO 0.5 mg at 03/09/21 5347       Allergies:     Allergies   Allergen Reactions    Baclofen Other (See Comments)     Dry mouth, abd pain    Ibuprofen Other (See Comments)     Acute bleeding    Nickel      Surgical steel    Penicillins Hives       Problem List:    Patient Active Problem List   Diagnosis Code    Obstructive sleep apnea syndrome G47.33    Lumbar radicular pain M54.16    Recurrent major depressive disorder, in partial remission (HCC) F33.41    Type 2 diabetes mellitus without complication, without long-term current use of insulin (HCC) E11.9    Gastroesophageal reflux disease K21.9    Essential hypertension I10    Class 2 obesity due to excess calories without serious comorbidity with body mass index (BMI) of 37.0 to 37.9 in adult E66.09, Z68.37    Chronic pain syndrome G89.4    Lumbar spondylosis M47.816    Thoracic disc disease M51.9    Lumbar facet arthropathy M47.816    Spinal stenosis of lumbar region without neurogenic claudication M48.061    Migraine without aura and without status migrainosus, not intractable G43.009    Lumbar spondylolysis M43.06    Thoracic facet joint syndrome M47.894    Thoracic spondylosis M47.814    Acute anemia D64.9       Past Medical History:        Diagnosis Date    Arthritis     Cataract     right    Chronic fatigue syndrome     Depression     Diabetes mellitus (HCC)     SC injection weekly    Eczema     Fibromyalgia     Hypertension     Memory loss     Mononucleosis     Sciatica     Sleep apnea     no c-pap    Vertigo        Past Surgical History:        Procedure Laterality Date    CARPAL TUNNEL RELEASE Bilateral     CATARACT REMOVAL Right     OTHER SURGICAL HISTORY Bilateral 01/21/2021    BILATERAL L3,L4 MEDIAL BRANCH AND L5 DORSAL RAMUS RADIOFREQUENCY    PAIN MANAGEMENT PROCEDURE Bilateral 1/21/2021    BILATERAL L3,4 MEDIAL BRANCH AND L5 DORSAL RAMUS  RADIOFREQUENCY ABLATION WITH SEDATION  (CPT V727281) performed by Dandre Read MD at [address] [facility]      UPPER GASTROINTESTINAL ENDOSCOPY N/A 2019    EGD BIOPSY performed by Glennda Cogan, MD at [facility] History:    Social History     Tobacco Use    Smoking status: Former Smoker     Packs/day: 2.00     Years: 10.00     Pack years: 20.00     Types: Cigarettes     Start date: 0     Quit date:      Years since quittin.2    Smokeless tobacco: Never Used   Substance Use Topics    Alcohol use: Not Currently     Alcohol/week: 1.0 standard drinks     Types: 1 Glasses of wine per week                                Counseling given: Not Answered      Vital Signs (Current):   Vitals:    21 1604 21 1930 21 2305 03/10/21 0515   BP: (!) 115/57 (!) 141/64 (!) 129/59    Pulse: 81 84 77    Resp:  18 16    Temp: 36.2 °C (97.1 °F) 36.4 °C (97.5 °F) 36.3 °C (97.3 °F)    TempSrc: Oral Infrared Infrared    SpO2: 98% 98% 97%    Weight:    194 lb 3.6 oz (88.1 kg)   Height:                                                  BP Readings from Last 3 Encounters:   21 (!) 129/59   21 (!) 120/58   21 134/82       NPO Status:                                                                                 BMI:   Wt Readings from Last 3 Encounters:   03/10/21 194 lb 3.6 oz (88.1 kg)   21 190 lb (86.2 kg)   21 192 lb (87.1 kg)     Body mass index is 34.41 kg/m².     CBC:   Lab Results   Component Value Date    WBC 6.3 03/10/2021    RBC 3.56 03/10/2021    HGB 7.6 03/10/2021    HCT 25.6 03/10/2021    MCV 71.9 03/10/2021    RDW 21.9 03/10/2021     03/10/2021       CMP:   Lab Results   Component Value Date     03/10/2021    K 3.9 03/10/2021    K 3.9 2021     03/10/2021    CO2 25 03/10/2021    BUN 12 03/10/2021    CREATININE 0.7 03/10/2021    GFRAA >60 03/10/2021    LABGLOM >60 03/10/2021    GLUCOSE 152 03/10/2021    PROT 6.0 03/10/2021    CALCIUM 8.9 03/10/2021    BILITOT 0.4 03/10/2021    ALKPHOS 83 03/10/2021    AST 35 03/10/2021    ALT 23 03/10/2021       POC Tests: No results for input(s): POCGLU, POCNA, POCK, POCCL, POCBUN, POCHEMO, POCHCT in the last 72 hours. Coags:   Lab Results   Component Value Date    PROTIME 12.3 03/08/2021    INR 1.1 03/08/2021    APTT 25.8 03/08/2021       HCG (If Applicable): No results found for: PREGTESTUR, PREGSERUM, HCG, HCGQUANT     ABGs: No results found for: PHART, PO2ART, CJK2NDQ, SOI7IBX, BEART, H6CMJZAO     Type & Screen (If Applicable):  No results found for: LABABO, LABRH    Drug/Infectious Status (If Applicable):  No results found for: HIV, HEPCAB    COVID-19 Screening (If Applicable):   Lab Results   Component Value Date    COVID19 Not Detected 01/15/2021         Anesthesia Evaluation  Patient summary reviewed and Nursing notes reviewed no history of anesthetic complications:   Airway: Mallampati: III  TM distance: >3 FB   Neck ROM: full  Mouth opening: > = 3 FB Dental:      Comment: None loose    Pulmonary:   (+) sleep apnea:  decreased breath sounds,                             Cardiovascular:    (+) hypertension:,         Rhythm: regular  Rate: normal                    Neuro/Psych:   (+) neuromuscular disease:, headaches: cluster headaches, psychiatric history:            GI/Hepatic/Renal:   (+) GERD:,          ROS comment: Gi BLEED  Obesity. Endo/Other:    (+) Diabetes, blood dyscrasia: anemia:., .                 Abdominal:           Vascular:                                    Anesthesia Plan      MAC     ASA 4       Induction: intravenous. Anesthetic plan and risks discussed with patient. Plan discussed with attending and CRNA. MORALES Martinez - CRNA   3/10/2021    Patient seen and examined, chart reviewed, agree with above findings. Anesthetic plan, risks, benefits, alternatives, and personnel involved discussed with patient. Patient verbalized an understanding and agreed to proceed.   NPO status confirmed. Anesthetic plan discussed with care team members and agreed upon.     Caty Esqueda DO   3/10/2021  7:39 AM

## 2021-03-10 NOTE — PROGRESS NOTES
Lab here and joy am labs. Pt refusing to get OOB to bath at this time,states she was told her EGD is at 1500 and wants to get more sleep. Checked the schedule and pt is currently not listed. States she doesn't want to wash up until later on day shift. Also refusing BS checks and insulin administrations. Will continue to monitor.  Electronically signed by Charlie Burks RN on 3/10/2021 at 5:14 AM

## 2021-03-10 NOTE — PROGRESS NOTES
Consent for EGD signed and placed on chart, verbalizes understanding regarding NPO after midnight.  Electronically signed by Kori Britt RN on 3/9/2021 at 9:58 PM

## 2021-03-10 NOTE — PROGRESS NOTES
([facility]) 5-325 MG per tablet 2 tablet, 2 tablet, Oral, Q4H PRN, Harry Arellano DO, 2 tablet at 03/09/21 0246    insulin lispro (HUMALOG) injection vial 0-12 Units, 0-12 Units, Subcutaneous, TID WC, Allan Gomez DO    insulin lispro (HUMALOG) injection vial 0-6 Units, 0-6 Units, Subcutaneous, Nightly, Allan Gomez DO    ferric gluconate (FERRLECIT) 125 mg in sodium chloride 0.9 % 100 mL IVPB, 125 mg, Intravenous, Daily, Maggi Teixeira APRN - JAMI, Last Rate: 110 mL/hr at 03/10/21 1104, Restarted at 03/10/21 1104    0.9 % sodium chloride infusion, , Intravenous, PRN, Burgess Juarez,     sodium chloride flush 0.9 % injection 10 mL, 10 mL, Intravenous, 2 times per day, Narcisa Gomez DO, 10 mL at 03/10/21 0947    sodium chloride flush 0.9 % injection 10 mL, 10 mL, Intravenous, PRN, Marko Gomez DO, 10 mL at 03/09/21 1958    promethazine (PHENERGAN) tablet 12.5 mg, 12.5 mg, Oral, Q6H PRN **OR** ondansetron (ZOFRAN) injection 4 mg, 4 mg, Intravenous, Q6H PRN, Allan Gomez DO    polyethylene glycol (GLYCOLAX) packet 17 g, 17 g, Oral, Daily PRN, Harry Arellano DO    acetaminophen (TYLENOL) tablet 650 mg, 650 mg, Oral, Q6H PRN **OR** acetaminophen (TYLENOL) suppository 650 mg, 650 mg, Rectal, Q6H PRN, Allan Gomez DO    FLUoxetine (PROZAC) capsule 60 mg, 60 mg, Oral, Daily, Narcisa Gomez DO, 60 mg at 03/10/21 0092    gabapentin (NEURONTIN) capsule 300 mg, 300 mg, Oral, 4x Daily, Narcisa Gomez DO, 300 mg at 03/10/21 0945    lisinopril (PRINIVIL;ZESTRIL) tablet 20 mg, 20 mg, Oral, Daily, Narcisa Gomez DO, 20 mg at 03/10/21 0946    melatonin tablet 3 mg, 3 mg, Oral, Nightly, Narcisa Gomez DO, 3 mg at 03/09/21 2307    pantoprazole (PROTONIX) tablet 40 mg, 40 mg, Oral, QAM AC, Narcisa Gomez DO, 40 mg at 03/10/21 0507    tiZANidine (ZANAFLEX) tablet 2 mg, 2 mg, Oral, BID, Marko Gomez DO, 2 mg at 03/10/21 0946    rOPINIRole (REQUIP) tablet 0.5 mg, 0.5 mg, Oral, TID, Jennyfer Gomez DO, 0.5 mg at 03/10/21 4896    Assessment:    Active Problems:    Acute anemia  Resolved Problems:    * No resolved hospital problems. *    58-year-old female was found to have a low hemoglobin 5.5 during preoperative check-up and was sent in for evaluation. We were subsequently consulted for Anemia, Hemoccult negative. EGD was negative for any active bleeding. Plan:  Maintain hemoglobin >7g/dL transfuse as needed  Iron profile showing Iron stat 5%, Iron 23  Continue IV Ferrlecit 125mg x 3 doses  Vitamin B12 and Folate in normal range  Check peripheral blood smear  SPEP-pending   She can [facility] at the [facility] week with -patient is aware.          Electronically signed by MORALES Crow NP on 3/10/2021 at 12:54 PM

## 2021-03-10 NOTE — OP NOTE
ESOPHAGOGASTRODUODENOSCOPY PROCEDURE NOTE    DATE OF PROCEDURE: 3/10/2021    PREOPERATIVE DIAGNOSIS:  Anemia     POSTOPERATIVE DIAGNOSIS/FINDINGS:  Retained food, schatzkin ring, small hiatal hernia, no evidence of bleeding    SURGEON: Sukumar Raphael MD    ASSISTANT: None    OPERATION: Esophagogastroduodenoscopy     ANESTHESIA: Local monitored anesthesia. CONDITION: Stable    COMPLICATIONS: None. Specimens Removed: as above    EBL: None    BRIEF HISTORY:  This is a 72 y.o. female who presents with the complaint of anemia. It was recommended the patient undergo an esophagogastrduodenoscopy. The risks/benefits/alternatives/expected outcomes were explained the the patient. The patient verbalized understanding and agreed to proceed. PROCEDURE:  The patient was brought into the endoscopy suite and placed in the left lateral decubitus position. A bite block was placed in the patients mouth. After the initiation of LMAC anesthesia, a gastroscope was inserted into the patient's mouth and passed into the esophagus and into the stomach. Immediately upon entering the stomach the note of Schatzki ring, small hiatal hernia and retained food was made. The scope was advanced through the pylorus into the first and second portion of the duodenum making the note of normal mucosa. The scope was then withdrawn back into the stomach where it was retroflexed. There was small hiatal hernia noted. The scope was then straightened out. The scope was then withdrawn the entire length of the esophagus, making the note of a normal esophagus without masses, ulcerations, or lesions noted. The scope was withdrawn entirely. The patient tolerated the procedure well and there were no complications. GEJ at 38 cm.      Sukumar Raphael MD  3/10/2021

## 2021-03-10 NOTE — PLAN OF CARE
Problem: Pain:  Goal: Pain level will decrease  Description: Pain level will decrease  3/10/2021 1611 by Adelia Jolley RN  Outcome: Completed  3/10/2021 1515 by Adelia Jolley RN  Outcome: Met This Shift  Goal: Control of acute pain  Description: Control of acute pain  3/10/2021 1611 by Adelia Jolley RN  Outcome: Completed  3/10/2021 1515 by Adelia Jolley RN  Outcome: Met This Shift  Goal: Control of chronic pain  Description: Control of chronic pain  Outcome: Completed     Problem: Falls - Risk of:  Goal: Will remain free from falls  Description: Will remain free from falls  3/10/2021 1611 by Adelia Jolley RN  Outcome: Completed  3/10/2021 1515 by Adelia Jolley RN  Outcome: Met This Shift  Goal: Absence of physical injury  Description: Absence of physical injury  3/10/2021 1611 by Adelia Jolley RN  Outcome: Completed  3/10/2021 1515 by Adelia Jolley RN  Outcome: Met This Shift

## 2021-03-11 ENCOUNTER — TELEPHONE (OUTPATIENT)
Dept: PAIN MANAGEMENT | Age: 71
End: 2021-03-11

## 2021-03-11 DIAGNOSIS — G89.4 CHRONIC PAIN SYNDROME: Primary | ICD-10-CM

## 2021-03-11 DIAGNOSIS — M47.816 LUMBAR FACET ARTHROPATHY: ICD-10-CM

## 2021-03-11 DIAGNOSIS — M48.061 SPINAL STENOSIS OF LUMBAR REGION WITHOUT NEUROGENIC CLAUDICATION: ICD-10-CM

## 2021-03-11 DIAGNOSIS — M47.816 LUMBAR SPONDYLOSIS: ICD-10-CM

## 2021-03-11 RX ORDER — HYDROCODONE BITARTRATE AND ACETAMINOPHEN 5; 325 MG/1; MG/1
1 TABLET ORAL EVERY 6 HOURS PRN
Qty: 30 TABLET | Refills: 0 | Status: SHIPPED | OUTPATIENT
Start: 2021-03-11 | End: 2021-04-10

## 2021-03-11 NOTE — TELEPHONE ENCOUNTER
Racquel Mix called in for a refill of her Norco, she has 1.5 tablets left.   She just came home from the hospital.

## 2021-03-15 ENCOUNTER — OFFICE VISIT (OUTPATIENT)
Dept: FAMILY MEDICINE CLINIC | Age: 71
End: 2021-03-15
Payer: MEDICARE

## 2021-03-15 VITALS
OXYGEN SATURATION: 97 % | DIASTOLIC BLOOD PRESSURE: 74 MMHG | BODY MASS INDEX: 34.54 KG/M2 | RESPIRATION RATE: 18 BRPM | SYSTOLIC BLOOD PRESSURE: 126 MMHG | WEIGHT: 195 LBS | TEMPERATURE: 97 F | HEART RATE: 75 BPM

## 2021-03-15 DIAGNOSIS — M43.06 LUMBAR SPONDYLOLYSIS: Primary | ICD-10-CM

## 2021-03-15 DIAGNOSIS — D64.9 ACUTE ANEMIA: ICD-10-CM

## 2021-03-15 DIAGNOSIS — R01.1 MURMUR, CARDIAC: ICD-10-CM

## 2021-03-15 PROCEDURE — 99214 OFFICE O/P EST MOD 30 MIN: CPT | Performed by: STUDENT IN AN ORGANIZED HEALTH CARE EDUCATION/TRAINING PROGRAM

## 2021-03-15 PROCEDURE — 4040F PNEUMOC VAC/ADMIN/RCVD: CPT | Performed by: STUDENT IN AN ORGANIZED HEALTH CARE EDUCATION/TRAINING PROGRAM

## 2021-03-15 PROCEDURE — 1123F ACP DISCUSS/DSCN MKR DOCD: CPT | Performed by: STUDENT IN AN ORGANIZED HEALTH CARE EDUCATION/TRAINING PROGRAM

## 2021-03-15 PROCEDURE — 1111F DSCHRG MED/CURRENT MED MERGE: CPT | Performed by: STUDENT IN AN ORGANIZED HEALTH CARE EDUCATION/TRAINING PROGRAM

## 2021-03-15 PROCEDURE — 1090F PRES/ABSN URINE INCON ASSESS: CPT | Performed by: STUDENT IN AN ORGANIZED HEALTH CARE EDUCATION/TRAINING PROGRAM

## 2021-03-15 PROCEDURE — 3017F COLORECTAL CA SCREEN DOC REV: CPT | Performed by: STUDENT IN AN ORGANIZED HEALTH CARE EDUCATION/TRAINING PROGRAM

## 2021-03-15 PROCEDURE — G8417 CALC BMI ABV UP PARAM F/U: HCPCS | Performed by: STUDENT IN AN ORGANIZED HEALTH CARE EDUCATION/TRAINING PROGRAM

## 2021-03-15 PROCEDURE — 1036F TOBACCO NON-USER: CPT | Performed by: STUDENT IN AN ORGANIZED HEALTH CARE EDUCATION/TRAINING PROGRAM

## 2021-03-15 PROCEDURE — G8427 DOCREV CUR MEDS BY ELIG CLIN: HCPCS | Performed by: STUDENT IN AN ORGANIZED HEALTH CARE EDUCATION/TRAINING PROGRAM

## 2021-03-15 PROCEDURE — G8484 FLU IMMUNIZE NO ADMIN: HCPCS | Performed by: STUDENT IN AN ORGANIZED HEALTH CARE EDUCATION/TRAINING PROGRAM

## 2021-03-15 PROCEDURE — G8400 PT W/DXA NO RESULTS DOC: HCPCS | Performed by: STUDENT IN AN ORGANIZED HEALTH CARE EDUCATION/TRAINING PROGRAM

## 2021-03-15 NOTE — PROGRESS NOTES
Post-Discharge Transitional Care Management Services or Hospital Follow Up      John Barber   YOB: 1950    Date of Office Visit:  3/15/2021  Date of Hospital Admission: 3/8/21  Date of Hospital Discharge: 3/10/21  Risk of hospital readmission (high >=14%. Medium >=10%) :Readmission Risk Score: 12      Care management risk score Rising risk (score 2-5) and Complex Care (Scores >=6): 2     Non face to face  following discharge, date last encounter closed (first attempt may have been earlier): *No documented post hospital discharge outreach found in the last 14 days    Call initiated 2 business days of discharge: *No response recorded in the last 14 days     Patient is a 59-year-old female here today for hospital follow-up. She was in the hospital for hemoglobin of 5.5. She was admitted for anemia. She had an EGD done which was negative and did not show any bleeding. They told her that her anemia was due to her diabetes. She is going to see hematologist today. She believes her anemia is due to her autoimmune diseases. She is going to get surgery on the 19th, a kyphoplasty. She is to get blood work done prior to this. Patient is feeling well other than some back pain. We did discover a murmur on the patient but did not want an echo at first.  Now she does want a echo and a referral to Dr. Forrest Carney.      Patient Active Problem List   Diagnosis    Obstructive sleep apnea syndrome    Lumbar radicular pain    Recurrent major depressive disorder, in partial remission ([facility] [address]    Type 2 diabetes mellitus without complication, without long-term current use of insulin (HCC)    Gastroesophageal reflux disease    Essential hypertension    Class 2 obesity due to excess calories without serious comorbidity with body mass index (BMI) of 37.0 to 37.9 in adult    Chronic pain syndrome    Lumbar spondylosis    Thoracic disc disease    Lumbar facet arthropathy    Spinal stenosis of lumbar region without neurogenic claudication    Migraine without aura and without status migrainosus, not intractable    Lumbar spondylolysis    Thoracic facet joint syndrome    Thoracic spondylosis    Acute anemia       Allergies   Allergen Reactions    Baclofen Other (See Comments)     Dry mouth, abd pain    Ibuprofen Other (See Comments)     Acute bleeding    Nickel      Surgical steel    Penicillins Hives       Medications listed as ordered at the time of discharge from hospital   [place], [address] [facility] Medication Instructions JIM:    Printed on:03/15/21 0074   Medication Information                      AIMOVIG 70 MG/ML SOAJ  12th month             Dulaglutide (TRULICITY) 3 XR/9.6UH SOPN  Inject 3 mg into the skin once a week Every Monday             FLUoxetine (PROZAC) 20 MG capsule  Take 3 capsules by mouth daily             gabapentin (NEURONTIN) 300 MG capsule  Take 1 capsule by mouth 4 times daily for 30 days. HYDROcodone-acetaminophen (NORCO) 5-325 MG per tablet  Take 1 tablet by mouth every 6 hours as needed for Pain for up to 30 days. lisinopril (PRINIVIL;ZESTRIL) 20 MG tablet  Take one tablet po daily in the a.m.             melatonin 3 MG TABS tablet  Take 3 mg by mouth nightly             omeprazole (PRILOSEC) 20 MG delayed release capsule  Take 1 capsule by mouth Daily             rOPINIRole (REQUIP) 0.5 MG tablet  TAKE ONE TABLET BY MOUTH THREE TIMES A DAY                   Medications marked \"taking\" at this time  Outpatient Medications Marked as Taking for the 3/15/21 encounter (Office Visit) with Desiree Busby, DO   Medication Sig Dispense Refill    HYDROcodone-acetaminophen (NORCO) 5-325 MG per tablet Take 1 tablet by mouth every 6 hours as needed for Pain for up to 30 days.  30 tablet 0    AIMOVIG 70 MG/ML SOAJ 12th month      lisinopril (PRINIVIL;ZESTRIL) 20 MG tablet Take one tablet po daily in the a.m. 30 tablet 3    gabapentin (NEURONTIN) 300 MG capsule Take 1 capsule by mouth 4 times daily for 30 days. 360 capsule 3    rOPINIRole (REQUIP) 0.5 MG tablet TAKE ONE TABLET BY MOUTH THREE TIMES A DAY 90 tablet 2    Dulaglutide (TRULICITY) 3 ES/5.6XM SOPN Inject 3 mg into the skin once a week Every Monday      omeprazole (PRILOSEC) 20 MG delayed release capsule Take 1 capsule by mouth Daily (Patient taking differently: Take 20 mg by mouth Daily am) 30 capsule 3    melatonin 3 MG TABS tablet Take 3 mg by mouth nightly      FLUoxetine (PROZAC) 20 MG capsule Take 3 capsules by mouth daily 270 capsule 1        Medications patient taking as of now reconciled against medications ordered at time of hospital discharge: Yes    Chief Complaint   Patient presents with    Follow-Up from Hospital     acute anemia       History of Present illness - Follow up of Hospital diagnosis(es): Anemia, unspecified type     Inpatient course: Discharge summary reviewed- see chart. Interval history/Current status: stable at this time    A comprehensive review of systems was negative except for what was noted in the HPI. Vitals:    03/15/21 1109   BP: 126/74   Pulse: 75   Resp: 18   Temp: 97 °F (36.1 °C)   TempSrc: Temporal   SpO2: 97%   Weight: 195 lb (88.5 kg)     Body mass index is 34.54 kg/m².    Wt Readings from Last 3 Encounters:   03/15/21 195 lb (88.5 kg)   03/10/21 194 lb 3.6 oz (88.1 kg)   03/08/21 190 lb (86.2 kg)     BP Readings from Last 3 Encounters:   03/15/21 126/74   03/10/21 114/60   03/10/21 (!) 112/55        Physical Exam:  General Appearance: alert and oriented to person, place and time, well-developed and well-nourished, in no acute distress  Skin: warm and dry, no rash or erythema  Head: normocephalic and atraumatic  Eyes: pupils equal, round, and reactive to light, extraocular eye movements intact, conjunctivae normal  Cardiovascular: normal rate, normal S1 and S2 and murmur present+  Abdomen: soft, non-tender, non-distended, normal bowel sounds, no masses or organomegaly  Musculoskeletal: lower back pain decreased ROM +    Assessment/Plan:  1. Lumbar spondylolysis    - NC DISCHARGE MEDS RECONCILED W/ CURRENT OUTPATIENT MED LIST    2. Acute anemia    - NC DISCHARGE MEDS RECONCILED W/ CURRENT OUTPATIENT MED LIST    3. Murmur, cardiac    - Medardo Almanza MD, Cardiology, [facility]  - ECHO Compl W Dop Color Flow; Future        Medical Decision Making: moderate complexity      Educational materials and/or home exercises printed for patient's review and were included in patient instructions on his/her After Visit Summary and given to patient at the end of visit. Counseled regarding above diagnosis, including possible risks and complications,  especially if left uncontrolled. Counseled regarding the possible side effects, risks, benefits and alternatives to treatment; patient and/or guardian verbalizes understanding, agrees, feels comfortable with and wishes to proceed with above treatment plan. Advised patient to call with any new medication issues, and read all Rx info from pharmacy to assure aware of all possible risks and side effects of medication before taking. Reviewed age and gender appropriate health screening exams and vaccinations. Advised patient regarding importance of keeping up with recommended health maintenance and to schedule as soon as possible if overdue, as this is important in assessing for undiagnosed pathology, especially cancer, as well as protecting against potentially harmful/life threatening disease. Patient and/or guardian verbalizes understanding and agrees with above counseling, assessment and plan. All questions answered.     Kimberley Berman DO

## 2021-03-15 NOTE — PROGRESS NOTES
Spoke with Damien De León at Dr [facility]yes. She will check with him for any further orders re: low H&H and coags.   Nagi Wong  3/15/2021  11:28 AM

## 2021-03-16 ENCOUNTER — FOLLOWUP TELEPHONE ENCOUNTER (OUTPATIENT)
Dept: FAMILY MEDICINE CLINIC | Age: 71
End: 2021-03-16

## 2021-03-16 ENCOUNTER — TELEPHONE (OUTPATIENT)
Dept: FAMILY MEDICINE CLINIC | Age: 71
End: 2021-03-16

## 2021-03-16 ENCOUNTER — TELEPHONE (OUTPATIENT)
Dept: PAIN MANAGEMENT | Age: 71
End: 2021-03-16

## 2021-03-16 DIAGNOSIS — D64.9 ANEMIA, UNSPECIFIED TYPE: Primary | ICD-10-CM

## 2021-03-16 NOTE — TELEPHONE ENCOUNTER
3-16-21-call to Rajesh Bustos at [facility]. After speaking with Dr Alea Schroeder does not need to come in for Pre-testing again, she does not need to have a PT/PTT/INR drawn again. Will need to draw a Hemoglobin and hematocrit again on 3-18-21. Rajesh Bustos states she will discuss the case with anesthesia. Also will need to get the Medical Clearance on the chart from Dr Junaid Desouza, called her office to have the form sent to this office or entered into epic.     Wes Griffin RN  Pain Management

## 2021-03-16 NOTE — TELEPHONE ENCOUNTER
I spoke with Dr. Ginny Cai and we discussed that patient is medically stable for surgery on Friday as long as Hgb is above 7.0 and it was 7.4 on most recent blood work.

## 2021-03-17 NOTE — PROGRESS NOTES
Spoke with Dr Willie Galan. He states may proceed with surgery if Hgb greater than 7.    Magda Alvarenga  3/17/2021  John

## 2021-03-17 NOTE — TELEPHONE ENCOUNTER
Note for medical clearance in [facility] from Dr. Amelie Fragoso. oSco Barber instructed to have H&H drawn tomorrow morning on 3/18/2021.

## 2021-03-18 DIAGNOSIS — D64.9 ANEMIA, UNSPECIFIED TYPE: ICD-10-CM

## 2021-03-19 ENCOUNTER — ANESTHESIA EVENT (OUTPATIENT)
Dept: OPERATING ROOM | Age: 71
End: 2021-03-19
Payer: MEDICARE

## 2021-03-19 ENCOUNTER — HOSPITAL ENCOUNTER (OUTPATIENT)
Dept: GENERAL RADIOLOGY | Age: 71
Discharge: HOME | End: 2021-03-21
Payer: MEDICARE

## 2021-03-19 ENCOUNTER — ANESTHESIA (OUTPATIENT)
Dept: OPERATING ROOM | Age: 71
End: 2021-03-19
Payer: MEDICARE

## 2021-03-19 ENCOUNTER — HOSPITAL ENCOUNTER (OUTPATIENT)
Age: 71
Setting detail: OUTPATIENT SURGERY
Discharge: HOME | End: 2021-03-19
Attending: PAIN MEDICINE | Admitting: PAIN MEDICINE
Payer: MEDICARE

## 2021-03-19 VITALS
HEIGHT: 64 IN | HEART RATE: 77 BPM | SYSTOLIC BLOOD PRESSURE: 119 MMHG | WEIGHT: 194.4 LBS | RESPIRATION RATE: 16 BRPM | OXYGEN SATURATION: 97 % | BODY MASS INDEX: 33.19 KG/M2 | TEMPERATURE: 96.9 F | DIASTOLIC BLOOD PRESSURE: 49 MMHG

## 2021-03-19 VITALS
OXYGEN SATURATION: 100 % | RESPIRATION RATE: 22 BRPM | DIASTOLIC BLOOD PRESSURE: 82 MMHG | SYSTOLIC BLOOD PRESSURE: 125 MMHG

## 2021-03-19 DIAGNOSIS — Z01.818 PREOP TESTING: Primary | ICD-10-CM

## 2021-03-19 DIAGNOSIS — S22.089A CLOSED FRACTURE OF ELEVENTH THORACIC VERTEBRA, UNSPECIFIED FRACTURE MORPHOLOGY, INITIAL ENCOUNTER (HCC): ICD-10-CM

## 2021-03-19 LAB — METER GLUCOSE: 155 MG/DL (ref 74–99)

## 2021-03-19 PROCEDURE — C1713 ANCHOR/SCREW BN/BN,TIS/BN: HCPCS | Performed by: PAIN MEDICINE

## 2021-03-19 PROCEDURE — 3209999900 FLUORO FOR SURGICAL PROCEDURES

## 2021-03-19 PROCEDURE — 6360000002 HC RX W HCPCS: Performed by: ANESTHESIOLOGY

## 2021-03-19 PROCEDURE — 6360000002 HC RX W HCPCS

## 2021-03-19 PROCEDURE — 7100000001 HC PACU RECOVERY - ADDTL 15 MIN: Performed by: PAIN MEDICINE

## 2021-03-19 PROCEDURE — 3700000000 HC ANESTHESIA ATTENDED CARE: Performed by: PAIN MEDICINE

## 2021-03-19 PROCEDURE — 7100000010 HC PHASE II RECOVERY - FIRST 15 MIN: Performed by: PAIN MEDICINE

## 2021-03-19 PROCEDURE — 22513 PERQ VERTEBRAL AUGMENTATION: CPT | Performed by: PAIN MEDICINE

## 2021-03-19 PROCEDURE — 2580000003 HC RX 258: Performed by: NURSE ANESTHETIST, CERTIFIED REGISTERED

## 2021-03-19 PROCEDURE — 7100000000 HC PACU RECOVERY - FIRST 15 MIN: Performed by: PAIN MEDICINE

## 2021-03-19 PROCEDURE — 2500000003 HC RX 250 WO HCPCS: Performed by: PAIN MEDICINE

## 2021-03-19 PROCEDURE — C1894 INTRO/SHEATH, NON-LASER: HCPCS | Performed by: PAIN MEDICINE

## 2021-03-19 PROCEDURE — 2720000010 HC SURG SUPPLY STERILE: Performed by: PAIN MEDICINE

## 2021-03-19 PROCEDURE — 88311 DECALCIFY TISSUE: CPT

## 2021-03-19 PROCEDURE — 6360000002 HC RX W HCPCS: Performed by: NURSE ANESTHETIST, CERTIFIED REGISTERED

## 2021-03-19 PROCEDURE — 3600000003 HC SURGERY LEVEL 3 BASE: Performed by: PAIN MEDICINE

## 2021-03-19 PROCEDURE — 2580000003 HC RX 258: Performed by: ANESTHESIOLOGY

## 2021-03-19 PROCEDURE — 20225 BONE BIOPSY TROCAR/NDL DEEP: CPT | Performed by: PAIN MEDICINE

## 2021-03-19 PROCEDURE — 88307 TISSUE EXAM BY PATHOLOGIST: CPT

## 2021-03-19 PROCEDURE — 2709999900 HC NON-CHARGEABLE SUPPLY: Performed by: PAIN MEDICINE

## 2021-03-19 PROCEDURE — 3600000013 HC SURGERY LEVEL 3 ADDTL 15MIN: Performed by: PAIN MEDICINE

## 2021-03-19 PROCEDURE — 3700000001 HC ADD 15 MINUTES (ANESTHESIA): Performed by: PAIN MEDICINE

## 2021-03-19 PROCEDURE — 7100000011 HC PHASE II RECOVERY - ADDTL 15 MIN: Performed by: PAIN MEDICINE

## 2021-03-19 PROCEDURE — 82962 GLUCOSE BLOOD TEST: CPT

## 2021-03-19 DEVICE — CEMENT C01A KYPHX HV-R BONE CEMENT EN
Type: IMPLANTABLE DEVICE | Site: BACK | Status: FUNCTIONAL
Brand: KYPHON® HV-R® BONE CEMENT

## 2021-03-19 RX ORDER — HYDROCODONE BITARTRATE AND ACETAMINOPHEN 5; 325 MG/1; MG/1
1 TABLET ORAL PRN
Status: DISCONTINUED | OUTPATIENT
Start: 2021-03-19 | End: 2021-03-19 | Stop reason: HOSPADM

## 2021-03-19 RX ORDER — CEFAZOLIN SODIUM 2 G/50ML
SOLUTION INTRAVENOUS
Status: COMPLETED
Start: 2021-03-19 | End: 2021-03-19

## 2021-03-19 RX ORDER — MORPHINE SULFATE 2 MG/ML
2 INJECTION, SOLUTION INTRAMUSCULAR; INTRAVENOUS EVERY 5 MIN PRN
Status: DISCONTINUED | OUTPATIENT
Start: 2021-03-19 | End: 2021-03-19 | Stop reason: HOSPADM

## 2021-03-19 RX ORDER — CEFAZOLIN SODIUM 2 G/50ML
SOLUTION INTRAVENOUS PRN
Status: DISCONTINUED | OUTPATIENT
Start: 2021-03-19 | End: 2021-03-19 | Stop reason: SDUPTHER

## 2021-03-19 RX ORDER — SODIUM CHLORIDE 9 MG/ML
INJECTION, SOLUTION INTRAVENOUS CONTINUOUS PRN
Status: DISCONTINUED | OUTPATIENT
Start: 2021-03-19 | End: 2021-03-19 | Stop reason: SDUPTHER

## 2021-03-19 RX ORDER — MEPERIDINE HYDROCHLORIDE 25 MG/ML
12.5 INJECTION INTRAMUSCULAR; INTRAVENOUS; SUBCUTANEOUS EVERY 5 MIN PRN
Status: DISCONTINUED | OUTPATIENT
Start: 2021-03-19 | End: 2021-03-19 | Stop reason: HOSPADM

## 2021-03-19 RX ORDER — FENTANYL CITRATE 50 UG/ML
INJECTION, SOLUTION INTRAMUSCULAR; INTRAVENOUS PRN
Status: DISCONTINUED | OUTPATIENT
Start: 2021-03-19 | End: 2021-03-19 | Stop reason: SDUPTHER

## 2021-03-19 RX ORDER — PROPOFOL 10 MG/ML
INJECTION, EMULSION INTRAVENOUS CONTINUOUS PRN
Status: DISCONTINUED | OUTPATIENT
Start: 2021-03-19 | End: 2021-03-19 | Stop reason: SDUPTHER

## 2021-03-19 RX ORDER — LIDOCAINE HYDROCHLORIDE 10 MG/ML
INJECTION, SOLUTION EPIDURAL; INFILTRATION; INTRACAUDAL; PERINEURAL PRN
Status: DISCONTINUED | OUTPATIENT
Start: 2021-03-19 | End: 2021-03-19 | Stop reason: ALTCHOICE

## 2021-03-19 RX ORDER — FENTANYL CITRATE 50 UG/ML
25 INJECTION, SOLUTION INTRAMUSCULAR; INTRAVENOUS EVERY 5 MIN PRN
Status: DISCONTINUED | OUTPATIENT
Start: 2021-03-19 | End: 2021-03-19 | Stop reason: HOSPADM

## 2021-03-19 RX ORDER — MEPERIDINE HYDROCHLORIDE 25 MG/ML
INJECTION INTRAMUSCULAR; INTRAVENOUS; SUBCUTANEOUS
Status: COMPLETED
Start: 2021-03-19 | End: 2021-03-19

## 2021-03-19 RX ORDER — SODIUM CHLORIDE, SODIUM LACTATE, POTASSIUM CHLORIDE, CALCIUM CHLORIDE 600; 310; 30; 20 MG/100ML; MG/100ML; MG/100ML; MG/100ML
INJECTION, SOLUTION INTRAVENOUS CONTINUOUS
Status: DISCONTINUED | OUTPATIENT
Start: 2021-03-19 | End: 2021-03-19 | Stop reason: CLARIF

## 2021-03-19 RX ORDER — HYDROCODONE BITARTRATE AND ACETAMINOPHEN 5; 325 MG/1; MG/1
2 TABLET ORAL PRN
Status: DISCONTINUED | OUTPATIENT
Start: 2021-03-19 | End: 2021-03-19 | Stop reason: HOSPADM

## 2021-03-19 RX ORDER — PROMETHAZINE HYDROCHLORIDE 25 MG/ML
6.25 INJECTION, SOLUTION INTRAMUSCULAR; INTRAVENOUS
Status: DISCONTINUED | OUTPATIENT
Start: 2021-03-19 | End: 2021-03-19 | Stop reason: HOSPADM

## 2021-03-19 RX ORDER — SODIUM CHLORIDE 9 MG/ML
INJECTION, SOLUTION INTRAVENOUS CONTINUOUS
Status: DISCONTINUED | OUTPATIENT
Start: 2021-03-19 | End: 2021-03-19 | Stop reason: HOSPADM

## 2021-03-19 RX ADMIN — MEPERIDINE HYDROCHLORIDE 12.5 MG: 25 INJECTION INTRAMUSCULAR; INTRAVENOUS; SUBCUTANEOUS at 17:39

## 2021-03-19 RX ADMIN — CEFAZOLIN SODIUM 2 G: 2 SOLUTION INTRAVENOUS at 16:24

## 2021-03-19 RX ADMIN — FENTANYL CITRATE 50 MCG: 50 INJECTION, SOLUTION INTRAMUSCULAR; INTRAVENOUS at 16:51

## 2021-03-19 RX ADMIN — SODIUM CHLORIDE: 9 INJECTION, SOLUTION INTRAVENOUS at 16:07

## 2021-03-19 RX ADMIN — FENTANYL CITRATE 50 MCG: 50 INJECTION, SOLUTION INTRAMUSCULAR; INTRAVENOUS at 16:12

## 2021-03-19 RX ADMIN — SODIUM CHLORIDE: 9 INJECTION, SOLUTION INTRAVENOUS at 14:03

## 2021-03-19 RX ADMIN — MEPERIDINE HYDROCHLORIDE 12.5 MG: 25 INJECTION INTRAMUSCULAR; INTRAVENOUS; SUBCUTANEOUS at 17:51

## 2021-03-19 RX ADMIN — PROPOFOL INJECTABLE EMULSION 75 MCG/KG/MIN: 10 INJECTION, EMULSION INTRAVENOUS at 16:10

## 2021-03-19 ASSESSMENT — PULMONARY FUNCTION TESTS
PIF_VALUE: 2
PIF_VALUE: 1
PIF_VALUE: 0
PIF_VALUE: 1
PIF_VALUE: 2
PIF_VALUE: 1
PIF_VALUE: 2
PIF_VALUE: 1
PIF_VALUE: 2
PIF_VALUE: 1
PIF_VALUE: 0
PIF_VALUE: 1

## 2021-03-19 ASSESSMENT — PAIN SCALES - GENERAL
PAINLEVEL_OUTOF10: 0
PAINLEVEL_OUTOF10: 2
PAINLEVEL_OUTOF10: 5

## 2021-03-19 ASSESSMENT — PAIN DESCRIPTION - LOCATION
LOCATION: BACK
LOCATION: BACK

## 2021-03-19 ASSESSMENT — PAIN DESCRIPTION - PROGRESSION
CLINICAL_PROGRESSION: GRADUALLY WORSENING
CLINICAL_PROGRESSION: GRADUALLY IMPROVING

## 2021-03-19 ASSESSMENT — PAIN DESCRIPTION - ORIENTATION: ORIENTATION: UPPER;MID

## 2021-03-19 ASSESSMENT — PAIN DESCRIPTION - DESCRIPTORS
DESCRIPTORS: ACHING;DISCOMFORT
DESCRIPTORS: ACHING;DISCOMFORT

## 2021-03-19 ASSESSMENT — PAIN DESCRIPTION - PAIN TYPE
TYPE: ACUTE PAIN;SURGICAL PAIN
TYPE: ACUTE PAIN;SURGICAL PAIN

## 2021-03-19 ASSESSMENT — PAIN - FUNCTIONAL ASSESSMENT: PAIN_FUNCTIONAL_ASSESSMENT: 0-10

## 2021-03-19 ASSESSMENT — LIFESTYLE VARIABLES: SMOKING_STATUS: 0

## 2021-03-19 NOTE — OP NOTE
3/19/2021    Patient: Lucas Donaldson  :  1950  Age:  72 y.o. Sex:  female     PRE-OPERATIVE DIAGNOSIS:  Osteoporotic compression fracture at T11    POST-OPERATIVE DIAGNOSIS: Same. PROCEDURE: Balloon Kyphoplasty at T11    SURGEON: ABA Jackson M.D. ANESTHESIA: MAC    ESTIMATED BLOOD LOSS: Minimal.  ______________________________________________________________________  BRIEF HISTORY:  Lucas Donaldson comes in today for balloon kyphoplasty under fluoroscopic guidance at T11. The potential complications of this procedure were discussed with her again today. She has elected to undergo the aforementioned procedure. Indications: Patient presents with low back pain that was getting worse. Patient had thoracic MRI which showed compression fracture at T11. On exam patient has midline tenderness over T11    Dennis complete History & Physical examination were reviewed in depth, a copy of which is in the chart. DESCRIPTION OF PROCEDURE:   After confirming written and informed consent, a time-out was performed and Dennis name and date of birth, the procedure to be performed as well as the plan for the location of the needle insertion were confirmed. Patient wa given 1 gram of Ancef preoperatively. The patient was brought into the operating room and placed in the prone position on the fluoroscopy table. Standard monitors were placed and vital signs were observed throughout the procedure. The area of the thoracic spine was prepped with chloraprep and draped in a sterile manner. The image intensifier C-arm was brought into position and T11 pedicles were identified and marked with skin marker. The skin and subcutaneous tissue were anesthestized with 1% lidocaine. In view of the collapse of T11, a transpedicular approach to the vertebral body was appropriate. An 10 gauge trocher was advanced through the T11 pedicle to the junction of the pedicle and the vertebral body on the right side.  Positioning was to our pain management center as scheduled.      Leni Reed MD

## 2021-03-19 NOTE — H&P
[facility], [address] [place] [address] Jhon  [phone]    Procedure History & Physical      Alexey Hauserstevo     HPI:    Patient  is here for mid back pain for T11 Kyphoplasty  Labs/imaging studies reviewed   All question and concerns addressed including R/B/A associated with the procedure    Past Medical History:   Diagnosis Date    Arthritis     Cataract     right    Chronic fatigue syndrome     Depression     Diabetes mellitus ([address]     SC injection weekly    Eczema     Fibromyalgia     Hypertension     Memory loss     Mononucleosis     Sciatica     Sleep apnea     no c-pap    Vertigo        Past Surgical History:   Procedure Laterality Date    CARPAL TUNNEL RELEASE Bilateral     CATARACT REMOVAL Right     OTHER SURGICAL HISTORY Bilateral 01/21/2021    BILATERAL L3,L4 MEDIAL BRANCH AND L5 DORSAL RAMUS RADIOFREQUENCY    PAIN MANAGEMENT PROCEDURE Bilateral 1/21/2021    BILATERAL L3,4 MEDIAL BRANCH AND L5 DORSAL RAMUS  RADIOFREQUENCY ABLATION WITH SEDATION  (CPT [id number]) performed by Darrall Mcburney, MD at [address] [facility] N/A 1/16/2019    EGD BIOPSY performed by Jossy Pugh MD at [facility] N/A 3/10/2021    EGD ESOPHAGOGASTRODUODENOSCOPY performed by Daiana Haji MD at [facility]       Prior to Admission medications    Medication Sig Start Date End Date Taking? Authorizing Provider   HYDROcodone-acetaminophen (NORCO) 5-325 MG per tablet Take 1 tablet by mouth every 6 hours as needed for Pain for up to 30 days.  3/11/21 4/10/21 Yes MORALES Ellis CNP   lisinopril (PRINIVIL;ZESTRIL) 20 MG tablet Take one tablet po daily in the a.m. 2/12/21  Yes Kimberley Berman DO   omeprazole (PRILOSEC) 20 MG delayed release capsule Take 1 capsule by mouth Daily  Patient taking differently: Take 20 mg by mouth Daily am 1/6/21  Yes Kimberley DO Antonella   AIMOVIG 70 MG/ML SOAJ 12th month 2/10/21   Historical Provider, MD   gabapentin (NEURONTIN) 300 MG capsule Take 1 capsule by mouth 4 times daily for 30 days.  1/26/21 3/15/21  Kimberley Berman DO   rOPINIRole (REQUIP) 0.5 MG tablet TAKE ONE TABLET BY MOUTH THREE TIMES A DAY 21   Kimberley Berman DO   Dulaglutide (TRULICITY) 3 NR/4.5SW SOPN Inject 3 mg into the skin once a week Every Monday    Historical Provider, MD   melatonin 3 MG TABS tablet Take 3 mg by mouth nightly    Historical Provider, MD   FLUoxetine (PROZAC) 20 MG capsule Take 3 capsules by mouth daily 10/16/20 3/15/21  Kimberley Berman DO       Allergies   Allergen Reactions    Baclofen Other (See Comments)     Dry mouth, abd pain    Ibuprofen Other (See Comments)     Acute bleeding    Nickel      Surgical steel    Penicillins Hives       Social History     Socioeconomic History    Marital status:      Spouse name: Not on file    Number of children: Not on file    Years of education: Not on file    Highest education level: Not on file   Occupational History    Not on file   Social Needs    Financial resource strain: Not on file    Food insecurity     Worry: Not on file     Inability: Not on file   [employer] needs     Medical: Not on file     Non-medical: Not on file   Tobacco Use    Smoking status: Former Smoker     Packs/day: 2.00     Years: 10.00     Pack years: 20.00     Types: Cigarettes     Start date: 0     Quit date:      Years since quittin.2    Smokeless tobacco: Never Used   Substance and Sexual Activity    Alcohol use: Not Currently     Alcohol/week: 1.0 standard drinks     Types: 1 Glasses of wine per week    Drug use: No    Sexual activity: Never   Lifestyle    Physical activity     Days per week: Not on file     Minutes per session: Not on file    Stress: Not on file   Relationships    Social connections     Talks on phone: Not on file     Gets together: Not on file     Attends Protestant service: Not on file     Active member of club or organization: Not on file     Attends meetings of clubs or organizations: Not on file     Relationship status: Not on file    Intimate partner violence     Fear of current or ex partner: Not on file     Emotionally abused: Not on file     Physically abused: Not on file     Forced sexual activity: Not on file   Other Topics Concern    Not on file   Social History Narrative    Not on file       Family History   Problem Relation Age of Onset    Hypertension Father     Heart Disease Father     Hypertension Paternal Grandfather     Heart Disease Paternal Grandfather          REVIEW OF SYSTEMS:    CONSTITUTIONAL:  negative for  fevers, chills, sweats and fatigue    RESPIRATORY:  negative for  dry cough, cough with sputum, dyspnea, wheezing and chest pain    CARDIOVASCULAR:  negative for chest pain, dyspnea, palpitations, syncope    GASTROINTESTINAL:  negative for nausea, vomiting, change in bowel habits, diarrhea, constipation and abdominal pain    MUSCULOSKELETAL: negative for muscle weakness    SKIN: negative for itching or rashes. BEHAVIOR/PSYCH:  negative for poor appetite, increased appetite, decreased sleep and poor concentration    All other systems negative      PHYSICAL EXAM:    VITALS:  BP (!) 140/86   Pulse 83   Temp 97.7 °F (36.5 °C) (Temporal)   Resp 15   Ht 5' 4\" (1.626 m)   Wt 194 lb 6.4 oz (88.2 kg)   LMP  (LMP Unknown)   SpO2 97%   BMI 33.37 kg/m²     CONSTITUTIONAL:  awake, alert, cooperative, no apparent distress, and appears stated age    EYES: PERRLA, EOMI    LUNGS:  No increased work of breathing, no audible wheezing    CARDIOVASCULAR:  regular rate and rhythm    ABDOMEN:  Soft non tender non distended     EXTREMITIES: no signs of clubbing or cyanosis. MUSCULOSKELETAL: negative for flaccid muscle tone or spastic movements. SKIN: gross examination reveals no signs of rashes, or diaphoresis.     NEURO: Cranial nerves II-XII grossly intact. No signs of agitated mood.        Assessment/Plan:    Mid back pain for T11 kyphoplasty

## 2021-03-19 NOTE — ANESTHESIA PRE PROCEDURE
Department of Anesthesiology  Preprocedure Note       Name:  Pam Miller   Age:  72 y.o.  :  1950                                          MRN:  [id number]         Date:  3/19/2021      Surgeon: Brijesh Bailey):  Emelia Barthel, MD    Procedure: Procedure(s):  T11 KYPHOPLASTY (2 C-ARMS)    Medications prior to admission:   Prior to Admission medications    Medication Sig Start Date End Date Taking? Authorizing Provider   HYDROcodone-acetaminophen (NORCO) 5-325 MG per tablet Take 1 tablet by mouth every 6 hours as needed for Pain for up to 30 days. 3/11/21 4/10/21  Trista Devine, APRN - CNP   AIMOVIG 70 MG/ML SOAJ 12th month 2/10/21   Historical Provider, MD   lisinopril (PRINIVIL;ZESTRIL) 20 MG tablet Take one tablet po daily in the a.m. 21   Kimberley Berman DO   gabapentin (NEURONTIN) 300 MG capsule Take 1 capsule by mouth 4 times daily for 30 days. 1/26/21 3/15/21  Kimberley Berman DO   rOPINIRole (REQUIP) 0.5 MG tablet TAKE ONE TABLET BY MOUTH THREE TIMES A DAY 21   Kimberley Berman DO   Dulaglutide (TRULICITY) 3 OW/7.4AR SOPN Inject 3 mg into the skin once a week Every Monday    Historical Provider, MD   omeprazole (PRILOSEC) 20 MG delayed release capsule Take 1 capsule by mouth Daily  Patient taking differently: Take 20 mg by mouth Daily am 21   Kimberley Berman DO   melatonin 3 MG TABS tablet Take 3 mg by mouth nightly    Historical Provider, MD   FLUoxetine (PROZAC) 20 MG capsule Take 3 capsules by mouth daily 10/16/20 3/15/21  Kimberley Berman DO       Current medications:    No current facility-administered medications for this visit. No current outpatient medications on file. Facility-Administered Medications Ordered in Other Visits   Medication Dose Route Frequency Provider Last Rate Last Admin    0.9 % sodium chloride infusion   Intravenous Continuous Jaxon Wright MD 50 mL/hr at 21 1403 New Bag at 21 1403       Allergies:     Allergies   Allergen Reactions    Baclofen Other (See Comments)     Dry mouth, abd pain    Ibuprofen Other (See Comments)     Acute bleeding    Nickel      Surgical steel    Penicillins Hives       Problem List:    Patient Active Problem List   Diagnosis Code    Obstructive sleep apnea syndrome G47.33    Lumbar radicular pain M54.16    Recurrent major depressive disorder, in partial remission (HCC) F33.41    Type 2 diabetes mellitus without complication, without long-term current use of insulin (HCC) E11.9    Gastroesophageal reflux disease K21.9    Essential hypertension I10    Class 2 obesity due to excess calories without serious comorbidity with body mass index (BMI) of 37.0 to 37.9 in adult E66.09, Z68.37    Chronic pain syndrome G89.4    Lumbar spondylosis M47.816    Thoracic disc disease M51.9    Lumbar facet arthropathy M47.816    Spinal stenosis of lumbar region without neurogenic claudication M48.061    Migraine without aura and without status migrainosus, not intractable G43.009    Lumbar spondylolysis M43.06    Thoracic facet joint syndrome M47.894    Thoracic spondylosis M47.814    Acute anemia D64.9       Past Medical History:        Diagnosis Date    Arthritis     Cataract     right    Chronic fatigue syndrome     Depression     Diabetes mellitus (HCC)     SC injection weekly    Eczema     Fibromyalgia     Hypertension     Memory loss     Mononucleosis     Sciatica     Sleep apnea     no c-pap    Vertigo        Past Surgical History:        Procedure Laterality Date    CARPAL TUNNEL RELEASE Bilateral     CATARACT REMOVAL Right     OTHER SURGICAL HISTORY Bilateral 01/21/2021    BILATERAL L3,L4 MEDIAL BRANCH AND L5 DORSAL RAMUS RADIOFREQUENCY    PAIN MANAGEMENT PROCEDURE Bilateral 1/21/2021    BILATERAL L3,4 MEDIAL BRANCH AND L5 DORSAL RAMUS  RADIOFREQUENCY ABLATION WITH SEDATION  (CPT R0967384) performed by Darrall Mcburney, MD at [address] [place] [address] GASTROINTESTINAL ENDOSCOPY N/A 2019    EGD BIOPSY performed by Yamilet Warren MD at [facility] N/A 3/10/2021    EGD ESOPHAGOGASTRODUODENOSCOPY performed by Pastor Arias MD at [facility] History:    Social History     Tobacco Use    Smoking status: Former Smoker     Packs/day: 2.00     Years: 10.00     Pack years: 20.00     Types: Cigarettes     Start date: 0     Quit date:      Years since quittin.2    Smokeless tobacco: Never Used   Substance Use Topics    Alcohol use: Not Currently     Alcohol/week: 1.0 standard drinks     Types: 1 Glasses of wine per week                                Counseling given: Not Answered      Vital Signs (Current): There were no vitals filed for this visit.                                            BP Readings from Last 3 Encounters:   21 (!) 140/86   03/15/21 126/74   03/10/21 114/60       NPO Status:                                                                                 BMI:   Wt Readings from Last 3 Encounters:   21 194 lb 6.4 oz (88.2 kg)   03/15/21 195 lb (88.5 kg)   03/10/21 194 lb 3.6 oz (88.1 kg)     There is no height or weight on file to calculate BMI.    CBC:   Lab Results   Component Value Date    WBC 6.3 03/10/2021    RBC 3.56 03/10/2021    HGB 7.4 03/10/2021    HCT 25.3 03/10/2021    MCV 71.9 03/10/2021    RDW 21.9 03/10/2021     03/10/2021       CMP:   Lab Results   Component Value Date     03/10/2021    K 3.9 03/10/2021    K 3.9 2021     03/10/2021    CO2 25 03/10/2021    BUN 12 03/10/2021    CREATININE 0.7 03/10/2021    GFRAA >60 03/10/2021    LABGLOM >60 03/10/2021    GLUCOSE 152 03/10/2021    PROT 6.0 03/10/2021    CALCIUM 8.9 03/10/2021    BILITOT 0.4 03/10/2021    ALKPHOS 83 03/10/2021    AST 35 03/10/2021    ALT 23 03/10/2021       POC Tests: No results for input(s): POCGLU, POCNA, POCK, POCCL, POCBUN, POCHEMO, POCHCT in the last 72 hours.    Coags:   Lab Results   Component Value Date    PROTIME 12.3 03/08/2021    INR 1.1 03/08/2021    APTT 25.8 03/08/2021       HCG (If Applicable): No results found for: PREGTESTUR, PREGSERUM, HCG, HCGQUANT     ABGs: No results found for: PHART, PO2ART, HAO2AME, MZC4ESI, BEART, L9YJJKRB     Type & Screen (If Applicable):  No results found for: LABABO, LABRH    Drug/Infectious Status (If Applicable):  No results found for: HIV, HEPCAB    COVID-19 Screening (If Applicable):   Lab Results   Component Value Date    COVID19 Not Detected 03/08/2021         Anesthesia Evaluation  Patient summary reviewed and Nursing notes reviewed no history of anesthetic complications:   Airway: Mallampati: III  TM distance: >3 FB   Neck ROM: full  Mouth opening: > = 3 FB Dental:      Comment: None loose    Pulmonary: breath sounds clear to auscultation  (+) sleep apnea:      (-) not a current smoker                           Cardiovascular:    (+) hypertension:,         Rhythm: regular  Rate: normal                    Neuro/Psych:   (+) neuromuscular disease ( Thoracic disc disease):, headaches: migraine headaches, psychiatric history:            GI/Hepatic/Renal:   (+) GERD:, morbid obesity         ROS comment: Gi BLEED  Obesity. Endo/Other:    (+) DiabetesType II DM, , blood dyscrasia: anemia:., .                 Abdominal:   (+) obese,         Vascular: negative vascular ROS. Anesthesia Plan      MAC     ASA 3       Induction: intravenous. Anesthetic plan and risks discussed with patient. Plan discussed with attending and CRNA. DOS STAFF ADDENDUM:    Pt seen and examined, chart reviewed (including anesthesia, drug and allergy history). Anesthetic plan, risks, benefits, alternatives, and personnel involved discussed with patient. Patient verbalized an understanding and agrees to proceed. Plan discussed with care team members and agreed upon.     Gauri Marmolejo MD  Staff Anesthesiologist  3:59 PM        Aleta Mcguire MD   3/19/2021

## 2021-03-26 ENCOUNTER — TELEPHONE (OUTPATIENT)
Dept: CARDIOLOGY | Age: 71
End: 2021-03-26

## 2021-03-26 ENCOUNTER — OFFICE VISIT (OUTPATIENT)
Dept: PAIN MANAGEMENT | Age: 71
End: 2021-03-26
Payer: MEDICARE

## 2021-03-26 VITALS
OXYGEN SATURATION: 95 % | HEART RATE: 94 BPM | TEMPERATURE: 96.9 F | WEIGHT: 190 LBS | SYSTOLIC BLOOD PRESSURE: 118 MMHG | HEIGHT: 63 IN | BODY MASS INDEX: 33.66 KG/M2 | DIASTOLIC BLOOD PRESSURE: 58 MMHG

## 2021-03-26 DIAGNOSIS — M48.061 SPINAL STENOSIS OF LUMBAR REGION WITHOUT NEUROGENIC CLAUDICATION: ICD-10-CM

## 2021-03-26 DIAGNOSIS — M47.814 THORACIC SPONDYLOSIS: ICD-10-CM

## 2021-03-26 DIAGNOSIS — G89.4 CHRONIC PAIN SYNDROME: ICD-10-CM

## 2021-03-26 DIAGNOSIS — M47.894 THORACIC FACET JOINT SYNDROME: ICD-10-CM

## 2021-03-26 DIAGNOSIS — M51.9 LUMBAR DISC DISORDER: ICD-10-CM

## 2021-03-26 DIAGNOSIS — M51.9 THORACIC DISC DISEASE: ICD-10-CM

## 2021-03-26 DIAGNOSIS — M47.816 LUMBAR SPONDYLOSIS: Primary | ICD-10-CM

## 2021-03-26 DIAGNOSIS — M47.816 LUMBAR FACET ARTHROPATHY: ICD-10-CM

## 2021-03-26 PROCEDURE — G8427 DOCREV CUR MEDS BY ELIG CLIN: HCPCS | Performed by: PAIN MEDICINE

## 2021-03-26 PROCEDURE — G8400 PT W/DXA NO RESULTS DOC: HCPCS | Performed by: PAIN MEDICINE

## 2021-03-26 PROCEDURE — 4040F PNEUMOC VAC/ADMIN/RCVD: CPT | Performed by: PAIN MEDICINE

## 2021-03-26 PROCEDURE — 1123F ACP DISCUSS/DSCN MKR DOCD: CPT | Performed by: PAIN MEDICINE

## 2021-03-26 PROCEDURE — 1111F DSCHRG MED/CURRENT MED MERGE: CPT | Performed by: PAIN MEDICINE

## 2021-03-26 PROCEDURE — 99213 OFFICE O/P EST LOW 20 MIN: CPT | Performed by: PAIN MEDICINE

## 2021-03-26 PROCEDURE — G8417 CALC BMI ABV UP PARAM F/U: HCPCS | Performed by: PAIN MEDICINE

## 2021-03-26 PROCEDURE — G8484 FLU IMMUNIZE NO ADMIN: HCPCS | Performed by: PAIN MEDICINE

## 2021-03-26 PROCEDURE — 3017F COLORECTAL CA SCREEN DOC REV: CPT | Performed by: PAIN MEDICINE

## 2021-03-26 PROCEDURE — 1036F TOBACCO NON-USER: CPT | Performed by: PAIN MEDICINE

## 2021-03-26 PROCEDURE — 99213 OFFICE O/P EST LOW 20 MIN: CPT

## 2021-03-26 PROCEDURE — 1090F PRES/ABSN URINE INCON ASSESS: CPT | Performed by: PAIN MEDICINE

## 2021-03-26 RX ORDER — DULAGLUTIDE 3 MG/.5ML
INJECTION, SOLUTION SUBCUTANEOUS
Qty: 2 ML | Refills: 0 | Status: SHIPPED
Start: 2021-03-26 | End: 2021-05-11

## 2021-03-26 NOTE — TELEPHONE ENCOUNTER
Last Appointment:  3/16/2021  Future Appointments   Date Time Provider Ana Ricardoi   3/26/2021  4:15 PM Dillan Jones MD [facility]   4/7/2021 11:20 AM Reina Jalloh MD [facility] CARDIO [facility]   4/7/2021  1:00 PM [facility]   4/26/2021  1:30 PM Kimberley Berman  [address]

## 2021-03-26 NOTE — PROGRESS NOTES
[address] [facility], [address] [place] [address] Jhon  [phone]    Follow up Note      John Barber     Date of Visit:  3/26/2021    CC:  Patient presents for follow up   Chief Complaint   Patient presents with    Back Pain     lower back pain     HPI:  Follow up on his mid back and low back pain. Appropriate analgesia with current medications regimen:Fair  Change in quality of symptoms:no.    Medication side effects:none  Recent diagnostic testing:none  Recent interventional procedures:T11 kyphoplasty     She has not been on anticoagulation medications to include none. The patient  has not been on herbal supplements.  The patient is diabetic. Imaging:   Lumbar spine MRI 2020:  Mild levoconvex lumbar scoliosis.      Mild central canal stenosis and moderate right foraminal stenosis at L2-L3.         Moderate right foraminal stenosis and mild left foraminal stenosis at L3-L4.         Severe left foraminal stenosis and left lateral recess stenosis at L4-L5.         Mild bilateral foraminal stenosis at L5-S1 greater on the right. Lumbar spine MRI 2014  1.  Marked disc degeneration at L2-3, L3-4 and L4-5. 2.  Central/right subarticular disc protrusion at L2-3, displacing the   right L3 nerve root.  Mild to moderate spinal stenosis at this level. 3.  Right extraforaminal disc protrusion at L3-4, displacing the right L3   nerve root. 4.  Left lateral recess stenosis at L4-5 with medial displacement of the   left L5 nerve root. 5.  Multilevel facet arthropathy. 6.  Lumbar levoscoliosis. 7.  Neural foraminal stenosis as above. Thoracic spine Xray 2021:   Multilevel degenerative disc disease. Previous treatments: Physical Therapy, Epidural Steroid Injection, facet RFA and medications. Abisai Alanis    Potential Aberrant Drug-Related Behavior:    None    Urine Drug Screening:  Saliva screen 08/2020 showed no narcotics which is consistent    OARRS report:  03/2021 consistent. Opioid agreement:  Date started 08/2020  Renewal date:08/2021    Past Medical History:   Diagnosis Date    Arthritis     Cataract     right    Chronic fatigue syndrome     Depression     Diabetes mellitus (HCC)     SC injection weekly    Eczema     Fibromyalgia     Hypertension     Memory loss     Mononucleosis     Sciatica     Sleep apnea     no c-pap    Vertigo      Past Surgical History:   Procedure Laterality Date    CARPAL TUNNEL RELEASE Bilateral     CATARACT REMOVAL Right     OTHER SURGICAL HISTORY Bilateral 01/21/2021    BILATERAL L3,L4 MEDIAL BRANCH AND L5 DORSAL RAMUS RADIOFREQUENCY    PAIN MANAGEMENT PROCEDURE Bilateral 1/21/2021    BILATERAL L3,4 MEDIAL BRANCH AND L5 DORSAL RAMUS  RADIOFREQUENCY ABLATION WITH SEDATION  (CPT [id number]) performed by Marcus Nielsen MD at [address] N/A 3/19/2021    T11 KYPHOPLASTY (2 C-ARMS) performed by Marcus Nielsen MD at [address] [facility] N/A 1/16/2019    EGD BIOPSY performed by Gerald Flores MD at [address] [facility] N/A 3/10/2021    EGD ESOPHAGOGASTRODUODENOSCOPY performed by Louie Smith MD at [facility]     Prior to Admission medications    Medication Sig Start Date End Date Taking? Authorizing Provider   [facility] SB/5.8XH SOPN INJECT 3 MG INTO THE SKIN ONCE A WEEK 3/26/21  Yes Kimberley Berman DO   HYDROcodone-acetaminophen (NORCO) 5-325 MG per tablet Take 1 tablet by mouth every 6 hours as needed for Pain for up to 30 days. 3/11/21 4/10/21 Yes MORALSE Costa - CNP   AIMOVIG 70 MG/ML SOAJ 12th month 2/10/21  Yes Historical Provider, MD   lisinopril (PRINIVIL;ZESTRIL) 20 MG tablet Take one tablet po daily in the a.m. 2/12/21  Yes Kimberley Berman DO   gabapentin (NEURONTIN) 300 MG capsule Take 1 capsule by mouth 4 times daily for 30 days.  1/26/21 3/26/21 Yes Kimberley Berman DO   rOPINIRole (REQUIP) 0.5 MG tablet TAKE ONE TABLET BY MOUTH THREE TIMES A DAY 21  Yes Kimberley Berman DO   omeprazole (PRILOSEC) 20 MG delayed release capsule Take 1 capsule by mouth Daily  Patient taking differently: Take 20 mg by mouth Daily am 21  Yes Kimberley Berman DO   melatonin 3 MG TABS tablet Take 3 mg by mouth nightly   Yes Historical Provider, MD   FLUoxetine (PROZAC) 20 MG capsule Take 3 capsules by mouth daily 10/16/20 3/26/21 Yes Kimberley Berman DO     Allergies   Allergen Reactions    Baclofen Other (See Comments)     Dry mouth, abd pain    Ibuprofen Other (See Comments)     Acute bleeding    Nickel      Surgical steel    Penicillins Hives     Social History     Socioeconomic History    Marital status:      Spouse name: Not on file    Number of children: Not on file    Years of education: Not on file    Highest education level: Not on file   Occupational History    Not on file   Social Needs    Financial resource strain: Not on file    Food insecurity     Worry: Not on file     Inability: Not on file   Fitfully needs     Medical: Not on file     Non-medical: Not on file   Tobacco Use    Smoking status: Former Smoker     Packs/day: 2.00     Years: 10.00     Pack years: 20.00     Types: Cigarettes     Start date: 0     Quit date:      Years since quittin.2    Smokeless tobacco: Never Used   Substance and Sexual Activity    Alcohol use: Not Currently     Alcohol/week: 1.0 standard drinks     Types: 1 Glasses of wine per week    Drug use: No    Sexual activity: Never   Lifestyle    Physical activity     Days per week: Not on file     Minutes per session: Not on file    Stress: Not on file   Relationships    Social connections     Talks on phone: Not on file     Gets together: Not on file     Attends Hinduism service: Not on file     Active member of club or organization: Not on file     Attends meetings of clubs or organizations: Not on file     Relationship status: Not on file    Intimate partner violence     Fear of current or ex partner: Not on file     Emotionally abused: Not on file     Physically abused: Not on file     Forced sexual activity: Not on file   Other Topics Concern    Not on file   Social History Narrative    Not on file     Family History   Problem Relation Age of Onset    Hypertension Father     Heart Disease Father     Hypertension Paternal Grandfather     Heart Disease Paternal Grandfather      REVIEW OF SYSTEMS:     Zahira Mccall denies fever/chills, chest pain, shortness of breath, new bowel or bladder complaints. All other review of systems was negative. PHYSICAL EXAMINATION:      BP (!) 118/58 (Site: Right Upper Arm)   Pulse 94   Temp 96.9 °F (36.1 °C)   Ht 5' 3\" (1.6 m)   Wt 190 lb (86.2 kg)   LMP  (LMP Unknown)   SpO2 95%   BMI 33.66 kg/m²     General:       General appearance:   elderly, pleasant and well-hydrated. , in mild discomfort and A & O x3  Build:Overweight     HEENT:     Head:normocephalic and atraumatic  Sclera: icterus absent,      Lungs:     Breathing:Breathing Pattern: regular, no distress     Abdomen:     Shape:obese, non-distended and normal  Tenderness:none    Thoracic spine:    Spine inspection:exaggerated kyphosis   pationPal:tenderness paravertebral muscles, midline tenderness, facet loading, left, right, positive and tenderness. Range of motion:abnormal moderately flexion, extension rotation bilateral and is  painful. Sutures removed      Lumbar spine:     Spine inspection:normal   CVA tenderness:No   Palpation:tenderness paravertebral muscles, facet loading, left, right and positive. Right worse than Left  Range of motion:abnormal moderately Lateral bending, flexion, extension rotation bilateral and is  painful.     Musculoskeletal:     Trigger points in Paraveteral:absent bilaterally  SI joint tenderness:negative right, negative left  SLR:negative right, negative left, sitting      Extremities:     Tremors:None bilaterally upper and lower  Range of motion:pain with internal rotation of hips negative. Intact:Yes  Edema:Normal     Neurological:     Sensory:normal to light touch bilateral lower extremities. Motor:                             Right Quadriceps5/5          Left Quadriceps5/5           Right Gastrocnemius5/5    Left Gastrocnemius5/5  Right Ant Tibialis5/5  Left Ant Tibialis5/5  Reflexes:    Right Quadriceps reflex2+  Left Quadriceps reflex2+  Right Achilles reflex2+  Left Achilles reflex2+  Gait:normal     Dermatology:     Skin:no unusual rashes and no skin lesions     Impression:  Chronic low back pain with non dermatomal radiation to bilateral lower extremities. Lumbar spine MRI 2014 multilevel degenerative disc disease with multilevel facet arthropathy. Patient had LESI and lumbar facet RFA, per patient RFA had lasted for 5 month. Patient mentioned that she had physical therapy before which had aggravated her pain. Plan:  Follow up on her low back and mid back pain  Patient is s/p T11 kyphoplasty with good outcome in her pain. Continue with Norco 5/325 QD PRN. Currently on Gabapentin 300 mg QID  OARRS report reviewed 03/2021. Patient encouraged to stay active and to lose weight. Will refer patient to physical therapy  Treatment plan discussed with the patient including medications side effects. We discussed with the patient that combining opioids, benzodiazepines, alcohol, illicit drugs or sleep aids increases the risk of respiratory depression including death. We discussed that these medications may cause drowsiness, sedation or dizziness and have counseled the patient not to drive or operate machinery. We have discussed that these medications will be prescribed only by one provider. We have discussed with the patient about age related risk factors and have thoroughly discussed the importance of taking these medications as prescribed. The patient verbalizes understanding.     ccreferring physic Lore CAPONE

## 2021-03-26 NOTE — PROGRESS NOTES
Do you currently have any of the following:    Fever: No  Headache:  No  Cough: No  Shortness of breath: No  Exposed to anyone with these symptoms: No                                                                                                                Baldev Badillo presents to the [facility] on 3/26/2021. Kev Biswas is complaining of pain lower back The pain is intermittent. The pain is described as aching. Pain is rated on her best day at a 4, on her worst day at a 5, and on average at a 5 on the VAS scale. She took her last dose of Motrin yeasreday. Kev Biswas does not have issues with constipation. Any procedures since your last visit: Yes, with 50 % relief. She is not on NSAIDS and  is not on anticoagulation medications to include none and is managed by. Pacemaker or defibrillator: No Physician managing device is . Medication Contract and Consent for Opioid Use Documents Filed      No documents found                   BP (!) 118/58 (Site: Right Upper Arm)   Pulse 94   Temp 96.9 °F (36.1 °C)   Ht 5' 3\" (1.6 m)   Wt 190 lb (86.2 kg)   LMP  (LMP Unknown)   SpO2 95%   BMI 33.66 kg/m²      No LMP recorded (lmp unknown).  Patient is postmenopausal.

## 2021-03-29 ENCOUNTER — TELEPHONE (OUTPATIENT)
Dept: CARDIOLOGY | Age: 71
End: 2021-03-29

## 2021-04-06 ENCOUNTER — TELEPHONE (OUTPATIENT)
Dept: FAMILY MEDICINE CLINIC | Age: 71
End: 2021-04-06

## 2021-04-06 DIAGNOSIS — F33.41 RECURRENT MAJOR DEPRESSIVE DISORDER, IN PARTIAL REMISSION (HCC): ICD-10-CM

## 2021-04-06 DIAGNOSIS — M54.16 LUMBAR RADICULAR PAIN: ICD-10-CM

## 2021-04-06 RX ORDER — TIZANIDINE 2 MG/1
2 TABLET ORAL 2 TIMES DAILY
Qty: 60 TABLET | Refills: 3 | Status: SHIPPED
Start: 2021-04-06 | End: 2021-06-03

## 2021-04-06 RX ORDER — FLUOXETINE HYDROCHLORIDE 20 MG/1
60 CAPSULE ORAL DAILY
Qty: 270 CAPSULE | Refills: 1 | Status: SHIPPED
Start: 2021-04-06 | End: 2022-03-08 | Stop reason: SDUPTHER

## 2021-04-06 RX ORDER — ROPINIROLE 0.5 MG/1
TABLET, FILM COATED ORAL
Qty: 90 TABLET | Refills: 2 | Status: SHIPPED
Start: 2021-04-06 | End: 2021-06-03

## 2021-04-06 RX ORDER — OMEPRAZOLE 20 MG/1
20 CAPSULE, DELAYED RELEASE ORAL DAILY
Qty: 90 CAPSULE | Refills: 1 | Status: SHIPPED
Start: 2021-04-06 | End: 2021-10-05 | Stop reason: SDUPTHER

## 2021-04-06 NOTE — TELEPHONE ENCOUNTER
Last Appointment:  3/16/2021  Future Appointments   Date Time Provider Ana Basilio   4/16/2021  2:00 PM [facility] Nydia   4/26/2021  1:30 PM DO JENIFFER Sanchez [facility]   4/27/2021  1:00 PM Maggi Bruno MD [facility]   4/30/2021 12:40 PM Deni Abad MD [facility]

## 2021-04-06 NOTE — TELEPHONE ENCOUNTER
Patient states that she would like to know if you could prescribe Aimovig instead of her going back to a very unpleasant Nurse practitioner (neurology).     Please advise and thank you

## 2021-04-13 NOTE — DISCHARGE SUMMARY
appropriate, normal insight      Consults:     IP CONSULT TO INTERNAL MEDICINE  IP CONSULT TO ONCOLOGY  IP CONSULT TO GENERAL SURGERY    Significant Diagnostic Studies:     XR CHEST PORTABLE   Final Result   No acute process. Mild cardiomegaly. Disposition:  home     Discharge Instructions/Follow-up:  Keep scheduled follow up appointments. Take medications as prescribed. Code Status:  Prior     Activity: activity as tolerated    Diet: regular diet    Labs: For convenience and continuity at follow-up the following most recent labs are provided:      CBC:    Lab Results   Component Value Date    WBC 6.3 03/10/2021    HGB 7.4 03/10/2021    HCT 25.3 03/10/2021     03/10/2021       Renal:    Lab Results   Component Value Date     03/10/2021    K 3.9 03/10/2021    K 3.9 03/08/2021     03/10/2021    CO2 25 03/10/2021    BUN 12 03/10/2021    CREATININE 0.7 03/10/2021    CALCIUM 8.9 03/10/2021    PHOS 3.9 03/09/2021       Discharge Medications:     Discharge Medication List as of 3/10/2021  3:37 PM           Details   HYDROcodone-acetaminophen (NORCO) 5-325 MG per tablet Take 1 tablet by mouth every 6 hours as needed for Pain. Historical Med      AIMOVIG 70 MG/ML SOAJ 12th month, DAWHistorical Med      lisinopril (PRINIVIL;ZESTRIL) 20 MG tablet Take one tablet po daily in the a.m., Disp-30 tablet, R-3Normal      tiZANidine (ZANAFLEX) 2 MG tablet Take 1 tablet by mouth 2 times daily, Disp-60 tablet, R-3Normal      gabapentin (NEURONTIN) 300 MG capsule Take 1 capsule by mouth 4 times daily for 30 days. , Disp-360 capsule, R-3Normal      rOPINIRole (REQUIP) 0.5 MG tablet TAKE ONE TABLET BY MOUTH THREE TIMES A DAY, Disp-90 tablet, R-2Normal      Dulaglutide (TRULICITY) 3 CX/0.0RS SOPN Inject 3 mg into the skin once a week Every MondayHistorical Med      omeprazole (PRILOSEC) 20 MG delayed release capsule Take 1 capsule by mouth Daily, Disp-30 capsule, R-3Normal      melatonin 3 MG TABS

## 2021-04-15 ENCOUNTER — TELEPHONE (OUTPATIENT)
Dept: CARDIOLOGY | Age: 71
End: 2021-04-15

## 2021-04-16 ENCOUNTER — HOSPITAL ENCOUNTER (OUTPATIENT)
Dept: CARDIOLOGY | Age: 71
Discharge: HOME | End: 2021-04-16
Payer: MEDICARE

## 2021-04-16 DIAGNOSIS — R01.1 MURMUR, CARDIAC: ICD-10-CM

## 2021-04-16 LAB
LV EF: 65 %
LVEF MODALITY: NORMAL

## 2021-04-16 PROCEDURE — 93306 TTE W/DOPPLER COMPLETE: CPT | Performed by: PSYCHIATRY & NEUROLOGY

## 2021-05-11 ENCOUNTER — OFFICE VISIT (OUTPATIENT)
Dept: FAMILY MEDICINE CLINIC | Age: 71
End: 2021-05-11
Payer: MEDICARE

## 2021-05-11 VITALS
WEIGHT: 198 LBS | RESPIRATION RATE: 18 BRPM | BODY MASS INDEX: 36.44 KG/M2 | HEIGHT: 62 IN | OXYGEN SATURATION: 98 % | DIASTOLIC BLOOD PRESSURE: 78 MMHG | SYSTOLIC BLOOD PRESSURE: 132 MMHG | TEMPERATURE: 97 F | HEART RATE: 105 BPM

## 2021-05-11 DIAGNOSIS — D50.9 IRON DEFICIENCY ANEMIA, UNSPECIFIED IRON DEFICIENCY ANEMIA TYPE: ICD-10-CM

## 2021-05-11 DIAGNOSIS — M43.06 LUMBAR SPONDYLOLYSIS: ICD-10-CM

## 2021-05-11 DIAGNOSIS — G43.019 INTRACTABLE MIGRAINE WITHOUT AURA AND WITHOUT STATUS MIGRAINOSUS: ICD-10-CM

## 2021-05-11 DIAGNOSIS — M54.16 LUMBAR RADICULAR PAIN: ICD-10-CM

## 2021-05-11 DIAGNOSIS — E11.9 TYPE 2 DIABETES MELLITUS WITHOUT COMPLICATION, WITHOUT LONG-TERM CURRENT USE OF INSULIN (HCC): Primary | ICD-10-CM

## 2021-05-11 LAB
HCT VFR BLD CALC: 32.2 % (ref 34–48)
HEMOGLOBIN: 9.7 G/DL (ref 11.5–15.5)

## 2021-05-11 PROCEDURE — 1123F ACP DISCUSS/DSCN MKR DOCD: CPT | Performed by: STUDENT IN AN ORGANIZED HEALTH CARE EDUCATION/TRAINING PROGRAM

## 2021-05-11 PROCEDURE — 1036F TOBACCO NON-USER: CPT | Performed by: STUDENT IN AN ORGANIZED HEALTH CARE EDUCATION/TRAINING PROGRAM

## 2021-05-11 PROCEDURE — 99213 OFFICE O/P EST LOW 20 MIN: CPT | Performed by: STUDENT IN AN ORGANIZED HEALTH CARE EDUCATION/TRAINING PROGRAM

## 2021-05-11 PROCEDURE — 3051F HG A1C>EQUAL 7.0%<8.0%: CPT | Performed by: STUDENT IN AN ORGANIZED HEALTH CARE EDUCATION/TRAINING PROGRAM

## 2021-05-11 PROCEDURE — G8427 DOCREV CUR MEDS BY ELIG CLIN: HCPCS | Performed by: STUDENT IN AN ORGANIZED HEALTH CARE EDUCATION/TRAINING PROGRAM

## 2021-05-11 PROCEDURE — G8417 CALC BMI ABV UP PARAM F/U: HCPCS | Performed by: STUDENT IN AN ORGANIZED HEALTH CARE EDUCATION/TRAINING PROGRAM

## 2021-05-11 PROCEDURE — 4040F PNEUMOC VAC/ADMIN/RCVD: CPT | Performed by: STUDENT IN AN ORGANIZED HEALTH CARE EDUCATION/TRAINING PROGRAM

## 2021-05-11 PROCEDURE — 1090F PRES/ABSN URINE INCON ASSESS: CPT | Performed by: STUDENT IN AN ORGANIZED HEALTH CARE EDUCATION/TRAINING PROGRAM

## 2021-05-11 PROCEDURE — 2022F DILAT RTA XM EVC RTNOPTHY: CPT | Performed by: STUDENT IN AN ORGANIZED HEALTH CARE EDUCATION/TRAINING PROGRAM

## 2021-05-11 PROCEDURE — 3017F COLORECTAL CA SCREEN DOC REV: CPT | Performed by: STUDENT IN AN ORGANIZED HEALTH CARE EDUCATION/TRAINING PROGRAM

## 2021-05-11 PROCEDURE — G8400 PT W/DXA NO RESULTS DOC: HCPCS | Performed by: STUDENT IN AN ORGANIZED HEALTH CARE EDUCATION/TRAINING PROGRAM

## 2021-05-11 RX ORDER — DULAGLUTIDE 3 MG/.5ML
INJECTION, SOLUTION SUBCUTANEOUS
Qty: 2 ML | Refills: 0 | Status: SHIPPED
Start: 2021-05-11 | End: 2021-05-11 | Stop reason: SDUPTHER

## 2021-05-11 RX ORDER — DULAGLUTIDE 3 MG/.5ML
INJECTION, SOLUTION SUBCUTANEOUS
Qty: 2 ML | Refills: 0 | Status: SHIPPED
Start: 2021-05-11 | End: 2021-07-06 | Stop reason: SDUPTHER

## 2021-05-11 RX ORDER — ERENUMAB-AOOE 70 MG/ML
INJECTION SUBCUTANEOUS
Qty: 1 PEN | Refills: 3 | Status: SHIPPED
Start: 2021-05-11 | End: 2021-10-04 | Stop reason: SDUPTHER

## 2021-05-11 ASSESSMENT — ENCOUNTER SYMPTOMS
VOMITING: 0
ABDOMINAL PAIN: 0
DIARRHEA: 0
SINUS PAIN: 0
SINUS PRESSURE: 0
EYE REDNESS: 0
RHINORRHEA: 0
NAUSEA: 0
COUGH: 0
SORE THROAT: 0
SHORTNESS OF BREATH: 0
BACK PAIN: 1
EYE PAIN: 0
CONSTIPATION: 0

## 2021-05-11 NOTE — TELEPHONE ENCOUNTER
Last Appointment:  3/16/2021  Future Appointments   Date Time Provider Ana Basilio   5/11/2021 12:30 PM Kimberley Berman  [address]

## 2021-05-11 NOTE — PROGRESS NOTES
Medication Sig Taking? Authorizing Provider   Dulaglutide (TRULICITY) 3 SW/0.4WJ SOPN inject 3 mg into the skin once a week Yes Kimberley Berman DO   AIMOVIG 70 MG/ML SOAJ 12th month Yes Kimberley Berman DO   FLUoxetine (PROZAC) 20 MG capsule Take 3 capsules by mouth daily Yes Kimberley Berman DO   omeprazole (PRILOSEC) 20 MG delayed release capsule Take 1 capsule by mouth Daily Yes Kimberley Berman DO   rOPINIRole (REQUIP) 0.5 MG tablet TAKE ONE TABLET BY MOUTH THREE TIMES A DAY Yes Kimberley Berman DO   tiZANidine (ZANAFLEX) 2 MG tablet Take 1 tablet by mouth 2 times daily Yes Kimberley Berman DO   lisinopril (PRINIVIL;ZESTRIL) 20 MG tablet Take one tablet po daily in the a.m. Yes Kimberley Berman DO   gabapentin (NEURONTIN) 300 MG capsule Take 1 capsule by mouth 4 times daily for 30 days.  Yes Kimberley Berman DO   melatonin 3 MG TABS tablet Take 3 mg by mouth nightly  Historical Provider, MD        Allergies   Allergen Reactions    Baclofen Other (See Comments)     Dry mouth, abd pain    Ibuprofen Other (See Comments)     Acute bleeding    Nickel      Surgical steel    Penicillins Hives       Past Medical History:   Diagnosis Date    Arthritis     Cataract     right    Chronic fatigue syndrome     Depression     Diabetes mellitus (HCC)     SC injection weekly    Eczema     Fibromyalgia     Hypertension     Memory loss     Mononucleosis     Sciatica     Sleep apnea     no c-pap    Vertigo        Past Surgical History:   Procedure Laterality Date    CARPAL TUNNEL RELEASE Bilateral     CATARACT REMOVAL Right     OTHER SURGICAL HISTORY Bilateral 01/21/2021    BILATERAL L3,L4 MEDIAL BRANCH AND L5 DORSAL RAMUS RADIOFREQUENCY    PAIN MANAGEMENT PROCEDURE Bilateral 1/21/2021    BILATERAL L3,4 MEDIAL BRANCH AND L5 DORSAL RAMUS  RADIOFREQUENCY ABLATION WITH SEDATION  (CPT R8501167) performed by Braxton Amos MD at [address] N/A 3/19/2021    T11 KYPHOPLASTY (2 C-ARMS) performed by [facility] Tai Sifuentes MD at [address] [facility] GASTROINTESTINAL ENDOSCOPY N/A 2019    EGD BIOPSY performed by Gaby Callejas MD at [address] [facility] N/A 3/10/2021    EGD ESOPHAGOGASTRODUODENOSCOPY performed by Yasmeen Tesfaye MD at [address] [facility] Marital status:      Spouse name: Not on file    Number of children: Not on file    Years of education: Not on file    Highest education level: Not on file   Occupational History    Not on file   Social Needs    Financial resource strain: Not on file    Food insecurity     Worry: Not on file     Inability: Not on file    Transportation needs     Medical: Not on file     Non-medical: Not on file   Tobacco Use    Smoking status: Former Smoker     Packs/day: 2.00     Years: 10.00     Pack years: 20.00     Types: Cigarettes     Start date: 0     Quit date:      Years since quittin.3    Smokeless tobacco: Never Used   Substance and Sexual Activity    Alcohol use: Not Currently     Alcohol/week: 1.0 standard drinks     Types: 1 Glasses of wine per week    Drug use: No    Sexual activity: Never   Lifestyle    Physical activity     Days per week: Not on file     Minutes per session: Not on file    Stress: Not on file   Relationships    Social connections     Talks on phone: Not on file     Gets together: Not on file     Attends Scientologist service: Not on file     Active member of club or organization: Not on file     Attends meetings of clubs or organizations: Not on file     Relationship status: Not on file    Intimate partner violence     Fear of current or ex partner: Not on file     Emotionally abused: Not on file     Physically abused: Not on file     Forced sexual activity: Not on file   Other Topics Concern    Not on file   Social History Narrative    Not on file        Family History   Problem Relation Age of Onset    Hypertension Father     Heart Disease Father     Hypertension Paternal Grandfather     Heart Disease Paternal Grandfather            Vitals:    05/11/21 1236   BP: 132/78  Comment: did not have bp med yet   Pulse: 105   Resp: 18   Temp: 97 °F (36.1 °C)   TempSrc: Temporal   SpO2: 98%   Weight: 198 lb (89.8 kg)   Height: 5' 2\" (1.575 m)     Estimated body mass index is 36.21 kg/m² as calculated from the following:    Height as of this encounter: 5' 2\" (1.575 m). Weight as of this encounter: 198 lb (89.8 kg).     Most Recent Labs  CBC  Lab Results   Component Value Date    WBC 6.3 03/10/2021    WBC 7.6 03/09/2021    WBC 9.6 03/08/2021    RBC 3.56 03/10/2021    RBC 3.70 03/09/2021    RBC 2.96 03/08/2021    HGB 7.4 03/10/2021    HGB 7.6 03/10/2021    HGB 7.6 03/10/2021    HCT 25.3 03/10/2021    HCT 25.6 03/10/2021    HCT 26.0 03/10/2021    MCV 71.9 03/10/2021    MCV 71.6 03/09/2021    MCV 67.2 03/08/2021     03/10/2021     03/09/2021     03/08/2021      CMP  Lab Results   Component Value Date     03/10/2021     03/09/2021     03/08/2021    K 3.9 03/10/2021    K 4.1 03/09/2021    K 3.9 03/08/2021    K 3.6 03/08/2021    K 4.1 10/16/2020     03/10/2021     03/09/2021     03/08/2021    CO2 25 03/10/2021    CO2 22 03/09/2021    CO2 20 03/08/2021    ANIONGAP 8 03/10/2021    ANIONGAP 9 03/09/2021    ANIONGAP 11 03/08/2021    GLUCOSE 152 03/10/2021    GLUCOSE 187 03/09/2021    GLUCOSE 225 03/08/2021    BUN 12 03/10/2021    BUN 15 03/09/2021    BUN 18 03/08/2021    CREATININE 0.7 03/10/2021    CREATININE 0.7 03/09/2021    CREATININE 0.7 03/08/2021    LABGLOM >60 03/10/2021    LABGLOM >60 03/09/2021    LABGLOM >60 03/08/2021    GFRAA >60 03/10/2021    GFRAA >60 03/09/2021    GFRAA >60 03/08/2021    CALCIUM 8.9 03/10/2021    CALCIUM 8.7 03/09/2021    CALCIUM 9.5 03/08/2021    PROT 6.0 03/10/2021    PROT 6.0 03/09/2021    PROT 6.5 03/09/2021    LABALBU 3.2 03/10/2021 LABALBU 2.8 03/09/2021    LABALBU 3.4 03/09/2021    BILITOT 0.4 03/10/2021    BILITOT 0.5 03/09/2021    BILITOT 0.2 03/08/2021    ALKPHOS 83 03/10/2021    ALKPHOS 90 03/09/2021    ALKPHOS 101 03/08/2021    AST 35 03/10/2021    AST 32 03/09/2021    AST 46 03/08/2021    ALT 23 03/10/2021    ALT 24 03/09/2021    ALT 26 03/08/2021     A1C  Lab Results   Component Value Date    LABA1C 7.0 03/09/2021    LABA1C 6.7 01/26/2021    LABA1C 8.3 10/16/2020     TSH  Lab Results   Component Value Date    TSH 1.630 03/09/2021    TSH 2.680 01/14/2019     LIPID  Lab Results   Component Value Date    CHOL 148 03/09/2021    CHOL 169 11/29/2018    HDL 52 03/09/2021    HDL 59 10/16/2020    HDL 46 11/29/2018    LDLCALC 85 03/09/2021    LDLCALC 104 10/16/2020    LDLCALC 94 11/29/2018    TRIG 54 03/09/2021    TRIG 147 11/29/2018    CHOLHDLRATIO 3.7 11/29/2018    VLDL 29 11/29/2018     MAGNESIUM  Lab Results   Component Value Date    MG 2.2 03/09/2021    MG 1.8 01/18/2019    MG 2.0 01/17/2019      PHOS  Lab Results   Component Value Date    PHOS 3.9 03/09/2021       HEPATITIS C  Lab Results   Component Value Date    HCVABI Non-Reactive 10/16/2020     UA  Lab Results   Component Value Date    COLORU yellow 02/08/2021    COLORU Yellow 01/15/2019    CLARITYU clear 02/08/2021    CLARITYU Clear 01/15/2019    GLUCOSEU 250mg/dl 02/08/2021    GLUCOSEU Negative 01/15/2019    BILIRUBINUR neg 02/08/2021    BILIRUBINUR Negative 01/15/2019    KETUA neg 02/08/2021    KETUA Negative 01/15/2019    SPECGRAV 1.015 02/08/2021    SPECGRAV <=1.005 01/15/2019    BLOODU neg 02/08/2021    BLOODU Negative 01/15/2019    PHUR 5.0 02/08/2021    PHUR 7.0 01/15/2019    PROTEINU neg 02/08/2021    PROTEINU Negative 01/15/2019    UROBILINOGEN 0.2 01/15/2019    NITRU Negative 01/15/2019    LEUKOCYTESUR small 02/08/2021    LEUKOCYTESUR TRACE 01/15/2019     Urine Micro/Albumin Ratio  Lab Results   Component Value Date    MALBCR 11.8 10/16/2020        Physical Exam  Vitals signs and nursing note reviewed. Constitutional:       Appearance: Normal appearance. HENT:      Head: Normocephalic and atraumatic. Right Ear: Tympanic membrane, ear canal and external ear normal.      Left Ear: Tympanic membrane, ear canal and external ear normal.      Nose: Nose normal.      Mouth/Throat:      Mouth: Mucous membranes are moist.      Pharynx: Oropharynx is clear. Eyes:      Extraocular Movements: Extraocular movements intact. Conjunctiva/sclera: Conjunctivae normal.      Pupils: Pupils are equal, round, and reactive to light. Neck:      Musculoskeletal: Normal range of motion and neck supple. Cardiovascular:      Rate and Rhythm: Normal rate and regular rhythm. Pulses: Normal pulses. Heart sounds: Murmur present. Pulmonary:      Effort: Pulmonary effort is normal.      Breath sounds: Normal breath sounds. Abdominal:      General: Bowel sounds are normal.      Palpations: Abdomen is soft. Musculoskeletal: Normal range of motion. Skin:     General: Skin is warm and dry. Capillary Refill: Capillary refill takes less than 2 seconds. Neurological:      General: No focal deficit present. Mental Status: She is alert and oriented to person, place, and time. Psychiatric:         Mood and Affect: Mood normal.         Behavior: Behavior normal.         Thought Content: Thought content normal.       ASSESSMENT/PLAN:  Ree Child was seen today for 3 month follow-up, back pain and other.     Diagnoses and all orders for this visit:    Type 2 diabetes mellitus without complication, without long-term current use of insulin ([facility])  -     Dulaglutide (TRULICITY) 3 IC/4.5EL SOPN; inject 3 mg into the skin once a week    Intractable migraine without aura and without status migrainosus  -     AIMOVIG 70 MG/ML SOAJ; 12th month    Iron deficiency anemia, unspecified iron deficiency anemia type  -     HEMOGLOBIN AND HEMATOCRIT, BLOOD; Future    Lumbar spondylolysis    Lumbar radicular pain    Patient to call surgeon and talk to him about the back pain    Educational materials and/or home exercises printed for patient's review and were included in patient instructions on his/her After Visit Summary and given to patient at the end of visit. Counseled regarding above diagnosis, including possible risks and complications,  especially if left uncontrolled. Counseled regarding the possible side effects, risks, benefits and alternatives to treatment; patient and/or guardian verbalizes understanding, agrees, feels comfortable with and wishes to proceed with above treatment plan. Advised patient to call with any new medication issues, and read all Rx info from pharmacy to assure aware of all possible risks and side effects of medication before taking. Reviewed age and gender appropriate health screening exams and vaccinations. Advised patient regarding importance of keeping up with recommended health maintenance and to schedule as soon as possible if overdue, as this is important in assessing for undiagnosed pathology, especially cancer, as well as protecting against potentially harmful/life threatening disease. Patient and/or guardian verbalizes understanding and agrees with above counseling, assessment and plan. All questions answered. Return in about 3 months (around 8/11/2021) for diabetes. An  electronic signature was used to authenticate this note.     --Jono Valdez, DO on 5/11/2021 at 12:55 PM

## 2021-05-25 ENCOUNTER — TELEPHONE (OUTPATIENT)
Dept: FAMILY MEDICINE CLINIC | Age: 71
End: 2021-05-25

## 2021-06-02 DIAGNOSIS — M54.16 LUMBAR RADICULAR PAIN: ICD-10-CM

## 2021-06-03 RX ORDER — TIZANIDINE 2 MG/1
TABLET ORAL
Qty: 180 TABLET | Refills: 1 | Status: SHIPPED
Start: 2021-06-03 | End: 2021-12-23 | Stop reason: SDUPTHER

## 2021-06-03 RX ORDER — ROPINIROLE 0.5 MG/1
TABLET, FILM COATED ORAL
Qty: 270 TABLET | Refills: 1 | Status: SHIPPED
Start: 2021-06-03 | End: 2021-12-23 | Stop reason: SDUPTHER

## 2021-06-25 ENCOUNTER — TELEPHONE (OUTPATIENT)
Dept: FAMILY MEDICINE CLINIC | Age: 71
End: 2021-06-25

## 2021-06-25 NOTE — TELEPHONE ENCOUNTER
Last Appointment:  5/11/2021  Future Appointments   Date Time Provider Ana Basilio   7/6/2021  2:45 PM Kimberley Berman DO [address] [facility]        Patient left message on voice mail asking for samples of trulicity. She is in a donut hole and it is going to cost her $250 to get it. She has applied for patient assistance but it has not been approved yet. We have 1.5mg and 0.75mg samples available in the office. Please advise.

## 2021-07-06 ENCOUNTER — OFFICE VISIT (OUTPATIENT)
Dept: FAMILY MEDICINE CLINIC | Age: 71
End: 2021-07-06
Payer: MEDICARE

## 2021-07-06 VITALS
TEMPERATURE: 97.2 F | HEIGHT: 62 IN | BODY MASS INDEX: 37.73 KG/M2 | DIASTOLIC BLOOD PRESSURE: 74 MMHG | RESPIRATION RATE: 16 BRPM | WEIGHT: 205 LBS | HEART RATE: 101 BPM | OXYGEN SATURATION: 98 % | SYSTOLIC BLOOD PRESSURE: 124 MMHG

## 2021-07-06 DIAGNOSIS — I10 ESSENTIAL HYPERTENSION: ICD-10-CM

## 2021-07-06 DIAGNOSIS — D50.9 IRON DEFICIENCY ANEMIA, UNSPECIFIED IRON DEFICIENCY ANEMIA TYPE: ICD-10-CM

## 2021-07-06 DIAGNOSIS — R01.1 MURMUR, CARDIAC: ICD-10-CM

## 2021-07-06 DIAGNOSIS — E11.9 TYPE 2 DIABETES MELLITUS WITHOUT COMPLICATION, WITHOUT LONG-TERM CURRENT USE OF INSULIN (HCC): Primary | ICD-10-CM

## 2021-07-06 LAB
BASOPHILS ABSOLUTE: 0.06 E9/L (ref 0–0.2)
BASOPHILS RELATIVE PERCENT: 0.7 % (ref 0–2)
EOSINOPHILS ABSOLUTE: 0.42 E9/L (ref 0.05–0.5)
EOSINOPHILS RELATIVE PERCENT: 4.8 % (ref 0–6)
HBA1C MFR BLD: 10.3 %
HCT VFR BLD CALC: 28.6 % (ref 34–48)
HEMOGLOBIN: 8.6 G/DL (ref 11.5–15.5)
IMMATURE GRANULOCYTES #: 0.03 E9/L
IMMATURE GRANULOCYTES %: 0.3 % (ref 0–5)
LYMPHOCYTES ABSOLUTE: 1.43 E9/L (ref 1.5–4)
LYMPHOCYTES RELATIVE PERCENT: 16.5 % (ref 20–42)
MCH RBC QN AUTO: 24.4 PG (ref 26–35)
MCHC RBC AUTO-ENTMCNC: 30.1 % (ref 32–34.5)
MCV RBC AUTO: 81 FL (ref 80–99.9)
MONOCYTES ABSOLUTE: 0.92 E9/L (ref 0.1–0.95)
MONOCYTES RELATIVE PERCENT: 10.6 % (ref 2–12)
NEUTROPHILS ABSOLUTE: 5.82 E9/L (ref 1.8–7.3)
NEUTROPHILS RELATIVE PERCENT: 67.1 % (ref 43–80)
PDW BLD-RTO: 16.7 FL (ref 11.5–15)
PLATELET # BLD: 217 E9/L (ref 130–450)
PMV BLD AUTO: 10.4 FL (ref 7–12)
RBC # BLD: 3.53 E12/L (ref 3.5–5.5)
WBC # BLD: 8.7 E9/L (ref 4.5–11.5)

## 2021-07-06 PROCEDURE — 4040F PNEUMOC VAC/ADMIN/RCVD: CPT | Performed by: STUDENT IN AN ORGANIZED HEALTH CARE EDUCATION/TRAINING PROGRAM

## 2021-07-06 PROCEDURE — 1123F ACP DISCUSS/DSCN MKR DOCD: CPT | Performed by: STUDENT IN AN ORGANIZED HEALTH CARE EDUCATION/TRAINING PROGRAM

## 2021-07-06 PROCEDURE — G8417 CALC BMI ABV UP PARAM F/U: HCPCS | Performed by: STUDENT IN AN ORGANIZED HEALTH CARE EDUCATION/TRAINING PROGRAM

## 2021-07-06 PROCEDURE — 3046F HEMOGLOBIN A1C LEVEL >9.0%: CPT | Performed by: STUDENT IN AN ORGANIZED HEALTH CARE EDUCATION/TRAINING PROGRAM

## 2021-07-06 PROCEDURE — G8427 DOCREV CUR MEDS BY ELIG CLIN: HCPCS | Performed by: STUDENT IN AN ORGANIZED HEALTH CARE EDUCATION/TRAINING PROGRAM

## 2021-07-06 PROCEDURE — 2022F DILAT RTA XM EVC RTNOPTHY: CPT | Performed by: STUDENT IN AN ORGANIZED HEALTH CARE EDUCATION/TRAINING PROGRAM

## 2021-07-06 PROCEDURE — 3017F COLORECTAL CA SCREEN DOC REV: CPT | Performed by: STUDENT IN AN ORGANIZED HEALTH CARE EDUCATION/TRAINING PROGRAM

## 2021-07-06 PROCEDURE — 99213 OFFICE O/P EST LOW 20 MIN: CPT | Performed by: STUDENT IN AN ORGANIZED HEALTH CARE EDUCATION/TRAINING PROGRAM

## 2021-07-06 PROCEDURE — G8400 PT W/DXA NO RESULTS DOC: HCPCS | Performed by: STUDENT IN AN ORGANIZED HEALTH CARE EDUCATION/TRAINING PROGRAM

## 2021-07-06 PROCEDURE — 83036 HEMOGLOBIN GLYCOSYLATED A1C: CPT | Performed by: STUDENT IN AN ORGANIZED HEALTH CARE EDUCATION/TRAINING PROGRAM

## 2021-07-06 PROCEDURE — 1036F TOBACCO NON-USER: CPT | Performed by: STUDENT IN AN ORGANIZED HEALTH CARE EDUCATION/TRAINING PROGRAM

## 2021-07-06 PROCEDURE — 1090F PRES/ABSN URINE INCON ASSESS: CPT | Performed by: STUDENT IN AN ORGANIZED HEALTH CARE EDUCATION/TRAINING PROGRAM

## 2021-07-06 RX ORDER — DULAGLUTIDE 3 MG/.5ML
INJECTION, SOLUTION SUBCUTANEOUS
Qty: 2 ML | Refills: 5 | Status: SHIPPED
Start: 2021-07-06 | End: 2021-11-15 | Stop reason: DRUGHIGH

## 2021-07-06 SDOH — ECONOMIC STABILITY: FOOD INSECURITY: WITHIN THE PAST 12 MONTHS, THE FOOD YOU BOUGHT JUST DIDN'T LAST AND YOU DIDN'T HAVE MONEY TO GET MORE.: NEVER TRUE

## 2021-07-06 SDOH — ECONOMIC STABILITY: FOOD INSECURITY: WITHIN THE PAST 12 MONTHS, YOU WORRIED THAT YOUR FOOD WOULD RUN OUT BEFORE YOU GOT MONEY TO BUY MORE.: NEVER TRUE

## 2021-07-06 ASSESSMENT — ENCOUNTER SYMPTOMS
NAUSEA: 0
BACK PAIN: 1
ABDOMINAL PAIN: 0
SHORTNESS OF BREATH: 0
SORE THROAT: 0
EYE PAIN: 0
EYE REDNESS: 0
SINUS PRESSURE: 0
CONSTIPATION: 0
COUGH: 0
VOMITING: 0
RHINORRHEA: 0
SINUS PAIN: 0
DIARRHEA: 0

## 2021-07-06 ASSESSMENT — SOCIAL DETERMINANTS OF HEALTH (SDOH): HOW HARD IS IT FOR YOU TO PAY FOR THE VERY BASICS LIKE FOOD, HOUSING, MEDICAL CARE, AND HEATING?: VERY HARD

## 2021-07-06 NOTE — PROGRESS NOTES
2021    Cristi Barber (:  1950) is a 80 y.o. female, here for evaluation of the following medical concerns:    HPI  Chief Complaint   Patient presents with    Diabetes     Follow up. She is not checking her bs at home. She has not had the trulicity for a couple of weeks but she did get a coupon and will be able to get it now.  Hypertension     Not checking bp at home. She has not taken her meds yet today.  Back Pain     Always has back pain. Last surgery was in March. Hypertension:  Patient is here for follow up chronic hypertension. This is  generally controlled on current medication regimen. Takes meds as directed and tolerates them well. Most recent labs reviewed with patient and are not remarkable. No symptoms from htn standpoint per ROS. Patient is  compliant with lifestyle modifications. Patient does not smoke. Comorbid conditions include diabetes and obesity. DM2:   Patient is here to fu regarding DM2. Patient is  controlled. Taking all medications and tolerating well. Fasting sugars are running n/a she is not checking. Patient is taking ASA and Ace Inhibitor/ARB. Patient is  on appropriately-dosed statin. LDL is  at goal.  BP is  controlled. does not hypoglycemic episodes. Patient does not see Podiatry regularly. Patient is aware that it is necessary to see an Eye Dr yearly. Patient does not smoke. Most recent labs reviewed with patient. Patient does not have complaints or concerns today. She needs a letter stating that her dog is an emotional support dog. She is moving with her son and daughter in law to [facility] in September most likely. Lab Results   Component Value Date    LABA1C 10.3 2021       Lab Results   Component Value Date    LDLCALC 85 2021         Review of Systems   Constitutional: Negative for chills, fatigue and fever.    HENT: Negative for congestion, ear pain, postnasal drip, rhinorrhea, sinus pressure, sinus pain and sore throat. Eyes: Negative for pain and redness. Respiratory: Negative for cough and shortness of breath. Cardiovascular: Negative for chest pain. Gastrointestinal: Negative for abdominal pain, constipation, diarrhea, nausea and vomiting. Genitourinary: Negative for difficulty urinating and dysuria. Musculoskeletal: Positive for back pain. Negative for myalgias and neck pain. Skin: Negative for rash. Neurological: Negative for dizziness, light-headedness and numbness. Psychiatric/Behavioral: The patient is not nervous/anxious. .    Prior to Visit Medications    Medication Sig Taking? Authorizing Provider   Dulaglutide (TRULICITY) 3 LW/9.9VO SOPN inject 3 mg into the skin once a week Yes Kimberley Berman DO   tiZANidine (ZANAFLEX) 2 MG tablet TAKE 1 TABLET TWICE DAILY Yes Kimberley Berman DO   rOPINIRole (REQUIP) 0.5 MG tablet TAKE 1 TABLET THREE TIMES DAILY Yes Kimberley Berman DO   AIMOVIG 70 MG/ML SOAJ 12th month Yes Kimberley Berman DO   FLUoxetine (PROZAC) 20 MG capsule Take 3 capsules by mouth daily Yes Kimberley Berman DO   omeprazole (PRILOSEC) 20 MG delayed release capsule Take 1 capsule by mouth Daily Yes Kimberley Berman DO   lisinopril (PRINIVIL;ZESTRIL) 20 MG tablet Take one tablet po daily in the a.m. Yes Kimberley Berman DO   gabapentin (NEURONTIN) 300 MG capsule Take 1 capsule by mouth 4 times daily for 30 days.  Yes Kimberley Berman DO   melatonin 3 MG TABS tablet Take 3 mg by mouth nightly Yes Historical Provider, MD        Allergies   Allergen Reactions    Baclofen Other (See Comments)     Dry mouth, abd pain    Ibuprofen Other (See Comments)     Acute bleeding    Nickel      Surgical steel    Penicillins Hives       Past Medical History:   Diagnosis Date    Arthritis     Cataract     right    Chronic fatigue syndrome     Depression     Diabetes mellitus (HCC)     SC injection weekly    Eczema     Fibromyalgia     Hypertension     Memory loss     Mononucleosis     Sciatica     Sleep apnea     no c-pap    Vertigo        Past Surgical History:   Procedure Laterality Date    CARPAL TUNNEL RELEASE Bilateral     CATARACT REMOVAL Right     OTHER SURGICAL HISTORY Bilateral 2021    BILATERAL L3,L4 MEDIAL BRANCH AND L5 DORSAL RAMUS RADIOFREQUENCY    PAIN MANAGEMENT PROCEDURE Bilateral 2021    BILATERAL L3,4 MEDIAL BRANCH AND L5 DORSAL RAMUS  RADIOFREQUENCY ABLATION WITH SEDATION  (CPT 60053) performed by Jenn Hugo MD at [address] [facility] N/A 3/19/2021    T11 KYPHOPLASTY (2 C-ARMS) performed by Jenn Hugo MD at [address] N/A 2019    EGD BIOPSY performed by Lázaro Gee MD at [address] [place] [address] N/A 3/10/2021    EGD ESOPHAGOGASTRODUODENOSCOPY performed by Ady Light MD at [address] [place] [address] Marital status:      Spouse name: Not on file    Number of children: Not on file    Years of education: Not on file    Highest education level: Not on file   Occupational History    Not on file   Tobacco Use    Smoking status: Former Smoker     Packs/day: 2.00     Years: 10.00     Pack years: 20.00     Types: Cigarettes     Start date: 0     Quit date:      Years since quittin.5    Smokeless tobacco: Never Used   Substance and Sexual Activity    Alcohol use: Not Currently     Alcohol/week: 1.0 standard drinks     Types: 1 Glasses of wine per week    Drug use: No    Sexual activity: Never   Other Topics Concern    Not on file   Social History Narrative    Not on file     Social Determinants of Health     Financial Resource Strain: High Risk    Difficulty of Paying Living Expenses: Very hard   Food Insecurity: No Food Insecurity    Worried About Running Out of Food in the Last Year: Never true    Alo of Food in the Last Year: Never true   Transportation Needs:  Lack of Transportation (Medical):  Lack of Transportation (Non-Medical):    Physical Activity:     Days of Exercise per Week:     Minutes of Exercise per Session:    Stress:     Feeling of Stress :    Social Connections:     Frequency of Communication with Friends and Family:     Frequency of Social Gatherings with Friends and Family:     Attends Sikhism Services:     Active Member of Clubs or Organizations:     Attends Club or Organization Meetings:     Marital Status:    Intimate Partner Violence:     Fear of Current or Ex-Partner:     Emotionally Abused:     Physically Abused:     Sexually Abused:         Family History   Problem Relation Age of Onset    Hypertension Father     Heart Disease Father     Hypertension Paternal Grandfather     Heart Disease Paternal Grandfather        Vitals:    07/06/21 1505 07/06/21 1519   BP: (!) 144/86 124/74   Site: Right Upper Arm    Pulse: 101    Resp: 16    Temp: 97.2 °F (36.2 °C)    TempSrc: Temporal    SpO2: 98%    Weight: 205 lb (93 kg)    Height: 5' 2\" (1.575 m)      Estimated body mass index is 37.49 kg/m² as calculated from the following:    Height as of this encounter: 5' 2\" (1.575 m). Weight as of this encounter: 205 lb (93 kg).     Most Recent Labs  CBC  Lab Results   Component Value Date    WBC 6.3 03/10/2021    WBC 7.6 03/09/2021    WBC 9.6 03/08/2021    RBC 3.56 03/10/2021    RBC 3.70 03/09/2021    RBC 2.96 03/08/2021    HGB 9.7 05/11/2021    HGB 7.4 03/10/2021    HGB 7.6 03/10/2021    HCT 32.2 05/11/2021    HCT 25.3 03/10/2021    HCT 25.6 03/10/2021    MCV 71.9 03/10/2021    MCV 71.6 03/09/2021    MCV 67.2 03/08/2021     03/10/2021     03/09/2021     03/08/2021      CMP  Lab Results   Component Value Date     03/10/2021     03/09/2021     03/08/2021    K 3.9 03/10/2021    K 4.1 03/09/2021    K 3.9 03/08/2021    K 3.6 03/08/2021    K 4.1 10/16/2020     03/10/2021     03/09/2021    CL 103 03/08/2021    CO2 25 03/10/2021    CO2 22 03/09/2021    CO2 20 03/08/2021    ANIONGAP 8 03/10/2021    ANIONGAP 9 03/09/2021    ANIONGAP 11 03/08/2021    GLUCOSE 152 03/10/2021    GLUCOSE 187 03/09/2021    GLUCOSE 225 03/08/2021    BUN 12 03/10/2021    BUN 15 03/09/2021    BUN 18 03/08/2021    CREATININE 0.7 03/10/2021    CREATININE 0.7 03/09/2021    CREATININE 0.7 03/08/2021    LABGLOM >60 03/10/2021    LABGLOM >60 03/09/2021    LABGLOM >60 03/08/2021    GFRAA >60 03/10/2021    GFRAA >60 03/09/2021    GFRAA >60 03/08/2021    CALCIUM 8.9 03/10/2021    CALCIUM 8.7 03/09/2021    CALCIUM 9.5 03/08/2021    PROT 6.0 03/10/2021    PROT 6.0 03/09/2021    PROT 6.5 03/09/2021    LABALBU 3.2 03/10/2021    LABALBU 2.8 03/09/2021    LABALBU 3.4 03/09/2021    BILITOT 0.4 03/10/2021    BILITOT 0.5 03/09/2021    BILITOT 0.2 03/08/2021    ALKPHOS 83 03/10/2021    ALKPHOS 90 03/09/2021    ALKPHOS 101 03/08/2021    AST 35 03/10/2021    AST 32 03/09/2021    AST 46 03/08/2021    ALT 23 03/10/2021    ALT 24 03/09/2021    ALT 26 03/08/2021     A1C  Lab Results   Component Value Date    LABA1C 10.3 07/06/2021    LABA1C 7.0 03/09/2021    LABA1C 6.7 01/26/2021     TSH  Lab Results   Component Value Date    TSH 1.630 03/09/2021    TSH 2.680 01/14/2019     LIPID  Lab Results   Component Value Date    CHOL 148 03/09/2021    CHOL 169 11/29/2018    HDL 52 03/09/2021    HDL 59 10/16/2020    HDL 46 11/29/2018    LDLCALC 85 03/09/2021    LDLCALC 104 10/16/2020    LDLCALC 94 11/29/2018    TRIG 54 03/09/2021    TRIG 147 11/29/2018    CHOLHDLRATIO 3.7 11/29/2018    VLDL 29 11/29/2018     MAGNESIUM  Lab Results   Component Value Date    MG 2.2 03/09/2021    MG 1.8 01/18/2019    MG 2.0 01/17/2019      PHOS  Lab Results   Component Value Date    PHOS 3.9 03/09/2021     HEPATITIS C  Lab Results   Component Value Date    HCVABI Non-Reactive 10/16/2020     UA  Lab Results   Component Value Date    COLORU yellow 02/08/2021    COLORU Yellow 01/15/2019 CLARITYU clear 02/08/2021    CLARITYU Clear 01/15/2019    GLUCOSEU 250mg/dl 02/08/2021    GLUCOSEU Negative 01/15/2019    BILIRUBINUR neg 02/08/2021    BILIRUBINUR Negative 01/15/2019    KETUA neg 02/08/2021    KETUA Negative 01/15/2019    SPECGRAV 1.015 02/08/2021    SPECGRAV <=1.005 01/15/2019    BLOODU neg 02/08/2021    BLOODU Negative 01/15/2019    PHUR 5.0 02/08/2021    PHUR 7.0 01/15/2019    PROTEINU neg 02/08/2021    PROTEINU Negative 01/15/2019    UROBILINOGEN 0.2 01/15/2019    NITRU Negative 01/15/2019    LEUKOCYTESUR small 02/08/2021    LEUKOCYTESUR TRACE 01/15/2019     Urine Micro/Albumin Ratio  Lab Results   Component Value Date    MALBCR 11.8 10/16/2020        Physical Exam  Vitals and nursing note reviewed. Constitutional:       Appearance: Normal appearance. She is obese. HENT:      Head: Normocephalic and atraumatic. Right Ear: External ear normal.      Left Ear: External ear normal.   Eyes:      Extraocular Movements: Extraocular movements intact. Conjunctiva/sclera: Conjunctivae normal.      Pupils: Pupils are equal, round, and reactive to light. Cardiovascular:      Rate and Rhythm: Normal rate and regular rhythm. Pulses: Normal pulses. Heart sounds: Murmur heard. Pulmonary:      Effort: Pulmonary effort is normal.      Breath sounds: Normal breath sounds. Abdominal:      General: Bowel sounds are normal.      Palpations: Abdomen is soft. Musculoskeletal:         General: Normal range of motion. Cervical back: Normal range of motion and neck supple. Skin:     General: Skin is warm and dry. Neurological:      General: No focal deficit present. Mental Status: She is alert and oriented to person, place, and time. Psychiatric:         Mood and Affect: Mood normal.         Behavior: Behavior normal.         Thought Content: Thought content normal.         ASSESSMENT/PLAN:  Mars Zelaya was seen today for diabetes, hypertension and back pain.     Diagnoses and all orders for this visit:    Type 2 diabetes mellitus without complication, without long-term current use of insulin (HCC)  -     POCT glycosylated hemoglobin (Hb A1C)  -     Dulaglutide (TRULICITY) 3 UG/8.0FV SOPN; inject 3 mg into the skin once a week    Essential hypertension    Murmur, cardiac    Iron deficiency anemia, unspecified iron deficiency anemia type  -     CBC Auto Differential; Future       Educational materials and/or home exercises printed for patient's review and were included in patient instructions on his/her After Visit Summary and given to patient at the end of visit. Counseled regarding above diagnosis, including possible risks and complications,  especially if left uncontrolled. Counseled regarding the possible side effects, risks, benefits and alternatives to treatment; patient and/or guardian verbalizes understanding, agrees, feels comfortable with and wishes to proceed with above treatment plan. Advised patient to call with any new medication issues, and read all Rx info from pharmacy to assure aware of all possible risks and side effects of medication before taking. Reviewed age and gender appropriate health screening exams and vaccinations. Advised patient regarding importance of keeping up with recommended health maintenance and to schedule as soon as possible if overdue, as this is important in assessing for undiagnosed pathology, especially cancer, as well as protecting against potentially harmful/life threatening disease. Patient and/or guardian verbalizes understanding and agrees with above counseling, assessment and plan. All questions answered. Return in about 3 months (around 10/6/2021) for HTN, diabetes. An  electronic signature was used to authenticate this note.     --Nolan Fry, DO on 7/6/2021 at 5:08 PM

## 2021-08-18 DIAGNOSIS — I10 ESSENTIAL HYPERTENSION: ICD-10-CM

## 2021-08-18 RX ORDER — LISINOPRIL 20 MG/1
TABLET ORAL
Qty: 30 TABLET | Refills: 0 | Status: SHIPPED
Start: 2021-08-18 | End: 2022-02-17 | Stop reason: SDUPTHER

## 2021-08-24 ENCOUNTER — TELEPHONE (OUTPATIENT)
Dept: FAMILY MEDICINE CLINIC | Age: 71
End: 2021-08-24

## 2021-08-24 NOTE — TELEPHONE ENCOUNTER
----- Message from José Miguel Ivan sent at 8/23/2021  4:49 PM EDT -----  Subject: Message to Provider    QUESTIONS  Information for Provider? Pt is wanting to receive samples of Trulicity   3mg as soon as possible. Pt is having issues with insurance and have   filled out paperwork. Pt is still in need of samples and help until she   can get approved for the assistance that she applied for. Also pt is   needing help with Aimovig 70mg medication also. Pt needs both of these   meds before she leaves for [place] on 8/31. Please contact pt and let   her know what you can do for her. Coupons only worked 1 time so samples   are needed this time. ---------------------------------------------------------------------------  --------------  Pattie KAN  What is the best way for the office to contact you? OK to leave message on   [address] for [employer]  Preferred Call Back Phone Number? [phone]  ---------------------------------------------------------------------------  --------------  SCRIPT ANSWERS  Relationship to Patient?  Self

## 2021-09-14 ENCOUNTER — TELEPHONE (OUTPATIENT)
Dept: FAMILY MEDICINE CLINIC | Age: 71
End: 2021-09-14

## 2021-09-14 NOTE — TELEPHONE ENCOUNTER
We can call the patient and see if she is willing to start a statin but I think she has told me in the past that she does not want any.

## 2021-09-14 NOTE — TELEPHONE ENCOUNTER
Fax received from [facility]. Patient meets the criteria to be on a statin because she is between the ages of 43-69 and has diabetes. Please advise on statin therapy.

## 2021-09-14 NOTE — TELEPHONE ENCOUNTER
Called patient but a recording come on that stated patient is unable to be reached at this time. Try later.

## 2021-09-20 ENCOUNTER — TELEPHONE (OUTPATIENT)
Dept: FAMILY MEDICINE CLINIC | Age: 71
End: 2021-09-20

## 2021-09-20 NOTE — TELEPHONE ENCOUNTER
Pt's insurance is requesting pt to be on statin therapy for diabetes and pt has not been on a statin.  Please advise

## 2021-09-20 NOTE — TELEPHONE ENCOUNTER
Yeah we have been trying to contact her to see if she is willing to start one but we have not been able to get a hold of her

## 2021-10-04 DIAGNOSIS — G43.019 INTRACTABLE MIGRAINE WITHOUT AURA AND WITHOUT STATUS MIGRAINOSUS: ICD-10-CM

## 2021-10-04 RX ORDER — ERENUMAB-AOOE 70 MG/ML
INJECTION SUBCUTANEOUS
Qty: 1 PEN | Refills: 3 | Status: SHIPPED
Start: 2021-10-04 | End: 2022-02-17 | Stop reason: SDUPTHER

## 2021-10-04 NOTE — TELEPHONE ENCOUNTER
Last Appointment:  7/6/2021  Future Appointments   Date Time Provider Ana Basilio   10/11/2021 11:45 AM Kimberley Berman  [address]

## 2021-10-05 RX ORDER — OMEPRAZOLE 20 MG/1
20 CAPSULE, DELAYED RELEASE ORAL DAILY
Qty: 90 CAPSULE | Refills: 1 | Status: SHIPPED
Start: 2021-10-05 | End: 2022-02-17 | Stop reason: SDUPTHER

## 2021-10-11 NOTE — TELEPHONE ENCOUNTER
[employer] Approval of CTA's Neck/Head - #[id number], Valid 1/13/2021 - 2/12/2021. Scheduled 1/29 @ 1:30 pm & 2:15 pm @ [facility] by DOUGIE Pablo.   Electronically signed by Riddhi Sinclair on 1/25/21 at 3:01 PM EST Vaccine Information Sheet (VIS) provided-VIS date: 8/15/19/Risks/benefits discussed with patient/surrogate

## 2021-11-15 ENCOUNTER — OFFICE VISIT (OUTPATIENT)
Dept: FAMILY MEDICINE CLINIC | Age: 71
End: 2021-11-15
Payer: MEDICARE

## 2021-11-15 VITALS
HEART RATE: 95 BPM | HEIGHT: 62 IN | SYSTOLIC BLOOD PRESSURE: 112 MMHG | DIASTOLIC BLOOD PRESSURE: 68 MMHG | BODY MASS INDEX: 37.65 KG/M2 | OXYGEN SATURATION: 98 % | TEMPERATURE: 97.1 F | WEIGHT: 204.6 LBS | RESPIRATION RATE: 16 BRPM

## 2021-11-15 DIAGNOSIS — I10 ESSENTIAL HYPERTENSION: ICD-10-CM

## 2021-11-15 DIAGNOSIS — Z23 FLU VACCINE NEED: ICD-10-CM

## 2021-11-15 DIAGNOSIS — B37.9 YEAST INFECTION: ICD-10-CM

## 2021-11-15 DIAGNOSIS — E11.9 TYPE 2 DIABETES MELLITUS WITHOUT COMPLICATION, WITHOUT LONG-TERM CURRENT USE OF INSULIN (HCC): Primary | ICD-10-CM

## 2021-11-15 DIAGNOSIS — F33.41 RECURRENT MAJOR DEPRESSIVE DISORDER, IN PARTIAL REMISSION (HCC): ICD-10-CM

## 2021-11-15 DIAGNOSIS — H57.13 EYE PAIN, BILATERAL: ICD-10-CM

## 2021-11-15 DIAGNOSIS — E11.65 TYPE 2 DIABETES MELLITUS WITH HYPERGLYCEMIA, WITHOUT LONG-TERM CURRENT USE OF INSULIN (HCC): ICD-10-CM

## 2021-11-15 LAB — HBA1C MFR BLD: 10.7 %

## 2021-11-15 PROCEDURE — G8417 CALC BMI ABV UP PARAM F/U: HCPCS | Performed by: STUDENT IN AN ORGANIZED HEALTH CARE EDUCATION/TRAINING PROGRAM

## 2021-11-15 PROCEDURE — G8427 DOCREV CUR MEDS BY ELIG CLIN: HCPCS | Performed by: STUDENT IN AN ORGANIZED HEALTH CARE EDUCATION/TRAINING PROGRAM

## 2021-11-15 PROCEDURE — 2022F DILAT RTA XM EVC RTNOPTHY: CPT | Performed by: STUDENT IN AN ORGANIZED HEALTH CARE EDUCATION/TRAINING PROGRAM

## 2021-11-15 PROCEDURE — G8400 PT W/DXA NO RESULTS DOC: HCPCS | Performed by: STUDENT IN AN ORGANIZED HEALTH CARE EDUCATION/TRAINING PROGRAM

## 2021-11-15 PROCEDURE — 83036 HEMOGLOBIN GLYCOSYLATED A1C: CPT | Performed by: STUDENT IN AN ORGANIZED HEALTH CARE EDUCATION/TRAINING PROGRAM

## 2021-11-15 PROCEDURE — 3046F HEMOGLOBIN A1C LEVEL >9.0%: CPT | Performed by: STUDENT IN AN ORGANIZED HEALTH CARE EDUCATION/TRAINING PROGRAM

## 2021-11-15 PROCEDURE — G8484 FLU IMMUNIZE NO ADMIN: HCPCS | Performed by: STUDENT IN AN ORGANIZED HEALTH CARE EDUCATION/TRAINING PROGRAM

## 2021-11-15 PROCEDURE — G0008 ADMIN INFLUENZA VIRUS VAC: HCPCS | Performed by: STUDENT IN AN ORGANIZED HEALTH CARE EDUCATION/TRAINING PROGRAM

## 2021-11-15 PROCEDURE — 1123F ACP DISCUSS/DSCN MKR DOCD: CPT | Performed by: STUDENT IN AN ORGANIZED HEALTH CARE EDUCATION/TRAINING PROGRAM

## 2021-11-15 PROCEDURE — 99213 OFFICE O/P EST LOW 20 MIN: CPT | Performed by: STUDENT IN AN ORGANIZED HEALTH CARE EDUCATION/TRAINING PROGRAM

## 2021-11-15 PROCEDURE — 4040F PNEUMOC VAC/ADMIN/RCVD: CPT | Performed by: STUDENT IN AN ORGANIZED HEALTH CARE EDUCATION/TRAINING PROGRAM

## 2021-11-15 PROCEDURE — 3017F COLORECTAL CA SCREEN DOC REV: CPT | Performed by: STUDENT IN AN ORGANIZED HEALTH CARE EDUCATION/TRAINING PROGRAM

## 2021-11-15 PROCEDURE — 1036F TOBACCO NON-USER: CPT | Performed by: STUDENT IN AN ORGANIZED HEALTH CARE EDUCATION/TRAINING PROGRAM

## 2021-11-15 PROCEDURE — 90694 VACC AIIV4 NO PRSRV 0.5ML IM: CPT | Performed by: STUDENT IN AN ORGANIZED HEALTH CARE EDUCATION/TRAINING PROGRAM

## 2021-11-15 PROCEDURE — 1090F PRES/ABSN URINE INCON ASSESS: CPT | Performed by: STUDENT IN AN ORGANIZED HEALTH CARE EDUCATION/TRAINING PROGRAM

## 2021-11-15 RX ORDER — FLUCONAZOLE 150 MG/1
150 TABLET ORAL
Qty: 2 TABLET | Refills: 2 | Status: SHIPPED | OUTPATIENT
Start: 2021-11-15 | End: 2021-11-21

## 2021-11-15 ASSESSMENT — ENCOUNTER SYMPTOMS
SHORTNESS OF BREATH: 0
EYE PAIN: 0
SINUS PRESSURE: 0
VOMITING: 0
COUGH: 0
RHINORRHEA: 0
DIARRHEA: 0
BACK PAIN: 1
ABDOMINAL PAIN: 0
SORE THROAT: 0
NAUSEA: 0
SINUS PAIN: 0
CONSTIPATION: 0
EYE REDNESS: 0

## 2021-11-15 NOTE — PROGRESS NOTES
11/15/2021    Larisa Barber (:  1950) is a 80 y.o. female, here for evaluation of the following medical concerns:    HPI  Chief Complaint   Patient presents with    Diabetes     follow up. She has been getting chronic vaginal yeast infections because she thinks her bs has been elevated.  Eye Pain     She has been having stabbing pain in her eye. She has not contacted the eye doctor.  Back Pain     Stated it is chronic pain. DM2:   Patient is here to fu regarding DM2. Patient is not controlled. Taking all medications and tolerating well. Patient is taking ASA and Ace Inhibitor/ARB. Patient is not on appropriately-dosed statin. LDL is  at goal.  BP is  controlled. does not hypoglycemic episodes. Patient does not see Podiatry regularly. Saw an Eye Dr n/a needs an eye appointment. Patient is aware that it is necessary to see an Eye Dr yearly. Patient does not smoke. Most recent labs reviewed with patient. Patient does have complaints or concerns today. Lab Results   Component Value Date    LABA1C 10.3 2021       Lab Results   Component Value Date    [address] 2021      She has been having multiple yeast infections she knows it is because her sugars are elevated. She gets vaginal bleeding with it as well. Has not seen an OB in years. Review of Systems   Constitutional: Negative for chills, fatigue and fever. HENT: Negative for congestion, ear pain, postnasal drip, rhinorrhea, sinus pressure, sinus pain and sore throat. Eyes: Negative for pain and redness. Respiratory: Negative for cough and shortness of breath. Cardiovascular: Negative for chest pain. Gastrointestinal: Negative for abdominal pain, constipation, diarrhea, nausea and vomiting. Genitourinary: Positive for vaginal bleeding and vaginal discharge. Negative for difficulty urinating and dysuria. Musculoskeletal: Positive for back pain. Negative for myalgias and neck pain.    Skin: Negative  PAIN MANAGEMENT PROCEDURE Bilateral 2021    BILATERAL L3,4 MEDIAL BRANCH AND L5 DORSAL RAMUS  RADIOFREQUENCY ABLATION WITH SEDATION  (CPT 44564) performed by Herbert Rueda MD at [address] N/A 3/19/2021    T11 KYPHOPLASTY (2 C-ARMS) performed by Herbert Rueda MD at [address] ENDOSCOPY N/A 2019    EGD BIOPSY performed by Kevin Brower MD at [address] N/A 3/10/2021    EGD ESOPHAGOGASTRODUODENOSCOPY performed by Shruthi Sidhu MD at [address] [facility] Marital status:      Spouse name: Not on file    Number of children: Not on file    Years of education: Not on file    Highest education level: Not on file   Occupational History    Not on file   Tobacco Use    Smoking status: Former Smoker     Packs/day: 2.00     Years: 10.00     Pack years: 20.00     Types: Cigarettes     Start date: 0     Quit date:      Years since quittin.8    Smokeless tobacco: Never Used   Substance and Sexual Activity    Alcohol use: Not Currently     Alcohol/week: 1.0 standard drink     Types: 1 Glasses of wine per week    Drug use: No    Sexual activity: Never   Other Topics Concern    Not on file   Social History Narrative    Not on file     Social Determinants of Health     Financial Resource Strain: High Risk    Difficulty of Paying Living Expenses: Very hard   Food Insecurity: No Food Insecurity    Worried About Running Out of Food in the Last Year: Never true    Alo of Food in the Last Year: Never true   Transportation Needs:     Lack of Transportation (Medical): Not on file    Lack of Transportation (Non-Medical):  Not on file   Physical Activity:     Days of Exercise per Week: Not on file    Minutes of Exercise per Session: Not on file   Stress:     Feeling of Stress : Not on file   Social Connections:  Frequency of Communication with Friends and Family: Not on file    Frequency of Social Gatherings with Friends and Family: Not on file    Attends Jew Services: Not on file    Active Member of Clubs or Organizations: Not on file    Attends Club or Organization Meetings: Not on file    Marital Status: Not on file   Intimate Partner Violence:     Fear of Current or Ex-Partner: Not on file    Emotionally Abused: Not on file    Physically Abused: Not on file    Sexually Abused: Not on file   Housing Stability:     Unable to Pay for Housing in the Last Year: Not on file    Number of [place] in the Last Year: Not on file    Unstable Housing in the Last Year: Not on file        Family History   Problem Relation Age of Onset    Hypertension Father     Heart Disease Father     Hypertension Paternal Grandfather     Heart Disease Paternal Grandfather            Vitals:    11/15/21 1315   BP: 112/68   Site: Left Upper Arm   Pulse: 95   Resp: 16   Temp: 97.1 °F (36.2 °C)   TempSrc: Temporal   SpO2: 98%   Weight: 204 lb 9.6 oz (92.8 kg)   Height: 5' 2\" (1.575 m)     Estimated body mass index is 37.42 kg/m² as calculated from the following:    Height as of this encounter: 5' 2\" (1.575 m). Weight as of this encounter: 204 lb 9.6 oz (92.8 kg).     Most Recent Labs  CBC  Lab Results   Component Value Date    WBC 8.7 07/06/2021    WBC 6.3 03/10/2021    WBC 7.6 03/09/2021    RBC 3.53 07/06/2021    RBC 3.56 03/10/2021    RBC 3.70 03/09/2021    HGB 8.6 07/06/2021    HGB 9.7 05/11/2021    HGB 7.4 03/10/2021    HCT 28.6 07/06/2021    HCT 32.2 05/11/2021    HCT 25.3 03/10/2021    MCV 81.0 07/06/2021    MCV 71.9 03/10/2021    MCV 71.6 03/09/2021     07/06/2021     03/10/2021     03/09/2021      CMP  Lab Results   Component Value Date     03/10/2021     03/09/2021     03/08/2021    K 3.9 03/10/2021    K 4.1 03/09/2021    K 3.9 03/08/2021    K 3.6 03/08/2021    K 4.1 10/16/2020     03/10/2021     03/09/2021     03/08/2021    CO2 25 03/10/2021    CO2 22 03/09/2021    CO2 20 03/08/2021    ANIONGAP 8 03/10/2021    ANIONGAP 9 03/09/2021    ANIONGAP 11 03/08/2021    GLUCOSE 152 03/10/2021    GLUCOSE 187 03/09/2021    GLUCOSE 225 03/08/2021    BUN 12 03/10/2021    BUN 15 03/09/2021    BUN 18 03/08/2021    CREATININE 0.7 03/10/2021    CREATININE 0.7 03/09/2021    CREATININE 0.7 03/08/2021    LABGLOM >60 03/10/2021    LABGLOM >60 03/09/2021    LABGLOM >60 03/08/2021    GFRAA >60 03/10/2021    GFRAA >60 03/09/2021    GFRAA >60 03/08/2021    CALCIUM 8.9 03/10/2021    CALCIUM 8.7 03/09/2021    CALCIUM 9.5 03/08/2021    PROT 6.0 03/10/2021    PROT 6.0 03/09/2021    PROT 6.5 03/09/2021    LABALBU 3.2 03/10/2021    LABALBU 2.8 03/09/2021    LABALBU 3.4 03/09/2021    BILITOT 0.4 03/10/2021    BILITOT 0.5 03/09/2021    BILITOT 0.2 03/08/2021    ALKPHOS 83 03/10/2021    ALKPHOS 90 03/09/2021    ALKPHOS 101 03/08/2021    AST 35 03/10/2021    AST 32 03/09/2021    AST 46 03/08/2021    ALT 23 03/10/2021    ALT 24 03/09/2021    ALT 26 03/08/2021     A1C  Lab Results   Component Value Date    LABA1C 10.3 07/06/2021    LABA1C 7.0 03/09/2021    LABA1C 6.7 01/26/2021     TSH  Lab Results   Component Value Date    TSH 1.630 03/09/2021    TSH 2.680 01/14/2019     LIPID  Lab Results   Component Value Date    CHOL 148 03/09/2021    CHOL 169 11/29/2018    HDL 52 03/09/2021    HDL 59 10/16/2020    HDL 46 11/29/2018    LDLCALC 85 03/09/2021    LDLCALC 104 10/16/2020    LDLCALC 94 11/29/2018    TRIG 54 03/09/2021    TRIG 147 11/29/2018    CHOLHDLRATIO 3.7 11/29/2018    VLDL 29 11/29/2018     MAGNESIUM  Lab Results   Component Value Date    MG 2.2 03/09/2021    MG 1.8 01/18/2019    MG 2.0 01/17/2019      PHOS  Lab Results   Component Value Date    PHOS 3.9 03/09/2021       HEPATITIS C  Lab Results   Component Value Date    HCVABI Non-Reactive 10/16/2020     UA  Lab Results   Component Value Date COLORU yellow 02/08/2021    COLORU Yellow 01/15/2019    CLARITYU clear 02/08/2021    CLARITYU Clear 01/15/2019    GLUCOSEU 250mg/dl 02/08/2021    GLUCOSEU Negative 01/15/2019    BILIRUBINUR neg 02/08/2021    BILIRUBINUR Negative 01/15/2019    KETUA neg 02/08/2021    KETUA Negative 01/15/2019    SPECGRAV 1.015 02/08/2021    SPECGRAV <=1.005 01/15/2019    BLOODU neg 02/08/2021    BLOODU Negative 01/15/2019    PHUR 5.0 02/08/2021    PHUR 7.0 01/15/2019    PROTEINU neg 02/08/2021    PROTEINU Negative 01/15/2019    UROBILINOGEN 0.2 01/15/2019    NITRU Negative 01/15/2019    LEUKOCYTESUR small 02/08/2021    LEUKOCYTESUR TRACE 01/15/2019     Urine Micro/Albumin Ratio  Lab Results   Component Value Date    MALBCR 11.8 10/16/2020        Physical Exam  Vitals and nursing note reviewed. Constitutional:       Appearance: Normal appearance. She is obese. HENT:      Head: Normocephalic and atraumatic. Right Ear: External ear normal.      Left Ear: External ear normal.   Eyes:      Extraocular Movements: Extraocular movements intact. Conjunctiva/sclera: Conjunctivae normal.      Pupils: Pupils are equal, round, and reactive to light. Cardiovascular:      Rate and Rhythm: Normal rate and regular rhythm. Pulses: Normal pulses. Heart sounds: Murmur heard. Pulmonary:      Effort: Pulmonary effort is normal.      Breath sounds: Normal breath sounds. Abdominal:      General: Bowel sounds are normal.      Palpations: Abdomen is soft. Musculoskeletal:         General: Normal range of motion. Cervical back: Normal range of motion and neck supple. Skin:     General: Skin is warm and dry. Capillary Refill: Capillary refill takes less than 2 seconds. Neurological:      General: No focal deficit present. Mental Status: She is alert and oriented to person, place, and time.    Psychiatric:         Mood and Affect: Mood normal.         Behavior: Behavior normal.         Thought Content: Thought content normal.       Visual inspection:  Deformity/amputation: absent  Skin lesions/pre-ulcerative calluses: absent  Edema: right- negative, left- negative    Sensory exam:  Monofilament sensation: normal  (minimum of 5 random plantar locations tested, avoiding callused areas - > 1 area with absence of sensation is + for neuropathy)    Plus at least one of the following:  Pulses: normal,     ASSESSMENT/PLAN:  Anne Marie Kemp was seen today for diabetes, eye pain and back pain. Diagnoses and all orders for this visit:    Type 2 diabetes mellitus without complication, without long-term current use of insulin ([facility])  -     POCT glycosylated hemoglobin (Hb A1C)  -     Dulaglutide 4.5 MG/0.5ML SOPN; Inject 4.5 mg into the skin once a week  -      DIABETES FOOT EXAM  -     Microalbumin / Creatinine Urine Ratio; Future    Flu vaccine need  -     INFLUENZA, QUADV, ADJUVANTED, 65 YRS =, IM, PF, PREFILL SYR, 0.5ML (FLUAD)    Eye pain, bilateral    Recurrent major depressive disorder, in partial remission ([facility] [address]    Type 2 diabetes mellitus with hyperglycemia, without long-term current use of insulin (HCC)  -      DIABETES FOOT EXAM    Essential hypertension    Yeast infection  -     fluconazole (DIFLUCAN) 150 MG tablet; Take 1 tablet by mouth every 72 hours for 6 days       Recommended referral for OB and eye doctor but patient has limited transportation  Increase trulicity, discussed she really needs insulin but she wants to try an increased dose of the medication first. Will likely need endocrinology     Educational materials and/or home exercises printed for patient's review and were included in patient instructions on his/her After Visit Summary and given to patient at the end of visit. Counseled regarding above diagnosis, including possible risks and complications,  especially if left uncontrolled.      Counseled regarding the possible side effects, risks, benefits and alternatives to treatment; patient and/or

## 2021-11-15 NOTE — PROGRESS NOTES
Vaccine Information Sheet, \"Influenza - Inactivated\"  given to Khloe, or parent/legal guardian of  Khloe and verbalized understanding. Patient responses:    Have you ever had a reaction to a flu vaccine? No  Are you able to eat eggs without adverse effects? Yes  Do you have any current illness? No  Have you ever had [place] Syndrome? No    Flu vaccine given per order. Please see immunization tab.

## 2021-12-22 DIAGNOSIS — M54.16 LUMBAR RADICULAR PAIN: ICD-10-CM

## 2021-12-23 RX ORDER — ROPINIROLE 0.5 MG/1
TABLET, FILM COATED ORAL
Qty: 270 TABLET | Refills: 1 | Status: SHIPPED
Start: 2021-12-23 | End: 2022-03-08 | Stop reason: SDUPTHER

## 2021-12-23 RX ORDER — TIZANIDINE 2 MG/1
TABLET ORAL
Qty: 180 TABLET | Refills: 1 | Status: SHIPPED | OUTPATIENT
Start: 2021-12-23

## 2021-12-23 NOTE — TELEPHONE ENCOUNTER
Last Appointment:  11/15/2021  Future Appointments   Date Time Provider Ana Basilio   2/15/2022  1:00 PM DO JENIFFER [facility]

## 2022-01-12 DIAGNOSIS — E11.9 TYPE 2 DIABETES MELLITUS WITHOUT COMPLICATION, WITHOUT LONG-TERM CURRENT USE OF INSULIN (HCC): ICD-10-CM

## 2022-01-13 RX ORDER — DULAGLUTIDE 4.5 MG/.5ML
INJECTION, SOLUTION SUBCUTANEOUS
Qty: 12 PEN | Refills: 1 | Status: SHIPPED
Start: 2022-01-13 | End: 2022-05-13 | Stop reason: SDUPTHER

## 2022-01-13 NOTE — TELEPHONE ENCOUNTER
Last Appointment:  11/15/2021  Future Appointments   Date Time Provider Ana Basilio   1/17/2022  1:15 PM Kimberley Berman  [address]

## 2022-02-17 DIAGNOSIS — M54.16 LUMBAR RADICULOPATHY: ICD-10-CM

## 2022-02-17 DIAGNOSIS — G43.019 INTRACTABLE MIGRAINE WITHOUT AURA AND WITHOUT STATUS MIGRAINOSUS: ICD-10-CM

## 2022-02-17 DIAGNOSIS — I10 ESSENTIAL HYPERTENSION: ICD-10-CM

## 2022-02-17 RX ORDER — ERENUMAB-AOOE 70 MG/ML
INJECTION SUBCUTANEOUS
Qty: 1 PEN | Refills: 1 | Status: SHIPPED
Start: 2022-02-17 | End: 2022-02-22

## 2022-02-17 RX ORDER — LISINOPRIL 20 MG/1
TABLET ORAL
Qty: 30 TABLET | Refills: 0 | Status: SHIPPED
Start: 2022-02-17 | End: 2022-03-08 | Stop reason: SDUPTHER

## 2022-02-17 RX ORDER — OMEPRAZOLE 20 MG/1
20 CAPSULE, DELAYED RELEASE ORAL DAILY
Qty: 30 CAPSULE | Refills: 0 | Status: SHIPPED
Start: 2022-02-17 | End: 2022-03-08 | Stop reason: SDUPTHER

## 2022-02-17 RX ORDER — GABAPENTIN 300 MG/1
300 CAPSULE ORAL 4 TIMES DAILY
Qty: 120 CAPSULE | Refills: 0 | Status: SHIPPED
Start: 2022-02-17 | End: 2022-03-08 | Stop reason: SDUPTHER

## 2022-02-21 ENCOUNTER — TELEPHONE (OUTPATIENT)
Dept: FAMILY MEDICINE CLINIC | Age: 72
End: 2022-02-21

## 2022-02-21 NOTE — TELEPHONE ENCOUNTER
Fax received from [facility]. Clarification needed on [employer]. Directions for use for incomplete. Please send new script to [facility].

## 2022-02-22 DIAGNOSIS — G43.019 INTRACTABLE MIGRAINE WITHOUT AURA AND WITHOUT STATUS MIGRAINOSUS: ICD-10-CM

## 2022-02-22 RX ORDER — ERENUMAB-AOOE 70 MG/ML
70 INJECTION SUBCUTANEOUS
Qty: 3 PEN | Refills: 3 | Status: SHIPPED
Start: 2022-02-22 | End: 2022-09-27

## 2022-03-08 ENCOUNTER — OFFICE VISIT (OUTPATIENT)
Dept: FAMILY MEDICINE CLINIC | Age: 72
End: 2022-03-08
Payer: MEDICARE

## 2022-03-08 VITALS
RESPIRATION RATE: 18 BRPM | OXYGEN SATURATION: 98 % | DIASTOLIC BLOOD PRESSURE: 72 MMHG | SYSTOLIC BLOOD PRESSURE: 130 MMHG | BODY MASS INDEX: 35.9 KG/M2 | HEIGHT: 63 IN | HEART RATE: 96 BPM | TEMPERATURE: 98.5 F | WEIGHT: 202.6 LBS

## 2022-03-08 DIAGNOSIS — Z00.00 MEDICARE ANNUAL WELLNESS VISIT, SUBSEQUENT: ICD-10-CM

## 2022-03-08 DIAGNOSIS — M54.16 LUMBAR RADICULAR PAIN: ICD-10-CM

## 2022-03-08 DIAGNOSIS — F33.41 RECURRENT MAJOR DEPRESSIVE DISORDER, IN PARTIAL REMISSION (HCC): ICD-10-CM

## 2022-03-08 DIAGNOSIS — I10 ESSENTIAL HYPERTENSION: ICD-10-CM

## 2022-03-08 DIAGNOSIS — E11.9 TYPE 2 DIABETES MELLITUS WITHOUT COMPLICATION, WITHOUT LONG-TERM CURRENT USE OF INSULIN (HCC): Primary | ICD-10-CM

## 2022-03-08 DIAGNOSIS — Z23 NEED FOR PNEUMOCOCCAL VACCINE: ICD-10-CM

## 2022-03-08 DIAGNOSIS — J30.9 ALLERGIC RHINITIS, UNSPECIFIED SEASONALITY, UNSPECIFIED TRIGGER: ICD-10-CM

## 2022-03-08 DIAGNOSIS — E66.01 SEVERE OBESITY (BMI 35.0-39.9) WITH COMORBIDITY (HCC): ICD-10-CM

## 2022-03-08 DIAGNOSIS — Z91.199 NONCOMPLIANCE: ICD-10-CM

## 2022-03-08 DIAGNOSIS — D50.9 IRON DEFICIENCY ANEMIA, UNSPECIFIED IRON DEFICIENCY ANEMIA TYPE: ICD-10-CM

## 2022-03-08 DIAGNOSIS — E11.9 TYPE 2 DIABETES MELLITUS WITHOUT COMPLICATION, WITHOUT LONG-TERM CURRENT USE OF INSULIN (HCC): ICD-10-CM

## 2022-03-08 DIAGNOSIS — M54.16 LUMBAR RADICULOPATHY: ICD-10-CM

## 2022-03-08 DIAGNOSIS — E11.65 TYPE 2 DIABETES MELLITUS WITH HYPERGLYCEMIA, WITHOUT LONG-TERM CURRENT USE OF INSULIN (HCC): ICD-10-CM

## 2022-03-08 LAB
ALBUMIN SERPL-MCNC: 3.8 G/DL (ref 3.5–5.2)
ALP BLD-CCNC: 91 U/L (ref 35–104)
ALT SERPL-CCNC: 30 U/L (ref 0–32)
ANION GAP SERPL CALCULATED.3IONS-SCNC: 19 MMOL/L (ref 7–16)
AST SERPL-CCNC: 35 U/L (ref 0–31)
BASOPHILS ABSOLUTE: 0.05 E9/L (ref 0–0.2)
BASOPHILS RELATIVE PERCENT: 0.6 % (ref 0–2)
BILIRUB SERPL-MCNC: 0.3 MG/DL (ref 0–1.2)
BUN BLDV-MCNC: 24 MG/DL (ref 6–23)
CALCIUM SERPL-MCNC: 9.7 MG/DL (ref 8.6–10.2)
CHLORIDE BLD-SCNC: 96 MMOL/L (ref 98–107)
CHOLESTEROL, TOTAL: 201 MG/DL (ref 0–199)
CO2: 17 MMOL/L (ref 22–29)
CREAT SERPL-MCNC: 0.8 MG/DL (ref 0.5–1)
EOSINOPHILS ABSOLUTE: 0.56 E9/L (ref 0.05–0.5)
EOSINOPHILS RELATIVE PERCENT: 6.4 % (ref 0–6)
GFR AFRICAN AMERICAN: >60
GFR NON-AFRICAN AMERICAN: >60 ML/MIN/1.73
GLUCOSE BLD-MCNC: 401 MG/DL (ref 74–99)
HBA1C MFR BLD: 11 %
HCT VFR BLD CALC: 35.5 % (ref 34–48)
HDLC SERPL-MCNC: 63 MG/DL
HEMOGLOBIN: 11.2 G/DL (ref 11.5–15.5)
IMMATURE GRANULOCYTES #: 0.04 E9/L
IMMATURE GRANULOCYTES %: 0.5 % (ref 0–5)
LDL CHOLESTEROL CALCULATED: 115 MG/DL (ref 0–99)
LYMPHOCYTES ABSOLUTE: 2.1 E9/L (ref 1.5–4)
LYMPHOCYTES RELATIVE PERCENT: 24.1 % (ref 20–42)
MCH RBC QN AUTO: 29.7 PG (ref 26–35)
MCHC RBC AUTO-ENTMCNC: 31.5 % (ref 32–34.5)
MCV RBC AUTO: 94.2 FL (ref 80–99.9)
MONOCYTES ABSOLUTE: 0.75 E9/L (ref 0.1–0.95)
MONOCYTES RELATIVE PERCENT: 8.6 % (ref 2–12)
NEUTROPHILS ABSOLUTE: 5.23 E9/L (ref 1.8–7.3)
NEUTROPHILS RELATIVE PERCENT: 59.8 % (ref 43–80)
PDW BLD-RTO: 13.6 FL (ref 11.5–15)
PLATELET # BLD: 199 E9/L (ref 130–450)
PMV BLD AUTO: 11.1 FL (ref 7–12)
POTASSIUM SERPL-SCNC: 4.4 MMOL/L (ref 3.5–5)
RBC # BLD: 3.77 E12/L (ref 3.5–5.5)
SODIUM BLD-SCNC: 132 MMOL/L (ref 132–146)
TOTAL PROTEIN: 7.4 G/DL (ref 6.4–8.3)
TRIGL SERPL-MCNC: 117 MG/DL (ref 0–149)
TSH SERPL DL<=0.05 MIU/L-ACNC: 1.47 UIU/ML (ref 0.27–4.2)
VLDLC SERPL CALC-MCNC: 23 MG/DL
WBC # BLD: 8.7 E9/L (ref 4.5–11.5)

## 2022-03-08 PROCEDURE — 4040F PNEUMOC VAC/ADMIN/RCVD: CPT | Performed by: STUDENT IN AN ORGANIZED HEALTH CARE EDUCATION/TRAINING PROGRAM

## 2022-03-08 PROCEDURE — 3017F COLORECTAL CA SCREEN DOC REV: CPT | Performed by: STUDENT IN AN ORGANIZED HEALTH CARE EDUCATION/TRAINING PROGRAM

## 2022-03-08 PROCEDURE — 1090F PRES/ABSN URINE INCON ASSESS: CPT | Performed by: STUDENT IN AN ORGANIZED HEALTH CARE EDUCATION/TRAINING PROGRAM

## 2022-03-08 PROCEDURE — 1123F ACP DISCUSS/DSCN MKR DOCD: CPT | Performed by: STUDENT IN AN ORGANIZED HEALTH CARE EDUCATION/TRAINING PROGRAM

## 2022-03-08 PROCEDURE — 83036 HEMOGLOBIN GLYCOSYLATED A1C: CPT | Performed by: STUDENT IN AN ORGANIZED HEALTH CARE EDUCATION/TRAINING PROGRAM

## 2022-03-08 PROCEDURE — 1036F TOBACCO NON-USER: CPT | Performed by: STUDENT IN AN ORGANIZED HEALTH CARE EDUCATION/TRAINING PROGRAM

## 2022-03-08 PROCEDURE — 2022F DILAT RTA XM EVC RTNOPTHY: CPT | Performed by: STUDENT IN AN ORGANIZED HEALTH CARE EDUCATION/TRAINING PROGRAM

## 2022-03-08 PROCEDURE — 99214 OFFICE O/P EST MOD 30 MIN: CPT | Performed by: STUDENT IN AN ORGANIZED HEALTH CARE EDUCATION/TRAINING PROGRAM

## 2022-03-08 PROCEDURE — 90732 PPSV23 VACC 2 YRS+ SUBQ/IM: CPT | Performed by: STUDENT IN AN ORGANIZED HEALTH CARE EDUCATION/TRAINING PROGRAM

## 2022-03-08 PROCEDURE — G8400 PT W/DXA NO RESULTS DOC: HCPCS | Performed by: STUDENT IN AN ORGANIZED HEALTH CARE EDUCATION/TRAINING PROGRAM

## 2022-03-08 PROCEDURE — G0439 PPPS, SUBSEQ VISIT: HCPCS | Performed by: STUDENT IN AN ORGANIZED HEALTH CARE EDUCATION/TRAINING PROGRAM

## 2022-03-08 PROCEDURE — G8427 DOCREV CUR MEDS BY ELIG CLIN: HCPCS | Performed by: STUDENT IN AN ORGANIZED HEALTH CARE EDUCATION/TRAINING PROGRAM

## 2022-03-08 PROCEDURE — G8484 FLU IMMUNIZE NO ADMIN: HCPCS | Performed by: STUDENT IN AN ORGANIZED HEALTH CARE EDUCATION/TRAINING PROGRAM

## 2022-03-08 PROCEDURE — G0009 ADMIN PNEUMOCOCCAL VACCINE: HCPCS | Performed by: STUDENT IN AN ORGANIZED HEALTH CARE EDUCATION/TRAINING PROGRAM

## 2022-03-08 PROCEDURE — G8417 CALC BMI ABV UP PARAM F/U: HCPCS | Performed by: STUDENT IN AN ORGANIZED HEALTH CARE EDUCATION/TRAINING PROGRAM

## 2022-03-08 PROCEDURE — 3046F HEMOGLOBIN A1C LEVEL >9.0%: CPT | Performed by: STUDENT IN AN ORGANIZED HEALTH CARE EDUCATION/TRAINING PROGRAM

## 2022-03-08 RX ORDER — ROPINIROLE 0.5 MG/1
TABLET, FILM COATED ORAL
Qty: 270 TABLET | Refills: 1 | Status: SHIPPED
Start: 2022-03-08 | End: 2022-03-08

## 2022-03-08 RX ORDER — OMEPRAZOLE 20 MG/1
20 CAPSULE, DELAYED RELEASE ORAL DAILY
Qty: 30 CAPSULE | Refills: 0 | Status: SHIPPED
Start: 2022-03-08 | End: 2022-05-23

## 2022-03-08 RX ORDER — FLUOXETINE HYDROCHLORIDE 20 MG/1
60 CAPSULE ORAL DAILY
Qty: 270 CAPSULE | Refills: 1 | Status: SHIPPED
Start: 2022-03-08 | End: 2022-03-08

## 2022-03-08 RX ORDER — DULOXETIN HYDROCHLORIDE 30 MG/1
30 CAPSULE, DELAYED RELEASE ORAL DAILY
Qty: 30 CAPSULE | Refills: 5 | Status: SHIPPED | OUTPATIENT
Start: 2022-03-08

## 2022-03-08 RX ORDER — DULOXETIN HYDROCHLORIDE 30 MG/1
30 CAPSULE, DELAYED RELEASE ORAL DAILY
Qty: 30 CAPSULE | Refills: 5 | Status: SHIPPED
Start: 2022-03-08 | End: 2022-03-08

## 2022-03-08 RX ORDER — FLUTICASONE PROPIONATE 50 MCG
2 SPRAY, SUSPENSION (ML) NASAL 2 TIMES DAILY
Qty: 16 G | Refills: 5 | Status: SHIPPED
Start: 2022-03-08 | End: 2022-03-11 | Stop reason: DRUGHIGH

## 2022-03-08 RX ORDER — GABAPENTIN 300 MG/1
300 CAPSULE ORAL 4 TIMES DAILY
Qty: 120 CAPSULE | Refills: 0 | Status: SHIPPED
Start: 2022-03-08 | End: 2022-05-13 | Stop reason: SDUPTHER

## 2022-03-08 RX ORDER — LISINOPRIL 20 MG/1
TABLET ORAL
Qty: 30 TABLET | Refills: 0 | Status: SHIPPED
Start: 2022-03-08 | End: 2022-04-06

## 2022-03-08 RX ORDER — FLUTICASONE PROPIONATE 50 MCG
2 SPRAY, SUSPENSION (ML) NASAL 2 TIMES DAILY
COMMUNITY
End: 2022-03-08 | Stop reason: SDUPTHER

## 2022-03-08 RX ORDER — OMEPRAZOLE 20 MG/1
20 CAPSULE, DELAYED RELEASE ORAL DAILY
Qty: 30 CAPSULE | Refills: 0 | Status: SHIPPED
Start: 2022-03-08 | End: 2022-03-08

## 2022-03-08 RX ORDER — GABAPENTIN 300 MG/1
300 CAPSULE ORAL 4 TIMES DAILY
Qty: 120 CAPSULE | Refills: 0 | Status: SHIPPED
Start: 2022-03-08 | End: 2022-03-08

## 2022-03-08 RX ORDER — ROPINIROLE 0.5 MG/1
TABLET, FILM COATED ORAL
Qty: 270 TABLET | Refills: 1 | Status: SHIPPED | OUTPATIENT
Start: 2022-03-08

## 2022-03-08 RX ORDER — FLUTICASONE PROPIONATE 50 MCG
2 SPRAY, SUSPENSION (ML) NASAL 2 TIMES DAILY
Qty: 16 G | Refills: 5 | Status: SHIPPED
Start: 2022-03-08 | End: 2022-03-08

## 2022-03-08 RX ORDER — LISINOPRIL 20 MG/1
TABLET ORAL
Qty: 30 TABLET | Refills: 0 | Status: SHIPPED
Start: 2022-03-08 | End: 2022-03-08

## 2022-03-08 ASSESSMENT — PATIENT HEALTH QUESTIONNAIRE - PHQ9
SUM OF ALL RESPONSES TO PHQ QUESTIONS 1-9: 5
3. TROUBLE FALLING OR STAYING ASLEEP: 0
SUM OF ALL RESPONSES TO PHQ9 QUESTIONS 1 & 2: 5
1. LITTLE INTEREST OR PLEASURE IN DOING THINGS: 2
2. FEELING DOWN, DEPRESSED OR HOPELESS: 3
SUM OF ALL RESPONSES TO PHQ QUESTIONS 1-9: 5

## 2022-03-08 ASSESSMENT — LIFESTYLE VARIABLES
HOW OFTEN DO YOU HAVE A DRINK CONTAINING ALCOHOL: NEVER
HOW MANY STANDARD DRINKS CONTAINING ALCOHOL DO YOU HAVE ON A TYPICAL DAY: PATIENT DECLINED

## 2022-03-08 NOTE — PATIENT INSTRUCTIONS
Personalized Preventive Plan for Gregg Barber - 3/8/2022  Medicare offers a range of preventive health benefits. Some of the tests and screenings are paid in full while other may be subject to a deductible, co-insurance, and/or copay. Some of these benefits include a comprehensive review of your medical history including lifestyle, illnesses that may run in your family, and various assessments and screenings as appropriate. After reviewing your medical record and screening and assessments performed today your provider may have ordered immunizations, labs, imaging, and/or referrals for you. A list of these orders (if applicable) as well as your Preventive Care list are included within your After Visit Summary for your review. Other Preventive Recommendations:    · A preventive eye exam performed by an eye specialist is recommended every 1-2 years to screen for glaucoma; cataracts, macular degeneration, and other eye disorders. · A preventive dental visit is recommended every 6 months. · Try to get at least 150 minutes of exercise per week or 10,000 steps per day on a pedometer . · Order or download the FREE \"Exercise & Physical Activity: Your Everyday Guide\" from [employer]. Call 2-481.780.1834 or search [employer] online. · You need 6832-1340 mg of calcium and 5764-2900 IU of vitamin D per day. It is possible to meet your calcium requirement with diet alone, but a vitamin D supplement is usually necessary to meet this goal.  · When exposed to the sun, use a sunscreen that protects against both UVA and UVB radiation with an SPF of 30 or greater. Reapply every 2 to 3 hours or after sweating, drying off with a towel, or swimming. · Always wear a seat belt when traveling in a car. Always wear a helmet when riding a bicycle or motorcycle.   Patient Education        Learning About Carbohydrate (Carb) Counting and Eating Out When You Have Diabetes  Why plan your meals? Meal planning can be a key part of managing diabetes. Planning meals and snacks with the right balance of carbohydrate, protein, and fat can help you keep your blood sugar at the target level you set with your doctor. You don't have to eat special foods. You can eat what your family eats, including sweets once in a while. But you do have to pay attention to how often you eat and how much you eat of certain foods. You may want to work with a dietitian or a certified diabetes educator. He or she can give you tips and meal ideas and can answer your questions about meal planning. This health professional can also help you reach a healthy weight if that is one of your goals. What should you know about eating carbs? Managing the amount of carbohydrate (carbs) you eat is an important part of healthy meals when you have diabetes. Carbohydrate is found in many foods. Learn which foods have carbs. And learn the amounts of carbs in different foods. Bread, cereal, pasta, and rice have about 15 grams of carbs in a serving. A serving is 1 slice of bread (1 ounce), ½ cup of cooked cereal, or 1/3 cup of cooked pasta or rice. Fruits have 15 grams of carbs in a serving. A serving is 1 small fresh fruit, such as an apple or orange; ½ of a banana; ½ cup of cooked or canned fruit; ½ cup of fruit juice; 1 cup of melon or raspberries; or 2 tablespoons of dried fruit. Milk and no-sugar-added yogurt have 15 grams of carbs in a serving. A serving is 1 cup of milk or 2/3 cup of no-sugar-added yogurt. Starchy vegetables have 15 grams of carbs in a serving. A serving is ½ cup of mashed potatoes or sweet potato; 1 cup winter squash; ½ of a small baked potato; ½ cup of cooked beans; or ½ cup cooked corn or green peas. Learn how much carbs to eat each day and at each meal. A dietitian or CDE can teach you how to keep track of the amount of carbs you eat. This is called carbohydrate counting.   If you are not sure how to count carbohydrate grams, use the Plate Method to plan meals. It is a good, quick way to make sure that you have a balanced meal. It also helps you spread carbs throughout the day. Divide your plate by types of foods. Put non-starchy vegetables on half the plate, meat or other protein food on one-quarter of the plate, and a grain or starchy vegetable in the final quarter of the plate. To this you can add a small piece of fruit and 1 cup of milk or yogurt, depending on how many carbs you are supposed to eat at a meal.  Try to eat about the same amount of carbs at each meal. Do not \"save up\" your daily allowance of carbs to eat at one meal.  Proteins have very little or no carbs per serving. Examples of proteins are beef, chicken, turkey, fish, eggs, tofu, cheese, cottage cheese, and peanut butter. A serving size of meat is 3 ounces, which is about the size of a deck of cards. Examples of meat substitute serving sizes (equal to 1 ounce of meat) are 1/4 cup of cottage cheese, 1 egg, 1 tablespoon of peanut butter, and ½ cup of tofu. How can you eat out and still eat healthy? Learn to estimate the serving sizes of foods that have carbohydrate. If you measure food at home, it will be easier to estimate the amount in a serving of restaurant food. If the meal you order has too much carbohydrate (such as potatoes, corn, or baked beans), ask to have a low-carbohydrate food instead. Ask for a salad or green vegetables. If you use insulin, check your blood sugar before and after eating out to help you plan how much to eat in the future. If you eat more carbohydrate at a meal than you had planned, take a walk or do other exercise. This will help lower your blood sugar. What are some tips for eating healthy? Limit saturated fat, such as the fat from meat and dairy products. This is a healthy choice because people who have diabetes are at higher risk of heart disease.  So choose lean cuts of meat and nonfat or low-fat dairy products. Use olive or canola oil instead of butter or shortening when cooking. Don't skip meals. Your blood sugar may drop too low if you skip meals and take insulin or certain medicines for diabetes. Check with your doctor before you drink alcohol. Alcohol can cause your blood sugar to drop too low. Alcohol can also cause a bad reaction if you take certain diabetes medicines. Follow-up care is a key part of your treatment and safety. Be sure to make and go to all appointments, and call your doctor if you are having problems. It's also a good idea to know your test results and keep a list of the medicines you take. Where can you learn more? Go to https://P[employer] Eyespepiceweb.[employer]. org and sign in to your [employer] account. Enter K773 in the [employer] box to learn more about \"Learning About Carbohydrate (Carb) Counting and Eating Out When You Have Diabetes. \"     If you do not have an account, please click on the \"Sign Up Now\" link. Current as of: September 8, 2021               Content Version: 13.1  © 2006-2021 [employer]. Care instructions adapted under license by [facility] ([facility]). If you have questions about a medical condition or this instruction, always ask your healthcare professional. [place] [address] any warranty or liability for your use of this information. Patient Education        Counting Carbohydrates for Diabetes: Care Instructions  Your Care Instructions     You don't have to eat special foods when you have diabetes. You just have to be careful to eat healthy foods. Carbohydrates (carbs) raise blood sugar higher and quicker than any other nutrient. Carbs are found in desserts, breads and cereals, and fruit. They're also in starchy vegetables. These include potatoes, corn, and grains such as rice and pasta. Carbs are also in milk and yogurt. The more carbs you eat at one time, the higher your blood sugar will rise.  Spreading carbs all through the day helps keep your blood sugar levels within your target range. Counting carbs is one of the best ways to keep your blood sugar under control. If you use insulin, counting carbs helps you match the right amount of insulin to the number of grams of carbs in a meal. Then you can change your diet and insulin dose as needed. Testing your blood sugar several times a day can help you learn how carbs affect your blood sugar. A registered dietitian or certified diabetes educator can help you plan meals and snacks. Follow-up care is a key part of your treatment and safety. Be sure to make and go to all appointments, and call your doctor if you are having problems. It's also a good idea to know your test results and keep a list of the medicines you take. How can you care for yourself at home? Know your daily amount of carbohydrates  Your daily amount depends on several things, such as your weight, how active you are, which diabetes medicines you take, and what your goals are for your blood sugar levels. A registered dietitian or certified diabetes educator can help you plan how many carbs to include in each meal and snack. For most adults, a guideline for the daily amount of carbs is:  45 to 60 grams at each meal. That's about the same as 3 to 4 carbohydrate servings. 15 to 20 grams at each snack. That's about the same as 1 carbohydrate serving. Count carbs  Counting carbs lets you know how much rapid-acting insulin to take before you eat. If you use an insulin pump, you get a constant rate of insulin during the day. So the pump must be programmed at meals. This gives you extra insulin to cover the rise in blood sugar after meals. If you take insulin:  Learn your own insulin-to-carb ratio. You and your diabetes health professional will figure out the ratio. You can do this by testing your blood sugar after meals. For example, you may need a certain amount of insulin for every 15 grams of carbs.   Add up the carb grams in a meal. Then you can figure out how many units of insulin to take based on your insulin-to-carb ratio. Exercise lowers blood sugar. You can use less insulin than you would if you were not doing exercise. Keep in mind that timing matters. If you exercise within 1 hour after a meal, your body may need less insulin for that meal than it would if you exercised 3 hours after the meal. Test your blood sugar to find out how exercise affects your need for insulin. If you do or don't take insulin:  Look at labels on packaged foods. This can tell you how many carbs are in a serving. You can also use guides from the American Diabetes Association. Be aware of portions, or serving sizes. If a package has two servings and you eat the whole package, you need to double the number of grams of carbohydrate listed for one serving. Protein, fat, and fiber do not raise blood sugar as much as carbs do. If you eat a lot of these nutrients in a meal, your blood sugar will rise more slowly than it would otherwise. Eat from all food groups  Eat at least three meals a day. Plan meals to include food from all the food groups. The food groups include grains, fruits, dairy, proteins, and vegetables. Talk to your dietitian or diabetes educator about ways to add limited amounts of sweets into your meal plan. If you drink alcohol, talk to your doctor. It may not be recommended when you are taking certain diabetes medicines. Where can you learn more? Go to https://[employer]. org and sign in to your [employer] account. Enter I091 in the [facility] box to learn more about \"Counting Carbohydrates for Diabetes: Care Instructions. \"     If you do not have an account, please click on the \"Sign Up Now\" link. Current as of: July 28, 2021               Content Version: 13.1  © 4374-2627 Healthwise, Incorporated. Care instructions adapted under license by [facility] ([facility]).  If you have questions about a medical condition or this instruction, always ask your healthcare professional. [address] any warranty or liability for your use of this information. Patient Education        Diabetes and Preventing Falls: Care Instructions  Overview     Complications of diabetes--such as nerve damage, foot problems, and reduced vision--may increase your risk of a fall. Some of your medicines also may add to your risk. By making your home safer, you can lower your risk of falling. Doing things to prevent diabetes complications may also help to lower your risk. You can make your home safer with a few simple measures. Follow-up care is a key part of your treatment and safety. Be sure to make and go to all appointments, and call your doctor if you are having problems. It's also a good idea to know your test results and keep a list of the medicines you take. How can you care for yourself at home? Taking care of yourself  Keep your blood sugar at a target level (which you set with your doctor). Exercise regularly to improve your strength, muscle tone, and balance. Walk if you can. Swimming may be a good choice if you cannot walk easily. Have your vision checked as often as your doctor recommends. It is usually once a year or more often if you have eye problems. Know the side effects of the medicines you take. Ask your doctor or pharmacist whether the medicines you take can affect your balance. Sleeping pills or sedatives can affect your balance. Limit the amount of alcohol you drink. Alcohol can impair your balance and other senses. Have your doctor check your feet during each visit. If you have a foot problem, see your doctor. Preventing falls at home  Remove raised doorway thresholds, throw rugs, and clutter. Repair loose carpet or raised areas in the floor. Move furniture and electrical cords to keep them out of walking paths.   Use nonskid floor wax, and wipe up spills right away, especially on ceramic tile floors. If you use a walker or cane, put rubber tips on it. If you use crutches, clean the bottoms of them regularly with an abrasive pad, such as steel wool. Keep your house well lit, especially stairways, porches, and outside walkways. Use night-lights in areas such as hallways and bathrooms. Add extra light switches or use remote switches (such as switches that go on or off when you clap your hands) to make it easier to turn lights on if you have to get up during the night. Install sturdy handrails on stairways. Put grab bars near your shower, bathtub, and toilet. Store household items on low shelves so that you do not have to climb or reach high. Or use a reaching device that you can get at a medical supply store. If you have to climb for something, use a step stool with handrails, or ask someone to get it for you. Keep a cordless phone and a flashlight with new batteries by your bed. If possible, put a phone in each of the main rooms of your house, or carry a cell phone in case you fall and cannot reach a phone. Or you can wear a device around your neck or wrist. You push a button that sends a signal for help. Wear low-heeled shoes that fit well and give your feet good support. Use footwear with nonskid soles. Check the heels and soles of your shoes for wear. Repair or replace worn heels or soles. Do not wear socks without shoes on wood floors. Walk on the grass when the sidewalks are slippery. If you live in an area that gets snow and ice in the winter, sprinkle salt on slippery steps and sidewalks. Where can you learn more? Go to https://[employer]. org and sign in to your [employer] account. Enter S582 in the [facility] box to learn more about \"Diabetes and Preventing Falls: Care Instructions. \"     If you do not have an account, please click on the \"Sign Up Now\" link.   Current as of: September 8, 2021               Content Version: 13.1  © 9636-9180 Healthwise, Incorporated. Care instructions adapted under license by [facility] ([facility]). If you have questions about a medical condition or this instruction, always ask your healthcare professional. [address] any warranty or liability for your use of this information.

## 2022-03-08 NOTE — PROGRESS NOTES
Medicare Annual Wellness Visit    Bello Pacheco is here for Medicare AWV (has not taken BP med for couple months)    Assessment & Plan   Jethro Grajeda was seen today for medicare awv. Diagnoses and all orders for this visit:    Type 2 diabetes mellitus without complication, without long-term current use of insulin ([facility])  -     POCT glycosylated hemoglobin (Hb A1C)  -     Lipid Panel; Future    Lumbar radiculopathy  -     Discontinue: gabapentin (NEURONTIN) 300 MG capsule; Take 1 capsule by mouth 4 times daily for 30 days. -     Discontinue: DULoxetine (CYMBALTA) 30 MG extended release capsule; Take 1 capsule by mouth daily  -     DULoxetine (CYMBALTA) 30 MG extended release capsule; Take 1 capsule by mouth daily  -     gabapentin (NEURONTIN) 300 MG capsule; Take 1 capsule by mouth 4 times daily for 30 days. Recurrent major depressive disorder, in partial remission (HCC)  -     Discontinue: FLUoxetine (PROZAC) 20 MG capsule; Take 3 capsules by mouth daily  -     External Referral To Psychiatry  -     Discontinue: DULoxetine (CYMBALTA) 30 MG extended release capsule; Take 1 capsule by mouth daily  -     DULoxetine (CYMBALTA) 30 MG extended release capsule; Take 1 capsule by mouth daily    Essential hypertension  -     Discontinue: lisinopril (PRINIVIL;ZESTRIL) 20 MG tablet; Take one tablet po daily in the a.m.  -     CBC with Auto Differential; Future  -     Comprehensive Metabolic Panel; Future  -     lisinopril (PRINIVIL;ZESTRIL) 20 MG tablet; Take one tablet po daily in the a.m. Lumbar radicular pain  -     Discontinue: rOPINIRole (REQUIP) 0.5 MG tablet; Take 1 tablet 3 times daily  -     rOPINIRole (REQUIP) 0.5 MG tablet; Take 1 tablet 3 times daily    Severe obesity (BMI 35.0-39. 9) with comorbidity ([facility])  -     TSH;  Future    Medicare annual wellness visit, subsequent    Allergic rhinitis, unspecified seasonality, unspecified trigger  -     Discontinue: fluticasone (FLONASE) 50 MCG/ACT nasal spray; 2 sprays by Each Nostril route 2 times daily  -     fluticasone (FLONASE) 50 MCG/ACT nasal spray; 2 sprays by Each Nostril route 2 times daily    Type 2 diabetes mellitus with hyperglycemia, without long-term current use of insulin (HCC)    Noncompliance    Iron deficiency anemia, unspecified iron deficiency anemia type  -     Cancel: POCT hemoglobin    Need for pneumococcal vaccine  -     PNEUMOVAX 23 subcutaneous/IM (Pneumococcal polysaccharide vaccine 23-valent >= 3yo)    Other orders  -     Discontinue: omeprazole (PRILOSEC) 20 MG delayed release capsule; Take 1 capsule by mouth Daily  -     omeprazole (PRILOSEC) 20 MG delayed release capsule; Take 1 capsule by mouth Daily         Recommendations for Preventive Services Due: see orders and patient instructions/AVS.  Recommended screening schedule for the next 5-10 years is provided to the patient in written form: see Patient Instructions/AVS.    The patient is asked to make an attempt to improve diet and exercise patterns to aid in medical management of this problem. There have been some probable medication, dietary and lifestyle compliance issues here. I have discussed with her the great importance of following the treatment plan exactly as directed in order to achieve a good medical outcome. The patient is asked to make an attempt to improve diet and exercise patterns to aid in medical management of this problem. Patient declines ECHO at this time even though it has been recommended for the murmur     Taper prozac- take 10 mg daily for a week, take 10 mg every other day then start the cymbalta for the hope that it will help with anxiety and pain      Return for Medicare Annual Wellness Visit in 1 year. Subjective   The following acute and/or chronic problems were also addressed today:  Anxiety/depression  Back pain      Patient's complete Health Risk Assessment and screening values have been reviewed and are found in Flowsheets.  The following problems were reviewed today and where indicated follow up appointments were made and/or referrals ordered.     Positive Risk Factor Screenings with Interventions:    Fall Risk:  Do you feel unsteady or are you worried about falling? : (!) yes  2 or more falls in past year?: no  Fall with injury in past year?: (!) yes     Fall Risk Interventions:    · Home safety tips provided     Depression:  PHQ-2 Score: 5  PHQ-9 Total Score: 5    Severity:1-4 = minimal depression, 5-9 = mild depression, 10-14 = moderate depression, 15-19 = moderately severe depression, 20-27 = severe depression    Depression Interventions:  · Patient advised to follow-up in this office for further evaluation and treatment within 1 week  · Regular exercise recommended- 3-5 times per week, 30-45 minutes per session  · Relaxation techniques discussed  · Counseling/psychotherapy referral ordered for further evaluation/treatment  · Psychiatry referral ordered for further evaluation/treatment  · Medication adjusted- will switch prozac to [address] and ACP:  General  In general, how would you say your health is?: (!) Poor  In the past 7 days, have you experienced any of the following: New or Increased Pain, New or Increased Fatigue, Loneliness, Social Isolation, Stress or Anger?: (!) Yes  Select all that apply: (!) New or Increased Pain,New or Increased Fatigue,Loneliness,Social Isolation  Do you get the social and emotional support that you need?: (!) No  Do you have a Living Will?: (!) No    Advance Directives     Power of  Living Will ACP-Advance Directive ACP-Power of     Not on File Not on File Not on File Not on File      General Health Risk Interventions:  · Poor self-assessment of health status: discussed importance of compliance with medications  · Pain issues: patient sees pain management but it is hard to get to their office  · Loneliness: patient's comments regarding inadequate social support: family is not around here, covid is an issue, patient may move to [place]  · Social isolation: patient's comments regarding inadequate social support: patient is willing to see a counselor and psychiatry  · Anger: patient's comments regarding reasons for stress and/or anger: she is socially isolated  · Inadequate social/emotional support: patient's comments regarding inadequate social support: family is not around here, has good friends but it is hard with covid  · No Living Will: Patient declines ACP discussion/assistance    Health Habits/Nutrition:     Physical Activity: Inactive    Days of Exercise per Week: 0 days    Minutes of Exercise per Session: 0 min     Have you lost any weight without trying in the past 3 months?: (!) Yes  Body mass index: (!) 35.89  Have you seen the dentist within the past year?: (!) No    Health Habits/Nutrition Interventions:  · Inadequate physical activity:  educational materials provided to promote increased physical activity  · Nutritional issues:  educational materials for healthy, well-balanced diet provided  · Dental exam overdue:  patient encouraged to make appointment with his/her dentist     Safety:  Do you have working smoke detectors?: Yes  Do you have any tripping hazards - loose or unsecured carpets or rugs?: No  Do you have any tripping hazards - clutter in doorways, halls, or stairs?: No  Do you have either shower bars, grab bars, non-slip mats or non-slip surfaces in your shower or bathtub?: Yes  Do all of your stairways have a railing or banister?: (!) No  Do you always fasten your seatbelt when you are in a car?: Yes    ADLs:  In the past 7 days, did you need help from others to perform any of the following everyday activities: Eating, dressing, grooming, bathing, toileting, or walking/balance?: No  In the past 7 days, did you need help from others to take care of any of the following: Laundry, housekeeping, banking/finances, shopping, telephone use, food preparation, transportation, or taking medications?: (!) Yes  Select all that apply: (!) Laundry,Housekeeping,Transportation          Objective   Vitals:    03/08/22 1128   BP: 130/72   Pulse: 96   Resp: 18   Temp: 98.5 °F (36.9 °C)   TempSrc: Infrared   SpO2: 98%   Weight: 202 lb 9.6 oz (91.9 kg)   Height: 5' 3\" (1.6 m)      Body mass index is 35.89 kg/m². Physical Exam  Vitals and nursing note reviewed. Constitutional:       Appearance: Normal appearance. She is obese. HENT:      Head: Normocephalic and atraumatic. Right Ear: External ear normal.      Left Ear: External ear normal.   Eyes:      Extraocular Movements: Extraocular movements intact. Conjunctiva/sclera: Conjunctivae normal.      Pupils: Pupils are equal, round, and reactive to light. Cardiovascular:      Rate and Rhythm: Normal rate and regular rhythm. Pulses: Normal pulses. Heart sounds: Murmur heard. Pulmonary:      Effort: Pulmonary effort is normal.      Breath sounds: Normal breath sounds. Abdominal:      General: Bowel sounds are normal.      Palpations: Abdomen is soft. Musculoskeletal:         General: Tenderness present. Cervical back: Normal range of motion and neck supple. Skin:     General: Skin is warm and dry. Capillary Refill: Capillary refill takes less than 2 seconds. Neurological:      General: No focal deficit present. Mental Status: She is alert and oriented to person, place, and time. Psychiatric:         Mood and Affect: Mood is anxious and depressed. Affect is tearful. Behavior: Behavior normal.         Thought Content: Thought content normal.           Allergies   Allergen Reactions    Baclofen Other (See Comments)     Dry mouth, abd pain    Ibuprofen Other (See Comments)     Acute bleeding    Nickel      Surgical steel    Penicillins Hives     Prior to Visit Medications    Medication Sig Taking?  Authorizing Provider   DULoxetine (CYMBALTA) 30 MG extended release capsule Take 1 capsule by mouth daily Yes Kimberley Berman DO   fluticasone (FLONASE) 50 MCG/ACT nasal spray 2 sprays by Each Nostril route 2 times daily Yes Kimberley Berman DO   gabapentin (NEURONTIN) 300 MG capsule Take 1 capsule by mouth 4 times daily for 30 days. Yes Kimberley Berman DO   lisinopril (PRINIVIL;ZESTRIL) 20 MG tablet Take one tablet po daily in the a.m.  Yes Kimberley Berman DO   omeprazole (PRILOSEC) 20 MG delayed release capsule Take 1 capsule by mouth Daily Yes Kimberley Berman DO   rOPINIRole (REQUIP) 0.5 MG tablet Take 1 tablet 3 times daily Yes Kimberley Berman DO   Erenumab-aooe (AIMOVIG) 70 MG/ML SOAJ Inject 70 mg into the skin every 30 days Yes Kimberley Berman DO   TRULICITY 4.5 AQ/8.8MW SOPN INJECT 4.5MG (1 PEN) SUBCUTANEOUSLY EVERY WEEK Yes Kimberley Berman DO   tiZANidine (ZANAFLEX) 2 MG tablet TAKE 1 TABLET TWICE DAILY Yes Kimberley Berman DO   melatonin 3 MG TABS tablet Take 3 mg by mouth nightly Yes Historical Provider, MD Chacon (Including outside providers/suppliers regularly involved in providing care):   Patient Care Team:  Iron Gonzalez DO as PCP - General (Family Medicine)  Iron Gonzalez DO as PCP - [facility] Empaneled Provider    Reviewed and updated this visit:  Tobacco  Allergies  Meds  Problems  Med Hx  Surg Hx  Soc Hx  Fam Hx

## 2022-03-09 DIAGNOSIS — E11.65 UNCONTROLLED TYPE 2 DIABETES MELLITUS WITH HYPERGLYCEMIA (HCC): Primary | ICD-10-CM

## 2022-03-09 DIAGNOSIS — E78.2 MIXED HYPERLIPIDEMIA: ICD-10-CM

## 2022-03-09 RX ORDER — ROSUVASTATIN CALCIUM 20 MG/1
20 TABLET, COATED ORAL NIGHTLY
Qty: 90 TABLET | Refills: 1 | Status: SHIPPED | OUTPATIENT
Start: 2022-03-09

## 2022-03-11 ENCOUNTER — TELEPHONE (OUTPATIENT)
Dept: FAMILY MEDICINE CLINIC | Age: 72
End: 2022-03-11

## 2022-03-11 DIAGNOSIS — J30.9 ALLERGIC RHINITIS, UNSPECIFIED SEASONALITY, UNSPECIFIED TRIGGER: Primary | ICD-10-CM

## 2022-03-11 RX ORDER — FLUTICASONE PROPIONATE 50 MCG
1 SPRAY, SUSPENSION (ML) NASAL DAILY
Qty: 32 G | Refills: 1 | Status: SHIPPED
Start: 2022-03-11 | End: 2022-10-07 | Stop reason: SDUPTHER

## 2022-03-11 NOTE — TELEPHONE ENCOUNTER
Per pt's insurance RX for Flonase 2 sprays twice daily for a total of 400mcg/day. Maximum recommended dosage is 200mcg/day intranasally. Please clarify dosing directions.

## 2022-04-05 ENCOUNTER — OFFICE VISIT (OUTPATIENT)
Dept: PAIN MANAGEMENT | Age: 72
End: 2022-04-05
Payer: MEDICARE

## 2022-04-05 VITALS
OXYGEN SATURATION: 94 % | RESPIRATION RATE: 16 BRPM | BODY MASS INDEX: 35.79 KG/M2 | HEART RATE: 50 BPM | HEIGHT: 63 IN | TEMPERATURE: 96.6 F | WEIGHT: 202 LBS | SYSTOLIC BLOOD PRESSURE: 118 MMHG | DIASTOLIC BLOOD PRESSURE: 72 MMHG

## 2022-04-05 DIAGNOSIS — M48.061 SPINAL STENOSIS OF LUMBAR REGION WITHOUT NEUROGENIC CLAUDICATION: ICD-10-CM

## 2022-04-05 DIAGNOSIS — M47.816 LUMBAR FACET ARTHROPATHY: ICD-10-CM

## 2022-04-05 DIAGNOSIS — M48.061 STENOSIS OF LATERAL RECESS OF LUMBAR SPINE: ICD-10-CM

## 2022-04-05 DIAGNOSIS — M51.9 THORACIC DISC DISEASE: ICD-10-CM

## 2022-04-05 DIAGNOSIS — M47.814 THORACIC SPONDYLOSIS: ICD-10-CM

## 2022-04-05 DIAGNOSIS — G89.4 CHRONIC PAIN SYNDROME: ICD-10-CM

## 2022-04-05 DIAGNOSIS — M54.16 LUMBAR RADICULOPATHY: ICD-10-CM

## 2022-04-05 DIAGNOSIS — M53.3 DISORDER OF SACRUM: ICD-10-CM

## 2022-04-05 DIAGNOSIS — M51.9 LUMBAR DISC DISORDER: ICD-10-CM

## 2022-04-05 DIAGNOSIS — M47.816 LUMBAR SPONDYLOSIS: Primary | ICD-10-CM

## 2022-04-05 PROCEDURE — 4040F PNEUMOC VAC/ADMIN/RCVD: CPT | Performed by: PAIN MEDICINE

## 2022-04-05 PROCEDURE — 1036F TOBACCO NON-USER: CPT | Performed by: PAIN MEDICINE

## 2022-04-05 PROCEDURE — 3017F COLORECTAL CA SCREEN DOC REV: CPT | Performed by: PAIN MEDICINE

## 2022-04-05 PROCEDURE — G8400 PT W/DXA NO RESULTS DOC: HCPCS | Performed by: PAIN MEDICINE

## 2022-04-05 PROCEDURE — G8427 DOCREV CUR MEDS BY ELIG CLIN: HCPCS | Performed by: PAIN MEDICINE

## 2022-04-05 PROCEDURE — 99213 OFFICE O/P EST LOW 20 MIN: CPT | Performed by: PAIN MEDICINE

## 2022-04-05 PROCEDURE — G8417 CALC BMI ABV UP PARAM F/U: HCPCS | Performed by: PAIN MEDICINE

## 2022-04-05 PROCEDURE — 99214 OFFICE O/P EST MOD 30 MIN: CPT | Performed by: PAIN MEDICINE

## 2022-04-05 PROCEDURE — 1123F ACP DISCUSS/DSCN MKR DOCD: CPT | Performed by: PAIN MEDICINE

## 2022-04-05 PROCEDURE — 1090F PRES/ABSN URINE INCON ASSESS: CPT | Performed by: PAIN MEDICINE

## 2022-04-05 RX ORDER — FLUOXETINE HYDROCHLORIDE 20 MG/1
CAPSULE ORAL
COMMUNITY
Start: 2022-03-08 | End: 2022-05-13 | Stop reason: SDUPTHER

## 2022-04-05 NOTE — PROGRESS NOTES
Do you currently have any of the following:    Fever: No  Headache:  Yes  Cough: No  Shortness of breath: No  Exposed to anyone with these symptoms: No                                                                                                                Aixa Jenkins presents to the [facility] on 4/5/2022. Jovanni Alexandra is complaining of pain back. The pain is constant. The pain is described as aching, throbbing and burning. Pain is rated on her best day at a 5, on her worst day at a 9, and on average at a 8 on the VAS scale. She took her last dose of Neurontin and cymbalta, zanaflex . Jovanni Alexandra does not have issues with constipation. Any procedures since your last visit: no    She is not on NSAIDS and  is not on anticoagulation medications to include none      Pacemaker or defibrillator: No Physician      Medication Contract and Consent for Opioid Use Documents Filed      No documents found                   /72   Pulse 50   Temp 96.6 °F (35.9 °C) (Infrared)   Resp 16   Ht 5' 3\" (1.6 m)   Wt 202 lb (91.6 kg)   LMP  (LMP Unknown)   SpO2 94%   BMI 35.78 kg/m²      No LMP recorded (lmp unknown).  Patient is postmenopausal.

## 2022-04-05 NOTE — PROGRESS NOTES
[address]  [address] [facility], [address] [place] [address] Jhon  [phone]    Follow up Note      Celi Barber     Date of Visit:  4/5/2022    CC:  Patient presents for follow up   No chief complaint on file. HPI:  Office extension for worsening for worsening low back pain with radiation to bilateral hips down the left lower extremity down to her foot, last seen 03/2021. Pain is described as stabbing. Pain is aggravated by standing and sitting, pain is relieved by ice. Appropriate analgesia with current medications regimen:Fair  Change in quality of symptoms:no.    Medication side effects:none  Recent diagnostic testing:none  Recent interventional procedures:none    She has not been on anticoagulation medications to include none. The patient  has not been on herbal supplements.  The patient is diabetic. Imaging:   Lumbar spine MRI 2020:  Mild levoconvex lumbar scoliosis.      Mild central canal stenosis and moderate right foraminal stenosis at L2-L3.         Moderate right foraminal stenosis and mild left foraminal stenosis at L3-L4.         Severe left foraminal stenosis and left lateral recess stenosis at L4-L5.         Mild bilateral foraminal stenosis at L5-S1 greater on the right. Lumbar spine MRI 2014  1.  Marked disc degeneration at L2-3, L3-4 and L4-5. 2.  Central/right subarticular disc protrusion at L2-3, displacing the   right L3 nerve root.  Mild to moderate spinal stenosis at this level. 3.  Right extraforaminal disc protrusion at L3-4, displacing the right L3   nerve root. 4.  Left lateral recess stenosis at L4-5 with medial displacement of the   left L5 nerve root. 5.  Multilevel facet arthropathy. 6.  Lumbar levoscoliosis. 7.  Neural foraminal stenosis as above. Thoracic spine Xray 2021:   Multilevel degenerative disc disease. Previous treatments: Physical Therapy, Epidural Steroid Injection, facet RFA and medications. Francisco Arch    Potential Aberrant Drug-Related Behavior:    None    Urine Drug Screening:  Saliva screen 08/2020 showed no narcotics which is consistent    OARRS report:  04/2022 consistent. Opioid agreement:  Renewal date:08/2021    Past Medical History:   Diagnosis Date    Arthritis     Cataract     right    Chronic fatigue syndrome     Depression     Diabetes mellitus (HCC)     SC injection weekly    Eczema     Fibromyalgia     Hypertension     Memory loss     Mononucleosis     Sciatica     Sleep apnea     no c-pap    Vertigo      Past Surgical History:   Procedure Laterality Date    CARPAL TUNNEL RELEASE Bilateral     CATARACT REMOVAL Right     OTHER SURGICAL HISTORY Bilateral 01/21/2021    BILATERAL L3,L4 MEDIAL BRANCH AND L5 DORSAL RAMUS RADIOFREQUENCY    PAIN MANAGEMENT PROCEDURE Bilateral 1/21/2021    BILATERAL L3,4 MEDIAL BRANCH AND L5 DORSAL RAMUS  RADIOFREQUENCY ABLATION WITH SEDATION  (CPT T3114406) performed by Nicole Silverman MD at [address]  N/A 3/19/2021    T11 KYPHOPLASTY (2 C-ARMS) performed by Nicole Silverman MD at [address] [facility] N/A 1/16/2019    EGD BIOPSY performed by Noam Royal MD at [facility] N/A 3/10/2021    EGD ESOPHAGOGASTRODUODENOSCOPY performed by Abdi Mix MD at [facility]     Prior to Admission medications    Medication Sig Start Date End Date Taking? Authorizing Provider   fluticasone (FLONASE) 50 MCG/ACT nasal spray 1 spray by Each Nostril route daily 3/11/22   Kimberley Berman, DO   rosuvastatin (CRESTOR) 20 MG tablet Take 1 tablet by mouth nightly 3/9/22   Kimberley Berman, DO   DULoxetine (CYMBALTA) 30 MG extended release capsule Take 1 capsule by mouth daily 3/8/22   Kimberley Berman, DO   gabapentin (NEURONTIN) 300 MG capsule Take 1 capsule by mouth 4 times daily for 30 days.  3/8/22 4/7/22  Kimberley Berman, DO   lisinopril (PRINIVIL;ZESTRIL) 20 MG tablet Take one tablet po daily in the a.m. 3/8/22   Kimberley Berman,    omeprazole (PRILOSEC) 20 MG delayed release capsule Take 1 capsule by mouth Daily 3/8/22   Kimberley Berman DO   rOPINIRole (REQUIP) 0.5 MG tablet Take 1 tablet 3 times daily 3/8/22   Kimberley Berman DO   Erenumab-aooe (AIMOVIG) 70 MG/ML SOAJ Inject 70 mg into the skin every 30 days 22   Kimberley Berman, DO   TRULICITY 4.5 NV/3.7QU SOPN INJECT 4.5MG (1 PEN) SUBCUTANEOUSLY EVERY WEEK 22   Kimberley Berman DO   tiZANidine (ZANAFLEX) 2 MG tablet TAKE 1 TABLET TWICE DAILY 21   Kimberley Berman DO   melatonin 3 MG TABS tablet Take 3 mg by mouth nightly    Historical Provider, MD     Allergies   Allergen Reactions    Baclofen Other (See Comments)     Dry mouth, abd pain    Ibuprofen Other (See Comments)     Acute bleeding    Nickel      Surgical steel    Penicillins Hives     Social History     Socioeconomic History    Marital status:      Spouse name: Not on file    Number of children: Not on file    Years of education: Not on file    Highest education level: Not on file   Occupational History    Not on file   Tobacco Use    Smoking status: Former Smoker     Packs/day: 2.00     Years: 10.00     Pack years: 20.00     Types: Cigarettes     Start date: 0     Quit date:      Years since quittin.2    Smokeless tobacco: Never Used   Substance and Sexual Activity    Alcohol use: Not Currently     Alcohol/week: 1.0 standard drink     Types: 1 Glasses of wine per week    Drug use: No    Sexual activity: Never   Other Topics Concern    Not on file   Social History Narrative    Not on file     Social Determinants of Health     Financial Resource Strain: High Risk    Difficulty of Paying Living Expenses: Very hard   Food Insecurity: No Food Insecurity    Worried About Running Out of Food in the Last Year: Never true    Alo of Food in the Last Year: Never true   Transportation Needs:     Lack of Transportation (Medical): Not on file    Lack of Transportation (Non-Medical): Not on file   Physical Activity: Inactive    Days of Exercise per Week: 0 days    Minutes of Exercise per Session: 0 min   Stress:     Feeling of Stress : Not on file   Social Connections:     Frequency of Communication with Friends and Family: Not on file    Frequency of Social Gatherings with Friends and Family: Not on file    Attends Orthodoxy Services: Not on file    Active Member of [address] [place] [address] [place] or Organizations: Not on file    Attends Club or Organization Meetings: Not on file    Marital Status: Not on file   Intimate Partner Violence:     Fear of Current or Ex-Partner: Not on file    Emotionally Abused: Not on file    Physically Abused: Not on file    Sexually Abused: Not on file   Housing Stability:     Unable to Pay for Housing in the Last Year: Not on file    Number of [place] in the Last Year: Not on file    Unstable Housing in the Last Year: Not on file     Family History   Problem Relation Age of Onset    Hypertension Father     Heart Disease Father     Hypertension Paternal Grandfather     Heart Disease Paternal Grandfather      REVIEW OF SYSTEMS:     Demi Robison denies fever/chills, chest pain, shortness of breath, new bowel or bladder complaints. All other review of systems was negative. PHYSICAL EXAMINATION:      LMP  (LMP Unknown)     General:       General appearance:   elderly, pleasant and well-hydrated. , in moderate discomfort and A & O x3  Build:Overweight     HEENT:     Head:normocephalic and atraumatic  Sclera: icterus absent,      Lungs:     Breathing:Breathing Pattern: regular, no distress     Abdomen:     Shape:obese, non-distended and normal  Tenderness:none     Lumbar spine:     Spine inspection:normal   CVA tenderness:No   Palpation:tenderness paravertebral muscles, facet loading, left, right and positive. Right worse than Left  Range of motion:abnormal moderately Lateral bending, flexion, extension rotation bilateral and is  painful.     Musculoskeletal:     Trigger points in Paraveteral:absent bilaterally  SI joint tenderness:negative right, negative left  SLR:negative right, negative left, sitting      Extremities:     Tremors:None bilaterally upper and lower  Range of motion:pain with internal rotation of hips negative. Intact:Yes  Edema:Normal     Neurological:     Sensory:normal to light touch bilateral lower extremities. Motor:                             Right Quadriceps5/5          Left Quadriceps5/5           Right Gastrocnemius5/5    Left Gastrocnemius5/5  Right Ant Tibialis5/5  Left Ant Tibialis5/5  Reflexes:    Right Quadriceps reflex2+  Left Quadriceps reflex2+  Right Achilles reflex2+  Left Achilles reflex2+  Gait:normal     Dermatology:     Skin:no unusual rashes and no skin lesions     Impression:  Chronic low back pain with non dermatomal radiation to bilateral lower extremities. Lumbar spine MRI 2014 multilevel degenerative disc disease with multilevel facet arthropathy. Patient had LESI and lumbar facet RFA, per patient RFA had lasted for 5 month. Patient mentioned that she had physical therapy before which had aggravated her pain. Plan:  Office extension for worsening low back with radiation to the Left lower extremity, last seen 03/2021. Reviewed previous lumbar spine imaging studies, will schedule patient for lumbar spine MR and bilateral SIJ Xray. Will consider referral to [address] [place] [address] for second opinion after reviewing her MRI. Currently on Gabapentin 300 mg QID  OARRS report reviewed 04/2022. Patient encouraged to stay active and to lose weight. Treatment plan discussed with the patient including medications side effects. We discussed with the patient that combining opioids, benzodiazepines, alcohol, illicit drugs or sleep aids increases the risk of respiratory depression including death.  We discussed that these medications may cause drowsiness, sedation or dizziness and have counseled the patient not to drive or operate machinery. We have discussed that these medications will be prescribed only by one provider. We have discussed with the patient about age related risk factors and have thoroughly discussed the importance of taking these medications as prescribed. The patient verbalizes understanding. ccrefvalarie Hess M.D.

## 2022-04-06 DIAGNOSIS — I10 ESSENTIAL HYPERTENSION: ICD-10-CM

## 2022-04-06 RX ORDER — LISINOPRIL 20 MG/1
TABLET ORAL
Qty: 90 TABLET | Refills: 1 | Status: SHIPPED
Start: 2022-04-06 | End: 2022-09-01

## 2022-04-06 NOTE — TELEPHONE ENCOUNTER
Last Appointment:  3/8/2022  Future Appointments   Date Time Provider Ana Basilio   5/6/2022  1:00 PM Henry Jacobsen MD [facility]

## 2022-05-06 ENCOUNTER — TELEPHONE (OUTPATIENT)
Dept: PAIN MANAGEMENT | Age: 72
End: 2022-05-06

## 2022-05-06 NOTE — TELEPHONE ENCOUNTER
Adair Ganser called in, her mother has fallen and she takes care of her. She would really like something for pain if possible.

## 2022-05-13 DIAGNOSIS — M54.16 LUMBAR RADICULOPATHY: ICD-10-CM

## 2022-05-13 DIAGNOSIS — E11.9 TYPE 2 DIABETES MELLITUS WITHOUT COMPLICATION, WITHOUT LONG-TERM CURRENT USE OF INSULIN (HCC): ICD-10-CM

## 2022-05-13 RX ORDER — DULAGLUTIDE 4.5 MG/.5ML
INJECTION, SOLUTION SUBCUTANEOUS
Qty: 12 PEN | Refills: 1 | Status: SHIPPED
Start: 2022-05-13 | End: 2022-05-17 | Stop reason: SDUPTHER

## 2022-05-13 RX ORDER — GABAPENTIN 300 MG/1
300 CAPSULE ORAL 4 TIMES DAILY
Qty: 360 CAPSULE | Refills: 1 | Status: SHIPPED | OUTPATIENT
Start: 2022-05-13 | End: 2022-09-27

## 2022-05-13 RX ORDER — FLUOXETINE HYDROCHLORIDE 20 MG/1
CAPSULE ORAL
Qty: 270 CAPSULE | Refills: 3 | Status: SHIPPED | OUTPATIENT
Start: 2022-05-13 | End: 2022-06-12

## 2022-05-13 NOTE — TELEPHONE ENCOUNTER
Last Appointment:  3/8/2022  Future Appointments   Date Time Provider Ana Basilio   5/17/2022  1:00 PM Theresa Medina MD [facility]

## 2022-05-17 DIAGNOSIS — E11.9 TYPE 2 DIABETES MELLITUS WITHOUT COMPLICATION, WITHOUT LONG-TERM CURRENT USE OF INSULIN (HCC): ICD-10-CM

## 2022-05-17 RX ORDER — DULAGLUTIDE 4.5 MG/.5ML
INJECTION, SOLUTION SUBCUTANEOUS
Qty: 4 PEN | Refills: 0 | Status: SHIPPED
Start: 2022-05-17 | End: 2022-09-27 | Stop reason: SDUPTHER

## 2022-05-17 NOTE — TELEPHONE ENCOUNTER
Last Appointment:  3/8/2022  No future appointments. Message left from patient that she is waiting on mail order but she has been out for about a week.  Would like short supply sent to [facility].

## 2022-05-23 RX ORDER — OMEPRAZOLE 20 MG/1
CAPSULE, DELAYED RELEASE ORAL
Qty: 30 CAPSULE | Refills: 5 | Status: SHIPPED
Start: 2022-05-23 | End: 2022-09-27 | Stop reason: SDUPTHER

## 2022-06-02 ENCOUNTER — TELEPHONE (OUTPATIENT)
Dept: FAMILY MEDICINE CLINIC | Age: 72
End: 2022-06-02

## 2022-06-02 NOTE — TELEPHONE ENCOUNTER
Were they calling in for Demi Robison or for her mother?  I have not placed a referral for Demi Robison for PT

## 2022-07-05 ENCOUNTER — TELEPHONE (OUTPATIENT)
Dept: FAMILY MEDICINE CLINIC | Age: 72
End: 2022-07-05

## 2022-07-05 NOTE — TELEPHONE ENCOUNTER
Patient wants a referral to a back specialist that uses a Spinal Nerve Stimulator. She only sees a pain specialist and that is not helping her.  Please fernando

## 2022-07-05 NOTE — TELEPHONE ENCOUNTER
I thought that Dr. Yisel Boogie did this, she can call their office and see.  If not we can refer somewhere else

## 2022-08-16 ENCOUNTER — OFFICE VISIT (OUTPATIENT)
Dept: PAIN MANAGEMENT | Age: 72
End: 2022-08-16
Payer: MEDICARE

## 2022-08-16 VITALS
OXYGEN SATURATION: 96 % | SYSTOLIC BLOOD PRESSURE: 130 MMHG | WEIGHT: 202 LBS | RESPIRATION RATE: 16 BRPM | DIASTOLIC BLOOD PRESSURE: 80 MMHG | HEART RATE: 116 BPM | BODY MASS INDEX: 35.79 KG/M2 | TEMPERATURE: 96.9 F | HEIGHT: 63 IN

## 2022-08-16 DIAGNOSIS — M48.061 SPINAL STENOSIS OF LUMBAR REGION WITHOUT NEUROGENIC CLAUDICATION: ICD-10-CM

## 2022-08-16 DIAGNOSIS — M47.816 LUMBAR SPONDYLOSIS: ICD-10-CM

## 2022-08-16 DIAGNOSIS — M53.3 DISORDER OF SACRUM: ICD-10-CM

## 2022-08-16 DIAGNOSIS — M51.9 LUMBAR DISC DISORDER: ICD-10-CM

## 2022-08-16 DIAGNOSIS — G89.4 CHRONIC PAIN SYNDROME: Primary | ICD-10-CM

## 2022-08-16 DIAGNOSIS — Z91.81 AT HIGH RISK FOR FALLS: ICD-10-CM

## 2022-08-16 DIAGNOSIS — M51.9 THORACIC DISC DISEASE: ICD-10-CM

## 2022-08-16 DIAGNOSIS — M47.894 THORACIC FACET JOINT SYNDROME: ICD-10-CM

## 2022-08-16 DIAGNOSIS — M47.816 LUMBAR FACET ARTHROPATHY: ICD-10-CM

## 2022-08-16 DIAGNOSIS — M48.061 STENOSIS OF LATERAL RECESS OF LUMBAR SPINE: ICD-10-CM

## 2022-08-16 DIAGNOSIS — M25.551 PAIN IN RIGHT HIP: ICD-10-CM

## 2022-08-16 PROCEDURE — 1123F ACP DISCUSS/DSCN MKR DOCD: CPT | Performed by: PAIN MEDICINE

## 2022-08-16 PROCEDURE — 99213 OFFICE O/P EST LOW 20 MIN: CPT | Performed by: PAIN MEDICINE

## 2022-08-16 PROCEDURE — G8427 DOCREV CUR MEDS BY ELIG CLIN: HCPCS | Performed by: PAIN MEDICINE

## 2022-08-16 PROCEDURE — G8417 CALC BMI ABV UP PARAM F/U: HCPCS | Performed by: PAIN MEDICINE

## 2022-08-16 PROCEDURE — 3017F COLORECTAL CA SCREEN DOC REV: CPT | Performed by: PAIN MEDICINE

## 2022-08-16 PROCEDURE — 1090F PRES/ABSN URINE INCON ASSESS: CPT | Performed by: PAIN MEDICINE

## 2022-08-16 PROCEDURE — 1036F TOBACCO NON-USER: CPT | Performed by: PAIN MEDICINE

## 2022-08-16 PROCEDURE — G8400 PT W/DXA NO RESULTS DOC: HCPCS | Performed by: PAIN MEDICINE

## 2022-08-16 RX ORDER — FLUCONAZOLE 150 MG/1
TABLET ORAL
COMMUNITY
Start: 2022-05-13

## 2022-08-16 NOTE — PROGRESS NOTES
[address] [facility], [address] [place] [address] Jhon  [phone]    Follow up Note      Kelsey Barber     Date of Visit:  8/16/2022    CC:  Patient presents for follow up   Chief Complaint   Patient presents with    Lower Back Pain     Wants to talk about SCS trial     HPI:  Follow up on her low back pain with radiation to bilateral hips down the left lower extremity down to her foot with no acute issues, last seen 04/2022. Appropriate analgesia with current medications regimen:Fair  Change in quality of symptoms:no. Medication side effects:none  Recent diagnostic testing:none  Recent interventional procedures:none    She has not been on anticoagulation medications to include none. The patient  has not been on herbal supplements. The patient is diabetic. Imaging:   Lumbar spine MRI 2020:  Mild levoconvex lumbar scoliosis. Mild central canal stenosis and moderate right foraminal stenosis at L2-L3. Moderate right foraminal stenosis and mild left foraminal stenosis at L3-L4. Severe left foraminal stenosis and left lateral recess stenosis at L4-L5. Mild bilateral foraminal stenosis at L5-S1 greater on the right. Lumbar spine MRI 2014  1. Marked disc degeneration at L2-3, L3-4 and L4-5.  2.  Central/right subarticular disc protrusion at L2-3, displacing the   right L3 nerve root. Mild to moderate spinal stenosis at this level. 3.  Right extraforaminal disc protrusion at L3-4, displacing the right L3   nerve root. 4.  Left lateral recess stenosis at L4-5 with medial displacement of the   left L5 nerve root. 5.  Multilevel facet arthropathy. 6.  Lumbar levoscoliosis. 7.  Neural foraminal stenosis as above. Thoracic spine Xray 2021:   Multilevel degenerative disc disease. Previous treatments: Physical Therapy, Epidural Steroid Injection, facet RFA and medications. .      Potential Aberrant Drug-Related Behavior: None    Urine Drug Screening:  Saliva screen 08/2020 showed no narcotics which is consistent    OARRS report:  08/2022 consistent. Opioid agreement:  Renewal date:N/A    Past Medical History:   Diagnosis Date    Arthritis     Cataract     right    Chronic fatigue syndrome     Depression     Diabetes mellitus (HCC)     SC injection weekly    Eczema     Fibromyalgia     Hypertension     Memory loss     Mononucleosis     Sciatica     Sleep apnea     no c-pap    Vertigo      Past Surgical History:   Procedure Laterality Date    CARPAL TUNNEL RELEASE Bilateral     CATARACT REMOVAL Right     OTHER SURGICAL HISTORY Bilateral 01/21/2021    BILATERAL L3,L4 MEDIAL BRANCH AND L5 DORSAL RAMUS RADIOFREQUENCY    PAIN MANAGEMENT PROCEDURE Bilateral 1/21/2021    BILATERAL L3,4 MEDIAL BRANCH AND L5 DORSAL RAMUS  RADIOFREQUENCY ABLATION WITH SEDATION  (CPT Q8447732) performed by Herbert Rueda MD at [place] [address] 3/19/2021    T11 KYPHOPLASTY (2 C-ARMS) performed by Herbert Rueda MD at [address] [place] [address] 1/16/2019    EGD BIOPSY performed by Kevin Brower MD at [facility] N/A 3/10/2021    EGD ESOPHAGOGASTRODUODENOSCOPY performed by Shruthi Sidhu MD at [address]     Prior to Admission medications    Medication Sig Start Date End Date Taking?  Authorizing Provider   fluconazole (DIFLUCAN) 150 MG tablet  5/13/22  Yes Historical Provider, MD   omeprazole (PRILOSEC) 20 MG delayed release capsule TAKE 1 CAPSULE EVERY DAY 5/23/22  Yes Kimberley Berman DO   Dulaglutide (TRULICITY) 4.5 WL/5.5MI SOPN INJECT 4.5MG (1 PEN) SUBCUTANEOUSLY EVERY WEEK 5/17/22  Yes Kimberley Berman DO   lisinopril (PRINIVIL;ZESTRIL) 20 MG tablet TAKE 1 TABLET EVERY MORNING 4/6/22  Yes Kimberley Berman DO   fluticasone (FLONASE) 50 MCG/ACT nasal spray 1 spray by Each Nostril route daily 3/11/22  Yes Kimberley Berman DO   rosuvastatin (CRESTOR) 20 MG tablet Take 1 tablet by mouth nightly 3/9/22  Yes Kimberley Berman DO   DULoxetine (CYMBALTA) 30 MG extended release capsule Take 1 capsule by mouth daily 3/8/22  Yes Kimberley Berman DO   rOPINIRole (REQUIP) 0.5 MG tablet Take 1 tablet 3 times daily 3/8/22  Yes Kimberley Berman DO   Erenumab-aooe (AIMOVIG) 70 MG/ML SOAJ Inject 70 mg into the skin every 30 days 22  Yes Kimberley Berman DO   tiZANidine (ZANAFLEX) 2 MG tablet TAKE 1 TABLET TWICE DAILY 21  Yes Kimberley Berman DO   melatonin 3 MG TABS tablet Take 3 mg by mouth nightly   Yes Historical Provider, MD   FLUoxetine (PROZAC) 20 MG capsule TAKE 3 CAPSULES EVERY DAY 22  Kimberley Berman DO   gabapentin (NEURONTIN) 300 MG capsule Take 1 capsule by mouth 4 times daily for 30 days.  22  Kimberley Berman DO     Allergies   Allergen Reactions    Baclofen Other (See Comments)     Dry mouth, abd pain    Ibuprofen Other (See Comments)     Acute bleeding    Nickel      Surgical steel    Penicillins Hives     Social History     Socioeconomic History    Marital status:      Spouse name: Not on file    Number of children: Not on file    Years of education: Not on file    Highest education level: Not on file   Occupational History    Not on file   Tobacco Use    Smoking status: Former     Packs/day: 2.00     Years: 10.00     Pack years: 20.00     Types: Cigarettes     Start date: 0     Quit date:      Years since quittin.6    Smokeless tobacco: Never   Substance and Sexual Activity    Alcohol use: Not Currently     Alcohol/week: 1.0 standard drink     Types: 1 Glasses of wine per week    Drug use: No    Sexual activity: Never   Other Topics Concern    Not on file   Social History Narrative    Not on file     Social Determinants of Health     Financial Resource Strain: Not on file   Food Insecurity: Not on file   Transportation Needs: Not on file   Physical Activity: Inactive    Days of Exercise per Week: 0 days    Minutes of Exercise per Session: 0 min   Stress: Not on file   Social Connections: Not on file   Intimate Partner Violence: Not on file   Housing Stability: Not on file     Family History   Problem Relation Age of Onset    Hypertension Father     Heart Disease Father     Hypertension Paternal Grandfather     Heart Disease Paternal Grandfather      REVIEW OF SYSTEMS:     Sai Pitts denies fever/chills, chest pain, shortness of breath, new bowel or bladder complaints. All other review of systems was negative. PHYSICAL EXAMINATION:      /80   Pulse (!) 116   Temp 96.9 °F (36.1 °C) (Infrared)   Resp 16   Ht 5' 3\" (1.6 m)   Wt 202 lb (91.6 kg)   LMP  (LMP Unknown)   SpO2 96%   BMI 35.78 kg/m²     General:       General appearance:   elderly, pleasant and well-hydrated. , in moderate discomfort and A & O x3  Build:Overweight     HEENT:     Head:normocephalic and atraumatic  Sclera: icterus absent,      Lungs:     Breathing:Breathing Pattern: regular, no distress     Abdomen:     Shape:obese, non-distended and normal  Tenderness:none     Lumbar spine:     Spine inspection:normal   CVA tenderness:No   Palpation:tenderness paravertebral muscles, facet loading, left, right and positive. Right worse than Left  Range of motion:abnormal moderately Lateral bending, flexion, extension rotation bilateral and is  painful. Musculoskeletal:     Trigger points in Paraveteral:absent bilaterally  SI joint tenderness:negative right, negative left  SLR:negative right, negative left, sitting      Extremities:     Tremors:None bilaterally upper and lower  Range of motion:pain with internal rotation of hips positive right  Intact:Yes  Edema:Normal     Neurological:     Sensory:normal to light touch bilateral lower extremities.   Motor:                             Right Quadriceps5/5          Left Quadriceps5/5           Right Gastrocnemius5/5    Left Gastrocnemius5/5  Right Ant Tibialis5/5  Left Ant Tibialis5/5  Reflexes:    Right Quadriceps reflex2+  Left Quadriceps reflex2+  Right Achilles reflex2+  Left Achilles reflex2+  Gait:normal     Dermatology:     Skin:no unusual rashes and no skin lesions     Impression:  Chronic low back pain with non dermatomal radiation to bilateral lower extremities. Lumbar spine MRI 2014 multilevel degenerative disc disease with multilevel facet arthropathy. Patient had LESI and lumbar facet RFA, per patient RFA had lasted for 5 month. Patient mentioned that she had physical therapy before which had aggravated her pain. Plan:  Follow up on her low back with radiation to the Left lower extremity with no acute issues. Patient wasn't able to get lumbar spine MRI because she was taking care of her sick mother. Will consider referral to [address] [place] [address] for second opinion after reviewing her MRI. Currently on Gabapentin 300 mg QID  OARRS report reviewed 08/2022. Patient encouraged to stay active and to lose weight. Treatment plan discussed with the patient including medications side effects. We discussed with the patient that combining opioids, benzodiazepines, alcohol, illicit drugs or sleep aids increases the risk of respiratory depression including death. We discussed that these medications may cause drowsiness, sedation or dizziness and have counseled the patient not to drive or operate machinery. We have discussed that these medications will be prescribed only by one provider. We have discussed with the patient about age related risk factors and have thoroughly discussed the importance of taking these medications as prescribed. The patient verbalizes understanding. ccreferring checo Light M.D. On the basis of positive falls risk screening, assessment and plan is as follows: home safety tips provided.

## 2022-08-16 NOTE — PROGRESS NOTES
Do you currently have any of the following:    Fever: No  Headache:  No  Cough: No  Shortness of breath: No  Exposed to anyone with these symptoms: Nicci                                                                                                                Joana Alvarado presents to the [facility] on 8/16/2022. Rolando Mccartney is complaining of pain in lower back and right hip. . The pain is constant. The pain is described as aching and spasms. Pain is rated on her best day at a 8, on her worst day at a 8, and on average at a 8 on the VAS scale. She took her last dose of Neurontin and tizanidine  . Rolando Mccartney does not have issues with constipation. Any procedures since your last visit: No,     She is not on NSAIDS and  is not on anticoagulation medications to include none and is managed by NA. Pacemaker or defibrillator: No Physician managing device is NA. Medication Contract and Consent for Opioid Use Documents Filed        No documents found                       /80   Pulse (!) 116   Temp 96.9 °F (36.1 °C) (Infrared)   Resp 16   Ht 5' 3\" (1.6 m)   Wt 202 lb (91.6 kg)   LMP  (LMP Unknown)   SpO2 96%   BMI 35.78 kg/m²      No LMP recorded (lmp unknown).  Patient is postmenopausal.

## 2022-09-01 DIAGNOSIS — I10 ESSENTIAL HYPERTENSION: ICD-10-CM

## 2022-09-01 RX ORDER — LISINOPRIL 20 MG/1
TABLET ORAL
Qty: 30 TABLET | Refills: 0 | Status: SHIPPED
Start: 2022-09-01 | End: 2022-10-26 | Stop reason: SDUPTHER

## 2022-09-01 NOTE — TELEPHONE ENCOUNTER
Last Appointment:  3/8/2022  Future Appointments   Date Time Provider Ana Basilio   9/16/2022  2:30 PM [facility] MRI RM [facility] MRI [facility]   9/23/2022 10:30 AM Silas Hobson MD [place] [facility]   9/27/2022  1:30 PM Karla Gregory, DO [facility] [address]

## 2022-09-16 ENCOUNTER — HOSPITAL ENCOUNTER (OUTPATIENT)
Age: 72
Discharge: HOME | End: 2022-09-18
Payer: MEDICARE

## 2022-09-16 ENCOUNTER — HOSPITAL ENCOUNTER (OUTPATIENT)
Dept: MRI IMAGING | Age: 72
Discharge: HOME | End: 2022-09-18
Payer: MEDICARE

## 2022-09-16 DIAGNOSIS — M48.061 SPINAL STENOSIS OF LUMBAR REGION WITHOUT NEUROGENIC CLAUDICATION: ICD-10-CM

## 2022-09-16 PROCEDURE — 72148 MRI LUMBAR SPINE W/O DYE: CPT

## 2022-09-27 ENCOUNTER — OFFICE VISIT (OUTPATIENT)
Dept: FAMILY MEDICINE CLINIC | Age: 72
End: 2022-09-27
Payer: MEDICARE

## 2022-09-27 VITALS
SYSTOLIC BLOOD PRESSURE: 126 MMHG | DIASTOLIC BLOOD PRESSURE: 78 MMHG | HEART RATE: 121 BPM | BODY MASS INDEX: 34.2 KG/M2 | RESPIRATION RATE: 16 BRPM | WEIGHT: 193 LBS | HEIGHT: 63 IN | TEMPERATURE: 98.3 F | OXYGEN SATURATION: 97 %

## 2022-09-27 DIAGNOSIS — M54.16 LUMBAR RADICULOPATHY: ICD-10-CM

## 2022-09-27 DIAGNOSIS — E11.65 UNCONTROLLED TYPE 2 DIABETES MELLITUS WITH HYPERGLYCEMIA (HCC): ICD-10-CM

## 2022-09-27 DIAGNOSIS — E11.65 TYPE 2 DIABETES MELLITUS WITH HYPERGLYCEMIA, WITHOUT LONG-TERM CURRENT USE OF INSULIN (HCC): ICD-10-CM

## 2022-09-27 DIAGNOSIS — G89.4 CHRONIC PAIN SYNDROME: ICD-10-CM

## 2022-09-27 DIAGNOSIS — K21.9 GASTROESOPHAGEAL REFLUX DISEASE, UNSPECIFIED WHETHER ESOPHAGITIS PRESENT: ICD-10-CM

## 2022-09-27 DIAGNOSIS — D50.9 IRON DEFICIENCY ANEMIA, UNSPECIFIED IRON DEFICIENCY ANEMIA TYPE: ICD-10-CM

## 2022-09-27 DIAGNOSIS — G43.009 MIGRAINE WITHOUT AURA AND WITHOUT STATUS MIGRAINOSUS, NOT INTRACTABLE: ICD-10-CM

## 2022-09-27 DIAGNOSIS — M54.16 LUMBAR RADICULAR PAIN: ICD-10-CM

## 2022-09-27 DIAGNOSIS — I10 ESSENTIAL HYPERTENSION: ICD-10-CM

## 2022-09-27 DIAGNOSIS — E11.65 TYPE 2 DIABETES MELLITUS WITH HYPERGLYCEMIA, WITHOUT LONG-TERM CURRENT USE OF INSULIN (HCC): Primary | ICD-10-CM

## 2022-09-27 DIAGNOSIS — R01.1 SYSTOLIC MURMUR: ICD-10-CM

## 2022-09-27 PROBLEM — E66.812 CLASS 2 OBESITY DUE TO EXCESS CALORIES WITHOUT SERIOUS COMORBIDITY WITH BODY MASS INDEX (BMI) OF 37.0 TO 37.9 IN ADULT: Status: RESOLVED | Noted: 2018-05-09 | Resolved: 2022-09-27

## 2022-09-27 PROBLEM — E66.09 CLASS 2 OBESITY DUE TO EXCESS CALORIES WITHOUT SERIOUS COMORBIDITY WITH BODY MASS INDEX (BMI) OF 37.0 TO 37.9 IN ADULT: Status: RESOLVED | Noted: 2018-05-09 | Resolved: 2022-09-27

## 2022-09-27 LAB
ALBUMIN SERPL-MCNC: 4.1 G/DL (ref 3.5–5.2)
ALP BLD-CCNC: 95 U/L (ref 35–104)
ALT SERPL-CCNC: 30 U/L (ref 0–32)
ANION GAP SERPL CALCULATED.3IONS-SCNC: 16 MMOL/L (ref 7–16)
AST SERPL-CCNC: 38 U/L (ref 0–31)
BASOPHILS ABSOLUTE: 0.08 E9/L (ref 0–0.2)
BASOPHILS RELATIVE PERCENT: 0.8 % (ref 0–2)
BILIRUB SERPL-MCNC: 0.3 MG/DL (ref 0–1.2)
BUN BLDV-MCNC: 14 MG/DL (ref 6–23)
CALCIUM SERPL-MCNC: 9.7 MG/DL (ref 8.6–10.2)
CHLORIDE BLD-SCNC: 96 MMOL/L (ref 98–107)
CO2: 19 MMOL/L (ref 22–29)
CREAT SERPL-MCNC: 0.7 MG/DL (ref 0.5–1)
EOSINOPHILS ABSOLUTE: 0.37 E9/L (ref 0.05–0.5)
EOSINOPHILS RELATIVE PERCENT: 3.5 % (ref 0–6)
GFR AFRICAN AMERICAN: >60
GFR NON-AFRICAN AMERICAN: >60 ML/MIN/1.73
GLUCOSE BLD-MCNC: 313 MG/DL (ref 74–99)
HBA1C MFR BLD: 11.7 %
HCT VFR BLD CALC: 28.8 % (ref 34–48)
HEMOGLOBIN: 8.8 G/DL (ref 11.5–15.5)
IMMATURE GRANULOCYTES #: 0.04 E9/L
IMMATURE GRANULOCYTES %: 0.4 % (ref 0–5)
IRON SATURATION: 5 % (ref 15–50)
IRON: 23 MCG/DL (ref 37–145)
LYMPHOCYTES ABSOLUTE: 2.17 E9/L (ref 1.5–4)
LYMPHOCYTES RELATIVE PERCENT: 20.5 % (ref 20–42)
MCH RBC QN AUTO: 25.1 PG (ref 26–35)
MCHC RBC AUTO-ENTMCNC: 30.6 % (ref 32–34.5)
MCV RBC AUTO: 82.1 FL (ref 80–99.9)
MONOCYTES ABSOLUTE: 0.9 E9/L (ref 0.1–0.95)
MONOCYTES RELATIVE PERCENT: 8.5 % (ref 2–12)
NEUTROPHILS ABSOLUTE: 7.04 E9/L (ref 1.8–7.3)
NEUTROPHILS RELATIVE PERCENT: 66.3 % (ref 43–80)
PDW BLD-RTO: 15.4 FL (ref 11.5–15)
PLATELET # BLD: 278 E9/L (ref 130–450)
PMV BLD AUTO: 10.7 FL (ref 7–12)
POTASSIUM SERPL-SCNC: 4.4 MMOL/L (ref 3.5–5)
RBC # BLD: 3.51 E12/L (ref 3.5–5.5)
SODIUM BLD-SCNC: 131 MMOL/L (ref 132–146)
TOTAL IRON BINDING CAPACITY: 466 MCG/DL (ref 250–450)
TOTAL PROTEIN: 7.6 G/DL (ref 6.4–8.3)
WBC # BLD: 10.6 E9/L (ref 4.5–11.5)

## 2022-09-27 PROCEDURE — 83036 HEMOGLOBIN GLYCOSYLATED A1C: CPT | Performed by: INTERNAL MEDICINE

## 2022-09-27 PROCEDURE — 1036F TOBACCO NON-USER: CPT | Performed by: INTERNAL MEDICINE

## 2022-09-27 PROCEDURE — G8417 CALC BMI ABV UP PARAM F/U: HCPCS | Performed by: INTERNAL MEDICINE

## 2022-09-27 PROCEDURE — 3017F COLORECTAL CA SCREEN DOC REV: CPT | Performed by: INTERNAL MEDICINE

## 2022-09-27 PROCEDURE — 1123F ACP DISCUSS/DSCN MKR DOCD: CPT | Performed by: INTERNAL MEDICINE

## 2022-09-27 PROCEDURE — 99215 OFFICE O/P EST HI 40 MIN: CPT | Performed by: INTERNAL MEDICINE

## 2022-09-27 PROCEDURE — G8427 DOCREV CUR MEDS BY ELIG CLIN: HCPCS | Performed by: INTERNAL MEDICINE

## 2022-09-27 PROCEDURE — 1090F PRES/ABSN URINE INCON ASSESS: CPT | Performed by: INTERNAL MEDICINE

## 2022-09-27 PROCEDURE — 2022F DILAT RTA XM EVC RTNOPTHY: CPT | Performed by: INTERNAL MEDICINE

## 2022-09-27 PROCEDURE — G8400 PT W/DXA NO RESULTS DOC: HCPCS | Performed by: INTERNAL MEDICINE

## 2022-09-27 PROCEDURE — 3046F HEMOGLOBIN A1C LEVEL >9.0%: CPT | Performed by: INTERNAL MEDICINE

## 2022-09-27 RX ORDER — DULAGLUTIDE 4.5 MG/.5ML
INJECTION, SOLUTION SUBCUTANEOUS
Qty: 1 ADJUSTABLE DOSE PRE-FILLED PEN SYRINGE | Refills: 5 | Status: SHIPPED | OUTPATIENT
Start: 2022-09-27

## 2022-09-27 RX ORDER — MELOXICAM 7.5 MG/1
7.5 TABLET ORAL DAILY
Qty: 30 TABLET | Refills: 5 | Status: SHIPPED | OUTPATIENT
Start: 2022-09-27

## 2022-09-27 RX ORDER — GLIPIZIDE 5 MG/1
5 TABLET ORAL DAILY
Qty: 30 TABLET | Refills: 3 | Status: SHIPPED | OUTPATIENT
Start: 2022-09-27

## 2022-09-27 RX ORDER — OMEPRAZOLE 20 MG/1
CAPSULE, DELAYED RELEASE ORAL
Qty: 30 CAPSULE | Refills: 5 | Status: SHIPPED | OUTPATIENT
Start: 2022-09-27

## 2022-09-27 SDOH — ECONOMIC STABILITY: FOOD INSECURITY: WITHIN THE PAST 12 MONTHS, YOU WORRIED THAT YOUR FOOD WOULD RUN OUT BEFORE YOU GOT MONEY TO BUY MORE.: SOMETIMES TRUE

## 2022-09-27 SDOH — ECONOMIC STABILITY: FOOD INSECURITY: WITHIN THE PAST 12 MONTHS, THE FOOD YOU BOUGHT JUST DIDN'T LAST AND YOU DIDN'T HAVE MONEY TO GET MORE.: SOMETIMES TRUE

## 2022-09-27 ASSESSMENT — SOCIAL DETERMINANTS OF HEALTH (SDOH): HOW HARD IS IT FOR YOU TO PAY FOR THE VERY BASICS LIKE FOOD, HOUSING, MEDICAL CARE, AND HEATING?: SOMEWHAT HARD

## 2022-09-29 DIAGNOSIS — D50.9 IRON DEFICIENCY ANEMIA, UNSPECIFIED IRON DEFICIENCY ANEMIA TYPE: Primary | ICD-10-CM

## 2022-09-29 RX ORDER — FERROUS SULFATE 325(65) MG
325 TABLET ORAL
Qty: 90 TABLET | Refills: 1 | Status: SHIPPED | OUTPATIENT
Start: 2022-09-29

## 2022-10-03 NOTE — TELEPHONE ENCOUNTER
Last Appointment:  9/27/2022  Future Appointments   Date Time Provider Ana Basilio   1/30/2023  1:00 PM Gudelia Gong, [address] [facility]

## 2022-10-07 DIAGNOSIS — J30.9 ALLERGIC RHINITIS, UNSPECIFIED SEASONALITY, UNSPECIFIED TRIGGER: ICD-10-CM

## 2022-10-07 RX ORDER — FLUTICASONE PROPIONATE 50 MCG
1 SPRAY, SUSPENSION (ML) NASAL DAILY
Qty: 32 G | Refills: 3 | Status: SHIPPED | OUTPATIENT
Start: 2022-10-07

## 2022-10-07 NOTE — TELEPHONE ENCOUNTER
Last Appointment:  9/27/2022  Future Appointments   Date Time Provider Ana Basilio   1/30/2023  1:00 PM Elva Duque, [address] [facility]

## 2022-10-20 ENCOUNTER — TELEPHONE (OUTPATIENT)
Dept: FAMILY MEDICINE CLINIC | Age: 72
End: 2022-10-20

## 2022-10-20 NOTE — TELEPHONE ENCOUNTER
Lov:09/27/22  Nov: 01/30/23    Patient called. She wants to come in the office and have her iron checked. She said her iron level is low 8.2. She is experiencing light headedness, and fell down a few days ago. She is not sure if she passed out all the way. 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

## 2022-10-26 DIAGNOSIS — I10 ESSENTIAL HYPERTENSION: ICD-10-CM

## 2022-10-26 RX ORDER — LISINOPRIL 20 MG/1
20 TABLET ORAL DAILY
Qty: 30 TABLET | Refills: 5 | Status: SHIPPED | OUTPATIENT
Start: 2022-10-26

## 2022-10-28 ENCOUNTER — OFFICE VISIT (OUTPATIENT)
Dept: FAMILY MEDICINE CLINIC | Age: 72
End: 2022-10-28
Payer: MEDICARE

## 2022-10-28 VITALS
BODY MASS INDEX: 34.2 KG/M2 | DIASTOLIC BLOOD PRESSURE: 68 MMHG | HEIGHT: 63 IN | TEMPERATURE: 97.5 F | RESPIRATION RATE: 18 BRPM | WEIGHT: 193 LBS | OXYGEN SATURATION: 97 % | SYSTOLIC BLOOD PRESSURE: 108 MMHG | HEART RATE: 128 BPM

## 2022-10-28 DIAGNOSIS — D50.9 IRON DEFICIENCY ANEMIA, UNSPECIFIED IRON DEFICIENCY ANEMIA TYPE: ICD-10-CM

## 2022-10-28 DIAGNOSIS — R42 VERTIGO: ICD-10-CM

## 2022-10-28 DIAGNOSIS — R42 DIZZINESS: ICD-10-CM

## 2022-10-28 DIAGNOSIS — R55 NEAR SYNCOPE: ICD-10-CM

## 2022-10-28 DIAGNOSIS — D64.9 ACUTE ANEMIA: Primary | ICD-10-CM

## 2022-10-28 DIAGNOSIS — Z23 FLU VACCINE NEED: ICD-10-CM

## 2022-10-28 DIAGNOSIS — M25.572 ACUTE LEFT ANKLE PAIN: ICD-10-CM

## 2022-10-28 LAB
ANISOCYTOSIS: ABNORMAL
BASOPHILS ABSOLUTE: 0 E9/L (ref 0–0.2)
BASOPHILS RELATIVE PERCENT: 1.2 % (ref 0–2)
BURR CELLS: ABNORMAL
EOSINOPHILS ABSOLUTE: 0.47 E9/L (ref 0.05–0.5)
EOSINOPHILS RELATIVE PERCENT: 6.1 % (ref 0–6)
HCT VFR BLD CALC: 27.9 % (ref 34–48)
HEMOGLOBIN: 8 G/DL (ref 11.5–15.5)
IRON SATURATION: 6 % (ref 15–50)
IRON: 25 MCG/DL (ref 37–145)
LYMPHOCYTES ABSOLUTE: 1.23 E9/L (ref 1.5–4)
LYMPHOCYTES RELATIVE PERCENT: 15.7 % (ref 20–42)
MCH RBC QN AUTO: 24.2 PG (ref 26–35)
MCHC RBC AUTO-ENTMCNC: 28.7 % (ref 32–34.5)
MCV RBC AUTO: 84.5 FL (ref 80–99.9)
MONOCYTES ABSOLUTE: 0.23 E9/L (ref 0.1–0.95)
MONOCYTES RELATIVE PERCENT: 2.6 % (ref 2–12)
NEUTROPHILS ABSOLUTE: 5.85 E9/L (ref 1.8–7.3)
NEUTROPHILS RELATIVE PERCENT: 75.6 % (ref 43–80)
PDW BLD-RTO: 18.7 FL (ref 11.5–15)
PLATELET # BLD: 273 E9/L (ref 130–450)
PMV BLD AUTO: 10.2 FL (ref 7–12)
POIKILOCYTES: ABNORMAL
POLYCHROMASIA: ABNORMAL
RBC # BLD: 3.3 E12/L (ref 3.5–5.5)
SCHISTOCYTES: ABNORMAL
TARGET CELLS: ABNORMAL
TEAR DROP CELLS: ABNORMAL
TOTAL IRON BINDING CAPACITY: 441 MCG/DL (ref 250–450)
WBC # BLD: 7.7 E9/L (ref 4.5–11.5)

## 2022-10-28 PROCEDURE — 90694 VACC AIIV4 NO PRSRV 0.5ML IM: CPT | Performed by: INTERNAL MEDICINE

## 2022-10-28 PROCEDURE — 3017F COLORECTAL CA SCREEN DOC REV: CPT | Performed by: INTERNAL MEDICINE

## 2022-10-28 PROCEDURE — 1036F TOBACCO NON-USER: CPT | Performed by: INTERNAL MEDICINE

## 2022-10-28 PROCEDURE — 1123F ACP DISCUSS/DSCN MKR DOCD: CPT | Performed by: INTERNAL MEDICINE

## 2022-10-28 PROCEDURE — 99214 OFFICE O/P EST MOD 30 MIN: CPT | Performed by: INTERNAL MEDICINE

## 2022-10-28 PROCEDURE — 1090F PRES/ABSN URINE INCON ASSESS: CPT | Performed by: INTERNAL MEDICINE

## 2022-10-28 PROCEDURE — 3078F DIAST BP <80 MM HG: CPT | Performed by: INTERNAL MEDICINE

## 2022-10-28 PROCEDURE — G8400 PT W/DXA NO RESULTS DOC: HCPCS | Performed by: INTERNAL MEDICINE

## 2022-10-28 PROCEDURE — 3074F SYST BP LT 130 MM HG: CPT | Performed by: INTERNAL MEDICINE

## 2022-10-28 PROCEDURE — G8484 FLU IMMUNIZE NO ADMIN: HCPCS | Performed by: INTERNAL MEDICINE

## 2022-10-28 PROCEDURE — G8427 DOCREV CUR MEDS BY ELIG CLIN: HCPCS | Performed by: INTERNAL MEDICINE

## 2022-10-28 PROCEDURE — G8417 CALC BMI ABV UP PARAM F/U: HCPCS | Performed by: INTERNAL MEDICINE

## 2022-10-28 PROCEDURE — G0008 ADMIN INFLUENZA VIRUS VAC: HCPCS | Performed by: INTERNAL MEDICINE

## 2022-10-28 RX ORDER — BLOOD-GLUCOSE METER
EACH MISCELLANEOUS
COMMUNITY
Start: 2022-10-14

## 2022-10-28 RX ORDER — ISOPROPYL ALCOHOL 70 ML/100ML
SWAB TOPICAL
COMMUNITY
Start: 2022-10-14

## 2022-10-28 RX ORDER — BLOOD-GLUCOSE CONTROL, NORMAL
EACH MISCELLANEOUS
COMMUNITY
Start: 2022-10-14

## 2022-10-28 RX ORDER — LANCETS 33 GAUGE
EACH MISCELLANEOUS
COMMUNITY
Start: 2022-10-14

## 2022-10-28 NOTE — PROGRESS NOTES
[facility]  [address] [address]  Dept: [phone]  Dept Fax: [phone]     NAME: Aneta Barber        :  1950        MRN:  [id number]    Chief Complaint   Patient presents with    Loss of Consciousness     Possible syncopal episode last week /fell and injured lt ankle / states that her Hemoglobin was extremely low last time. Ankle Injury     Lt ankle pain and swelling / states that swelling is doing much better       Subjective     History of Present Illness  Susy Jose is a 71 y.o. female who presents today for above concerns. For the last couple weeks patient has felt increasingly fatigued and dizzy. She chronically deals with vertigo, so the dizziness is not unusual, but she is feeling more unsteady than usual. Last  she had an episode where so go lightheaded and stumbled to the ground. Unsure if there was a complete loss of consciousness or if it was a near syncopal episode. She injured her ankle in the process. The swelling and tenderness at the ankle has improved, although there is still some swelling present she does not think it is fractured since it has improved and she is weight baring. The increased fatigue is similar to what she has felt before with low blood counts. She has been hospitalized requiring transfusions in the past. She has known iron deficiency and take a daily iron supplement. Last time she required a transfusion there was no source of bleeding found. She denies any recent bleeding, blood in the urine or stool, no large areas of bruising. POC hemoglobin was attempted in the office today. Our first reading was 6.0, and when we tried to recheck to confirm, next reading was 1.1 and the machine had an error. Last CBC done just a month ago had a hgb of 8.8. Review of Systems  Please see HPI above. All bolded are positive.   Gen: fever, chills, fatigue, weakness, diaphoresis, unintentional weight change  Head: headache, vision change, hearing loss  Chest: chest pain/heaviness, palpitations  Lungs: shortness of breath, wheezing, coughing, hemoptysis  Abdomen: abdominal pain, nausea, vomiting, diarrhea, constipation, melena, hematochezia, hematemesis, loss of appetite  Extremities: lower extremity edema, myalgias, arthralgias  Urinary: dysuria, hematuria, weak flow, increase in frequency  Neurologic: lightheadedness, dizziness, confusion, syncope  Endocrine: polydipsia, polyuria, heat or cold intolerance  Psychiatric: depression, suicidal ideation, anxiety  Derm: Rashes, ulcers, burns    Past Medical Hx:  Past Medical History:   Diagnosis Date    Arthritis     Cataract     right    Chronic fatigue syndrome     Depression     Diabetes mellitus (HCC)     SC injection weekly    Eczema     Fibromyalgia     Hypertension     Memory loss     Mononucleosis     Sciatica     Sleep apnea     no c-pap    Vertigo        Past Surgical Hx:  Past Surgical History:   Procedure Laterality Date    CARPAL TUNNEL RELEASE Bilateral     CATARACT REMOVAL Right     OTHER SURGICAL HISTORY Bilateral 01/21/2021    BILATERAL L3,L4 MEDIAL BRANCH AND L5 DORSAL RAMUS RADIOFREQUENCY    PAIN MANAGEMENT PROCEDURE Bilateral 1/21/2021    BILATERAL L3,4 MEDIAL BRANCH AND L5 DORSAL RAMUS  RADIOFREQUENCY ABLATION WITH SEDATION  (CPT J9058925) performed by Savita Roman MD at [email or web address] N/A 3/19/2021    T11 KYPHOPLASTY (2 C-ARMS) performed by Savita Roman MD at [address] [place] [address] ENDOSCOPY N/A 1/16/2019    EGD BIOPSY performed by Cecille Cotter MD at [place] [address] N/A 3/10/2021    EGD ESOPHAGOGASTRODUODENOSCOPY performed by Van Lo MD at [facility]       Family Hx:  Family History   Problem Relation Age of Onset    Hypertension Father     Heart Disease Father     Hypertension Paternal Grandfather     Heart Disease Paternal Grandfather        Social Hx:  Social History     Tobacco Use    Smoking status: Former     Packs/day: 2.00     Years: 10.00     Pack years: 20.00     Types: Cigarettes     Start date: 0     Quit date:      Years since quittin.8    Smokeless tobacco: Never   Substance Use Topics    Alcohol use: Not Currently     Alcohol/week: 1.0 standard drink     Types: 1 Glasses of wine per week       Home Medications:  Current Outpatient Medications   Medication Sig Dispense Refill    Alcohol Swabs (DROPSAFE ALCOHOL PREP) 70 % PADS       Blood Glucose Calibration (OT ULTRA/FASTTK CNTRL SOLN) SOLN       Blood Glucose Monitoring Suppl (ONE TOUCH ULTRA 2) w/Device KIT       lisinopril (PRINIVIL;ZESTRIL) 20 MG tablet Take 1 tablet by mouth daily 30 tablet 5    fluticasone (FLONASE) 50 MCG/ACT nasal spray 1 spray by Each Nostril route daily 32 g 3    blood glucose test strips (ASCENSIA AUTODISC VI;ONE TOUCH ULTRA TEST VI) strip 1 each by In Vitro route daily Tests daily and as needed. 100 each 1    ferrous sulfate (IRON 325) 325 (65 Fe) MG tablet Take 1 tablet by mouth daily (with breakfast) 90 tablet 1    Dulaglutide (TRULICITY) 4.5 VN/9.2CD SOPN INJECT 4.5MG (1 PEN) SUBCUTANEOUSLY EVERY WEEK 1 Adjustable Dose Pre-filled Pen Syringe 5    omeprazole (PRILOSEC) 20 MG delayed release capsule TAKE 1 CAPSULE EVERY DAY 30 capsule 5    glipiZIDE (GLUCOTROL) 5 MG tablet Take 1 tablet by mouth daily 30 tablet 3    blood glucose monitor kit and supplies Dispense sufficient amount for indicated testing frequency plus additional to accommodate PRN testing needs with ONE TOUCH glucometer. Dispense all needed supplies to include: monitor, strips, lancing device, lancets, control solutions, alcohol swabs.  1 kit 0    fluconazole (DIFLUCAN) 150 MG tablet       rosuvastatin (CRESTOR) 20 MG tablet Take 1 tablet by mouth nightly 90 tablet 1    DULoxetine (CYMBALTA) 30 MG extended release capsule Take 1 capsule by mouth daily 30 capsule 5    rOPINIRole (REQUIP) 0.5 MG tablet Take 1 tablet 3 times daily 270 tablet 1    tiZANidine (ZANAFLEX) 2 MG tablet TAKE 1 TABLET TWICE DAILY 180 tablet 1    Lancets (ONETOUCH DELICA PLUS [id number]) MISC       meloxicam (MOBIC) 7.5 MG tablet Take 1 tablet by mouth daily (Patient not taking: Reported on 10/28/2022) 30 tablet 5    FLUoxetine (PROZAC) 20 MG capsule TAKE 3 CAPSULES EVERY  capsule 3    gabapentin (NEURONTIN) 300 MG capsule Take 1 capsule by mouth 4 times daily for 30 days. 360 capsule 1     No current facility-administered medications for this visit. Allergies: Allergies   Allergen Reactions    Baclofen Other (See Comments)     Dry mouth, abd pain    Ibuprofen Other (See Comments)     Acute bleeding    Nickel      Surgical steel    Penicillins Hives       Objective     Vitals:    10/28/22 1128   BP: 108/68   Pulse: (!) 128   Resp: 18   Temp: 97.5 °F (36.4 °C)   TempSrc: Temporal   SpO2: 97%   Weight: 193 lb (87.5 kg)   Height: 5' 3\" (1.6 m)        Physical Exam  General: Awake, alert, and oriented to person, place, time, and purpose, appears stated age and cooperative, No acute distress  Head: Normocephalic, atraumatic  Eyes: conjunctivae/corneas clear, EOMI  Mouth: Mucous membranes moist with no pharyngeal exudate or erythema  Neck: no JVD, no adenopathy, no carotid bruit, supple, symmetrical, trachea midline  Back: symmetric, ROM normal, No CVA tenderness. Lungs: clear to auscultation bilaterally without wheezes, rales, or rhonchi  Heart: regular rate and rhythm, S1, S2 normal, systolic murmur, click, rub or gallop  Abdomen: soft, non-tender; bowel sounds normal; no masses,  no organomegaly  Extremities: atraumatic, no cyanosis, left ankle with mild swelling, slight bruising to dorsal aspect of foot  Skin: color, texture, turgor within normal limits.  No rashes or lesions   Neurologic:speech appropriate, moves all 4 extremities, normal muscle strength and tone, CN 2-12 grossly intact    Labs:  Lab Results   Component Value Date    WBC 10.6 09/27/2022    HGB 8.8 (L) 09/27/2022    HCT 28.8 (L) 09/27/2022     09/27/2022     (L) 09/27/2022    K 4.4 09/27/2022    CL 96 (L) 09/27/2022    CREATININE 0.7 09/27/2022    BUN 14 09/27/2022    CO2 19 (L) 09/27/2022    GLUCOSE 313 (H) 09/27/2022    ALT 30 09/27/2022    AST 38 (H) 09/27/2022    INR 1.1 03/08/2021     Lab Results   Component Value Date    TSH 1.470 03/08/2022     Lab Results   Component Value Date    TRIG 117 03/08/2022    TRIG 54 03/09/2021    TRIG 147 11/29/2018     Lab Results   Component Value Date    HDL 63 03/08/2022    HDL 52 03/09/2021    HDL 59 10/16/2020     Lab Results   Component Value Date    LDLCALC 115 (H) 03/08/2022    LDLCALC 85 03/09/2021    LDLCALC 104 (H) 10/16/2020     Lab Results   Component Value Date    LABA1C 11.7 09/27/2022     Lab Results   Component Value Date    INR 1.1 03/08/2021    INR 1.2 03/08/2021    INR 1.1 01/14/2019    PROTIME 12.3 03/08/2021    PROTIME 13.6 (H) 03/08/2021    PROTIME 13.0 (H) 01/14/2019      *All recent labs were reviewed. Please see electronic chart for a more comprehensive set of labs    Radiology:  No results found. Assessment and Plan     Patient is a 71 y.o. female who presented to the office for follow up.    Full problem list is as follows:  Patient Active Problem List   Diagnosis    Obstructive sleep apnea syndrome    Lumbar radicular pain    Recurrent major depressive disorder, in partial remission ([facility] [address]    Type 2 diabetes mellitus with hyperglycemia, without long-term current use of insulin (HCC)    Gastroesophageal reflux disease    Essential hypertension    Chronic pain syndrome    Lumbar spondylosis    Thoracic disc disease    Lumbar facet arthropathy    Spinal stenosis of lumbar region without neurogenic claudication    Migraine without aura and without status migrainosus, not intractable    Lumbar spondylolysis    Thoracic facet joint syndrome    Thoracic spondylosis    Acute anemia    Lumbar disc disorder    Stenosis of lateral recess of lumbar spine    Lumbar radiculopathy    Disorder of sacrum       Ashleigh Kee was seen today for loss of consciousness and ankle injury. Diagnoses and all orders for this visit:    Acute anemia  -     Cancel: POCT hemoglobin  -     Influenza, FLUAD, (age 72 y+), IM, Preservative Free, 0.5 mL  - CBC with auto differential  - Iron and TIBC   - uncontrolled, in light of recent near syncopal event. Near Syncope  - new, uncertain prognosis, pending CBC    Vertigo  Dizziness    Acute left ankle pain  -     XR ANKLE LEFT (MIN 3 VIEWS); Future    Flu vaccine need  -     Influenza, FLUAD, (age 72 y+), IM, Preservative Free, 0.5 mL    CBC and iron panel were drawn today. Awaiting results and will call patient as soon as they are received. On this date, 10/28/22 I have spent 30 minutes reviewing previous notes, test results and face to face with the patient discussing the diagnosis and importance of compliance with the treatment plan as well as documenting on the day of the visit. Educational materials and/or home exercises printed for patient's review and were included in patient instructions on his/her After Visit Summary and given to patient at the end of visit. Counseled regarding above diagnosis, including possible risks and complications, especially if left uncontrolled. Counseled regarding the possible side effects, risks, benefits and alternatives to treatment; patient and/or guardian verbalizes understanding, agrees, feels comfortable with and wishes to proceed with above treatment plan. Advised patient to call [facility] new medication issues, and read all Rx info from pharmacy to assure aware of all possible risks and side effects of any medication before taking. Patient verbalizes understanding and agrees with above counseling, assessment and plan.      All questions answered.     Mega Chavez, DO

## 2022-11-01 ENCOUNTER — TELEPHONE (OUTPATIENT)
Dept: FAMILY MEDICINE CLINIC | Age: 72
End: 2022-11-01

## 2022-11-02 DIAGNOSIS — D50.9 IRON DEFICIENCY ANEMIA, UNSPECIFIED IRON DEFICIENCY ANEMIA TYPE: Primary | ICD-10-CM

## 2022-11-07 ENCOUNTER — TELEPHONE (OUTPATIENT)
Dept: FAMILY MEDICINE CLINIC | Age: 72
End: 2022-11-07

## 2022-11-07 DIAGNOSIS — D50.9 IRON DEFICIENCY ANEMIA, UNSPECIFIED IRON DEFICIENCY ANEMIA TYPE: ICD-10-CM

## 2022-11-07 DIAGNOSIS — D64.9 ACUTE ANEMIA: Primary | ICD-10-CM

## 2022-11-08 ENCOUNTER — HOSPITAL ENCOUNTER (OUTPATIENT)
Age: 72
Discharge: HOME | End: 2022-11-08
Payer: MEDICARE

## 2022-11-08 ENCOUNTER — HOSPITAL ENCOUNTER (OUTPATIENT)
Dept: GENERAL RADIOLOGY | Age: 72
Discharge: HOME | End: 2022-11-10
Payer: MEDICARE

## 2022-11-08 ENCOUNTER — HOSPITAL ENCOUNTER (OUTPATIENT)
Age: 72
Discharge: HOME | End: 2022-11-10
Payer: MEDICARE

## 2022-11-08 DIAGNOSIS — D50.9 IRON DEFICIENCY ANEMIA, UNSPECIFIED IRON DEFICIENCY ANEMIA TYPE: ICD-10-CM

## 2022-11-08 DIAGNOSIS — M53.3 DISORDER OF SACRUM: ICD-10-CM

## 2022-11-08 DIAGNOSIS — D64.9 ACUTE ANEMIA: ICD-10-CM

## 2022-11-08 DIAGNOSIS — M25.572 ACUTE LEFT ANKLE PAIN: ICD-10-CM

## 2022-11-08 LAB
ANISOCYTOSIS: ABNORMAL
BASOPHILS ABSOLUTE: 0 E9/L (ref 0–0.2)
BASOPHILS RELATIVE PERCENT: 0 % (ref 0–2)
EOSINOPHILS ABSOLUTE: 0.29 E9/L (ref 0.05–0.5)
EOSINOPHILS RELATIVE PERCENT: 3 % (ref 0–6)
HCT VFR BLD CALC: 26.3 % (ref 34–48)
HEMOGLOBIN: 7.4 G/DL (ref 11.5–15.5)
HYPOCHROMIA: ABNORMAL
LYMPHOCYTES ABSOLUTE: 2.04 E9/L (ref 1.5–4)
LYMPHOCYTES RELATIVE PERCENT: 21 % (ref 20–42)
MCH RBC QN AUTO: 23.8 PG (ref 26–35)
MCHC RBC AUTO-ENTMCNC: 28.1 % (ref 32–34.5)
MCV RBC AUTO: 84.6 FL (ref 80–99.9)
MONOCYTES ABSOLUTE: 0.39 E9/L (ref 0.1–0.95)
MONOCYTES RELATIVE PERCENT: 4 % (ref 2–12)
MYELOCYTE PERCENT: 1 % (ref 0–0)
NEUTROPHILS ABSOLUTE: 6.98 E9/L (ref 1.8–7.3)
NEUTROPHILS RELATIVE PERCENT: 71 % (ref 43–80)
OVALOCYTES: ABNORMAL
PDW BLD-RTO: 18.6 FL (ref 11.5–15)
PLATELET # BLD: 239 E9/L (ref 130–450)
PMV BLD AUTO: 9.8 FL (ref 7–12)
POIKILOCYTES: ABNORMAL
POLYCHROMASIA: ABNORMAL
RBC # BLD: 3.11 E12/L (ref 3.5–5.5)
TARGET CELLS: ABNORMAL
TEAR DROP CELLS: ABNORMAL
WBC # BLD: 9.7 E9/L (ref 4.5–11.5)

## 2022-11-08 PROCEDURE — 72202 X-RAY EXAM SI JOINTS 3/> VWS: CPT

## 2022-11-08 PROCEDURE — 73610 X-RAY EXAM OF ANKLE: CPT

## 2022-11-08 PROCEDURE — 36415 COLL VENOUS BLD VENIPUNCTURE: CPT

## 2022-11-08 PROCEDURE — 85025 COMPLETE CBC W/AUTO DIFF WBC: CPT

## 2022-11-09 DIAGNOSIS — S82.892A CLOSED FRACTURE OF MALLEOLUS OF LEFT ANKLE, INITIAL ENCOUNTER: Primary | ICD-10-CM

## 2022-11-14 ENCOUNTER — HOSPITAL ENCOUNTER (OUTPATIENT)
Dept: INFUSION THERAPY | Age: 72
Discharge: HOME | End: 2022-11-14

## 2022-11-14 ENCOUNTER — OFFICE VISIT (OUTPATIENT)
Dept: ONCOLOGY | Age: 72
End: 2022-11-14
Payer: MEDICARE

## 2022-11-14 VITALS
OXYGEN SATURATION: 100 % | SYSTOLIC BLOOD PRESSURE: 139 MMHG | HEART RATE: 92 BPM | HEIGHT: 63 IN | DIASTOLIC BLOOD PRESSURE: 70 MMHG | WEIGHT: 198.9 LBS | BODY MASS INDEX: 35.24 KG/M2 | TEMPERATURE: 98.7 F

## 2022-11-14 DIAGNOSIS — D50.0 IRON DEFICIENCY ANEMIA DUE TO CHRONIC BLOOD LOSS: ICD-10-CM

## 2022-11-14 PROBLEM — D50.9 IRON DEFICIENCY ANEMIA: Status: ACTIVE | Noted: 2022-11-14

## 2022-11-14 PROCEDURE — 1123F ACP DISCUSS/DSCN MKR DOCD: CPT | Performed by: INTERNAL MEDICINE

## 2022-11-14 PROCEDURE — 1036F TOBACCO NON-USER: CPT | Performed by: INTERNAL MEDICINE

## 2022-11-14 PROCEDURE — 99205 OFFICE O/P NEW HI 60 MIN: CPT | Performed by: INTERNAL MEDICINE

## 2022-11-14 PROCEDURE — 3017F COLORECTAL CA SCREEN DOC REV: CPT | Performed by: INTERNAL MEDICINE

## 2022-11-14 PROCEDURE — 3074F SYST BP LT 130 MM HG: CPT | Performed by: INTERNAL MEDICINE

## 2022-11-14 PROCEDURE — G8417 CALC BMI ABV UP PARAM F/U: HCPCS | Performed by: INTERNAL MEDICINE

## 2022-11-14 PROCEDURE — 1090F PRES/ABSN URINE INCON ASSESS: CPT | Performed by: INTERNAL MEDICINE

## 2022-11-14 PROCEDURE — G8400 PT W/DXA NO RESULTS DOC: HCPCS | Performed by: INTERNAL MEDICINE

## 2022-11-14 PROCEDURE — G8427 DOCREV CUR MEDS BY ELIG CLIN: HCPCS | Performed by: INTERNAL MEDICINE

## 2022-11-14 PROCEDURE — 3078F DIAST BP <80 MM HG: CPT | Performed by: INTERNAL MEDICINE

## 2022-11-14 PROCEDURE — 99214 OFFICE O/P EST MOD 30 MIN: CPT

## 2022-11-14 PROCEDURE — G8484 FLU IMMUNIZE NO ADMIN: HCPCS | Performed by: INTERNAL MEDICINE

## 2022-11-14 NOTE — PROGRESS NOTES
[id number] [facility]  [place] [address]  Dept: [facility]: [phone]  Attending Consult Note      Reason for Visit:   Anemia. Referring Physician:  Gudelia Gong DO    PCP:  Gudelia Gong DO    History of Present Illness:      Mrs. Barber is a very pleasant 29-year-old lady, with past medical history significant for type II DM, hypertension, fibromyalgia, sleep apnea, ISABEL, and depression, who was referred to the hematology office for evaluation of anemia. The patient CBCD from 11/8/2022, was remarkable for hemoglobin 7.5, hematocrit 26.3, white count 9.7, platelet count 534I. Iron 25, TIBC 441, TSAT 6%. The patient has been taking oral iron. Tolerating it well. She denies any bleeding. The patient had an EGD done on 3/10/2021, revealing Schatzki ring, small hiatal hernia, otherwise no evidence of bleeding, at that time she was hospitalized with a hemoglobin of 5.5G/DL. The patient believes that she had a colonoscopy done within the last few years by Dr. Govind Muro. She is feeling tired, she has shortness of breath. She is retired ICU nurse. Review of Systems;  CONSTITUTIONAL: No fever, chills. Good appetite, feeling tired. ENMT: Eyes: No diplopia; Nose: No epistaxis. Mouth: No sore throat. RESPIRATORY: No hemoptysis, shortness of breath, cough. CARDIOVASCULAR: No chest pain, palpitations. GASTROINTESTINAL: No nausea/vomiting, abdominal pain, diarrhea/constipation. GENITOURINARY: No dysuria, urinary frequency, hematuria. NEURO: No syncope, presyncope, headache. Positive for dizziness.   Remainder:  ROS NEGATIVE    Past Medical History:      Diagnosis Date    Arthritis     Cataract     right    Chronic fatigue syndrome     Depression     Diabetes mellitus (HCC)     SC injection weekly    Eczema     Fibromyalgia     Hypertension     Memory loss     Mononucleosis     Sciatica     Sleep apnea     no c-pap    Vertigo      Patient Active Problem List   Diagnosis    Obstructive sleep apnea syndrome    Lumbar radicular pain    Recurrent major depressive disorder, in partial remission (HCC)    Type 2 diabetes mellitus with hyperglycemia, without long-term current use of insulin (HCC)    Gastroesophageal reflux disease    Essential hypertension    Chronic pain syndrome    Lumbar spondylosis    Thoracic disc disease    Lumbar facet arthropathy    Spinal stenosis of lumbar region without neurogenic claudication    Migraine without aura and without status migrainosus, not intractable    Lumbar spondylolysis    Thoracic facet joint syndrome    Thoracic spondylosis    Acute anemia    Lumbar disc disorder    Stenosis of lateral recess of lumbar spine    Lumbar radiculopathy    Disorder of sacrum        Past Surgical History:      Procedure Laterality Date    CARPAL TUNNEL RELEASE Bilateral     CATARACT REMOVAL Right     OTHER SURGICAL HISTORY Bilateral 01/21/2021    BILATERAL L3,L4 MEDIAL BRANCH AND L5 DORSAL RAMUS RADIOFREQUENCY    PAIN MANAGEMENT PROCEDURE Bilateral 1/21/2021    BILATERAL L3,4 MEDIAL BRANCH AND L5 DORSAL RAMUS  RADIOFREQUENCY ABLATION WITH SEDATION  (CPT S0479389) performed by Jenaro Steward MD at [address] N/A 3/19/2021    T11 KYPHOPLASTY (2 C-ARMS) performed by Jenaro Steward MD at [address] ENDOSCOPY N/A 1/16/2019    EGD BIOPSY performed by Jitendra Mccullough MD at [facility] [address] N/A 3/10/2021    EGD ESOPHAGOGASTRODUODENOSCOPY performed by Amira Nielsen MD at [place] [address] History:  Family History   Problem Relation Age of Onset    Hypertension Father     Heart Disease Father     Hypertension Paternal Grandfather     Heart Disease Paternal Grandfather        Medications:  Reviewed and reconciled.     Social History:  Social History     Socioeconomic History    Marital status:      Spouse name: Not on file    Number of children: Not on file    Years of education: Not on file    Highest education level: Not on file   Occupational History    Not on file   Tobacco Use    Smoking status: Former     Packs/day: 2.00     Years: 10.00     Pack years: 20.00     Types: Cigarettes     Start date: 0     Quit date: 5     Years since quittin.8    Smokeless tobacco: Never   Substance and Sexual Activity    Alcohol use: Not Currently     Alcohol/week: 1.0 standard drink     Types: 1 Glasses of wine per week    Drug use: No    Sexual activity: Never   Other Topics Concern    Not on file   Social History Narrative    Not on file     Social Determinants of Health     Financial Resource Strain: Medium Risk    Difficulty of Paying Living Expenses: Somewhat hard   Food Insecurity: Food Insecurity Present    Worried About Running Out of Food in the Last Year: Sometimes true    Ran Out of Food in the Last Year: Sometimes true   Transportation Needs: Not on file   Physical Activity: Inactive    Days of Exercise per Week: 0 days    Minutes of Exercise per Session: 0 min   Stress: Not on file   Social Connections: Not on file   Intimate Partner Violence: Not on file   Housing Stability: Not on file       Allergies: Allergies   Allergen Reactions    Baclofen Other (See Comments)     Dry mouth, abd pain    Ibuprofen Other (See Comments)     Acute bleeding    Nickel      Surgical steel    Penicillins Hives       Physical Exam:  /70   Pulse 92   Temp 98.7 °F (37.1 °C)   Ht 5' 3\" (1.6 m)   Wt 198 lb 14.4 oz (90.2 kg)   LMP  (LMP Unknown)   SpO2 100%   BMI 35.23 kg/m²   GENERAL: Alert, oriented x 3, not in acute distress. HEENT: PERRLA; EOMI. Oropharynx clear. Positive for pallor. NECK: Supple. No palpable cervical or supraclavicular lymphadenopathy. LUNGS: Good air entry bilaterally. No wheezing, crackles or rhonchi. CARDIOVASCULAR: Regular rate.  No murmurs, rubs or gallops. ABDOMEN: Soft. Non-tender, non-distended. Positive bowel sounds. EXTREMITIES: Without clubbing, cyanosis, or edema. NEUROLOGIC: No focal deficits. ECOG PS 1       Impression/Plan:       Mrs. Barber is a very pleasant 67-year-old lady, with past medical history significant for type II DM, hypertension, fibromyalgia, sleep apnea, ISABEL, and depression, who was referred to the hematology office for evaluation of anemia. The patient CBCD from 11/8/2022, was remarkable for hemoglobin 7.5, hematocrit 26.3, white count 9.7, platelet count 444V. Iron 25, TIBC 441, TSAT 6%. The patient has been taking oral iron. Tolerating it well. She denies any bleeding. The patient had an EGD done on 3/10/2021, revealing Schatzki ring, small hiatal hernia, otherwise no evidence of bleeding, at that time she was hospitalized with a hemoglobin of 5.5G/DL. The patient believes that she had a colonoscopy done within the last few years by Dr. Maynor Landers. She is feeling tired, she has shortness of breath. She is retired ICU nurse. The patient has normocytic anemia, secondary to iron deficiency, be secondary to bleeding or iron malabsorption, she had GI work-up done in the past, was negative for bleeding, last EGD was on 3/10/2021, will have a fit test done, we might need repeat colonoscopy and a capsule endoscopy. She does not have celiac disease. The patient did not have an adequate response to the oral iron, recommended parenteral iron infusion, Venofer, the side effects were reviewed with the patient, to be started as soon as authorized by her insurance. SPEP negative for monoclonal proteins, no vit B12/folate deficiency, TSH is normal.    RTC in 1 week. .  Thank you for allowing us to participate in the care of Mrs. Barber.     Wendie Stanford MD   HEMATOLOGY/MEDICAL [id number]  [place] [address]  Dept: [facility]: [phone] Azithromycin Pregnancy And Lactation Text: This medication is considered safe during pregnancy and is also secreted in breast milk.

## 2022-11-14 NOTE — PROGRESS NOTES
Meagan Barber  1950 71 y.o. Referring Physician:     PCP: Yari Escobar,     Vitals:    22 1426   BP: 139/70   Pulse: 92   Temp: 98.7 °F (37.1 °C)   SpO2: 100%        Wt Readings from Last 3 Encounters:   22 198 lb 14.4 oz (90.2 kg)   10/28/22 193 lb (87.5 kg)   22 193 lb (87.5 kg)        Body mass index is 35.23 kg/m². Chief Complaint:   Chief Complaint   Patient presents with    New Patient    Other     dyana         Cancer Staging  No matching staging information was found for the patient. Prior Radiation Therapy? NO    Concurrent Chemo/radiation? NO    Prior Chemotherapy? NO    Prior Hormonal Therapy? NO    Head and Neck Cancer? No, patient does NOT have HN cancer. LMP:     Age at first Menses: 16    : 1    Para: 1          Current Outpatient Medications:     Alcohol Swabs (DROPSAFE ALCOHOL PREP) 70 % PADS, , Disp: , Rfl:     Blood Glucose Calibration (OT ULTRA/FASTTK CNTRL SOLN) SOLN, , Disp: , Rfl:     Lancets (ONETOUCH DELICA PLUS [id number]) MISC, , Disp: , Rfl:     Blood Glucose Monitoring Suppl (ONE TOUCH ULTRA 2) w/Device KIT, , Disp: , Rfl:     lisinopril (PRINIVIL;ZESTRIL) 20 MG tablet, Take 1 tablet by mouth daily, Disp: 30 tablet, Rfl: 5    fluticasone (FLONASE) 50 MCG/ACT nasal spray, 1 spray by Each Nostril route daily, Disp: 32 g, Rfl: 3    blood glucose test strips (ASCENSIA AUTODISC VI;ONE TOUCH ULTRA TEST VI) strip, 1 each by In Vitro route daily Tests daily and as needed. , Disp: 100 each, Rfl: 1    ferrous sulfate (IRON 325) 325 (65 Fe) MG tablet, Take 1 tablet by mouth daily (with breakfast), Disp: 90 tablet, Rfl: 1    Dulaglutide (TRULICITY) 4.5 IB/1.9PQ SOPN, INJECT 4.5MG (1 PEN) SUBCUTANEOUSLY EVERY WEEK, Disp: 1 Adjustable Dose Pre-filled Pen Syringe, Rfl: 5    glipiZIDE (GLUCOTROL) 5 MG tablet, Take 1 tablet by mouth daily, Disp: 30 tablet, Rfl: 3    blood glucose monitor kit and supplies, Dispense sufficient amount for indicated testing frequency plus additional to accommodate PRN testing needs with ONE TOUCH glucometer. Dispense all needed supplies to include: monitor, strips, lancing device, lancets, control solutions, alcohol swabs., Disp: 1 kit, Rfl: 0    DULoxetine (CYMBALTA) 30 MG extended release capsule, Take 1 capsule by mouth daily, Disp: 30 capsule, Rfl: 5    rOPINIRole (REQUIP) 0.5 MG tablet, Take 1 tablet 3 times daily, Disp: 270 tablet, Rfl: 1    tiZANidine (ZANAFLEX) 2 MG tablet, TAKE 1 TABLET TWICE DAILY, Disp: 180 tablet, Rfl: 1    omeprazole (PRILOSEC) 20 MG delayed release capsule, TAKE 1 CAPSULE EVERY DAY (Patient not taking: Reported on 11/14/2022), Disp: 30 capsule, Rfl: 5    meloxicam (MOBIC) 7.5 MG tablet, Take 1 tablet by mouth daily (Patient not taking: No sig reported), Disp: 30 tablet, Rfl: 5    gabapentin (NEURONTIN) 300 MG capsule, Take 1 capsule by mouth 4 times daily for 30 days. , Disp: 360 capsule, Rfl: 1    rosuvastatin (CRESTOR) 20 MG tablet, Take 1 tablet by mouth nightly (Patient not taking: Reported on 11/14/2022), Disp: 90 tablet, Rfl: 1       Past Medical History:   Diagnosis Date    Arthritis     Cataract     right    Chronic fatigue syndrome     Depression     Diabetes mellitus (HCC)     SC injection weekly    Eczema     Fibromyalgia     Hypertension     Memory loss     Mononucleosis     Sciatica     Sleep apnea     no c-pap    Vertigo        Past Surgical History:   Procedure Laterality Date    CARPAL TUNNEL RELEASE Bilateral     CATARACT REMOVAL Right     OTHER SURGICAL HISTORY Bilateral 01/21/2021    BILATERAL L3,L4 MEDIAL BRANCH AND L5 DORSAL RAMUS RADIOFREQUENCY    PAIN MANAGEMENT PROCEDURE Bilateral 1/21/2021    BILATERAL L3,4 MEDIAL BRANCH AND L5 DORSAL RAMUS  RADIOFREQUENCY ABLATION WITH SEDATION  (CPT [id number]) performed by Miguelina Mills MD at [place] [address] 3/19/2021    T11 KYPHOPLASTY (2 C-ARMS) performed by Miguelina Mills MD at [place] [address] ADENOIDECTOMY      UPPER GASTROINTESTINAL ENDOSCOPY N/A 2019    EGD BIOPSY performed by Sarah Quezada MD at [address] [place] [address] N/A 3/10/2021    EGD ESOPHAGOGASTRODUODENOSCOPY performed by Sathya Guzman MD at [address] [place] [address] History   Problem Relation Age of Onset    Hypertension Father     Heart Disease Father     Hypertension Paternal Grandfather     Heart Disease Paternal Grandfather        Social History     Socioeconomic History    Marital status:      Spouse name: Not on file    Number of children: Not on file    Years of education: Not on file    Highest education level: Not on file   Occupational History    Not on file   Tobacco Use    Smoking status: Former     Packs/day: 2.00     Years: 10.00     Pack years: 20.00     Types: Cigarettes     Start date: 0     Quit date:      Years since quittin.8    Smokeless tobacco: Never   Substance and Sexual Activity    Alcohol use: Not Currently     Alcohol/week: 1.0 standard drink     Types: 1 Glasses of wine per week    Drug use: No    Sexual activity: Never   Other Topics Concern    Not on file   Social History Narrative    Not on file     Social Determinants of Health     Financial Resource Strain: Medium Risk    Difficulty of Paying Living Expenses: Somewhat hard   Food Insecurity: Food Insecurity Present    Worried About Running Out of Food in the Last Year: Sometimes true    Ran Out of Food in the Last Year: Sometimes true   Transportation Needs: Not on file   Physical Activity: Inactive    Days of Exercise per Week: 0 days    Minutes of Exercise per Session: 0 min   Stress: Not on file   Social Connections: Not on file   Intimate Partner Violence: Not on file   Housing Stability: Not on file           Occupation: disabled  Retired:  YES: Patient is retired from [address] [place] [address],[address].           REVIEW OF SYSTEMS:     Pacemaker/Defibulator/ICD:  No    Mediport: No           FALLS RISK SCREENING ASSESSMENT    Instructions:  Assess the patient and [place] the appropriate indicators that are present for fall risk identification. Total the numbers circled and assign a fall risk score from Table 2.  Reassess patient at a minimum every 12 weeks or with status change. Assessment   Date  11/14/2022     1. Mental Ability: confusion/cognitively impaired No - 0       2. Elimination Issues: incontinence, frequency No - 0       3. Ambulatory: use of assistive devices (walker, cane, off-loading devices), attached to equipment (IV pole, oxygen) Yes - 2     4. Sensory Limitations: dizziness, vertigo, impaired vision Yes - 3       5. Age 72 years or greater - 1       10. Medication: diuretics, strong analgesics, hypnotics, sedatives, antihypertensive agents   Yes - 3   7. Falls:  recent history of falls within the last 3 months (not to include slipping or tripping)   Yes - 7   TOTAL 16    If score of 4 or greater was education given? Yes       TABLE 2   Risk Score Risk Level Plan of Care   0-3 Little or  No Risk 1. Provide assistance as indicated for ambulation activities  2. Reorient confused/cognitively impaired patient  3. Call-light/bell within patient's reach  4. Chair/bed in low position, stretcher/bed with siderails up except when performing patient care activities  5. Educate patient/family/caregiver on falls prevention  6.  Reassess in 12 weeks or with any noted change in patient condition which places them at a risk for a fall   4-6 Moderate Risk 1. Provide assistance as indicated for ambulation activities  2. Reorient confused/cognitively impaired patient  3. Call-light/bell within patient's reach  4. Chair/bed in low position, stretcher/bed with siderails up except when performing patient care activities  5. Educate patient/family/caregiver on falls prevention  6. Falls risk precaution (Yellow sticker Level II) placed on patient chart   7 or   Higher High Risk 1.   Place patient in easily observable treatment room  2. Patient attended at all times by family member or staff  3. Provide assistance as indicated for ambulation activities  4. Reorient confused/cognitively impaired patient  5. Call-light/bell within patient's reach  6. Chair/bed in low position, stretcher/bed with siderails up except when performing patient care activities  7. Educate patient/family/caregiver on falls prevention  8. Falls risk precaution (Yellow sticker Level III) placed on patient chart           MALNUTRITION RISK SCREENING ASSESSMENT    Instructions:  Assess the patient and enter the appropriate indicators that are present for nutrition risk identification. Total the numbers entered and assign a risk score. Follow the appropriate action for total score listed below. Assessment   Date  11/14/2022     Have you lost weight without trying? 1- Yes, 0.5 kg to 5 kg     Have you been eating poorly because of a decreased appetite? 0- No   3. Do you have a diagnosis of head and neck cancer? 0- No                                                                                    TOTAL 1        Score of 0-1: No action  Score 2 or greater:   For Non-Diabetic Patient: Recommend adding Ensure Enlive 2 x daily and provide patient with Ensure wellness bag with coupons  For Diabetic Patient: Recommend adding Glucerna Shake 2 x daily and provide patient with Glucerna Wellness bag with coupons  Route to the dietitian via Chika Beck RN

## 2022-11-17 ENCOUNTER — HOSPITAL ENCOUNTER (OUTPATIENT)
Dept: INFUSION THERAPY | Age: 72
Discharge: HOME | End: 2022-11-17
Payer: MEDICARE

## 2022-11-17 VITALS
TEMPERATURE: 98.2 F | HEART RATE: 97 BPM | SYSTOLIC BLOOD PRESSURE: 136 MMHG | DIASTOLIC BLOOD PRESSURE: 61 MMHG | OXYGEN SATURATION: 100 % | RESPIRATION RATE: 16 BRPM

## 2022-11-17 DIAGNOSIS — D50.0 IRON DEFICIENCY ANEMIA DUE TO CHRONIC BLOOD LOSS: Primary | ICD-10-CM

## 2022-11-17 LAB
CONTROL: ABNORMAL
HEMOCCULT STL QL: ABNORMAL

## 2022-11-17 PROCEDURE — 6360000002 HC RX W HCPCS: Performed by: INTERNAL MEDICINE

## 2022-11-17 PROCEDURE — 2580000003 HC RX 258: Performed by: INTERNAL MEDICINE

## 2022-11-17 PROCEDURE — 96374 THER/PROPH/DIAG INJ IV PUSH: CPT

## 2022-11-17 RX ORDER — DIPHENHYDRAMINE HYDROCHLORIDE 50 MG/ML
50 INJECTION INTRAMUSCULAR; INTRAVENOUS
Status: CANCELLED | OUTPATIENT
Start: 2022-11-24

## 2022-11-17 RX ORDER — ONDANSETRON 2 MG/ML
8 INJECTION INTRAMUSCULAR; INTRAVENOUS
Status: CANCELLED | OUTPATIENT
Start: 2022-11-24

## 2022-11-17 RX ORDER — SODIUM CHLORIDE 9 MG/ML
5-250 INJECTION, SOLUTION INTRAVENOUS PRN
Status: CANCELLED | OUTPATIENT
Start: 2022-11-24

## 2022-11-17 RX ORDER — DIPHENHYDRAMINE HYDROCHLORIDE 50 MG/ML
50 INJECTION INTRAMUSCULAR; INTRAVENOUS
Status: CANCELLED | OUTPATIENT
Start: 2022-11-17

## 2022-11-17 RX ORDER — MEPERIDINE HYDROCHLORIDE 25 MG/ML
12.5 INJECTION INTRAMUSCULAR; INTRAVENOUS; SUBCUTANEOUS PRN
Status: CANCELLED | OUTPATIENT
Start: 2022-11-24

## 2022-11-17 RX ORDER — EPINEPHRINE 1 MG/ML
0.3 INJECTION, SOLUTION, CONCENTRATE INTRAVENOUS PRN
Status: CANCELLED | OUTPATIENT
Start: 2022-11-24

## 2022-11-17 RX ORDER — HEPARIN SODIUM (PORCINE) LOCK FLUSH IV SOLN 100 UNIT/ML 100 UNIT/ML
500 SOLUTION INTRAVENOUS PRN
Status: CANCELLED | OUTPATIENT
Start: 2022-11-24

## 2022-11-17 RX ORDER — ALBUTEROL SULFATE 90 UG/1
4 AEROSOL, METERED RESPIRATORY (INHALATION) PRN
Status: CANCELLED | OUTPATIENT
Start: 2022-11-17

## 2022-11-17 RX ORDER — ACETAMINOPHEN 325 MG/1
650 TABLET ORAL
Status: CANCELLED | OUTPATIENT
Start: 2022-11-24

## 2022-11-17 RX ORDER — ACETAMINOPHEN 325 MG/1
650 TABLET ORAL
Status: CANCELLED | OUTPATIENT
Start: 2022-11-17

## 2022-11-17 RX ORDER — SODIUM CHLORIDE 9 MG/ML
INJECTION, SOLUTION INTRAVENOUS CONTINUOUS
Status: CANCELLED | OUTPATIENT
Start: 2022-11-24

## 2022-11-17 RX ORDER — EPINEPHRINE 1 MG/ML
0.3 INJECTION, SOLUTION, CONCENTRATE INTRAVENOUS PRN
Status: CANCELLED | OUTPATIENT
Start: 2022-11-17

## 2022-11-17 RX ORDER — SODIUM CHLORIDE 9 MG/ML
INJECTION, SOLUTION INTRAVENOUS CONTINUOUS
Status: DISCONTINUED | OUTPATIENT
Start: 2022-11-17 | End: 2022-11-18 | Stop reason: HOSPADM

## 2022-11-17 RX ORDER — HEPARIN SODIUM (PORCINE) LOCK FLUSH IV SOLN 100 UNIT/ML 100 UNIT/ML
500 SOLUTION INTRAVENOUS PRN
Status: CANCELLED | OUTPATIENT
Start: 2022-11-17

## 2022-11-17 RX ORDER — SODIUM CHLORIDE 0.9 % (FLUSH) 0.9 %
5-40 SYRINGE (ML) INJECTION PRN
Status: CANCELLED | OUTPATIENT
Start: 2022-11-17

## 2022-11-17 RX ORDER — ONDANSETRON 2 MG/ML
8 INJECTION INTRAMUSCULAR; INTRAVENOUS
Status: CANCELLED | OUTPATIENT
Start: 2022-11-17

## 2022-11-17 RX ORDER — SODIUM CHLORIDE 9 MG/ML
5-250 INJECTION, SOLUTION INTRAVENOUS PRN
Status: CANCELLED | OUTPATIENT
Start: 2022-11-17

## 2022-11-17 RX ORDER — SODIUM CHLORIDE 9 MG/ML
INJECTION, SOLUTION INTRAVENOUS CONTINUOUS
Status: CANCELLED | OUTPATIENT
Start: 2022-11-17

## 2022-11-17 RX ORDER — MEPERIDINE HYDROCHLORIDE 25 MG/ML
12.5 INJECTION INTRAMUSCULAR; INTRAVENOUS; SUBCUTANEOUS PRN
Status: CANCELLED | OUTPATIENT
Start: 2022-11-17

## 2022-11-17 RX ORDER — SODIUM CHLORIDE 0.9 % (FLUSH) 0.9 %
5-40 SYRINGE (ML) INJECTION PRN
Status: CANCELLED | OUTPATIENT
Start: 2022-11-24

## 2022-11-17 RX ORDER — FAMOTIDINE 10 MG/ML
20 INJECTION, SOLUTION INTRAVENOUS
Status: CANCELLED | OUTPATIENT
Start: 2022-11-17

## 2022-11-17 RX ORDER — FAMOTIDINE 10 MG/ML
20 INJECTION, SOLUTION INTRAVENOUS
Status: CANCELLED | OUTPATIENT
Start: 2022-11-24

## 2022-11-17 RX ORDER — ALBUTEROL SULFATE 90 UG/1
4 AEROSOL, METERED RESPIRATORY (INHALATION) PRN
Status: CANCELLED | OUTPATIENT
Start: 2022-11-24

## 2022-11-17 RX ADMIN — IRON SUCROSE 200 MG: 20 INJECTION, SOLUTION INTRAVENOUS at 13:49

## 2022-11-17 RX ADMIN — SODIUM CHLORIDE: 9 INJECTION, SOLUTION INTRAVENOUS at 13:30

## 2022-11-17 ASSESSMENT — PAIN SCALES - GENERAL: PAINLEVEL_OUTOF10: 3

## 2022-11-17 ASSESSMENT — PAIN DESCRIPTION - LOCATION: LOCATION: BACK

## 2022-11-17 NOTE — PROGRESS NOTES
Patient tolerated infusion well. Patient alert and oriented x3. No distress noted. Vital signs stable. Patient denies any new or worsening pain.

## 2022-11-21 ENCOUNTER — OFFICE VISIT (OUTPATIENT)
Dept: ONCOLOGY | Age: 72
End: 2022-11-21
Payer: MEDICARE

## 2022-11-21 ENCOUNTER — HOSPITAL ENCOUNTER (OUTPATIENT)
Dept: INFUSION THERAPY | Age: 72
Discharge: HOME | End: 2022-11-21
Payer: MEDICARE

## 2022-11-21 VITALS
TEMPERATURE: 97.5 F | HEIGHT: 63 IN | WEIGHT: 193.4 LBS | SYSTOLIC BLOOD PRESSURE: 156 MMHG | OXYGEN SATURATION: 96 % | HEART RATE: 126 BPM | DIASTOLIC BLOOD PRESSURE: 72 MMHG | BODY MASS INDEX: 34.27 KG/M2

## 2022-11-21 VITALS
DIASTOLIC BLOOD PRESSURE: 84 MMHG | OXYGEN SATURATION: 98 % | RESPIRATION RATE: 18 BRPM | SYSTOLIC BLOOD PRESSURE: 106 MMHG | TEMPERATURE: 97.9 F | HEART RATE: 120 BPM

## 2022-11-21 DIAGNOSIS — D50.0 IRON DEFICIENCY ANEMIA DUE TO CHRONIC BLOOD LOSS: Primary | ICD-10-CM

## 2022-11-21 PROCEDURE — G8417 CALC BMI ABV UP PARAM F/U: HCPCS | Performed by: INTERNAL MEDICINE

## 2022-11-21 PROCEDURE — 1036F TOBACCO NON-USER: CPT | Performed by: INTERNAL MEDICINE

## 2022-11-21 PROCEDURE — 96374 THER/PROPH/DIAG INJ IV PUSH: CPT

## 2022-11-21 PROCEDURE — 1090F PRES/ABSN URINE INCON ASSESS: CPT | Performed by: INTERNAL MEDICINE

## 2022-11-21 PROCEDURE — 6360000002 HC RX W HCPCS: Performed by: INTERNAL MEDICINE

## 2022-11-21 PROCEDURE — 3078F DIAST BP <80 MM HG: CPT | Performed by: INTERNAL MEDICINE

## 2022-11-21 PROCEDURE — 3074F SYST BP LT 130 MM HG: CPT | Performed by: INTERNAL MEDICINE

## 2022-11-21 PROCEDURE — G8427 DOCREV CUR MEDS BY ELIG CLIN: HCPCS | Performed by: INTERNAL MEDICINE

## 2022-11-21 PROCEDURE — 1123F ACP DISCUSS/DSCN MKR DOCD: CPT | Performed by: INTERNAL MEDICINE

## 2022-11-21 PROCEDURE — G8400 PT W/DXA NO RESULTS DOC: HCPCS | Performed by: INTERNAL MEDICINE

## 2022-11-21 PROCEDURE — 99214 OFFICE O/P EST MOD 30 MIN: CPT | Performed by: INTERNAL MEDICINE

## 2022-11-21 PROCEDURE — 2580000003 HC RX 258: Performed by: INTERNAL MEDICINE

## 2022-11-21 PROCEDURE — 3017F COLORECTAL CA SCREEN DOC REV: CPT | Performed by: INTERNAL MEDICINE

## 2022-11-21 PROCEDURE — G8484 FLU IMMUNIZE NO ADMIN: HCPCS | Performed by: INTERNAL MEDICINE

## 2022-11-21 RX ORDER — ACETAMINOPHEN 325 MG/1
650 TABLET ORAL
Status: CANCELLED | OUTPATIENT
Start: 2022-11-28

## 2022-11-21 RX ORDER — SODIUM CHLORIDE 0.9 % (FLUSH) 0.9 %
5-40 SYRINGE (ML) INJECTION PRN
Status: CANCELLED | OUTPATIENT
Start: 2022-11-28

## 2022-11-21 RX ORDER — FAMOTIDINE 10 MG/ML
20 INJECTION, SOLUTION INTRAVENOUS
Status: CANCELLED | OUTPATIENT
Start: 2022-11-28

## 2022-11-21 RX ORDER — MEPERIDINE HYDROCHLORIDE 25 MG/ML
12.5 INJECTION INTRAMUSCULAR; INTRAVENOUS; SUBCUTANEOUS PRN
Status: CANCELLED | OUTPATIENT
Start: 2022-11-28

## 2022-11-21 RX ORDER — ALBUTEROL SULFATE 90 UG/1
4 AEROSOL, METERED RESPIRATORY (INHALATION) PRN
Status: CANCELLED | OUTPATIENT
Start: 2022-11-28

## 2022-11-21 RX ORDER — ONDANSETRON HYDROCHLORIDE 8 MG/1
8 TABLET, FILM COATED ORAL EVERY 8 HOURS PRN
Qty: 30 TABLET | Refills: 1 | Status: SHIPPED | OUTPATIENT
Start: 2022-11-21

## 2022-11-21 RX ORDER — ONDANSETRON 2 MG/ML
8 INJECTION INTRAMUSCULAR; INTRAVENOUS
Status: CANCELLED | OUTPATIENT
Start: 2022-11-28

## 2022-11-21 RX ORDER — HEPARIN SODIUM (PORCINE) LOCK FLUSH IV SOLN 100 UNIT/ML 100 UNIT/ML
500 SOLUTION INTRAVENOUS PRN
Status: CANCELLED | OUTPATIENT
Start: 2022-11-28

## 2022-11-21 RX ORDER — SODIUM CHLORIDE 9 MG/ML
INJECTION, SOLUTION INTRAVENOUS CONTINUOUS
Status: CANCELLED | OUTPATIENT
Start: 2022-11-28

## 2022-11-21 RX ORDER — DIPHENHYDRAMINE HYDROCHLORIDE 50 MG/ML
50 INJECTION INTRAMUSCULAR; INTRAVENOUS
Status: CANCELLED | OUTPATIENT
Start: 2022-11-28

## 2022-11-21 RX ORDER — EPINEPHRINE 1 MG/ML
0.3 INJECTION, SOLUTION, CONCENTRATE INTRAVENOUS PRN
Status: CANCELLED | OUTPATIENT
Start: 2022-11-28

## 2022-11-21 RX ORDER — SODIUM CHLORIDE 9 MG/ML
INJECTION, SOLUTION INTRAVENOUS CONTINUOUS
Status: DISCONTINUED | OUTPATIENT
Start: 2022-11-21 | End: 2022-11-22 | Stop reason: HOSPADM

## 2022-11-21 RX ORDER — SODIUM CHLORIDE 9 MG/ML
5-250 INJECTION, SOLUTION INTRAVENOUS PRN
Status: CANCELLED | OUTPATIENT
Start: 2022-11-28

## 2022-11-21 RX ADMIN — IRON SUCROSE 200 MG: 20 INJECTION, SOLUTION INTRAVENOUS at 13:55

## 2022-11-21 RX ADMIN — SODIUM CHLORIDE: 9 INJECTION, SOLUTION INTRAVENOUS at 13:50

## 2022-11-21 ASSESSMENT — PAIN DESCRIPTION - LOCATION: LOCATION: FOOT

## 2022-11-21 NOTE — PROGRESS NOTES
[id number] [facility]  [place] [address]  Dept: Pranav: [phone]  Attending Consult Note      Reason for Visit:   Anemia. Referring Physician:  Jose Love DO    PCP:  Jose Love DO    History of Present Illness:      Mrs. Barber is a very pleasant 36-year-old lady, with past medical history significant for type II DM, hypertension, fibromyalgia, sleep apnea, ISABEL, and depression, who was referred to the hematology office for evaluation of anemia. The patient CBCD from 11/8/2022, was remarkable for hemoglobin 7.5, hematocrit 26.3, white count 9.7, platelet count 872C. Iron 25, TIBC 441, TSAT 6%. The patient has been taking oral iron. Tolerating it well. She denies any bleeding. The patient had an EGD done on 3/10/2021, revealing Schatzki ring, small hiatal hernia, otherwise no evidence of bleeding, at that time she was hospitalized with a hemoglobin of 5.5G/DL. The patient believes that she had a colonoscopy done within the last few years by Dr. Shravan Ricci. She is feeling tired, she has shortness of breath. She is retired ICU nurse. She received a dose of Venofer, tolerated it well, no reported side effects. Review of Systems;  CONSTITUTIONAL: No fever, chills. Good appetite, feeling tired. ENMT: Eyes: No diplopia; Nose: No epistaxis. Mouth: No sore throat. RESPIRATORY: No hemoptysis, shortness of breath, cough. CARDIOVASCULAR: No chest pain, palpitations. GASTROINTESTINAL: Positive for nausea, abdominal pain, diarrhea/constipation. GENITOURINARY: No dysuria, urinary frequency, hematuria. NEURO: No syncope, presyncope, headache. Positive for dizziness.   Remainder:  ROS NEGATIVE    Past Medical History:      Diagnosis Date    Arthritis     Cataract     right    Chronic fatigue syndrome     Depression     Diabetes mellitus (HCC)     SC injection weekly    Fibromyalgia     Hypertension Memory loss     Mononucleosis     Psoriasis     Sciatica     Sleep apnea     no c-pap    Vertigo      Patient Active Problem List   Diagnosis    Obstructive sleep apnea syndrome    Lumbar radicular pain    Recurrent major depressive disorder, in partial remission (HCC)    Type 2 diabetes mellitus with hyperglycemia, without long-term current use of insulin (HCC)    Gastroesophageal reflux disease    Essential hypertension    Chronic pain syndrome    Lumbar spondylosis    Thoracic disc disease    Lumbar facet arthropathy    Spinal stenosis of lumbar region without neurogenic claudication    Migraine without aura and without status migrainosus, not intractable    Lumbar spondylolysis    Thoracic facet joint syndrome    Thoracic spondylosis    Acute anemia    Lumbar disc disorder    Stenosis of lateral recess of lumbar spine    Lumbar radiculopathy    Disorder of sacrum    Iron deficiency anemia        Past Surgical History:      Procedure Laterality Date    CARPAL TUNNEL RELEASE Bilateral     CATARACT REMOVAL Right     COLONOSCOPY      OTHER SURGICAL HISTORY Bilateral 01/21/2021    BILATERAL L3,L4 MEDIAL BRANCH AND L5 DORSAL RAMUS RADIOFREQUENCY    PAIN MANAGEMENT PROCEDURE Bilateral 01/21/2021    BILATERAL L3,4 MEDIAL BRANCH AND L5 DORSAL RAMUS  RADIOFREQUENCY ABLATION WITH SEDATION  (CPT D0580169) performed by Milton Quintero MD at [address] [facility] N/A 03/19/2021    T11 KYPHOPLASTY (2 C-ARMS) performed by Milton Quintero MD at [address] [facility] ENDOSCOPY N/A 01/16/2019    EGD BIOPSY performed by Sarah Quezada MD at [place] [address] N/A 03/10/2021    EGD ESOPHAGOGASTRODUODENOSCOPY performed by Sathya Guzman MD at [address] [place] [address] History:  Family History   Problem Relation Age of Onset    Hypertension Father     Heart Disease Father     Hypertension Paternal Grandfather     Heart Disease Paternal Grandfather        Medications:  Reviewed and reconciled. Social History:  Social History     Socioeconomic History    Marital status:      Spouse name: Not on file    Number of children: Not on file    Years of education: Not on file    Highest education level: Not on file   Occupational History    Not on file   Tobacco Use    Smoking status: Former     Packs/day: 1.00     Years: 10.00     Pack years: 10.00     Types: Cigarettes     Start date: 0     Quit date:      Years since quittin.9    Smokeless tobacco: Never   Vaping Use    Vaping Use: Never used   Substance and Sexual Activity    Alcohol use: Yes     Alcohol/week: 1.0 standard drink     Types: 1 Glasses of wine per week     Comment: socially    Drug use: No    Sexual activity: Not Currently   Other Topics Concern    Not on file   Social History Narrative    Not on file     Social Determinants of Health     Financial Resource Strain: Medium Risk    Difficulty of Paying Living Expenses: Somewhat hard   Food Insecurity: Food Insecurity Present    Worried About Running Out of Food in the Last Year: Sometimes true    Ran Out of Food in the Last Year: Sometimes true   Transportation Needs: Not on file   Physical Activity: Inactive    Days of Exercise per Week: 0 days    Minutes of Exercise per Session: 0 min   Stress: Not on file   Social Connections: Not on file   Intimate Partner Violence: Not on file   Housing Stability: Not on file       Allergies: Allergies   Allergen Reactions    Baclofen Other (See Comments)     Dry mouth, abd pain    Ibuprofen Other (See Comments)     Acute bleeding    Patient states she is NOT allergic to ibuprofen.  (2022)    Nickel      Surgical steel    Penicillins Hives       Physical Exam:  BP (!) 156/72   Pulse (!) 126   Temp 97.5 °F (36.4 °C)   Ht 5' 3\" (1.6 m)   Wt 193 lb 6.4 oz (87.7 kg)   LMP  (LMP Unknown)   SpO2 96%   BMI 34.26 kg/m²   GENERAL: Alert, oriented x 3, not in acute distress. HEENT: PERRLA; EOMI. Oropharynx clear. Positive for pallor. NECK: Supple. No palpable cervical or supraclavicular lymphadenopathy. LUNGS: Good air entry bilaterally. No wheezing, crackles or rhonchi. CARDIOVASCULAR: Regular rhythm, tachycardic. ABDOMEN: Soft. Non-tender, non-distended. Positive bowel sounds. EXTREMITIES: Without clubbing, cyanosis, or edema. NEUROLOGIC: No focal deficits. ECOG PS 1       Impression/Plan:       Mrs. Barber is a very pleasant 69-year-old lady, with past medical history significant for type II DM, hypertension, fibromyalgia, sleep apnea, ISABEL, and depression, who was referred to the hematology office for evaluation of anemia. The patient CBCD from 11/8/2022, was remarkable for hemoglobin 7.5, hematocrit 26.3, white count 9.7, platelet count 725Z. Iron 25, TIBC 441, TSAT 6%. The patient has been taking oral iron. Tolerating it well. She denies any bleeding. The patient had an EGD done on 3/10/2021, revealing Schatzki ring, small hiatal hernia, otherwise no evidence of bleeding, at that time she was hospitalized with a hemoglobin of 5.5G/DL. The patient believes that she had a colonoscopy done within the last few years by Dr. Valerie Sellers. She is feeling tired, she has shortness of breath. She is retired ICU nurse. The patient has normocytic anemia, secondary to iron deficiency, be secondary to bleeding or iron malabsorption, she had GI work-up done in the past, was negative for bleeding, last EGD was on 3/10/2021. She does not have celiac disease. The patient did not have an adequate response to the oral iron, recommended parenteral iron infusion, Venofer, the side effects were reviewed with the patient, she received 1 dose, no reported side effects. Today 11/21/2022, the patient received a second dose of Venofer, 3 additional doses will be scheduled, recheck HR.   Fit test is positive, the patient was referred to GI, she will need to have a capsule endoscopy and possibly repeat colonoscopy. SPEP negative for monoclonal proteins, no vit B12/folate deficiency, TSH is normal.    RTC in 1 month. Thank you for allowing us to participate in the care of Mrs. Barber.     Arlette Moreira MD   HEMATOLOGY/MEDICAL [address] [place] [address] [address]  [address] [address]  Dept: [facility]: [phone]

## 2022-11-28 ENCOUNTER — HOSPITAL ENCOUNTER (OUTPATIENT)
Dept: INFUSION THERAPY | Age: 72
Discharge: HOME | End: 2022-11-28
Payer: MEDICARE

## 2022-11-28 VITALS
TEMPERATURE: 97.3 F | DIASTOLIC BLOOD PRESSURE: 69 MMHG | RESPIRATION RATE: 18 BRPM | HEART RATE: 95 BPM | OXYGEN SATURATION: 99 % | SYSTOLIC BLOOD PRESSURE: 125 MMHG

## 2022-11-28 DIAGNOSIS — D50.0 IRON DEFICIENCY ANEMIA DUE TO CHRONIC BLOOD LOSS: Primary | ICD-10-CM

## 2022-11-28 PROCEDURE — 6360000002 HC RX W HCPCS: Performed by: INTERNAL MEDICINE

## 2022-11-28 PROCEDURE — 96374 THER/PROPH/DIAG INJ IV PUSH: CPT

## 2022-11-28 PROCEDURE — 96365 THER/PROPH/DIAG IV INF INIT: CPT

## 2022-11-28 PROCEDURE — 2580000003 HC RX 258: Performed by: INTERNAL MEDICINE

## 2022-11-28 RX ORDER — FAMOTIDINE 10 MG/ML
20 INJECTION, SOLUTION INTRAVENOUS
Status: CANCELLED | OUTPATIENT
Start: 2022-12-05

## 2022-11-28 RX ORDER — SODIUM CHLORIDE 9 MG/ML
INJECTION, SOLUTION INTRAVENOUS CONTINUOUS
Status: CANCELLED | OUTPATIENT
Start: 2022-12-05

## 2022-11-28 RX ORDER — ONDANSETRON 2 MG/ML
8 INJECTION INTRAMUSCULAR; INTRAVENOUS
Status: CANCELLED | OUTPATIENT
Start: 2022-12-05

## 2022-11-28 RX ORDER — HEPARIN SODIUM (PORCINE) LOCK FLUSH IV SOLN 100 UNIT/ML 100 UNIT/ML
500 SOLUTION INTRAVENOUS PRN
Status: CANCELLED | OUTPATIENT
Start: 2022-12-05

## 2022-11-28 RX ORDER — SODIUM CHLORIDE 9 MG/ML
INJECTION, SOLUTION INTRAVENOUS CONTINUOUS
Status: DISCONTINUED | OUTPATIENT
Start: 2022-11-28 | End: 2022-11-29 | Stop reason: HOSPADM

## 2022-11-28 RX ORDER — SODIUM CHLORIDE 0.9 % (FLUSH) 0.9 %
5-40 SYRINGE (ML) INJECTION PRN
Status: DISCONTINUED | OUTPATIENT
Start: 2022-11-28 | End: 2022-11-29 | Stop reason: HOSPADM

## 2022-11-28 RX ORDER — SODIUM CHLORIDE 9 MG/ML
5-250 INJECTION, SOLUTION INTRAVENOUS PRN
Status: CANCELLED | OUTPATIENT
Start: 2022-12-05

## 2022-11-28 RX ORDER — SODIUM CHLORIDE 0.9 % (FLUSH) 0.9 %
5-40 SYRINGE (ML) INJECTION PRN
Status: CANCELLED | OUTPATIENT
Start: 2022-12-05

## 2022-11-28 RX ORDER — EPINEPHRINE 1 MG/ML
0.3 INJECTION, SOLUTION, CONCENTRATE INTRAVENOUS PRN
Status: CANCELLED | OUTPATIENT
Start: 2022-12-05

## 2022-11-28 RX ORDER — ALBUTEROL SULFATE 90 UG/1
4 AEROSOL, METERED RESPIRATORY (INHALATION) PRN
Status: CANCELLED | OUTPATIENT
Start: 2022-12-05

## 2022-11-28 RX ORDER — ACETAMINOPHEN 325 MG/1
650 TABLET ORAL
Status: CANCELLED | OUTPATIENT
Start: 2022-12-05

## 2022-11-28 RX ORDER — DIPHENHYDRAMINE HYDROCHLORIDE 50 MG/ML
50 INJECTION INTRAMUSCULAR; INTRAVENOUS
Status: CANCELLED | OUTPATIENT
Start: 2022-12-05

## 2022-11-28 RX ORDER — MEPERIDINE HYDROCHLORIDE 25 MG/ML
12.5 INJECTION INTRAMUSCULAR; INTRAVENOUS; SUBCUTANEOUS PRN
Status: CANCELLED | OUTPATIENT
Start: 2022-12-05

## 2022-11-28 RX ADMIN — IRON SUCROSE 200 MG: 20 INJECTION, SOLUTION INTRAVENOUS at 14:08

## 2022-11-28 RX ADMIN — SODIUM CHLORIDE: 9 INJECTION, SOLUTION INTRAVENOUS at 13:58

## 2022-11-28 RX ADMIN — SODIUM CHLORIDE, PRESERVATIVE FREE 10 ML: 5 INJECTION INTRAVENOUS at 13:55

## 2022-11-30 ENCOUNTER — TELEPHONE (OUTPATIENT)
Dept: ORTHOPEDIC SURGERY | Age: 72
End: 2022-11-30

## 2022-11-30 ENCOUNTER — HOSPITAL ENCOUNTER (OUTPATIENT)
Dept: INFUSION THERAPY | Age: 72
Discharge: HOME | End: 2022-11-30

## 2022-11-30 DIAGNOSIS — D50.0 IRON DEFICIENCY ANEMIA DUE TO CHRONIC BLOOD LOSS: Primary | ICD-10-CM

## 2022-11-30 NOTE — TELEPHONE ENCOUNTER
Spoke to patient after receiving return referral for cancellation of her appointments. Patient has been having other medical issues that are prioritized with low iron/ anemia and infusion treatments. Stated she has not been walking/standing for more than 10 minutes at a time due to health/fatigue. Denies LOB, pain, ROM limits, and numbness/pins/needles in the left leg. Using [facility] for ambulation and safety at home and community. Patient expressed wanting to hold on seeing Orthopedics and will discuss with Dr. Sandra Trejo at her next f/u appointment.

## 2022-12-05 ENCOUNTER — HOSPITAL ENCOUNTER (OUTPATIENT)
Dept: INFUSION THERAPY | Age: 72
Discharge: HOME | End: 2022-12-05
Payer: MEDICARE

## 2022-12-05 VITALS
RESPIRATION RATE: 16 BRPM | HEART RATE: 92 BPM | SYSTOLIC BLOOD PRESSURE: 122 MMHG | OXYGEN SATURATION: 98 % | TEMPERATURE: 97.8 F | DIASTOLIC BLOOD PRESSURE: 65 MMHG

## 2022-12-05 DIAGNOSIS — D50.0 IRON DEFICIENCY ANEMIA DUE TO CHRONIC BLOOD LOSS: Primary | ICD-10-CM

## 2022-12-05 LAB
ANISOCYTOSIS: ABNORMAL
BASOPHILS ABSOLUTE: 0 E9/L (ref 0–0.2)
BASOPHILS RELATIVE PERCENT: 0.6 % (ref 0–2)
EOSINOPHILS ABSOLUTE: 0.35 E9/L (ref 0.05–0.5)
EOSINOPHILS RELATIVE PERCENT: 4.3 % (ref 0–6)
HCT VFR BLD CALC: 29.5 % (ref 34–48)
HEMOGLOBIN: 8.7 G/DL (ref 11.5–15.5)
HYPOCHROMIA: ABNORMAL
LYMPHOCYTES ABSOLUTE: 1.07 E9/L (ref 1.5–4)
LYMPHOCYTES RELATIVE PERCENT: 13 % (ref 20–42)
MCH RBC QN AUTO: 26 PG (ref 26–35)
MCHC RBC AUTO-ENTMCNC: 29.5 % (ref 32–34.5)
MCV RBC AUTO: 88.1 FL (ref 80–99.9)
MONOCYTES ABSOLUTE: 0.49 E9/L (ref 0.1–0.95)
MONOCYTES RELATIVE PERCENT: 6.1 % (ref 2–12)
NEUTROPHILS ABSOLUTE: 6.31 E9/L (ref 1.8–7.3)
NEUTROPHILS RELATIVE PERCENT: 76.5 % (ref 43–80)
OVALOCYTES: ABNORMAL
PDW BLD-RTO: 21.9 FL (ref 11.5–15)
PLATELET # BLD: 262 E9/L (ref 130–450)
PMV BLD AUTO: 9.6 FL (ref 7–12)
POIKILOCYTES: ABNORMAL
POLYCHROMASIA: ABNORMAL
RBC # BLD: 3.35 E12/L (ref 3.5–5.5)
WBC # BLD: 8.2 E9/L (ref 4.5–11.5)

## 2022-12-05 PROCEDURE — 6360000002 HC RX W HCPCS: Performed by: INTERNAL MEDICINE

## 2022-12-05 PROCEDURE — 96374 THER/PROPH/DIAG INJ IV PUSH: CPT

## 2022-12-05 PROCEDURE — 85025 COMPLETE CBC W/AUTO DIFF WBC: CPT

## 2022-12-05 PROCEDURE — 2580000003 HC RX 258: Performed by: INTERNAL MEDICINE

## 2022-12-05 RX ORDER — MEPERIDINE HYDROCHLORIDE 25 MG/ML
12.5 INJECTION INTRAMUSCULAR; INTRAVENOUS; SUBCUTANEOUS PRN
Status: CANCELLED | OUTPATIENT
Start: 2022-12-12

## 2022-12-05 RX ORDER — SODIUM CHLORIDE 0.9 % (FLUSH) 0.9 %
5-40 SYRINGE (ML) INJECTION PRN
Status: CANCELLED | OUTPATIENT
Start: 2022-12-12

## 2022-12-05 RX ORDER — ACETAMINOPHEN 325 MG/1
650 TABLET ORAL
Status: CANCELLED | OUTPATIENT
Start: 2022-12-12

## 2022-12-05 RX ORDER — SODIUM CHLORIDE 0.9 % (FLUSH) 0.9 %
5-40 SYRINGE (ML) INJECTION PRN
Status: DISCONTINUED | OUTPATIENT
Start: 2022-12-05 | End: 2022-12-06 | Stop reason: HOSPADM

## 2022-12-05 RX ORDER — FAMOTIDINE 10 MG/ML
20 INJECTION, SOLUTION INTRAVENOUS
Status: CANCELLED | OUTPATIENT
Start: 2022-12-12

## 2022-12-05 RX ORDER — ALBUTEROL SULFATE 90 UG/1
4 AEROSOL, METERED RESPIRATORY (INHALATION) PRN
Status: CANCELLED | OUTPATIENT
Start: 2022-12-12

## 2022-12-05 RX ORDER — ONDANSETRON 2 MG/ML
8 INJECTION INTRAMUSCULAR; INTRAVENOUS
Status: CANCELLED | OUTPATIENT
Start: 2022-12-12

## 2022-12-05 RX ORDER — EPINEPHRINE 1 MG/ML
0.3 INJECTION, SOLUTION, CONCENTRATE INTRAVENOUS PRN
Status: CANCELLED | OUTPATIENT
Start: 2022-12-12

## 2022-12-05 RX ORDER — SODIUM CHLORIDE 9 MG/ML
INJECTION, SOLUTION INTRAVENOUS CONTINUOUS
Status: CANCELLED | OUTPATIENT
Start: 2022-12-12

## 2022-12-05 RX ORDER — DIPHENHYDRAMINE HYDROCHLORIDE 50 MG/ML
50 INJECTION INTRAMUSCULAR; INTRAVENOUS
Status: CANCELLED | OUTPATIENT
Start: 2022-12-12

## 2022-12-05 RX ORDER — SODIUM CHLORIDE 9 MG/ML
INJECTION, SOLUTION INTRAVENOUS CONTINUOUS
Status: DISCONTINUED | OUTPATIENT
Start: 2022-12-05 | End: 2022-12-06 | Stop reason: HOSPADM

## 2022-12-05 RX ORDER — HEPARIN SODIUM (PORCINE) LOCK FLUSH IV SOLN 100 UNIT/ML 100 UNIT/ML
500 SOLUTION INTRAVENOUS PRN
Status: CANCELLED | OUTPATIENT
Start: 2022-12-12

## 2022-12-05 RX ORDER — SODIUM CHLORIDE 9 MG/ML
5-250 INJECTION, SOLUTION INTRAVENOUS PRN
Status: CANCELLED | OUTPATIENT
Start: 2022-12-12

## 2022-12-05 RX ADMIN — SODIUM CHLORIDE: 9 INJECTION, SOLUTION INTRAVENOUS at 13:25

## 2022-12-05 RX ADMIN — IRON SUCROSE 200 MG: 20 INJECTION, SOLUTION INTRAVENOUS at 13:38

## 2022-12-05 RX ADMIN — SODIUM CHLORIDE, PRESERVATIVE FREE 10 ML: 5 INJECTION INTRAVENOUS at 13:26

## 2022-12-12 ENCOUNTER — HOSPITAL ENCOUNTER (OUTPATIENT)
Dept: INFUSION THERAPY | Age: 72
Discharge: HOME | End: 2022-12-12
Payer: MEDICARE

## 2022-12-12 VITALS
OXYGEN SATURATION: 98 % | SYSTOLIC BLOOD PRESSURE: 112 MMHG | TEMPERATURE: 97.8 F | DIASTOLIC BLOOD PRESSURE: 52 MMHG | RESPIRATION RATE: 16 BRPM | HEART RATE: 88 BPM

## 2022-12-12 DIAGNOSIS — D50.0 IRON DEFICIENCY ANEMIA DUE TO CHRONIC BLOOD LOSS: Primary | ICD-10-CM

## 2022-12-12 PROCEDURE — 6360000002 HC RX W HCPCS: Performed by: INTERNAL MEDICINE

## 2022-12-12 PROCEDURE — 2580000003 HC RX 258: Performed by: INTERNAL MEDICINE

## 2022-12-12 PROCEDURE — 96374 THER/PROPH/DIAG INJ IV PUSH: CPT

## 2022-12-12 RX ORDER — SODIUM CHLORIDE 0.9 % (FLUSH) 0.9 %
5-40 SYRINGE (ML) INJECTION PRN
Status: DISCONTINUED | OUTPATIENT
Start: 2022-12-12 | End: 2022-12-12

## 2022-12-12 RX ORDER — ALBUTEROL SULFATE 90 UG/1
4 AEROSOL, METERED RESPIRATORY (INHALATION) PRN
Status: CANCELLED | OUTPATIENT
Start: 2022-12-19

## 2022-12-12 RX ORDER — SODIUM CHLORIDE 9 MG/ML
INJECTION, SOLUTION INTRAVENOUS CONTINUOUS
Status: CANCELLED | OUTPATIENT
Start: 2022-12-19

## 2022-12-12 RX ORDER — SODIUM CHLORIDE 0.9 % (FLUSH) 0.9 %
5-40 SYRINGE (ML) INJECTION PRN
Status: CANCELLED | OUTPATIENT
Start: 2022-12-19

## 2022-12-12 RX ORDER — EPINEPHRINE 1 MG/ML
0.3 INJECTION, SOLUTION, CONCENTRATE INTRAVENOUS PRN
Status: CANCELLED | OUTPATIENT
Start: 2022-12-19

## 2022-12-12 RX ORDER — ONDANSETRON 2 MG/ML
8 INJECTION INTRAMUSCULAR; INTRAVENOUS
Status: CANCELLED | OUTPATIENT
Start: 2022-12-19

## 2022-12-12 RX ORDER — DIPHENHYDRAMINE HYDROCHLORIDE 50 MG/ML
50 INJECTION INTRAMUSCULAR; INTRAVENOUS
Status: CANCELLED | OUTPATIENT
Start: 2022-12-19

## 2022-12-12 RX ORDER — FAMOTIDINE 10 MG/ML
20 INJECTION, SOLUTION INTRAVENOUS
Status: CANCELLED | OUTPATIENT
Start: 2022-12-19

## 2022-12-12 RX ORDER — HEPARIN SODIUM (PORCINE) LOCK FLUSH IV SOLN 100 UNIT/ML 100 UNIT/ML
500 SOLUTION INTRAVENOUS PRN
Status: CANCELLED | OUTPATIENT
Start: 2022-12-19

## 2022-12-12 RX ORDER — SODIUM CHLORIDE 9 MG/ML
5-250 INJECTION, SOLUTION INTRAVENOUS PRN
Status: CANCELLED | OUTPATIENT
Start: 2022-12-19

## 2022-12-12 RX ORDER — ACETAMINOPHEN 325 MG/1
650 TABLET ORAL
Status: CANCELLED | OUTPATIENT
Start: 2022-12-19

## 2022-12-12 RX ORDER — SODIUM CHLORIDE 9 MG/ML
INJECTION, SOLUTION INTRAVENOUS CONTINUOUS
Status: DISCONTINUED | OUTPATIENT
Start: 2022-12-12 | End: 2022-12-12

## 2022-12-12 RX ORDER — MEPERIDINE HYDROCHLORIDE 25 MG/ML
12.5 INJECTION INTRAMUSCULAR; INTRAVENOUS; SUBCUTANEOUS PRN
Status: CANCELLED | OUTPATIENT
Start: 2022-12-19

## 2022-12-12 RX ADMIN — SODIUM CHLORIDE, PRESERVATIVE FREE 10 ML: 5 INJECTION INTRAVENOUS at 13:54

## 2022-12-12 RX ADMIN — SODIUM CHLORIDE 20 ML/HR: 9 INJECTION, SOLUTION INTRAVENOUS at 13:55

## 2022-12-12 RX ADMIN — IRON SUCROSE 200 MG: 20 INJECTION, SOLUTION INTRAVENOUS at 13:55

## 2022-12-12 NOTE — PROGRESS NOTES
Kevin notified -patient complaining fof dizziness, not feeling sell. 94/56 HR glo 105-has not been drinking much. Feels she may be dehydrated.

## 2022-12-12 NOTE — PROGRESS NOTES
Patient tolerated infusion well. Gave entire bag of 250cc NS-feeling better.  Vitals stable-discharged with friend-wheelchair

## 2022-12-15 ENCOUNTER — COMMUNITY OUTREACH (OUTPATIENT)
Dept: FAMILY MEDICINE CLINIC | Age: 72
End: 2022-12-15

## 2022-12-16 ENCOUNTER — TELEPHONE (OUTPATIENT)
Dept: FAMILY MEDICINE CLINIC | Age: 72
End: 2022-12-16

## 2022-12-20 ENCOUNTER — OFFICE VISIT (OUTPATIENT)
Dept: FAMILY MEDICINE CLINIC | Age: 72
End: 2022-12-20
Payer: MEDICARE

## 2022-12-20 VITALS
DIASTOLIC BLOOD PRESSURE: 60 MMHG | TEMPERATURE: 97.2 F | OXYGEN SATURATION: 97 % | BODY MASS INDEX: 34.26 KG/M2 | RESPIRATION RATE: 16 BRPM | HEIGHT: 63 IN | SYSTOLIC BLOOD PRESSURE: 102 MMHG | HEART RATE: 102 BPM

## 2022-12-20 DIAGNOSIS — N39.0 URINARY TRACT INFECTION WITH HEMATURIA, SITE UNSPECIFIED: Primary | ICD-10-CM

## 2022-12-20 DIAGNOSIS — R30.0 DYSURIA: ICD-10-CM

## 2022-12-20 DIAGNOSIS — R31.9 URINARY TRACT INFECTION WITH HEMATURIA, SITE UNSPECIFIED: Primary | ICD-10-CM

## 2022-12-20 LAB
BILIRUBIN, POC: ABNORMAL
BLOOD URINE, POC: ABNORMAL
CLARITY, POC: CLEAR
COLOR, POC: YELLOW
GLUCOSE URINE, POC: 500
KETONES, POC: ABNORMAL
LEUKOCYTE EST, POC: ABNORMAL
NITRITE, POC: POSITIVE
PH, POC: 5.5
PROTEIN, POC: 100
SPECIFIC GRAVITY, POC: 1.02
UROBILINOGEN, POC: 0.2

## 2022-12-20 PROCEDURE — 99214 OFFICE O/P EST MOD 30 MIN: CPT | Performed by: NURSE PRACTITIONER

## 2022-12-20 PROCEDURE — 1123F ACP DISCUSS/DSCN MKR DOCD: CPT | Performed by: NURSE PRACTITIONER

## 2022-12-20 PROCEDURE — 3078F DIAST BP <80 MM HG: CPT | Performed by: NURSE PRACTITIONER

## 2022-12-20 PROCEDURE — 1090F PRES/ABSN URINE INCON ASSESS: CPT | Performed by: NURSE PRACTITIONER

## 2022-12-20 PROCEDURE — 81002 URINALYSIS NONAUTO W/O SCOPE: CPT | Performed by: NURSE PRACTITIONER

## 2022-12-20 PROCEDURE — G8484 FLU IMMUNIZE NO ADMIN: HCPCS | Performed by: NURSE PRACTITIONER

## 2022-12-20 PROCEDURE — 3017F COLORECTAL CA SCREEN DOC REV: CPT | Performed by: NURSE PRACTITIONER

## 2022-12-20 PROCEDURE — 3074F SYST BP LT 130 MM HG: CPT | Performed by: NURSE PRACTITIONER

## 2022-12-20 PROCEDURE — G8417 CALC BMI ABV UP PARAM F/U: HCPCS | Performed by: NURSE PRACTITIONER

## 2022-12-20 PROCEDURE — G8427 DOCREV CUR MEDS BY ELIG CLIN: HCPCS | Performed by: NURSE PRACTITIONER

## 2022-12-20 PROCEDURE — 1036F TOBACCO NON-USER: CPT | Performed by: NURSE PRACTITIONER

## 2022-12-20 PROCEDURE — G8400 PT W/DXA NO RESULTS DOC: HCPCS | Performed by: NURSE PRACTITIONER

## 2022-12-20 RX ORDER — SULFAMETHOXAZOLE AND TRIMETHOPRIM 800; 160 MG/1; MG/1
1 TABLET ORAL 2 TIMES DAILY
Qty: 14 TABLET | Refills: 0 | Status: SHIPPED | OUTPATIENT
Start: 2022-12-20 | End: 2022-12-27

## 2022-12-20 NOTE — PROGRESS NOTES
22  Aneta Barber : 1950 Sex: female  Age 71 y.o. Subjective:  Chief Complaint   Patient presents with    Urinary Urgency     Started about a week ago. Dysuria       HPI:   Susy Jose , 71 y.o. female presents to the clinic for evaluation of UTI symptoms x 7 days. Reports associated frequency, urgency, and suprapubic pressure. The patient has not taken any treatment for symptoms. The patient reports unchanged symptoms over time. Denies hematuria, flank pain, vaginal discharge, vaginal bleeding, or lethargy. The patient also denies headache, fever, chest pain, abdominal pain, shortness of breath, and nausea / vomiting / diarrhea. No LMP recorded (lmp unknown). Patient is postmenopausal.    ROS:   Unless otherwise stated in this report the patient's positive and negative responses for review of systems for constitutional, eyes, ENT, cardiovascular, respiratory, gastrointestinal, neurological, , musculoskeletal, and integument systems and related systems to the presenting problem are either stated in the history of present illness or were not pertinent or were negative for the symptoms and/or complaints related to the presenting medical problem. Positives and pertinent negatives as per HPI. All others reviewed and are negative.       PMH:     Past Medical History:   Diagnosis Date    Arthritis     Cataract     right    Chronic fatigue syndrome     Depression     Diabetes mellitus (HCC)     SC injection weekly    Fibromyalgia     Hypertension     Memory loss     Mononucleosis     Psoriasis     Sciatica     Sleep apnea     no c-pap    Vertigo        Past Surgical History:   Procedure Laterality Date    CARPAL TUNNEL RELEASE Bilateral     CATARACT REMOVAL Right     COLONOSCOPY      OTHER SURGICAL HISTORY Bilateral 2021    BILATERAL L3,L4 MEDIAL BRANCH AND L5 DORSAL RAMUS RADIOFREQUENCY    PAIN MANAGEMENT PROCEDURE Bilateral 2021    BILATERAL L3,4 MEDIAL BRANCH AND L5 DORSAL RAMUS  RADIOFREQUENCY ABLATION WITH SEDATION  (CPT 79356) performed by Juliet Dawkins MD at [address] [facility] N/A 03/19/2021    T11 KYPHOPLASTY (2 C-ARMS) performed by Juliet Dawkins MD at [facility] N/A 01/16/2019    EGD BIOPSY performed by Sheyla Dinero MD at [address] [facility] N/A 03/10/2021    EGD ESOPHAGOGASTRODUODENOSCOPY performed by Magy Sanders MD at [facility] History   Problem Relation Age of Onset    Hypertension Father     Heart Disease Father     Hypertension Paternal Grandfather     Heart Disease Paternal Grandfather        Medications:     Current Outpatient Medications:     sulfamethoxazole-trimethoprim (BACTRIM DS) 800-160 MG per tablet, Take 1 tablet by mouth 2 times daily for 7 days, Disp: 14 tablet, Rfl: 0    ondansetron (ZOFRAN) 8 MG tablet, Take 1 tablet by mouth every 8 hours as needed for Nausea or Vomiting, Disp: 30 tablet, Rfl: 1    Alcohol Swabs (DROPSAFE ALCOHOL PREP) 70 % PADS, , Disp: , Rfl:     Lancets (ONETOUCH DELICA PLUS [id number]) MISC, , Disp: , Rfl:     Blood Glucose Monitoring Suppl (ONE TOUCH ULTRA 2) w/Device KIT, , Disp: , Rfl:     fluticasone (FLONASE) 50 MCG/ACT nasal spray, 1 spray by Each Nostril route daily, Disp: 32 g, Rfl: 3    blood glucose test strips (ASCENSIA AUTODISC VI;ONE TOUCH ULTRA TEST VI) strip, 1 each by In Vitro route daily Tests daily and as needed. , Disp: 100 each, Rfl: 1    Dulaglutide (TRULICITY) 4.5 HK/3.3NP SOPN, INJECT 4.5MG (1 PEN) SUBCUTANEOUSLY EVERY WEEK, Disp: 1 Adjustable Dose Pre-filled Pen Syringe, Rfl: 5    omeprazole (PRILOSEC) 20 MG delayed release capsule, TAKE 1 CAPSULE EVERY DAY, Disp: 30 capsule, Rfl: 5    glipiZIDE (GLUCOTROL) 5 MG tablet, Take 1 tablet by mouth daily, Disp: 30 tablet, Rfl: 3    blood glucose monitor kit and supplies, Dispense sufficient amount for indicated testing frequency plus additional to accommodate PRN testing needs with ONE TOUCH glucometer. Dispense all needed supplies to include: monitor, strips, lancing device, lancets, control solutions, alcohol swabs., Disp: 1 kit, Rfl: 0    gabapentin (NEURONTIN) 300 MG capsule, Take 1 capsule by mouth 4 times daily for 30 days. , Disp: 360 capsule, Rfl: 1    rosuvastatin (CRESTOR) 20 MG tablet, Take 1 tablet by mouth nightly, Disp: 90 tablet, Rfl: 1    DULoxetine (CYMBALTA) 30 MG extended release capsule, Take 1 capsule by mouth daily, Disp: 30 capsule, Rfl: 5    rOPINIRole (REQUIP) 0.5 MG tablet, Take 1 tablet 3 times daily, Disp: 270 tablet, Rfl: 1    tiZANidine (ZANAFLEX) 2 MG tablet, TAKE 1 TABLET TWICE DAILY, Disp: 180 tablet, Rfl: 1    Blood Glucose Calibration (OT ULTRA/FASTTK CNTRL SOLN) SOLN, , Disp: , Rfl:     Allergies: Allergies   Allergen Reactions    Baclofen Other (See Comments)     Dry mouth, abd pain    Nickel      Surgical steel    Penicillins Hives       Social History:     Social History     Tobacco Use    Smoking status: Former     Packs/day: 1.00     Years: 10.00     Pack years: 10.00     Types: Cigarettes     Start date: 0     Quit date:      Years since quittin.9    Smokeless tobacco: Never   Vaping Use    Vaping Use: Never used   Substance Use Topics    Alcohol use: Yes     Alcohol/week: 1.0 standard drink     Types: 1 Glasses of wine per week     Comment: socially    Drug use: No       Physical Exam:     Vitals:    22 1146   BP: 102/60   Pulse: (!) 102   Resp: 16   Temp: 97.2 °F (36.2 °C)   TempSrc: Temporal   SpO2: 97%   Height: 5' 3\" (1.6 m)       Physical Exam (PE)   Constitutional: Alert, development consistent with age. HENT:      Head: Normocephalic. Right Ear: External ear normal.      Left Ear: External ear normal.      Nose: Normal.      Mouth/Throat:     Mouth: Mucous membranes are moist.      Pharynx: Oropharynx is clear.   Eyes: Pupils: Pupils are equal, round, and reactive to light. Neck: Normal ROM. Supple. Cardiovascular: Heart RRR without pathologic murmurs or gallops. Pulmonary: Respiratory effort normal.  Normal breath sounds. Abdomen: Soft, nondistended, with mild suprapubic tenderness. No rebound, rigidity, or guarding. BS+ X4. No organomegaly. Back: Negative CVA tenderness. Skin:  Normal turgor. Warm, dry, without visible rash, unless noted elsewhere. Musculoskeletal: General: Normal strength / ROM. Neurological:  Alert and oriented. Motor functions intact. Responds to verbal commands. Psychiatric: Mood and Affect: Mood normal. Behavior: Behavior normal    Testing:   (All laboratory and radiology results have been personally reviewed by myself)  Labs:  Results for orders placed or performed in visit on 12/20/22   POCT Urinalysis no Micro   Result Value Ref Range    Color, UA yellow     Clarity, UA clear     Glucose, UA      Bilirubin, UA neg     Ketones, UA neg     Spec Grav, UA 1.020     Blood, UA POC mod     pH, UA 5.5     Protein, UA      Urobilinogen, UA 0.2     Leukocytes, UA small     Nitrite, UA positive        Imaging: All Radiology results interpreted by Radiologist unless otherwise noted. No orders to display       Assessment / Plan:   The patient's vitals, allergies, medications, and past medical history have been reviewed. Bailee Galvin was seen today for urinary urgency and dysuria. Diagnoses and all orders for this visit:    Urinary tract infection with hematuria, site unspecified  -     sulfamethoxazole-trimethoprim (BACTRIM DS) 800-160 MG per tablet; Take 1 tablet by mouth 2 times daily for 7 days    Dysuria  -     POCT Urinalysis no Micro  -     Culture, Urine; Future      - Disposition: Home    - Educational material printed for patient's review and were included in patient instructions. After Visit Summary was given to patient at the end of visit. - Patient is advised to rest and increase oral fluids.  Discussed other symptomatic treatments with the patient today. The patient is to follow-up with PCP in the next 2-3 days for reevaluation. Red flag symptoms were also discussed with the patient today. If symptoms worsen the patient is to go directly to the emergency department for reevaluation and treatment. Pt verbalizes understanding and is in agreement with plan of care. All questions answered. SIGNATURE: ABDOUL Elliott    *NOTE: This report was transcribed using voice recognition software. Every effort was made to ensure accuracy; however, inadvertent computerized transcription errors may be present.

## 2022-12-23 LAB
ORGANISM: ABNORMAL
URINE CULTURE, ROUTINE: ABNORMAL

## 2023-01-03 ENCOUNTER — TELEPHONE (OUTPATIENT)
Dept: CASE MANAGEMENT | Age: 73
End: 2023-01-03

## 2023-01-03 DIAGNOSIS — D50.0 IRON DEFICIENCY ANEMIA DUE TO CHRONIC BLOOD LOSS: Primary | ICD-10-CM

## 2023-01-03 NOTE — TELEPHONE ENCOUNTER
Patient called leaving a message she has a concern about how she's been feeling and would like to have her hemoglobin checked before she sees Dr. Kolton Armando at her office visit on 01/09/2023. Updated Amy CNP, lab work ordered and patient contacted with appointment to come tomorrow at 1430 to have them drawn.Kimberli Colon  RN, ADN, BSE, OCN  Patient Nurse Navigator

## 2023-01-09 ENCOUNTER — OFFICE VISIT (OUTPATIENT)
Dept: ONCOLOGY | Age: 73
End: 2023-01-09
Payer: MEDICARE

## 2023-01-09 ENCOUNTER — HOSPITAL ENCOUNTER (EMERGENCY)
Age: 73
Discharge: HOME | End: 2023-01-09
Attending: EMERGENCY MEDICINE
Payer: MEDICARE

## 2023-01-09 ENCOUNTER — APPOINTMENT (OUTPATIENT)
Dept: GENERAL RADIOLOGY | Age: 73
End: 2023-01-09
Payer: MEDICARE

## 2023-01-09 ENCOUNTER — HOSPITAL ENCOUNTER (OUTPATIENT)
Dept: INFUSION THERAPY | Age: 73
Discharge: HOME | End: 2023-01-09
Payer: MEDICARE

## 2023-01-09 VITALS
WEIGHT: 200 LBS | HEART RATE: 115 BPM | TEMPERATURE: 97.8 F | RESPIRATION RATE: 28 BRPM | OXYGEN SATURATION: 99 % | DIASTOLIC BLOOD PRESSURE: 123 MMHG | HEIGHT: 63 IN | SYSTOLIC BLOOD PRESSURE: 141 MMHG | BODY MASS INDEX: 35.44 KG/M2

## 2023-01-09 VITALS
SYSTOLIC BLOOD PRESSURE: 130 MMHG | BODY MASS INDEX: 35.49 KG/M2 | HEIGHT: 63 IN | OXYGEN SATURATION: 99 % | WEIGHT: 200.3 LBS | HEART RATE: 118 BPM | DIASTOLIC BLOOD PRESSURE: 58 MMHG | TEMPERATURE: 98 F

## 2023-01-09 DIAGNOSIS — D50.0 IRON DEFICIENCY ANEMIA DUE TO CHRONIC BLOOD LOSS: ICD-10-CM

## 2023-01-09 DIAGNOSIS — D50.0 IRON DEFICIENCY ANEMIA DUE TO CHRONIC BLOOD LOSS: Primary | ICD-10-CM

## 2023-01-09 DIAGNOSIS — D50.9 IRON DEFICIENCY ANEMIA, UNSPECIFIED IRON DEFICIENCY ANEMIA TYPE: Primary | ICD-10-CM

## 2023-01-09 LAB
ABO/RH: NORMAL
ACANTHOCYTES: ABNORMAL
ALBUMIN SERPL-MCNC: 3.8 G/DL (ref 3.5–5.2)
ALP BLD-CCNC: 78 U/L (ref 35–104)
ALT SERPL-CCNC: 27 U/L (ref 0–32)
ANION GAP SERPL CALCULATED.3IONS-SCNC: 13 MMOL/L (ref 7–16)
ANION GAP SERPL CALCULATED.3IONS-SCNC: 13 MMOL/L (ref 7–16)
ANISOCYTOSIS: ABNORMAL
ANTIBODY SCREEN: NORMAL
AST SERPL-CCNC: 38 U/L (ref 0–31)
BACTERIA: NORMAL /HPF
BASOPHILS ABSOLUTE: 0.21 E9/L (ref 0–0.2)
BASOPHILS RELATIVE PERCENT: 2.6 % (ref 0–2)
BILIRUB SERPL-MCNC: 0.3 MG/DL (ref 0–1.2)
BILIRUBIN URINE: NEGATIVE
BLOOD BANK DISPENSE STATUS: NORMAL
BLOOD BANK DISPENSE STATUS: NORMAL
BLOOD BANK PRODUCT CODE: NORMAL
BLOOD BANK PRODUCT CODE: NORMAL
BLOOD, URINE: NEGATIVE
BPU ID: NORMAL
BPU ID: NORMAL
BUN BLDV-MCNC: 12 MG/DL (ref 6–23)
BUN BLDV-MCNC: 12 MG/DL (ref 6–23)
CALCIUM SERPL-MCNC: 8.6 MG/DL (ref 8.6–10.2)
CALCIUM SERPL-MCNC: 8.6 MG/DL (ref 8.6–10.2)
CHLORIDE BLD-SCNC: 102 MMOL/L (ref 98–107)
CHLORIDE BLD-SCNC: 104 MMOL/L (ref 98–107)
CLARITY: CLEAR
CO2: 18 MMOL/L (ref 22–29)
CO2: 19 MMOL/L (ref 22–29)
COLOR: NORMAL
CREAT SERPL-MCNC: 0.6 MG/DL (ref 0.5–1)
CREAT SERPL-MCNC: 0.6 MG/DL (ref 0.5–1)
DESCRIPTION BLOOD BANK: NORMAL
DESCRIPTION BLOOD BANK: NORMAL
EKG ATRIAL RATE: 121 BPM
EKG P AXIS: 75 DEGREES
EKG P-R INTERVAL: 152 MS
EKG Q-T INTERVAL: 318 MS
EKG QRS DURATION: 72 MS
EKG QTC CALCULATION (BAZETT): 451 MS
EKG R AXIS: -9 DEGREES
EKG T AXIS: 59 DEGREES
EKG VENTRICULAR RATE: 121 BPM
EOSINOPHILS ABSOLUTE: 0.14 E9/L (ref 0.05–0.5)
EOSINOPHILS RELATIVE PERCENT: 1.8 % (ref 0–6)
EPITHELIAL CELLS, UA: NORMAL /HPF
GFR SERPL CREATININE-BSD FRML MDRD: >60 ML/MIN/1.73
GFR SERPL CREATININE-BSD FRML MDRD: >60 ML/MIN/1.73
GLUCOSE BLD-MCNC: 233 MG/DL (ref 74–99)
GLUCOSE BLD-MCNC: 244 MG/DL (ref 74–99)
GLUCOSE URINE: NEGATIVE MG/DL
HCT VFR BLD CALC: 17.6 % (ref 34–48)
HCT VFR BLD CALC: 18.5 % (ref 34–48)
HCT VFR BLD CALC: 20.6 % (ref 34–48)
HEMOGLOBIN: 5.1 G/DL (ref 11.5–15.5)
HEMOGLOBIN: 5.1 G/DL (ref 11.5–15.5)
HEMOGLOBIN: 6.2 G/DL (ref 11.5–15.5)
HYPOCHROMIA: ABNORMAL
KETONES, URINE: NEGATIVE MG/DL
LEUKOCYTE ESTERASE, URINE: NEGATIVE
LYMPHOCYTES ABSOLUTE: 1.42 E9/L (ref 1.5–4)
LYMPHOCYTES RELATIVE PERCENT: 17.5 % (ref 20–42)
MCH RBC QN AUTO: 23.9 PG (ref 26–35)
MCH RBC QN AUTO: 24.6 PG (ref 26–35)
MCHC RBC AUTO-ENTMCNC: 27.6 % (ref 32–34.5)
MCHC RBC AUTO-ENTMCNC: 29 % (ref 32–34.5)
MCV RBC AUTO: 85 FL (ref 80–99.9)
MCV RBC AUTO: 86.9 FL (ref 80–99.9)
MONOCYTES ABSOLUTE: 0.55 E9/L (ref 0.1–0.95)
MONOCYTES RELATIVE PERCENT: 7 % (ref 2–12)
NEUTROPHILS ABSOLUTE: 5.61 E9/L (ref 1.8–7.3)
NEUTROPHILS RELATIVE PERCENT: 71.1 % (ref 43–80)
NITRITE, URINE: NEGATIVE
OVALOCYTES: ABNORMAL
PDW BLD-RTO: 20.8 FL (ref 11.5–15)
PDW BLD-RTO: 20.9 FL (ref 11.5–15)
PH UA: 6.5 (ref 5–9)
PLATELET # BLD: 236 E9/L (ref 130–450)
PLATELET # BLD: 244 E9/L (ref 130–450)
PMV BLD AUTO: 10.6 FL (ref 7–12)
PMV BLD AUTO: 11 FL (ref 7–12)
POIKILOCYTES: ABNORMAL
POLYCHROMASIA: ABNORMAL
POTASSIUM SERPL-SCNC: 3.9 MMOL/L (ref 3.5–5)
POTASSIUM SERPL-SCNC: 4.4 MMOL/L (ref 3.5–5)
PRO-BNP: 97 PG/ML (ref 0–125)
PROTEIN UA: NEGATIVE MG/DL
RBC # BLD: 2.07 E12/L (ref 3.5–5.5)
RBC # BLD: 2.13 E12/L (ref 3.5–5.5)
RBC UA: NORMAL /HPF (ref 0–2)
SODIUM BLD-SCNC: 133 MMOL/L (ref 132–146)
SODIUM BLD-SCNC: 136 MMOL/L (ref 132–146)
SPECIFIC GRAVITY UA: 1.01 (ref 1–1.03)
TARGET CELLS: ABNORMAL
TOTAL PROTEIN: 6.5 G/DL (ref 6.4–8.3)
TROPONIN, HIGH SENSITIVITY: 14 NG/L (ref 0–9)
TROPONIN, HIGH SENSITIVITY: 14 NG/L (ref 0–9)
UROBILINOGEN, URINE: 0.2 E.U./DL
WBC # BLD: 7.9 E9/L (ref 4.5–11.5)
WBC # BLD: 9 E9/L (ref 4.5–11.5)
WBC UA: NORMAL /HPF (ref 0–5)

## 2023-01-09 PROCEDURE — 86850 RBC ANTIBODY SCREEN: CPT

## 2023-01-09 PROCEDURE — 99214 OFFICE O/P EST MOD 30 MIN: CPT | Performed by: INTERNAL MEDICINE

## 2023-01-09 PROCEDURE — 87088 URINE BACTERIA CULTURE: CPT

## 2023-01-09 PROCEDURE — 3075F SYST BP GE 130 - 139MM HG: CPT | Performed by: INTERNAL MEDICINE

## 2023-01-09 PROCEDURE — 85025 COMPLETE CBC W/AUTO DIFF WBC: CPT

## 2023-01-09 PROCEDURE — 85014 HEMATOCRIT: CPT

## 2023-01-09 PROCEDURE — 93005 ELECTROCARDIOGRAM TRACING: CPT | Performed by: EMERGENCY MEDICINE

## 2023-01-09 PROCEDURE — 71045 X-RAY EXAM CHEST 1 VIEW: CPT

## 2023-01-09 PROCEDURE — 84484 ASSAY OF TROPONIN QUANT: CPT

## 2023-01-09 PROCEDURE — 36430 TRANSFUSION BLD/BLD COMPNT: CPT

## 2023-01-09 PROCEDURE — P9016 RBC LEUKOCYTES REDUCED: HCPCS

## 2023-01-09 PROCEDURE — 81001 URINALYSIS AUTO W/SCOPE: CPT

## 2023-01-09 PROCEDURE — 86900 BLOOD TYPING SEROLOGIC ABO: CPT

## 2023-01-09 PROCEDURE — 86901 BLOOD TYPING SEROLOGIC RH(D): CPT

## 2023-01-09 PROCEDURE — 85018 HEMOGLOBIN: CPT

## 2023-01-09 PROCEDURE — 1090F PRES/ABSN URINE INCON ASSESS: CPT | Performed by: INTERNAL MEDICINE

## 2023-01-09 PROCEDURE — 3078F DIAST BP <80 MM HG: CPT | Performed by: INTERNAL MEDICINE

## 2023-01-09 PROCEDURE — 1123F ACP DISCUSS/DSCN MKR DOCD: CPT | Performed by: INTERNAL MEDICINE

## 2023-01-09 PROCEDURE — 1036F TOBACCO NON-USER: CPT | Performed by: INTERNAL MEDICINE

## 2023-01-09 PROCEDURE — 36415 COLL VENOUS BLD VENIPUNCTURE: CPT

## 2023-01-09 PROCEDURE — 80053 COMPREHEN METABOLIC PANEL: CPT

## 2023-01-09 PROCEDURE — 80048 BASIC METABOLIC PNL TOTAL CA: CPT

## 2023-01-09 PROCEDURE — 99212 OFFICE O/P EST SF 10 MIN: CPT

## 2023-01-09 PROCEDURE — 99285 EMERGENCY DEPT VISIT HI MDM: CPT

## 2023-01-09 PROCEDURE — 85027 COMPLETE CBC AUTOMATED: CPT

## 2023-01-09 PROCEDURE — 93010 ELECTROCARDIOGRAM REPORT: CPT | Performed by: INTERNAL MEDICINE

## 2023-01-09 PROCEDURE — G8417 CALC BMI ABV UP PARAM F/U: HCPCS | Performed by: INTERNAL MEDICINE

## 2023-01-09 PROCEDURE — G8484 FLU IMMUNIZE NO ADMIN: HCPCS | Performed by: INTERNAL MEDICINE

## 2023-01-09 PROCEDURE — 3017F COLORECTAL CA SCREEN DOC REV: CPT | Performed by: INTERNAL MEDICINE

## 2023-01-09 PROCEDURE — G8427 DOCREV CUR MEDS BY ELIG CLIN: HCPCS | Performed by: INTERNAL MEDICINE

## 2023-01-09 PROCEDURE — G8400 PT W/DXA NO RESULTS DOC: HCPCS | Performed by: INTERNAL MEDICINE

## 2023-01-09 PROCEDURE — 86920 COMPATIBILITY TEST SPIN: CPT

## 2023-01-09 PROCEDURE — 83880 ASSAY OF NATRIURETIC PEPTIDE: CPT

## 2023-01-09 RX ORDER — SODIUM CHLORIDE 9 MG/ML
INJECTION, SOLUTION INTRAVENOUS PRN
Status: DISCONTINUED | OUTPATIENT
Start: 2023-01-09 | End: 2023-01-09 | Stop reason: HOSPADM

## 2023-01-09 RX ORDER — DULAGLUTIDE 4.5 MG/.5ML
4.5 INJECTION, SOLUTION SUBCUTANEOUS WEEKLY
COMMUNITY
End: 2023-01-19 | Stop reason: SDUPTHER

## 2023-01-09 RX ORDER — GABAPENTIN 300 MG/1
600 CAPSULE ORAL NIGHTLY
COMMUNITY

## 2023-01-09 RX ORDER — GABAPENTIN 300 MG/1
300 CAPSULE ORAL DAILY
COMMUNITY

## 2023-01-09 RX ORDER — FLUTICASONE PROPIONATE 50 MCG
1 SPRAY, SUSPENSION (ML) NASAL DAILY PRN
COMMUNITY

## 2023-01-09 RX ORDER — TIZANIDINE 2 MG/1
2 TABLET ORAL 2 TIMES DAILY PRN
COMMUNITY

## 2023-01-09 RX ORDER — ROPINIROLE 0.5 MG/1
1 TABLET, FILM COATED ORAL NIGHTLY
COMMUNITY

## 2023-01-09 RX ORDER — ROPINIROLE 0.5 MG/1
0.5 TABLET, FILM COATED ORAL DAILY
COMMUNITY

## 2023-01-09 ASSESSMENT — ENCOUNTER SYMPTOMS
VOMITING: 0
CONSTIPATION: 1
ABDOMINAL PAIN: 0
SHORTNESS OF BREATH: 1
BLOOD IN STOOL: 0

## 2023-01-09 ASSESSMENT — PAIN - FUNCTIONAL ASSESSMENT: PAIN_FUNCTIONAL_ASSESSMENT: NONE - DENIES PAIN

## 2023-01-09 NOTE — PROGRESS NOTES
[id number]  [place] [address]  Dept: [place]: [phone]  Attending groups note      Reason for Visit:   Anemia. Referring Physician:  Mac Almonte DO    PCP:  Mac Almonte DO    History of Present Illness:      Mrs. Barber is a very pleasant 80-year-old lady, with past medical history significant for type II DM, hypertension, fibromyalgia, sleep apnea, ISABEL, and depression, who was referred to the hematology office for evaluation of anemia. The patient's CBCD from 11/8/2022, was remarkable for hemoglobin 7.5, hematocrit 26.3, white count 9.7, platelet count 277Q. Iron 25, TIBC 441, TSAT 6%. The patient had an EGD done on 3/10/2021, revealing Schatzki ring, small hiatal hernia, otherwise no evidence of bleeding, at that time she was hospitalized with a hemoglobin of 5.5G/DL. The patient believes that she had a colonoscopy done within the last few years by Dr. Antonio Sanderson. The patient received parenteral iron infusion, last dose was on 12/12/2022, tolerated it well. The patient was referred to GI in November for capsule endoscopy and repeat colonoscopy. They called her but she was not able to see them because she is busy with other appointments, including neuro at [facility]. She is feeling extremely weak, she has been having dark stools for the last month. Review of Systems;  CONSTITUTIONAL: No fever, chills. Positive for fatigue. ENMT: Eyes: No diplopia; Nose: No epistaxis. Mouth: No sore throat. RESPIRATORY: No hemoptysis, shortness of breath, cough. CARDIOVASCULAR: No chest pain, palpitations. GASTROINTESTINAL: No nausea, vomiting, no abdominal pain, positive for dark stools  GENITOURINARY: No dysuria, urinary frequency, hematuria. NEURO: No syncope, presyncope, positive for migraine headache.     Remainder:  ROS NEGATIVE    Past Medical History:      Diagnosis Date    Arthritis Cataract     right    Chronic fatigue syndrome     Depression     Diabetes mellitus (HCC)     SC injection weekly    Fibromyalgia     Hypertension     Memory loss     Mononucleosis     Psoriasis     Sciatica     Sleep apnea     no c-pap    Vertigo      Patient Active Problem List   Diagnosis    Obstructive sleep apnea syndrome    Lumbar radicular pain    Recurrent major depressive disorder, in partial remission (HCC)    Type 2 diabetes mellitus with hyperglycemia, without long-term current use of insulin (HCC)    Gastroesophageal reflux disease    Essential hypertension    Chronic pain syndrome    Lumbar spondylosis    Thoracic disc disease    Lumbar facet arthropathy    Spinal stenosis of lumbar region without neurogenic claudication    Migraine without aura and without status migrainosus, not intractable    Lumbar spondylolysis    Thoracic facet joint syndrome    Thoracic spondylosis    Acute anemia    Lumbar disc disorder    Stenosis of lateral recess of lumbar spine    Lumbar radiculopathy    Disorder of sacrum    Iron deficiency anemia        Past Surgical History:      Procedure Laterality Date    CARPAL TUNNEL RELEASE Bilateral     CATARACT REMOVAL Right     COLONOSCOPY      OTHER SURGICAL HISTORY Bilateral 01/21/2021    BILATERAL L3,L4 MEDIAL BRANCH AND L5 DORSAL RAMUS RADIOFREQUENCY    PAIN MANAGEMENT PROCEDURE Bilateral 01/21/2021    BILATERAL L3,4 MEDIAL BRANCH AND L5 DORSAL RAMUS  RADIOFREQUENCY ABLATION WITH SEDATION  (CPT K3257295) performed by Roxie Diaz MD at [address] N/A 03/19/2021    T11 KYPHOPLASTY (2 C-ARMS) performed by Roxie Diaz MD at [address] 01/16/2019    EGD BIOPSY performed by Michael Wagner MD at [place] [address] N/A 03/10/2021    EGD ESOPHAGOGASTRODUODENOSCOPY performed by Fede Renner MD at [place] [address] History:  Family History Problem Relation Age of Onset    Hypertension Father     Heart Disease Father     Hypertension Paternal Grandfather     Heart Disease Paternal Grandfather        Medications:  Reviewed and reconciled. Social History:  Social History     Socioeconomic History    Marital status:      Spouse name: Not on file    Number of children: Not on file    Years of education: Not on file    Highest education level: Not on file   Occupational History    Not on file   Tobacco Use    Smoking status: Former     Packs/day: 1.00     Years: 10.00     Pack years: 10.00     Types: Cigarettes     Start date: 0     Quit date:      Years since quittin.0    Smokeless tobacco: Never   Vaping Use    Vaping Use: Never used   Substance and Sexual Activity    Alcohol use: Yes     Alcohol/week: 1.0 standard drink     Types: 1 Glasses of wine per week     Comment: socially    Drug use: No    Sexual activity: Not Currently   Other Topics Concern    Not on file   Social History Narrative    Not on file     Social Determinants of Health     Financial Resource Strain: Medium Risk    Difficulty of Paying Living Expenses: Somewhat hard   Food Insecurity: Food Insecurity Present    Worried About Running Out of Food in the Last Year: Sometimes true    Ran Out of Food in the Last Year: Sometimes true   Transportation Needs: Not on file   Physical Activity: Inactive    Days of Exercise per Week: 0 days    Minutes of Exercise per Session: 0 min   Stress: Not on file   Social Connections: Not on file   Intimate Partner Violence: Not on file   Housing Stability: Not on file       Allergies:   Allergies   Allergen Reactions    Baclofen Other (See Comments)     Dry mouth, abd pain    Nickel      Surgical steel    Penicillins Hives       Physical Exam:  BP (!) 130/58   Pulse (!) 118   Temp 98 °F (36.7 °C)   Ht 5' 3\" (1.6 m)   Wt 200 lb 4.8 oz (90.9 kg)   LMP  (LMP Unknown)   SpO2 99%   BMI 35.48 kg/m²   GENERAL: Alert, oriented x 3, positive for pallor, in the wheelchair. HEENT: PERRLA; EOMI. Oropharynx clear. NECK: Supple. No palpable cervical or supraclavicular lymphadenopathy. LUNGS: Good air entry bilaterally. No wheezing, crackles or rhonchi. CARDIOVASCULAR: Regular rhythm, tachycardic. ABDOMEN: Soft. Non-tender, non-distended. Positive bowel sounds. EXTREMITIES: Without clubbing, cyanosis, or edema. NEUROLOGIC: No focal deficits. ECOG PS 1       Impression/Plan:       Mrs. Barber is a very pleasant 71-year-old lady, with past medical history significant for type II DM, hypertension, fibromyalgia, sleep apnea, ISABEL, and depression, who was referred to the hematology office for evaluation of anemia. The patient CBCD from 11/8/2022, was remarkable for hemoglobin 7.5, hematocrit 26.3, white count 9.7, platelet count 517V. Iron 25, TIBC 441, TSAT 6%. The patient has been taking oral iron. Tolerating it well. She denies any bleeding. The patient had an EGD done on 3/10/2021, revealing Schatzki ring, small hiatal hernia, otherwise no evidence of bleeding, at that time she was hospitalized with a hemoglobin of 5.5G/DL. The patient believes that she had a colonoscopy done within the last few years by Dr. Jennifer Rose. . She is retired ICU nurse. The patient has normocytic anemia, secondary to iron deficiency, could be secondary to bleeding or iron malabsorption, she had GI work-up done in the past, was negative for bleeding, last EGD was on 3/10/2021. The patient did not have an adequate response to the oral iron, recommended parenteral iron infusion, she received first dose of Venofer, last dose was on 12/12/2022, tolerated it well. Test is positive, the patient was referred to GI in November for capsule endoscopy and repeat colonoscopy. They called her but she was not able to see them because she is busy with other appointments, including neuro at [facility].   She is feeling extremely weak, she has been having dark stools for the last month, concerning for GI bleed, labs reviewed, her hemoglobin had decreased to 5.1G/DL, she is symptomatic, the patient was referred to the ED for evaluation and treatment, GI will need to be consulted to have the work-up done as inpatient. Thank you for allowing us to participate in the care of Mrs. Barber.     Malika Ho MD   HEMATOLOGY/MEDICAL [address] [place] [address] [address]  [address] [place] [address]  Dept: [place]: [phone]

## 2023-01-09 NOTE — ED PROVIDER NOTES
Patient presents emergency department with complaint of low hemoglobin levels. Patient states that she has iron deficiency anemia. She states that they are also concerned she might be losing a small amount of blood with her stools. She states that she does get an occasional external hemorrhoid from having to strain after taking the ferrous sulfate. She last had a transfusion in November. Her hemoglobin earlier in December was 8. She admits to feeling short of breath with ambulation. She denies any chest pain. She has no prior history of atrial fibrillation or congestive heart failure. Patient reports no increase in edema. The history is provided by the patient. Fatigue  Associated symptoms: shortness of breath    Associated symptoms: no chest pain and no vomiting      Review of Systems   Constitutional:  Positive for activity change and fatigue. Respiratory:  Positive for shortness of breath. Cardiovascular:  Negative for chest pain, palpitations and leg swelling. Gastrointestinal:  Positive for constipation. Negative for blood in stool and vomiting. Genitourinary:  Negative for hematuria. Skin:  Positive for pallor. Neurological:  Positive for light-headedness. Negative for syncope. Hematological: Negative. Physical Exam  Patient with elevated BMI. She is pale in appearance. Moist mucous membranes appreciated. Heart rate is fast but regular. Pansystolic murmur appreciated. Lungs clear to auscultation with few scattered basilar crackles. Abdomen is soft and nontender. No peripheral edema appreciated. No focal neurologic deficits noted. Procedures    Medical Decision Making  Amount and/or Complexity of Data Reviewed  Labs: ordered. Radiology: ordered. ECG/medicine tests: ordered. Risk  Prescription drug management. Kimo diagnosis includes anemia. Will evaluate for cardiac sequela. ED Course as of 01/11/23 0852   Mon Jan 09, 2023   1225 Hemoglobin is 5.1. She will have transfusion of RBC. [JS]   2388 Patient actively being transfused. NO complaints. [JS]   1640 4:40 PM EST  I have signed this patient out to the oncoming physician, Dr. Shanna Zuleta. I have discussed the patient's initial exam, treatment and plan of care with the on coming physician. I have notified the patient / family of the change in treating physician and answered their questions to this point. [JS]   2033 Spoke with the patient, she has been given 2 units of blood. She says she feels better generally speaking. However, this is an ongoing recurrent problem that she has had. Heart rate is little elevated between 110 and 118. We discussed this, we discussed admission to the hospital.  Patient reports that even though she has had all of her vaccinations she does not want to come in the hospital as there is still COVID and influenza. She wants to go home. She understands she can always return to the ED for any problems or any worsening. She will follow-up with her hematologist/oncologist and her family doctor as well [SO]      ED Course User Index  [JS] Jada Aleman DO  [SO] Tisha Lopez DO     EKG Interpretation    Interpreted by emergency department physician    Rhythm: sinus tachycardia  Rate: 120-130  Axis: normal  Ectopy: none  Conduction: normal  ST Segments: no acute change  T Waves: no acute change  Q Waves: III    Clinical Impression: sinus tachycardia    Jada Aleman DO  ED Course as of 01/11/23 0852   Mon Jan 09, 2023   1225 Hemoglobin is 5.1. She will have transfusion of RBC. [JS]   2982 Patient actively being transfused. NO complaints. [JS]   1640 4:40 PM EST  I have signed this patient out to the oncoming physician, Dr. Shanna Zuleta. I have discussed the patient's initial exam, treatment and plan of care with the on coming physician. I have notified the patient / family of the change in treating physician and answered their questions to this point.    [JS]   2033 Spoke with the patient, she has been given 2 units of blood. She says she feels better generally speaking. However, this is an ongoing recurrent problem that she has had. Heart rate is little elevated between 110 and 118. We discussed this, we discussed admission to the hospital.  Patient reports that even though she has had all of her vaccinations she does not want to come in the hospital as there is still COVID and influenza. She wants to go home. She understands she can always return to the ED for any problems or any worsening. She will follow-up with her hematologist/oncologist and her family doctor as well [SO]      ED Course User Index  [JS] Heaven Ross, DO  [SO] Анна Sánhcez, DO       --------------------------------------------- PAST HISTORY ---------------------------------------------  Past Medical History:  has a past medical history of Arthritis, Cataract, Chronic fatigue syndrome, Depression, Diabetes mellitus ([facility] [address] Fibromyalgia, Hypertension, Memory loss, Mononucleosis, Psoriasis, Sciatica, Sleep apnea, and Vertigo. Past Surgical History:  has a past surgical history that includes Upper gastrointestinal endoscopy (N/A, 01/16/2019); Carpal tunnel release (Bilateral); Tonsillectomy and adenoidectomy; Cataract removal (Right); other surgical history (Bilateral, 01/21/2021); Pain management procedure (Bilateral, 01/21/2021); Upper gastrointestinal endoscopy (N/A, 03/10/2021); Spine surgery (N/A, 03/19/2021); and Colonoscopy. Social History:  reports that she quit smoking about 38 years ago. Her smoking use included cigarettes. She started smoking about 51 years ago. She has a 10.00 pack-year smoking history. She has never used smokeless tobacco. She reports current alcohol use of about 1.0 standard drink per week. She reports that she does not use drugs.     Family History: family history includes Heart Disease in her father and paternal grandfather; Hypertension in her father and paternal grandfather. The patients home medications have been reviewed.     Allergies: Baclofen, Penicillins, and Nickel    -------------------------------------------------- RESULTS -------------------------------------------------  Labs:  Results for orders placed or performed during the hospital encounter of 75/56/64   Basic metabolic panel   Result Value Ref Range    Sodium 133 132 - 146 mmol/L    Potassium 4.4 3.5 - 5.0 mmol/L    Chloride 102 98 - 107 mmol/L    CO2 18 (L) 22 - 29 mmol/L    Anion Gap 13 7 - 16 mmol/L    Glucose 233 (H) 74 - 99 mg/dL    BUN 12 6 - 23 mg/dL    Creatinine 0.6 0.5 - 1.0 mg/dL    Est, Glom Filt Rate >60 >=60 mL/min/1.73    Calcium 8.6 8.6 - 10.2 mg/dL   CBC   Result Value Ref Range    WBC 9.0 4.5 - 11.5 E9/L    RBC 2.13 (L) 3.50 - 5.50 E12/L    Hemoglobin 5.1 (LL) 11.5 - 15.5 g/dL    Hematocrit 18.5 (L) 34.0 - 48.0 %    MCV 86.9 80.0 - 99.9 fL    MCH 23.9 (L) 26.0 - 35.0 pg    MCHC 27.6 (L) 32.0 - 34.5 %    RDW 20.9 (H) 11.5 - 15.0 fL    Platelets 884 306 - 179 E9/L    MPV 11.0 7.0 - 12.0 fL   Troponin   Result Value Ref Range    Troponin, High Sensitivity 14 (H) 0 - 9 ng/L   Urinalysis with Microscopic   Result Value Ref Range    Color, UA Straw Straw/Yellow    Clarity, UA Clear Clear    Glucose, Ur Negative Negative mg/dL    Bilirubin Urine Negative Negative    Ketones, Urine Negative Negative mg/dL    Specific Gravity, UA 1.010 1.005 - 1.030    Blood, Urine Negative Negative    pH, UA 6.5 5.0 - 9.0    Protein, UA Negative Negative mg/dL    Urobilinogen, Urine 0.2 <2.0 E.U./dL    Nitrite, Urine Negative Negative    Leukocyte Esterase, Urine Negative Negative    WBC, UA NONE 0 - 5 /HPF    RBC, UA NONE 0 - 2 /HPF    Epithelial Cells, UA FEW /HPF    Bacteria, UA NONE SEEN None Seen /HPF   Brain Natriuretic Peptide   Result Value Ref Range    Pro-BNP 97 0 - 125 pg/mL   Hemoglobin and Hematocrit   Result Value Ref Range    Hemoglobin 6.2 (LL) 11.5 - 15.5 g/dL    Hematocrit 20.6 (L) 34.0 - 48.0 %   Troponin   Result Value Ref Range    Troponin, High Sensitivity 14 (H) 0 - 9 ng/L   EKG 12 Lead   Result Value Ref Range    Ventricular Rate 121 BPM    Atrial Rate 121 BPM    P-R Interval 152 ms    QRS Duration 72 ms    Q-T Interval 318 ms    QTc Calculation (Bazett) 451 ms    P Axis 75 degrees    R Axis -9 degrees    T Axis 59 degrees   TYPE AND SCREEN   Result Value Ref Range    ABO/Rh A POS     Antibody Screen NEG    PREPARE RBC (CROSSMATCH), 1 Units   Result Value Ref Range    Product Code Blood Bank [id number]     Description Blood Bank Red Blood Cells, Leuko-reduced     Unit Number [id number]     Dispense Status Blood Bank transfused    PREPARE RBC (CROSSMATCH), 1 Units   Result Value Ref Range    Product Code Blood Bank [id number]     Description Blood Bank Red Blood Cells, Leuko-reduced     Unit Number [id number]     Dispense Status Blood Bank transfused        Radiology:  XR CHEST PORTABLE   Final Result   No acute process. ------------------------- NURSING NOTES AND VITALS REVIEWED ---------------------------  Date / Time Roomed:  1/9/2023 11:24 AM  ED Bed Assignment:  09/09    The nursing notes within the ED encounter and vital signs as below have been reviewed. BP (!) 141/123   Pulse (!) 115   Temp 97.8 °F (36.6 °C)   Resp 28   Ht 5' 3\" (1.6 m)   Wt 200 lb (90.7 kg)   LMP  (LMP Unknown)   SpO2 99%   BMI 35.43 kg/m²   Oxygen Saturation Interpretation: Normal      ------------------------------------------ PROGRESS NOTES ------------------------------------------  I have spoken with the patient and discussed todays results, in addition to providing specific details for the plan of care and counseling regarding the diagnosis and prognosis. Their questions are answered at this time and they are agreeable with the plan. I discussed at length with them reasons for immediate return here for re evaluation.  They will followup with primary care by calling their office tomorrow. --------------------------------- ADDITIONAL PROVIDER NOTES ---------------------------------  At this time the patient is without objective evidence of an acute process requiring hospitalization or inpatient management. They have remained hemodynamically stable throughout their entire ED visit and are stable for discharge with outpatient follow-up. The plan has been discussed in detail and they are aware of the specific conditions for emergent return, as well as the importance of follow-up. Discharge Medication List as of 1/9/2023  8:36 PM          Diagnosis:  1. Iron deficiency anemia, unspecified iron deficiency anemia type        Disposition:  Patient's disposition: Discharge to home  Patient's condition is stable.                Guero Gilmore DO  01/11/23 3564

## 2023-01-10 ENCOUNTER — APPOINTMENT (OUTPATIENT)
Dept: GENERAL RADIOLOGY | Age: 73
End: 2023-01-10
Payer: MEDICARE

## 2023-01-10 LAB
ABO/RH: NORMAL
ANION GAP SERPL CALCULATED.3IONS-SCNC: 12 MMOL/L (ref 7–16)
ANTIBODY SCREEN: NORMAL
APTT: 28.7 SEC (ref 24.5–35.1)
BASOPHILS ABSOLUTE: 0.06 E9/L (ref 0–0.2)
BASOPHILS RELATIVE PERCENT: 0.7 % (ref 0–2)
BUN BLDV-MCNC: 16 MG/DL (ref 6–23)
CALCIUM SERPL-MCNC: 9 MG/DL (ref 8.6–10.2)
CHLORIDE BLD-SCNC: 106 MMOL/L (ref 98–107)
CO2: 18 MMOL/L (ref 22–29)
CREAT SERPL-MCNC: 0.6 MG/DL (ref 0.5–1)
EOSINOPHILS ABSOLUTE: 0.27 E9/L (ref 0.05–0.5)
EOSINOPHILS RELATIVE PERCENT: 3 % (ref 0–6)
GFR SERPL CREATININE-BSD FRML MDRD: >60 ML/MIN/1.73
GLUCOSE BLD-MCNC: 255 MG/DL (ref 74–99)
HCT VFR BLD CALC: 23.1 % (ref 34–48)
HEMOGLOBIN: 7.3 G/DL (ref 11.5–15.5)
IMMATURE GRANULOCYTES #: 0.07 E9/L
IMMATURE GRANULOCYTES %: 0.8 % (ref 0–5)
INR BLD: 1.1
LYMPHOCYTES ABSOLUTE: 1.7 E9/L (ref 1.5–4)
LYMPHOCYTES RELATIVE PERCENT: 19 % (ref 20–42)
MCH RBC QN AUTO: 26.4 PG (ref 26–35)
MCHC RBC AUTO-ENTMCNC: 31.6 % (ref 32–34.5)
MCV RBC AUTO: 83.4 FL (ref 80–99.9)
MONOCYTES ABSOLUTE: 1.09 E9/L (ref 0.1–0.95)
MONOCYTES RELATIVE PERCENT: 12.2 % (ref 2–12)
NEUTROPHILS ABSOLUTE: 5.75 E9/L (ref 1.8–7.3)
NEUTROPHILS RELATIVE PERCENT: 64.3 % (ref 43–80)
PDW BLD-RTO: 18.3 FL (ref 11.5–15)
PLATELET # BLD: 184 E9/L (ref 130–450)
PMV BLD AUTO: 10.2 FL (ref 7–12)
POTASSIUM SERPL-SCNC: 3.9 MMOL/L (ref 3.5–5)
PROTHROMBIN TIME: 12.2 SEC (ref 9.3–12.4)
RBC # BLD: 2.77 E12/L (ref 3.5–5.5)
SODIUM BLD-SCNC: 136 MMOL/L (ref 132–146)
TROPONIN, HIGH SENSITIVITY: 13 NG/L (ref 0–9)
WBC # BLD: 8.9 E9/L (ref 4.5–11.5)

## 2023-01-10 PROCEDURE — 85610 PROTHROMBIN TIME: CPT

## 2023-01-10 PROCEDURE — 71046 X-RAY EXAM CHEST 2 VIEWS: CPT

## 2023-01-10 PROCEDURE — 86850 RBC ANTIBODY SCREEN: CPT

## 2023-01-10 PROCEDURE — 93005 ELECTROCARDIOGRAM TRACING: CPT | Performed by: PHYSICIAN ASSISTANT

## 2023-01-10 PROCEDURE — 85730 THROMBOPLASTIN TIME PARTIAL: CPT

## 2023-01-10 PROCEDURE — 99285 EMERGENCY DEPT VISIT HI MDM: CPT

## 2023-01-10 PROCEDURE — 85025 COMPLETE CBC W/AUTO DIFF WBC: CPT

## 2023-01-10 PROCEDURE — 86900 BLOOD TYPING SEROLOGIC ABO: CPT

## 2023-01-10 PROCEDURE — 86901 BLOOD TYPING SEROLOGIC RH(D): CPT

## 2023-01-10 PROCEDURE — 84484 ASSAY OF TROPONIN QUANT: CPT

## 2023-01-10 PROCEDURE — 80048 BASIC METABOLIC PNL TOTAL CA: CPT

## 2023-01-10 ASSESSMENT — LIFESTYLE VARIABLES
HOW MANY STANDARD DRINKS CONTAINING ALCOHOL DO YOU HAVE ON A TYPICAL DAY: PATIENT DOES NOT DRINK
HOW OFTEN DO YOU HAVE A DRINK CONTAINING ALCOHOL: NEVER

## 2023-01-10 NOTE — ED NOTES
Department of Emergency Medicine  FIRST PROVIDER TRIAGE NOTE             Independent MLP           1/10/23  5:33 PM EST    Date of Encounter: 1/10/23   MRN: [id number]      HPI: Seymour Venegas is a 71 y.o. female who presents to the ED for Other (Patient sent by PCP for low hemoglobin. Pt states she is weak, SoB, and high heart rate. )     ROS: Negative for fever or rash. PE: Gen Appearance/Constitutional: alert     Initial Plan of Care: All treatment areas with department are currently occupied. Plan to order/Initiate the following while awaiting opening in ED: labs, EKG, and imaging studies.   Initiate Treatment-Testing, Proceed toTreatment Area When Bed Available for ED Attending/MLP to Continue Care    Electronically signed by Diana Scott PA-C   DD: 1/10/23       Diana Scott PA-C  01/10/23 3226

## 2023-01-10 NOTE — ED PROVIDER NOTES
71-year-old female presenting with concern about low blood count. Her family doctor had a check at 5.1. She previously received multiple transfusions over the last few years. She was admitted to hospital twice in 2019 and once before that as well. She has received multiple iron transfusions with most recent being December 22 of last year. The oncology note is reviewed by Dr. Carlos Eduardo Lazo showing this information. She feels generally tired and sometimes short of breath. Now she feels better than she did before. Family History   Problem Relation Age of Onset    Hypertension Father     Heart Disease Father     Hypertension Paternal Grandfather     Heart Disease Paternal Grandfather      Past Surgical History:   Procedure Laterality Date    CARPAL TUNNEL RELEASE Bilateral     CATARACT REMOVAL Right     COLONOSCOPY      OTHER SURGICAL HISTORY Bilateral 01/21/2021    BILATERAL L3,L4 MEDIAL BRANCH AND L5 DORSAL RAMUS RADIOFREQUENCY    PAIN MANAGEMENT PROCEDURE Bilateral 01/21/2021    BILATERAL L3,4 MEDIAL BRANCH AND L5 DORSAL RAMUS  RADIOFREQUENCY ABLATION WITH SEDATION  (CPT C318302) performed by Ora Wan MD at [address] N/A 03/19/2021    T11 KYPHOPLASTY (2 C-ARMS) performed by Ora Wan MD at [address] [place] [address],[address] N/A 01/16/2019    EGD BIOPSY performed by Srini Miranda MD at [place] [address] N/A 03/10/2021    EGD ESOPHAGOGASTRODUODENOSCOPY performed by Agatha Yee MD at [address] [place] [address]       Review of Systems   Constitutional:  Positive for fatigue. Respiratory:  Positive for shortness of breath. Gastrointestinal:  Negative for abdominal pain. Physical Exam  Constitutional:       General: She is not in acute distress. Appearance: She is well-developed. HENT:      Head: Normocephalic and atraumatic.    Eyes:      Conjunctiva/sclera: Conjunctivae normal.      Pupils: Pupils are equal, round, and reactive to light. Neck:      Thyroid: No thyromegaly. Cardiovascular:      Rate and Rhythm: Normal rate and regular rhythm. Pulmonary:      Effort: Pulmonary effort is normal. No respiratory distress. Breath sounds: Normal breath sounds. Abdominal:      General: There is no distension. Palpations: Abdomen is soft. Tenderness: There is no abdominal tenderness. There is no guarding or rebound. Musculoskeletal:         General: No tenderness. Normal range of motion. Cervical back: Normal range of motion. Skin:     General: Skin is warm and dry. Findings: No erythema. Neurological:      Mental Status: She is alert and oriented to person, place, and time. Cranial Nerves: No cranial nerve deficit. Coordination: Coordination normal.        Procedures     MDM       History From: Patient    CC/HPI Summary, DDx, ED Course, Reassessment, Tests Considered, Patient expectation:   Patient presenting with a recurrent GI bleed. Her hemoglobin is 5.1 her heart rate is little elevated. She received 2 units of blood as the first recheck was 6.1 after the first unit of blood. She has had multiple iron infusions without significant improvement. 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 We talked admission to the hospital as her heart rate while I was still in the room is about 115. However, with concerns of infectious processes occurring around her she does not want to be in the hospital.  She agrees to return to the ED for any problems any worsening. She was to follow-up with her oncologist and her family doctor as well. Social Determinants affecting Dx or Tx: Some difficulty getting around, lives alone    Chronic Conditions: GI bleed, obesity, headaches, migraines    Records Reviewed:  Oncology outpatient note from earlier today was reviewed by Dr. Rosanna Boswell. ED Course as of 01/09/23 2035 Mon Jan 09, 2023   1225 Hemoglobin is 5.1. She will have transfusion of RBC. [JS]   7506 Patient actively being transfused. NO complaints. [JS]   1640 4:40 PM EST  I have signed this patient out to the oncoming physician, Dr. Abraham Barnhart. I have discussed the patient's initial exam, treatment and plan of care with the on coming physician. I have notified the patient / family of the change in treating physician and answered their questions to this point. [JS]   2033 Spoke with the patient, she has been given 2 units of blood. She says she feels better generally speaking. However, this is an ongoing recurrent problem that she has had. Heart rate is little elevated between 110 and 118. We discussed this, we discussed admission to the hospital.  Patient reports that even though she has had all of her vaccinations she does not want to come in the hospital as there is still COVID and influenza. She wants to go home. She understands she can always return to the ED for any problems or any worsening. She will follow-up with her hematologist/oncologist and her family doctor as well [SO]      ED Course User Index  [JS] Alexander Johnson, DO  [SO] Eveline Cushing, DO                ED Course as of 01/09/23 2034 Mon Jan 09, 2023   1225 Hemoglobin is 5.1. She will have transfusion of RBC. [JS]   0297 Patient actively being transfused. NO complaints. [JS]   1640 4:40 PM EST  I have signed this patient out to the oncoming physician, Dr. Abraham Barnhart. I have discussed the patient's initial exam, treatment and plan of care with the on coming physician. I have notified the patient / family of the change in treating physician and answered their questions to this point. [JS]   2033 Spoke with the patient, she has been given 2 units of blood. She says she feels better generally speaking. However, this is an ongoing recurrent problem that she has had.   Heart rate is little elevated between 110 and 118. We discussed this, we discussed admission to the hospital.  Patient reports that even though she has had all of her vaccinations she does not want to come in the hospital as there is still COVID and influenza. She wants to go home. She understands she can always return to the ED for any problems or any worsening. She will follow-up with her hematologist/oncologist and her family doctor as well [SO]      ED Course User Index  [JS] Peter Elliott, DO  [SO] Navi Troncoso, DO       --------------------------------------------- PAST HISTORY ---------------------------------------------  Past Medical History:  has a past medical history of Arthritis, Cataract, Chronic fatigue syndrome, Depression, Diabetes mellitus ([facility] [address] Fibromyalgia, Hypertension, Memory loss, Mononucleosis, Psoriasis, Sciatica, Sleep apnea, and Vertigo. Past Surgical History:  has a past surgical history that includes Upper gastrointestinal endoscopy (N/A, 01/16/2019); Carpal tunnel release (Bilateral); Tonsillectomy and adenoidectomy; Cataract removal (Right); other surgical history (Bilateral, 01/21/2021); Pain management procedure (Bilateral, 01/21/2021); Upper gastrointestinal endoscopy (N/A, 03/10/2021); Spine surgery (N/A, 03/19/2021); and Colonoscopy. Social History:  reports that she quit smoking about 38 years ago. Her smoking use included cigarettes. She started smoking about 51 years ago. She has a 10.00 pack-year smoking history. She has never used smokeless tobacco. She reports current alcohol use of about 1.0 standard drink per week. She reports that she does not use drugs. Family History: family history includes Heart Disease in her father and paternal grandfather; Hypertension in her father and paternal grandfather. The patients home medications have been reviewed.     Allergies: Baclofen, Penicillins, and Nickel    -------------------------------------------------- RESULTS -------------------------------------------------  Labs:  Results for orders placed or performed during the hospital encounter of 20/99/43   Basic metabolic panel   Result Value Ref Range    Sodium 133 132 - 146 mmol/L    Potassium 4.4 3.5 - 5.0 mmol/L    Chloride 102 98 - 107 mmol/L    CO2 18 (L) 22 - 29 mmol/L    Anion Gap 13 7 - 16 mmol/L    Glucose 233 (H) 74 - 99 mg/dL    BUN 12 6 - 23 mg/dL    Creatinine 0.6 0.5 - 1.0 mg/dL    Est, Glom Filt Rate >60 >=60 mL/min/1.73    Calcium 8.6 8.6 - 10.2 mg/dL   CBC   Result Value Ref Range    WBC 9.0 4.5 - 11.5 E9/L    RBC 2.13 (L) 3.50 - 5.50 E12/L    Hemoglobin 5.1 (LL) 11.5 - 15.5 g/dL    Hematocrit 18.5 (L) 34.0 - 48.0 %    MCV 86.9 80.0 - 99.9 fL    MCH 23.9 (L) 26.0 - 35.0 pg    MCHC 27.6 (L) 32.0 - 34.5 %    RDW 20.9 (H) 11.5 - 15.0 fL    Platelets 124 197 - 186 E9/L    MPV 11.0 7.0 - 12.0 fL   Troponin   Result Value Ref Range    Troponin, High Sensitivity 14 (H) 0 - 9 ng/L   Urinalysis with Microscopic   Result Value Ref Range    Color, UA Straw Straw/Yellow    Clarity, UA Clear Clear    Glucose, Ur Negative Negative mg/dL    Bilirubin Urine Negative Negative    Ketones, Urine Negative Negative mg/dL    Specific Gravity, UA 1.010 1.005 - 1.030    Blood, Urine Negative Negative    pH, UA 6.5 5.0 - 9.0    Protein, UA Negative Negative mg/dL    Urobilinogen, Urine 0.2 <2.0 E.U./dL    Nitrite, Urine Negative Negative    Leukocyte Esterase, Urine Negative Negative    WBC, UA NONE 0 - 5 /HPF    RBC, UA NONE 0 - 2 /HPF    Epithelial Cells, UA FEW /HPF    Bacteria, UA NONE SEEN None Seen /HPF   Brain Natriuretic Peptide   Result Value Ref Range    Pro-BNP 97 0 - 125 pg/mL   Hemoglobin and Hematocrit   Result Value Ref Range    Hemoglobin 6.2 (LL) 11.5 - 15.5 g/dL    Hematocrit 20.6 (L) 34.0 - 48.0 %   Troponin   Result Value Ref Range    Troponin, High Sensitivity 14 (H) 0 - 9 ng/L   EKG 12 Lead   Result Value Ref Range    Ventricular Rate 121 BPM    Atrial Rate 121 BPM    P-R Interval 152 ms    QRS Duration 72 ms    Q-T Interval 318 ms    QTc Calculation (Bazett) 451 ms    P Axis 75 degrees    R Axis -9 degrees    T Axis 59 degrees   TYPE AND SCREEN   Result Value Ref Range    ABO/Rh A POS     Antibody Screen NEG    PREPARE RBC (CROSSMATCH), 1 Units   Result Value Ref Range    Product Code Blood Bank [id number]     Description Blood Bank Red Blood Cells, Leuko-reduced     Unit Number [id number]     Dispense Status Blood Bank issued    PREPARE RBC (CROSSMATCH), 1 Units   Result Value Ref Range    Product Code Blood Bank [id number]     Description Blood Bank Red Blood Cells, Leuko-reduced     Unit Number [id number]     Dispense Status Blood Bank issued        Radiology:  XR CHEST PORTABLE   Final Result   No acute process. ------------------------- NURSING NOTES AND VITALS REVIEWED ---------------------------  Date / Time Roomed:  1/9/2023 11:24 AM  ED Bed Assignment:  09/09    The nursing notes within the ED encounter and vital signs as below have been reviewed. /75   Pulse (!) 124   Temp 97.8 °F (36.6 °C)   Resp 16   Ht 5' 3\" (1.6 m)   Wt 200 lb (90.7 kg)   LMP  (LMP Unknown)   SpO2 99%   BMI 35.43 kg/m²   Oxygen Saturation Interpretation: Normal      ------------------------------------------ PROGRESS NOTES ------------------------------------------  I have spoken with the patient and discussed todays results, in addition to providing specific details for the plan of care and counseling regarding the diagnosis and prognosis. Their questions are answered at this time and they are agreeable with the plan. I discussed at length with them reasons for immediate return here for re evaluation. They will followup with primary care by calling their office tomorrow.       --------------------------------- ADDITIONAL PROVIDER NOTES ---------------------------------  At this time the patient is without objective evidence of an acute process requiring hospitalization or inpatient management. They have remained hemodynamically stable throughout their entire ED visit and are stable for discharge with outpatient follow-up. The plan has been discussed in detail and they are aware of the specific conditions for emergent return, as well as the importance of follow-up. New Prescriptions    No medications on file       Diagnosis:  1. Iron deficiency anemia, unspecified iron deficiency anemia type        Disposition:  Patient's disposition: Discharge to home  Patient's condition is stable.           Narayan Villagran,   01/09/23 2039

## 2023-01-11 ENCOUNTER — HOSPITAL ENCOUNTER (EMERGENCY)
Age: 73
Discharge: LEFT BEFORE DISCHARGE | End: 2023-01-11
Attending: EMERGENCY MEDICINE
Payer: MEDICARE

## 2023-01-11 VITALS
TEMPERATURE: 99 F | SYSTOLIC BLOOD PRESSURE: 178 MMHG | RESPIRATION RATE: 20 BRPM | DIASTOLIC BLOOD PRESSURE: 78 MMHG | HEART RATE: 106 BPM | OXYGEN SATURATION: 98 %

## 2023-01-11 DIAGNOSIS — K62.5 RECTAL BLEEDING: ICD-10-CM

## 2023-01-11 DIAGNOSIS — D50.9 IRON DEFICIENCY ANEMIA, UNSPECIFIED IRON DEFICIENCY ANEMIA TYPE: Primary | ICD-10-CM

## 2023-01-11 LAB
EKG ATRIAL RATE: 105 BPM
EKG P AXIS: 53 DEGREES
EKG P-R INTERVAL: 134 MS
EKG Q-T INTERVAL: 356 MS
EKG QRS DURATION: 76 MS
EKG QTC CALCULATION (BAZETT): 470 MS
EKG R AXIS: -20 DEGREES
EKG T AXIS: -7 DEGREES
EKG VENTRICULAR RATE: 105 BPM

## 2023-01-11 PROCEDURE — 93010 ELECTROCARDIOGRAM REPORT: CPT | Performed by: INTERNAL MEDICINE

## 2023-01-11 ASSESSMENT — PAIN DESCRIPTION - LOCATION: LOCATION: BACK

## 2023-01-11 ASSESSMENT — PAIN DESCRIPTION - DESCRIPTORS: DESCRIPTORS: SHARP

## 2023-01-11 ASSESSMENT — PAIN DESCRIPTION - ONSET: ONSET: ON-GOING

## 2023-01-11 ASSESSMENT — PAIN DESCRIPTION - PAIN TYPE: TYPE: ACUTE PAIN

## 2023-01-11 ASSESSMENT — PAIN - FUNCTIONAL ASSESSMENT
PAIN_FUNCTIONAL_ASSESSMENT: PREVENTS OR INTERFERES SOME ACTIVE ACTIVITIES AND ADLS
PAIN_FUNCTIONAL_ASSESSMENT: 0-10

## 2023-01-11 ASSESSMENT — PAIN DESCRIPTION - FREQUENCY: FREQUENCY: CONTINUOUS

## 2023-01-11 ASSESSMENT — PAIN SCALES - GENERAL: PAINLEVEL_OUTOF10: 6

## 2023-01-11 NOTE — ED NOTES
Pt brought in to triage room. Er resident updating pt at this time.      Timbo Castano, JASONN  23/99/39 [id number]

## 2023-01-11 NOTE — ED NOTES
Pt up at pivot desk voicing she can no longer stay, Er Dr aware of pt wanting to leave , Er Dr out in waiting room talking with pt, pt aware treatment not complete and will have to sign AMA, pt voiced understanding.      Skylar Huerta, RIVERA  14/16/36 [id number]

## 2023-01-11 NOTE — ED PROVIDER NOTES
[address]        Pt Name: Aleksander Robin  MRN: [id number]  [place] 1950  Date of evaluation: 1/10/2023  Provider: Hudson Small MD  PCP: Sarah Vang DO  Note Started: 1:17 AM EST 1/11/23    CHIEF COMPLAINT       Chief Complaint   Patient presents with    Other     Patient sent by PCP for low hemoglobin. Pt states she is weak, SoB, and high heart rate. HISTORY OF PRESENT ILLNESS: 1 or more Elements   History From: Patient    Limitations to history : None    Aleksander Robin is a 71 y.o. female who presents to the emergency department with complaint of low hemoglobin and a complaint of bright red blood in her bowel movement that occurred earlier today. Patient states she was recently seen at [facility] and was transfused 2 units of blood. She reports difficulty with ambulation, lightheadedness, shortness of breath, and generalized weakness. She states in the past month she has had multiple iron transfusions for iron deficiency anemia which has caused constipation. She also reports a history of hemorrhoids and is not sure if that is the source of her bleeding. She reports a history of previous EGDs and colonoscopies that have been unremarkable. Nursing Notes were all reviewed and agreed with or any disagreements were addressed in the HPI. REVIEW OF SYSTEMS :           Positives and Pertinent negatives as per HPI.      SURGICAL HISTORY     Past Surgical History:   Procedure Laterality Date    CARPAL TUNNEL RELEASE Bilateral     CATARACT REMOVAL Right     COLONOSCOPY      OTHER SURGICAL HISTORY Bilateral 01/21/2021    BILATERAL L3,L4 MEDIAL BRANCH AND L5 DORSAL RAMUS RADIOFREQUENCY    PAIN MANAGEMENT PROCEDURE Bilateral 01/21/2021    BILATERAL L3,4 MEDIAL BRANCH AND L5 DORSAL RAMUS  RADIOFREQUENCY ABLATION WITH SEDATION  (CPT Q8042832) performed by Fauzia Fletcher MD at [address] [place] [address] SPINE SURGERY N/A 2021    T11 KYPHOPLASTY (2 C-ARMS) performed by Yanira Clark MD at [address] 2019    EGD BIOPSY performed by Nabeel Louie MD at [address] [place] [address] 03/10/2021    EGD ESOPHAGOGASTRODUODENOSCOPY performed by Leo Reyna MD at [facility]       Previous Medications    DULAGLUTIDE (TRULICITY) 4.5 ZK/8.7NM SOPN    Inject 4.5 mg into the skin once a week Tuesday    DULOXETINE (CYMBALTA) 30 MG EXTENDED RELEASE CAPSULE    Take 1 capsule by mouth daily    FLUTICASONE (FLONASE) 50 MCG/ACT NASAL SPRAY    1 spray by Each Nostril route daily as needed for Rhinitis    GABAPENTIN (NEURONTIN) 300 MG CAPSULE    Take 300 mg by mouth daily. GABAPENTIN (NEURONTIN) 300 MG CAPSULE    Take 600 mg by mouth nightly.     GLIPIZIDE (GLUCOTROL) 5 MG TABLET    Take 1 tablet by mouth daily    OMEPRAZOLE (PRILOSEC) 20 MG DELAYED RELEASE CAPSULE    TAKE 1 CAPSULE EVERY DAY    ONDANSETRON (ZOFRAN) 8 MG TABLET    Take 1 tablet by mouth every 8 hours as needed for Nausea or Vomiting    ROPINIROLE (REQUIP) 0.5 MG TABLET    Take 0.5 mg by mouth daily    ROPINIROLE (REQUIP) 0.5 MG TABLET    Take 1 mg by mouth nightly    TIZANIDINE (ZANAFLEX) 2 MG TABLET    Take 2 mg by mouth 2 times daily as needed (Muscle Spasms)       ALLERGIES     Baclofen, Penicillins, and Nickel    FAMILYHISTORY       Family History   Problem Relation Age of Onset    Hypertension Father     Heart Disease Father     Hypertension Paternal Grandfather     Heart Disease Paternal Grandfather         SOCIAL HISTORY       Social History     Tobacco Use    Smoking status: Former     Packs/day: 1.00     Years: 10.00     Pack years: 10.00     Types: Cigarettes     Start date: 0     Quit date:      Years since quittin.0    Smokeless tobacco: Never   Vaping Use    Vaping Use: Never used   Substance Use Topics    Alcohol use: Yes     Alcohol/week: 1.0 standard drink     Types: 1 Glasses of wine per week     Comment: socially    Drug use: No       SCREENINGS        Isaac Coma Scale  Eye Opening: Spontaneous  Best Verbal Response: Oriented  Best Motor Response: Obeys commands  Isaac Coma Scale Score: 15                CIWA Assessment  BP: (!) 178/78  Heart Rate: (!) 106           PHYSICAL EXAM  1 or more Elements     ED Triage Vitals   BP Temp Temp Source Heart Rate Resp SpO2 Height Weight   01/10/23 1732 01/10/23 1723 01/11/23 0035 01/10/23 1723 01/10/23 1732 01/10/23 1723 -- --   133/65 97.4 °F (36.3 °C) Oral (!) 111 20 98 %              Constitutional/General: Alert and oriented x3  Head: Normocephalic and atraumatic  Eyes: PERRL, EOMI, conjunctiva normal, sclera non icteric  ENT:  Oropharynx clear, handling secretions, no trismus, no asymmetry of the posterior oropharynx or uvular edema  Respiratory: Lungs clear to auscultation bilaterally, no wheezes, rales, or rhonchi. Not in respiratory distress  Cardiovascular: Tachycardic. Regular rhythm. GI:  Abdomen Soft, Non tender, Non distended. No rebound, guarding, or rigidity. No pulsatile masses. Musculoskeletal: Moves all extremities x 4. Warm and well perfused, no clubbing, no cyanosis, no edema. Integument: skin warm and dry. No rashes.    Neurologic: GCS 15, no focal deficits, symmetric strength 5/5 in the upper and lower extremities bilaterally  Psychiatric: Normal Affect            DIAGNOSTIC RESULTS   LABS:    Labs Reviewed   CBC WITH AUTO DIFFERENTIAL - Abnormal; Notable for the following components:       Result Value    RBC 2.77 (*)     Hemoglobin 7.3 (*)     Hematocrit 23.1 (*)     MCHC 31.6 (*)     RDW 18.3 (*)     Lymphocytes % 19.0 (*)     Monocytes % 12.2 (*)     Monocytes Absolute 1.09 (*)     All other components within normal limits   BASIC METABOLIC PANEL - Abnormal; Notable for the following components:    CO2 18 (*)     Glucose 255 (*)     All other components within normal limits   TROPONIN - Abnormal; Notable for the following components:    Troponin, High Sensitivity 13 (*)     All other components within normal limits   APTT   PROTIME-INR   TYPE AND SCREEN       As interpreted by me, the above displayed labs are abnormal. All other labs obtained during this visit were within normal range or not returned as of this dictation. EKG Interpretation  Interpreted by emergency department physician, Caro Saenz MD      EKG read interpreted by me. Rate 105, sinus tachycardia, low voltage, normal ME interval, , no ST elevation or depression, changes compared to previous EKG. RADIOLOGY:   Non-plain film images such as CT, Ultrasound and MRI are read by the radiologist. Plain radiographic images are visualized and preliminarily interpreted by the ED Provider with the below findings:    Chest x-ray shows no acute process. Interpretation per the Radiologist below, if available at the time of this note:    XR CHEST (2 VW)   Final Result   No acute process. Minimal atelectasis, left lung base. XR CHEST (2 VW)    Result Date: 1/10/2023  EXAMINATION: TWO XRAY VIEWS OF THE CHEST 1/10/2023 6:06 pm COMPARISON: None. HISTORY: ORDERING SYSTEM PROVIDED HISTORY: sob TECHNOLOGIST PROVIDED HISTORY: Reason for exam:->sob What reading provider will be dictating this exam?->CRC FINDINGS: The lungs are without acute focal process. There is no effusion or pneumothorax. The cardiomediastinal silhouette is without acute process. The osseous structures are without acute process. No acute process. Minimal atelectasis, left lung base. XR CHEST PORTABLE    Result Date: 1/9/2023  EXAMINATION: ONE XRAY VIEW OF THE CHEST 1/9/2023 12:02 pm COMPARISON: 03/08/2021 HISTORY: ORDERING SYSTEM PROVIDED HISTORY: r/o CHF TECHNOLOGIST PROVIDED HISTORY: Reason for exam:->r/o CHF FINDINGS: The lungs are without acute focal process.   There is no effusion or pneumothorax. The cardiomediastinal silhouette is without acute process. The osseous structures are without acute process. No acute process. No results found. PROCEDURES   Unless otherwise noted below, none          CRITICAL CARE TIME (.cct)   none    PAST MEDICAL HISTORY/Chronic Conditions Affecting Care      has a past medical history of Arthritis, Cataract, Chronic fatigue syndrome, Depression, Diabetes mellitus ([address] Fibromyalgia, Hypertension, Memory loss, Mononucleosis, Psoriasis, Sciatica, Sleep apnea, and Vertigo. EMERGENCY DEPARTMENT COURSE    Vitals:    Vitals:    01/10/23 1723 01/10/23 1732 01/11/23 0035   BP:  133/65 (!) 178/78   Pulse: (!) 111  (!) 106   Resp:  20 20   Temp: 97.4 °F (36.3 °C)  99 °F (37.2 °C)   TempSrc:   Oral   SpO2: 98%  98%       Patient was given the following medications:  Medications - No data to display        Medical Decision Making/Differential Diagnosis:    CC/HPI Summary, Social Determinants of health, Records Reviewed, DDx, testing done/not done, ED Course, Reassessment, disposition considerations/shared decision making with patient, consults, disposition:            Medical Decision Making  Amount and/or Complexity of Data Reviewed  Labs: ordered. Radiology: ordered. ECG/medicine tests: ordered. This is a 20-year-old female with a history of iron deficiency anemia presenting with concern for rectal bleeding and concern for low hemoglobin. Patient noted to be tachycardic in no acute distress. Lab work was obtained and remarkable for hemoglobin of 7.3. This is improved from yesterday after receiving 2 units of blood at [facility]. Discussion with patient regarding plans for admission for likely colonoscopy to evaluate her GI bleed, however patient later decided to leave [address] Patient has decision-making capacity and was counseled regarding potential risks of leaving [address]   She was provided follow-up. CONSULTS: (Who and What was discussed)  None        I am the Primary Clinician of Record. FINAL IMPRESSION      1. Iron deficiency anemia, unspecified iron deficiency anemia type    2. Rectal bleeding          DISPOSITION/PLAN     DISPOSITION [place] 01/11/2023 01:25:06 AM      PATIENT REFERRED TO:  Tatyanaspring Marie   [address] [place] [id number] [phone]    Schedule an appointment as soon as possible for a visit       DISCHARGE MEDICATIONS:  New Prescriptions    No medications on file       DISCONTINUED MEDICATIONS:  Discontinued Medications    No medications on file              (Please note that portions of this note were completed with a voice recognition program.  Efforts were made to edit the dictations but occasionally words are mis-transcribed.)    Jimmy Bliss MD (electronically signed)          Baldev Patterson DO  Resident  01/11/23 0246    ATTENDING PROVIDER ATTESTATION:     I have personally performed and/or participated in the history, exam, medical decision making, and procedures and agree with all pertinent clinical information. I have also reviewed and agree with the past medical, family and social history unless otherwise noted. I have discussed this patient in detail with the resident, and provided the instruction and education regarding rectal bleeding. My findings/Plan: I was the primary provider for patient. Patient presenting because of rectal bleeding. Patient with history of iron deficiency anemia. Presenting here because of feeling weak and lightheaded. Patient reporting that she had some blood in her stool earlier today. Patient was just recently seen at outlying facility and was transfused with 2 units of packed red blood cells her hemoglobin at that time was 5.1 is actually improved here. Patient is awake alert oriented x3 heart lung exam normal abdomen soft nontender. Patient moves all extremities.   Patient differential includes diverticular bleeding hemorrhoid bleeding as well as iron deficiency anemia. Patient hemoglobin as stated above has improved. Patient other labs reviewed as well as EKG showed sinus rhythm at a rate of 105 no acute ST elevation it looks unchanged from her prior EKG that was done recently. Agree with the interpretation by resident. Patient was seen initially in the intake room. The plan was to bring patient back into emergency department still awaiting bed though. Patient did not want a wait any longer here in the emergency department. Patient was made aware of risk of death or disability. She is awake alert oriented x3 neurologically intact patient was told to return anytime for any further problems. Patient will sign out [address] This patient has chosen to leave against medical advice. I have personally explained to them that choosing to do so may result in permanent bodily harm or death. I discussed at length that without further evaluation and monitoring there may be unforeseen circumstances and deterioration resulting in permanent bodily harm or death as a result of their choice. They are alert, oriented, and competent at this time. They state that they are aware of the serious risks as explained, but they continue to wish to leave against medical advice. In light of their decision to leave against medical advice, follow-up has been arranged and they are aware of the importance of following up as instructed. They have been advised that they should return to the ED immediately if they change their mind at any time, or if their condition begins to change or worsen.               Aristides Buck MD  01/11/23 9783

## 2023-01-12 LAB — URINE CULTURE, ROUTINE: NORMAL

## 2023-01-13 DIAGNOSIS — K21.9 GASTROESOPHAGEAL REFLUX DISEASE, UNSPECIFIED WHETHER ESOPHAGITIS PRESENT: ICD-10-CM

## 2023-01-13 DIAGNOSIS — E11.65 TYPE 2 DIABETES MELLITUS WITH HYPERGLYCEMIA, WITHOUT LONG-TERM CURRENT USE OF INSULIN (HCC): ICD-10-CM

## 2023-01-13 RX ORDER — OMEPRAZOLE 20 MG/1
CAPSULE, DELAYED RELEASE ORAL
Qty: 90 CAPSULE | Refills: 1 | Status: SHIPPED | OUTPATIENT
Start: 2023-01-13

## 2023-01-13 RX ORDER — GLIPIZIDE 5 MG/1
TABLET ORAL
Qty: 90 TABLET | Refills: 1 | Status: SHIPPED | OUTPATIENT
Start: 2023-01-13

## 2023-01-13 NOTE — TELEPHONE ENCOUNTER
Last Appointment:  10/28/2022  Future Appointments   Date Time Provider Ana Basilio   1/23/2023  2:00  [facility] [address]   1/23/2023  3:00 PM Rosalina Soto MD  [facility] [address]  [facility]   1/30/2023  1:00 PM Darío Blanco, [address] [facility]

## 2023-01-19 RX ORDER — DULAGLUTIDE 4.5 MG/.5ML
4.5 INJECTION, SOLUTION SUBCUTANEOUS WEEKLY
Qty: 4 ADJUSTABLE DOSE PRE-FILLED PEN SYRINGE | Refills: 2 | Status: SHIPPED | OUTPATIENT
Start: 2023-01-19

## 2023-01-19 RX ORDER — FLUOXETINE HYDROCHLORIDE 20 MG/1
CAPSULE ORAL 3 TIMES DAILY
COMMUNITY
Start: 2023-01-17

## 2023-01-19 NOTE — TELEPHONE ENCOUNTER
Phone call from patient. She is having a hard time finding the trulicity. She called [facility] pharmacy and they have it. Please send new script.

## 2023-01-20 DIAGNOSIS — D50.0 IRON DEFICIENCY ANEMIA DUE TO CHRONIC BLOOD LOSS: Primary | ICD-10-CM

## 2023-02-06 ENCOUNTER — HOSPITAL ENCOUNTER (INPATIENT)
Age: 73
LOS: 1 days | Discharge: HOME | DRG: 812 | End: 2023-02-07
Attending: EMERGENCY MEDICINE | Admitting: INTERNAL MEDICINE
Payer: MEDICARE

## 2023-02-06 DIAGNOSIS — R53.83 OTHER FATIGUE: Primary | ICD-10-CM

## 2023-02-06 DIAGNOSIS — D64.9 ACUTE ON CHRONIC ANEMIA: ICD-10-CM

## 2023-02-06 LAB
APTT: 27.1 SEC (ref 24.5–35.1)
INR BLD: 1.2
PROTHROMBIN TIME: 13.2 SEC (ref 9.3–12.4)

## 2023-02-06 PROCEDURE — 80048 BASIC METABOLIC PNL TOTAL CA: CPT

## 2023-02-06 PROCEDURE — 86901 BLOOD TYPING SEROLOGIC RH(D): CPT

## 2023-02-06 PROCEDURE — 99285 EMERGENCY DEPT VISIT HI MDM: CPT

## 2023-02-06 PROCEDURE — 87502 INFLUENZA DNA AMP PROBE: CPT

## 2023-02-06 PROCEDURE — 84484 ASSAY OF TROPONIN QUANT: CPT

## 2023-02-06 PROCEDURE — P9016 RBC LEUKOCYTES REDUCED: HCPCS

## 2023-02-06 PROCEDURE — 85610 PROTHROMBIN TIME: CPT

## 2023-02-06 PROCEDURE — 85025 COMPLETE CBC W/AUTO DIFF WBC: CPT

## 2023-02-06 PROCEDURE — 86900 BLOOD TYPING SEROLOGIC ABO: CPT

## 2023-02-06 PROCEDURE — 86923 COMPATIBILITY TEST ELECTRIC: CPT

## 2023-02-06 PROCEDURE — 86850 RBC ANTIBODY SCREEN: CPT

## 2023-02-06 PROCEDURE — 87635 SARS-COV-2 COVID-19 AMP PRB: CPT

## 2023-02-06 PROCEDURE — 85730 THROMBOPLASTIN TIME PARTIAL: CPT

## 2023-02-06 PROCEDURE — 0202U NFCT DS 22 TRGT SARS-COV-2: CPT

## 2023-02-06 PROCEDURE — 83880 ASSAY OF NATRIURETIC PEPTIDE: CPT

## 2023-02-06 PROCEDURE — 83735 ASSAY OF MAGNESIUM: CPT

## 2023-02-06 PROCEDURE — 93005 ELECTROCARDIOGRAM TRACING: CPT | Performed by: EMERGENCY MEDICINE

## 2023-02-06 RX ORDER — SODIUM CHLORIDE 9 MG/ML
INJECTION, SOLUTION INTRAVENOUS PRN
Status: DISCONTINUED | OUTPATIENT
Start: 2023-02-06 | End: 2023-02-07 | Stop reason: HOSPADM

## 2023-02-06 ASSESSMENT — ENCOUNTER SYMPTOMS
VOMITING: 0
SHORTNESS OF BREATH: 0
ABDOMINAL DISTENTION: 0
BLOOD IN STOOL: 0
ABDOMINAL PAIN: 0
WHEEZING: 0
SORE THROAT: 0
BACK PAIN: 0
NAUSEA: 1
DIARRHEA: 0
CHEST TIGHTNESS: 0
COUGH: 0

## 2023-02-06 ASSESSMENT — PAIN - FUNCTIONAL ASSESSMENT: PAIN_FUNCTIONAL_ASSESSMENT: NONE - DENIES PAIN

## 2023-02-07 ENCOUNTER — APPOINTMENT (OUTPATIENT)
Dept: GENERAL RADIOLOGY | Age: 73
DRG: 812 | End: 2023-02-07
Payer: MEDICARE

## 2023-02-07 VITALS
RESPIRATION RATE: 16 BRPM | SYSTOLIC BLOOD PRESSURE: 98 MMHG | OXYGEN SATURATION: 100 % | TEMPERATURE: 98.4 F | HEART RATE: 88 BPM | WEIGHT: 190 LBS | DIASTOLIC BLOOD PRESSURE: 49 MMHG | BODY MASS INDEX: 34.96 KG/M2 | HEIGHT: 62 IN

## 2023-02-07 PROBLEM — D64.9 ACUTE ON CHRONIC ANEMIA: Status: ACTIVE | Noted: 2023-02-07

## 2023-02-07 LAB
ABO/RH: NORMAL
ADENOVIRUS BY PCR: NOT DETECTED
ALBUMIN SERPL-MCNC: 3.5 G/DL (ref 3.5–5.2)
ALP BLD-CCNC: 77 U/L (ref 35–104)
ALT SERPL-CCNC: 23 U/L (ref 0–32)
ANION GAP SERPL CALCULATED.3IONS-SCNC: 11 MMOL/L (ref 7–16)
ANION GAP SERPL CALCULATED.3IONS-SCNC: 9 MMOL/L (ref 7–16)
ANISOCYTOSIS: ABNORMAL
ANISOCYTOSIS: ABNORMAL
ANTIBODY SCREEN: NORMAL
AST SERPL-CCNC: 32 U/L (ref 0–31)
BACTERIA: ABNORMAL /HPF
BASOPHILS ABSOLUTE: 0 E9/L (ref 0–0.2)
BASOPHILS ABSOLUTE: 0.04 E9/L (ref 0–0.2)
BASOPHILS ABSOLUTE: 0.09 E9/L (ref 0–0.2)
BASOPHILS RELATIVE PERCENT: 0.4 % (ref 0–2)
BASOPHILS RELATIVE PERCENT: 0.5 % (ref 0–2)
BASOPHILS RELATIVE PERCENT: 0.9 % (ref 0–2)
BILIRUB SERPL-MCNC: 0.9 MG/DL (ref 0–1.2)
BILIRUBIN DIRECT: 0.3 MG/DL (ref 0–0.3)
BILIRUBIN URINE: NEGATIVE
BILIRUBIN, INDIRECT: 0.6 MG/DL (ref 0–1)
BLOOD BANK DISPENSE STATUS: NORMAL
BLOOD BANK PRODUCT CODE: NORMAL
BLOOD, URINE: NEGATIVE
BORDETELLA PARAPERTUSSIS BY PCR: NOT DETECTED
BORDETELLA PERTUSSIS BY PCR: NOT DETECTED
BPU ID: NORMAL
BUN BLDV-MCNC: 12 MG/DL (ref 6–23)
BUN BLDV-MCNC: 13 MG/DL (ref 6–23)
BURR CELLS: ABNORMAL
CALCIUM SERPL-MCNC: 8.4 MG/DL (ref 8.6–10.2)
CALCIUM SERPL-MCNC: 9 MG/DL (ref 8.6–10.2)
CHLAMYDOPHILIA PNEUMONIAE BY PCR: NOT DETECTED
CHLORIDE BLD-SCNC: 102 MMOL/L (ref 98–107)
CHLORIDE BLD-SCNC: 105 MMOL/L (ref 98–107)
CHP ED QC CHECK: YES
CLARITY: CLEAR
CO2: 21 MMOL/L (ref 22–29)
CO2: 22 MMOL/L (ref 22–29)
COLOR: YELLOW
CORONAVIRUS 229E BY PCR: NOT DETECTED
CORONAVIRUS HKU1 BY PCR: NOT DETECTED
CORONAVIRUS NL63 BY PCR: NOT DETECTED
CORONAVIRUS OC43 BY PCR: NOT DETECTED
CREAT SERPL-MCNC: 0.7 MG/DL (ref 0.5–1)
CREAT SERPL-MCNC: 0.8 MG/DL (ref 0.5–1)
DESCRIPTION BLOOD BANK: NORMAL
EKG ATRIAL RATE: 85 BPM
EKG P AXIS: 63 DEGREES
EKG P-R INTERVAL: 170 MS
EKG Q-T INTERVAL: 420 MS
EKG QRS DURATION: 86 MS
EKG QTC CALCULATION (BAZETT): 499 MS
EKG R AXIS: -11 DEGREES
EKG T AXIS: 37 DEGREES
EKG VENTRICULAR RATE: 85 BPM
EOSINOPHILS ABSOLUTE: 0 E9/L (ref 0.05–0.5)
EOSINOPHILS ABSOLUTE: 0.1 E9/L (ref 0.05–0.5)
EOSINOPHILS ABSOLUTE: 0.23 E9/L (ref 0.05–0.5)
EOSINOPHILS RELATIVE PERCENT: 0.9 % (ref 0–6)
EOSINOPHILS RELATIVE PERCENT: 1.2 % (ref 0–6)
EOSINOPHILS RELATIVE PERCENT: 2.3 % (ref 0–6)
EPITHELIAL CELLS, UA: ABNORMAL /HPF
FERRITIN: 16 NG/ML
FOLATE: 13.5 NG/ML (ref 4.8–24.2)
GFR SERPL CREATININE-BSD FRML MDRD: >60 ML/MIN/1.73
GFR SERPL CREATININE-BSD FRML MDRD: >60 ML/MIN/1.73
GLUCOSE BLD-MCNC: 121 MG/DL (ref 74–99)
GLUCOSE BLD-MCNC: 128 MG/DL
GLUCOSE BLD-MCNC: 133 MG/DL (ref 74–99)
GLUCOSE URINE: 100 MG/DL
HAPTOGLOBIN: 134 MG/DL (ref 30–200)
HBA1C MFR BLD: 6.7 % (ref 4–5.6)
HCT VFR BLD CALC: 17.3 % (ref 34–48)
HCT VFR BLD CALC: 19 % (ref 34–48)
HCT VFR BLD CALC: 22.3 % (ref 34–48)
HCT VFR BLD CALC: 24.4 % (ref 34–48)
HCT VFR BLD CALC: 31.6 % (ref 34–48)
HEMOGLOBIN: 4.9 G/DL (ref 11.5–15.5)
HEMOGLOBIN: 5.2 G/DL (ref 11.5–15.5)
HEMOGLOBIN: 6.6 G/DL (ref 11.5–15.5)
HEMOGLOBIN: 7.3 G/DL (ref 11.5–15.5)
HEMOGLOBIN: 9.4 G/DL (ref 11.5–15.5)
HUMAN METAPNEUMOVIRUS BY PCR: NOT DETECTED
HUMAN RHINOVIRUS/ENTEROVIRUS BY PCR: NOT DETECTED
HYPOCHROMIA: ABNORMAL
HYPOCHROMIA: ABNORMAL
IMMATURE GRANULOCYTES #: 0.05 E9/L
IMMATURE GRANULOCYTES %: 0.5 % (ref 0–5)
IMMATURE RETIC FRACT: 25.5 % (ref 3–15.9)
INFLUENZA A BY PCR: NOT DETECTED
INFLUENZA A BY PCR: NOT DETECTED
INFLUENZA B BY PCR: NOT DETECTED
INFLUENZA B BY PCR: NOT DETECTED
IRON SATURATION: 2 % (ref 15–50)
IRON: 9 MCG/DL (ref 37–145)
KETONES, URINE: NEGATIVE MG/DL
LEUKOCYTE ESTERASE, URINE: ABNORMAL
LYMPHOCYTES ABSOLUTE: 0.62 E9/L (ref 1.5–4)
LYMPHOCYTES ABSOLUTE: 1.64 E9/L (ref 1.5–4)
LYMPHOCYTES ABSOLUTE: 1.99 E9/L (ref 1.5–4)
LYMPHOCYTES RELATIVE PERCENT: 14.9 % (ref 20–42)
LYMPHOCYTES RELATIVE PERCENT: 19.8 % (ref 20–42)
LYMPHOCYTES RELATIVE PERCENT: 6.1 % (ref 20–42)
MAGNESIUM: 1.9 MG/DL (ref 1.6–2.6)
MAGNESIUM: 1.9 MG/DL (ref 1.6–2.6)
MCH RBC QN AUTO: 21.4 PG (ref 26–35)
MCH RBC QN AUTO: 22.1 PG (ref 26–35)
MCH RBC QN AUTO: 23.6 PG (ref 26–35)
MCHC RBC AUTO-ENTMCNC: 27.4 % (ref 32–34.5)
MCHC RBC AUTO-ENTMCNC: 28.3 % (ref 32–34.5)
MCHC RBC AUTO-ENTMCNC: 29.6 % (ref 32–34.5)
MCV RBC AUTO: 77.9 FL (ref 80–99.9)
MCV RBC AUTO: 78.2 FL (ref 80–99.9)
MCV RBC AUTO: 79.6 FL (ref 80–99.9)
METER GLUCOSE: 124 MG/DL (ref 74–99)
METER GLUCOSE: 128 MG/DL (ref 74–99)
MONOCYTES ABSOLUTE: 0.44 E9/L (ref 0.1–0.95)
MONOCYTES ABSOLUTE: 0.83 E9/L (ref 0.1–0.95)
MONOCYTES ABSOLUTE: 1.01 E9/L (ref 0.1–0.95)
MONOCYTES RELATIVE PERCENT: 10 % (ref 2–12)
MONOCYTES RELATIVE PERCENT: 3.5 % (ref 2–12)
MONOCYTES RELATIVE PERCENT: 7.8 % (ref 2–12)
MYCOPLASMA PNEUMONIAE BY PCR: NOT DETECTED
NEUTROPHILS ABSOLUTE: 6.75 E9/L (ref 1.8–7.3)
NEUTROPHILS ABSOLUTE: 8.83 E9/L (ref 1.8–7.3)
NEUTROPHILS ABSOLUTE: 8.84 E9/L (ref 1.8–7.3)
NEUTROPHILS RELATIVE PERCENT: 67 % (ref 43–80)
NEUTROPHILS RELATIVE PERCENT: 80.7 % (ref 43–80)
NEUTROPHILS RELATIVE PERCENT: 85.2 % (ref 43–80)
NITRITE, URINE: NEGATIVE
NUCLEATED RED BLOOD CELLS: 1.8 /100 WBC
OCCULT BLOOD SCREENING: NORMAL
OVALOCYTES: ABNORMAL
OVALOCYTES: ABNORMAL
PARAINFLUENZA VIRUS 1 BY PCR: NOT DETECTED
PARAINFLUENZA VIRUS 2 BY PCR: NOT DETECTED
PARAINFLUENZA VIRUS 3 BY PCR: NOT DETECTED
PARAINFLUENZA VIRUS 4 BY PCR: NOT DETECTED
PATHOLOGIST REVIEW: NORMAL
PDW BLD-RTO: 19.6 FL (ref 11.5–15)
PDW BLD-RTO: 19.8 FL (ref 11.5–15)
PDW BLD-RTO: 19.8 FL (ref 11.5–15)
PH UA: 6 (ref 5–9)
PHOSPHORUS: 3.3 MG/DL (ref 2.5–4.5)
PLATELET # BLD: 216 E9/L (ref 130–450)
PLATELET # BLD: 225 E9/L (ref 130–450)
PLATELET # BLD: 234 E9/L (ref 130–450)
PMV BLD AUTO: 10.4 FL (ref 7–12)
PMV BLD AUTO: 10.5 FL (ref 7–12)
PMV BLD AUTO: 10.9 FL (ref 7–12)
POIKILOCYTES: ABNORMAL
POIKILOCYTES: ABNORMAL
POLYCHROMASIA: ABNORMAL
POLYCHROMASIA: ABNORMAL
POTASSIUM SERPL-SCNC: 3.5 MMOL/L (ref 3.5–5)
POTASSIUM SERPL-SCNC: 3.6 MMOL/L (ref 3.5–5)
PRO-BNP: 71 PG/ML (ref 0–125)
PROTEIN UA: NEGATIVE MG/DL
RBC # BLD: 2.22 E12/L (ref 3.5–5.5)
RBC # BLD: 2.43 E12/L (ref 3.5–5.5)
RBC # BLD: 2.8 E12/L (ref 3.5–5.5)
RBC UA: ABNORMAL /HPF (ref 0–2)
RESPIRATORY SYNCYTIAL VIRUS BY PCR: NOT DETECTED
RETIC HGB EQUIVALENT: 19 PG (ref 28.2–36.6)
RETICULOCYTE ABSOLUTE COUNT: 0.04 E12/L
RETICULOCYTE COUNT PCT: 1.6 % (ref 0.4–1.9)
SARS-COV-2, NAAT: NOT DETECTED
SARS-COV-2, PCR: NOT DETECTED
SCHISTOCYTES: ABNORMAL
SCHISTOCYTES: ABNORMAL
SODIUM BLD-SCNC: 135 MMOL/L (ref 132–146)
SODIUM BLD-SCNC: 135 MMOL/L (ref 132–146)
SPECIFIC GRAVITY UA: 1.01 (ref 1–1.03)
TARGET CELLS: ABNORMAL
TARGET CELLS: ABNORMAL
TEAR DROP CELLS: ABNORMAL
TEAR DROP CELLS: ABNORMAL
TOTAL IRON BINDING CAPACITY: 428 MCG/DL (ref 250–450)
TOTAL PROTEIN: 6.3 G/DL (ref 6.4–8.3)
TROPONIN, HIGH SENSITIVITY: 10 NG/L (ref 0–9)
TROPONIN, HIGH SENSITIVITY: 11 NG/L (ref 0–9)
TROPONIN, HIGH SENSITIVITY: 9 NG/L (ref 0–9)
UROBILINOGEN, URINE: 1 E.U./DL
VITAMIN B-12: 846 PG/ML (ref 211–946)
WBC # BLD: 10.1 E9/L (ref 4.5–11.5)
WBC # BLD: 10.4 E9/L (ref 4.5–11.5)
WBC # BLD: 10.9 E9/L (ref 4.5–11.5)
WBC UA: ABNORMAL /HPF (ref 0–5)

## 2023-02-07 PROCEDURE — A4216 STERILE WATER/SALINE, 10 ML: HCPCS | Performed by: INTERNAL MEDICINE

## 2023-02-07 PROCEDURE — 84484 ASSAY OF TROPONIN QUANT: CPT

## 2023-02-07 PROCEDURE — C9113 INJ PANTOPRAZOLE SODIUM, VIA: HCPCS | Performed by: INTERNAL MEDICINE

## 2023-02-07 PROCEDURE — 85018 HEMOGLOBIN: CPT

## 2023-02-07 PROCEDURE — 83036 HEMOGLOBIN GLYCOSYLATED A1C: CPT

## 2023-02-07 PROCEDURE — 85014 HEMATOCRIT: CPT

## 2023-02-07 PROCEDURE — 36430 TRANSFUSION BLD/BLD COMPNT: CPT

## 2023-02-07 PROCEDURE — 83010 ASSAY OF HAPTOGLOBIN QUANT: CPT

## 2023-02-07 PROCEDURE — 83540 ASSAY OF IRON: CPT

## 2023-02-07 PROCEDURE — 82746 ASSAY OF FOLIC ACID SERUM: CPT

## 2023-02-07 PROCEDURE — 83550 IRON BINDING TEST: CPT

## 2023-02-07 PROCEDURE — 82607 VITAMIN B-12: CPT

## 2023-02-07 PROCEDURE — 6370000000 HC RX 637 (ALT 250 FOR IP): Performed by: INTERNAL MEDICINE

## 2023-02-07 PROCEDURE — 81001 URINALYSIS AUTO W/SCOPE: CPT

## 2023-02-07 PROCEDURE — 99222 1ST HOSP IP/OBS MODERATE 55: CPT | Performed by: INTERNAL MEDICINE

## 2023-02-07 PROCEDURE — 82962 GLUCOSE BLOOD TEST: CPT

## 2023-02-07 PROCEDURE — 71045 X-RAY EXAM CHEST 1 VIEW: CPT

## 2023-02-07 PROCEDURE — 2060000000 HC ICU INTERMEDIATE R&B

## 2023-02-07 PROCEDURE — 84100 ASSAY OF PHOSPHORUS: CPT

## 2023-02-07 PROCEDURE — 82728 ASSAY OF FERRITIN: CPT

## 2023-02-07 PROCEDURE — 83735 ASSAY OF MAGNESIUM: CPT

## 2023-02-07 PROCEDURE — 36415 COLL VENOUS BLD VENIPUNCTURE: CPT

## 2023-02-07 PROCEDURE — 6360000002 HC RX W HCPCS: Performed by: INTERNAL MEDICINE

## 2023-02-07 PROCEDURE — 2580000003 HC RX 258: Performed by: INTERNAL MEDICINE

## 2023-02-07 PROCEDURE — 82270 OCCULT BLOOD FECES: CPT

## 2023-02-07 PROCEDURE — P9016 RBC LEUKOCYTES REDUCED: HCPCS

## 2023-02-07 PROCEDURE — 80048 BASIC METABOLIC PNL TOTAL CA: CPT

## 2023-02-07 PROCEDURE — 93010 ELECTROCARDIOGRAM REPORT: CPT | Performed by: INTERNAL MEDICINE

## 2023-02-07 PROCEDURE — 85045 AUTOMATED RETICULOCYTE COUNT: CPT

## 2023-02-07 PROCEDURE — 80076 HEPATIC FUNCTION PANEL: CPT

## 2023-02-07 PROCEDURE — 85025 COMPLETE CBC W/AUTO DIFF WBC: CPT

## 2023-02-07 RX ORDER — ROPINIROLE 1 MG/1
1 TABLET, FILM COATED ORAL NIGHTLY
Status: DISCONTINUED | OUTPATIENT
Start: 2023-02-07 | End: 2023-02-07 | Stop reason: HOSPADM

## 2023-02-07 RX ORDER — ONDANSETRON 2 MG/ML
4 INJECTION INTRAMUSCULAR; INTRAVENOUS EVERY 6 HOURS PRN
Status: DISCONTINUED | OUTPATIENT
Start: 2023-02-07 | End: 2023-02-07 | Stop reason: HOSPADM

## 2023-02-07 RX ORDER — TIZANIDINE 4 MG/1
2 TABLET ORAL 2 TIMES DAILY PRN
Status: DISCONTINUED | OUTPATIENT
Start: 2023-02-07 | End: 2023-02-07 | Stop reason: HOSPADM

## 2023-02-07 RX ORDER — POTASSIUM CHLORIDE 7.45 MG/ML
10 INJECTION INTRAVENOUS PRN
Status: DISCONTINUED | OUTPATIENT
Start: 2023-02-07 | End: 2023-02-07 | Stop reason: HOSPADM

## 2023-02-07 RX ORDER — POTASSIUM CHLORIDE 20 MEQ/1
40 TABLET, EXTENDED RELEASE ORAL PRN
Status: DISCONTINUED | OUTPATIENT
Start: 2023-02-07 | End: 2023-02-07 | Stop reason: HOSPADM

## 2023-02-07 RX ORDER — ROPINIROLE 1 MG/1
0.5 TABLET, FILM COATED ORAL DAILY
Status: DISCONTINUED | OUTPATIENT
Start: 2023-02-07 | End: 2023-02-07 | Stop reason: HOSPADM

## 2023-02-07 RX ORDER — ONDANSETRON 4 MG/1
4 TABLET, ORALLY DISINTEGRATING ORAL EVERY 8 HOURS PRN
Status: DISCONTINUED | OUTPATIENT
Start: 2023-02-07 | End: 2023-02-07 | Stop reason: HOSPADM

## 2023-02-07 RX ORDER — ACETAMINOPHEN 650 MG/1
650 SUPPOSITORY RECTAL EVERY 6 HOURS PRN
Status: DISCONTINUED | OUTPATIENT
Start: 2023-02-07 | End: 2023-02-07 | Stop reason: HOSPADM

## 2023-02-07 RX ORDER — FUROSEMIDE 10 MG/ML
20 INJECTION INTRAMUSCULAR; INTRAVENOUS SEE ADMIN INSTRUCTIONS
Status: DISCONTINUED | OUTPATIENT
Start: 2023-02-07 | End: 2023-02-07 | Stop reason: HOSPADM

## 2023-02-07 RX ORDER — GABAPENTIN 300 MG/1
300 CAPSULE ORAL DAILY
Status: DISCONTINUED | OUTPATIENT
Start: 2023-02-07 | End: 2023-02-07 | Stop reason: HOSPADM

## 2023-02-07 RX ORDER — SODIUM CHLORIDE 9 MG/ML
INJECTION, SOLUTION INTRAVENOUS PRN
Status: DISCONTINUED | OUTPATIENT
Start: 2023-02-07 | End: 2023-02-07 | Stop reason: HOSPADM

## 2023-02-07 RX ORDER — DEXTROSE MONOHYDRATE 100 MG/ML
INJECTION, SOLUTION INTRAVENOUS CONTINUOUS PRN
Status: DISCONTINUED | OUTPATIENT
Start: 2023-02-07 | End: 2023-02-07 | Stop reason: HOSPADM

## 2023-02-07 RX ORDER — MAGNESIUM SULFATE 1 G/100ML
1000 INJECTION INTRAVENOUS PRN
Status: DISCONTINUED | OUTPATIENT
Start: 2023-02-07 | End: 2023-02-07 | Stop reason: HOSPADM

## 2023-02-07 RX ORDER — ACETAMINOPHEN 325 MG/1
650 TABLET ORAL EVERY 6 HOURS PRN
Status: DISCONTINUED | OUTPATIENT
Start: 2023-02-07 | End: 2023-02-07 | Stop reason: HOSPADM

## 2023-02-07 RX ORDER — INSULIN LISPRO 100 [IU]/ML
0-4 INJECTION, SOLUTION INTRAVENOUS; SUBCUTANEOUS NIGHTLY
Status: DISCONTINUED | OUTPATIENT
Start: 2023-02-07 | End: 2023-02-07 | Stop reason: HOSPADM

## 2023-02-07 RX ORDER — DULOXETIN HYDROCHLORIDE 30 MG/1
30 CAPSULE, DELAYED RELEASE ORAL DAILY
Status: DISCONTINUED | OUTPATIENT
Start: 2023-02-07 | End: 2023-02-07

## 2023-02-07 RX ORDER — SODIUM CHLORIDE AND POTASSIUM CHLORIDE 300; 900 MG/100ML; MG/100ML
INJECTION, SOLUTION INTRAVENOUS CONTINUOUS
Status: DISCONTINUED | OUTPATIENT
Start: 2023-02-07 | End: 2023-02-07 | Stop reason: HOSPADM

## 2023-02-07 RX ORDER — FLUOXETINE HYDROCHLORIDE 20 MG/1
20 CAPSULE ORAL 2 TIMES DAILY
Status: DISCONTINUED | OUTPATIENT
Start: 2023-02-07 | End: 2023-02-07 | Stop reason: HOSPADM

## 2023-02-07 RX ORDER — SODIUM CHLORIDE 0.9 % (FLUSH) 0.9 %
5-40 SYRINGE (ML) INJECTION PRN
Status: DISCONTINUED | OUTPATIENT
Start: 2023-02-07 | End: 2023-02-07 | Stop reason: HOSPADM

## 2023-02-07 RX ORDER — INSULIN LISPRO 100 [IU]/ML
0-4 INJECTION, SOLUTION INTRAVENOUS; SUBCUTANEOUS
Status: DISCONTINUED | OUTPATIENT
Start: 2023-02-07 | End: 2023-02-07 | Stop reason: HOSPADM

## 2023-02-07 RX ORDER — GABAPENTIN 300 MG/1
600 CAPSULE ORAL NIGHTLY
Status: DISCONTINUED | OUTPATIENT
Start: 2023-02-07 | End: 2023-02-07 | Stop reason: HOSPADM

## 2023-02-07 RX ORDER — SODIUM CHLORIDE 0.9 % (FLUSH) 0.9 %
5-40 SYRINGE (ML) INJECTION EVERY 12 HOURS SCHEDULED
Status: DISCONTINUED | OUTPATIENT
Start: 2023-02-07 | End: 2023-02-07 | Stop reason: HOSPADM

## 2023-02-07 RX ADMIN — GABAPENTIN 600 MG: 300 CAPSULE ORAL at 01:34

## 2023-02-07 RX ADMIN — POTASSIUM CHLORIDE AND SODIUM CHLORIDE: 900; 300 INJECTION, SOLUTION INTRAVENOUS at 04:29

## 2023-02-07 RX ADMIN — SODIUM CHLORIDE 40 MG: 9 INJECTION, SOLUTION INTRAMUSCULAR; INTRAVENOUS; SUBCUTANEOUS at 01:16

## 2023-02-07 RX ADMIN — GABAPENTIN 300 MG: 300 CAPSULE ORAL at 09:20

## 2023-02-07 RX ADMIN — SODIUM CHLORIDE, PRESERVATIVE FREE 10 ML: 5 INJECTION INTRAVENOUS at 09:21

## 2023-02-07 RX ADMIN — FLUOXETINE 20 MG: 20 CAPSULE ORAL at 09:21

## 2023-02-07 RX ADMIN — ACETAMINOPHEN 650 MG: 325 TABLET ORAL at 10:37

## 2023-02-07 RX ADMIN — ROPINIROLE HYDROCHLORIDE 1 MG: 1 TABLET, FILM COATED ORAL at 01:34

## 2023-02-07 RX ADMIN — FLUOXETINE 20 MG: 20 CAPSULE ORAL at 01:33

## 2023-02-07 RX ADMIN — SODIUM CHLORIDE 40 MG: 9 INJECTION, SOLUTION INTRAMUSCULAR; INTRAVENOUS; SUBCUTANEOUS at 15:07

## 2023-02-07 RX ADMIN — ROPINIROLE HYDROCHLORIDE 0.5 MG: 0.5 TABLET, FILM COATED ORAL at 09:21

## 2023-02-07 ASSESSMENT — PAIN - FUNCTIONAL ASSESSMENT
PAIN_FUNCTIONAL_ASSESSMENT: NONE - DENIES PAIN

## 2023-02-07 ASSESSMENT — PAIN SCALES - GENERAL: PAINLEVEL_OUTOF10: 4

## 2023-02-07 ASSESSMENT — PAIN DESCRIPTION - LOCATION: LOCATION: BACK

## 2023-02-07 ASSESSMENT — PAIN DESCRIPTION - DESCRIPTORS: DESCRIPTORS: ACHING

## 2023-02-07 NOTE — ED NOTES
Report to oncoming RN,aware need for 1 unit of blood due now and lab draw. No further at this time.       Micheal Escobar RN  02/07/23 6919

## 2023-02-07 NOTE — CONSULTS
Inpatient Hematology/Oncology Consult Note    Reason for Visit:  Consultation on a patient with Iron deficiency anemia    Referring Physician:  Shante Rivera DO    PCP:  Shante Rivera DO    History of Present Illness:  78-year-old lady, with hx of II DM, hypertension, fibromyalgia, sleep apnea, ISABEL, and depression, iron deficiency, GI work-up was negative for bleeding, last EGD was on 3/10/2021. The patient did not have an adequate response to the oral iron, recommended parenteral iron infusion, Venofer last dose was on 12/12/2022. Presented to the ED for worsening fatigue found to have significant anemia     Review of Systems;  CONSTITUTIONAL: No fever, chills. Fair appetite. Worsening Fatigue   ENMT: Eyes: No diplopia; Nose: No epistaxis. Mouth: No sore throat. RESPIRATORY: No hemoptysis, shortness of breath, cough. CARDIOVASCULAR: No chest pain, palpitations. GASTROINTESTINAL: No nausea/vomiting, abdominal pain  GENITOURINARY: No dysuria, urinary frequency, hematuria. NEURO: No syncope, presyncope, headache.   Remainder: ROS NEGATIVE    Past Medical History:      Diagnosis Date    Arthritis     Cataract     right    Chronic fatigue syndrome     Depression     Diabetes mellitus (HCC)     SC injection weekly    Fibromyalgia     Hypertension     Memory loss     Mononucleosis     Psoriasis     Sciatica     Sleep apnea     no c-pap    Vertigo      Past Surgical History:      Procedure Laterality Date    CARPAL TUNNEL RELEASE Bilateral     CATARACT REMOVAL Right     COLONOSCOPY      OTHER SURGICAL HISTORY Bilateral 01/21/2021    BILATERAL L3,L4 MEDIAL BRANCH AND L5 DORSAL RAMUS RADIOFREQUENCY    PAIN MANAGEMENT PROCEDURE Bilateral 01/21/2021    BILATERAL L3,4 MEDIAL BRANCH AND L5 DORSAL RAMUS  RADIOFREQUENCY ABLATION WITH SEDATION  (CPT [id number]) performed by Drew Gutiérrez MD at [address]  N/A 03/19/2021    T11 KYPHOPLASTY (2 C-ARMS) performed by Drew Gutiérrez MD at [address] TONSILLECTOMY AND ADENOIDECTOMY      UPPER GASTROINTESTINAL ENDOSCOPY N/A 2019    EGD BIOPSY performed by Danie Jones MD at [place] [address] N/A 03/10/2021    EGD ESOPHAGOGASTRODUODENOSCOPY performed by Ochoa Jewell MD at [facility] ENDOSCOPY     Family History:  Family History   Problem Relation Age of Onset    Hypertension Father     Heart Disease Father     Hypertension Paternal Grandfather     Heart Disease Paternal Grandfather      Medications:  Reviewed and reconciled. Social History:  Social History     Socioeconomic History    Marital status:      Spouse name: Not on file    Number of children: Not on file    Years of education: Not on file    Highest education level: Not on file   Occupational History    Not on file   Tobacco Use    Smoking status: Former     Packs/day: 1.00     Years: 10.00     Pack years: 10.00     Types: Cigarettes     Start date: 0     Quit date:      Years since quittin.1    Smokeless tobacco: Never   Vaping Use    Vaping Use: Never used   Substance and Sexual Activity    Alcohol use: Not Currently     Alcohol/week: 1.0 standard drink     Types: 1 Glasses of wine per week     Comment: socially    Drug use: No    Sexual activity: Not Currently   Other Topics Concern    Not on file   Social History Narrative    Not on file     Social Determinants of Health     Financial Resource Strain: Medium Risk    Difficulty of Paying Living Expenses: Somewhat hard   Food Insecurity: Food Insecurity Present    Worried About Running Out of Food in the Last Year: Sometimes true    Ran Out of Food in the Last Year: Sometimes true   Transportation Needs: Not on file   Physical Activity: Inactive    Days of Exercise per Week: 0 days    Minutes of Exercise per Session: 0 min   Stress: Not on file   Social Connections: Not on file   Intimate Partner Violence: Not on file   Housing Stability: Not on file     Allergies:   Allergies   Allergen Reactions    Baclofen Other (See Comments)     Dry mouth, abd pain    Penicillins Hives    Nickel Rash and Other (See Comments)     Surgical steel     Physical Exam:  /72   Pulse 93   Temp 98.7 °F (37.1 °C) (Oral)   Resp 22   Ht 5' 2\" (1.575 m)   Wt 190 lb (86.2 kg)   LMP  (LMP Unknown)   SpO2 96%   BMI 34.75 kg/m²   GENERAL: Alert, oriented x 3, tired  HEENT: PERRLA; EOMI. Oropharynx clear. NECK: Supple. Without lymphadenopathy. LUNGS: Good air entry bilaterally. CARDIOVASCULAR: Regular rate. ABDOMEN: Soft. Non-tender, non-distended. EXTREMITIES: Without clubbing, cyanosis, or edema. NEUROLOGIC: No focal deficits. ECOG PS 2    Lab Results   Component Value Date    WBC 10.1 02/07/2023    HGB 9.4 (L) 02/07/2023    HCT 31.6 (L) 02/07/2023    MCV 79.6 (L) 02/07/2023     02/07/2023     Lab Results   Component Value Date     02/07/2023    K 3.6 02/07/2023     02/07/2023    CO2 21 (L) 02/07/2023    BUN 12 02/07/2023    CREATININE 0.7 02/07/2023    GLUCOSE 121 (H) 02/07/2023    CALCIUM 8.4 (L) 02/07/2023    PROT 6.3 (L) 02/07/2023    LABALBU 3.5 02/07/2023    BILITOT 0.9 02/07/2023    ALKPHOS 77 02/07/2023    AST 32 (H) 02/07/2023    ALT 23 02/07/2023    LABGLOM >60 02/07/2023    GFRAA >60 09/27/2022     Impression/Plan:  50-year-old female with history of diabetes mellitus type 2, hypertension, fibromyalgia, sleep apnea, ISABEL, depression with iron deficiency anemia secondary to bleeding or iron malabsorption  She did not have an adequate response to the oral iron recommended parenteral iron infusion Venofer last dose was on 12/12/2022  Presented to the ED for worsening fatigue found to have significant anemia. She received appropriate transfusion support and refused to stay any longer for iv iron infusions. She wants to leave today following her third unit of blood.  She has f/u appointments [facility] at [facility]    Thank you for allowing us to participate in the care of Mrs. Edgard Willard MD   2/7/2023

## 2023-02-07 NOTE — DISCHARGE SUMMARY
Sarah Peace deals with chronic anemia and has been evaluated multiple times by the GI and hematology teams. She is scheduled to follow-up with hematology at [facility] tomorrow. She received 3 units of packed red blood cells throughout the hospitalization and hemoglobin has trended upward as expected. We have suggested IV iron and she defers currently. She has no outward signs of bleeding. She was evaluated by the GI team who will follow up as an outpatient patient. She desires to be discharged today so that she does not miss her appointments tomorrow.     Miroslava Castro DO  2:08 PM  2/7/2023

## 2023-02-07 NOTE — PROGRESS NOTES
Physical Therapy    Room #:  0519/1131-10  Patient Name: Venkat Crespo  YOB: 1950  MRN: [id number]      Date of Service: 2/7/2023    Chart reviewed. Spoke with nursing  . Patient  reports to be independent with bed mobility, transfers and gait. No skilled Physical Therapy needs at this time. Please re-order Physical Therapy if there is a change in physical status and Physical Therapy is needed. Thank you.      Danilo Escalona, PT

## 2023-02-07 NOTE — ED NOTES
PATIENT RESTING AT THIS TIME, WILL CONTINUE TO EVALUATE VITAL SIGNS, MONITOR.       Nuria Daigle RN  02/07/23 9456

## 2023-02-07 NOTE — H&P
Department of Internal Medicine  History and Physical Examination     Primary Care Physician: Jared Kirk DO   Admitting Physician:  Sahil Salcedo DO  Admission date and time: 2/6/2023  9:48 PM    Room:  [address] [place] [address]  Admitting diagnosis: Other fatigue [R53.83]  Acute on chronic anemia [D64.9]    Patient Name: Keyona Vieira  MRN: [id number]    Date of Service: 2/7/2023     Chief Complaint:  Symptomatic anemia    HISTORY OF PRESENT ILLNESS:    Keyona Vieira is a 71-year-old female patient who presented to [address] [place] [address] with complaints of symptomatic anemia. She has a longstanding history of severe iron deficiency anemia and has been transfusion dependent in regards to iron and blood for some time. She has had extensive GI work-up in the past and follows with GI and hematology both locally and at [facility] clinic. ER work-up revealed significant anemia that required transfusion. Metabolic panel was unremarkable as was the remainder of her ER work-up. She denied any overt bleeding in the stool or urine. Case was discussed with ER physician with plan for admission, transfusion, further care and management under the service of Dr. Sada Tyler. Sheree Godinez is seen and examined at bedside today. Immediately upon arrival to the room the patient requested discharge. We have reviewed her recent history as she frequently will present for anemia complaints and received transfusions or treatment and be discharged without being willing to be admitted to the hospital.  She states that she has appointment scheduled to [facility] tomorrow that she cannot miss and, upon completion of her third unit of blood, she will leave the hospital today. We have discussed the importance of staying for iron repletion therapy however she is refusing to do this as she will see hematology tomorrow [facility]. Otherwise she is at her baseline. Fatigue is improving with transfusion. No fevers or chills.   No known sick contact or exposure. No new headache conditions although she does suffer from migraines chronically and is seeing [facility] clinic neurology tomorrow. No new neurologic complaints. Denies chest pain or palpitation, fluttering. Denies shortness of breath, cough or URI complaints. Exertional dyspnea is improving with blood transfusion. No abdominal pain, nausea, vomiting, hematemesis. No bowel changes, hematochezia or melena. No acute urinary complaints or hematuria. Positive for longstanding history of severe degenerative disc disease and low back arthritis with chronic pain syndrome associated.     PAST MEDICAL Hx:  Past Medical History:   Diagnosis Date    Arthritis     Cataract     right    Chronic fatigue syndrome     Depression     Diabetes mellitus (HCC)     SC injection weekly    Fibromyalgia     Hypertension     Memory loss     Mononucleosis     Psoriasis     Sciatica     Sleep apnea     no c-pap    Vertigo        PAST SURGICAL Hx:   Past Surgical History:   Procedure Laterality Date    CARPAL TUNNEL RELEASE Bilateral     CATARACT REMOVAL Right     COLONOSCOPY      OTHER SURGICAL HISTORY Bilateral 01/21/2021    BILATERAL L3,L4 MEDIAL BRANCH AND L5 DORSAL RAMUS RADIOFREQUENCY    PAIN MANAGEMENT PROCEDURE Bilateral 01/21/2021    BILATERAL L3,4 MEDIAL BRANCH AND L5 DORSAL RAMUS  RADIOFREQUENCY ABLATION WITH SEDATION  (CPT [id number]) performed by Hugo Blair MD at [address] [facility] N/A 03/19/2021    T11 KYPHOPLASTY (2 C-ARMS) performed by Hugo Blair MD at [address] [facility] ENDOSCOPY N/A 01/16/2019    EGD BIOPSY performed by Joseph Amaro MD at [address] [place] [address] N/A 03/10/2021    EGD ESOPHAGOGASTRODUODENOSCOPY performed by Jayne Abbott MD at [address] Hx:  Family History   Problem Relation Age of Onset    Hypertension Father     Heart Disease Father     Hypertension Paternal Grandfather Heart Disease Paternal Grandfather        HOME MEDICATIONS:  Prior to Admission medications    Medication Sig Start Date End Date Taking? Authorizing Provider   FLUoxetine (PROZAC) 20 MG capsule 3 times daily 23   Historical Provider, MD   Dulaglutide (TRULICITY) 4.5 NF/9.2XX SOPN Inject 4.5 mg into the skin once a week 23   Virginia Hartley,    glipiZIDE (GLUCOTROL) 5 MG tablet TAKE 1 TABLET EVERY DAY 23   Virginia Hartley DO   omeprazole (PRILOSEC) 20 MG delayed release capsule TAKE 1 CAPSULE EVERY DAY 23   Virginia Hartley DO   fluticasone (FLONASE) 50 MCG/ACT nasal spray 1 spray by Each Nostril route daily as needed for Rhinitis    Historical Provider, MD   gabapentin (NEURONTIN) 300 MG capsule Take 300 mg by mouth daily. Historical Provider, MD   gabapentin (NEURONTIN) 300 MG capsule Take 600 mg by mouth nightly.     Historical Provider, MD   rOPINIRole (REQUIP) 0.5 MG tablet Take 0.5 mg by mouth daily    Historical Provider, MD   rOPINIRole (REQUIP) 0.5 MG tablet Take 1 mg by mouth nightly    Historical Provider, MD   tiZANidine (ZANAFLEX) 2 MG tablet Take 2 mg by mouth 2 times daily as needed (Muscle Spasms)    Historical Provider, MD   ondansetron (ZOFRAN) 8 MG tablet Take 1 tablet by mouth every 8 hours as needed for Nausea or Vomiting 22   Donya Varela MD       ALLERGIES:  Baclofen, Penicillins, and Nickel    SOCIAL Hx:  Social History     Socioeconomic History    Marital status:      Spouse name: Not on file    Number of children: Not on file    Years of education: Not on file    Highest education level: Not on file   Occupational History    Not on file   Tobacco Use    Smoking status: Former     Packs/day: 1.00     Years: 10.00     Pack years: 10.00     Types: Cigarettes     Start date: 0     Quit date:      Years since quittin.1    Smokeless tobacco: Never   Vaping Use    Vaping Use: Never used   Substance and Sexual Activity Alcohol use: Not Currently     Alcohol/week: 1.0 standard drink     Types: 1 Glasses of wine per week     Comment: socially    Drug use: No    Sexual activity: Not Currently   Other Topics Concern    Not on file   Social History Narrative    Not on file     Social Determinants of Health     Financial Resource Strain: Medium Risk    Difficulty of Paying Living Expenses: Somewhat hard   Food Insecurity: Food Insecurity Present    Worried About Running Out of Food in the Last Year: Sometimes true    Ran Out of Food in the Last Year: Sometimes true   Transportation Needs: Not on file   Physical Activity: Inactive    Days of Exercise per Week: 0 days    Minutes of Exercise per Session: 0 min   Stress: Not on file   Social Connections: Not on file   Intimate Partner Violence: Not on file   Housing Stability: Not on file     ROS: 12 point review of symptoms was conducted, pertinent positives and negative were reviewed, aside from that all 12 systems were reviewed and negative. PHYSICAL EXAM:  VITALS:  Vitals:    02/07/23 1215   BP: 124/62   Pulse: 90   Resp: 20   Temp: 98.2 °F (36.8 °C)   SpO2: 99%         CONSTITUTIONAL:    Awake, alert, cooperative, more comfortable appearing, no apparent distress    EYES:    PERRL, EOMI, sclera clear without icterus, conjunctiva normal    ENT:    Normocephalic, atraumatic, sinuses nontender on palpation. External ears without lesions. Oral pharynx with moist mucus membranes. NECK:    Supple, symmetrical, trachea midline, no adenopathy, thyroid symmetric, not enlarged and no tenderness, skin normal, no bruits, no JVD    HEMATOLOGIC/LYMPHATICS:    No cervical lymphadenopathy and no supraclavicular lymphadenopathy    LUNGS:    Symmetric.  No increased work of breathing, diminished air exchange, clear to auscultation bilaterally, no wheezes, rhonchi, or rales,     CARDIOVASCULAR:    Normal apical impulse, regular rate and rhythm, normal S1 and S2    ABDOMEN:    Morbidly obese contour, normal bowel sounds, soft, non-distended, non-tender, no rebound or guarding elicited on palpation     MUSCULOSKELETAL:    There is no redness, warmth, or swelling of the joints. Tone is normal.    NEUROLOGIC:    Awake, alert, oriented to name, place and time. Cranial nerves II-XII are grossly intact. Motor is 5 out of 5 bilaterally. SKIN:    No bruising or bleeding. No redness, warmth, or swelling    EXTREMITIES:    Peripheral pulses present. No significant pitting peripheral edema.     LABORATORY DATA:  CBC with Differential:    Lab Results   Component Value Date/Time    WBC 10.1 02/07/2023 05:36 AM    RBC 2.80 02/07/2023 05:36 AM    HGB 7.3 02/07/2023 10:31 AM    HCT 24.4 02/07/2023 10:31 AM     02/07/2023 05:36 AM    MCV 79.6 02/07/2023 05:36 AM    MCH 23.6 02/07/2023 05:36 AM    MCHC 29.6 02/07/2023 05:36 AM    RDW 19.6 02/07/2023 05:36 AM    NRBC 1.8 02/07/2023 01:11 AM    LYMPHOPCT 19.8 02/07/2023 05:36 AM    MONOPCT 10.0 02/07/2023 05:36 AM    MYELOPCT 1.0 11/08/2022 04:24 PM    BASOPCT 0.4 02/07/2023 05:36 AM    MONOSABS 1.01 02/07/2023 05:36 AM    LYMPHSABS 1.99 02/07/2023 05:36 AM    EOSABS 0.23 02/07/2023 05:36 AM    BASOSABS 0.04 02/07/2023 05:36 AM     BMP:    Lab Results   Component Value Date/Time     02/07/2023 05:36 AM    K 3.6 02/07/2023 05:36 AM    K 3.9 03/08/2021 07:55 PM     02/07/2023 05:36 AM    CO2 21 02/07/2023 05:36 AM    BUN 12 02/07/2023 05:36 AM    LABALBU 3.5 02/07/2023 05:36 AM    CREATININE 0.7 02/07/2023 05:36 AM    CALCIUM 8.4 02/07/2023 05:36 AM    GFRAA >60 09/27/2022 02:36 PM    LABGLOM >60 02/07/2023 05:36 AM    GLUCOSE 121 02/07/2023 05:36 AM       ASSESSMENT:  Acute on chronic symptomatic anemia with severe iron deficiency requiring transfusion  Non-insulin-dependent diabetes mellitus type 2  Essential hypertension  Chronic pain syndrome related to degenerative disc disease and radiculopathy on Cymbalta, gabapentin, Zanaflex   Restless leg syndrome on Requip    PLAN:  Kiesha Quiros is a 78-year-old female who presented with symptomatic anemia without overt blood loss with extensive history of the same and prior work-up. She is essentially iron infusion and blood transfusion dependent. She received appropriate transfusion responded accordingly. Now, as she is feeling better, she is refusing to stay any longer as she has scheduled appointments at the [facility]. We discussed the risk of early departure as well as not replacing significant iron deficiency anemia while hospitalized versus the benefit of maintaining hospitalization as he refuses. She has follow-up with [facility] tomorrow with multiple specialist including hematology. The GI team has evaluated the patient here and will arrange for outpatient endoscopic work-up if she so wishes. Her chronic morbidities, labs and vital signs were monitored and addressed accordingly throughout her brief hospitalization. She is ultimately made the determination that she will leave today following her third unit of blood. See additional orders for details.      Geovanni áSnchez, APRN - CNP  12:56 PM  2/7/2023    Electronically signed by MORALES Ruiz CNP on 2/7/23 at 12:56 PM EST

## 2023-02-07 NOTE — DISCHARGE INSTRUCTIONS
Your information:  Name: Jose L Whitfield YOB: 1950    Your instructions:    YOU ARE BEING DISCHARGED HOME. PLEASE MAKE AND KEEP ALL FOLLOW-UP APPOINTMENTS. IF YOU EXPERIENCE CHEST PAIN, SHORTNESS OF BREATH, SWEATING, LIGHTHEADEDNESS, OR PALPITATIONS: CALL 911 OR GO TO THE EMERGENCY DEPARTMENT IMMEDIATELY. If you begin to feel unwell or symptoms persist, contact your physician. What to do after you leave the hospital:    Recommended diet: regular diet    Recommended activity: activity as tolerated        The following personal items were collected during your admission and were returned to you:    Belongings  Dental Appliances: None  Vision - Corrective Lenses: Contact Lenses  Hearing Aid: None  Clothing: Socks, Undergarments, Footwear, Bathrobe, Pajamas  Jewelry: Necklace  Body Piercings Removed: N/A  Electronic Devices: Cell Phone,   Weapons (Notify Protective Services/Security): None  Other Valuables: Wallet  Home Medications: None  Valuables Given To: Patient  Provide Name(s) of Who Valuable(s) Were Given To: Patient  Responsible person(s) in the waiting room: n/a  Patient approves for provider to speak to responsible person post operatively: Yes    Information obtained by:  By signing below, I understand that if any problems occur once I leave the hospital I am to contact Patricia Abarca DO.  I understand and acknowledge receipt of the instructions indicated above.

## 2023-02-07 NOTE — CONSULTS
Portia Gaming M.D. [facility]  Dr. Char Easley M.D.,  Dr. Una Friedman M.D.,  Dr. Keith Santana D.O.,  Dr. Jacklynn Lesch, D.O. ,  Dr. Nakia Colin M.D.,          Naseem Barber  71 y.o.  female      Re: Acute on chronic anemia  Requesting physician: Dr. Kari Albarran  Date:10:10 AM 2/7/2023          HPI: 76-year-old female patient seen in the hospital for above described issue. She presented yesterday with chief complaint of fatigue. Patient is a retired nurse. Patient reports knowledge of iron deficiency anemia for at least several years and reports upper endoscopy and colonoscopy approximately 5 years ago. Patient reports that she has been receiving IV iron supplementation. She reports constipation. She denies blood in the stool and melena. Patient denies any significant abdominal pain. She reports constipation previously but none recently. Patient reports stool to be brown. Upon presentation she was noted to be anemic with hemoglobin found to be as low as 5.2 compared to hemoglobin of 5.1 on 9 January of this year. Review of emergency department notes from 9 January when patient was found to be severely anemic indicates that patient was discharged home without consultation to GI. Most recent FOBT test was in November of last year and was positive.     Information sources:   -Patient  -medical record  -health care team    PMHx:  Past Medical History:   Diagnosis Date    Arthritis     Cataract     right    Chronic fatigue syndrome     Depression     Diabetes mellitus ([facility] [address]     SC injection weekly    Fibromyalgia     Hypertension     Memory loss     Mononucleosis     Psoriasis     Sciatica     Sleep apnea     no c-pap    Vertigo        PSHx:  Past Surgical History:   Procedure Laterality Date    CARPAL TUNNEL RELEASE Bilateral     CATARACT REMOVAL Right     COLONOSCOPY      OTHER SURGICAL HISTORY Bilateral 01/21/2021    BILATERAL L3,L4 MEDIAL BRANCH AND L5 DORSAL RAMUS RADIOFREQUENCY    PAIN MANAGEMENT PROCEDURE Bilateral 01/21/2021    BILATERAL L3,4 MEDIAL BRANCH AND L5 DORSAL RAMUS  RADIOFREQUENCY ABLATION WITH SEDATION  (CPT 22723) performed by Dora Mason MD at [address] [facility] [address] N/A 03/19/2021    T11 KYPHOPLASTY (2 C-ARMS) performed by Dora Mason MD at [address] 01/16/2019    EGD BIOPSY performed by Elisha Murillo MD at [facility] N/A 03/10/2021    EGD ESOPHAGOGASTRODUODENOSCOPY performed by Krystal Gutierrez MD at [address]:  Current Facility-Administered Medications   Medication Dose Route Frequency Provider Last Rate Last Admin    FLUoxetine (PROZAC) capsule 20 mg  20 mg Oral BID Gianna Cooper, DO   20 mg at 02/07/23 1595    gabapentin (NEURONTIN) capsule 300 mg  300 mg Oral Daily Gianna Steeleker, DO   300 mg at 02/07/23 0920    gabapentin (NEURONTIN) capsule 600 mg  600 mg Oral Nightly Gianna Cooper, DO   600 mg at 02/07/23 0134    rOPINIRole (REQUIP) tablet 0.5 mg  0.5 mg Oral Daily Bienvenidoald Ivetteker, DO   0.5 mg at 02/07/23 1150    rOPINIRole (REQUIP) tablet 1 mg  1 mg Oral Nightly Bienvenidoald Shoemaker, DO   1 mg at 02/07/23 0134    tiZANidine (ZANAFLEX) tablet 2 mg  2 mg Oral BID PRN Gianna Cooper, DO        magnesium sulfate 1000 mg in dextrose 5% 100 mL IVPB  1,000 mg IntraVENous PRN Gianna Cooper, DO        sodium phosphate 13.8 mmol in sodium chloride 0.9 % 250 mL IVPB  0.16 mmol/kg IntraVENous PRN Gianna Cooper, DO        Or    sodium phosphate 27.57 mmol in sodium chloride 0.9 % 500 mL IVPB  0.32 mmol/kg IntraVENous PRN Gianna Cooper, DO        potassium chloride (KLOR-CON M) extended release tablet 40 mEq  40 mEq Oral PRN Gianna Steeleker, DO        Or    potassium bicarb-citric acid (EFFER-K) effervescent tablet 40 mEq  40 mEq Oral PRN Gianna Cooper, DO        Or    potassium chloride 10 mEq/100 mL IVPB (Peripheral Line)  10 mEq IntraVENous PRN Luther Son, DO        glucose chewable tablet 16 g  4 tablet Oral PRN Luther Son, DO        dextrose bolus 10% 125 mL  125 mL IntraVENous PRN Luther Son, DO        Or    dextrose bolus 10% 250 mL  250 mL IntraVENous PRN Luther Son, DO        glucagon (rDNA) injection 1 mg  1 mg SubCUTAneous PRN Luther Son, DO        dextrose 10 % infusion   IntraVENous Continuous PRN Luther Son, DO        sodium chloride flush 0.9 % injection 5-40 mL  5-40 mL IntraVENous 2 times per day Luther Son, DO   10 mL at 02/07/23 0119    sodium chloride flush 0.9 % injection 5-40 mL  5-40 mL IntraVENous PRN Luther Son, DO        0.9 % sodium chloride infusion   IntraVENous PRN Luther Son, DO        ondansetron (ZOFRAN-ODT) disintegrating tablet 4 mg  4 mg Oral Q8H PRN Luther Son, DO        Or    ondansetron [facility]) injection 4 mg  4 mg IntraVENous Q6H PRN Luther Son, DO        acetaminophen (TYLENOL) tablet 650 mg  650 mg Oral Q6H PRN Luther Son, DO        Or    acetaminophen (TYLENOL) suppository 650 mg  650 mg Rectal Q6H PRN Luther Son, DO        insulin lispro (HUMALOG) injection vial 0-4 Units  0-4 Units SubCUTAneous TID WC Luther Son, DO        insulin lispro (HUMALOG) injection vial 0-4 Units  0-4 Units SubCUTAneous Nightly Luther Son, DO        pantoprazole (PROTONIX) 40 mg in sodium chloride (PF) 0.9 % 10 mL injection  40 mg IntraVENous Q12H Luther Son, DO   40 mg at 02/07/23 0116    0.9% NaCl with KCl 40 mEq infusion   IntraVENous Continuous Luther Son, DO 60 mL/hr at 02/07/23 0900 Rate Verify at 02/07/23 0900    0.9 % sodium chloride infusion   IntraVENous PRN Luther Son, DO        furosemide (LASIX) injection 20 mg  20 mg IntraVENous See Admin Instructions Luther Son, DO        0.9 % sodium chloride infusion   IntraVENous PRN Irvin Barkley APRN - CNP        furosemide (LASIX) injection 20 mg  20 mg IntraVENous See Admin Instructions MORALES Mccormick - CNP        0.9 % sodium chloride infusion   IntraVENous PRN Esaw Rota, DO         Current Outpatient Medications   Medication Sig Dispense Refill    FLUoxetine (PROZAC) 20 MG capsule 3 times daily      Dulaglutide (TRULICITY) 4.5 JZ/7.0QG SOPN Inject 4.5 mg into the skin once a week Tuesday 4 Adjustable Dose Pre-filled Pen Syringe 2    glipiZIDE (GLUCOTROL) 5 MG tablet TAKE 1 TABLET EVERY DAY 90 tablet 1    omeprazole (PRILOSEC) 20 MG delayed release capsule TAKE 1 CAPSULE EVERY DAY 90 capsule 1    fluticasone (FLONASE) 50 MCG/ACT nasal spray 1 spray by Each Nostril route daily as needed for Rhinitis      gabapentin (NEURONTIN) 300 MG capsule Take 300 mg by mouth daily. gabapentin (NEURONTIN) 300 MG capsule Take 600 mg by mouth nightly.       rOPINIRole (REQUIP) 0.5 MG tablet Take 0.5 mg by mouth daily      rOPINIRole (REQUIP) 0.5 MG tablet Take 1 mg by mouth nightly      tiZANidine (ZANAFLEX) 2 MG tablet Take 2 mg by mouth 2 times daily as needed (Muscle Spasms)      ondansetron (ZOFRAN) 8 MG tablet Take 1 tablet by mouth every 8 hours as needed for Nausea or Vomiting 30 tablet 1    DULoxetine (CYMBALTA) 30 MG extended release capsule Take 1 capsule by mouth daily 30 capsule 5        SocHx:  Social History     Socioeconomic History    Marital status:      Spouse name: Not on file    Number of children: Not on file    Years of education: Not on file    Highest education level: Not on file   Occupational History    Not on file   Tobacco Use    Smoking status: Former     Packs/day: 1.00     Years: 10.00     Pack years: 10.00     Types: Cigarettes     Start date: 0     Quit date:      Years since quittin.1    Smokeless tobacco: Never   Vaping Use    Vaping Use: Never used   Substance and Sexual Activity    Alcohol use: Not Currently     Alcohol/week: 1.0 standard drink     Types: 1 Glasses of wine per week     Comment: socially    Drug use: No    Sexual activity: Not Currently   Other Topics Concern    Not on file   Social History Narrative Not on file     Social Determinants of Health     Financial Resource Strain: Medium Risk    Difficulty of Paying Living Expenses: Somewhat hard   Food Insecurity: Food Insecurity Present    Worried About Running Out of Food in the Last Year: Sometimes true    Ran Out of Food in the Last Year: Sometimes true   Transportation Needs: Not on file   Physical Activity: Inactive    Days of Exercise per Week: 0 days    Minutes of Exercise per Session: 0 min   Stress: Not on file   Social Connections: Not on file   Intimate Partner Violence: Not on file   Housing Stability: Not on file       FamHx:  Family History   Problem Relation Age of Onset    Hypertension Father     Heart Disease Father     Hypertension Paternal Grandfather     Heart Disease Paternal Grandfather        Allergy:  Allergies   Allergen Reactions    Baclofen Other (See Comments)     Dry mouth, abd pain    Penicillins Hives    Nickel Rash and Other (See Comments)     Surgical steel         ROS: As described in the HPI and in addition is negative upon detailed review of systems or unobtainable unless otherwise stated in this dictation. PE:  /72   Pulse 93   Temp 98.7 °F (37.1 °C) (Oral)   Resp 22   Ht 5' 2\" (1.575 m)   Wt 190 lb (86.2 kg)   LMP  (LMP Unknown)   SpO2 96%   BMI 34.75 kg/m²     Gen.: NAD/obese  female  Head: Atraumatic/normocephalic  Eyes: EOMI/anicteric sclera  ENT: Moist oral mucosa/no discharge nose or ears  Neck: Supple/trachea midline  Chest: CTA B/symmetric excursions  Cor: Regular/S1-S2  Abd.: Soft and obese. Nontender  Extr.:  No significant peripheral edema  Muscles:  Tone and bulk, consistent with age and condition  Skin: Warm and dry/anicteric        DATA:     Lab Results   Component Value Date/Time    WBC 10.1 02/07/2023 05:36 AM    RBC 2.80 02/07/2023 05:36 AM    HGB 6.6 02/07/2023 05:36 AM    HCT 22.3 02/07/2023 05:36 AM    MCV 79.6 02/07/2023 05:36 AM    MCH 23.6 02/07/2023 05:36 AM    MCHC 29.6 02/07/2023 05:36 AM    RDW 19.6 02/07/2023 05:36 AM     02/07/2023 05:36 AM    MPV 10.5 02/07/2023 05:36 AM     Lab Results   Component Value Date/Time     02/07/2023 05:36 AM    K 3.6 02/07/2023 05:36 AM    K 3.9 03/08/2021 07:55 PM     02/07/2023 05:36 AM    CO2 21 02/07/2023 05:36 AM    BUN 12 02/07/2023 05:36 AM    CREATININE 0.7 02/07/2023 05:36 AM    CALCIUM 8.4 02/07/2023 05:36 AM    PROT 6.3 02/07/2023 05:36 AM    LABALBU 3.5 02/07/2023 05:36 AM    BILITOT 0.9 02/07/2023 05:36 AM    ALKPHOS 77 02/07/2023 05:36 AM    AST 32 02/07/2023 05:36 AM    ALT 23 02/07/2023 05:36 AM     No results found for: LIPASE  No results found for: AMYLASE      ASSESSMENT/PLAN:  Patient Active Problem List   Diagnosis    Obstructive sleep apnea syndrome    Lumbar radicular pain    Recurrent major depressive disorder, in partial remission ([facility] [address]    Type 2 diabetes mellitus with hyperglycemia, without long-term current use of insulin ([facility])    Gastroesophageal reflux disease    Essential hypertension    Chronic pain syndrome    Lumbar spondylosis    Thoracic disc disease    Lumbar facet arthropathy    Spinal stenosis of lumbar region without neurogenic claudication    Migraine without aura and without status migrainosus, not intractable    Lumbar spondylolysis    Thoracic facet joint syndrome    Thoracic spondylosis    Acute anemia    Lumbar disc disorder    Stenosis of lateral recess of lumbar spine    Lumbar radiculopathy    Disorder of sacrum    Iron deficiency anemia    Acute on chronic anemia     1. Anemia  -Acute on chronic  -Previously iron deficient  -No evidence of overt bleed with positive FOBT November of last year  -Patient will require further evaluation with EGD and colonoscopy likely followed by video capsule endoscopy  -Recommend iron supplementation    Above has been discussed with the patient and all questions answered to her satisfaction.   She verbalized understanding and agreement with the plan but requests outpatient procedure as she apparently has long anticipated follow-up at [facility] tomorrow. Thank you for the opportunity to see this patient in consultation    Jeanie Ibanez MD  2/7/2023  10:10 AM    NOTE:  This report was transcribed using voice recognition software. Every effort was made to ensure accuracy; however, inadvertent computerized transcription errors may be present.

## 2023-02-07 NOTE — ED NOTES
Blood consent signed at this time and witnessed by this RN.      [facility]  02/07/23 3790

## 2023-02-07 NOTE — ED NOTES
Critical Hemoglobin 5.2 per lab     [facility], [address] [place] [address]  02/06/23 5465

## 2023-02-07 NOTE — ACP (ADVANCE CARE PLANNING)
Advance Care Planning     Advance Care Planning Activator (Inpatient)  Conversation Note      Date of ACP Conversation: 2/7/2023     Conversation Conducted with: Patient with Decision Making Capacity    ACP Activator: Kirsten Prasad:     Current Designated Health Care Decision Maker:     Primary Decision Maker: Jaziel Sickle - Child - [phone]  Click here to complete [address] including section of the Healthcare Decision Maker Relationship (ie \"Primary\")  Today we documented Decision Maker(s) consistent with Legal Next of Kin hierarchy. Care Preferences    Ventilation: \"If you were in your present state of health and suddenly became very ill and were unable to breathe on your own, what would your preference be about the use of a ventilator (breathing machine) if it were available to you? \"      Would the patient desire the use of ventilator (breathing machine)?: Undecided. SW explained that until pt decides, he/she will by default be a full code status. \"If your health worsens and it becomes clear that your chance of recovery is unlikely, what would your preference be about the use of a ventilator (breathing machine) if it were available to you? \"     Would the patient desire the use of ventilator (breathing machine)?: No      Resuscitation  \"CPR works best to restart the heart when there is a sudden event, like a heart attack, in someone who is otherwise healthy. Unfortunately, CPR does not typically restart the heart for people who have serious health conditions or who are very sick. \"    \"In the event your heart stopped as a result of an underlying serious health condition, would you want attempts to be made to restart your heart (answer \"yes\" for attempt to resuscitate) or would you prefer a natural death (answer \"no\" for do not attempt to resuscitate)? \" Undecided. SW explained that until pt decides, he/she will by default be a full code status. [] Yes   [x] No   Educated Patient / Alirioell Pulse regarding differences between Advance Directives and portable DNR orders.     Length of ACP Conversation in minutes: 11     Conversation Outcomes:  [x] ACP discussion completed  [] Existing advance directive reviewed with patient; no changes to patient's previously recorded wishes  [] New Advance Directive completed  [] Portable Do Not Rescitate prepared for Provider review and signature  [] POLST/POST/MOLST/MOST prepared for Provider review and signature      Follow-up plan:    [] Schedule follow-up conversation to continue planning  [x] Referred individual to Provider for additional questions/concerns   [] Advised patient/agent/surrogate to review completed ACP document and update if needed with changes in condition, patient preferences or care setting    [x] This note routed to one or more involved healthcare providers  Electronically signed by THOM Mock on 2/7/2023 at 10:40 AM

## 2023-02-07 NOTE — CARE COORDINATION
Case Management Assessment  Initial Evaluation    Date/Time of Evaluation: 2/7/2023 10:49 AM  Assessment Completed by: THOM Luna    If patient is discharged prior to next notation, then this note serves as note for discharge by case management. Patient Name: Kiesha Quiros                   YOB: 1950  Diagnosis: Acute on chronic anemia [D64.9]                   Date / Time: 2/6/2023  9:48 PM    Patient Admission Status: Inpatient   Readmission Risk (Low < 19, Mod (19-27), High > 27): Readmission Risk Score: 16.4    Current PCP: Teri Rico, DO  PCP verified by CM? Yes    Chart Reviewed: Yes      History Provided by: Patient  Patient Orientation: Alert and Oriented, Person, Place, Situation, Self    Patient Cognition: Alert    Hospitalization in the last 30 days (Readmission):  No    If yes, Readmission Assessment in CM Navigator will be completed. Advance Directives:      Code Status: Full Code   Patient's Primary Decision Maker is: Legal Next of Kin    Primary Decision Makersem Sheets - Child - [phone]    Discharge Planning:    Patient lives with: Alone Type of Home: House  Primary Care Giver: Self  Patient Support Systems include: Children, Family Members, Friends/Neighbors   Current Financial resources: Medicare  Current community resources: None  Current services prior to admission: None            Current DME:              Type of Home Care services:  None    ADLS  Prior functional level: Independent in ADLs/IADLs  Current functional level: Independent in ADLs/IADLs    PT AM-PAC:   /24  OT AM-PAC:   /24    Family can provide assistance at DC: Yes  Would you like Case Management to discuss the discharge plan with any other family members/significant others, and if so, who?  No  Plans to Return to Present Housing: Yes  Other Identified Issues/Barriers to RETURNING to current housing: none  Potential Assistance needed at discharge: N/A            Potential DME: Patient expects to discharge to: House  Plan for transportation at discharge:      Financial    Payor: Batsheva Berry / Plan: [address] / Product Type: *No Product type* /     Potential assistance Purchasing Medications: No  Meds-to-Beds request:        Trista Peña [address] [facility], [address]ourtney [address] [facility] [address]  Phone: [phone] Fax: [phone]    [address] [facility] Mail Param Adler [phone] - f [phone]  [address] [address]  Phone: [phone] Fax: [phone], [address]  [address]  Phone: [phone] Fax: [phone]      Notes:    Factors facilitating achievement of predicted outcomes: Family support, Friend support, Motivated, and Good insight into deficits    Barriers to discharge: none    Additional Case Management Notes:     SS Note: No Covid test. Pt w/Iron deficiency, Acute on chronic anemia & requiring PRBC transfusion. 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 Pt does have ISABEL but no longer uses C-PAP. She has DM II- uses Trulicity. PTA, pt is independent in IADL's/ADL's, does not drive but has family/friends who transport her & has insurance. PCP is Bassam Bianchi DO & Pharmacy is [address] [facility]- but most meds are mail order via [facility]. Pt has following DME: none. Denies hx of HHC or FRANCY & has No hx of 02. Pt was to have EGD and colonoscopy likely followed by video capsule endoscopy but reports she has multiple appointments @ [facility] @ 1030. She states she is going to leave today after her transfusion. The Plan for Transition of Care is related to the following treatment goals of Acute on chronic anemia [D64.9]    The Patient and/or Patient Representative Agree with the Discharge Plan?       Marjan Spaulding [address] [facility]  Case Management Department  Electronically signed by THOM Apodaca on 2/7/2023 at 10:51 AM

## 2023-02-07 NOTE — ED PROVIDER NOTES
Chief complaint:  Fatigue    HPI history provided by the patient  Patient presents here with a history of chronic anemia requiring frequent transfusions. States she has been scoped multiple times and has no bleeding. Not on anticoagulation. Here complaining of increased fatigue, weakness and mild short of breath with exertion for the last several days getting worse feels like her previous anemic spells. No fevers, sweats or chills. Has had a slightly poor appetite mild nausea the last couple of days but no particular vomiting or diarrhea. No blood in the stool. No confusion or headache. No extremity numbness, tingling, paresthesia or weakness. No syncope. Review of Systems   Constitutional:  Positive for activity change, appetite change and fatigue. Negative for chills, diaphoresis and fever. HENT:  Negative for congestion and sore throat. Respiratory:  Negative for cough, chest tightness, shortness of breath and wheezing. Cardiovascular:  Negative for chest pain, palpitations and leg swelling. Gastrointestinal:  Positive for nausea. Negative for abdominal distention, abdominal pain, blood in stool, diarrhea and vomiting. Genitourinary:  Negative for dysuria, flank pain, frequency and urgency. Musculoskeletal:  Negative for arthralgias, back pain, gait problem, joint swelling, myalgias, neck pain and neck stiffness. Skin:  Negative for rash and wound. Neurological:  Positive for light-headedness. Negative for dizziness, seizures, syncope, facial asymmetry, speech difficulty, weakness, numbness and headaches. All other systems reviewed and are negative. Physical Exam  Vitals and nursing note reviewed. Constitutional:       General: She is awake. She is not in acute distress. Appearance: She is well-developed. She is not ill-appearing, toxic-appearing or diaphoretic. HENT:      Head: Normocephalic and atraumatic. Eyes:      General: No scleral icterus.      Pupils: Pupils are equal, round, and reactive to light. Cardiovascular:      Rate and Rhythm: Normal rate and regular rhythm. Heart sounds: Murmur heard. Systolic murmur is present with a grade of 5/6. Pulmonary:      Effort: Pulmonary effort is normal. No respiratory distress. Breath sounds: Normal breath sounds. No stridor, decreased air movement or transmitted upper airway sounds. No decreased breath sounds, wheezing, rhonchi or rales. Chest:      Chest wall: No tenderness. Abdominal:      General: Bowel sounds are normal. There is no distension. Palpations: Abdomen is soft. Tenderness: There is no abdominal tenderness. There is no right CVA tenderness, left CVA tenderness, guarding or rebound. Musculoskeletal:         General: No swelling, tenderness, deformity or signs of injury. Cervical back: Full passive range of motion without pain, normal range of motion and neck supple. Right lower leg: No edema. Left lower leg: No edema. Comments: Arms and legs are neurovascular intact with no pretibial edema or calf pain. Skin:     General: Skin is warm and dry. Coloration: Skin is pale. Skin is not cyanotic, jaundiced or mottled. Findings: No bruising, erythema or rash. Comments: Generally mildly pallorous appearing although has good capillary refill distally and has intact distal pulses. Neurological:      General: No focal deficit present. Mental Status: She is alert and oriented to person, place, and time. GCS: GCS eye subscore is 4. GCS verbal subscore is 5. GCS motor subscore is 6. Cranial Nerves: Cranial nerves 2-12 are intact. No cranial nerve deficit. Sensory: Sensation is intact. Motor: Motor function is intact. Coordination: Coordination is intact. Coordination normal.   Psychiatric:         Behavior: Behavior is cooperative. Procedures     MDM   History provided by:  The patient  Social factors affecting care: None  Chronic conditions affecting care: Chronic anemia  Chart reviewed: None    Differential includes but not limited to: Acute on chronic anemia, influenza, MI, dehydration, electrolyte abnormalities, congestive heart failure, COVID, influenza. COVID-negative, influenza is negative, CBC shows anemia with a hemoglobin of 5.2 as well as a hematocrit of 19 which is significantly changed from her most recent on chart review. Normal renal function BUN 13 creatinine 0.8 otherwise electrolytes, basic metabolic panel unremarkable. Coagulation studies INR 1.2. Troponin 11 which is in her baseline when compared to previous several checks. proBNP 71 with clear lung fields and no hypoxia unlikely congestive heart failure. EKG showed no acute ischemic findings with baseline troponin unlikely acute MI. Magnesium 1.9. Work up includes with interpretations: See above    Advanced directive discussion: None    Treatment in ER: 1 unit blood transfusion ordered    Consultations in ER: Internal medicine    Diagnosis and disposition: Acute on chronic anemia, fatigue, admission      ED Course as of 02/07/23 0028 Tue Feb 07, 2023 0002 Patient laying in the bed resting comfortably in no distress, no new complaints, exam unchanged. Updated on work-up results, anemia and need for admission. [NC]      ED Course User Index  [NC] Vikash Joseph DO        EKG Interpretation    Interpreted by emergency department physician    Rhythm: normal sinus   Rate: 85  Axis: normal  Ectopy: none  Conduction: normal  ST Segments: no acute change  T Waves: no acute change  Q Waves: none    Clinical Impression: no acute changes    Vikash Joseph DO      ED Course as of 02/07/23 0028 Tue Feb 07, 2023 0002 Patient laying in the bed resting comfortably in no distress, no new complaints, exam unchanged. Updated on work-up results, anemia and need for admission.  [NC]      ED Course User Index  [NC] Vikash Joseph DO --------------------------------------------- PAST HISTORY ---------------------------------------------  Past Medical History:  has a past medical history of Arthritis, Cataract, Chronic fatigue syndrome, Depression, [facility] Fibromyalgia, Hypertension, Memory loss, Mononucleosis, Psoriasis, Sciatica, Sleep apnea, and Vertigo. Past Surgical History:  has a past surgical history that includes Upper gastrointestinal endoscopy (N/A, 01/16/2019); Carpal tunnel release (Bilateral); Tonsillectomy and adenoidectomy; Cataract removal (Right); other surgical history (Bilateral, 01/21/2021); Pain management procedure (Bilateral, 01/21/2021); Upper gastrointestinal endoscopy (N/A, 03/10/2021); Spine surgery (N/A, 03/19/2021); and Colonoscopy. Social History:  reports that she quit smoking about 38 years ago. Her smoking use included cigarettes. She started smoking about 51 years ago. She has a 10.00 pack-year smoking history. She has never used smokeless tobacco. She reports that she does not currently use alcohol after a past usage of about 1.0 standard drink per week. She reports that she does not use drugs. Family History: family history includes Heart Disease in her father and paternal grandfather; Hypertension in her father and paternal grandfather. The patients home medications have been reviewed.     Allergies: Baclofen, Penicillins, and Nickel    -------------------------------------------------- RESULTS -------------------------------------------------    Lab  Results for orders placed or performed during the hospital encounter of 02/06/23   Rapid influenza A/B antigens    Specimen: Nares   Result Value Ref Range    Influenza A by PCR Not Detected Not Detected    Influenza B by PCR Not Detected Not Detected   COVID-19, Rapid    Specimen: Nares   Result Value Ref Range    SARS-CoV-2, NAAT Not Detected Not Detected   CBC with Auto Differential   Result Value Ref Range    WBC 10.4 4.5 - 11.5 E9/L    RBC 2.43 (L) 3.50 - 5.50 E12/L    Hemoglobin 5.2 (LL) 11.5 - 15.5 g/dL    Hematocrit 19.0 (L) 34.0 - 48.0 %    MCV 78.2 (L) 80.0 - 99.9 fL    MCH 21.4 (L) 26.0 - 35.0 pg    MCHC 27.4 (L) 32.0 - 34.5 %    RDW 19.8 (H) 11.5 - 15.0 fL    Platelets 675 346 - 181 E9/L    MPV 10.9 7.0 - 12.0 fL    Neutrophils % 85.2 (H) 43.0 - 80.0 %    Lymphocytes % 6.1 (L) 20.0 - 42.0 %    Monocytes % 7.8 2.0 - 12.0 %    Eosinophils % 1.2 0.0 - 6.0 %    Basophils % 0.9 0.0 - 2.0 %    Neutrophils Absolute 8.84 (H) 1.80 - 7.30 E9/L    Lymphocytes Absolute 0.62 (L) 1.50 - 4.00 E9/L    Monocytes Absolute 0.83 0.10 - 0.95 E9/L    Eosinophils Absolute 0.00 (L) 0.05 - 0.50 E9/L    Basophils Absolute 0.09 0.00 - 0.20 E9/L    Anisocytosis 2+     Polychromasia 3+     Hypochromia 3+     Poikilocytes 1+     Schistocytes 1+     Ovalocytes 1+     Target Cells 1+     Tear Drop Cells 1+    Basic Metabolic Panel   Result Value Ref Range    Sodium 135 132 - 146 mmol/L    Potassium 3.5 3.5 - 5.0 mmol/L    Chloride 102 98 - 107 mmol/L    CO2 22 22 - 29 mmol/L    Anion Gap 11 7 - 16 mmol/L    Glucose 133 (H) 74 - 99 mg/dL    BUN 13 6 - 23 mg/dL    Creatinine 0.8 0.5 - 1.0 mg/dL    Est, Glom Filt Rate >60 >=60 mL/min/1.73    Calcium 9.0 8.6 - 10.2 mg/dL   Protime-INR   Result Value Ref Range    Protime 13.2 (H) 9.3 - 12.4 sec    INR 1.2    APTT   Result Value Ref Range    aPTT 27.1 24.5 - 35.1 sec   Troponin   Result Value Ref Range    Troponin, High Sensitivity 11 (H) 0 - 9 ng/L   Brain Natriuretic Peptide   Result Value Ref Range    Pro-BNP 71 0 - 125 pg/mL   Magnesium   Result Value Ref Range    Magnesium 1.9 1.6 - 2.6 mg/dL   EKG 12 Lead   Result Value Ref Range    Ventricular Rate 85 BPM    Atrial Rate 85 BPM    P-R Interval 170 ms    QRS Duration 86 ms    Q-T Interval 420 ms    QTc Calculation (Bazett) 499 ms    P Axis 63 degrees    R Axis -11 degrees    T Axis 37 degrees       Radiology  XR CHEST PORTABLE    (Results Pending) ------------------------- NURSING NOTES AND VITALS REVIEWED ---------------------------  Date / Time Roomed:  2/6/2023  9:48 PM  ED Bed Assignment:  13/13    The nursing notes within the ED encounter and vital signs as below have been reviewed. Patient Vitals for the past 24 hrs:   BP Temp Temp src Pulse Resp SpO2 Height Weight   02/06/23 2158 (!) 116/53 98.2 °F (36.8 °C) Oral 90 16 99 % 5' 2\" (1.575 m) 190 lb (86.2 kg)       Oxygen Saturation Interpretation: Normal      ------------------------------------------ PROGRESS NOTES ------------------------------------------  Re-evaluation(s):    I have spoken with the patient and discussed todays results, in addition to providing specific details for the plan of care and counseling regarding the diagnosis and prognosis. Their questions are answered at this time and they are agreeable with the plan.      --------------------------------- ADDITIONAL PROVIDER NOTES ---------------------------------  Consultations:  0028 am Spoke with Savannah Sparrow for Dr. Meredith Barron,  They will admit this patient. This patient's ED course included: a personal history and physicial examination, re-evaluation prior to disposition, multiple bedside re-evaluations, cardiac monitoring, and continuous pulse oximetry    This patient has remained hemodynamically stable and been closely monitored during their ED course. Please note that the withdrawal or failure to initiate urgent interventions for this patient would likely result in a life threatening deterioration or permanent disability. Accordingly this patient received 30 minutes of critical care time, excluding separately billable procedures. Systems at risk for deterioration include: Cardiopulmonary. Patient with significant anemia requiring initiation of blood products in the ER due to cardiac risk factors with active symptomatology      Clinical Impression  1. Other fatigue    2.  Acute on chronic anemia Disposition  Patient's disposition: Admit to telemetry  Patient's condition is stable.        Booker Feldman DO  02/07/23 0028

## 2023-02-08 ENCOUNTER — CARE COORDINATION (OUTPATIENT)
Dept: CASE MANAGEMENT | Age: 73
End: 2023-02-08

## 2023-02-08 NOTE — CARE COORDINATION
[facility] Care Transitions Initial Follow Up Call    Call within 2 business days of discharge: Yes    Patient Current Location: [facility]      Patient: Janice Barber Patient : 1950   MRN: [id number]  Reason for Admission:  Acute on Chronic Anemia  Discharge Date: 23 RARS: Readmission Risk Score: 15.8      Last Discharge  Street       Date Complaint Diagnosis Description Type Department Provider    23 Fatigue Other fatigue . .. ED to Hosp-Admission (Discharged) (ADMITTED) [id number] Loida Deras DO; Lugii Moran. .. Attempted to reach patient by phone for initial post hospital discharge follow up. HIPAA compliant message left on patient's voicemail; CTN contact information provided. CTN will forward note to office of PCP for HFU appointment scheduling.        Wiliam Lundberg RN

## 2023-02-08 NOTE — TELEPHONE ENCOUNTER
I spoke with Julio César Moore she is on her way to [facility]. She will call the office to schedule an appointment.

## 2023-02-09 ENCOUNTER — CARE COORDINATION (OUTPATIENT)
Dept: CASE MANAGEMENT | Age: 73
End: 2023-02-09

## 2023-02-09 ENCOUNTER — TELEPHONE (OUTPATIENT)
Dept: FAMILY MEDICINE CLINIC | Age: 73
End: 2023-02-09

## 2023-02-09 NOTE — TELEPHONE ENCOUNTER
Pt stated that she went to [facility] on 2/8/2023 and they did labs on her and told her that the Prilosec could be keeping her from absorbing iron and pt is wondering if she could be switched to Protonix please advise

## 2023-02-10 NOTE — TELEPHONE ENCOUNTER
Patient advised of recommendations. She will call her GI doctor for further recommendations. Ipledge Number (Optional): [id number] Detail Level: Zone Patient Reported Weight (Optional - Include Units): 123 Anticipated Starting Dosage (Optional): 30mg BID

## 2023-02-20 ENCOUNTER — TELEPHONE (OUTPATIENT)
Dept: ONCOLOGY | Age: 73
End: 2023-02-20

## 2023-02-20 NOTE — TELEPHONE ENCOUNTER
02/20/23  received a phone call here at [facility] from Julio C Patrick, [address] [facility] of Dr Annie Oneal's office from [facility]. She stated that Bailee Glenis had called their office and said that she may need a blood transfusion. Julio C Patrick was informed her that they would not be able to help her, as they do not have an infusion center at that office. She encouraged her to follow-up with Dr Surjit Morley concerning this, and was just letting our office know the situation.

## 2023-02-21 ENCOUNTER — HOSPITAL ENCOUNTER (OUTPATIENT)
Dept: INFUSION THERAPY | Age: 73
Discharge: HOME | End: 2023-02-21
Payer: MEDICARE

## 2023-02-21 DIAGNOSIS — D50.0 IRON DEFICIENCY ANEMIA DUE TO CHRONIC BLOOD LOSS: ICD-10-CM

## 2023-02-21 LAB
ALBUMIN SERPL-MCNC: 3.7 G/DL (ref 3.5–5.2)
ALP BLD-CCNC: 77 U/L (ref 35–104)
ALT SERPL-CCNC: 30 U/L (ref 0–32)
ANION GAP SERPL CALCULATED.3IONS-SCNC: 13 MMOL/L (ref 7–16)
ANISOCYTOSIS: ABNORMAL
AST SERPL-CCNC: 41 U/L (ref 0–31)
BASOPHILS ABSOLUTE: 0.05 E9/L (ref 0–0.2)
BASOPHILS RELATIVE PERCENT: 0.6 % (ref 0–2)
BILIRUB SERPL-MCNC: 0.2 MG/DL (ref 0–1.2)
BUN BLDV-MCNC: 18 MG/DL (ref 6–23)
CALCIUM SERPL-MCNC: 9.3 MG/DL (ref 8.6–10.2)
CHLORIDE BLD-SCNC: 98 MMOL/L (ref 98–107)
CO2: 18 MMOL/L (ref 22–29)
CREAT SERPL-MCNC: 0.7 MG/DL (ref 0.5–1)
EOSINOPHILS ABSOLUTE: 0.35 E9/L (ref 0.05–0.5)
EOSINOPHILS RELATIVE PERCENT: 4.1 % (ref 0–6)
GFR SERPL CREATININE-BSD FRML MDRD: >60 ML/MIN/1.73
GLUCOSE BLD-MCNC: 275 MG/DL (ref 74–99)
HCT VFR BLD CALC: 26.5 % (ref 34–48)
HEMOGLOBIN: 8.4 G/DL (ref 11.5–15.5)
HYPOCHROMIA: ABNORMAL
IMMATURE GRANULOCYTES #: 0.03 E9/L
IMMATURE GRANULOCYTES %: 0.4 % (ref 0–5)
LYMPHOCYTES ABSOLUTE: 1.36 E9/L (ref 1.5–4)
LYMPHOCYTES RELATIVE PERCENT: 16.1 % (ref 20–42)
MCH RBC QN AUTO: 25.5 PG (ref 26–35)
MCHC RBC AUTO-ENTMCNC: 31.7 % (ref 32–34.5)
MCV RBC AUTO: 80.5 FL (ref 80–99.9)
MONOCYTES ABSOLUTE: 0.72 E9/L (ref 0.1–0.95)
MONOCYTES RELATIVE PERCENT: 8.5 % (ref 2–12)
NEUTROPHILS ABSOLUTE: 5.93 E9/L (ref 1.8–7.3)
NEUTROPHILS RELATIVE PERCENT: 70.3 % (ref 43–80)
OVALOCYTES: ABNORMAL
PDW BLD-RTO: 20.9 FL (ref 11.5–15)
PLATELET # BLD: 286 E9/L (ref 130–450)
PMV BLD AUTO: 9.7 FL (ref 7–12)
POIKILOCYTES: ABNORMAL
POLYCHROMASIA: ABNORMAL
POTASSIUM SERPL-SCNC: 3.8 MMOL/L (ref 3.5–5)
RBC # BLD: 3.29 E12/L (ref 3.5–5.5)
SODIUM BLD-SCNC: 129 MMOL/L (ref 132–146)
TEAR DROP CELLS: ABNORMAL
TOTAL PROTEIN: 6.9 G/DL (ref 6.4–8.3)
WBC # BLD: 8.4 E9/L (ref 4.5–11.5)

## 2023-02-21 PROCEDURE — 85025 COMPLETE CBC W/AUTO DIFF WBC: CPT

## 2023-02-21 PROCEDURE — 80053 COMPREHEN METABOLIC PANEL: CPT

## 2023-02-21 PROCEDURE — 36415 COLL VENOUS BLD VENIPUNCTURE: CPT

## 2023-02-21 NOTE — PROGRESS NOTES
Per Dr. Jay Jay Cho, patient's iron studies resulted and patient is to be scheduled for feraheme. Called patient to schedule for feraheme infusion. Per patient [facility]. Patient thanks office staff for their assistance and will call for further questions.

## 2023-03-24 RX ORDER — ROPINIROLE 0.5 MG/1
TABLET, FILM COATED ORAL
Qty: 270 TABLET | Refills: 1 | Status: SHIPPED | OUTPATIENT
Start: 2023-03-24

## 2023-03-24 NOTE — TELEPHONE ENCOUNTER
Last Appointment:  10/28/2022  Future Appointments   Date Time Provider Ana Basilio   3/28/2023  2:00 PM Moriah Anderson  [address]

## 2023-03-27 RX ORDER — MELOXICAM 7.5 MG/1
TABLET ORAL
Qty: 90 TABLET | Refills: 1 | Status: SHIPPED | OUTPATIENT
Start: 2023-03-27

## 2023-03-27 NOTE — TELEPHONE ENCOUNTER
Last Appointment:  10/28/2022  Future Appointments   Date Time Provider Ana Basilio   3/28/2023  2:00 PM Mac Almonte  [address]

## 2023-03-28 ENCOUNTER — OFFICE VISIT (OUTPATIENT)
Dept: FAMILY MEDICINE CLINIC | Age: 73
End: 2023-03-28
Payer: MEDICARE

## 2023-03-28 VITALS
HEART RATE: 102 BPM | OXYGEN SATURATION: 99 % | BODY MASS INDEX: 36.07 KG/M2 | TEMPERATURE: 97.3 F | DIASTOLIC BLOOD PRESSURE: 58 MMHG | SYSTOLIC BLOOD PRESSURE: 134 MMHG | WEIGHT: 196 LBS | RESPIRATION RATE: 18 BRPM | HEIGHT: 62 IN

## 2023-03-28 DIAGNOSIS — M54.16 LUMBAR RADICULAR PAIN: ICD-10-CM

## 2023-03-28 DIAGNOSIS — D50.0 IRON DEFICIENCY ANEMIA DUE TO CHRONIC BLOOD LOSS: ICD-10-CM

## 2023-03-28 DIAGNOSIS — I10 ESSENTIAL HYPERTENSION: ICD-10-CM

## 2023-03-28 DIAGNOSIS — F33.41 RECURRENT MAJOR DEPRESSIVE DISORDER, IN PARTIAL REMISSION (HCC): ICD-10-CM

## 2023-03-28 DIAGNOSIS — K21.9 GASTROESOPHAGEAL REFLUX DISEASE, UNSPECIFIED WHETHER ESOPHAGITIS PRESENT: ICD-10-CM

## 2023-03-28 DIAGNOSIS — G43.009 MIGRAINE WITHOUT AURA AND WITHOUT STATUS MIGRAINOSUS, NOT INTRACTABLE: ICD-10-CM

## 2023-03-28 DIAGNOSIS — E66.01 SEVERE OBESITY (BMI 35.0-39.9) WITH COMORBIDITY (HCC): ICD-10-CM

## 2023-03-28 DIAGNOSIS — E11.65 TYPE 2 DIABETES MELLITUS WITH HYPERGLYCEMIA, WITHOUT LONG-TERM CURRENT USE OF INSULIN (HCC): Primary | ICD-10-CM

## 2023-03-28 PROBLEM — G20.A1 PARKINSON'S DISEASE: Status: ACTIVE | Noted: 2023-03-28

## 2023-03-28 PROBLEM — G20 PARKINSON'S DISEASE (HCC): Status: ACTIVE | Noted: 2023-03-28

## 2023-03-28 PROBLEM — D64.9 ACUTE ON CHRONIC ANEMIA: Status: RESOLVED | Noted: 2023-02-07 | Resolved: 2023-03-28

## 2023-03-28 PROBLEM — D64.9 ACUTE ANEMIA: Status: RESOLVED | Noted: 2021-03-08 | Resolved: 2023-03-28

## 2023-03-28 PROCEDURE — 3075F SYST BP GE 130 - 139MM HG: CPT | Performed by: INTERNAL MEDICINE

## 2023-03-28 PROCEDURE — 1036F TOBACCO NON-USER: CPT | Performed by: INTERNAL MEDICINE

## 2023-03-28 PROCEDURE — 3044F HG A1C LEVEL LT 7.0%: CPT | Performed by: INTERNAL MEDICINE

## 2023-03-28 PROCEDURE — 3078F DIAST BP <80 MM HG: CPT | Performed by: INTERNAL MEDICINE

## 2023-03-28 PROCEDURE — 2022F DILAT RTA XM EVC RTNOPTHY: CPT | Performed by: INTERNAL MEDICINE

## 2023-03-28 PROCEDURE — 1090F PRES/ABSN URINE INCON ASSESS: CPT | Performed by: INTERNAL MEDICINE

## 2023-03-28 PROCEDURE — G8400 PT W/DXA NO RESULTS DOC: HCPCS | Performed by: INTERNAL MEDICINE

## 2023-03-28 PROCEDURE — G8417 CALC BMI ABV UP PARAM F/U: HCPCS | Performed by: INTERNAL MEDICINE

## 2023-03-28 PROCEDURE — G8484 FLU IMMUNIZE NO ADMIN: HCPCS | Performed by: INTERNAL MEDICINE

## 2023-03-28 PROCEDURE — 1123F ACP DISCUSS/DSCN MKR DOCD: CPT | Performed by: INTERNAL MEDICINE

## 2023-03-28 PROCEDURE — G8427 DOCREV CUR MEDS BY ELIG CLIN: HCPCS | Performed by: INTERNAL MEDICINE

## 2023-03-28 PROCEDURE — 3017F COLORECTAL CA SCREEN DOC REV: CPT | Performed by: INTERNAL MEDICINE

## 2023-03-28 PROCEDURE — 99215 OFFICE O/P EST HI 40 MIN: CPT | Performed by: INTERNAL MEDICINE

## 2023-03-28 RX ORDER — RIZATRIPTAN BENZOATE 10 MG/1
TABLET ORAL
COMMUNITY
Start: 2023-03-01

## 2023-03-28 RX ORDER — VERAPAMIL HYDROCHLORIDE 120 MG/1
CAPSULE, EXTENDED RELEASE ORAL
COMMUNITY
Start: 2023-02-08

## 2023-03-28 RX ORDER — LISINOPRIL 20 MG/1
TABLET ORAL
COMMUNITY
Start: 2023-01-31

## 2023-03-28 SDOH — ECONOMIC STABILITY: HOUSING INSECURITY
IN THE LAST 12 MONTHS, WAS THERE A TIME WHEN YOU DID NOT HAVE A STEADY PLACE TO SLEEP OR SLEPT IN A SHELTER (INCLUDING NOW)?: NO

## 2023-03-28 SDOH — ECONOMIC STABILITY: FOOD INSECURITY: WITHIN THE PAST 12 MONTHS, YOU WORRIED THAT YOUR FOOD WOULD RUN OUT BEFORE YOU GOT MONEY TO BUY MORE.: NEVER TRUE

## 2023-03-28 SDOH — ECONOMIC STABILITY: FOOD INSECURITY: WITHIN THE PAST 12 MONTHS, THE FOOD YOU BOUGHT JUST DIDN'T LAST AND YOU DIDN'T HAVE MONEY TO GET MORE.: NEVER TRUE

## 2023-03-28 SDOH — ECONOMIC STABILITY: INCOME INSECURITY: HOW HARD IS IT FOR YOU TO PAY FOR THE VERY BASICS LIKE FOOD, HOUSING, MEDICAL CARE, AND HEATING?: NOT HARD AT ALL

## 2023-03-28 ASSESSMENT — PATIENT HEALTH QUESTIONNAIRE - PHQ9
SUM OF ALL RESPONSES TO PHQ QUESTIONS 1-9: 5
7. TROUBLE CONCENTRATING ON THINGS, SUCH AS READING THE NEWSPAPER OR WATCHING TELEVISION: 0
9. THOUGHTS THAT YOU WOULD BE BETTER OFF DEAD, OR OF HURTING YOURSELF: 0
1. LITTLE INTEREST OR PLEASURE IN DOING THINGS: 1
4. FEELING TIRED OR HAVING LITTLE ENERGY: 1
10. IF YOU CHECKED OFF ANY PROBLEMS, HOW DIFFICULT HAVE THESE PROBLEMS MADE IT FOR YOU TO DO YOUR WORK, TAKE CARE OF THINGS AT HOME, OR GET ALONG WITH OTHER PEOPLE: 1
SUM OF ALL RESPONSES TO PHQ QUESTIONS 1-9: 5
SUM OF ALL RESPONSES TO PHQ QUESTIONS 1-9: 5
6. FEELING BAD ABOUT YOURSELF - OR THAT YOU ARE A FAILURE OR HAVE LET YOURSELF OR YOUR FAMILY DOWN: 1
3. TROUBLE FALLING OR STAYING ASLEEP: 1
SUM OF ALL RESPONSES TO PHQ QUESTIONS 1-9: 5
2. FEELING DOWN, DEPRESSED OR HOPELESS: 1
SUM OF ALL RESPONSES TO PHQ9 QUESTIONS 1 & 2: 2
5. POOR APPETITE OR OVEREATING: 0
8. MOVING OR SPEAKING SO SLOWLY THAT OTHER PEOPLE COULD HAVE NOTICED. OR THE OPPOSITE, BEING SO FIGETY OR RESTLESS THAT YOU HAVE BEEN MOVING AROUND A LOT MORE THAN USUAL: 0

## 2023-03-28 NOTE — PROGRESS NOTES
[facility] PRIMARY CARE  [address] [place] [address]  [place] [address]  Dept: [phone]  Dept Fax: [phone]     NAME: Brando Barber        :  1950        MRN:  [id number]    Chief Complaint   Patient presents with    Follow-up     Pt is having problems with iron absorption and low Hgb, has been seeing [facility]. Pt feeling shaky    Diabetes    Hypertension       Subjective     History of Present Illness  Bhavik Monet is a 68 y.o. female who presents today for routine follow up and medication refill. Recent records from [facility] hematology and gastroenterology were reviewed. EGD was completed but she was unable to get colonoscopy d/t her anatomy the scope couldn't be advanced. Plans for repeat colonoscopy under [facility]. Patient is not happy about having to repeat the colonoscopy. EGD showed gastritis. She is taking 40mg of the omeprazole daily    Just had labs done today for [facility] - no results available yet    Most recent labs results from 3/16/23 reviewed and hgb was 9.1, iron 27, ferritin 21  A1c in February was 6.7%    Takes BP meds only occasionally due to feeling weak, her BP can also run low at times due to anemia. Episode of abdominal pain a couple weeks ago. None currently. Review of Systems  Please see HPI above. Comprehensive review of systems negative unless otherwise noted above.     Past Medical Hx:  Past Medical History:   Diagnosis Date    Arthritis     Cataract     right    Chronic fatigue syndrome     Depression     Diabetes mellitus (HCC)     SC injection weekly    Fibromyalgia     Hypertension     Memory loss     Mononucleosis     Psoriasis     Sciatica     Sleep apnea     no c-pap    Vertigo        Past Surgical Hx:  Past Surgical History:   Procedure Laterality Date    CARPAL TUNNEL RELEASE Bilateral     CATARACT REMOVAL Right     COLONOSCOPY      OTHER SURGICAL HISTORY Bilateral 2021    BILATERAL L3,L4 MEDIAL BRANCH AND L5 DORSAL RAMUS

## 2023-04-24 RX ORDER — DULAGLUTIDE 4.5 MG/.5ML
4.5 INJECTION, SOLUTION SUBCUTANEOUS WEEKLY
Qty: 2 ML | Refills: 5 | Status: SHIPPED | OUTPATIENT
Start: 2023-04-24

## 2023-05-10 DIAGNOSIS — E11.65 TYPE 2 DIABETES MELLITUS WITH HYPERGLYCEMIA, WITHOUT LONG-TERM CURRENT USE OF INSULIN (HCC): ICD-10-CM

## 2023-05-10 NOTE — TELEPHONE ENCOUNTER
Last Appointment:  3/28/2023  Future Appointments   Date Time Provider Ana Basilio   9/26/2023  1:30 PM Krissy Dash  [address]

## 2023-05-11 RX ORDER — GLIPIZIDE 5 MG/1
5 TABLET ORAL DAILY
Qty: 90 TABLET | Refills: 1 | Status: SHIPPED | OUTPATIENT
Start: 2023-05-11

## 2023-05-18 DIAGNOSIS — M54.16 LUMBAR RADICULAR PAIN: Primary | ICD-10-CM

## 2023-05-18 RX ORDER — GABAPENTIN 300 MG/1
600 CAPSULE ORAL NIGHTLY
Qty: 180 CAPSULE | Refills: 1 | Status: SHIPPED | OUTPATIENT
Start: 2023-05-18 | End: 2023-08-16

## 2023-05-18 RX ORDER — GABAPENTIN 300 MG/1
300 CAPSULE ORAL DAILY
Qty: 90 CAPSULE | Refills: 1 | Status: SHIPPED | OUTPATIENT
Start: 2023-05-18 | End: 2023-08-16

## 2023-06-05 RX ORDER — FLUOXETINE HYDROCHLORIDE 20 MG/1
CAPSULE ORAL
Qty: 270 CAPSULE | Refills: 1 | Status: SHIPPED | OUTPATIENT
Start: 2023-06-05

## 2023-06-05 NOTE — TELEPHONE ENCOUNTER
Last Appointment:  3/8/2022  Future Appointments   Date Time Provider Ana Basilio   9/26/2023  1:30 PM Mega Chavez  [address]

## 2023-07-13 ENCOUNTER — HOSPITAL ENCOUNTER (INPATIENT)
Age: 73
LOS: 1 days | Discharge: HOME | DRG: 812 | End: 2023-07-14
Attending: EMERGENCY MEDICINE | Admitting: INTERNAL MEDICINE
Payer: MEDICARE

## 2023-07-13 DIAGNOSIS — E11.65 TYPE 2 DIABETES MELLITUS WITH HYPERGLYCEMIA, WITHOUT LONG-TERM CURRENT USE OF INSULIN (HCC): ICD-10-CM

## 2023-07-13 DIAGNOSIS — D64.9 ACUTE ON CHRONIC ANEMIA: ICD-10-CM

## 2023-07-13 DIAGNOSIS — K62.5 RECTAL BLEEDING: Primary | ICD-10-CM

## 2023-07-13 LAB
ABO + RH BLD: NORMAL
ALBUMIN SERPL-MCNC: 3.5 G/DL (ref 3.5–5.2)
ALP SERPL-CCNC: 79 U/L (ref 35–104)
ALT SERPL-CCNC: 26 U/L (ref 0–32)
ANION GAP SERPL CALCULATED.3IONS-SCNC: 12 MMOL/L (ref 7–16)
ANISOCYTOSIS: ABNORMAL
APTT BLD: 24.8 SEC (ref 24.5–35.1)
AST SERPL-CCNC: 34 U/L (ref 0–31)
BASOPHILS # BLD: 0.05 E9/L (ref 0–0.2)
BASOPHILS NFR BLD: 0.6 % (ref 0–2)
BILIRUB SERPL-MCNC: 0.2 MG/DL (ref 0–1.2)
BLD GP AB SCN SERPL QL: NORMAL
BUN SERPL-MCNC: 18 MG/DL (ref 6–23)
CALCIUM SERPL-MCNC: 8.3 MG/DL (ref 8.6–10.2)
CHLORIDE SERPL-SCNC: 107 MMOL/L (ref 98–107)
CO2 SERPL-SCNC: 18 MMOL/L (ref 22–29)
CREAT SERPL-MCNC: 0.7 MG/DL (ref 0.5–1)
EOSINOPHIL # BLD: 0.38 E9/L (ref 0.05–0.5)
EOSINOPHIL NFR BLD: 4.4 % (ref 0–6)
ERYTHROCYTE [DISTWIDTH] IN BLOOD BY AUTOMATED COUNT: 20.1 FL (ref 11.5–15)
GLUCOSE SERPL-MCNC: 306 MG/DL (ref 74–99)
HCT VFR BLD AUTO: 17.6 % (ref 34–48)
HGB BLD-MCNC: 5.4 G/DL (ref 11.5–15.5)
HYPOCHROMIA: ABNORMAL
IMM GRANULOCYTES # BLD: 0.06 E9/L
IMM GRANULOCYTES NFR BLD: 0.7 % (ref 0–5)
INR BLD: 1.1
LYMPHOCYTES # BLD: 1.22 E9/L (ref 1.5–4)
LYMPHOCYTES NFR BLD: 14.2 % (ref 20–42)
MCH RBC QN AUTO: 29 PG (ref 26–35)
MCHC RBC AUTO-ENTMCNC: 30.7 % (ref 32–34.5)
MCV RBC AUTO: 94.6 FL (ref 80–99.9)
METER GLUCOSE: 272 MG/DL (ref 74–99)
MONOCYTES # BLD: 0.99 E9/L (ref 0.1–0.95)
MONOCYTES NFR BLD: 11.5 % (ref 2–12)
NEUTROPHILS # BLD: 5.92 E9/L (ref 1.8–7.3)
NEUTS SEG NFR BLD: 68.6 % (ref 43–80)
OVALOCYTES: ABNORMAL
PLATELET # BLD AUTO: 171 E9/L (ref 130–450)
PMV BLD AUTO: 10.7 FL (ref 7–12)
POIKILOCYTES: ABNORMAL
POLYCHROMASIA: ABNORMAL
POTASSIUM SERPL-SCNC: 3.7 MMOL/L (ref 3.5–5)
PROT SERPL-MCNC: 5.9 G/DL (ref 6.4–8.3)
PROTHROMBIN TIME: 12.5 SEC (ref 9.3–12.4)
RBC # BLD AUTO: 1.86 E12/L (ref 3.5–5.5)
SODIUM SERPL-SCNC: 137 MMOL/L (ref 132–146)
TEAR DROP CELLS: ABNORMAL
TROPONIN, HIGH SENSITIVITY: 10 NG/L (ref 0–9)
WBC # BLD: 8.6 E9/L (ref 4.5–11.5)

## 2023-07-13 PROCEDURE — 85730 THROMBOPLASTIN TIME PARTIAL: CPT

## 2023-07-13 PROCEDURE — 85025 COMPLETE CBC W/AUTO DIFF WBC: CPT

## 2023-07-13 PROCEDURE — 1200000000 HC SEMI PRIVATE

## 2023-07-13 PROCEDURE — 85610 PROTHROMBIN TIME: CPT

## 2023-07-13 PROCEDURE — 82962 GLUCOSE BLOOD TEST: CPT

## 2023-07-13 PROCEDURE — 36430 TRANSFUSION BLD/BLD COMPNT: CPT

## 2023-07-13 PROCEDURE — 86923 COMPATIBILITY TEST ELECTRIC: CPT

## 2023-07-13 PROCEDURE — 93005 ELECTROCARDIOGRAM TRACING: CPT | Performed by: EMERGENCY MEDICINE

## 2023-07-13 PROCEDURE — 86900 BLOOD TYPING SEROLOGIC ABO: CPT

## 2023-07-13 PROCEDURE — P9016 RBC LEUKOCYTES REDUCED: HCPCS

## 2023-07-13 PROCEDURE — 84484 ASSAY OF TROPONIN QUANT: CPT

## 2023-07-13 PROCEDURE — 80053 COMPREHEN METABOLIC PANEL: CPT

## 2023-07-13 PROCEDURE — 6360000002 HC RX W HCPCS: Performed by: INTERNAL MEDICINE

## 2023-07-13 PROCEDURE — 86901 BLOOD TYPING SEROLOGIC RH(D): CPT

## 2023-07-13 PROCEDURE — C9113 INJ PANTOPRAZOLE SODIUM, VIA: HCPCS | Performed by: INTERNAL MEDICINE

## 2023-07-13 PROCEDURE — 2580000003 HC RX 258: Performed by: EMERGENCY MEDICINE

## 2023-07-13 PROCEDURE — 99285 EMERGENCY DEPT VISIT HI MDM: CPT

## 2023-07-13 PROCEDURE — 30233N1 TRANSFUSION OF NONAUTOLOGOUS RED BLOOD CELLS INTO PERIPHERAL VEIN, PERCUTANEOUS APPROACH: ICD-10-PCS | Performed by: INTERNAL MEDICINE

## 2023-07-13 PROCEDURE — 6370000000 HC RX 637 (ALT 250 FOR IP): Performed by: INTERNAL MEDICINE

## 2023-07-13 PROCEDURE — 86850 RBC ANTIBODY SCREEN: CPT

## 2023-07-13 PROCEDURE — 2580000003 HC RX 258: Performed by: INTERNAL MEDICINE

## 2023-07-13 RX ORDER — FLUOXETINE HYDROCHLORIDE 20 MG/1
60 CAPSULE ORAL DAILY
Status: DISCONTINUED | OUTPATIENT
Start: 2023-07-14 | End: 2023-07-14 | Stop reason: HOSPADM

## 2023-07-13 RX ORDER — 0.9 % SODIUM CHLORIDE 0.9 %
1000 INTRAVENOUS SOLUTION INTRAVENOUS ONCE
Status: COMPLETED | OUTPATIENT
Start: 2023-07-13 | End: 2023-07-13

## 2023-07-13 RX ORDER — GABAPENTIN 300 MG/1
600 CAPSULE ORAL NIGHTLY
Status: DISCONTINUED | OUTPATIENT
Start: 2023-07-13 | End: 2023-07-14 | Stop reason: HOSPADM

## 2023-07-13 RX ORDER — ACETAMINOPHEN 325 MG/1
650 TABLET ORAL EVERY 6 HOURS PRN
Status: DISCONTINUED | OUTPATIENT
Start: 2023-07-13 | End: 2023-07-14 | Stop reason: HOSPADM

## 2023-07-13 RX ORDER — DEXTROSE MONOHYDRATE 100 MG/ML
INJECTION, SOLUTION INTRAVENOUS CONTINUOUS PRN
Status: DISCONTINUED | OUTPATIENT
Start: 2023-07-13 | End: 2023-07-14 | Stop reason: HOSPADM

## 2023-07-13 RX ORDER — SODIUM CHLORIDE 0.9 % (FLUSH) 0.9 %
5-40 SYRINGE (ML) INJECTION EVERY 12 HOURS SCHEDULED
Status: DISCONTINUED | OUTPATIENT
Start: 2023-07-13 | End: 2023-07-14 | Stop reason: HOSPADM

## 2023-07-13 RX ORDER — SODIUM CHLORIDE 0.9 % (FLUSH) 0.9 %
5-40 SYRINGE (ML) INJECTION PRN
Status: DISCONTINUED | OUTPATIENT
Start: 2023-07-13 | End: 2023-07-14 | Stop reason: HOSPADM

## 2023-07-13 RX ORDER — SODIUM CHLORIDE 9 MG/ML
INJECTION, SOLUTION INTRAVENOUS PRN
Status: DISCONTINUED | OUTPATIENT
Start: 2023-07-13 | End: 2023-07-14 | Stop reason: HOSPADM

## 2023-07-13 RX ORDER — ACETAMINOPHEN 650 MG/1
650 SUPPOSITORY RECTAL EVERY 6 HOURS PRN
Status: DISCONTINUED | OUTPATIENT
Start: 2023-07-13 | End: 2023-07-14 | Stop reason: HOSPADM

## 2023-07-13 RX ORDER — ROPINIROLE 0.25 MG/1
0.5 TABLET, FILM COATED ORAL 3 TIMES DAILY
Status: DISCONTINUED | OUTPATIENT
Start: 2023-07-13 | End: 2023-07-14 | Stop reason: HOSPADM

## 2023-07-13 RX ORDER — POLYETHYLENE GLYCOL 3350 17 G/17G
17 POWDER, FOR SOLUTION ORAL DAILY PRN
Status: DISCONTINUED | OUTPATIENT
Start: 2023-07-13 | End: 2023-07-14 | Stop reason: HOSPADM

## 2023-07-13 RX ORDER — PANTOPRAZOLE SODIUM 40 MG/10ML
40 INJECTION, POWDER, LYOPHILIZED, FOR SOLUTION INTRAVENOUS 2 TIMES DAILY
Status: DISCONTINUED | OUTPATIENT
Start: 2023-07-13 | End: 2023-07-14 | Stop reason: HOSPADM

## 2023-07-13 RX ORDER — FLUOXETINE HYDROCHLORIDE 20 MG/1
20 CAPSULE ORAL DAILY
Status: DISCONTINUED | OUTPATIENT
Start: 2023-07-14 | End: 2023-07-13 | Stop reason: DRUGHIGH

## 2023-07-13 RX ORDER — GABAPENTIN 300 MG/1
300 CAPSULE ORAL DAILY
Status: DISCONTINUED | OUTPATIENT
Start: 2023-07-14 | End: 2023-07-13 | Stop reason: SDUPTHER

## 2023-07-13 RX ADMIN — GABAPENTIN 600 MG: 300 CAPSULE ORAL at 23:12

## 2023-07-13 RX ADMIN — ROPINIROLE HYDROCHLORIDE 0.5 MG: 0.25 TABLET, FILM COATED ORAL at 23:12

## 2023-07-13 RX ADMIN — SODIUM CHLORIDE 1000 ML: 9 INJECTION, SOLUTION INTRAVENOUS at 17:16

## 2023-07-13 RX ADMIN — PANTOPRAZOLE SODIUM 40 MG: 40 INJECTION, POWDER, FOR SOLUTION INTRAVENOUS at 23:11

## 2023-07-13 RX ADMIN — Medication 10 ML: at 23:11

## 2023-07-13 ASSESSMENT — LIFESTYLE VARIABLES
HOW OFTEN DO YOU HAVE A DRINK CONTAINING ALCOHOL: NEVER
HOW MANY STANDARD DRINKS CONTAINING ALCOHOL DO YOU HAVE ON A TYPICAL DAY: PATIENT DOES NOT DRINK
HOW OFTEN DO YOU HAVE A DRINK CONTAINING ALCOHOL: NEVER

## 2023-07-13 ASSESSMENT — ENCOUNTER SYMPTOMS
ABDOMINAL PAIN: 0
CHEST TIGHTNESS: 0
SHORTNESS OF BREATH: 0
COUGH: 0
VOMITING: 0
DIARRHEA: 0
BLOOD IN STOOL: 1
SORE THROAT: 0
BACK PAIN: 0
WHEEZING: 0
NAUSEA: 0

## 2023-07-13 NOTE — ED PROVIDER NOTES
Chief complaint:  Anemia    HPI history provided by the patient  Patient presents here for worsening chronic anemia feeling fatigued and weak. Has recurring anemia, has been worked up many times and is still in the process of being worked up. She has chronic dark tarry stools, has had 2 lower scopes, 2 upper scopes, has had some areas of her stomach frozen she states and they still cannot nikolas down on the actual bleeding source, she gets recurrent blood transfusions. She follows mostly with the [facility] for her specialist.  She is here because her blood counts are low and she feels fatigued again. Denies abdominal pain, chest pain, palpitations or shortness of breath or lightheadedness or syncope. Review of Systems   Constitutional:  Positive for fatigue. Negative for chills, diaphoresis and fever. HENT:  Negative for congestion and sore throat. Respiratory:  Negative for cough, chest tightness, shortness of breath and wheezing. Cardiovascular:  Negative for chest pain, palpitations and leg swelling. Gastrointestinal:  Positive for blood in stool. Negative for abdominal pain, diarrhea, nausea and vomiting. Genitourinary:  Negative for dysuria, flank pain, frequency, hematuria and urgency. Musculoskeletal:  Negative for arthralgias, back pain, gait problem, joint swelling, myalgias, neck pain and neck stiffness. Skin:  Negative for rash and wound. Neurological:  Negative for dizziness, syncope, weakness, light-headedness and headaches. All other systems reviewed and are negative. Physical Exam  Vitals and nursing note reviewed. Constitutional:       General: She is awake. She is not in acute distress. Appearance: She is well-developed. She is not ill-appearing, toxic-appearing or diaphoretic. HENT:      Head: Normocephalic and atraumatic. Eyes:      General: No scleral icterus. Pupils: Pupils are equal, round, and reactive to light.    Cardiovascular:      Rate and Rhythm: Normal rate and regular rhythm. Heart sounds: Normal heart sounds. No murmur heard. Pulmonary:      Effort: Pulmonary effort is normal. No respiratory distress. Breath sounds: Normal breath sounds. No stridor, decreased air movement or transmitted upper airway sounds. No decreased breath sounds, wheezing, rhonchi or rales. Chest:      Chest wall: No tenderness. Abdominal:      General: Bowel sounds are normal. There is no distension. Palpations: Abdomen is soft. Tenderness: There is no abdominal tenderness. There is no right CVA tenderness, left CVA tenderness, guarding or rebound. Musculoskeletal:         General: No swelling, tenderness, deformity or signs of injury. Cervical back: Full passive range of motion without pain, normal range of motion and neck supple. No signs of trauma or rigidity. No spinous process tenderness or muscular tenderness. Normal range of motion. Right lower leg: No edema. Left lower leg: No edema. Comments: Arms and legs are all neurovascular intact with no pretibial edema or calf pain. Skin:     General: Skin is warm and dry. Coloration: Skin is not cyanotic, jaundiced, mottled or pale. Findings: No bruising, erythema or rash. Neurological:      General: No focal deficit present. Mental Status: She is alert and oriented to person, place, and time. GCS: GCS eye subscore is 4. GCS verbal subscore is 5. GCS motor subscore is 6. Cranial Nerves: Cranial nerves 2-12 are intact. No cranial nerve deficit. Sensory: Sensation is intact. Motor: Motor function is intact. Coordination: Coordination is intact. Coordination normal.   Psychiatric:         Behavior: Behavior is cooperative. Procedures     MDM     History provided by: Patient    Patient complaining of fatigue with worsening chronic anemia. Known GI bleeding from unknown source, has had multiple scopes upper and lower.   Follows with

## 2023-07-14 VITALS
SYSTOLIC BLOOD PRESSURE: 124 MMHG | DIASTOLIC BLOOD PRESSURE: 62 MMHG | WEIGHT: 206 LBS | OXYGEN SATURATION: 96 % | HEART RATE: 96 BPM | RESPIRATION RATE: 17 BRPM | BODY MASS INDEX: 37.68 KG/M2 | TEMPERATURE: 98 F

## 2023-07-14 LAB
ALBUMIN SERPL-MCNC: 3.1 G/DL (ref 3.5–5.2)
ALP SERPL-CCNC: 60 U/L (ref 35–104)
ALT SERPL-CCNC: 23 U/L (ref 0–32)
ANION GAP SERPL CALCULATED.3IONS-SCNC: 13 MMOL/L (ref 7–16)
AST SERPL-CCNC: 28 U/L (ref 0–31)
BASOPHILS # BLD: 0.03 E9/L (ref 0–0.2)
BASOPHILS NFR BLD: 0.4 % (ref 0–2)
BILIRUB SERPL-MCNC: 0.7 MG/DL (ref 0–1.2)
BLOOD BANK DISPENSE STATUS: NORMAL
BLOOD BANK DISPENSE STATUS: NORMAL
BLOOD BANK PRODUCT CODE: NORMAL
BLOOD BANK PRODUCT CODE: NORMAL
BPU ID: NORMAL
BPU ID: NORMAL
BUN SERPL-MCNC: 14 MG/DL (ref 6–23)
CALCIUM SERPL-MCNC: 7.9 MG/DL (ref 8.6–10.2)
CHLORIDE SERPL-SCNC: 107 MMOL/L (ref 98–107)
CO2 SERPL-SCNC: 18 MMOL/L (ref 22–29)
CREAT SERPL-MCNC: 0.7 MG/DL (ref 0.5–1)
DESCRIPTION BLOOD BANK: NORMAL
DESCRIPTION BLOOD BANK: NORMAL
EKG ATRIAL RATE: 105 BPM
EKG P AXIS: 49 DEGREES
EKG P-R INTERVAL: 160 MS
EKG Q-T INTERVAL: 374 MS
EKG QRS DURATION: 84 MS
EKG QTC CALCULATION (BAZETT): 494 MS
EKG R AXIS: -13 DEGREES
EKG T AXIS: 46 DEGREES
EKG VENTRICULAR RATE: 105 BPM
EOSINOPHIL # BLD: 0.32 E9/L (ref 0.05–0.5)
EOSINOPHIL NFR BLD: 3.8 % (ref 0–6)
ERYTHROCYTE [DISTWIDTH] IN BLOOD BY AUTOMATED COUNT: 18.5 FL (ref 11.5–15)
GLUCOSE SERPL-MCNC: 242 MG/DL (ref 74–99)
HCT VFR BLD AUTO: 21.7 % (ref 34–48)
HCT VFR BLD AUTO: 23.6 % (ref 34–48)
HGB BLD-MCNC: 6.6 G/DL (ref 11.5–15.5)
HGB BLD-MCNC: 7.4 G/DL (ref 11.5–15.5)
IMM GRANULOCYTES # BLD: 0.06 E9/L
IMM GRANULOCYTES NFR BLD: 0.7 % (ref 0–5)
IMMATURE RETIC FRACT: 35.7 % (ref 3–15.9)
LYMPHOCYTES # BLD: 1.29 E9/L (ref 1.5–4)
LYMPHOCYTES NFR BLD: 15.2 % (ref 20–42)
MAGNESIUM SERPL-MCNC: 2 MG/DL (ref 1.6–2.6)
MCH RBC QN AUTO: 29.3 PG (ref 26–35)
MCHC RBC AUTO-ENTMCNC: 30.4 % (ref 32–34.5)
MCV RBC AUTO: 96.4 FL (ref 80–99.9)
METER GLUCOSE: 228 MG/DL (ref 74–99)
METER GLUCOSE: 249 MG/DL (ref 74–99)
MONOCYTES # BLD: 0.72 E9/L (ref 0.1–0.95)
MONOCYTES NFR BLD: 8.5 % (ref 2–12)
NEUTROPHILS # BLD: 6.05 E9/L (ref 1.8–7.3)
NEUTS SEG NFR BLD: 71.4 % (ref 43–80)
PHOSPHATE SERPL-MCNC: 2.2 MG/DL (ref 2.5–4.5)
PLATELET # BLD AUTO: 168 E9/L (ref 130–450)
PMV BLD AUTO: 10.7 FL (ref 7–12)
POTASSIUM SERPL-SCNC: 3.6 MMOL/L (ref 3.5–5)
PROCALCITONIN: 0.14 NG/ML (ref 0–0.08)
PROT SERPL-MCNC: 5.6 G/DL (ref 6.4–8.3)
RBC # BLD AUTO: 2.25 E12/L (ref 3.5–5.5)
RETIC HGB EQUIVALENT: 21 PG (ref 28.2–36.6)
RETICS/RBC NFR: 5.3 % (ref 0.4–1.9)
RETICULOCYTE ABSOLUTE COUNT: 0.12 E12/L
SODIUM SERPL-SCNC: 138 MMOL/L (ref 132–146)
T4 FREE SERPL-MCNC: 1.07 NG/DL (ref 0.93–1.7)
TSH SERPL-MCNC: 2.4 UIU/ML (ref 0.27–4.2)
WBC # BLD: 8.5 E9/L (ref 4.5–11.5)

## 2023-07-14 PROCEDURE — 82962 GLUCOSE BLOOD TEST: CPT

## 2023-07-14 PROCEDURE — 84145 PROCALCITONIN (PCT): CPT

## 2023-07-14 PROCEDURE — 84439 ASSAY OF FREE THYROXINE: CPT

## 2023-07-14 PROCEDURE — 84100 ASSAY OF PHOSPHORUS: CPT

## 2023-07-14 PROCEDURE — 6360000002 HC RX W HCPCS: Performed by: INTERNAL MEDICINE

## 2023-07-14 PROCEDURE — 85025 COMPLETE CBC W/AUTO DIFF WBC: CPT

## 2023-07-14 PROCEDURE — 85014 HEMATOCRIT: CPT

## 2023-07-14 PROCEDURE — 2580000003 HC RX 258: Performed by: INTERNAL MEDICINE

## 2023-07-14 PROCEDURE — 85045 AUTOMATED RETICULOCYTE COUNT: CPT

## 2023-07-14 PROCEDURE — 84443 ASSAY THYROID STIM HORMONE: CPT

## 2023-07-14 PROCEDURE — 85018 HEMOGLOBIN: CPT

## 2023-07-14 PROCEDURE — 36430 TRANSFUSION BLD/BLD COMPNT: CPT

## 2023-07-14 PROCEDURE — 80053 COMPREHEN METABOLIC PANEL: CPT

## 2023-07-14 PROCEDURE — 36415 COLL VENOUS BLD VENIPUNCTURE: CPT

## 2023-07-14 PROCEDURE — 83735 ASSAY OF MAGNESIUM: CPT

## 2023-07-14 PROCEDURE — C9113 INJ PANTOPRAZOLE SODIUM, VIA: HCPCS | Performed by: INTERNAL MEDICINE

## 2023-07-14 PROCEDURE — 93010 ELECTROCARDIOGRAM REPORT: CPT | Performed by: INTERNAL MEDICINE

## 2023-07-14 PROCEDURE — 6370000000 HC RX 637 (ALT 250 FOR IP): Performed by: INTERNAL MEDICINE

## 2023-07-14 RX ORDER — LISINOPRIL 20 MG/1
10 TABLET ORAL DAILY
Qty: 30 TABLET | Refills: 3 | COMMUNITY
Start: 2023-07-14

## 2023-07-14 RX ORDER — INSULIN LISPRO 100 [IU]/ML
0-4 INJECTION, SOLUTION INTRAVENOUS; SUBCUTANEOUS EVERY 6 HOURS
Status: DISCONTINUED | OUTPATIENT
Start: 2023-07-14 | End: 2023-07-14 | Stop reason: HOSPADM

## 2023-07-14 RX ORDER — SODIUM CHLORIDE 9 MG/ML
INJECTION, SOLUTION INTRAVENOUS PRN
Status: DISCONTINUED | OUTPATIENT
Start: 2023-07-14 | End: 2023-07-14 | Stop reason: HOSPADM

## 2023-07-14 RX ORDER — GLIPIZIDE 5 MG/1
10 TABLET ORAL DAILY
COMMUNITY
Start: 2023-07-14

## 2023-07-14 RX ADMIN — ROPINIROLE HYDROCHLORIDE 0.5 MG: 0.25 TABLET, FILM COATED ORAL at 08:11

## 2023-07-14 RX ADMIN — FLUOXETINE 60 MG: 20 CAPSULE ORAL at 08:11

## 2023-07-14 RX ADMIN — INSULIN LISPRO 2 UNITS: 100 INJECTION, SOLUTION INTRAVENOUS; SUBCUTANEOUS at 08:17

## 2023-07-14 RX ADMIN — PANTOPRAZOLE SODIUM 40 MG: 40 INJECTION, POWDER, FOR SOLUTION INTRAVENOUS at 08:11

## 2023-07-14 RX ADMIN — Medication 10 ML: at 08:12

## 2023-07-14 RX ADMIN — INSULIN LISPRO 1 UNITS: 100 INJECTION, SOLUTION INTRAVENOUS; SUBCUTANEOUS at 11:16

## 2023-07-14 NOTE — DISCHARGE INSTRUCTIONS
Your information:  Name: Maegan Triplett  : 1950    Your instructions:    YOU ARE BEING DISCHARGED HOME. PLEASE MAKE AND KEEP YOUR FOLLOW UP APPOINTMENTS. IF YOU EXPERIENCE ANY OF THE FOLLOWING SYMPTOMS SHORTNESS OF BREATH, INCREASED WEAKNESS OR FATIGUE, LIGHTHEADEDNESS, DIZZINESS, LOSS OF CONSCIOUSNESS, HEADACHES, BLOODY OR DARK, TARRY STOOLS, LOSS OF APPETITE, GENERAL NOT FEELING WELL, SIGNS AND SYMPTOMS OF INFECTION LIKE FEVER AND OR CHILLS, PLEASE CALL THE DOCTOR OR RETURN TO THE EMERGENCY ROOM. What to do after you leave the hospital:    Recommended diet: regular diet    Recommended activity: activity as tolerated        The following personal items were collected during your admission and were returned to you:    Belongings  Dental Appliances: None  Vision - Corrective Lenses: Contact Lenses  Hearing Aid: None  Clothing: Footwear, Pants, Shirt, Undergarments, Socks  Jewelry: Earrings, Ring, Necklace  Body Piercings Removed: No  Electronic Devices: Cell Phone  Weapons (Notify Protective Services/Security): None  Other Valuables: Purse  Home Medications: None  Patient approves for provider to speak to responsible person post operatively: Yes    Information obtained by:  By signing below, I understand that if any problems occur once I leave the hospital I am to contact the doctor. I understand and acknowledge receipt of the instructions indicated above.

## 2023-07-14 NOTE — PLAN OF CARE
Problem: Safety - Adult  Goal: Free from fall injury  7/14/2023 1032 by Melissa Brar RN  Outcome: Progressing  7/14/2023 0449 by Adi Villalpando RN  Outcome: Progressing     Problem: Chronic Conditions and Co-morbidities  Goal: Patient's chronic conditions and co-morbidity symptoms are monitored and maintained or improved  Outcome: Progressing

## 2023-07-14 NOTE — H&P
Department of Internal Medicine  History and Physical    PCP: Elmer Oquendo DO  Admitting Physician: Dr. Lynn Thomas  Consultants:   Date of Service: 7/13/2023    CHIEF COMPLAINT:  weakness/sob    HISTORY OF PRESENT ILLNESS:    Patient is a 78-year-old female presents to the ED due to increased weakness and fatigue. She has also been feeling short of breath. Patient has issues with anemia on a chronic basis. She has had upper and lower scopes in the past to evaluate for GI bleed. Over the last several months she has required 12 units of packed red blood cells. Her last transfusion was about 3 weeks ago with 2 units. Patient does not complain of any abdominal pain. She denies any lightheadedness or dizziness.     PAST MEDICAL Hx:  Past Medical History:   Diagnosis Date    Arthritis     Cataract     right    Chronic fatigue syndrome     Depression     Diabetes mellitus (HCC)     SC injection weekly    Fibromyalgia     Hypertension     Memory loss     Mononucleosis     Psoriasis     Sciatica     Sleep apnea     no c-pap    Vertigo        PAST SURGICAL Hx:   Past Surgical History:   Procedure Laterality Date    CARPAL TUNNEL RELEASE Bilateral     CATARACT REMOVAL Right     COLONOSCOPY      OTHER SURGICAL HISTORY Bilateral 01/21/2021    BILATERAL L3,L4 MEDIAL BRANCH AND L5 DORSAL RAMUS RADIOFREQUENCY    PAIN MANAGEMENT PROCEDURE Bilateral 01/21/2021    BILATERAL L3,4 MEDIAL BRANCH AND L5 DORSAL RAMUS  RADIOFREQUENCY ABLATION WITH SEDATION  (CPT [id number]) performed by Kindra Rodriguez MD at [address] [facility] N/A 03/19/2021    T11 KYPHOPLASTY (2 C-ARMS) performed by Kindra Rodriguez MD at [facility] 01/16/2019    EGD BIOPSY performed by Jane Whalen MD at [address] [place] [address] N/A 03/10/2021    EGD ESOPHAGOGASTRODUODENOSCOPY performed by Fabiano Churchill MD at [facility] Hx:  Family

## 2023-07-17 ENCOUNTER — TELEPHONE (OUTPATIENT)
Dept: FAMILY MEDICINE CLINIC | Age: 73
End: 2023-07-17

## 2023-07-17 ENCOUNTER — CARE COORDINATION (OUTPATIENT)
Dept: CASE MANAGEMENT | Age: 73
End: 2023-07-17

## 2023-07-17 NOTE — TELEPHONE ENCOUNTER
Care Transitions Initial Follow Up Call    Outreach made within 2 business days of discharge: Yes    Patient: Makenzie Mckeon Patient : 1950   MRN: [id number]  Reason for Admission: There are no discharge diagnoses documented for the most recent discharge. Discharge Date: 23       Spoke with: Left message    Discharge department/facility: [facility] Interactive Patient Contact:  Was patient able to fill all prescriptions: Yes  Was patient instructed to bring all medications to the follow-up visit: Yes  Is patient taking all medications as directed in the discharge summary?  Yes  Does patient understand their discharge instructions: Yes  Does patient have questions or concerns that need addressed prior to 7-14 day follow up office visit: no    Scheduled appointment with PCP within 7-14 days    Follow Up  Future Appointments   Date Time Provider [address] [place] [address]   2023  1:30 PM Jahaira Fajardo, [address]

## 2023-07-17 NOTE — CARE COORDINATION
[facility] Care Transitions Initial Follow Up Call    Call within 2 business days of discharge: Yes    Patient: Goyo Bryan Patient : 1950   MRN: [id number]  Reason for Admission: Anemia  Discharge Date: 23 RARS: Readmission Risk Score: 14.3      Last Discharge [address] [facility]       Date Complaint Diagnosis Description Type Department Provider    23 Rectal Bleeding Rectal bleeding . .. ED to Hosp-Admission (Discharged) (ADMITTED) [address] [address], [place], [place]; Sacha Trujillo ... Attempted to contact patient today 23 for initial TCM/hospital discharge follow up for Anemia. Left message on home/mobile number requesting a return call back to CTN and provided contact information.     Juan Carlos Corbett APRN

## 2023-07-18 ENCOUNTER — PATIENT MESSAGE (OUTPATIENT)
Dept: CASE MANAGEMENT | Age: 73
End: 2023-07-18

## 2023-07-18 ENCOUNTER — CARE COORDINATION (OUTPATIENT)
Dept: CASE MANAGEMENT | Age: 73
End: 2023-07-18

## 2023-07-18 NOTE — CARE COORDINATION
[facility] Care Transitions Initial Follow Up Call    Call within 2 business days of discharge: Yes    Patient: Jessie Nevarez Patient : 1950   MRN: [id number]  Reason for Admission: Anemia  Discharge Date: 23 RARS: Readmission Risk Score: 14.3      Last Discharge [address] [facility]       Date Complaint Diagnosis Description Type Department Provider    23 Rectal Bleeding Rectal bleeding . .. ED to Hosp-Admission (Discharged) (ADMITTED) [address] [address], [place], [place]; Leonce Babinski ... Second attempt made today 23 for initial TCM/hospital discharge follow up. Left message on home/mobile number requesting a return call back to CTN and provided contact information. Noted patient is scheduled for HFU appt with Dr. Alissa Morley (PCP) on 23 at 1:30 pm. CTN signing off for Care Transition.     MORALES Gibson

## 2023-07-27 NOTE — PROGRESS NOTES
Physician Progress Note      PATIENT:               Anca Jimenez  CSN #:                  [id number]  :                       1950  ADMIT DATE:       2023 3:51 PM  [address] [facility] DATE:        2023 1:41 PM  RESPONDING  PROVIDER #:        Coleen West TEXT:    Dear ,    Pt admitted with weakness and fatigue and has anemia documented. If possible,   please document in progress notes and discharge summary further specificity   regarding the acuity and type of anemia:    The medical record reflects the following:  Risk Factors: DM, HTN, sleep apnea, chronic anemia  Clinical Indicators: per H&P: \"increased weakness and fatigue. She has also   been feeling short of breath\"; H&H currently 5.4/17.6 on 2023 8.4/26.5  Treatment: telemetry monitoring, GI consult, monitor H&H, blood transfusion,   IV Protonix    Thank You,  Jose Manuel Amaro RN, BSN, CDS  Clinical Documentation Improvement Specialist  Options provided:  -- Anemia due to acute blood loss  -- Anemia due to chronic blood loss  -- Anemia due to acute on chronic blood loss  -- Anemia due to iron deficiency  -- Other - I will add my own diagnosis  -- Disagree - Not applicable / Not valid  -- Disagree - Clinically unable to determine / Unknown  -- Refer to Clinical Documentation Reviewer    PROVIDER RESPONSE TEXT:    This patient has acute on chronic blood loss anemia.     Query created by: Jose Manuel Amaro on 2023 8:22 AM      Electronically signed by:  Catie Pascal DO 2023 11:26 AM

## 2023-08-17 DIAGNOSIS — K21.9 GASTROESOPHAGEAL REFLUX DISEASE, UNSPECIFIED WHETHER ESOPHAGITIS PRESENT: ICD-10-CM

## 2023-08-17 RX ORDER — OMEPRAZOLE 20 MG/1
CAPSULE, DELAYED RELEASE ORAL
Qty: 90 CAPSULE | Refills: 1 | Status: SHIPPED | OUTPATIENT
Start: 2023-08-17

## 2023-08-17 RX ORDER — FLUTICASONE PROPIONATE 50 MCG
SPRAY, SUSPENSION (ML) NASAL
Qty: 32 G | Refills: 1 | Status: SHIPPED | OUTPATIENT
Start: 2023-08-17

## 2023-08-17 NOTE — TELEPHONE ENCOUNTER
Last Appointment:  3/28/2023  Future Appointments   Date Time Provider [address]   9/26/2023  1:30 PM Sloane Coffman DO [address] [facility]

## 2023-08-24 ENCOUNTER — TELEPHONE (OUTPATIENT)
Dept: FAMILY MEDICINE CLINIC | Age: 73
End: 2023-08-24

## 2023-08-24 NOTE — TELEPHONE ENCOUNTER
----- Message from Princess Akash sent at 8/24/2023 11:22 AM EDT -----  Subject: Message to Provider    QUESTIONS  Information for Provider? Rep( Orquidea)is calling on behalf of pt. She   would like to confirm if PCP will follow pt for home health care through   [facility]. Pt is discharging home from [facility] and home   health was order from that facility. Please contact to discuss as soon as   possible.   ---------------------------------------------------------------------------  --------------  Erica KAN  [phone]; OK to leave message on voicemail  ---------------------------------------------------------------------------  --------------  SCRIPT ANSWERS  Relationship to Patient? Covered Entity  Covered Entity Type? Home Health Care? Representative Name?  [facility]

## 2023-08-24 NOTE — TELEPHONE ENCOUNTER
Called and spoke with [facility] and pt had chosen a different facility.   Dr. Tabby Mitchell has already signed for some Home health care visits

## 2023-08-24 NOTE — TELEPHONE ENCOUNTER
----- Message from Abigail Braxton sent at 8/24/2023 11:22 AM EDT -----  Subject: Message to Provider    QUESTIONS  Information for Provider? RepLuisa Heard)is calling on behalf of pt. She   would like to confirm if PCP will follow pt for home health care through   [facility]. Pt is discharging home from [facility] and home   health was order from that facility. Please contact to discuss as soon as   possible.   ---------------------------------------------------------------------------  --------------  Chloe Desouza INFO  [phone]; OK to leave message on voicemail  ---------------------------------------------------------------------------  --------------  SCRIPT ANSWERS  Relationship to Patient? Covered Entity  Covered Entity Type? Home Health Care? Representative Name?  [facility]

## 2023-08-25 ENCOUNTER — TELEPHONE (OUTPATIENT)
Dept: FAMILY MEDICINE CLINIC | Age: 73
End: 2023-08-25

## 2023-08-25 DIAGNOSIS — K92.2 ACUTE GASTROINTESTINAL HEMORRHAGE: Primary | ICD-10-CM

## 2023-08-30 RX ORDER — GABAPENTIN 300 MG/1
300 CAPSULE ORAL DAILY
Qty: 90 CAPSULE | Refills: 1 | Status: SHIPPED | OUTPATIENT
Start: 2023-08-30 | End: 2024-02-26

## 2023-08-30 NOTE — TELEPHONE ENCOUNTER
Last Appointment:  3/28/2023  Future Appointments   Date Time Provider [address]   9/11/2023  1:00 PM Jahaira Fajardo, DO [address] [facility]   9/26/2023  1:30 PM Jahaira Fajardo, DO [address] [facility]      Voice mail from patient. She stated that she misplaced her gabapentin and needs a new script sent to [facility]. I called the pharmacy and was told they filled a 90 day supply on 8/17.

## 2023-09-12 ENCOUNTER — OFFICE VISIT (OUTPATIENT)
Dept: FAMILY MEDICINE CLINIC | Age: 73
End: 2023-09-12

## 2023-09-12 VITALS
BODY MASS INDEX: 36.62 KG/M2 | DIASTOLIC BLOOD PRESSURE: 58 MMHG | WEIGHT: 199 LBS | RESPIRATION RATE: 16 BRPM | OXYGEN SATURATION: 97 % | HEART RATE: 94 BPM | SYSTOLIC BLOOD PRESSURE: 122 MMHG | TEMPERATURE: 97.7 F | HEIGHT: 62 IN

## 2023-09-12 DIAGNOSIS — K31.819 GAVE (GASTRIC ANTRAL VASCULAR ECTASIA): ICD-10-CM

## 2023-09-12 DIAGNOSIS — K55.20 AVM (ARTERIOVENOUS MALFORMATION) OF COLON: ICD-10-CM

## 2023-09-12 DIAGNOSIS — Z09 HOSPITAL DISCHARGE FOLLOW-UP: Primary | ICD-10-CM

## 2023-09-12 DIAGNOSIS — K92.2 GASTROINTESTINAL HEMORRHAGE, UNSPECIFIED GASTROINTESTINAL HEMORRHAGE TYPE: ICD-10-CM

## 2023-09-12 PROBLEM — D62 ACUTE BLOOD LOSS ANEMIA: Status: ACTIVE | Noted: 2023-07-23

## 2023-09-12 PROBLEM — K74.60 HEPATIC CIRRHOSIS (HCC): Status: ACTIVE | Noted: 2023-07-23

## 2023-09-12 RX ORDER — OCTREOTIDE ACETATE 100 UG/ML
INJECTION, SOLUTION INTRAVENOUS; SUBCUTANEOUS EVERY 12 HOURS
COMMUNITY
Start: 2023-09-11 | End: 2023-10-11

## 2023-09-12 RX ORDER — LIDOCAINE 4 G/G
2 PATCH TOPICAL DAILY
COMMUNITY
Start: 2023-08-25

## 2023-09-12 RX ORDER — PANTOPRAZOLE SODIUM 40 MG/1
40 TABLET, DELAYED RELEASE ORAL 2 TIMES DAILY
COMMUNITY
Start: 2023-07-25

## 2023-09-13 DIAGNOSIS — K21.9 GASTROESOPHAGEAL REFLUX DISEASE, UNSPECIFIED WHETHER ESOPHAGITIS PRESENT: ICD-10-CM

## 2023-09-13 RX ORDER — OMEPRAZOLE 20 MG/1
CAPSULE, DELAYED RELEASE ORAL
Qty: 120 CAPSULE | Refills: 0 | OUTPATIENT
Start: 2023-09-13

## 2023-09-14 ENCOUNTER — TELEPHONE (OUTPATIENT)
Dept: FAMILY MEDICINE CLINIC | Age: 73
End: 2023-09-14

## 2023-09-14 DIAGNOSIS — K21.9 GASTROESOPHAGEAL REFLUX DISEASE, UNSPECIFIED WHETHER ESOPHAGITIS PRESENT: ICD-10-CM

## 2023-09-14 RX ORDER — OMEPRAZOLE 20 MG/1
CAPSULE, DELAYED RELEASE ORAL
Qty: 120 CAPSULE | Refills: 0 | OUTPATIENT
Start: 2023-09-14

## 2023-09-14 NOTE — TELEPHONE ENCOUNTER
[facility] called to inform you that they will see Yamileth Hernandez 1 time a week for 3 weeks.  They will call when complete

## 2023-09-18 ENCOUNTER — TELEPHONE (OUTPATIENT)
Dept: NON INVASIVE DIAGNOSTICS | Age: 73
End: 2023-09-18

## 2023-09-18 NOTE — TELEPHONE ENCOUNTER
Routed  echo from  to Dr. Yassine Pulliam to check on renewal of order or cancellation of test. Awaiting on response.     Chaya Sorto RN, BSN

## 2023-10-26 ENCOUNTER — TELEPHONE (OUTPATIENT)
Dept: FAMILY MEDICINE CLINIC | Age: 73
End: 2023-10-26

## 2023-10-26 DIAGNOSIS — K31.819 GAVE (GASTRIC ANTRAL VASCULAR ECTASIA): ICD-10-CM

## 2023-10-26 DIAGNOSIS — D64.9 ACUTE ANEMIA: ICD-10-CM

## 2023-10-26 DIAGNOSIS — K92.2 GASTROINTESTINAL HEMORRHAGE, UNSPECIFIED GASTROINTESTINAL HEMORRHAGE TYPE: Primary | ICD-10-CM

## 2023-10-26 NOTE — TELEPHONE ENCOUNTER
Patient called requesting a referral for home health to [facility], please fax to Intake Department at [phone].

## 2023-11-02 RX ORDER — DULAGLUTIDE 4.5 MG/.5ML
4.5 INJECTION, SOLUTION SUBCUTANEOUS WEEKLY
Qty: 2 ML | Refills: 3 | Status: SHIPPED | OUTPATIENT
Start: 2023-11-02

## 2023-11-12 ENCOUNTER — HOSPITAL ENCOUNTER (INPATIENT)
Age: 73
LOS: 2 days | Discharge: NURSING, REHAB OR LONG-TERM CARE | DRG: 871 | End: 2023-11-14
Attending: EMERGENCY MEDICINE | Admitting: INTERNAL MEDICINE
Payer: MEDICARE

## 2023-11-12 ENCOUNTER — APPOINTMENT (OUTPATIENT)
Dept: CT IMAGING | Age: 73
DRG: 871 | End: 2023-11-12
Payer: MEDICARE

## 2023-11-12 ENCOUNTER — APPOINTMENT (OUTPATIENT)
Dept: GENERAL RADIOLOGY | Age: 73
DRG: 871 | End: 2023-11-12
Payer: MEDICARE

## 2023-11-12 DIAGNOSIS — B33.8 RESPIRATORY SYNCYTIAL VIRUS (RSV): ICD-10-CM

## 2023-11-12 DIAGNOSIS — D50.9 IRON DEFICIENCY ANEMIA, UNSPECIFIED IRON DEFICIENCY ANEMIA TYPE: ICD-10-CM

## 2023-11-12 DIAGNOSIS — R73.9 HYPERGLYCEMIA: ICD-10-CM
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
## 2023-11-12 PROBLEM — R53.1 GENERALIZED WEAKNESS: Status: ACTIVE | Noted: 2023-11-12

## 2023-11-12 PROBLEM — A41.9 SEPSIS (HCC): Status: ACTIVE | Noted: 2023-11-12

## 2023-11-12 LAB
ALBUMIN SERPL-MCNC: 3.4 G/DL (ref 3.5–5.2)
ALP SERPL-CCNC: 83 U/L (ref 35–104)
ALT SERPL-CCNC: 38 U/L (ref 0–32)
ANION GAP SERPL CALCULATED.3IONS-SCNC: 8 MMOL/L (ref 7–16)
AST SERPL-CCNC: 61 U/L (ref 0–31)
BASOPHILS # BLD: 0.03 K/UL (ref 0–0.2)
BASOPHILS NFR BLD: 0 % (ref 0–2)
BILIRUB SERPL-MCNC: 0.5 MG/DL (ref 0–1.2)
BNP SERPL-MCNC: 145 PG/ML (ref 0–450)
BUN SERPL-MCNC: 12 MG/DL (ref 6–23)
CALCIUM SERPL-MCNC: 8.8 MG/DL (ref 8.6–10.2)
CHLORIDE SERPL-SCNC: 103 MMOL/L (ref 98–107)
CO2 SERPL-SCNC: 24 MMOL/L (ref 22–29)
CREAT SERPL-MCNC: 0.6 MG/DL (ref 0.5–1)
EOSINOPHIL # BLD: 0.02 K/UL (ref 0.05–0.5)
EOSINOPHILS RELATIVE PERCENT: 0 % (ref 0–6)
ERYTHROCYTE [DISTWIDTH] IN BLOOD BY AUTOMATED COUNT: 19.7 % (ref 11.5–15)
GFR SERPL CREATININE-BSD FRML MDRD: >60 ML/MIN/1.73M2
GLUCOSE BLD-MCNC: 388 MG/DL (ref 74–99)
GLUCOSE SERPL-MCNC: 314 MG/DL (ref 74–99)
HCT VFR BLD AUTO: 24.8 % (ref 34–48)
HGB BLD-MCNC: 7.9 G/DL (ref 11.5–15.5)
IMM GRANULOCYTES # BLD AUTO: 0.06 K/UL (ref 0–0.58)
IMM GRANULOCYTES NFR BLD: 1 % (ref 0–5)
INFLUENZA A BY PCR: NOT DETECTED
INFLUENZA B BY PCR: NOT DETECTED
LYMPHOCYTES NFR BLD: 1.54 K/UL (ref 1.5–4)
LYMPHOCYTES RELATIVE PERCENT: 13 % (ref 20–42)
MCH RBC QN AUTO: 32.5 PG (ref 26–35)
MCHC RBC AUTO-ENTMCNC: 31.9 G/DL (ref 32–34.5)
MCV RBC AUTO: 102.1 FL (ref 80–99.9)
MONOCYTES NFR BLD: 1.23 K/UL (ref 0.1–0.95)
MONOCYTES NFR BLD: 11 % (ref 2–12)
NEUTROPHILS NFR BLD: 75 % (ref 43–80)
NEUTS SEG NFR BLD: 8.59 K/UL (ref 1.8–7.3)
PLATELET # BLD AUTO: 182 K/UL (ref 130–450)
PMV BLD AUTO: 10.5 FL (ref 7–12)
POTASSIUM SERPL-SCNC: 3.9 MMOL/L (ref 3.5–5)
PROT SERPL-MCNC: 6.1 G/DL (ref 6.4–8.3)
RBC # BLD AUTO: 2.43 M/UL (ref 3.5–5.5)
RSV BY PCR: DETECTED
SODIUM SERPL-SCNC: 135 MMOL/L (ref 132–146)
SPECIMEN SOURCE: ABNORMAL
TROPONIN I SERPL HS-MCNC: 21 NG/L (ref 0–9)
TROPONIN I SERPL HS-MCNC: 22 NG/L (ref 0–9)
WBC OTHER # BLD: 11.5 K/UL (ref 4.5–11.5)

## 2023-11-12 PROCEDURE — 1200000000 HC SEMI PRIVATE

## 2023-11-12 PROCEDURE — 2580000003 HC RX 258

## 2023-11-12 PROCEDURE — 72125 CT NECK SPINE W/O DYE: CPT

## 2023-11-12 PROCEDURE — 6360000002 HC RX W HCPCS

## 2023-11-12 PROCEDURE — 86901 BLOOD TYPING SEROLOGIC RH(D): CPT

## 2023-11-12 PROCEDURE — 96374 THER/PROPH/DIAG INJ IV PUSH: CPT

## 2023-11-12 PROCEDURE — 86900 BLOOD TYPING SEROLOGIC ABO: CPT

## 2023-11-12 PROCEDURE — 99285 EMERGENCY DEPT VISIT HI MDM: CPT

## 2023-11-12 PROCEDURE — 36415 COLL VENOUS BLD VENIPUNCTURE: CPT

## 2023-11-12 PROCEDURE — 96375 TX/PRO/DX INJ NEW DRUG ADDON: CPT

## 2023-11-12 PROCEDURE — 73590 X-RAY EXAM OF LOWER LEG: CPT

## 2023-11-12 PROCEDURE — 70450 CT HEAD/BRAIN W/O DYE: CPT

## 2023-11-12 PROCEDURE — 82962 GLUCOSE BLOOD TEST: CPT

## 2023-11-12 PROCEDURE — 87634 RSV DNA/RNA AMP PROBE: CPT

## 2023-11-12 PROCEDURE — 86923 COMPATIBILITY TEST ELECTRIC: CPT

## 2023-11-12 PROCEDURE — 94640 AIRWAY INHALATION TREATMENT: CPT

## 2023-11-12 PROCEDURE — 71045 X-RAY EXAM CHEST 1 VIEW: CPT

## 2023-11-12 PROCEDURE — 84484 ASSAY OF TROPONIN QUANT: CPT

## 2023-11-12 PROCEDURE — 83880 ASSAY OF NATRIURETIC PEPTIDE: CPT

## 2023-11-12 PROCEDURE — 73620 X-RAY EXAM OF FOOT: CPT

## 2023-11-12 PROCEDURE — 6370000000 HC RX 637 (ALT 250 FOR IP)

## 2023-11-12 PROCEDURE — 85025 COMPLETE CBC W/AUTO DIFF WBC: CPT

## 2023-11-12 PROCEDURE — 93005 ELECTROCARDIOGRAM TRACING: CPT

## 2023-11-12 PROCEDURE — 73560 X-RAY EXAM OF KNEE 1 OR 2: CPT

## 2023-11-12 PROCEDURE — 80053 COMPREHEN METABOLIC PANEL: CPT

## 2023-11-12 PROCEDURE — 87502 INFLUENZA DNA AMP PROBE: CPT

## 2023-11-12 PROCEDURE — 86850 RBC ANTIBODY SCREEN: CPT

## 2023-11-12 RX ORDER — DOXYCYCLINE HYCLATE 100 MG/1
100 CAPSULE ORAL EVERY 12 HOURS SCHEDULED
Status: DISCONTINUED | OUTPATIENT
Start: 2023-11-12 | End: 2023-11-14 | Stop reason: HOSPADM

## 2023-11-12 RX ORDER — ARFORMOTEROL TARTRATE 15 UG/2ML
15 SOLUTION RESPIRATORY (INHALATION)
Status: DISCONTINUED | OUTPATIENT
Start: 2023-11-12 | End: 2023-11-14 | Stop reason: HOSPADM

## 2023-11-12 RX ORDER — LIDOCAINE HYDROCHLORIDE 10 MG/ML
5 INJECTION, SOLUTION EPIDURAL; INFILTRATION; INTRACAUDAL; PERINEURAL ONCE
Status: COMPLETED | OUTPATIENT
Start: 2023-11-12 | End: 2023-11-13

## 2023-11-12 RX ORDER — GLUCAGON 1 MG/ML
1 KIT INJECTION PRN
Status: DISCONTINUED | OUTPATIENT
Start: 2023-11-12 | End: 2023-11-13

## 2023-11-12 RX ORDER — SODIUM CHLORIDE 9 MG/ML
INJECTION, SOLUTION INTRAVENOUS PRN
Status: DISCONTINUED | OUTPATIENT
Start: 2023-11-12 | End: 2023-11-14 | Stop reason: HOSPADM

## 2023-11-12 RX ORDER — 0.9 % SODIUM CHLORIDE 0.9 %
1000 INTRAVENOUS SOLUTION INTRAVENOUS ONCE
Status: COMPLETED | OUTPATIENT
Start: 2023-11-12 | End: 2023-11-12

## 2023-11-12 RX ORDER — GABAPENTIN 300 MG/1
300 CAPSULE ORAL DAILY
Status: DISCONTINUED | OUTPATIENT
Start: 2023-11-12 | End: 2023-11-14 | Stop reason: HOSPADM

## 2023-11-12 RX ORDER — DEXTROSE MONOHYDRATE 100 MG/ML
INJECTION, SOLUTION INTRAVENOUS CONTINUOUS PRN
Status: DISCONTINUED | OUTPATIENT
Start: 2023-11-12 | End: 2023-11-13

## 2023-11-12 RX ORDER — LIDOCAINE 4 G/G
2 PATCH TOPICAL DAILY
Status: DISCONTINUED | OUTPATIENT
Start: 2023-11-12 | End: 2023-11-14 | Stop reason: HOSPADM

## 2023-11-12 RX ORDER — VERAPAMIL HYDROCHLORIDE 120 MG/1
120 CAPSULE, EXTENDED RELEASE ORAL DAILY
Status: DISCONTINUED | OUTPATIENT
Start: 2023-11-12 | End: 2023-11-12

## 2023-11-12 RX ORDER — SODIUM CHLORIDE 0.9 % (FLUSH) 0.9 %
5-40 SYRINGE (ML) INJECTION PRN
Status: DISCONTINUED | OUTPATIENT
Start: 2023-11-12 | End: 2023-11-14 | Stop reason: HOSPADM

## 2023-11-12 RX ORDER — SODIUM CHLORIDE 9 MG/ML
INJECTION, SOLUTION INTRAVENOUS CONTINUOUS
Status: DISCONTINUED | OUTPATIENT
Start: 2023-11-12 | End: 2023-11-13

## 2023-11-12 RX ORDER — FLUTICASONE PROPIONATE 50 MCG
1 SPRAY, SUSPENSION (ML) NASAL ONCE
Status: COMPLETED | OUTPATIENT
Start: 2023-11-12 | End: 2023-11-12

## 2023-11-12 RX ORDER — ROPINIROLE 0.5 MG/1
0.5 TABLET, FILM COATED ORAL 3 TIMES DAILY
Status: DISCONTINUED | OUTPATIENT
Start: 2023-11-12 | End: 2023-11-14 | Stop reason: HOSPADM

## 2023-11-12 RX ORDER — SODIUM CHLORIDE 0.9 % (FLUSH) 0.9 %
5-40 SYRINGE (ML) INJECTION EVERY 12 HOURS SCHEDULED
Status: DISCONTINUED | OUTPATIENT
Start: 2023-11-12 | End: 2023-11-14 | Stop reason: HOSPADM

## 2023-11-12 RX ORDER — HEPARIN 100 UNIT/ML
3 SYRINGE INTRAVENOUS PRN
Status: DISCONTINUED | OUTPATIENT
Start: 2023-11-12 | End: 2023-11-14 | Stop reason: HOSPADM

## 2023-11-12 RX ORDER — HEPARIN 100 UNIT/ML
3 SYRINGE INTRAVENOUS EVERY 12 HOURS SCHEDULED
Status: DISCONTINUED | OUTPATIENT
Start: 2023-11-12 | End: 2023-11-14 | Stop reason: HOSPADM

## 2023-11-12 RX ORDER — FLUOXETINE HYDROCHLORIDE 20 MG/1
60 CAPSULE ORAL DAILY
Status: DISCONTINUED | OUTPATIENT
Start: 2023-11-13 | End: 2023-11-14 | Stop reason: HOSPADM

## 2023-11-12 RX ORDER — DIPHENHYDRAMINE HYDROCHLORIDE 50 MG/ML
25 INJECTION INTRAMUSCULAR; INTRAVENOUS ONCE
Status: COMPLETED | OUTPATIENT
Start: 2023-11-12 | End: 2023-11-12

## 2023-11-12 RX ORDER — BUDESONIDE 0.5 MG/2ML
500 INHALANT ORAL
Status: DISCONTINUED | OUTPATIENT
Start: 2023-11-12 | End: 2023-11-14 | Stop reason: HOSPADM

## 2023-11-12 RX ORDER — IPRATROPIUM BROMIDE AND ALBUTEROL SULFATE 2.5; .5 MG/3ML; MG/3ML
1 SOLUTION RESPIRATORY (INHALATION) EVERY 4 HOURS PRN
Status: DISCONTINUED | OUTPATIENT
Start: 2023-11-12 | End: 2023-11-14 | Stop reason: HOSPADM

## 2023-11-12 RX ORDER — INSULIN LISPRO 100 [IU]/ML
0-8 INJECTION, SOLUTION INTRAVENOUS; SUBCUTANEOUS
Status: DISCONTINUED | OUTPATIENT
Start: 2023-11-12 | End: 2023-11-13

## 2023-11-12 RX ORDER — FLUTICASONE PROPIONATE 50 MCG
1 SPRAY, SUSPENSION (ML) NASAL DAILY
Status: DISCONTINUED | OUTPATIENT
Start: 2023-11-13 | End: 2023-11-14 | Stop reason: HOSPADM

## 2023-11-12 RX ORDER — PROCHLORPERAZINE EDISYLATE 5 MG/ML
10 INJECTION INTRAMUSCULAR; INTRAVENOUS ONCE
Status: COMPLETED | OUTPATIENT
Start: 2023-11-12 | End: 2023-11-12

## 2023-11-12 RX ORDER — PANTOPRAZOLE SODIUM 40 MG/1
40 TABLET, DELAYED RELEASE ORAL
Status: DISCONTINUED | OUTPATIENT
Start: 2023-11-12 | End: 2023-11-13

## 2023-11-12 RX ORDER — INSULIN LISPRO 100 [IU]/ML
0-4 INJECTION, SOLUTION INTRAVENOUS; SUBCUTANEOUS NIGHTLY
Status: DISCONTINUED | OUTPATIENT
Start: 2023-11-12 | End: 2023-11-13

## 2023-11-12 RX ORDER — IBUPROFEN 400 MG/1
400 TABLET ORAL EVERY 6 HOURS PRN
Status: DISCONTINUED | OUTPATIENT
Start: 2023-11-12 | End: 2023-11-13

## 2023-11-12 RX ADMIN — IPRATROPIUM BROMIDE AND ALBUTEROL SULFATE 1 DOSE: .5; 2.5 SOLUTION RESPIRATORY (INHALATION) at 23:38

## 2023-11-12 RX ADMIN — FLUTICASONE PROPIONATE 1 SPRAY: 50 SPRAY, METERED NASAL at 23:41

## 2023-11-12 RX ADMIN — SODIUM CHLORIDE: 900 INJECTION, SOLUTION INTRAVENOUS at 16:33

## 2023-11-12 RX ADMIN — SODIUM CHLORIDE, PRESERVATIVE FREE 10 ML: 5 INJECTION INTRAVENOUS at 22:05

## 2023-11-12 RX ADMIN — ARFORMOTEROL TARTRATE 15 MCG: 15 SOLUTION RESPIRATORY (INHALATION) at 21:25

## 2023-11-12 RX ADMIN — ROPINIROLE HYDROCHLORIDE 0.5 MG: 0.5 TABLET, FILM COATED ORAL at 22:04

## 2023-11-12 RX ADMIN — PROCHLORPERAZINE EDISYLATE 10 MG: 5 INJECTION INTRAMUSCULAR; INTRAVENOUS at 11:09

## 2023-11-12 RX ADMIN — DOXYCYCLINE HYCLATE 100 MG: 100 CAPSULE ORAL at 21:54

## 2023-11-12 RX ADMIN — DIPHENHYDRAMINE HYDROCHLORIDE 25 MG: 50 INJECTION INTRAMUSCULAR; INTRAVENOUS at 11:09

## 2023-11-12 RX ADMIN — BUDESONIDE INHALATION 500 MCG: 0.5 SUSPENSION RESPIRATORY (INHALATION) at 21:25

## 2023-11-12 RX ADMIN — INSULIN LISPRO 4 UNITS: 100 INJECTION, SOLUTION INTRAVENOUS; SUBCUTANEOUS at 22:00

## 2023-11-12 RX ADMIN — GABAPENTIN 300 MG: 300 CAPSULE ORAL at 21:54

## 2023-11-12 RX ADMIN — IBUPROFEN 400 MG: 400 TABLET, FILM COATED ORAL at 23:02

## 2023-11-12 RX ADMIN — SODIUM CHLORIDE 1000 ML: 9 INJECTION, SOLUTION INTRAVENOUS at 08:08

## 2023-11-12 RX ADMIN — FLUTICASONE PROPIONATE 1 SPRAY: 50 SPRAY, METERED NASAL at 23:44

## 2023-11-12 ASSESSMENT — PAIN DESCRIPTION - LOCATION: LOCATION: HEAD

## 2023-11-12 ASSESSMENT — PATIENT HEALTH QUESTIONNAIRE - PHQ9
2. FEELING DOWN, DEPRESSED OR HOPELESS: 0
SUM OF ALL RESPONSES TO PHQ QUESTIONS 1-9: 0
SUM OF ALL RESPONSES TO PHQ9 QUESTIONS 1 & 2: 0
SUM OF ALL RESPONSES TO PHQ QUESTIONS 1-9: 0
SUM OF ALL RESPONSES TO PHQ QUESTIONS 1-9: 0
1. LITTLE INTEREST OR PLEASURE IN DOING THINGS: 0
SUM OF ALL RESPONSES TO PHQ QUESTIONS 1-9: 0

## 2023-11-12 ASSESSMENT — PAIN DESCRIPTION - DESCRIPTORS: DESCRIPTORS: ACHING

## 2023-11-12 ASSESSMENT — PAIN - FUNCTIONAL ASSESSMENT
PAIN_FUNCTIONAL_ASSESSMENT: NONE - DENIES PAIN
PAIN_FUNCTIONAL_ASSESSMENT: NONE - DENIES PAIN

## 2023-11-12 ASSESSMENT — PAIN SCALES - GENERAL
PAINLEVEL_OUTOF10: 3
PAINLEVEL_OUTOF10: 0

## 2023-11-12 NOTE — DISCHARGE INSTRUCTIONS
Your information:  Name: Jed Calix  : 1950    Your instructions:  YOU ARE BEING DISCHARGED HOME. PLEASE MAKE AND KEEP YOUR FOLLOW UP APPOINTMENTS. What to do after you leave the hospital:    Recommended diet: regular diet    Recommended activity: activity as tolerated        The following personal items were collected during your admission and were returned to you:    Belongings  Dental Appliances: None  Vision - Corrective Lenses: None  Hearing Aid: None  Clothing: Footwear, Robe  Jewelry: Earrings, Ring  Body Piercings Removed: Yes  Electronic Devices: Cell Phone  Weapons (Notify Protective Services/Security): None  Other Valuables: At home  Home Medications: None  Valuables Given To: Patient  Provide Name(s) of Who Valuable(s) Were Given To: CHERRY  Responsible person(s) in the waiting room: NA  Patient approves for provider to speak to responsible person post operatively: Yes    Information obtained by:  By signing below, I understand that if any problems occur once I leave the hospital I am to contact Dr. Erika Chicas. I understand and acknowledge receipt of the instructions indicated above.

## 2023-11-12 NOTE — ED PROVIDER NOTES
[address]        Pt Name: Michael Dallas  MRN: [id number]  [address] [facility] 1950  Date of evaluation: 11/12/2023  Provider: Adam Boone DO  PCP: Giorgi Davila DO  Note Started: 7:36 AM EST 11/12/23    CHIEF COMPLAINT       Chief Complaint   Patient presents with    Fall     Left ankle; left knee       HISTORY OF PRESENT ILLNESS: 1 or more Elements   History From: Patient      Michael Dallas is a 68 y.o. female who presents to the emergency department for evaluation following fall. Patient states that her real concern is that she has a cough and she is concerned she has RSV. Patient states that today she may have fallen but she does not remember. Patient states she has left foot, ankle, and knee pain. Patient did not ambulate since the accident. Patient has full range of motion. Patient also states that she has received numerous blood transfusions for a known GI bleed. Patient states that she was sick starting on Wednesday of this last week. Patient denies fever, chills, nausea, vomiting, abdominal pain, shortness of breath, chest pain, headache, neck pain  Patient confirms fall, leg pain, cough          Nursing Notes were all reviewed and agreed with or any disagreements were addressed in the HPI. REVIEW OF SYSTEMS :      Positives and Pertinent negatives as per HPI.      SURGICAL HISTORY     Past Surgical History:   Procedure Laterality Date    CARPAL TUNNEL RELEASE Bilateral     CATARACT REMOVAL Right     COLONOSCOPY      OTHER SURGICAL HISTORY Bilateral 01/21/2021    BILATERAL L3,L4 MEDIAL BRANCH AND L5 DORSAL RAMUS RADIOFREQUENCY    PAIN MANAGEMENT PROCEDURE Bilateral 01/21/2021    BILATERAL L3,4 MEDIAL BRANCH AND L5 DORSAL RAMUS  RADIOFREQUENCY ABLATION WITH SEDATION  (CPT [id number]) performed by Jose Tobin MD at [address] [facility] N/A 03/19/2021    T11 KYPHOPLASTY (2 C-ARMS) performed by [facility]

## 2023-11-12 NOTE — ED NOTES
IV access obtained at this time. Labwork sent and pt placed on purwick. Pt C/O of burning and dysuria when trying to urinate. Dr Beto Sheppard notified.       Trevon Vallecillo RN  11/12/23 7250

## 2023-11-12 NOTE — ED NOTES
Updated report given to [facility] on 4th floor     [place], [place]  11/12/23 0711

## 2023-11-12 NOTE — ED NOTES
Resident to bedside to attempt IV access, Night shift RN's unsuccessful at access at this time.       Sacha Quintero RN  11/12/23 2494

## 2023-11-12 NOTE — ED NOTES
Iv access attempted again via [address]  Meds successfully given, However, iv infiltrated when connected to IV fluids     Sacha Quintero RN  11/12/23 8879

## 2023-11-12 NOTE — ED NOTES
Asked nursing supervisor about bed assignment. Supervisor states, it is not an issue to take pt to 4th floor. Called floor 4 to update on line status. Floor states they cannot take pt.       Loraine Green RN  11/12/23 0550

## 2023-11-12 NOTE — ED NOTES
Phone Message left with PICC team. Notified that there is a pt on 4th floor who will need a PICC placed tomorrow     Yamil Ritchie RN  11/12/23 8737

## 2023-11-12 NOTE — ED NOTES
Plan in place for PICC line. ED  \"perfect served\" IV team for PICC line.      Shannan Amos RN  11/12/23 0954

## 2023-11-12 NOTE — ED NOTES
PROCEDURE  11/12/23       Time: 1530    CENTRAL LINE INSERTION  Risks, benefits and alternatives (for applicable procedures below) described. Performed By: Cleo Georges DO and EM Attending Physician. Indication: inability to gain peripheral access. Informed consent: Verbal consent obtained. The patient was counseled regarding the procedure in person, it's indications, risks, potential complications and alternatives and any questions were answered. Verbal consent was obtained. Procedure: After routine sterile preparation, local anesthesia obtained by infiltration using 1% Lidocaine without epinephrine. A right 3-Lumen 7F Central Venous Catheter was placed by femoral vein approach and secured by standard fashion. Ultrasound Guidance:   used. Number of Attempts: 1  Post-procedure Findings: A post procedural chest x-ray  was not indicated. Patient tolerated the procedure well.           Cleo Georges DO  Resident  11/12/23 5786

## 2023-11-12 NOTE — ED TRIAGE NOTES
Patient states she ended up on the floor in the bathroom due to weakness. No LOC; did not hit her head.  -blood thinners. C/o left ankle left knee.

## 2023-11-13 LAB
ALBUMIN SERPL-MCNC: 2.8 G/DL (ref 3.5–5.2)
ALP SERPL-CCNC: 72 U/L (ref 35–104)
ALT SERPL-CCNC: 31 U/L (ref 0–32)
ANION GAP SERPL CALCULATED.3IONS-SCNC: 9 MMOL/L (ref 7–16)
AST SERPL-CCNC: 53 U/L (ref 0–31)
B PARAP IS1001 DNA NPH QL NAA+NON-PROBE: NOT DETECTED
B PERT DNA SPEC QL NAA+PROBE: NOT DETECTED
BASOPHILS # BLD: 0.04 K/UL (ref 0–0.2)
BASOPHILS NFR BLD: 1 % (ref 0–2)
BILIRUB SERPL-MCNC: 0.5 MG/DL (ref 0–1.2)
BUN SERPL-MCNC: 17 MG/DL (ref 6–23)
C PNEUM DNA NPH QL NAA+NON-PROBE: NOT DETECTED
CALCIUM SERPL-MCNC: 8.5 MG/DL (ref 8.6–10.2)
CHLORIDE SERPL-SCNC: 104 MMOL/L (ref 98–107)
CHOLEST SERPL-MCNC: 153 MG/DL
CO2 SERPL-SCNC: 21 MMOL/L (ref 22–29)
CREAT SERPL-MCNC: 0.7 MG/DL (ref 0.5–1)
EOSINOPHIL # BLD: 0.22 K/UL (ref 0.05–0.5)
EOSINOPHILS RELATIVE PERCENT: 3 % (ref 0–6)
ERYTHROCYTE [DISTWIDTH] IN BLOOD BY AUTOMATED COUNT: 19.8 % (ref 11.5–15)
FLUAV RNA NPH QL NAA+NON-PROBE: NOT DETECTED
FLUBV RNA NPH QL NAA+NON-PROBE: NOT DETECTED
FOLATE SERPL-MCNC: 9.3 NG/ML (ref 4.8–24.2)
GFR SERPL CREATININE-BSD FRML MDRD: >60 ML/MIN/1.73M2
GLUCOSE BLD-MCNC: 337 MG/DL (ref 74–99)
GLUCOSE BLD-MCNC: 375 MG/DL (ref 74–99)
GLUCOSE BLD-MCNC: 390 MG/DL (ref 74–99)
GLUCOSE BLD-MCNC: 482 MG/DL (ref 74–99)
GLUCOSE BLD-MCNC: 496 MG/DL (ref 74–99)
GLUCOSE SERPL-MCNC: 313 MG/DL (ref 74–99)
HADV DNA NPH QL NAA+NON-PROBE: NOT DETECTED
HBA1C MFR BLD: 6 % (ref 4–5.6)
HBA1C MFR BLD: 6.2 % (ref 4–5.6)
HCOV 229E RNA NPH QL NAA+NON-PROBE: NOT DETECTED
HCOV HKU1 RNA NPH QL NAA+NON-PROBE: NOT DETECTED
HCOV NL63 RNA NPH QL NAA+NON-PROBE: NOT DETECTED
HCOV OC43 RNA NPH QL NAA+NON-PROBE: NOT DETECTED
HCT VFR BLD AUTO: 21.8 % (ref 34–48)
HCT VFR BLD AUTO: 25.3 % (ref 34–48)
HDLC SERPL-MCNC: 33 MG/DL
HGB BLD-MCNC: 6.7 G/DL (ref 11.5–15.5)
HGB BLD-MCNC: 6.7 G/DL (ref 11.5–15.5)
HGB BLD-MCNC: 6.9 G/DL (ref 11.5–15.5)
HGB BLD-MCNC: 8.2 G/DL (ref 11.5–15.5)
HGB BLD-MCNC: 8.2 G/DL (ref 11.5–15.5)
HMPV RNA NPH QL NAA+NON-PROBE: NOT DETECTED
HPIV1 RNA NPH QL NAA+NON-PROBE: NOT DETECTED
HPIV2 RNA NPH QL NAA+NON-PROBE: NOT DETECTED
HPIV3 RNA NPH QL NAA+NON-PROBE: NOT DETECTED
HPIV4 RNA NPH QL NAA+NON-PROBE: NOT DETECTED
IMM GRANULOCYTES # BLD AUTO: 0.05 K/UL (ref 0–0.58)
IMM GRANULOCYTES NFR BLD: 1 % (ref 0–5)
IRON SATN MFR SERPL: 12 % (ref 15–50)
IRON SERPL-MCNC: 31 UG/DL (ref 37–145)
LDLC SERPL CALC-MCNC: 103 MG/DL
LYMPHOCYTES NFR BLD: 1.25 K/UL (ref 1.5–4)
LYMPHOCYTES RELATIVE PERCENT: 19 % (ref 20–42)
M PNEUMO DNA NPH QL NAA+NON-PROBE: NOT DETECTED
MAGNESIUM SERPL-MCNC: 2.1 MG/DL (ref 1.6–2.6)
MCH RBC QN AUTO: 32.7 PG (ref 26–35)
MCHC RBC AUTO-ENTMCNC: 30.7 G/DL (ref 32–34.5)
MCV RBC AUTO: 106.3 FL (ref 80–99.9)
MONOCYTES NFR BLD: 0.67 K/UL (ref 0.1–0.95)
MONOCYTES NFR BLD: 10 % (ref 2–12)
NEUTROPHILS NFR BLD: 66 % (ref 43–80)
NEUTS SEG NFR BLD: 4.43 K/UL (ref 1.8–7.3)
PLATELET # BLD AUTO: 144 K/UL (ref 130–450)
PMV BLD AUTO: 10.8 FL (ref 7–12)
POTASSIUM SERPL-SCNC: 3.5 MMOL/L (ref 3.5–5)
PROT SERPL-MCNC: 5.8 G/DL (ref 6.4–8.3)
RBC # BLD AUTO: 2.05 M/UL (ref 3.5–5.5)
RSV RNA NPH QL NAA+NON-PROBE: DETECTED
RV+EV RNA NPH QL NAA+NON-PROBE: NOT DETECTED
SARS-COV-2 RNA NPH QL NAA+NON-PROBE: NOT DETECTED
SODIUM SERPL-SCNC: 134 MMOL/L (ref 132–146)
SPECIMEN DESCRIPTION: ABNORMAL
T4 FREE SERPL-MCNC: 1 NG/DL (ref 0.9–1.7)
TIBC SERPL-MCNC: 250 UG/DL (ref 250–450)
TRIGL SERPL-MCNC: 87 MG/DL
TSH SERPL DL<=0.05 MIU/L-ACNC: 0.95 UIU/ML (ref 0.27–4.2)
VIT B12 SERPL-MCNC: 1203 PG/ML (ref 211–946)
VLDLC SERPL CALC-MCNC: 17 MG/DL
WBC OTHER # BLD: 6.7 K/UL (ref 4.5–11.5)

## 2023-11-13 PROCEDURE — 85018 HEMOGLOBIN: CPT

## 2023-11-13 PROCEDURE — 6360000002 HC RX W HCPCS

## 2023-11-13 PROCEDURE — 97161 PT EVAL LOW COMPLEX 20 MIN: CPT | Performed by: PHYSICAL THERAPIST

## 2023-11-13 PROCEDURE — 83540 ASSAY OF IRON: CPT

## 2023-11-13 PROCEDURE — 97530 THERAPEUTIC ACTIVITIES: CPT

## 2023-11-13 PROCEDURE — C9113 INJ PANTOPRAZOLE SODIUM, VIA: HCPCS | Performed by: NURSE PRACTITIONER

## 2023-11-13 PROCEDURE — 83735 ASSAY OF MAGNESIUM: CPT

## 2023-11-13 PROCEDURE — 97165 OT EVAL LOW COMPLEX 30 MIN: CPT

## 2023-11-13 PROCEDURE — 84439 ASSAY OF FREE THYROXINE: CPT

## 2023-11-13 PROCEDURE — 80061 LIPID PANEL: CPT

## 2023-11-13 PROCEDURE — 6360000002 HC RX W HCPCS: Performed by: NURSE PRACTITIONER

## 2023-11-13 PROCEDURE — 87899 AGENT NOS ASSAY W/OPTIC: CPT

## 2023-11-13 PROCEDURE — 2500000003 HC RX 250 WO HCPCS

## 2023-11-13 PROCEDURE — 83550 IRON BINDING TEST: CPT

## 2023-11-13 PROCEDURE — 2580000003 HC RX 258

## 2023-11-13 PROCEDURE — 2580000003 HC RX 258: Performed by: HOSPITALIST

## 2023-11-13 PROCEDURE — C1751 CATH, INF, PER/CENT/MIDLINE: HCPCS

## 2023-11-13 PROCEDURE — 36430 TRANSFUSION BLD/BLD COMPNT: CPT

## 2023-11-13 PROCEDURE — 82746 ASSAY OF FOLIC ACID SERUM: CPT

## 2023-11-13 PROCEDURE — 87040 BLOOD CULTURE FOR BACTERIA: CPT

## 2023-11-13 PROCEDURE — 6360000002 HC RX W HCPCS: Performed by: INTERNAL MEDICINE

## 2023-11-13 PROCEDURE — 97116 GAIT TRAINING THERAPY: CPT | Performed by: PHYSICAL THERAPIST

## 2023-11-13 PROCEDURE — 6360000002 HC RX W HCPCS: Performed by: HOSPITALIST

## 2023-11-13 PROCEDURE — 2580000003 HC RX 258: Performed by: INTERNAL MEDICINE

## 2023-11-13 PROCEDURE — 6370000000 HC RX 637 (ALT 250 FOR IP): Performed by: INTERNAL MEDICINE

## 2023-11-13 PROCEDURE — 94640 AIRWAY INHALATION TREATMENT: CPT

## 2023-11-13 PROCEDURE — 87449 NOS EACH ORGANISM AG IA: CPT

## 2023-11-13 PROCEDURE — 6370000000 HC RX 637 (ALT 250 FOR IP): Performed by: NURSE PRACTITIONER

## 2023-11-13 PROCEDURE — 82962 GLUCOSE BLOOD TEST: CPT

## 2023-11-13 PROCEDURE — 85025 COMPLETE CBC W/AUTO DIFF WBC: CPT

## 2023-11-13 PROCEDURE — 6370000000 HC RX 637 (ALT 250 FOR IP)

## 2023-11-13 PROCEDURE — 97530 THERAPEUTIC ACTIVITIES: CPT | Performed by: PHYSICAL THERAPIST

## 2023-11-13 PROCEDURE — 82607 VITAMIN B-12: CPT

## 2023-11-13 PROCEDURE — 36415 COLL VENOUS BLD VENIPUNCTURE: CPT

## 2023-11-13 PROCEDURE — 0202U NFCT DS 22 TRGT SARS-COV-2: CPT

## 2023-11-13 PROCEDURE — 85014 HEMATOCRIT: CPT

## 2023-11-13 PROCEDURE — 36410 VNPNXR 3YR/> PHY/QHP DX/THER: CPT

## 2023-11-13 PROCEDURE — 80053 COMPREHEN METABOLIC PANEL: CPT

## 2023-11-13 PROCEDURE — 1200000000 HC SEMI PRIVATE

## 2023-11-13 PROCEDURE — P9016 RBC LEUKOCYTES REDUCED: HCPCS

## 2023-11-13 PROCEDURE — 76937 US GUIDE VASCULAR ACCESS: CPT

## 2023-11-13 PROCEDURE — 84145 PROCALCITONIN (PCT): CPT

## 2023-11-13 PROCEDURE — 84443 ASSAY THYROID STIM HORMONE: CPT

## 2023-11-13 PROCEDURE — 05HD33Z INSERTION OF INFUSION DEVICE INTO RIGHT CEPHALIC VEIN, PERCUTANEOUS APPROACH: ICD-10-PCS | Performed by: INTERNAL MEDICINE

## 2023-11-13 PROCEDURE — 83036 HEMOGLOBIN GLYCOSYLATED A1C: CPT

## 2023-11-13 PROCEDURE — 36592 COLLECT BLOOD FROM PICC: CPT

## 2023-11-13 RX ORDER — INSULIN LISPRO 100 [IU]/ML
8 INJECTION, SOLUTION INTRAVENOUS; SUBCUTANEOUS ONCE
Status: COMPLETED | OUTPATIENT
Start: 2023-11-13 | End: 2023-11-13

## 2023-11-13 RX ORDER — HEPARIN 100 UNIT/ML
1 SYRINGE INTRAVENOUS EVERY 12 HOURS SCHEDULED
Status: DISCONTINUED | OUTPATIENT
Start: 2023-11-13 | End: 2023-11-14 | Stop reason: HOSPADM

## 2023-11-13 RX ORDER — INSULIN LISPRO 100 [IU]/ML
12 INJECTION, SOLUTION INTRAVENOUS; SUBCUTANEOUS ONCE
Status: COMPLETED | OUTPATIENT
Start: 2023-11-13 | End: 2023-11-13

## 2023-11-13 RX ORDER — INSULIN LISPRO 100 [IU]/ML
0-4 INJECTION, SOLUTION INTRAVENOUS; SUBCUTANEOUS NIGHTLY
Status: DISCONTINUED | OUTPATIENT
Start: 2023-11-13 | End: 2023-11-13

## 2023-11-13 RX ORDER — SODIUM CHLORIDE 9 MG/ML
INJECTION, SOLUTION INTRAVENOUS PRN
Status: DISCONTINUED | OUTPATIENT
Start: 2023-11-13 | End: 2023-11-14 | Stop reason: HOSPADM

## 2023-11-13 RX ORDER — FUROSEMIDE 10 MG/ML
20 INJECTION INTRAMUSCULAR; INTRAVENOUS SEE ADMIN INSTRUCTIONS
Status: DISCONTINUED | OUTPATIENT
Start: 2023-11-13 | End: 2023-11-14 | Stop reason: HOSPADM

## 2023-11-13 RX ORDER — OCTREOTIDE ACETATE 50 UG/ML
50 INJECTION, SOLUTION INTRAVENOUS; SUBCUTANEOUS ONCE
Status: COMPLETED | OUTPATIENT
Start: 2023-11-13 | End: 2023-11-13

## 2023-11-13 RX ORDER — GUAIFENESIN 600 MG/1
1200 TABLET, EXTENDED RELEASE ORAL 2 TIMES DAILY
Status: DISCONTINUED | OUTPATIENT
Start: 2023-11-13 | End: 2023-11-14 | Stop reason: HOSPADM

## 2023-11-13 RX ORDER — GLUCAGON 1 MG/ML
1 KIT INJECTION PRN
Status: DISCONTINUED | OUTPATIENT
Start: 2023-11-13 | End: 2023-11-14 | Stop reason: HOSPADM

## 2023-11-13 RX ORDER — SODIUM CHLORIDE 0.9 % (FLUSH) 0.9 %
5-40 SYRINGE (ML) INJECTION PRN
Status: DISCONTINUED | OUTPATIENT
Start: 2023-11-13 | End: 2023-11-14 | Stop reason: HOSPADM

## 2023-11-13 RX ORDER — INSULIN LISPRO 100 [IU]/ML
0-8 INJECTION, SOLUTION INTRAVENOUS; SUBCUTANEOUS
Status: DISCONTINUED | OUTPATIENT
Start: 2023-11-13 | End: 2023-11-13

## 2023-11-13 RX ORDER — PANTOPRAZOLE SODIUM 40 MG/10ML
40 INJECTION, POWDER, LYOPHILIZED, FOR SOLUTION INTRAVENOUS 2 TIMES DAILY
Status: DISCONTINUED | OUTPATIENT
Start: 2023-11-13 | End: 2023-11-14 | Stop reason: HOSPADM

## 2023-11-13 RX ORDER — SODIUM CHLORIDE 0.9 % (FLUSH) 0.9 %
5-40 SYRINGE (ML) INJECTION EVERY 12 HOURS SCHEDULED
Status: DISCONTINUED | OUTPATIENT
Start: 2023-11-13 | End: 2023-11-14 | Stop reason: HOSPADM

## 2023-11-13 RX ORDER — HEPARIN 100 UNIT/ML
1 SYRINGE INTRAVENOUS PRN
Status: DISCONTINUED | OUTPATIENT
Start: 2023-11-13 | End: 2023-11-14 | Stop reason: HOSPADM

## 2023-11-13 RX ORDER — INSULIN LISPRO 100 [IU]/ML
0-16 INJECTION, SOLUTION INTRAVENOUS; SUBCUTANEOUS
Status: DISCONTINUED | OUTPATIENT
Start: 2023-11-13 | End: 2023-11-14 | Stop reason: HOSPADM

## 2023-11-13 RX ORDER — DEXTROSE MONOHYDRATE 100 MG/ML
INJECTION, SOLUTION INTRAVENOUS CONTINUOUS PRN
Status: DISCONTINUED | OUTPATIENT
Start: 2023-11-13 | End: 2023-11-14 | Stop reason: HOSPADM

## 2023-11-13 RX ORDER — INSULIN LISPRO 100 [IU]/ML
0-4 INJECTION, SOLUTION INTRAVENOUS; SUBCUTANEOUS NIGHTLY
Status: DISCONTINUED | OUTPATIENT
Start: 2023-11-13 | End: 2023-11-14 | Stop reason: HOSPADM

## 2023-11-13 RX ADMIN — ROPINIROLE HYDROCHLORIDE 0.5 MG: 0.5 TABLET, FILM COATED ORAL at 20:13

## 2023-11-13 RX ADMIN — PANTOPRAZOLE SODIUM 40 MG: 40 INJECTION, POWDER, FOR SOLUTION INTRAVENOUS at 09:35

## 2023-11-13 RX ADMIN — INSULIN LISPRO 8 UNITS: 100 INJECTION, SOLUTION INTRAVENOUS; SUBCUTANEOUS at 10:03

## 2023-11-13 RX ADMIN — INSULIN LISPRO 4 UNITS: 100 INJECTION, SOLUTION INTRAVENOUS; SUBCUTANEOUS at 20:14

## 2023-11-13 RX ADMIN — HEPARIN 300 UNITS: 100 SYRINGE at 09:53

## 2023-11-13 RX ADMIN — OCTREOTIDE ACETATE 50 MCG: 50 INJECTION, SOLUTION INTRAVENOUS; SUBCUTANEOUS at 16:30

## 2023-11-13 RX ADMIN — INSULIN LISPRO 8 UNITS: 100 INJECTION, SOLUTION INTRAVENOUS; SUBCUTANEOUS at 12:15

## 2023-11-13 RX ADMIN — FLUTICASONE PROPIONATE 1 SPRAY: 50 SPRAY, METERED NASAL at 09:35

## 2023-11-13 RX ADMIN — INSULIN LISPRO 8 UNITS: 100 INJECTION, SOLUTION INTRAVENOUS; SUBCUTANEOUS at 16:39

## 2023-11-13 RX ADMIN — ROPINIROLE HYDROCHLORIDE 0.5 MG: 0.5 TABLET, FILM COATED ORAL at 09:36

## 2023-11-13 RX ADMIN — LIDOCAINE HYDROCHLORIDE 5 ML: 10 SOLUTION INTRAVENOUS at 11:57

## 2023-11-13 RX ADMIN — ARFORMOTEROL TARTRATE 15 MCG: 15 SOLUTION RESPIRATORY (INHALATION) at 06:24

## 2023-11-13 RX ADMIN — SODIUM CHLORIDE, PRESERVATIVE FREE 10 ML: 5 INJECTION INTRAVENOUS at 20:15

## 2023-11-13 RX ADMIN — METHYLPREDNISOLONE SODIUM SUCCINATE 40 MG: 40 INJECTION, POWDER, FOR SOLUTION INTRAMUSCULAR; INTRAVENOUS at 09:35

## 2023-11-13 RX ADMIN — PANTOPRAZOLE SODIUM 40 MG: 40 INJECTION, POWDER, FOR SOLUTION INTRAVENOUS at 20:13

## 2023-11-13 RX ADMIN — FLUOXETINE 60 MG: 20 CAPSULE ORAL at 09:36

## 2023-11-13 RX ADMIN — ROPINIROLE HYDROCHLORIDE 0.5 MG: 0.5 TABLET, FILM COATED ORAL at 15:37

## 2023-11-13 RX ADMIN — SODIUM CHLORIDE 125 MG: 9 INJECTION, SOLUTION INTRAVENOUS at 17:34

## 2023-11-13 RX ADMIN — GUAIFENESIN 1200 MG: 600 TABLET, EXTENDED RELEASE ORAL at 09:36

## 2023-11-13 RX ADMIN — OCTREOTIDE ACETATE 50 MCG/HR: 500 INJECTION, SOLUTION INTRAVENOUS; SUBCUTANEOUS at 16:32

## 2023-11-13 RX ADMIN — BUDESONIDE INHALATION 500 MCG: 0.5 SUSPENSION RESPIRATORY (INHALATION) at 18:24

## 2023-11-13 RX ADMIN — INSULIN LISPRO 8 UNITS: 100 INJECTION, SOLUTION INTRAVENOUS; SUBCUTANEOUS at 21:11

## 2023-11-13 RX ADMIN — METHYLPREDNISOLONE SODIUM SUCCINATE 40 MG: 40 INJECTION, POWDER, FOR SOLUTION INTRAMUSCULAR; INTRAVENOUS at 20:13

## 2023-11-13 RX ADMIN — INSULIN LISPRO 12 UNITS: 100 INJECTION, SOLUTION INTRAVENOUS; SUBCUTANEOUS at 17:35

## 2023-11-13 RX ADMIN — ARFORMOTEROL TARTRATE 15 MCG: 15 SOLUTION RESPIRATORY (INHALATION) at 18:24

## 2023-11-13 RX ADMIN — GUAIFENESIN 1200 MG: 600 TABLET, EXTENDED RELEASE ORAL at 20:14

## 2023-11-13 RX ADMIN — GABAPENTIN 300 MG: 300 CAPSULE ORAL at 09:36

## 2023-11-13 RX ADMIN — BUDESONIDE INHALATION 500 MCG: 0.5 SUSPENSION RESPIRATORY (INHALATION) at 06:24

## 2023-11-13 RX ADMIN — DOXYCYCLINE HYCLATE 100 MG: 100 CAPSULE ORAL at 20:14

## 2023-11-13 RX ADMIN — DOXYCYCLINE HYCLATE 100 MG: 100 CAPSULE ORAL at 09:36

## 2023-11-13 RX ADMIN — SODIUM CHLORIDE, PRESERVATIVE FREE 10 ML: 5 INJECTION INTRAVENOUS at 09:35

## 2023-11-13 RX ADMIN — SODIUM CHLORIDE, PRESERVATIVE FREE 10 ML: 5 INJECTION INTRAVENOUS at 20:13

## 2023-11-13 NOTE — ACP (ADVANCE CARE PLANNING)
Advance Care Planning       The patient has appointed the following active healthcare agents:    Primary Decision Maker: Lorrie Barber - Brother/Sister - [phone]    Primary Decision Maker: Pancho Asif Child - [phone]    The Patient has the following current code status:    Code Status: DNR-CC

## 2023-11-13 NOTE — H&P
[facility]                    [facility]                              HISTORY AND PHYSICAL    PATIENT NAME: Jose Armando Taylro                    :        1950  MED REC NO:   [id number]                            ROOM:       6685  ACCOUNT NO:   [de-identified]                           ADMIT DATE: 2023  PROVIDER:     Harper Whitaker DO    CHIEF COMPLAINT AND HISTORY OF CHIEF COMPLAINT:  This is a 71-year-old  obese white female who was admitted to [facility]. The patient  presented to the hospital here through the emergency room on 2023. The patient presented to the hospital here with symptomatology of  progressive fatigue. The patient apparently is status post fall earlier  today. The patient presented to the hospital at this time, was seen and  evaluated, and had a cough. Workup in the emergency room did show that  she had RSV. Further evaluation include radiographic findings at this  time do not show any acute pathology, although the patient does live  alone and do not seem capable of being discharged. The patient was  admitted under the service of Dr. Petey Brown and Dr. Stephanie Jennings. The patient was  seen in the emergency room and examined at this time, revealed a  71-year-old white female who was complaining of a headache. Radiographic findings include a CAT scan at this time, did show right  maxillary sinusitis. From a cardiac standpoint of view, the patient  currently denies any chest pain per se. She does have a history of  hypertension, but denies any current abnormalities. Troponin was  slightly elevated at 21, though her EKG showed no acute findings. She  does have evidence of mild tachycardia. Respiratory-wise, she does have  a cough, evidence of RSV is noted. She has sleep apnea, but does not  wear a CPAP. Gastrointestinal-wise, she denies any nausea, vomiting,  diarrhea, or constipation.   Genitourinary-wise,

## 2023-11-13 NOTE — CARE COORDINATION
Ss note: 11/13/20231:02 PM SW notified that [facility] has accepted pt, awaiting PT note to submit for [facility] under [facility]. Will need precert, hens and signed WARREN for SNF. PTA pt resides home alone, active with [facility]. SW will follow.  Sherrel Krabbe, LSW

## 2023-11-13 NOTE — HOME CARE
PATIENT IS ACTIVE WITH [facility] FOR SN/PT/OT/HHA/MS WORKER AND WILL NEED ROSALIE ORDERS UPON DISCHARGE IF DISCHARGING HOME     Sam, [address] [address] [address]

## 2023-11-13 NOTE — PROCEDURES
SL Power Midline  Placement 11/13/2023    Product number: [id number]   Lot Number: [id number]   Consult:  no access/right femoral placed in ED   Ultrasound: yes   Right Cephalic       Upper Arm Circumference: 26CM    Size: 4.5F/15CM    Exposed Length: 0    Internal Length: 15CM   Cut: 0   Vein Measurement: 0.45  Brisk blood return noted, NS flushed, clamped and labeled, pt kathie well, site intact    Jessica Fisher RN  11/13/2023  11:59 AM

## 2023-11-13 NOTE — CARE COORDINATION
Case Management Assessment  Initial Evaluation    Date/Time of Evaluation: 11/13/2023 10:54 AM  Assessment Completed by: THOM Disla    If patient is discharged prior to next notation, then this note serves as note for discharge by case management. Patient Name: Dawne Schlatter                   YOB: 1950  Diagnosis: Respiratory syncytial virus (RSV) [B33.8]  Hyperglycemia [R73.9]  Generalized weakness [R53.1]  Fall, initial encounter [W19. XXXA]                   Date / Time: 11/12/2023  3:59 AM    Patient Admission Status: Inpatient   Readmission Risk (Low < 19, Mod (19-27), High > 27): Readmission Risk Score: 18.7    Current PCP: Kaitlyn Gerber, DO  PCP verified by CM? Yes    Chart Reviewed: Yes      History Provided by: Patient  Patient Orientation: Alert and Oriented, Person, Place, Situation    Patient Cognition: Alert        Advance Directives:      Code Status: DNR-CC   Patient's Primary Decision Maker is: Legal Next of Kin    Primary Decision Maker: Lorrie Barber - Brother/Sister - [phone]    Primary Decision Maker: Pe[facility] Child - [phone]    Discharge Planning:    Patient lives with: Alone Type of Home: House two story home first floor set up    Primary Care Giver: Self    Patient Support Systems include: Family Members, Friends/Neighbors (a sister and a son reside in [place], a good friend Liset Stewardr reside in [place].)     Current services prior to admission: None            Current DME: rollator, shower chair.                Type of Home Care services:  pt relays [facility] with liaison Sandra Plata active for PT/OT/VELMA MONTGOMERY    ADLS  Prior functional level: Assistance with the following:, Mobility  Current functional level: Assistance with the following:, Mobility    PT AM-PAC:   /24  OT AM-PAC:   /24    Family can provide assistance at DC: No  Would you like Case Management to discuss the discharge plan with any other family

## 2023-11-13 NOTE — CONSULTS
[facility]  Hematology/Oncology  Consult      Patient Name: Vega Esquivel  YOB: 1950  PCP: Suzann Cockayne, DO   Referring Provider:      Reason for Consultation:   Chief Complaint   Patient presents with    Fall     Left ankle; left knee        History of Present Illness:  71-year-old woman with past medical history relevant for hypertension, diabetes mellitus, psoriasis, sleep apnea. She was admitted yesterday through ED with progressively worsening fatigue. She had sustained a fall earlier. Her viral panel was positive for RSV. She remains very deconditioned. We are consulted for severe anemia. Her CMP on admission shows a normal creatinine of 0.7 with adequate GFR. Her hepatic panel shows slightly elevated AST with hypoalbuminemia with normal total bilirubin. B12 level was elevated. CBC showed a hemoglobin of 6.9 with an MCV of 106 with normal WBC count of 6.7 and normal platelet count of 245 with WBC differential showing slight lymphopenia  Most recent iron studies show a low serum iron and low saturation. Ferritin pending. Her prior ferritin from February was 16. Back in July her hemoglobin was 5.4  Back in March her Hb was 11.2 with MCV of 81  Patient has a history of cryptogenic nonalcoholic liver cirrhosis likely contributing to her recent elevation in MCV. She follows with GI at [facility] and has a history of GAVE  Her most recent upper endoscopy done at [facility] had shown moderate gastric antral vascular ectasia without bleeding she had required in the past APC treatments.   Last colonoscopy from July showed no active bleeding      Diagnostic Data:     Past Medical History:   Diagnosis Date    Arthritis     Cataract     right    Chronic fatigue syndrome     Depression     Diabetes mellitus (HCC)     SC injection weekly    Fibromyalgia     Hypertension     Memory loss     Mononucleosis     Psoriasis     Sciatica     Sleep apnea     no c-pap    Vertigo        Patient
Cirrhosis  -Decompensated/nonalcoholic  -Follows with Dr. Stephanie Sweet at [facility] as above  -Obtain INR to calculate MELD  -Monitor MELD labs    3. Iron deficiency  -Chronic per patient  -Replace per admitting    Above has been discussed in detail with the patient. I have explained that black tarry stool in conjunction of her history of cirrhosis/GAVE is suspicious for blood loss from upper gastrointestinal source. I have recommended further admission/treatment with upper endoscopy. Patient verbalized understanding but at this time refuses endoscopy in this facility. We will start octreotide. If patient H&H continues to decrease and she continues to refuse endoscopy here, she will require transfer to [facility]. If patient H&H remained stable, she can be discharged for follow-up at [facility] as outpatient for repeat endoscopy. Consider nuclear medicine bleeding scan if further decrease in H&H  Thank you for the opportunity to see this patient in consultation    Yocasta Mars MD  11/13/2023  12:56 PM    NOTE:  This report was transcribed using voice recognition software. Every effort was made to ensure accuracy; however, inadvertent computerized transcription errors may be present.

## 2023-11-14 VITALS
OXYGEN SATURATION: 97 % | DIASTOLIC BLOOD PRESSURE: 55 MMHG | WEIGHT: 192 LBS | HEART RATE: 110 BPM | TEMPERATURE: 98.2 F | HEIGHT: 63 IN | SYSTOLIC BLOOD PRESSURE: 155 MMHG | RESPIRATION RATE: 20 BRPM | BODY MASS INDEX: 34.02 KG/M2

## 2023-11-14 LAB
ALBUMIN SERPL-MCNC: 2.9 G/DL (ref 3.5–5.2)
ALP SERPL-CCNC: 75 U/L (ref 35–104)
ALT SERPL-CCNC: 35 U/L (ref 0–32)
ANION GAP SERPL CALCULATED.3IONS-SCNC: 11 MMOL/L (ref 7–16)
AST SERPL-CCNC: 52 U/L (ref 0–31)
BASOPHILS # BLD: 0 K/UL (ref 0–0.2)
BASOPHILS NFR BLD: 0 % (ref 0–2)
BILIRUB SERPL-MCNC: 0.7 MG/DL (ref 0–1.2)
BUN SERPL-MCNC: 18 MG/DL (ref 6–23)
CALCIUM SERPL-MCNC: 8.3 MG/DL (ref 8.6–10.2)
CHLORIDE SERPL-SCNC: 103 MMOL/L (ref 98–107)
CO2 SERPL-SCNC: 20 MMOL/L (ref 22–29)
CREAT SERPL-MCNC: 0.8 MG/DL (ref 0.5–1)
EOSINOPHIL # BLD: 0 K/UL (ref 0.05–0.5)
EOSINOPHILS RELATIVE PERCENT: 0 % (ref 0–6)
ERYTHROCYTE [DISTWIDTH] IN BLOOD BY AUTOMATED COUNT: 20.1 % (ref 11.5–15)
GFR SERPL CREATININE-BSD FRML MDRD: >60 ML/MIN/1.73M2
GLUCOSE BLD-MCNC: 300 MG/DL (ref 74–99)
GLUCOSE BLD-MCNC: 376 MG/DL (ref 74–99)
GLUCOSE BLD-MCNC: 412 MG/DL (ref 74–99)
GLUCOSE BLD-MCNC: 414 MG/DL (ref 74–99)
GLUCOSE SERPL-MCNC: 364 MG/DL (ref 74–99)
HCT VFR BLD AUTO: 24.4 % (ref 34–48)
HCT VFR BLD AUTO: 25 % (ref 34–48)
HGB BLD-MCNC: 7.6 G/DL (ref 11.5–15.5)
HGB BLD-MCNC: 7.7 G/DL (ref 11.5–15.5)
HGB BLD-MCNC: 8 G/DL (ref 11.5–15.5)
INR PPP: 1.3
L PNEUMO1 AG UR QL IA.RAPID: NEGATIVE
LYMPHOCYTES NFR BLD: 0.27 K/UL (ref 1.5–4)
LYMPHOCYTES RELATIVE PERCENT: 4 % (ref 20–42)
MCH RBC QN AUTO: 31.4 PG (ref 26–35)
MCHC RBC AUTO-ENTMCNC: 31.1 G/DL (ref 32–34.5)
MCV RBC AUTO: 100.8 FL (ref 80–99.9)
MONOCYTES NFR BLD: 0.2 K/UL (ref 0.1–0.95)
MONOCYTES NFR BLD: 3 % (ref 2–12)
NEUTROPHILS NFR BLD: 94 % (ref 43–80)
NEUTS SEG NFR BLD: 7.23 K/UL (ref 1.8–7.3)
NUCLEATED RED BLOOD CELLS: 2 PER 100 WBC
PLATELET # BLD AUTO: 151 K/UL (ref 130–450)
PMV BLD AUTO: 10.5 FL (ref 7–12)
POTASSIUM SERPL-SCNC: 4.1 MMOL/L (ref 3.5–5)
PROCALCITONIN SERPL-MCNC: 0.29 NG/ML (ref 0–0.08)
PROT SERPL-MCNC: 6 G/DL (ref 6.4–8.3)
PROTHROMBIN TIME: 15.1 SEC (ref 9.3–12.4)
RBC # BLD AUTO: 2.42 M/UL (ref 3.5–5.5)
RBC # BLD: ABNORMAL 10*6/UL
S PNEUM AG SPEC QL: NEGATIVE
SODIUM SERPL-SCNC: 134 MMOL/L (ref 132–146)
SPECIMEN SOURCE: NORMAL
WBC OTHER # BLD: 7.7 K/UL (ref 4.5–11.5)

## 2023-11-14 PROCEDURE — 6370000000 HC RX 637 (ALT 250 FOR IP)

## 2023-11-14 PROCEDURE — 85610 PROTHROMBIN TIME: CPT

## 2023-11-14 PROCEDURE — 97535 SELF CARE MNGMENT TRAINING: CPT

## 2023-11-14 PROCEDURE — 6360000002 HC RX W HCPCS: Performed by: INTERNAL MEDICINE

## 2023-11-14 PROCEDURE — 6370000000 HC RX 637 (ALT 250 FOR IP): Performed by: INTERNAL MEDICINE

## 2023-11-14 PROCEDURE — 82105 ALPHA-FETOPROTEIN SERUM: CPT

## 2023-11-14 PROCEDURE — 90686 IIV4 VACC NO PRSV 0.5 ML IM: CPT | Performed by: INTERNAL MEDICINE

## 2023-11-14 PROCEDURE — 82962 GLUCOSE BLOOD TEST: CPT

## 2023-11-14 PROCEDURE — 2580000003 HC RX 258: Performed by: INTERNAL MEDICINE

## 2023-11-14 PROCEDURE — 36415 COLL VENOUS BLD VENIPUNCTURE: CPT

## 2023-11-14 PROCEDURE — 6360000002 HC RX W HCPCS: Performed by: HOSPITALIST

## 2023-11-14 PROCEDURE — 85025 COMPLETE CBC W/AUTO DIFF WBC: CPT

## 2023-11-14 PROCEDURE — 85014 HEMATOCRIT: CPT

## 2023-11-14 PROCEDURE — 6360000002 HC RX W HCPCS

## 2023-11-14 PROCEDURE — 97530 THERAPEUTIC ACTIVITIES: CPT

## 2023-11-14 PROCEDURE — 2580000003 HC RX 258: Performed by: HOSPITALIST

## 2023-11-14 PROCEDURE — 2580000003 HC RX 258

## 2023-11-14 PROCEDURE — G0008 ADMIN INFLUENZA VIRUS VAC: HCPCS | Performed by: INTERNAL MEDICINE

## 2023-11-14 PROCEDURE — 94640 AIRWAY INHALATION TREATMENT: CPT

## 2023-11-14 PROCEDURE — C9113 INJ PANTOPRAZOLE SODIUM, VIA: HCPCS | Performed by: NURSE PRACTITIONER

## 2023-11-14 PROCEDURE — 85018 HEMOGLOBIN: CPT

## 2023-11-14 PROCEDURE — 80053 COMPREHEN METABOLIC PANEL: CPT

## 2023-11-14 PROCEDURE — 6360000002 HC RX W HCPCS: Performed by: NURSE PRACTITIONER

## 2023-11-14 PROCEDURE — 6370000000 HC RX 637 (ALT 250 FOR IP): Performed by: NURSE PRACTITIONER

## 2023-11-14 RX ORDER — INSULIN LISPRO 100 [IU]/ML
5 INJECTION, SOLUTION INTRAVENOUS; SUBCUTANEOUS ONCE
Status: COMPLETED | OUTPATIENT
Start: 2023-11-14 | End: 2023-11-14

## 2023-11-14 RX ORDER — OCTREOTIDE ACETATE 50 UG/ML
100 INJECTION, SOLUTION INTRAVENOUS; SUBCUTANEOUS EVERY 12 HOURS
Status: DISCONTINUED | OUTPATIENT
Start: 2023-11-14 | End: 2023-11-14 | Stop reason: HOSPADM

## 2023-11-14 RX ORDER — FLUOXETINE HYDROCHLORIDE 20 MG/1
60 CAPSULE ORAL DAILY
Qty: 30 CAPSULE | Refills: 3 | DISCHARGE
Start: 2023-11-15

## 2023-11-14 RX ORDER — FERROUS SULFATE 325(65) MG
325 TABLET ORAL 2 TIMES DAILY
Qty: 180 TABLET | Refills: 1 | DISCHARGE
Start: 2023-11-14

## 2023-11-14 RX ORDER — IPRATROPIUM BROMIDE AND ALBUTEROL SULFATE 2.5; .5 MG/3ML; MG/3ML
3 SOLUTION RESPIRATORY (INHALATION) EVERY 4 HOURS PRN
Qty: 360 ML | DISCHARGE
Start: 2023-11-14

## 2023-11-14 RX ORDER — GUAIFENESIN 600 MG/1
1200 TABLET, EXTENDED RELEASE ORAL 2 TIMES DAILY
DISCHARGE
Start: 2023-11-14

## 2023-11-14 RX ORDER — OCTREOTIDE ACETATE 50 UG/ML
100 INJECTION, SOLUTION INTRAVENOUS; SUBCUTANEOUS EVERY 12 HOURS
Qty: 90 ML | DISCHARGE
Start: 2023-11-14

## 2023-11-14 RX ORDER — INSULIN LISPRO 100 [IU]/ML
0-16 INJECTION, SOLUTION INTRAVENOUS; SUBCUTANEOUS
DISCHARGE
Start: 2023-11-14

## 2023-11-14 RX ORDER — ACETAMINOPHEN 325 MG/1
650 TABLET ORAL EVERY 4 HOURS PRN
Status: DISCONTINUED | OUTPATIENT
Start: 2023-11-14 | End: 2023-11-14 | Stop reason: HOSPADM

## 2023-11-14 RX ORDER — BUDESONIDE 0.5 MG/2ML
500 INHALANT ORAL
Qty: 60 EACH | Refills: 3 | DISCHARGE
Start: 2023-11-14

## 2023-11-14 RX ORDER — INSULIN LISPRO 100 [IU]/ML
0-4 INJECTION, SOLUTION INTRAVENOUS; SUBCUTANEOUS NIGHTLY
DISCHARGE
Start: 2023-11-14

## 2023-11-14 RX ORDER — ARFORMOTEROL TARTRATE 15 UG/2ML
15 SOLUTION RESPIRATORY (INHALATION)
Qty: 120 ML | Refills: 3 | DISCHARGE
Start: 2023-11-14

## 2023-11-14 RX ORDER — FUROSEMIDE 10 MG/ML
20 INJECTION INTRAMUSCULAR; INTRAVENOUS DAILY
DISCHARGE
Start: 2023-11-14 | End: 2023-11-14 | Stop reason: HOSPADM

## 2023-11-14 RX ADMIN — ARFORMOTEROL TARTRATE 15 MCG: 15 SOLUTION RESPIRATORY (INHALATION) at 06:37

## 2023-11-14 RX ADMIN — INSULIN LISPRO 16 UNITS: 100 INJECTION, SOLUTION INTRAVENOUS; SUBCUTANEOUS at 08:15

## 2023-11-14 RX ADMIN — BUDESONIDE INHALATION 500 MCG: 0.5 SUSPENSION RESPIRATORY (INHALATION) at 06:37

## 2023-11-14 RX ADMIN — INSULIN LISPRO 5 UNITS: 100 INJECTION, SOLUTION INTRAVENOUS; SUBCUTANEOUS at 12:55

## 2023-11-14 RX ADMIN — GABAPENTIN 300 MG: 300 CAPSULE ORAL at 08:10

## 2023-11-14 RX ADMIN — FLUOXETINE 60 MG: 20 CAPSULE ORAL at 08:09

## 2023-11-14 RX ADMIN — ROPINIROLE HYDROCHLORIDE 0.5 MG: 0.5 TABLET, FILM COATED ORAL at 11:47

## 2023-11-14 RX ADMIN — METHYLPREDNISOLONE SODIUM SUCCINATE 40 MG: 40 INJECTION, POWDER, FOR SOLUTION INTRAMUSCULAR; INTRAVENOUS at 08:49

## 2023-11-14 RX ADMIN — ACETAMINOPHEN 650 MG: 325 TABLET ORAL at 03:48

## 2023-11-14 RX ADMIN — SODIUM CHLORIDE, PRESERVATIVE FREE 10 ML: 5 INJECTION INTRAVENOUS at 08:48

## 2023-11-14 RX ADMIN — HEPARIN 300 UNITS: 100 SYRINGE at 08:49

## 2023-11-14 RX ADMIN — PANTOPRAZOLE SODIUM 40 MG: 40 INJECTION, POWDER, FOR SOLUTION INTRAVENOUS at 08:48

## 2023-11-14 RX ADMIN — SODIUM CHLORIDE 125 MG: 9 INJECTION, SOLUTION INTRAVENOUS at 11:50

## 2023-11-14 RX ADMIN — GUAIFENESIN 1200 MG: 600 TABLET, EXTENDED RELEASE ORAL at 08:09

## 2023-11-14 RX ADMIN — SODIUM CHLORIDE, PRESERVATIVE FREE 10 ML: 5 INJECTION INTRAVENOUS at 08:49

## 2023-11-14 RX ADMIN — INFLUENZA VIRUS VACCINE 0.5 ML: 15; 15; 15; 15 SUSPENSION INTRAMUSCULAR at 12:27

## 2023-11-14 RX ADMIN — FLUTICASONE PROPIONATE 1 SPRAY: 50 SPRAY, METERED NASAL at 08:47

## 2023-11-14 RX ADMIN — OCTREOTIDE ACETATE 50 MCG/HR: 500 INJECTION, SOLUTION INTRAVENOUS; SUBCUTANEOUS at 01:20

## 2023-11-14 RX ADMIN — INSULIN LISPRO 16 UNITS: 100 INJECTION, SOLUTION INTRAVENOUS; SUBCUTANEOUS at 11:47

## 2023-11-14 RX ADMIN — DOXYCYCLINE HYCLATE 100 MG: 100 CAPSULE ORAL at 08:14

## 2023-11-14 ASSESSMENT — PAIN DESCRIPTION - DESCRIPTORS
DESCRIPTORS: ACHING
DESCRIPTORS: ACHING

## 2023-11-14 ASSESSMENT — PAIN DESCRIPTION - ORIENTATION
ORIENTATION: MID;RIGHT;LEFT
ORIENTATION: RIGHT;LEFT

## 2023-11-14 ASSESSMENT — PAIN DESCRIPTION - PAIN TYPE: TYPE: ACUTE PAIN

## 2023-11-14 ASSESSMENT — PAIN SCALES - GENERAL
PAINLEVEL_OUTOF10: 7
PAINLEVEL_OUTOF10: 8

## 2023-11-14 ASSESSMENT — PAIN DESCRIPTION - FREQUENCY: FREQUENCY: CONTINUOUS

## 2023-11-14 ASSESSMENT — PAIN DESCRIPTION - ONSET: ONSET: ON-GOING

## 2023-11-14 ASSESSMENT — PAIN DESCRIPTION - LOCATION
LOCATION: HEAD
LOCATION: HEAD

## 2023-11-14 NOTE — CARE COORDINATION
Ss note: 31/50/6468 35:51 AM PRECERT obtained for Odem. Dr. Almita Dillard met with pt at length, sw present. Discharge order noted, pt agreeable. Hens completed, need signed WARREN. Edilma arranged [facility] transfer  with rep Zach and per liaison [facility] facility can be billed fpr transport and pt will be updated. Facility liaison, nursing and pt aware of discharge destination and time. SW provided [employer] and private duty information for pt to investigate assistance for home cleaning. PTA pt was active with [facility]. Pt relays she will notify her sister of discharge time. Please call nurse to nurse.  THOM Kearney

## 2023-11-14 NOTE — DISCHARGE INSTR - COC
Continuity of Care Form    Patient Name: Shane Laurent   :  1950  MRN:  [id number]    Admit date:  2023  Discharge date:  ***    Code Status Order: DNR-CC   Advance Directives:     Admitting Physician:  Law Santana DO  PCP: Jason Miller DO    Discharging Nurse: [facility] Unit/Room#: 2594/2847-23  Discharging Unit Phone Number: ***    Emergency Contact:   Extended Emergency Contact Information  Primary Emergency Contact: Lorrie Barber  Home Phone: [phone]  Mobile Phone: [phone]  Relation: Brother/Sister   needed? No  Secondary Emergency Contact: Nirmala Bruce  Home Phone: [phone]  Mobile Phone: [phone]  Relation: Other   needed?  No    Past Surgical History:  Past Surgical History:   Procedure Laterality Date    CARPAL TUNNEL RELEASE Bilateral     CATARACT REMOVAL Right     COLONOSCOPY      OTHER SURGICAL HISTORY Bilateral 2021    BILATERAL L3,L4 MEDIAL BRANCH AND L5 DORSAL RAMUS RADIOFREQUENCY    PAIN MANAGEMENT PROCEDURE Bilateral 2021    BILATERAL L3,4 MEDIAL BRANCH AND L5 DORSAL RAMUS  RADIOFREQUENCY ABLATION WITH SEDATION  (CPT 05348) performed by Leo Ceballos MD at [address] [facility] N/A 2021    T11 KYPHOPLASTY (2 C-ARMS) performed by Leo Ceballos MD at [address] [facility] N/A 2019    EGD BIOPSY performed by Ricardo Sorto MD at [address] [facility] N/A 03/10/2021    EGD ESOPHAGOGASTRODUODENOSCOPY performed by Brie Rocha MD at [address] [facility]       Immunization History:   Immunization History   Administered Date(s) Administered    COVID-19, PFIZER Bivalent, DO NOT Dilute, (age 12y+), IM, 30 mcg/0.3 mL 2022    COVID-19, PFIZER PURPLE top, DILUTE for use, (age 15 y+), 30mcg/0.3mL 2021, 2021, 2021    Influenza Virus Vaccine 2018, 2018    Influenza, FLUAD,

## 2023-11-14 NOTE — PROGRESS NOTES
Dr. Von Bonner Ask NP, notified of blood sugar of 412. no new orders.  Sliding scale of 16 units of humalog given per order
Internal Medicine Progress Note    [facility]    Lian Okeefe. Hector Akhtar., CIRO.A.CAndrewOAndrewI. Shannan Rivera D.O., DHRUVOAndrewIAndrew Demarco D.O. Dalia Garrison, MSN, APRN, NP-C  Lindsey Mcnulty. Michael Greene, MSN, APRN-CNP  Brian Tee, MSN, APRN, NP-C     Primary Care Physician: Tosha Bass DO   Admitting Physician:  Rosita Jade DO  Admission date and time: 11/12/2023  3:59 AM    Room:  [address] [place] [address]  Admitting diagnosis: Respiratory syncytial virus (RSV) [B33.8]  Hyperglycemia [R73.9]  Generalized weakness [R53.1]  Fall, initial encounter [W19. XXXA]    Patient Name: Nico Jenkins  MRN: [id number]    Date of Service: 11/13/2023     Subjective:  Maribell Corona is a 68 y.o. female who was seen and examined today,11/13/2023, at the bedside. Her primary complaint is that of weakness and deconditioning. She is concerned that she cannot walk. We reviewed the results of the work-up and plan of care moving forward. She is agreeable to transfusion. She adamantly refuses any additional work-up from a GI standpoint as she has an established GAVE diagnosis and follows with [facility] clinic. She initially refuses dietary advancement in a stepwise fashion however is agreeable with clear liquid diet for breakfast this morning. Her nurse was present and we have discussed clear liquids for breakfast and, following appropriate response to transfusion, further dietary advancement thereafter. No family present during my examination. Review of systems:  Constitutional:   + fatigue and malaise , - fever/chills  HEENT:   - ear pain, sore throat, sinus or eye problems. Cardiovascular:   - any chest pain, - irregular heartbeats, - palpitations. Respiratory:   - dyspnea at rest, - dyspnea on exertion, + coughing, - sputum, - hemoptysis  Gastrointestinal:   - nausea, -vomiting. - diarrhea, - constipation. + black stools intermittently with GAVE.  - poor appetite and poor
Maksim Lambert NP, notified that we got report from ED and that the patient does not have an IV site, HR in the 120s.
[facility]  [address]        Date:2023                                                  Patient Name: Urvashi Tomas    MRN: [id number]    : 1950    Room: [address] [place] [address]      Evaluating OT: Lars Artis OTR/L; #[id number]     Referring Provider and Specific Provider Orders/Date:      23  OT eval and treat  Start:  23,   End:  23,   ONE TIME,   Standing Count:  1 Occurrences,   R         Miguel Juarez, APRN - CNP      Placement Recommendation: Subacute Rehab vs home with occupational therapy if patient continues to progress with OT. Diagnosis:   1. Fall, initial encounter    2. Respiratory syncytial virus (RSV)    3.  Hyperglycemia         Surgery: None      Pertinent Medical History:       Past Medical History:   Diagnosis Date    Arthritis     Cataract     right    Chronic fatigue syndrome     Depression     Diabetes mellitus (HCC)     SC injection weekly    Fibromyalgia     Hypertension     Memory loss     Mononucleosis     Psoriasis     Sciatica     Sleep apnea     no c-pap    Vertigo          Past Surgical History:   Procedure Laterality Date    CARPAL TUNNEL RELEASE Bilateral     CATARACT REMOVAL Right     COLONOSCOPY      OTHER SURGICAL HISTORY Bilateral 2021    BILATERAL L3,L4 MEDIAL BRANCH AND L5 DORSAL RAMUS RADIOFREQUENCY    PAIN MANAGEMENT PROCEDURE Bilateral 2021    BILATERAL L3,4 MEDIAL BRANCH AND L5 DORSAL RAMUS  RADIOFREQUENCY ABLATION WITH SEDATION  (CPT J0170451) performed by Oliver Booth MD at [address] [facility] N/A 2021    T11 KYPHOPLASTY (2 C-ARMS) performed by Oliver Booth MD at [facility] 2019    EGD BIOPSY performed by Cady Garcia MD at [address] [place] [address] N/A 03/10/2021    EGD
Nurse to nurse given to Susanne Pulliam at [facility]
Patient states that she wants to be a DNR. She says that she would not want compressions, intubation, medications, or shock to revive her.
ADLs.  Skilled positioning: Proper positioning to improve interaction with environment, overall functioning and decrease/prevent edema and contractures. Pt has made fair progress towards set goals     OT 1-3 days/wk for 2 weeks PRN     Treatment Time also includes thorough review of current medical information, gathering information on past medical history/social history and prior level of function, informal observation of tasks, assessment of data and education on plan of care and goals.     Treatment Time In: 10:50 AM    Treatment Time Out: 11:41 AM            Treatment Charges: Mins Units   ADL/Home Mgt     85668 31 2   Thera Activities     56660 10 1   Ther Ex                 88917       Manual Therapy    46966     Neuro Re-ed         63582     TDONJRRD manage/training                               07931     Non Billable Time 10    Total Timed Treatment 51-10=41 [address]
Patient assisted to edge of bed,   Sat edge of bed 5 minutes with Supervision  to increase dynamic sitting balance and activity tolerance. ambulated in room. Patient has decreased endurance. Therapeutic Exercises:  not performed due to fatigue      At end of session, patient in bed with nursing present call light and phone within reach,  all lines and tubes intact, nursing notified. Patient would benefit from continued skilled Physical Therapy to improve functional independence and quality of life. Patient's/ family goals   rehab    Time in  1422  Time out  1437    Total Treatment Time  15 minutes    CPT codes:    Therapeutic activities (43197)   15 minutes  1 unit(s)  Cameron Sullivan PTA  Lic.  #[id number]
reach,  all lines and tubes intact, nursing notified. Patient would benefit from continued skilled Physical Therapy to improve functional independence and quality of life. Patient's/ family goals   rehab    Time in  0917  Time out  1002    Total Treatment Time  25 minutes    Evaluation time includes thorough review of current medical information, gathering information on past medical history/social history and prior level of function, completion of standardized testing/informal observation of tasks, assessment of data, and development of Plan of care and goals.      CPT codes:  Low Complexity PT evaluation (01406)  Therapeutic activities (13584)   15 minutes  1 unit(s)  Gait Training (44631) 10 minutes 1 unit(s)    Sherly Cannon PT
the radiation dose to as low as reasonably achievable. COMPARISON: None. HISTORY: ORDERING SYSTEM PROVIDED HISTORY: fall TECHNOLOGIST PROVIDED HISTORY: Reason for exam:->fall Has a \"code stroke\" or \"stroke alert\" been called? ->No Decision Support Exception - unselect if not a suspected or confirmed emergency medical condition->Emergency Medical Condition (MA) FINDINGS: BRAIN: Gray-white differentiation is intact. Mild white matter hypoattenuation which can be seen with chronic small vessel ischemic change. CSF SPACES: No hydrocephalus. Mild cerebral volume loss. HEMORRHAGE: No acute intracranial hemorrhage is identified. MASS EFFECT: No midline shift. SINUS/MASTOIDS: Moderate mucosal thickening right maxillary sinus. SCALP: Questionable left supraorbital contusion. CALVARIUM: Intact. No acute intracranial abnormality. Moderate opacification right maxillary sinus. 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 Last colonoscopy in July was negative for any bleeding. She also has history of early liver cirrhosis contributing to her elevated MCV    She is on IV PPI and octreotide per GI  Transfuse with packed RBCs for hemoglobin below 7  Would benefit from parenteral IV iron    11/14/23  - UGIB likely given history of GAVE and cirrhosis as above  - GI following, offered endoscopy however patient declines at this time  - On PPI and octreotide  - Hgb slightly lower today at 7.6.  Transfuse for <7  - Continue IV iron    Sulma Peter MD  Electronically signed 11/14/2023 at 9:20 AM

## 2023-11-14 NOTE — CONSENT
Informed Consent for Blood Component Transfusion Note    I have discussed with the patient the rationale for blood component transfusion; its benefits in treating or preventing fatigue, organ damage, or death; and its risk which includes mild transfusion reactions, rare risk of blood borne infection, or more serious but rare reactions. I have discussed the alternatives to transfusion, including the risk and consequences of not receiving transfusion. The patient had an opportunity to ask questions and had agreed to proceed with transfusion of blood components.     Electronically signed by Sunil Lambert DO on 11/14/23 at 4:13 PM EST

## 2023-11-14 NOTE — CARE COORDINATION
Patient seen and examined. Pertinent note/labs reviewed. Patient reports brown stool without blood or melena. Declines endoscopy. No immediate plan for intervention from GI POV. 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 Patient also advised to closely follow with [facility]. Patient verbalized understanding and agreement with the plan as delineated. Okay to be discharged from GI POV   If patient decides to follow locally, she is to call for appointment and with questions/concerns at [phone]. Thank you for the opportunity to participate in the care of .  Alexa Ramirez MD

## 2023-11-15 LAB
EKG ATRIAL RATE: 108 BPM
EKG P AXIS: 83 DEGREES
EKG P-R INTERVAL: 172 MS
EKG Q-T INTERVAL: 376 MS
EKG QRS DURATION: 82 MS
EKG QTC CALCULATION (BAZETT): 503 MS
EKG R AXIS: -12 DEGREES
EKG T AXIS: 17 DEGREES
EKG VENTRICULAR RATE: 108 BPM

## 2023-11-15 PROCEDURE — 93010 ELECTROCARDIOGRAM REPORT: CPT | Performed by: INTERNAL MEDICINE

## 2023-11-16 LAB
ABO/RH: NORMAL
AFP SERPL-MCNC: 3 UG/L
ANTIBODY SCREEN: NEGATIVE
ARM BAND NUMBER: NORMAL
BLOOD BANK BLOOD PRODUCT EXPIRATION DATE: NORMAL
BLOOD BANK DISPENSE STATUS: NORMAL
BLOOD BANK DISPENSE STATUS: NORMAL
BLOOD BANK ISBT PRODUCT BLOOD TYPE: 600
BLOOD BANK PRODUCT CODE: NORMAL
BLOOD BANK SAMPLE EXPIRATION: NORMAL
BLOOD BANK UNIT TYPE AND RH: NORMAL
BPU ID: NORMAL
BPU ID: NORMAL
COMPONENT: NORMAL
COMPONENT: NORMAL
CROSSMATCH RESULT: NORMAL
CROSSMATCH RESULT: NORMAL
TRANSFUSION STATUS: NORMAL
TRANSFUSION STATUS: NORMAL
UNIT DIVISION: 0
UNIT DIVISION: 0
UNIT ISSUE DATE/TIME: NORMAL

## 2023-11-16 NOTE — DISCHARGE SUMMARY
[address]                    [address] ,[facility] [address], [address] [facility]                               DISCHARGE SUMMARY    PATIENT NAME: Leda Whittaker                    :        1950  MED REC NO:   [id number]                            ROOM:       3852  ACCOUNT NO:   [de-identified]                           ADMIT DATE: 2023  PROVIDER:     Tana Fernandez DO                  DISCHARGE DATE:  2023    ADMITTING DIAGNOSIS:  Sepsis. FINAL DISCHARGE DIAGNOSIS:  Sepsis present on admission with heart rate  and respiratory rate secondary to RSV pneumonia. SECONDARY DISCHARGE DIAGNOSES:  1. Acute-on-chronic anemia with recurrent intermittent occult bleeding  with GAVE syndrome, requiring red blood transfusion on outpatient basis  and refusing further workup. 2.  Gastric antral vascular ectasia (GAVE) syndrome, status post EGD and  APC on 2023, at [facility]. 3.  Inability to ambulate due to multiple contusions related to recent  fall without fracture. 4.  Non-insulin-dependent diabetes mellitus aggravated by steroids. 5.  Essential hypertension. 6.  Obesity with elevated BMI of 34.01.  7.  Restless legs syndrome. COMPLICATIONS:  No complications. OPERATION PERFORMED:  No operations. CONSULTATIONS OBTAINED:  With Hematology and GI. No OMT was given. ADMITTING PROVIDERS:  Dr. Hua Dunn and Dr. Katie Valdez. CHIEF COMPLAINT AND HISTORY OF CHIEF COMPLAINT:  This is a pleasant  60-year-old white female, who was admitted to [facility]. The  patient presented to the hospital here through the emergency room on  2023. The patient presented to the hospital with symptoms of  progressive fatigue. The patient apparently was status post fall  earlier today. The patient presented to the hospital, at which time,  she was seen and evaluated. The patient at this time did have a cough.    The patient was evaluated at this time in the

## 2023-11-18 LAB
MICROORGANISM SPEC CULT: NORMAL
MICROORGANISM SPEC CULT: NORMAL
SERVICE CMNT-IMP: NORMAL
SERVICE CMNT-IMP: NORMAL
SPECIMEN DESCRIPTION: NORMAL
SPECIMEN DESCRIPTION: NORMAL

## 2023-11-21 ENCOUNTER — OUTSIDE SERVICES (OUTPATIENT)
Dept: INTERNAL MEDICINE CLINIC | Age: 73
End: 2023-11-21
Payer: MEDICARE

## 2023-11-21 ENCOUNTER — TELEPHONE (OUTPATIENT)
Dept: FAMILY MEDICINE CLINIC | Age: 73
End: 2023-11-21

## 2023-11-21 DIAGNOSIS — F33.41 RECURRENT MAJOR DEPRESSION IN PARTIAL REMISSION (HCC): Primary | ICD-10-CM

## 2023-11-21 DIAGNOSIS — D50.9 IRON DEFICIENCY ANEMIA, UNSPECIFIED IRON DEFICIENCY ANEMIA TYPE: ICD-10-CM

## 2023-11-21 DIAGNOSIS — E66.01 MORBID (SEVERE) OBESITY DUE TO EXCESS CALORIES (HCC): ICD-10-CM

## 2023-11-21 DIAGNOSIS — M62.81 MUSCLE WEAKNESS (GENERALIZED): ICD-10-CM

## 2023-11-21 DIAGNOSIS — R41.841 COGNITIVE COMMUNICATION DEFICIT: ICD-10-CM

## 2023-11-21 DIAGNOSIS — I10 ESSENTIAL (PRIMARY) HYPERTENSION: ICD-10-CM
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
## 2023-11-21 DIAGNOSIS — Z74.1 NEED FOR ASSISTANCE WITH PERSONAL CARE: ICD-10-CM

## 2023-11-21 DIAGNOSIS — K31.819 ANGIODYSPLASIA OF STOMACH AND DUODENUM WITHOUT BLEEDING: ICD-10-CM

## 2023-11-21 DIAGNOSIS — J21.0 RSV (ACUTE BRONCHIOLITIS DUE TO RESPIRATORY SYNCYTIAL VIRUS): Primary | ICD-10-CM

## 2023-11-21 DIAGNOSIS — E11.65 TYPE 2 DIABETES MELLITUS WITH HYPERGLYCEMIA, UNSPECIFIED WHETHER LONG TERM INSULIN USE (HCC): ICD-10-CM

## 2023-11-21 DIAGNOSIS — K74.60 HEPATIC CIRRHOSIS, UNSPECIFIED HEPATIC CIRRHOSIS TYPE, UNSPECIFIED WHETHER ASCITES PRESENT (HCC): ICD-10-CM

## 2023-11-21 DIAGNOSIS — R26.2 DIFFICULTY IN WALKING, NOT ELSEWHERE CLASSIFIED: ICD-10-CM

## 2023-11-21 PROCEDURE — 99306 1ST NF CARE HIGH MDM 50: CPT | Performed by: INTERNAL MEDICINE

## 2023-11-24 NOTE — TELEPHONE ENCOUNTER
Aimovig 70 mg ordered to [facility] on [address] with 3 refills. Will plan to see her back as scheduled.
Jeri Stoll is working for her she would like another sample. Also please send to pharmacy for prior authorization.   Pharm[facility]: [facility] signed by Xin Kumar on 2/9/21 at 10:15 AM EST
Patient notified of insurance approval for Aimovig and script to pharmacy
Johnny

## 2023-11-29 ENCOUNTER — CARE COORDINATION (OUTPATIENT)
Dept: CASE MANAGEMENT | Age: 73
End: 2023-11-29

## 2023-11-29 DIAGNOSIS — A41.9 SEPSIS, DUE TO UNSPECIFIED ORGANISM, UNSPECIFIED WHETHER ACUTE ORGAN DYSFUNCTION PRESENT (HCC): Primary | ICD-10-CM

## 2023-11-29 PROCEDURE — 1111F DSCHRG MED/CURRENT MED MERGE: CPT | Performed by: INTERNAL MEDICINE

## 2023-12-01 ENCOUNTER — CARE COORDINATION (OUTPATIENT)
Dept: CARE COORDINATION | Age: 73
End: 2023-12-01

## 2023-12-01 ASSESSMENT — SOCIAL DETERMINANTS OF HEALTH (SDOH)
HOW OFTEN DO YOU ATTEND CHURCH OR RELIGIOUS SERVICES?: NEVER
HOW OFTEN DO YOU ATTENT MEETINGS OF THE CLUB OR ORGANIZATION YOU BELONG TO?: NEVER
DO YOU BELONG TO ANY CLUBS OR ORGANIZATIONS SUCH AS CHURCH GROUPS UNIONS, FRATERNAL OR ATHLETIC GROUPS, OR SCHOOL GROUPS?: NO
IN A TYPICAL WEEK, HOW MANY TIMES DO YOU TALK ON THE PHONE WITH FAMILY, FRIENDS, OR NEIGHBORS?: THREE TIMES A WEEK
HOW OFTEN DO YOU GET TOGETHER WITH FRIENDS OR RELATIVES?: ONCE A WEEK

## 2023-12-12 ENCOUNTER — OFFICE VISIT (OUTPATIENT)
Dept: FAMILY MEDICINE CLINIC | Age: 73
End: 2023-12-12
Payer: MEDICARE

## 2023-12-12 VITALS
DIASTOLIC BLOOD PRESSURE: 62 MMHG | RESPIRATION RATE: 16 BRPM | HEIGHT: 63 IN | OXYGEN SATURATION: 100 % | TEMPERATURE: 97.7 F | HEART RATE: 107 BPM | BODY MASS INDEX: 34.12 KG/M2 | SYSTOLIC BLOOD PRESSURE: 130 MMHG | WEIGHT: 192.6 LBS

## 2023-12-12 DIAGNOSIS — R81 GLUCOSE FOUND IN URINE ON EXAMINATION: ICD-10-CM

## 2023-12-12 DIAGNOSIS — R39.9 UTI SYMPTOMS: ICD-10-CM

## 2023-12-12 DIAGNOSIS — N39.0 URINARY TRACT INFECTION WITH HEMATURIA, SITE UNSPECIFIED: Primary | ICD-10-CM

## 2023-12-12 DIAGNOSIS — R31.9 URINARY TRACT INFECTION WITH HEMATURIA, SITE UNSPECIFIED: Primary | ICD-10-CM

## 2023-12-12 DIAGNOSIS — E11.65 TYPE 2 DIABETES MELLITUS WITH HYPERGLYCEMIA, WITHOUT LONG-TERM CURRENT USE OF INSULIN (HCC): ICD-10-CM

## 2023-12-12 LAB
BILIRUBIN, POC: NEGATIVE
BLOOD URINE, POC: ABNORMAL
CHP ED QC CHECK: YES
CLARITY, POC: ABNORMAL
COLOR, POC: YELLOW
GLUCOSE BLD-MCNC: 429 MG/DL
GLUCOSE URINE, POC: >=1000
KETONES, POC: NEGATIVE
LEUKOCYTE EST, POC: ABNORMAL
NITRITE, POC: NEGATIVE
PH, POC: 6
PROTEIN, POC: ABNORMAL
SPECIFIC GRAVITY, POC: 1.01
UROBILINOGEN, POC: 0.2

## 2023-12-12 PROCEDURE — G8482 FLU IMMUNIZE ORDER/ADMIN: HCPCS | Performed by: NURSE PRACTITIONER

## 2023-12-12 PROCEDURE — 1036F TOBACCO NON-USER: CPT | Performed by: NURSE PRACTITIONER

## 2023-12-12 PROCEDURE — G8417 CALC BMI ABV UP PARAM F/U: HCPCS | Performed by: NURSE PRACTITIONER

## 2023-12-12 PROCEDURE — 1123F ACP DISCUSS/DSCN MKR DOCD: CPT | Performed by: NURSE PRACTITIONER

## 2023-12-12 PROCEDURE — 1090F PRES/ABSN URINE INCON ASSESS: CPT | Performed by: NURSE PRACTITIONER

## 2023-12-12 PROCEDURE — 82962 GLUCOSE BLOOD TEST: CPT | Performed by: NURSE PRACTITIONER

## 2023-12-12 PROCEDURE — 1111F DSCHRG MED/CURRENT MED MERGE: CPT | Performed by: NURSE PRACTITIONER

## 2023-12-12 PROCEDURE — 3017F COLORECTAL CA SCREEN DOC REV: CPT | Performed by: NURSE PRACTITIONER

## 2023-12-12 PROCEDURE — 81002 URINALYSIS NONAUTO W/O SCOPE: CPT | Performed by: NURSE PRACTITIONER

## 2023-12-12 PROCEDURE — 3075F SYST BP GE 130 - 139MM HG: CPT | Performed by: NURSE PRACTITIONER

## 2023-12-12 PROCEDURE — 3078F DIAST BP <80 MM HG: CPT | Performed by: NURSE PRACTITIONER

## 2023-12-12 PROCEDURE — 99214 OFFICE O/P EST MOD 30 MIN: CPT | Performed by: NURSE PRACTITIONER

## 2023-12-12 PROCEDURE — G8400 PT W/DXA NO RESULTS DOC: HCPCS | Performed by: NURSE PRACTITIONER

## 2023-12-12 PROCEDURE — G8427 DOCREV CUR MEDS BY ELIG CLIN: HCPCS | Performed by: NURSE PRACTITIONER

## 2023-12-12 RX ORDER — SULFAMETHOXAZOLE AND TRIMETHOPRIM 800; 160 MG/1; MG/1
1 TABLET ORAL 2 TIMES DAILY
Qty: 14 TABLET | Refills: 0 | Status: SHIPPED | OUTPATIENT
Start: 2023-12-12 | End: 2023-12-19

## 2023-12-12 RX ORDER — GLIPIZIDE 5 MG/1
5 TABLET ORAL DAILY
Qty: 60 TABLET | Refills: 1 | Status: SHIPPED | OUTPATIENT
Start: 2023-12-12

## 2023-12-12 NOTE — TELEPHONE ENCOUNTER
Last Appointment:  9/12/2023  Future Appointments   Date Time Provider [address]   12/14/2023 10:10 AM [facility] LABS ROOM MEDICAL ONCOLOGY [facility] MED ONC Mansoor Alfred   12/14/2023 11:15 AM Harriett Arias MD [facility]

## 2023-12-12 NOTE — PROGRESS NOTES
Disp: 32 g, Rfl: 1    rOPINIRole (REQUIP) 0.5 MG tablet, TAKE 1 TABLET THREE TIMES DAILY, Disp: 270 tablet, Rfl: 1    glipiZIDE (GLUCOTROL) 5 MG tablet, Take 1 tablet by mouth daily, Disp: 60 tablet, Rfl: 1    insulin lispro (HUMALOG) 100 UNIT/ML SOLN injection vial, Inject 0-16 Units into the skin 3 times daily (with meals) (Patient not taking: Reported on 2023), Disp: , Rfl:     insulin lispro (HUMALOG) 100 UNIT/ML SOLN injection vial, Inject 0-4 Units into the skin nightly (Patient not taking: Reported on 2023), Disp: , Rfl:     guaiFENesin (MUCINEX) 600 MG extended release tablet, Take 2 tablets by mouth 2 times daily (Patient not taking: Reported on 2023), Disp: , Rfl:     arformoterol tartrate (BROVANA) 15 MCG/2ML NEBU, Take 2 mLs by nebulization in the morning and 2 mLs in the evening. (Patient not taking: Reported on 2023), Disp: 120 mL, Rfl: 3    budesonide (PULMICORT) 0.5 MG/2ML nebulizer suspension, Take 2 mLs by nebulization in the morning and 2 mLs in the evening. (Patient not taking: Reported on 2023), Disp: 60 each, Rfl: 3    ferrous sulfate (IRON 325) 325 (65 Fe) MG tablet, Take 1 tablet by mouth 2 times daily (Patient not taking: Reported on 2023), Disp: 180 tablet, Rfl: 1    gabapentin (NEURONTIN) 300 MG capsule, Take 2 capsules by mouth nightly for 90 days. (Patient not taking: Reported on 2023), Disp: 180 capsule, Rfl: 1    Allergies:      Allergies   Allergen Reactions    Baclofen Other (See Comments)     Dry mouth, abd pain    Penicillins Hives    Nickel Rash and Other (See Comments)     Surgical steel       Social History:     Social History     Tobacco Use    Smoking status: Former     Packs/day: 1.00     Years: 10.00     Additional pack years: 0.00     Total pack years: 10.00     Types: Cigarettes     Start date: 0     Quit date:      Years since quittin.9    Smokeless tobacco: Never   Vaping Use    Vaping Use: Never used   Substance Use

## 2023-12-14 ENCOUNTER — HOSPITAL ENCOUNTER (OUTPATIENT)
Dept: INFUSION THERAPY | Age: 73
Discharge: HOME | End: 2023-12-14

## 2023-12-14 DIAGNOSIS — E11.65 TYPE 2 DIABETES MELLITUS WITH HYPERGLYCEMIA, WITHOUT LONG-TERM CURRENT USE OF INSULIN (HCC): ICD-10-CM

## 2023-12-14 DIAGNOSIS — D50.0 IRON DEFICIENCY ANEMIA DUE TO CHRONIC BLOOD LOSS: Primary | ICD-10-CM

## 2023-12-15 LAB
CULTURE: ABNORMAL
SPECIMEN DESCRIPTION: ABNORMAL

## 2023-12-15 RX ORDER — GLIPIZIDE 5 MG/1
5 TABLET ORAL DAILY
Qty: 90 TABLET | Refills: 0 | OUTPATIENT
Start: 2023-12-15

## 2023-12-27 ENCOUNTER — CARE COORDINATION (OUTPATIENT)
Dept: CARE COORDINATION | Age: 73
End: 2023-12-27

## 2023-12-27 SDOH — ECONOMIC STABILITY: FOOD INSECURITY: WITHIN THE PAST 12 MONTHS, THE FOOD YOU BOUGHT JUST DIDN'T LAST AND YOU DIDN'T HAVE MONEY TO GET MORE.: NEVER TRUE

## 2023-12-27 SDOH — HEALTH STABILITY: PHYSICAL HEALTH: ON AVERAGE, HOW MANY MINUTES DO YOU ENGAGE IN EXERCISE AT THIS LEVEL?: 0 MIN

## 2023-12-27 SDOH — HEALTH STABILITY: PHYSICAL HEALTH: ON AVERAGE, HOW MANY DAYS PER WEEK DO YOU ENGAGE IN MODERATE TO STRENUOUS EXERCISE (LIKE A BRISK WALK)?: 0 DAYS

## 2023-12-27 SDOH — ECONOMIC STABILITY: TRANSPORTATION INSECURITY
IN THE PAST 12 MONTHS, HAS THE LACK OF TRANSPORTATION KEPT YOU FROM MEDICAL APPOINTMENTS OR FROM GETTING MEDICATIONS?: YES

## 2023-12-27 SDOH — ECONOMIC STABILITY: TRANSPORTATION INSECURITY
IN THE PAST 12 MONTHS, HAS LACK OF TRANSPORTATION KEPT YOU FROM MEETINGS, WORK, OR FROM GETTING THINGS NEEDED FOR DAILY LIVING?: NO

## 2023-12-27 SDOH — ECONOMIC STABILITY: FOOD INSECURITY: WITHIN THE PAST 12 MONTHS, YOU WORRIED THAT YOUR FOOD WOULD RUN OUT BEFORE YOU GOT MONEY TO BUY MORE.: NEVER TRUE

## 2023-12-27 ASSESSMENT — SOCIAL DETERMINANTS OF HEALTH (SDOH)
DO YOU BELONG TO ANY CLUBS OR ORGANIZATIONS SUCH AS CHURCH GROUPS UNIONS, FRATERNAL OR ATHLETIC GROUPS, OR SCHOOL GROUPS?: NO
HOW HARD IS IT FOR YOU TO PAY FOR THE VERY BASICS LIKE FOOD, HOUSING, MEDICAL CARE, AND HEATING?: NOT HARD AT ALL
HOW OFTEN DO YOU ATTENT MEETINGS OF THE CLUB OR ORGANIZATION YOU BELONG TO?: NEVER
HOW OFTEN DO YOU ATTEND CHURCH OR RELIGIOUS SERVICES?: NEVER
HOW OFTEN DO YOU GET TOGETHER WITH FRIENDS OR RELATIVES?: ONCE A WEEK
IN A TYPICAL WEEK, HOW MANY TIMES DO YOU TALK ON THE PHONE WITH FAMILY, FRIENDS, OR NEIGHBORS?: THREE TIMES A WEEK

## 2023-12-27 NOTE — CARE COORDINATION
Ambulatory Care Coordination Note  2023    Patient Current Location:  Home: [address] [place] [address]     ACM contacted the patient by telephone. Verified name and  with patient as identifiers. Provided introduction to self, and explanation of the ACM role.     Challenges to be reviewed by the provider   Additional needs identified to be addressed with provider: yes  Patient c/o urinary incontinence and bladder spasms.  Can not get in to provide urine sample.               Method of communication with provider: staff message.    ACM: Shyam Henderson RN    ACM contacted Haven.  She states she underwent an EGD yesterday.  She does have Gave syndrome.  The EGD found bleeding and it was treated.  Since return from hospital, Haven has been having bladder spasms and is incontinent of urine.  She denies blood in her urine.  She requests ACM contact PCP to update her on symptoms.  Haven does not drive and is unable to get to PCP office due to lack of transportation and generalized weakness.  Haven states she does not check her blood sugars at home.  She states her blood sugar was checked as an inpatient and she was given insulin coverage during her hospital stay.  SDOH and cc protocol were completed.    Plan  Send welcome letter and DM zone tool via [employer]  Send in basket message to [facility] (urinary symptoms and follow up appointment request)  Continue to follow for care coordination      Lab Results       None            Care Coordination Interventions    Referral from Primary Care Provider: No  Suggested Interventions and Community Resources          Goals Addressed    None         No future appointments.

## 2024-01-01 NOTE — TELEPHONE ENCOUNTER
Called patient again but got same recording. Try later. This note was copied from the mother's chart.  Mother's Name: Maico  Phone #: [phone]  Infant Name: Itz Barron : 2023  Gestation: 38w2d  Day of life: 1  Birth weight: 7-5.1 (3320g) Discharge weight:  Weight Loss: -1.81%  24 hour Summary of Feeds: 4 BF  Voids: 2  Stools: 1  Assistive devices (shields, shells, etc): Nipple shield  Significant Maternal history: , Renal Disorder  Maternal Concerns: None at this time  Maternal Goal: Breastfeed for 1 year-ok with formula supplement if needed  Mother's Medications: Prilosec, PNV,Probiotic  Breastpump for home: Spectra  Ped follow up appt:    F/U with mom.  Infant being held by visitors in room.  He was circumcised earlier in the afternoon and had a poor feeding X1.  Mom to attempt feeding again at this time.  Dad changed infant to help wake him up to feed.     2100   Mom stated infant latched and feed better than earlier.  Currently has infant STS and he is sleeping.

## 2024-01-02 VITALS
DIASTOLIC BLOOD PRESSURE: 82 MMHG | SYSTOLIC BLOOD PRESSURE: 138 MMHG | BODY MASS INDEX: 34.19 KG/M2 | HEART RATE: 86 BPM | TEMPERATURE: 97.7 F | WEIGHT: 193 LBS | RESPIRATION RATE: 18 BRPM | OXYGEN SATURATION: 97 %

## 2024-01-02 ASSESSMENT — ENCOUNTER SYMPTOMS
DIARRHEA: 0
SHORTNESS OF BREATH: 0
NAUSEA: 0
VOMITING: 0
ABDOMINAL PAIN: 0

## 2024-01-18 ENCOUNTER — CARE COORDINATION (OUTPATIENT)
Dept: CARE COORDINATION | Age: 74
End: 2024-01-18

## 2024-02-02 ENCOUNTER — CARE COORDINATION (OUTPATIENT)
Dept: CARE COORDINATION | Age: 74
End: 2024-02-02

## 2024-02-15 RX ORDER — DULAGLUTIDE 4.5 MG/.5ML
4.5 INJECTION, SOLUTION SUBCUTANEOUS WEEKLY
Qty: 2 ML | Refills: 0 | Status: SHIPPED | OUTPATIENT
Start: 2024-02-15

## 2024-02-16 ENCOUNTER — TELEPHONE (OUTPATIENT)
Dept: FAMILY MEDICINE CLINIC | Age: 74
End: 2024-02-16

## 2024-02-16 DIAGNOSIS — K31.811 ANGIODYSPLASIA OF STOMACH AND DUODENUM WITH HEMORRHAGE: Primary | ICD-10-CM

## 2024-02-22 ENCOUNTER — CARE COORDINATION (OUTPATIENT)
Dept: CARE COORDINATION | Age: 74
End: 2024-02-22

## 2024-02-22 NOTE — CARE COORDINATION
Ambulatory Care Coordination Note  2024    Patient Current Location:  Home: [address] [place] [address]     ACM contacted the patient by telephone. Verified name and  with patient as identifiers. Provided introduction to self, and explanation of the ACM role.     Challenges to be reviewed by the provider   Additional needs identified to be addressed with provider: No  none               Method of communication with provider: none.    ACM: Shyam Henderson, RN    ACM contacted Haven.  She states she is feeling very weak.  She is struggling to cook, clean and do household chores.  She is not interested in home delivered meals as she has had them in the past and did not care for them.  She has an intake appointment with a home health agency on 2024.  The agency has aide services as well as local transportation.  Haven frequently needs iron infusions and blood transfusions.  She received a port a few weeks ago at the [facility].  Her next blood work will be done on 2024.  She states if she is less than 8.0 hemoglobin, she will receive a transfusion.  Haven is awaiting an appointment with genetics specialist in [place].  Unfortunately, they are scheduled out for 2 months.  Haven has an appointment with Dr. Romero on 2024 and she plans on attending.  Haven has all her medications filled.  She will need to refill trulicity soon.  Per epic, prescription was sent to [facility].  The pharmacy will deliver Haven's medication.    Plan  Check status of home health care  Continue to follow for care coordination   .    Lab Results       None            Care Coordination Interventions    Referral from Primary Care Provider: No  Suggested Interventions and Community Resources          Goals Addressed                   This Visit's Progress     Conditions and Symptoms        I will schedule office visits, as directed by my provider.  I will keep my appointment or reschedule if I have to cancel.  I will

## 2024-03-08 NOTE — TELEPHONE ENCOUNTER
Last Appointment:  9/12/2023  Future Appointments   Date Time Provider Department Center   4/3/2024  3:30 PM Camila Romero, DO [facility]

## 2024-03-11 RX ORDER — DULAGLUTIDE 4.5 MG/.5ML
4.5 INJECTION, SOLUTION SUBCUTANEOUS WEEKLY
Qty: 2 ML | Refills: 3 | Status: SHIPPED | OUTPATIENT
Start: 2024-03-11

## 2024-03-15 DIAGNOSIS — M54.16 LUMBAR RADICULAR PAIN: ICD-10-CM

## 2024-03-15 NOTE — TELEPHONE ENCOUNTER
Last Appointment:  9/12/2023  Future Appointments   Date Time Provider Department Center   4/3/2024  3:30 PM Camila Romero, DO [facility]

## 2024-03-18 RX ORDER — GABAPENTIN 300 MG/1
300 CAPSULE ORAL DAILY
Qty: 90 CAPSULE | Refills: 1 | OUTPATIENT
Start: 2024-03-18 | End: 2024-09-14

## 2024-03-18 RX ORDER — GABAPENTIN 300 MG/1
600 CAPSULE ORAL NIGHTLY
Qty: 180 CAPSULE | Refills: 0 | Status: SHIPPED | OUTPATIENT
Start: 2024-03-18 | End: 2024-06-16

## 2024-03-26 ENCOUNTER — CARE COORDINATION (OUTPATIENT)
Dept: CARE COORDINATION | Age: 74
End: 2024-03-26

## 2024-04-15 ENCOUNTER — CARE COORDINATION (OUTPATIENT)
Dept: CARE COORDINATION | Age: 74
End: 2024-04-15

## 2024-04-16 ENCOUNTER — CARE COORDINATION (OUTPATIENT)
Dept: CARE COORDINATION | Age: 74
End: 2024-04-16

## 2024-04-16 NOTE — CARE COORDINATION
condition.    Barriers: stress  Plan for overcoming my barriers: care coordination  Confidence: 9/10  Anticipated Goal Completion Date: 3/1/2024    Care coordination graduation 4/16/2024  Goal met                No future appointments.

## 2024-04-24 RX ORDER — ROPINIROLE 0.5 MG/1
0.5 TABLET, FILM COATED ORAL 3 TIMES DAILY
Qty: 270 TABLET | Refills: 1 | Status: SHIPPED | OUTPATIENT
Start: 2024-04-24

## 2024-05-23 DIAGNOSIS — K21.9 GASTROESOPHAGEAL REFLUX DISEASE, UNSPECIFIED WHETHER ESOPHAGITIS PRESENT: ICD-10-CM

## 2024-05-23 DIAGNOSIS — E11.65 TYPE 2 DIABETES MELLITUS WITH HYPERGLYCEMIA, WITHOUT LONG-TERM CURRENT USE OF INSULIN (HCC): ICD-10-CM

## 2024-05-23 DIAGNOSIS — M54.16 LUMBAR RADICULAR PAIN: ICD-10-CM

## 2024-05-28 RX ORDER — GLIPIZIDE 5 MG/1
5 TABLET ORAL DAILY
Qty: 90 TABLET | Refills: 0 | Status: SHIPPED | OUTPATIENT
Start: 2024-05-28

## 2024-05-28 RX ORDER — OMEPRAZOLE 20 MG/1
CAPSULE, DELAYED RELEASE ORAL
Qty: 180 CAPSULE | Refills: 0 | Status: SHIPPED | OUTPATIENT
Start: 2024-05-28

## 2024-05-28 RX ORDER — GABAPENTIN 300 MG/1
600 CAPSULE ORAL NIGHTLY
Qty: 180 CAPSULE | Refills: 0 | Status: SHIPPED | OUTPATIENT
Start: 2024-05-28 | End: 2024-08-26

## 2024-05-28 RX ORDER — FLUTICASONE PROPIONATE 50 MCG
SPRAY, SUSPENSION (ML) NASAL
Qty: 32 G | Refills: 3 | Status: SHIPPED | OUTPATIENT
Start: 2024-05-28

## 2024-06-06 DIAGNOSIS — K21.9 GASTROESOPHAGEAL REFLUX DISEASE, UNSPECIFIED WHETHER ESOPHAGITIS PRESENT: ICD-10-CM

## 2024-06-06 RX ORDER — OMEPRAZOLE 20 MG/1
CAPSULE, DELAYED RELEASE ORAL
Qty: 180 CAPSULE | Refills: 0 | Status: SHIPPED | OUTPATIENT
Start: 2024-06-06

## 2024-06-13 RX ORDER — GABAPENTIN 300 MG/1
300 CAPSULE ORAL DAILY
Qty: 90 CAPSULE | Refills: 1 | OUTPATIENT
Start: 2024-06-13 | End: 2024-12-10

## 2024-06-13 NOTE — TELEPHONE ENCOUNTER
Last Appointment:  9/12/2023  Future Appointments   Date Time Provider Department Center   8/20/2024 10:30 AM Jesus Alvarado, DO [facility]

## 2024-07-31 ENCOUNTER — TELEPHONE (OUTPATIENT)
Dept: FAMILY MEDICINE CLINIC | Age: 74
End: 2024-07-31

## 2024-07-31 DIAGNOSIS — D63.8 ANEMIA OF CHRONIC DISEASE: Primary | ICD-10-CM

## 2024-09-11 RX ORDER — DULAGLUTIDE 4.5 MG/.5ML
4.5 INJECTION, SOLUTION SUBCUTANEOUS WEEKLY
Qty: 2 ML | Refills: 3 | Status: SHIPPED | OUTPATIENT
Start: 2024-09-11

## 2024-09-18 RX ORDER — GABAPENTIN 300 MG/1
300 CAPSULE ORAL DAILY
Qty: 90 CAPSULE | Refills: 1 | Status: SHIPPED | OUTPATIENT
Start: 2024-09-18 | End: 2025-03-17

## 2024-09-30 ENCOUNTER — TELEPHONE (OUTPATIENT)
Dept: FAMILY MEDICINE CLINIC | Age: 74
End: 2024-09-30

## 2024-09-30 NOTE — TELEPHONE ENCOUNTER
Last Appointment:  9/12/2023  Future Appointments   Date Time Provider Department Center   10/15/2024  1:30 PM Jesus Alvarado DO [facility] DEP

## 2024-10-01 DIAGNOSIS — E11.65 TYPE 2 DIABETES MELLITUS WITH HYPERGLYCEMIA, WITHOUT LONG-TERM CURRENT USE OF INSULIN (HCC): ICD-10-CM

## 2024-10-02 RX ORDER — GLIPIZIDE 5 MG/1
5 TABLET ORAL DAILY
Qty: 90 TABLET | Refills: 0 | Status: SHIPPED | OUTPATIENT
Start: 2024-10-02

## 2024-10-02 NOTE — TELEPHONE ENCOUNTER
Last Appointment:  Visit date not found  Future Appointments   Date Time Provider Department Center   10/15/2024  1:30 PM Jesus Alvarado DO [facility] DEP

## 2024-10-08 ENCOUNTER — OFFICE VISIT (OUTPATIENT)
Dept: FAMILY MEDICINE CLINIC | Age: 74
End: 2024-10-08
Payer: MEDICARE

## 2024-10-08 VITALS
BODY MASS INDEX: 34.54 KG/M2 | WEIGHT: 195 LBS | DIASTOLIC BLOOD PRESSURE: 70 MMHG | TEMPERATURE: 97.7 F | HEART RATE: 85 BPM | SYSTOLIC BLOOD PRESSURE: 110 MMHG | OXYGEN SATURATION: 97 %

## 2024-10-08 DIAGNOSIS — K74.60 HEPATIC CIRRHOSIS, UNSPECIFIED HEPATIC CIRRHOSIS TYPE, UNSPECIFIED WHETHER ASCITES PRESENT (HCC): ICD-10-CM

## 2024-10-08 DIAGNOSIS — E78.5 HYPERLIPIDEMIA, UNSPECIFIED HYPERLIPIDEMIA TYPE: ICD-10-CM

## 2024-10-08 DIAGNOSIS — K31.819 GAVE (GASTRIC ANTRAL VASCULAR ECTASIA): Primary | ICD-10-CM

## 2024-10-08 DIAGNOSIS — D63.8 ANEMIA OF CHRONIC DISEASE: ICD-10-CM

## 2024-10-08 DIAGNOSIS — E55.9 VITAMIN D DEFICIENCY: ICD-10-CM

## 2024-10-08 DIAGNOSIS — F33.41 RECURRENT MAJOR DEPRESSIVE DISORDER, IN PARTIAL REMISSION (HCC): ICD-10-CM

## 2024-10-08 DIAGNOSIS — E11.65 TYPE 2 DIABETES MELLITUS WITH HYPERGLYCEMIA, UNSPECIFIED WHETHER LONG TERM INSULIN USE (HCC): ICD-10-CM

## 2024-10-08 PROBLEM — J44.9 CHRONIC OBSTRUCTIVE PULMONARY DISEASE, UNSPECIFIED (HCC): Status: ACTIVE | Noted: 2024-10-08

## 2024-10-08 LAB
ALBUMIN: 3.5 G/DL (ref 3.5–5.2)
ALP BLD-CCNC: 90 U/L (ref 35–104)
ALT SERPL-CCNC: 23 U/L (ref 0–32)
ANION GAP SERPL CALCULATED.3IONS-SCNC: 11 MMOL/L (ref 7–16)
AST SERPL-CCNC: 34 U/L (ref 0–31)
BASOPHILS ABSOLUTE: 0.06 K/UL (ref 0–0.2)
BASOPHILS RELATIVE PERCENT: 1 % (ref 0–2)
BILIRUB SERPL-MCNC: 0.3 MG/DL (ref 0–1.2)
BUN BLDV-MCNC: 14 MG/DL (ref 6–23)
CALCIUM SERPL-MCNC: 9.5 MG/DL (ref 8.6–10.2)
CHLORIDE BLD-SCNC: 105 MMOL/L (ref 98–107)
CHOLESTEROL, TOTAL: 201 MG/DL
CO2: 21 MMOL/L (ref 22–29)
CREAT SERPL-MCNC: 0.7 MG/DL (ref 0.5–1)
EOSINOPHILS ABSOLUTE: 0.42 K/UL (ref 0.05–0.5)
EOSINOPHILS RELATIVE PERCENT: 7 % (ref 0–6)
GFR, ESTIMATED: >90 ML/MIN/1.73M2
GLUCOSE BLD-MCNC: 191 MG/DL (ref 74–99)
HBA1C MFR BLD: 7.3 %
HCT VFR BLD CALC: 23.4 % (ref 34–48)
HDLC SERPL-MCNC: 63 MG/DL
HEMOGLOBIN: 6.9 G/DL (ref 11.5–15.5)
IMMATURE GRANULOCYTES %: 1 % (ref 0–5)
IMMATURE GRANULOCYTES ABSOLUTE: 0.03 K/UL (ref 0–0.58)
LDL CHOLESTEROL: 121 MG/DL
LYMPHOCYTES ABSOLUTE: 1.06 K/UL (ref 1.5–4)
LYMPHOCYTES RELATIVE PERCENT: 16 % (ref 20–42)
MCH RBC QN AUTO: 25.8 PG (ref 26–35)
MCHC RBC AUTO-ENTMCNC: 29.5 G/DL (ref 32–34.5)
MCV RBC AUTO: 87.6 FL (ref 80–99.9)
MONOCYTES ABSOLUTE: 0.71 K/UL (ref 0.1–0.95)
MONOCYTES RELATIVE PERCENT: 11 % (ref 2–12)
NEUTROPHILS ABSOLUTE: 4.18 K/UL (ref 1.8–7.3)
NEUTROPHILS RELATIVE PERCENT: 65 % (ref 43–80)
PDW BLD-RTO: 17.8 % (ref 11.5–15)
PLATELET # BLD: 220 K/UL (ref 130–450)
PMV BLD AUTO: 10.9 FL (ref 7–12)
POTASSIUM SERPL-SCNC: 3.6 MMOL/L (ref 3.5–5)
RBC # BLD: 2.67 M/UL (ref 3.5–5.5)
SODIUM BLD-SCNC: 137 MMOL/L (ref 132–146)
TOTAL PROTEIN: 6.3 G/DL (ref 6.4–8.3)
TRIGL SERPL-MCNC: 84 MG/DL
VITAMIN D 25-HYDROXY: 24.1 NG/ML (ref 30–100)
VLDLC SERPL CALC-MCNC: 17 MG/DL
WBC # BLD: 6.5 K/UL (ref 4.5–11.5)

## 2024-10-08 PROCEDURE — 3051F HG A1C>EQUAL 7.0%<8.0%: CPT | Performed by: STUDENT IN AN ORGANIZED HEALTH CARE EDUCATION/TRAINING PROGRAM

## 2024-10-08 PROCEDURE — 3078F DIAST BP <80 MM HG: CPT | Performed by: STUDENT IN AN ORGANIZED HEALTH CARE EDUCATION/TRAINING PROGRAM

## 2024-10-08 PROCEDURE — 83036 HEMOGLOBIN GLYCOSYLATED A1C: CPT | Performed by: STUDENT IN AN ORGANIZED HEALTH CARE EDUCATION/TRAINING PROGRAM

## 2024-10-08 PROCEDURE — 1123F ACP DISCUSS/DSCN MKR DOCD: CPT | Performed by: STUDENT IN AN ORGANIZED HEALTH CARE EDUCATION/TRAINING PROGRAM

## 2024-10-08 PROCEDURE — 36415 COLL VENOUS BLD VENIPUNCTURE: CPT | Performed by: STUDENT IN AN ORGANIZED HEALTH CARE EDUCATION/TRAINING PROGRAM

## 2024-10-08 PROCEDURE — 99213 OFFICE O/P EST LOW 20 MIN: CPT | Performed by: STUDENT IN AN ORGANIZED HEALTH CARE EDUCATION/TRAINING PROGRAM

## 2024-10-08 PROCEDURE — 3074F SYST BP LT 130 MM HG: CPT | Performed by: STUDENT IN AN ORGANIZED HEALTH CARE EDUCATION/TRAINING PROGRAM

## 2024-10-08 RX ORDER — PROPRANOLOL HCL 10 MG
10 TABLET ORAL DAILY
COMMUNITY
Start: 2024-10-03 | End: 2025-04-01

## 2024-10-08 ASSESSMENT — PATIENT HEALTH QUESTIONNAIRE - PHQ9
10. IF YOU CHECKED OFF ANY PROBLEMS, HOW DIFFICULT HAVE THESE PROBLEMS MADE IT FOR YOU TO DO YOUR WORK, TAKE CARE OF THINGS AT HOME, OR GET ALONG WITH OTHER PEOPLE: VERY DIFFICULT
4. FEELING TIRED OR HAVING LITTLE ENERGY: NEARLY EVERY DAY
3. TROUBLE FALLING OR STAYING ASLEEP: SEVERAL DAYS
SUM OF ALL RESPONSES TO PHQ9 QUESTIONS 1 & 2: 6
7. TROUBLE CONCENTRATING ON THINGS, SUCH AS READING THE NEWSPAPER OR WATCHING TELEVISION: NOT AT ALL
SUM OF ALL RESPONSES TO PHQ QUESTIONS 1-9: 12
5. POOR APPETITE OR OVEREATING: SEVERAL DAYS
1. LITTLE INTEREST OR PLEASURE IN DOING THINGS: NEARLY EVERY DAY
8. MOVING OR SPEAKING SO SLOWLY THAT OTHER PEOPLE COULD HAVE NOTICED. OR THE OPPOSITE, BEING SO FIGETY OR RESTLESS THAT YOU HAVE BEEN MOVING AROUND A LOT MORE THAN USUAL: NOT AT ALL
2. FEELING DOWN, DEPRESSED OR HOPELESS: NEARLY EVERY DAY
SUM OF ALL RESPONSES TO PHQ QUESTIONS 1-9: 12
SUM OF ALL RESPONSES TO PHQ QUESTIONS 1-9: 12
6. FEELING BAD ABOUT YOURSELF - OR THAT YOU ARE A FAILURE OR HAVE LET YOURSELF OR YOUR FAMILY DOWN: SEVERAL DAYS
9. THOUGHTS THAT YOU WOULD BE BETTER OFF DEAD, OR OF HURTING YOURSELF: NOT AT ALL
SUM OF ALL RESPONSES TO PHQ QUESTIONS 1-9: 12

## 2024-10-08 ASSESSMENT — ENCOUNTER SYMPTOMS
DIARRHEA: 1
ABDOMINAL PAIN: 0
COUGH: 0
NAUSEA: 0
SHORTNESS OF BREATH: 0
BLOOD IN STOOL: 1

## 2024-10-08 NOTE — PROGRESS NOTES
against potentially harmful/life threatening disease.      Patient and/or guardian verbalizes understanding and agrees with above counseling, assessment and plan.     Past Medical & Surgical History:      Diagnosis Date    Arthritis     Cataract     right    Chronic fatigue syndrome     Depression     Diabetes mellitus (HCC)     SC injection weekly    Fibromyalgia     Hypertension     Memory loss     Mononucleosis     Psoriasis     Sciatica     Sleep apnea     no c-pap    Vertigo      Past Surgical History:   Procedure Laterality Date    CARPAL TUNNEL RELEASE Bilateral     CATARACT REMOVAL Right     COLONOSCOPY      OTHER SURGICAL HISTORY Bilateral 2021    BILATERAL L3,L4 MEDIAL BRANCH AND L5 DORSAL RAMUS RADIOFREQUENCY    PAIN MANAGEMENT PROCEDURE Bilateral 2021    BILATERAL L3,4 MEDIAL BRANCH AND L5 DORSAL RAMUS  RADIOFREQUENCY ABLATION WITH SEDATION  (CPT 93192) performed by Silas Quigley MD at [facility] OR    SPINE SURGERY N/A 2021    T11 KYPHOPLASTY (2 C-ARMS) performed by Silas Quigley MD at [facility] OR    TONSILLECTOMY AND ADENOIDECTOMY      UPPER GASTROINTESTINAL ENDOSCOPY N/A 2019    EGD BIOPSY performed by Katheryn Mooney MD at [facility]    UPPER GASTROINTESTINAL ENDOSCOPY N/A 03/10/2021    EGD ESOPHAGOGASTRODUODENOSCOPY performed by Ar Braun MD at [facility]       Family History:      Problem Relation Age of Onset    Hypertension Father     Heart Disease Father     Hypertension Paternal Grandfather     Heart Disease Paternal Grandfather        Social History:  Social History     Tobacco Use    Smoking status: Former     Current packs/day: 0.00     Average packs/day: 1 pack/day for 13.0 years (13.0 ttl pk-yrs)     Types: Cigarettes     Start date:      Quit date:      Years since quittin.7    Smokeless tobacco: Never   Substance Use Topics    Alcohol use: Not Currently     Alcohol/week: 1.0 standard drink of alcohol     Types: 1 Glasses of wine per

## 2024-10-09 DIAGNOSIS — K92.2 ACUTE GASTROINTESTINAL HEMORRHAGE: ICD-10-CM

## 2024-10-09 DIAGNOSIS — K31.819 GAVE (GASTRIC ANTRAL VASCULAR ECTASIA): Primary | ICD-10-CM

## 2024-10-09 NOTE — RESULT ENCOUNTER NOTE
Vit D is low. Would recommend supplementation with 2000 U daily.    Cholesterol slightly elevated but patient has extremely high ASCVD risk score and I did notice she is not on statins I would recommend that for prevention of heart attack or stroke.  The 10-year ASCVD risk score (Eliezer WASHINGTON, et al., 2019) is: 18.1%    Values used to calculate the score:      Age: 74 years      Sex: Female      Is Non- : No      Diabetic: Yes      Tobacco smoker: No      Systolic Blood Pressure: 110 mmHg      Is BP treated: No      HDL Cholesterol: 63 mg/dL      Total Cholesterol: 201 mg/dL

## 2024-10-09 NOTE — RESULT ENCOUNTER NOTE
Patient needs to be transfused likely today. Honestly I would recommend going to the ER ([place] or [facility] usually have less people present than [facility] or if patient goes first thing in the morning then will have better luck) at this time or calling Dr. Kolton Armando's office directly because I am having difficulty placing orders and I also cannot place stat orders so it is not guaranteed my transfusion would be done today.

## 2024-10-11 ENCOUNTER — TELEPHONE (OUTPATIENT)
Dept: INFUSION THERAPY | Age: 74
End: 2024-10-11

## 2024-10-11 NOTE — TELEPHONE ENCOUNTER
Called patient to schedule appt.  Spoke with patient, she was unsure if she wanted an appt. Said she would call back.

## 2024-10-13 ENCOUNTER — HOSPITAL ENCOUNTER (EMERGENCY)
Age: 74
Discharge: HOME | End: 2024-10-13
Payer: MEDICARE

## 2024-10-13 VITALS
HEART RATE: 76 BPM | RESPIRATION RATE: 17 BRPM | TEMPERATURE: 98 F | DIASTOLIC BLOOD PRESSURE: 66 MMHG | OXYGEN SATURATION: 97 % | SYSTOLIC BLOOD PRESSURE: 133 MMHG

## 2024-10-13 DIAGNOSIS — Z01.89 ROUTINE LAB DRAW: Primary | ICD-10-CM

## 2024-10-13 PROCEDURE — 86901 BLOOD TYPING SEROLOGIC RH(D): CPT

## 2024-10-13 PROCEDURE — 86923 COMPATIBILITY TEST ELECTRIC: CPT

## 2024-10-13 PROCEDURE — 99282 EMERGENCY DEPT VISIT SF MDM: CPT

## 2024-10-13 PROCEDURE — 86900 BLOOD TYPING SEROLOGIC ABO: CPT

## 2024-10-13 PROCEDURE — 86850 RBC ANTIBODY SCREEN: CPT

## 2024-10-13 NOTE — DISCHARGE INSTRUCTIONS
Please wear your green type and screen and that was given to you today in the emergency department when you come to the hospital tomorrow for your transfusion.

## 2024-10-13 NOTE — ED PROVIDER NOTES
Mood normal.         Behavior: Behavior normal.         Thought Content: Thought content normal.         Judgment: Judgment normal.       DIAGNOSTIC RESULTS   LABS:    Labs Reviewed   TYPE AND SCREEN       When ordered only abnormal lab results are displayed. All other labs were within normal range or not returned as of this dictation.    EKG: When ordered, EKG's are interpreted by the Emergency Department Physician in the absence of a cardiologist.  Please see their note for interpretation of EKG.    RADIOLOGY:   Non-plain film images such as CT, Ultrasound and MRI are read by the radiologist. Plain radiographic images are visualized and preliminarily interpreted by the ED Provider with the below findings:    Interpretation per the Radiologist below, if available at the time of this note:    No orders to display     No results found.    No results found.    PROCEDURES   Unless otherwise noted below, none     Procedures    CRITICAL CARE TIME (.cctime)   None    PAST MEDICAL HISTORY      has a past medical history of Arthritis, Cataract, Chronic fatigue syndrome, Depression, Diabetes mellitus (HCC), Fibromyalgia, Hypertension, Memory loss, Mononucleosis, Psoriasis, Sciatica, Sleep apnea, and Vertigo.     Chronic Conditions affecting Care: None    EMERGENCY DEPARTMENT COURSE and DIFFERENTIAL DIAGNOSIS/MDM:   Vitals:    Vitals:    10/13/24 1325 10/13/24 1331   BP:  133/66   Pulse: 76    Resp:  17   Temp: 98 °F (36.7 °C)    SpO2: 97%        Patient was given the following medications:  Medications - No data to display    CONSULTS: (Who and What was discussed)  None  Discussion with Other Profesionals : Spoke with blood bank to confirm drawing type and screen today was okay for blood transfusion tomorrow at [facility].    Social Determinants : None    Records Reviewed : Outpatient Notes reviewed PCP visit from 10/8/2024 with Dr. Freddy Hu    CC/HPI Summary, DDx, ED Course, and Reassessment: Haven Barber is a 74

## 2024-10-14 ENCOUNTER — HOSPITAL ENCOUNTER (OUTPATIENT)
Dept: INFUSION THERAPY | Age: 74
Setting detail: INFUSION SERIES
Discharge: HOME | End: 2024-10-14
Payer: MEDICARE

## 2024-10-14 VITALS
HEART RATE: 84 BPM | DIASTOLIC BLOOD PRESSURE: 53 MMHG | RESPIRATION RATE: 18 BRPM | OXYGEN SATURATION: 100 % | TEMPERATURE: 97 F | SYSTOLIC BLOOD PRESSURE: 136 MMHG

## 2024-10-14 PROCEDURE — 36430 TRANSFUSION BLD/BLD COMPNT: CPT

## 2024-10-14 PROCEDURE — 2580000003 HC RX 258: Performed by: INTERNAL MEDICINE

## 2024-10-14 PROCEDURE — P9016 RBC LEUKOCYTES REDUCED: HCPCS

## 2024-10-14 RX ORDER — SODIUM CHLORIDE 9 MG/ML
INJECTION, SOLUTION INTRAVENOUS PRN
Status: DISCONTINUED | OUTPATIENT
Start: 2024-10-14 | End: 2024-10-15 | Stop reason: HOSPADM

## 2024-10-14 RX ORDER — SODIUM CHLORIDE 0.9 % (FLUSH) 0.9 %
5-40 SYRINGE (ML) INJECTION EVERY 12 HOURS SCHEDULED
Status: DISCONTINUED | OUTPATIENT
Start: 2024-10-14 | End: 2024-10-15 | Stop reason: HOSPADM

## 2024-10-14 RX ADMIN — SODIUM CHLORIDE, PRESERVATIVE FREE 10 ML: 5 INJECTION INTRAVENOUS at 13:09

## 2024-10-14 NOTE — PROGRESS NOTES
Informed consent signed today and on patient chart.   Patient received 1 unit PRBCs . Patient tolerated well.    Vital signs stable. Pt denies any new or worsening pain. Patient refused 30 minute observation after transfusion. Informed patient of potential delayed reaction and instructions on what to do if it occurs. No further questions.

## 2024-10-15 LAB
ABO/RH: NORMAL
ANTIBODY SCREEN: NEGATIVE
ARM BAND NUMBER: NORMAL
BLOOD BANK BLOOD PRODUCT EXPIRATION DATE: NORMAL
BLOOD BANK DISPENSE STATUS: NORMAL
BLOOD BANK ISBT PRODUCT BLOOD TYPE: 6200
BLOOD BANK PRODUCT CODE: NORMAL
BLOOD BANK SAMPLE EXPIRATION: NORMAL
BLOOD BANK UNIT TYPE AND RH: NORMAL
BPU ID: NORMAL
COMPONENT: NORMAL
CROSSMATCH RESULT: NORMAL
TRANSFUSION STATUS: NORMAL
UNIT DIVISION: 0
UNIT ISSUE DATE/TIME: NORMAL

## 2024-10-17 ENCOUNTER — TELEPHONE (OUTPATIENT)
Dept: FAMILY MEDICINE CLINIC | Age: 74
End: 2024-10-17

## 2024-10-21 ENCOUNTER — APPOINTMENT (OUTPATIENT)
Dept: GENERAL RADIOLOGY | Age: 74
End: 2024-10-21
Payer: MEDICARE

## 2024-10-21 ENCOUNTER — HOSPITAL ENCOUNTER (EMERGENCY)
Age: 74
Discharge: HOME | End: 2024-10-21
Attending: STUDENT IN AN ORGANIZED HEALTH CARE EDUCATION/TRAINING PROGRAM
Payer: MEDICARE

## 2024-10-21 ENCOUNTER — TELEPHONE (OUTPATIENT)
Dept: INFUSION THERAPY | Age: 74
End: 2024-10-21

## 2024-10-21 VITALS
TEMPERATURE: 98.2 F | HEART RATE: 89 BPM | SYSTOLIC BLOOD PRESSURE: 134 MMHG | DIASTOLIC BLOOD PRESSURE: 71 MMHG | OXYGEN SATURATION: 98 % | RESPIRATION RATE: 18 BRPM

## 2024-10-21 DIAGNOSIS — D63.8 ANEMIA OF CHRONIC DISEASE: Primary | ICD-10-CM

## 2024-10-21 LAB
ALBUMIN SERPL-MCNC: 3.3 G/DL (ref 3.5–5.2)
ALBUMIN: 3.5 G/DL (ref 3.5–5.2)
ALP BLD-CCNC: 90 U/L (ref 35–104)
ALP SERPL-CCNC: 96 U/L (ref 35–104)
ALT SERPL-CCNC: 23 U/L (ref 0–32)
ALT SERPL-CCNC: 24 U/L (ref 0–32)
ANION GAP SERPL CALCULATED.3IONS-SCNC: 10 MMOL/L (ref 7–16)
ANION GAP SERPL CALCULATED.3IONS-SCNC: 11 MMOL/L (ref 7–16)
AST SERPL-CCNC: 34 U/L (ref 0–31)
AST SERPL-CCNC: 40 U/L (ref 0–31)
BASOPHILS # BLD: 0.05 K/UL (ref 0–0.2)
BASOPHILS ABSOLUTE: 0.06 K/UL (ref 0–0.2)
BASOPHILS NFR BLD: 1 % (ref 0–2)
BASOPHILS RELATIVE PERCENT: 1 % (ref 0–2)
BILIRUB SERPL-MCNC: 0.3 MG/DL (ref 0–1.2)
BILIRUB SERPL-MCNC: 0.5 MG/DL (ref 0–1.2)
BUN BLDV-MCNC: 14 MG/DL (ref 6–23)
BUN SERPL-MCNC: 12 MG/DL (ref 6–23)
CALCIUM SERPL-MCNC: 8.6 MG/DL (ref 8.6–10.2)
CALCIUM SERPL-MCNC: 9.5 MG/DL (ref 8.6–10.2)
CHLORIDE BLD-SCNC: 105 MMOL/L (ref 98–107)
CHLORIDE SERPL-SCNC: 106 MMOL/L (ref 98–107)
CHOLESTEROL, TOTAL: 201 MG/DL
CO2 SERPL-SCNC: 19 MMOL/L (ref 22–29)
CO2: 21 MMOL/L (ref 22–29)
CREAT SERPL-MCNC: 0.7 MG/DL (ref 0.5–1)
CREAT SERPL-MCNC: 0.8 MG/DL (ref 0.5–1)
EOSINOPHIL # BLD: 0.32 K/UL (ref 0.05–0.5)
EOSINOPHILS ABSOLUTE: 0.42 K/UL (ref 0.05–0.5)
EOSINOPHILS RELATIVE PERCENT: 5 % (ref 0–6)
EOSINOPHILS RELATIVE PERCENT: 7 % (ref 0–6)
ERYTHROCYTE [DISTWIDTH] IN BLOOD BY AUTOMATED COUNT: 18.3 % (ref 11.5–15)
GFR, ESTIMATED: 84 ML/MIN/1.73M2
GFR, ESTIMATED: >90 ML/MIN/1.73M2
GLUCOSE BLD-MCNC: 191 MG/DL (ref 74–99)
GLUCOSE SERPL-MCNC: 391 MG/DL (ref 74–99)
HCT VFR BLD AUTO: 21.1 % (ref 34–48)
HCT VFR BLD AUTO: 25 % (ref 34–48)
HCT VFR BLD CALC: 23.4 % (ref 34–48)
HDLC SERPL-MCNC: 63 MG/DL
HEMOGLOBIN: 6.9 G/DL (ref 11.5–15.5)
HGB BLD-MCNC: 6.3 G/DL (ref 11.5–15.5)
HGB BLD-MCNC: 7.2 G/DL (ref 11.5–15.5)
IMM GRANULOCYTES # BLD AUTO: 0.04 K/UL (ref 0–0.58)
IMM GRANULOCYTES NFR BLD: 1 % (ref 0–5)
IMMATURE GRANULOCYTES %: 1 % (ref 0–5)
IMMATURE GRANULOCYTES ABSOLUTE: 0.03 K/UL (ref 0–0.58)
LDL CHOLESTEROL: 121 MG/DL
LYMPHOCYTES ABSOLUTE: 1.06 K/UL (ref 1.5–4)
LYMPHOCYTES NFR BLD: 0.83 K/UL (ref 1.5–4)
LYMPHOCYTES RELATIVE PERCENT: 14 % (ref 20–42)
LYMPHOCYTES RELATIVE PERCENT: 16 % (ref 20–42)
MCH RBC QN AUTO: 25.2 PG (ref 26–35)
MCH RBC QN AUTO: 25.8 PG (ref 26–35)
MCHC RBC AUTO-ENTMCNC: 29.5 G/DL (ref 32–34.5)
MCHC RBC AUTO-ENTMCNC: 29.9 G/DL (ref 32–34.5)
MCV RBC AUTO: 84.4 FL (ref 80–99.9)
MCV RBC AUTO: 87.6 FL (ref 80–99.9)
MONOCYTES ABSOLUTE: 0.71 K/UL (ref 0.1–0.95)
MONOCYTES NFR BLD: 0.68 K/UL (ref 0.1–0.95)
MONOCYTES NFR BLD: 11 % (ref 2–12)
MONOCYTES RELATIVE PERCENT: 11 % (ref 2–12)
NEUTROPHILS ABSOLUTE: 4.18 K/UL (ref 1.8–7.3)
NEUTROPHILS NFR BLD: 68 % (ref 43–80)
NEUTROPHILS RELATIVE PERCENT: 65 % (ref 43–80)
NEUTS SEG NFR BLD: 4.12 K/UL (ref 1.8–7.3)
PDW BLD-RTO: 17.8 % (ref 11.5–15)
PLATELET # BLD AUTO: 177 K/UL (ref 130–450)
PLATELET # BLD: 220 K/UL (ref 130–450)
PMV BLD AUTO: 10.9 FL (ref 7–12)
PMV BLD AUTO: 11.1 FL (ref 7–12)
POTASSIUM SERPL-SCNC: 3.6 MMOL/L (ref 3.5–5)
POTASSIUM SERPL-SCNC: 4 MMOL/L (ref 3.5–5)
PROT SERPL-MCNC: 6 G/DL (ref 6.4–8.3)
RBC # BLD AUTO: 2.5 M/UL (ref 3.5–5.5)
RBC # BLD: 2.67 M/UL (ref 3.5–5.5)
SODIUM BLD-SCNC: 137 MMOL/L (ref 132–146)
SODIUM SERPL-SCNC: 135 MMOL/L (ref 132–146)
TOTAL PROTEIN: 6.3 G/DL (ref 6.4–8.3)
TRIGL SERPL-MCNC: 84 MG/DL
TROPONIN I SERPL HS-MCNC: 9 NG/L (ref 0–9)
VITAMIN D 25-HYDROXY: 24.1 NG/ML (ref 30–100)
VLDLC SERPL CALC-MCNC: 17 MG/DL
WBC # BLD: 6.5 K/UL (ref 4.5–11.5)
WBC OTHER # BLD: 6 K/UL (ref 4.5–11.5)

## 2024-10-21 PROCEDURE — 85014 HEMATOCRIT: CPT

## 2024-10-21 PROCEDURE — 71045 X-RAY EXAM CHEST 1 VIEW: CPT

## 2024-10-21 PROCEDURE — 93005 ELECTROCARDIOGRAM TRACING: CPT

## 2024-10-21 PROCEDURE — P9016 RBC LEUKOCYTES REDUCED: HCPCS

## 2024-10-21 PROCEDURE — 86900 BLOOD TYPING SEROLOGIC ABO: CPT

## 2024-10-21 PROCEDURE — 36430 TRANSFUSION BLD/BLD COMPNT: CPT

## 2024-10-21 PROCEDURE — 80053 COMPREHEN METABOLIC PANEL: CPT

## 2024-10-21 PROCEDURE — 6370000000 HC RX 637 (ALT 250 FOR IP): Performed by: STUDENT IN AN ORGANIZED HEALTH CARE EDUCATION/TRAINING PROGRAM

## 2024-10-21 PROCEDURE — 85025 COMPLETE CBC W/AUTO DIFF WBC: CPT

## 2024-10-21 PROCEDURE — 85018 HEMOGLOBIN: CPT

## 2024-10-21 PROCEDURE — 86850 RBC ANTIBODY SCREEN: CPT

## 2024-10-21 PROCEDURE — 84484 ASSAY OF TROPONIN QUANT: CPT

## 2024-10-21 PROCEDURE — 86923 COMPATIBILITY TEST ELECTRIC: CPT

## 2024-10-21 PROCEDURE — 99285 EMERGENCY DEPT VISIT HI MDM: CPT

## 2024-10-21 PROCEDURE — 86901 BLOOD TYPING SEROLOGIC RH(D): CPT

## 2024-10-21 RX ORDER — GABAPENTIN 300 MG/1
300 CAPSULE ORAL ONCE
Status: COMPLETED | OUTPATIENT
Start: 2024-10-21 | End: 2024-10-21

## 2024-10-21 RX ORDER — SODIUM CHLORIDE 9 MG/ML
INJECTION, SOLUTION INTRAVENOUS PRN
Status: DISCONTINUED | OUTPATIENT
Start: 2024-10-21 | End: 2024-10-21 | Stop reason: HOSPADM

## 2024-10-21 RX ADMIN — GABAPENTIN 300 MG: 300 CAPSULE ORAL at 16:07

## 2024-10-21 ASSESSMENT — PAIN - FUNCTIONAL ASSESSMENT: PAIN_FUNCTIONAL_ASSESSMENT: NONE - DENIES PAIN

## 2024-10-21 NOTE — TELEPHONE ENCOUNTER
Spoke with patient, who appeared in person today in the lobby. She wanted to have labs drawn because she's not feeling well. This nurse recommended her to either contact her PCP or go next door to be evaluated. The patient has not had labs here and got a transfusion at [facility]. Patient has upcoming appointment in November.  Patient verbalized understanding.

## 2024-10-21 NOTE — PROGRESS NOTES
Pt undressed and placed on cardiac heart monitor.  Belongings bagged labeled and placed at pt bedside

## 2024-10-22 LAB
ABO/RH: NORMAL
ANTIBODY SCREEN: NEGATIVE
ARM BAND NUMBER: NORMAL
BLOOD BANK BLOOD PRODUCT EXPIRATION DATE: NORMAL
BLOOD BANK DISPENSE STATUS: NORMAL
BLOOD BANK ISBT PRODUCT BLOOD TYPE: 6200
BLOOD BANK PRODUCT CODE: NORMAL
BLOOD BANK SAMPLE EXPIRATION: NORMAL
BLOOD BANK UNIT TYPE AND RH: NORMAL
BPU ID: NORMAL
COMPONENT: NORMAL
CROSSMATCH RESULT: NORMAL
EKG ATRIAL RATE: 92 BPM
EKG P AXIS: 49 DEGREES
EKG P-R INTERVAL: 156 MS
EKG Q-T INTERVAL: 394 MS
EKG QRS DURATION: 72 MS
EKG QTC CALCULATION (BAZETT): 487 MS
EKG R AXIS: -19 DEGREES
EKG T AXIS: 0 DEGREES
EKG VENTRICULAR RATE: 92 BPM
TRANSFUSION STATUS: NORMAL
UNIT DIVISION: 0
UNIT ISSUE DATE/TIME: NORMAL

## 2024-10-22 PROCEDURE — 93010 ELECTROCARDIOGRAM REPORT: CPT | Performed by: INTERNAL MEDICINE

## 2024-10-22 NOTE — ED PROVIDER NOTES
[facility] EMERGENCY DEPARTMENT  EMERGENCY DEPARTMENT ENCOUNTER        Pt Name: Haven Barber  MRN: [id number]  Birthdate 1950  Date of evaluation: 10/21/2024  Provider: Benjamin Solis DO  PCP: Jesus Alvarado DO  Note Started: 3:16 PM EDT 10/21/24    CHIEF COMPLAINT       Chief Complaint   Patient presents with   • Abnormal Lab      States she feels like her Hgb is low. normally gets transfusions at [facility] and is now having transportation problems getting there.         HISTORY OF PRESENT ILLNESS: 1 or more Elements   History From: PATIENT     Limitations to history : None    Haven Barber is a 74 y.o. female with prior history of GAVE, type 2 diabetes arriving with a complaint of vision changes, lightheadedness.  She states that the symptoms are chronic and they frequently happen when she needs a blood transfusion.  Due to her GAVE diagnosis she gets almost weekly blood transfusions.  She felt the symptoms today and called her primary doctor who recommended she come to the emergency department.  She states that she has always gone to [facility] but she has been having transportation issues so she came here today.  She says that usually she gets a transfusion and goes home.  She states that none of the symptoms are abnormal for her.  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      Nursing Notes were all reviewed and agreed with or any disagreements were addressed in the HPI.    REVIEW OF SYSTEMS :      Review of Systems    POSITIVE (+): lightheaded, vision changes   NEGATIVE (-): fevers, chills, nausea, vomiting, diarrhea, constipation, shortness of breath, chest pain, abdominal pain      SURGICAL HISTORY     Past Surgical History:   Procedure Laterality Date   • CARPAL TUNNEL RELEASE Bilateral    • CATARACT REMOVAL Right    • COLONOSCOPY     • OTHER SURGICAL HISTORY Bilateral 01/21/2021    BILATERAL L3,L4 MEDIAL BRANCH AND L5 DORSAL 
9:12 PM                 (Please note that portions of this note were completed with a voice recognition program.  Efforts were made to edit the dictations but occasionally words are mis-transcribed.)    Benjamin Solis,  PGY-2

## 2024-10-28 ENCOUNTER — TELEPHONE (OUTPATIENT)
Dept: FAMILY MEDICINE CLINIC | Age: 74
End: 2024-10-28

## 2024-10-28 NOTE — TELEPHONE ENCOUNTER
Haven called and left a voicemail requesting an order for prepared RBC crossmatch because she feels like her number are low. She called last week with the same request. She is being by [facility] for this health issue and was also referred to Dr. Armando. Doctor Freddy instructed her to call [facility] to get scheduled and instruction or be seen in the ER to be seen for her symptoms, she does not follow concerning this element.

## 2024-10-31 RX ORDER — SODIUM CHLORIDE 9 MG/ML
INJECTION, SOLUTION INTRAVENOUS PRN
Status: CANCELLED | OUTPATIENT
Start: 2024-10-31

## 2024-11-01 ENCOUNTER — HOSPITAL ENCOUNTER (OUTPATIENT)
Dept: INFUSION THERAPY | Age: 74
Setting detail: INFUSION SERIES
Discharge: HOME | End: 2024-11-01

## 2024-11-04 ENCOUNTER — HOSPITAL ENCOUNTER (OUTPATIENT)
Age: 74
Discharge: HOME | End: 2024-11-04
Payer: MEDICARE

## 2024-11-04 LAB
BASOPHILS # BLD: 0 K/UL (ref 0–0.2)
BASOPHILS NFR BLD: 0 % (ref 0–2)
EOSINOPHIL # BLD: 0.68 K/UL (ref 0.05–0.5)
EOSINOPHILS RELATIVE PERCENT: 12 % (ref 0–6)
ERYTHROCYTE [DISTWIDTH] IN BLOOD BY AUTOMATED COUNT: 18.7 % (ref 11.5–15)
HCT VFR BLD AUTO: 19.9 % (ref 34–48)
HGB BLD-MCNC: 5.6 G/DL (ref 11.5–15.5)
LYMPHOCYTES NFR BLD: 0.43 K/UL (ref 1.5–4)
LYMPHOCYTES RELATIVE PERCENT: 8 % (ref 20–42)
MCH RBC QN AUTO: 22.6 PG (ref 26–35)
MCHC RBC AUTO-ENTMCNC: 28.1 G/DL (ref 32–34.5)
MCV RBC AUTO: 80.2 FL (ref 80–99.9)
MONOCYTES NFR BLD: 0.19 K/UL (ref 0.1–0.95)
MONOCYTES NFR BLD: 4 % (ref 2–12)
NEUTROPHILS NFR BLD: 76 % (ref 43–80)
NEUTS SEG NFR BLD: 4.2 K/UL (ref 1.8–7.3)
PLATELET # BLD AUTO: 189 K/UL (ref 130–450)
PMV BLD AUTO: 10.5 FL (ref 7–12)
RBC # BLD AUTO: 2.48 M/UL (ref 3.5–5.5)
RBC # BLD: ABNORMAL 10*6/UL
WBC OTHER # BLD: 5.5 K/UL (ref 4.5–11.5)

## 2024-11-04 PROCEDURE — 36415 COLL VENOUS BLD VENIPUNCTURE: CPT

## 2024-11-04 PROCEDURE — 85025 COMPLETE CBC W/AUTO DIFF WBC: CPT

## 2024-11-04 PROCEDURE — 86923 COMPATIBILITY TEST ELECTRIC: CPT

## 2024-11-04 PROCEDURE — 86901 BLOOD TYPING SEROLOGIC RH(D): CPT

## 2024-11-04 PROCEDURE — 86850 RBC ANTIBODY SCREEN: CPT

## 2024-11-04 PROCEDURE — 86900 BLOOD TYPING SEROLOGIC ABO: CPT

## 2024-11-04 RX ORDER — SODIUM CHLORIDE 9 MG/ML
INJECTION, SOLUTION INTRAVENOUS PRN
Status: DISCONTINUED | OUTPATIENT
Start: 2024-11-04 | End: 2024-11-06 | Stop reason: HOSPADM

## 2024-11-05 ENCOUNTER — HOSPITAL ENCOUNTER (OUTPATIENT)
Dept: INFUSION THERAPY | Age: 74
Setting detail: INFUSION SERIES
Discharge: HOME | End: 2024-11-05
Payer: MEDICARE

## 2024-11-05 VITALS
DIASTOLIC BLOOD PRESSURE: 56 MMHG | TEMPERATURE: 97.6 F | OXYGEN SATURATION: 100 % | HEART RATE: 82 BPM | SYSTOLIC BLOOD PRESSURE: 117 MMHG | RESPIRATION RATE: 18 BRPM

## 2024-11-05 PROCEDURE — 2580000003 HC RX 258: Performed by: INTERNAL MEDICINE

## 2024-11-05 PROCEDURE — 36430 TRANSFUSION BLD/BLD COMPNT: CPT

## 2024-11-05 PROCEDURE — P9016 RBC LEUKOCYTES REDUCED: HCPCS

## 2024-11-05 RX ORDER — SODIUM CHLORIDE 0.9 % (FLUSH) 0.9 %
5-40 SYRINGE (ML) INJECTION PRN
Status: DISCONTINUED | OUTPATIENT
Start: 2024-11-05 | End: 2024-11-06 | Stop reason: HOSPADM

## 2024-11-05 RX ORDER — SODIUM CHLORIDE 9 MG/ML
INJECTION, SOLUTION INTRAVENOUS PRN
Status: DISCONTINUED | OUTPATIENT
Start: 2024-11-05 | End: 2024-11-06 | Stop reason: HOSPADM

## 2024-11-05 RX ADMIN — SODIUM CHLORIDE, PRESERVATIVE FREE 10 ML: 5 INJECTION INTRAVENOUS at 11:00

## 2024-11-05 RX ADMIN — SODIUM CHLORIDE, PRESERVATIVE FREE 20 ML: 5 INJECTION INTRAVENOUS at 14:01

## 2024-11-05 NOTE — PROGRESS NOTES
Patient tolerated Blood transfusion well. Remained on unit for 30 min after treatment. Patient alert and oriented x3. No distress noted. Vital signs stable. Patient denies any new or worsening pain. Educated patient on possible side effects and treatment of medication.     Patient verbalized understanding. Offered patient education and/or discharge material. Patient declined. Patient denies any needs. All questions answered. D/C in stable condition.

## 2024-11-11 ENCOUNTER — HOSPITAL ENCOUNTER (OUTPATIENT)
Age: 74
Discharge: HOME | End: 2024-11-11
Payer: MEDICARE

## 2024-11-11 LAB
BASOPHILS # BLD: 0 K/UL (ref 0–0.2)
BASOPHILS NFR BLD: 0 % (ref 0–2)
EOSINOPHIL # BLD: 0.31 K/UL (ref 0.05–0.5)
EOSINOPHILS RELATIVE PERCENT: 5 % (ref 0–6)
ERYTHROCYTE [DISTWIDTH] IN BLOOD BY AUTOMATED COUNT: 20.4 % (ref 11.5–15)
HCT VFR BLD AUTO: 20.6 % (ref 34–48)
HGB BLD-MCNC: 6 G/DL (ref 11.5–15.5)
LYMPHOCYTES NFR BLD: 0.52 K/UL (ref 1.5–4)
LYMPHOCYTES RELATIVE PERCENT: 9 % (ref 20–42)
MCH RBC QN AUTO: 23.5 PG (ref 26–35)
MCHC RBC AUTO-ENTMCNC: 29.1 G/DL (ref 32–34.5)
MCV RBC AUTO: 80.8 FL (ref 80–99.9)
MONOCYTES NFR BLD: 0.52 K/UL (ref 0.1–0.95)
MONOCYTES NFR BLD: 9 % (ref 2–12)
NEUTROPHILS NFR BLD: 77 % (ref 43–80)
NEUTS SEG NFR BLD: 4.64 K/UL (ref 1.8–7.3)
PLATELET # BLD AUTO: 179 K/UL (ref 130–450)
PMV BLD AUTO: 10.9 FL (ref 7–12)
RBC # BLD AUTO: 2.55 M/UL (ref 3.5–5.5)
RBC # BLD: ABNORMAL 10*6/UL
WBC OTHER # BLD: 6 K/UL (ref 4.5–11.5)

## 2024-11-11 PROCEDURE — 86923 COMPATIBILITY TEST ELECTRIC: CPT

## 2024-11-11 PROCEDURE — 86850 RBC ANTIBODY SCREEN: CPT

## 2024-11-11 PROCEDURE — 36415 COLL VENOUS BLD VENIPUNCTURE: CPT

## 2024-11-11 PROCEDURE — 86901 BLOOD TYPING SEROLOGIC RH(D): CPT

## 2024-11-11 PROCEDURE — 86900 BLOOD TYPING SEROLOGIC ABO: CPT

## 2024-11-11 PROCEDURE — 85025 COMPLETE CBC W/AUTO DIFF WBC: CPT

## 2024-11-12 RX ORDER — GABAPENTIN 300 MG/1
300 CAPSULE ORAL 3 TIMES DAILY
Qty: 90 CAPSULE | Refills: 1 | Status: SHIPPED | OUTPATIENT
Start: 2024-11-12 | End: 2025-05-11

## 2024-11-12 RX ORDER — ROPINIROLE 0.5 MG/1
0.5 TABLET, FILM COATED ORAL 2 TIMES DAILY
Qty: 180 TABLET | Refills: 1 | Status: SHIPPED | OUTPATIENT
Start: 2024-11-12

## 2024-11-12 NOTE — TELEPHONE ENCOUNTER
Name of Medication(s) Requested:  Requested Prescriptions      No prescriptions requested or ordered in this encounter       Medication is on current medication list Yes    Dosage and directions were verified? Yes    Quantity verified: 90 day supply     Pharmacy Verified?  Yes    Last Appointment:  10/8/2024    Future appts:  Future Appointments   Date Time Provider Department Center   11/13/2024 10:30 AM [facility]   11/20/2024 10:30 AM [facility]   11/27/2024 10:30 AM [facility]        (If no appt send self scheduling link. .REFILLAPPT)  Scheduling request sent?     [] Yes  [] No    Does patient need updated?  [] Yes  [] No

## 2024-11-13 ENCOUNTER — HOSPITAL ENCOUNTER (OUTPATIENT)
Dept: INFUSION THERAPY | Age: 74
Setting detail: INFUSION SERIES
Discharge: HOME | End: 2024-11-13
Payer: MEDICARE

## 2024-11-13 VITALS
OXYGEN SATURATION: 100 % | TEMPERATURE: 97.2 F | SYSTOLIC BLOOD PRESSURE: 125 MMHG | HEART RATE: 86 BPM | DIASTOLIC BLOOD PRESSURE: 48 MMHG | RESPIRATION RATE: 16 BRPM

## 2024-11-13 PROCEDURE — P9016 RBC LEUKOCYTES REDUCED: HCPCS

## 2024-11-13 PROCEDURE — 36430 TRANSFUSION BLD/BLD COMPNT: CPT

## 2024-11-13 PROCEDURE — 2580000003 HC RX 258: Performed by: INTERNAL MEDICINE

## 2024-11-13 RX ORDER — SODIUM CHLORIDE 9 MG/ML
INJECTION, SOLUTION INTRAVENOUS PRN
Status: COMPLETED | OUTPATIENT
Start: 2024-11-13 | End: 2024-11-13

## 2024-11-13 RX ORDER — SODIUM CHLORIDE 0.9 % (FLUSH) 0.9 %
5-40 SYRINGE (ML) INJECTION PRN
Status: DISCONTINUED | OUTPATIENT
Start: 2024-11-13 | End: 2024-11-14 | Stop reason: HOSPADM

## 2024-11-13 RX ADMIN — SODIUM CHLORIDE, PRESERVATIVE FREE 10 ML: 5 INJECTION INTRAVENOUS at 14:15

## 2024-11-13 RX ADMIN — SODIUM CHLORIDE: 9 INJECTION, SOLUTION INTRAVENOUS at 13:30

## 2024-11-13 RX ADMIN — SODIUM CHLORIDE, PRESERVATIVE FREE 10 ML: 5 INJECTION INTRAVENOUS at 14:11

## 2024-11-13 ASSESSMENT — PAIN DESCRIPTION - ORIENTATION: ORIENTATION: RIGHT;LEFT;LOWER;MID;UPPER

## 2024-11-13 ASSESSMENT — PAIN DESCRIPTION - ONSET: ONSET: GRADUAL

## 2024-11-13 ASSESSMENT — PAIN DESCRIPTION - PAIN TYPE: TYPE: CHRONIC PAIN

## 2024-11-13 ASSESSMENT — PAIN DESCRIPTION - FREQUENCY: FREQUENCY: INTERMITTENT

## 2024-11-13 ASSESSMENT — PAIN - FUNCTIONAL ASSESSMENT: PAIN_FUNCTIONAL_ASSESSMENT: PREVENTS OR INTERFERES WITH ALL ACTIVE AND SOME PASSIVE ACTIVITIES

## 2024-11-13 ASSESSMENT — PAIN DESCRIPTION - LOCATION: LOCATION: BACK

## 2024-11-13 NOTE — PROGRESS NOTES
Informed consent from DR. ALICIA BONE  verified and on patient chart. Patient continues to agree to treatment of blood transfusion.     Patient educated on signs and symptoms of transfusion reaction.  Patient verbalizes understanding.

## 2024-11-13 NOTE — FLOWSHEET NOTE
Patient tolerated BLOOD TRANSFUSION well. Remained on unit for 60 MINUTES after treatment. Patient alert and oriented x3. No distress noted. Vital signs stable. Patient denies any new or worsening pain. Educated patient on possible side effects and treatment of medication.     Patient verbalized understanding. Offered patient education and/or discharge material. Patient DECLINED. Patient denies any needs. All questions answered. D/C in stable condition.

## 2024-11-13 NOTE — PROGRESS NOTES
Informed consent from DR CAR verified and on patient chart.   Patient received 357 CC OF PRBC .  Patient tolerated well.    Vital signs stable.  Reviewed orally and provided with written information regarding potential delayed reaction and instructions on what to do if reaction occurs. Questions answered to apparent satisfaction. Patient observed for 60 minutes after transfusion.

## 2024-11-19 ENCOUNTER — HOSPITAL ENCOUNTER (OUTPATIENT)
Age: 74
Discharge: HOME | End: 2024-11-19
Payer: MEDICARE

## 2024-11-19 LAB
BASOPHILS # BLD: 0.04 K/UL (ref 0–0.2)
BASOPHILS NFR BLD: 1 % (ref 0–2)
EOSINOPHIL # BLD: 0.5 K/UL (ref 0.05–0.5)
EOSINOPHILS RELATIVE PERCENT: 8 % (ref 0–6)
ERYTHROCYTE [DISTWIDTH] IN BLOOD BY AUTOMATED COUNT: 20.9 % (ref 11.5–15)
HCT VFR BLD AUTO: 24 % (ref 34–48)
HGB BLD-MCNC: 7 G/DL (ref 11.5–15.5)
IMM GRANULOCYTES # BLD AUTO: 0.04 K/UL (ref 0–0.58)
IMM GRANULOCYTES NFR BLD: 1 % (ref 0–5)
LYMPHOCYTES NFR BLD: 0.64 K/UL (ref 1.5–4)
LYMPHOCYTES RELATIVE PERCENT: 10 % (ref 20–42)
MCH RBC QN AUTO: 23.8 PG (ref 26–35)
MCHC RBC AUTO-ENTMCNC: 29.2 G/DL (ref 32–34.5)
MCV RBC AUTO: 81.6 FL (ref 80–99.9)
MONOCYTES NFR BLD: 0.67 K/UL (ref 0.1–0.95)
MONOCYTES NFR BLD: 11 % (ref 2–12)
NEUTROPHILS NFR BLD: 70 % (ref 43–80)
NEUTS SEG NFR BLD: 4.43 K/UL (ref 1.8–7.3)
PLATELET # BLD AUTO: 215 K/UL (ref 130–450)
PMV BLD AUTO: 10.5 FL (ref 7–12)
RBC # BLD AUTO: 2.94 M/UL (ref 3.5–5.5)
RBC # BLD: ABNORMAL 10*6/UL
WBC OTHER # BLD: 6.3 K/UL (ref 4.5–11.5)

## 2024-11-19 PROCEDURE — 85025 COMPLETE CBC W/AUTO DIFF WBC: CPT

## 2024-11-19 PROCEDURE — 86900 BLOOD TYPING SEROLOGIC ABO: CPT

## 2024-11-19 PROCEDURE — 36415 COLL VENOUS BLD VENIPUNCTURE: CPT

## 2024-11-19 PROCEDURE — 86850 RBC ANTIBODY SCREEN: CPT

## 2024-11-19 PROCEDURE — 86901 BLOOD TYPING SEROLOGIC RH(D): CPT

## 2024-11-19 PROCEDURE — 86923 COMPATIBILITY TEST ELECTRIC: CPT

## 2024-11-19 RX ORDER — SODIUM CHLORIDE 9 MG/ML
INJECTION, SOLUTION INTRAVENOUS PRN
Status: DISCONTINUED | OUTPATIENT
Start: 2024-11-19 | End: 2024-11-21 | Stop reason: HOSPADM

## 2024-11-20 ENCOUNTER — HOSPITAL ENCOUNTER (OUTPATIENT)
Dept: INFUSION THERAPY | Age: 74
Setting detail: INFUSION SERIES
Discharge: HOME | End: 2024-11-20
Payer: MEDICARE

## 2024-11-20 VITALS
OXYGEN SATURATION: 99 % | HEART RATE: 84 BPM | DIASTOLIC BLOOD PRESSURE: 56 MMHG | SYSTOLIC BLOOD PRESSURE: 105 MMHG | RESPIRATION RATE: 16 BRPM | TEMPERATURE: 96.7 F

## 2024-11-20 PROCEDURE — 2580000003 HC RX 258: Performed by: INTERNAL MEDICINE

## 2024-11-20 PROCEDURE — P9016 RBC LEUKOCYTES REDUCED: HCPCS

## 2024-11-20 PROCEDURE — 36430 TRANSFUSION BLD/BLD COMPNT: CPT

## 2024-11-20 RX ORDER — SODIUM CHLORIDE 9 MG/ML
INJECTION, SOLUTION INTRAVENOUS PRN
Status: COMPLETED | OUTPATIENT
Start: 2024-11-20 | End: 2024-11-20

## 2024-11-20 RX ORDER — SODIUM CHLORIDE 0.9 % (FLUSH) 0.9 %
5-40 SYRINGE (ML) INJECTION PRN
Status: DISCONTINUED | OUTPATIENT
Start: 2024-11-20 | End: 2024-11-21 | Stop reason: HOSPADM

## 2024-11-20 RX ADMIN — SODIUM CHLORIDE, PRESERVATIVE FREE 10 ML: 5 INJECTION INTRAVENOUS at 09:20

## 2024-11-20 RX ADMIN — SODIUM CHLORIDE: 9 INJECTION, SOLUTION INTRAVENOUS at 11:45

## 2024-11-20 ASSESSMENT — PAIN DESCRIPTION - LOCATION: LOCATION: BACK

## 2024-11-20 ASSESSMENT — PAIN DESCRIPTION - PAIN TYPE: TYPE: CHRONIC PAIN

## 2024-11-20 ASSESSMENT — PAIN DESCRIPTION - DESCRIPTORS: DESCRIPTORS: SPASM

## 2024-11-20 ASSESSMENT — PAIN - FUNCTIONAL ASSESSMENT: PAIN_FUNCTIONAL_ASSESSMENT: PREVENTS OR INTERFERES SOME ACTIVE ACTIVITIES AND ADLS

## 2024-11-20 ASSESSMENT — PAIN SCALES - GENERAL: PAINLEVEL_OUTOF10: 8

## 2024-11-20 ASSESSMENT — PAIN DESCRIPTION - ORIENTATION: ORIENTATION: LOWER

## 2024-11-20 ASSESSMENT — PAIN DESCRIPTION - ONSET: ONSET: ON-GOING

## 2024-11-20 ASSESSMENT — PAIN DESCRIPTION - FREQUENCY: FREQUENCY: CONTINUOUS

## 2024-11-20 ASSESSMENT — PAIN DESCRIPTION - DIRECTION: RADIATING_TOWARDS: DOWN BOTH LEGS

## 2024-11-20 NOTE — PROGRESS NOTES
Informed consent from Dr. Oneal verified and on patient chart.   Patient received 1unit PRBC .  Patient tolerated well.  Vital signs stable.  Reviewed orally  information regarding potential delayed reaction and instructions on what to do if reaction occurs. Questions answered to apparent satisfaction. Patient observed for 30 minutes after transfusion.

## 2024-11-22 ENCOUNTER — TELEPHONE (OUTPATIENT)
Dept: INFUSION THERAPY | Age: 74
End: 2024-11-22

## 2024-11-26 RX ORDER — SODIUM CHLORIDE 9 MG/ML
INJECTION, SOLUTION INTRAVENOUS PRN
Status: DISCONTINUED | OUTPATIENT
Start: 2024-11-26 | End: 2024-11-30 | Stop reason: HOSPADM

## 2024-11-26 RX ORDER — SODIUM CHLORIDE 9 MG/ML
INJECTION, SOLUTION INTRAVENOUS PRN
OUTPATIENT
Start: 2024-11-26

## 2024-11-26 RX ORDER — SODIUM CHLORIDE 0.9 % (FLUSH) 0.9 %
5-40 SYRINGE (ML) INJECTION PRN
OUTPATIENT
Start: 2024-11-26

## 2024-11-27 ENCOUNTER — HOSPITAL ENCOUNTER (OUTPATIENT)
Dept: INFUSION THERAPY | Age: 74
Setting detail: INFUSION SERIES
Discharge: HOME | End: 2024-11-27

## 2024-12-01 ENCOUNTER — APPOINTMENT (OUTPATIENT)
Dept: GENERAL RADIOLOGY | Age: 74
DRG: 871 | End: 2024-12-01
Payer: MEDICARE

## 2024-12-01 ENCOUNTER — APPOINTMENT (OUTPATIENT)
Dept: CT IMAGING | Age: 74
DRG: 871 | End: 2024-12-01
Attending: EMERGENCY MEDICINE
Payer: MEDICARE

## 2024-12-01 ENCOUNTER — APPOINTMENT (OUTPATIENT)
Dept: CT IMAGING | Age: 74
DRG: 871 | End: 2024-12-01
Payer: MEDICARE

## 2024-12-01 ENCOUNTER — HOSPITAL ENCOUNTER (INPATIENT)
Age: 74
LOS: 8 days | Discharge: NURSING, REHAB OR LONG-TERM CARE | DRG: 871 | End: 2024-12-09
Attending: EMERGENCY MEDICINE | Admitting: STUDENT IN AN ORGANIZED HEALTH CARE EDUCATION/TRAINING PROGRAM
Payer: MEDICARE

## 2024-12-01 DIAGNOSIS — R65.20 SEPSIS WITH ACUTE HYPOXIC RESPIRATORY FAILURE WITHOUT SEPTIC SHOCK, DUE TO UNSPECIFIED ORGANISM (HCC): Primary | ICD-10-CM

## 2024-12-01 DIAGNOSIS — J96.01 SEPSIS WITH ACUTE HYPOXIC RESPIRATORY FAILURE WITHOUT SEPTIC SHOCK, DUE TO UNSPECIFIED ORGANISM (HCC): Primary | ICD-10-CM

## 2024-12-01 DIAGNOSIS — K92.1 MELENA: ICD-10-CM

## 2024-12-01 DIAGNOSIS — A41.9 SEPSIS WITH ACUTE HYPOXIC RESPIRATORY FAILURE WITHOUT SEPTIC SHOCK, DUE TO UNSPECIFIED ORGANISM (HCC): Primary | ICD-10-CM

## 2024-12-01 DIAGNOSIS — N30.00 ACUTE CYSTITIS WITHOUT HEMATURIA: ICD-10-CM

## 2024-12-01 DIAGNOSIS — J96.01 ACUTE RESPIRATORY FAILURE WITH HYPOXIA: ICD-10-CM

## 2024-12-01 DIAGNOSIS — J18.9 PNEUMONIA OF RIGHT LOWER LOBE DUE TO INFECTIOUS ORGANISM: ICD-10-CM

## 2024-12-01 DIAGNOSIS — D64.9 ANEMIA, UNSPECIFIED TYPE: ICD-10-CM

## 2024-12-01 PROBLEM — E87.20 LACTIC ACIDOSIS: Status: ACTIVE | Noted: 2024-12-01

## 2024-12-01 PROBLEM — N30.01 ACUTE CYSTITIS WITH HEMATURIA: Status: ACTIVE | Noted: 2024-12-01

## 2024-12-01 LAB
AADO2: 201.4 MMHG
ALBUMIN SERPL-MCNC: 3.5 G/DL (ref 3.5–5.2)
ALP SERPL-CCNC: 94 U/L (ref 35–104)
ALT SERPL-CCNC: 18 U/L (ref 0–32)
ANION GAP SERPL CALCULATED.3IONS-SCNC: 16 MMOL/L (ref 7–16)
ANION GAP SERPL CALCULATED.3IONS-SCNC: 19 MMOL/L (ref 7–16)
AST SERPL-CCNC: 35 U/L (ref 0–31)
B PARAP IS1001 DNA NPH QL NAA+NON-PROBE: NOT DETECTED
B PERT DNA SPEC QL NAA+PROBE: NOT DETECTED
B-OH-BUTYR SERPL-MCNC: 0.44 MMOL/L (ref 0.02–0.27)
B.E.: -10 MMOL/L (ref -3–3)
B.E.: -7.7 MMOL/L (ref -3–3)
BACTERIA URNS QL MICRO: ABNORMAL
BASOPHILS # BLD: 0 K/UL (ref 0–0.2)
BASOPHILS NFR BLD: 0 % (ref 0–2)
BILIRUB DIRECT SERPL-MCNC: 0.4 MG/DL (ref 0–0.3)
BILIRUB INDIRECT SERPL-MCNC: 0.9 MG/DL (ref 0–1)
BILIRUB SERPL-MCNC: 1.3 MG/DL (ref 0–1.2)
BILIRUB UR QL STRIP: NEGATIVE
BUN SERPL-MCNC: 18 MG/DL (ref 6–23)
BUN SERPL-MCNC: 19 MG/DL (ref 6–23)
C PNEUM DNA NPH QL NAA+NON-PROBE: NOT DETECTED
CALCIUM SERPL-MCNC: 8.7 MG/DL (ref 8.6–10.2)
CALCIUM SERPL-MCNC: 9.2 MG/DL (ref 8.6–10.2)
CHLORIDE SERPL-SCNC: 102 MMOL/L (ref 98–107)
CHLORIDE SERPL-SCNC: 98 MMOL/L (ref 98–107)
CLARITY UR: ABNORMAL
CO2 SERPL-SCNC: 15 MMOL/L (ref 22–29)
CO2 SERPL-SCNC: 18 MMOL/L (ref 22–29)
COHB: 0.6 % (ref 0–1.5)
COHB: 1.1 % (ref 0–1.5)
COLOR UR: YELLOW
CREAT SERPL-MCNC: 0.9 MG/DL (ref 0.5–1)
CREAT SERPL-MCNC: 0.9 MG/DL (ref 0.5–1)
CRITICAL: ABNORMAL
CRITICAL: ABNORMAL
CRP SERPL HS-MCNC: 65 MG/L (ref 0–5)
DATE ANALYZED: ABNORMAL
DATE ANALYZED: ABNORMAL
DATE OF COLLECTION: ABNORMAL
DATE OF COLLECTION: ABNORMAL
EOSINOPHIL # BLD: 0 K/UL (ref 0.05–0.5)
EOSINOPHILS RELATIVE PERCENT: 0 % (ref 0–6)
ERYTHROCYTE [DISTWIDTH] IN BLOOD BY AUTOMATED COUNT: 20.9 % (ref 11.5–15)
FIO2: 50 %
FLUAV RNA NPH QL NAA+NON-PROBE: NOT DETECTED
FLUBV RNA NPH QL NAA+NON-PROBE: NOT DETECTED
GFR, ESTIMATED: 67 ML/MIN/1.73M2
GFR, ESTIMATED: 72 ML/MIN/1.73M2
GLUCOSE BLD-MCNC: 266 MG/DL (ref 74–99)
GLUCOSE SERPL-MCNC: 269 MG/DL (ref 74–99)
GLUCOSE SERPL-MCNC: 407 MG/DL (ref 74–99)
GLUCOSE UR STRIP-MCNC: >=1000 MG/DL
HADV DNA NPH QL NAA+NON-PROBE: NOT DETECTED
HCO3: 13.3 MMOL/L (ref 22–26)
HCO3: 14.9 MMOL/L (ref 22–26)
HCOV 229E RNA NPH QL NAA+NON-PROBE: NOT DETECTED
HCOV HKU1 RNA NPH QL NAA+NON-PROBE: NOT DETECTED
HCOV NL63 RNA NPH QL NAA+NON-PROBE: NOT DETECTED
HCOV OC43 RNA NPH QL NAA+NON-PROBE: NOT DETECTED
HCT VFR BLD AUTO: 24.6 % (ref 34–48)
HGB BLD-MCNC: 7.2 G/DL (ref 11.5–15.5)
HGB UR QL STRIP.AUTO: ABNORMAL
HHB: 1.6 % (ref 0–5)
HHB: 9.9 % (ref 0–5)
HMPV RNA NPH QL NAA+NON-PROBE: NOT DETECTED
HPIV1 RNA NPH QL NAA+NON-PROBE: NOT DETECTED
HPIV2 RNA NPH QL NAA+NON-PROBE: NOT DETECTED
HPIV3 RNA NPH QL NAA+NON-PROBE: NOT DETECTED
HPIV4 RNA NPH QL NAA+NON-PROBE: NOT DETECTED
INR PPP: 1.4
KETONES UR STRIP-MCNC: 15 MG/DL
LAB: ABNORMAL
LAB: ABNORMAL
LACTATE BLDV-SCNC: 7.5 MMOL/L (ref 0.5–1.9)
LEUKOCYTE ESTERASE UR QL STRIP: ABNORMAL
LYMPHOCYTES NFR BLD: 0.38 K/UL (ref 1.5–4)
LYMPHOCYTES RELATIVE PERCENT: 2 % (ref 20–42)
Lab: 2021
Lab: 2150
M PNEUMO DNA NPH QL NAA+NON-PROBE: NOT DETECTED
MCH RBC QN AUTO: 23.2 PG (ref 26–35)
MCHC RBC AUTO-ENTMCNC: 29.3 G/DL (ref 32–34.5)
MCV RBC AUTO: 79.4 FL (ref 80–99.9)
METHB: 0.3 % (ref 0–1.5)
METHB: 0.6 % (ref 0–1.5)
MODE: ABNORMAL
MODE: ABNORMAL
MONOCYTES NFR BLD: 0.77 K/UL (ref 0.1–0.95)
MONOCYTES NFR BLD: 4 % (ref 2–12)
NEUTROPHILS NFR BLD: 95 % (ref 43–80)
NEUTS SEG NFR BLD: 20.85 K/UL (ref 1.8–7.3)
NITRITE UR QL STRIP: NEGATIVE
O2 SATURATION: 90 % (ref 92–98.5)
O2 SATURATION: 98.4 % (ref 92–98.5)
O2HB: 89.2 % (ref 94–97)
O2HB: 96.7 % (ref 94–97)
OPERATOR ID: 7291
OPERATOR ID: 7291
PATIENT TEMP: 37 C
PATIENT TEMP: 37 C
PCO2: 20.7 MMHG (ref 35–45)
PCO2: 21.9 MMHG (ref 35–45)
PEEP/CPAP: 6 CMH2O
PFO2: 2.39 MMHG/%
PH BLOOD GAS: 7.43 (ref 7.35–7.45)
PH BLOOD GAS: 7.45 (ref 7.35–7.45)
PH UR STRIP: 6 [PH] (ref 5–9)
PLATELET # BLD AUTO: 194 K/UL (ref 130–450)
PMV BLD AUTO: 10.5 FL (ref 7–12)
PO2: 119.3 MMHG (ref 75–100)
PO2: 60.8 MMHG (ref 75–100)
POTASSIUM SERPL-SCNC: 3.57 MMOL/L (ref 3.5–5)
POTASSIUM SERPL-SCNC: 3.6 MMOL/L (ref 3.5–5)
POTASSIUM SERPL-SCNC: 3.7 MMOL/L (ref 3.5–5)
PROCALCITONIN SERPL-MCNC: 2.6 NG/ML (ref 0–0.08)
PROT SERPL-MCNC: 6.8 G/DL (ref 6.4–8.3)
PROT UR STRIP-MCNC: 100 MG/DL
PROTHROMBIN TIME: 15.9 SEC (ref 9.3–12.4)
PS: 14 CMH20
RBC # BLD AUTO: 3.1 M/UL (ref 3.5–5.5)
RBC # BLD: ABNORMAL 10*6/UL
RBC #/AREA URNS HPF: ABNORMAL /HPF
RI(T): 1.69
RSV RNA NPH QL NAA+NON-PROBE: NOT DETECTED
RV+EV RNA NPH QL NAA+NON-PROBE: NOT DETECTED
SARS-COV-2 RNA NPH QL NAA+NON-PROBE: NOT DETECTED
SODIUM SERPL-SCNC: 132 MMOL/L (ref 132–146)
SODIUM SERPL-SCNC: 136 MMOL/L (ref 132–146)
SOURCE, BLOOD GAS: ABNORMAL
SOURCE, BLOOD GAS: ABNORMAL
SP GR UR STRIP: 1.02 (ref 1–1.03)
SPECIMEN DESCRIPTION: NORMAL
THB: 6.4 G/DL (ref 11.5–16.5)
THB: 9.2 G/DL (ref 11.5–16.5)
TIME ANALYZED: 2029
TIME ANALYZED: 2200
TROPONIN I SERPL HS-MCNC: 15 NG/L (ref 0–9)
TSH SERPL DL<=0.05 MIU/L-ACNC: 2.12 UIU/ML (ref 0.27–4.2)
UROBILINOGEN UR STRIP-ACNC: 0.2 EU/DL (ref 0–1)
WBC #/AREA URNS HPF: ABNORMAL /HPF
WBC OTHER # BLD: 22 K/UL (ref 4.5–11.5)

## 2024-12-01 PROCEDURE — 71275 CT ANGIOGRAPHY CHEST: CPT

## 2024-12-01 PROCEDURE — 2580000003 HC RX 258: Performed by: EMERGENCY MEDICINE

## 2024-12-01 PROCEDURE — 81001 URINALYSIS AUTO W/SCOPE: CPT

## 2024-12-01 PROCEDURE — 87040 BLOOD CULTURE FOR BACTERIA: CPT

## 2024-12-01 PROCEDURE — 84443 ASSAY THYROID STIM HORMONE: CPT

## 2024-12-01 PROCEDURE — 86140 C-REACTIVE PROTEIN: CPT

## 2024-12-01 PROCEDURE — 85025 COMPLETE CBC W/AUTO DIFF WBC: CPT

## 2024-12-01 PROCEDURE — 82010 KETONE BODYS QUAN: CPT

## 2024-12-01 PROCEDURE — 80053 COMPREHEN METABOLIC PANEL: CPT

## 2024-12-01 PROCEDURE — 83605 ASSAY OF LACTIC ACID: CPT

## 2024-12-01 PROCEDURE — 85652 RBC SED RATE AUTOMATED: CPT

## 2024-12-01 PROCEDURE — 86901 BLOOD TYPING SEROLOGIC RH(D): CPT

## 2024-12-01 PROCEDURE — 0202U NFCT DS 22 TRGT SARS-COV-2: CPT

## 2024-12-01 PROCEDURE — 87086 URINE CULTURE/COLONY COUNT: CPT

## 2024-12-01 PROCEDURE — 74177 CT ABD & PELVIS W/CONTRAST: CPT

## 2024-12-01 PROCEDURE — 86850 RBC ANTIBODY SCREEN: CPT

## 2024-12-01 PROCEDURE — 87899 AGENT NOS ASSAY W/OPTIC: CPT

## 2024-12-01 PROCEDURE — 6360000004 HC RX CONTRAST MEDICATION: Performed by: RADIOLOGY

## 2024-12-01 PROCEDURE — 80048 BASIC METABOLIC PNL TOTAL CA: CPT

## 2024-12-01 PROCEDURE — 5A09357 ASSISTANCE WITH RESPIRATORY VENTILATION, LESS THAN 24 CONSECUTIVE HOURS, CONTINUOUS POSITIVE AIRWAY PRESSURE: ICD-10-PCS | Performed by: INTERNAL MEDICINE

## 2024-12-01 PROCEDURE — 86923 COMPATIBILITY TEST ELECTRIC: CPT

## 2024-12-01 PROCEDURE — 71045 X-RAY EXAM CHEST 1 VIEW: CPT

## 2024-12-01 PROCEDURE — 2060000000 HC ICU INTERMEDIATE R&B

## 2024-12-01 PROCEDURE — 87449 NOS EACH ORGANISM AG IA: CPT

## 2024-12-01 PROCEDURE — 6360000002 HC RX W HCPCS: Performed by: EMERGENCY MEDICINE

## 2024-12-01 PROCEDURE — 93005 ELECTROCARDIOGRAM TRACING: CPT | Performed by: EMERGENCY MEDICINE

## 2024-12-01 PROCEDURE — 87088 URINE BACTERIA CULTURE: CPT

## 2024-12-01 PROCEDURE — 99285 EMERGENCY DEPT VISIT HI MDM: CPT

## 2024-12-01 PROCEDURE — 87077 CULTURE AEROBIC IDENTIFY: CPT

## 2024-12-01 PROCEDURE — 84145 PROCALCITONIN (PCT): CPT

## 2024-12-01 PROCEDURE — 94640 AIRWAY INHALATION TREATMENT: CPT

## 2024-12-01 PROCEDURE — 94660 CPAP INITIATION&MGMT: CPT

## 2024-12-01 PROCEDURE — 82947 ASSAY GLUCOSE BLOOD QUANT: CPT

## 2024-12-01 PROCEDURE — 82805 BLOOD GASES W/O2 SATURATION: CPT

## 2024-12-01 PROCEDURE — 84484 ASSAY OF TROPONIN QUANT: CPT

## 2024-12-01 PROCEDURE — 96365 THER/PROPH/DIAG IV INF INIT: CPT

## 2024-12-01 PROCEDURE — 84439 ASSAY OF FREE THYROXINE: CPT

## 2024-12-01 PROCEDURE — 86900 BLOOD TYPING SEROLOGIC ABO: CPT

## 2024-12-01 PROCEDURE — 85610 PROTHROMBIN TIME: CPT

## 2024-12-01 PROCEDURE — 96375 TX/PRO/DX INJ NEW DRUG ADDON: CPT

## 2024-12-01 PROCEDURE — 2580000003 HC RX 258: Performed by: STUDENT IN AN ORGANIZED HEALTH CARE EDUCATION/TRAINING PROGRAM

## 2024-12-01 PROCEDURE — 6370000000 HC RX 637 (ALT 250 FOR IP): Performed by: EMERGENCY MEDICINE

## 2024-12-01 PROCEDURE — 2500000003 HC RX 250 WO HCPCS: Performed by: EMERGENCY MEDICINE

## 2024-12-01 PROCEDURE — 2700000000 HC OXYGEN THERAPY PER DAY

## 2024-12-01 PROCEDURE — 82248 BILIRUBIN DIRECT: CPT

## 2024-12-01 PROCEDURE — 84132 ASSAY OF SERUM POTASSIUM: CPT

## 2024-12-01 PROCEDURE — 99223 1ST HOSP IP/OBS HIGH 75: CPT | Performed by: STUDENT IN AN ORGANIZED HEALTH CARE EDUCATION/TRAINING PROGRAM

## 2024-12-01 RX ORDER — ACETAMINOPHEN 650 MG/1
325 SUPPOSITORY RECTAL EVERY 6 HOURS PRN
Status: DISCONTINUED | OUTPATIENT
Start: 2024-12-01 | End: 2024-12-09 | Stop reason: HOSPADM

## 2024-12-01 RX ORDER — PROCHLORPERAZINE EDISYLATE 5 MG/ML
10 INJECTION INTRAMUSCULAR; INTRAVENOUS EVERY 6 HOURS PRN
Status: DISCONTINUED | OUTPATIENT
Start: 2024-12-01 | End: 2024-12-09 | Stop reason: HOSPADM

## 2024-12-01 RX ORDER — INSULIN LISPRO 100 [IU]/ML
0-8 INJECTION, SOLUTION INTRAVENOUS; SUBCUTANEOUS EVERY 6 HOURS SCHEDULED
Status: DISCONTINUED | OUTPATIENT
Start: 2024-12-02 | End: 2024-12-02

## 2024-12-01 RX ORDER — 0.9 % SODIUM CHLORIDE 0.9 %
1000 INTRAVENOUS SOLUTION INTRAVENOUS ONCE
Status: COMPLETED | OUTPATIENT
Start: 2024-12-01 | End: 2024-12-01

## 2024-12-01 RX ORDER — GLUCAGON 1 MG/ML
1 KIT INJECTION PRN
Status: DISCONTINUED | OUTPATIENT
Start: 2024-12-01 | End: 2024-12-02 | Stop reason: SDUPTHER

## 2024-12-01 RX ORDER — DEXTROSE MONOHYDRATE 100 MG/ML
INJECTION, SOLUTION INTRAVENOUS CONTINUOUS PRN
Status: DISCONTINUED | OUTPATIENT
Start: 2024-12-01 | End: 2024-12-02 | Stop reason: SDUPTHER

## 2024-12-01 RX ORDER — POLYETHYLENE GLYCOL 3350 17 G/17G
17 POWDER, FOR SOLUTION ORAL DAILY PRN
Status: DISCONTINUED | OUTPATIENT
Start: 2024-12-01 | End: 2024-12-09 | Stop reason: HOSPADM

## 2024-12-01 RX ORDER — SODIUM CHLORIDE 9 MG/ML
INJECTION, SOLUTION INTRAVENOUS PRN
Status: DISCONTINUED | OUTPATIENT
Start: 2024-12-01 | End: 2024-12-09 | Stop reason: HOSPADM

## 2024-12-01 RX ORDER — SODIUM CHLORIDE, SODIUM LACTATE, POTASSIUM CHLORIDE, CALCIUM CHLORIDE 600; 310; 30; 20 MG/100ML; MG/100ML; MG/100ML; MG/100ML
INJECTION, SOLUTION INTRAVENOUS CONTINUOUS
Status: DISCONTINUED | OUTPATIENT
Start: 2024-12-01 | End: 2024-12-03

## 2024-12-01 RX ORDER — SODIUM CHLORIDE 0.9 % (FLUSH) 0.9 %
5-40 SYRINGE (ML) INJECTION PRN
Status: DISCONTINUED | OUTPATIENT
Start: 2024-12-01 | End: 2024-12-09 | Stop reason: HOSPADM

## 2024-12-01 RX ORDER — IOPAMIDOL 755 MG/ML
75 INJECTION, SOLUTION INTRAVASCULAR
Status: COMPLETED | OUTPATIENT
Start: 2024-12-01 | End: 2024-12-01

## 2024-12-01 RX ORDER — MAGNESIUM SULFATE IN WATER 40 MG/ML
2000 INJECTION, SOLUTION INTRAVENOUS PRN
Status: DISCONTINUED | OUTPATIENT
Start: 2024-12-01 | End: 2024-12-09 | Stop reason: HOSPADM

## 2024-12-01 RX ORDER — ACETAMINOPHEN 500 MG
500 TABLET ORAL EVERY 6 HOURS PRN
Status: DISCONTINUED | OUTPATIENT
Start: 2024-12-01 | End: 2024-12-09 | Stop reason: HOSPADM

## 2024-12-01 RX ORDER — PANTOPRAZOLE SODIUM 40 MG/10ML
40 INJECTION, POWDER, LYOPHILIZED, FOR SOLUTION INTRAVENOUS DAILY
Status: DISCONTINUED | OUTPATIENT
Start: 2024-12-02 | End: 2024-12-02

## 2024-12-01 RX ORDER — SODIUM CHLORIDE 0.9 % (FLUSH) 0.9 %
5-40 SYRINGE (ML) INJECTION EVERY 12 HOURS SCHEDULED
Status: DISCONTINUED | OUTPATIENT
Start: 2024-12-01 | End: 2024-12-09 | Stop reason: HOSPADM

## 2024-12-01 RX ORDER — POTASSIUM CHLORIDE 7.45 MG/ML
10 INJECTION INTRAVENOUS PRN
Status: DISCONTINUED | OUTPATIENT
Start: 2024-12-01 | End: 2024-12-09 | Stop reason: HOSPADM

## 2024-12-01 RX ORDER — IPRATROPIUM BROMIDE AND ALBUTEROL SULFATE 2.5; .5 MG/3ML; MG/3ML
1 SOLUTION RESPIRATORY (INHALATION) ONCE
Status: COMPLETED | OUTPATIENT
Start: 2024-12-01 | End: 2024-12-01

## 2024-12-01 RX ORDER — POTASSIUM CHLORIDE 1500 MG/1
40 TABLET, EXTENDED RELEASE ORAL PRN
Status: DISCONTINUED | OUTPATIENT
Start: 2024-12-01 | End: 2024-12-09 | Stop reason: HOSPADM

## 2024-12-01 RX ORDER — SODIUM CHLORIDE, SODIUM LACTATE, POTASSIUM CHLORIDE, AND CALCIUM CHLORIDE .6; .31; .03; .02 G/100ML; G/100ML; G/100ML; G/100ML
1000 INJECTION, SOLUTION INTRAVENOUS ONCE
Status: COMPLETED | OUTPATIENT
Start: 2024-12-01 | End: 2024-12-02

## 2024-12-01 RX ADMIN — SODIUM CHLORIDE, POTASSIUM CHLORIDE, SODIUM LACTATE AND CALCIUM CHLORIDE 1000 ML: 600; 310; 30; 20 INJECTION, SOLUTION INTRAVENOUS at 22:31

## 2024-12-01 RX ADMIN — IPRATROPIUM BROMIDE AND ALBUTEROL SULFATE 1 DOSE: .5; 3 SOLUTION RESPIRATORY (INHALATION) at 21:25

## 2024-12-01 RX ADMIN — DOXYCYCLINE 100 MG: 100 INJECTION, POWDER, LYOPHILIZED, FOR SOLUTION INTRAVENOUS at 21:23

## 2024-12-01 RX ADMIN — SODIUM CHLORIDE 1000 ML: 9 INJECTION, SOLUTION INTRAVENOUS at 21:21

## 2024-12-01 RX ADMIN — IOPAMIDOL 75 ML: 755 INJECTION, SOLUTION INTRAVENOUS at 19:19

## 2024-12-01 RX ADMIN — SODIUM CHLORIDE, POTASSIUM CHLORIDE, SODIUM LACTATE AND CALCIUM CHLORIDE: 600; 310; 30; 20 INJECTION, SOLUTION INTRAVENOUS at 22:42

## 2024-12-01 RX ADMIN — IOPAMIDOL 75 ML: 755 INJECTION, SOLUTION INTRAVENOUS at 20:38

## 2024-12-01 RX ADMIN — PANTOPRAZOLE SODIUM 80 MG: 40 INJECTION, POWDER, FOR SOLUTION INTRAVENOUS at 16:28

## 2024-12-01 RX ADMIN — CEFTRIAXONE SODIUM 2000 MG: 2 INJECTION, POWDER, FOR SOLUTION INTRAMUSCULAR; INTRAVENOUS at 17:56

## 2024-12-01 ASSESSMENT — PAIN - FUNCTIONAL ASSESSMENT: PAIN_FUNCTIONAL_ASSESSMENT: 0-10

## 2024-12-01 ASSESSMENT — PAIN DESCRIPTION - ORIENTATION: ORIENTATION: LOWER

## 2024-12-01 ASSESSMENT — PAIN DESCRIPTION - DESCRIPTORS: DESCRIPTORS: OTHER (COMMENT)

## 2024-12-01 ASSESSMENT — PAIN DESCRIPTION - LOCATION: LOCATION: BACK

## 2024-12-01 ASSESSMENT — PAIN SCALES - GENERAL: PAINLEVEL_OUTOF10: 8

## 2024-12-02 PROBLEM — E11.65 TYPE 2 DIABETES MELLITUS WITH HYPERGLYCEMIA (HCC): Status: ACTIVE | Noted: 2024-12-02

## 2024-12-02 PROBLEM — K31.811 GASTROINTESTINAL HEMORRHAGE ASSOCIATED WITH ANGIODYSPLASIA OF STOMACH AND DUODENUM: Status: ACTIVE | Noted: 2019-01-14

## 2024-12-02 LAB
ALBUMIN SERPL-MCNC: 2.8 G/DL (ref 3.5–5.2)
ALP SERPL-CCNC: 69 U/L (ref 35–104)
ALT SERPL-CCNC: 17 U/L (ref 0–32)
ANION GAP SERPL CALCULATED.3IONS-SCNC: 13 MMOL/L (ref 7–16)
AST SERPL-CCNC: 42 U/L (ref 0–31)
BASOPHILS # BLD: 0 K/UL (ref 0–0.2)
BASOPHILS NFR BLD: 0 % (ref 0–2)
BILIRUB SERPL-MCNC: 0.6 MG/DL (ref 0–1.2)
BUN SERPL-MCNC: 22 MG/DL (ref 6–23)
CALCIUM SERPL-MCNC: 8.2 MG/DL (ref 8.6–10.2)
CHLORIDE SERPL-SCNC: 108 MMOL/L (ref 98–107)
CO2 SERPL-SCNC: 18 MMOL/L (ref 22–29)
CREAT SERPL-MCNC: 1 MG/DL (ref 0.5–1)
EOSINOPHIL # BLD: 0 K/UL (ref 0.05–0.5)
EOSINOPHILS RELATIVE PERCENT: 0 % (ref 0–6)
ERYTHROCYTE [DISTWIDTH] IN BLOOD BY AUTOMATED COUNT: 20.8 % (ref 11.5–15)
ERYTHROCYTE [SEDIMENTATION RATE] IN BLOOD BY WESTERGREN METHOD: 33 MM/HR (ref 0–20)
FERRITIN SERPL-MCNC: 47 NG/ML
GFR, ESTIMATED: 58 ML/MIN/1.73M2
GLUCOSE BLD-MCNC: 225 MG/DL (ref 74–99)
GLUCOSE BLD-MCNC: 249 MG/DL (ref 74–99)
GLUCOSE BLD-MCNC: 277 MG/DL (ref 74–99)
GLUCOSE BLD-MCNC: 309 MG/DL (ref 74–99)
GLUCOSE SERPL-MCNC: 325 MG/DL (ref 74–99)
HCT VFR BLD AUTO: 20.5 % (ref 34–48)
HCT VFR BLD AUTO: 27.1 % (ref 34–48)
HGB BLD-MCNC: 5.8 G/DL (ref 11.5–15.5)
HGB BLD-MCNC: 8.2 G/DL (ref 11.5–15.5)
L PNEUMO1 AG UR QL IA.RAPID: NEGATIVE
LACTATE BLDV-SCNC: 2.5 MMOL/L (ref 0.5–2.2)
LACTATE BLDV-SCNC: 3.6 MMOL/L (ref 0.5–1.9)
LACTATE BLDV-SCNC: 5.3 MMOL/L (ref 0.5–1.9)
LACTATE BLDV-SCNC: 5.6 MMOL/L (ref 0.5–1.9)
LYMPHOCYTES NFR BLD: 0.2 K/UL (ref 1.5–4)
LYMPHOCYTES RELATIVE PERCENT: 1 % (ref 20–42)
MCH RBC QN AUTO: 23.4 PG (ref 26–35)
MCHC RBC AUTO-ENTMCNC: 28.3 G/DL (ref 32–34.5)
MCV RBC AUTO: 82.7 FL (ref 80–99.9)
MONOCYTES NFR BLD: 0.2 K/UL (ref 0.1–0.95)
MONOCYTES NFR BLD: 1 % (ref 2–12)
NEUTROPHILS NFR BLD: 98 % (ref 43–80)
NEUTS SEG NFR BLD: 23.09 K/UL (ref 1.8–7.3)
PLATELET # BLD AUTO: 108 K/UL (ref 130–450)
PMV BLD AUTO: 10.4 FL (ref 7–12)
POTASSIUM SERPL-SCNC: 3.8 MMOL/L (ref 3.5–5)
PROCALCITONIN SERPL-MCNC: 19.56 NG/ML (ref 0–0.08)
PROT SERPL-MCNC: 5.3 G/DL (ref 6.4–8.3)
RBC # BLD AUTO: 2.48 M/UL (ref 3.5–5.5)
RBC # BLD: ABNORMAL 10*6/UL
S PNEUM AG SPEC QL: NEGATIVE
SODIUM SERPL-SCNC: 139 MMOL/L (ref 132–146)
SPECIMEN SOURCE: NORMAL
T4 FREE SERPL-MCNC: 1.1 NG/DL (ref 0.9–1.7)
WBC OTHER # BLD: 23.5 K/UL (ref 4.5–11.5)

## 2024-12-02 PROCEDURE — 2580000003 HC RX 258: Performed by: STUDENT IN AN ORGANIZED HEALTH CARE EDUCATION/TRAINING PROGRAM

## 2024-12-02 PROCEDURE — 6360000002 HC RX W HCPCS: Performed by: STUDENT IN AN ORGANIZED HEALTH CARE EDUCATION/TRAINING PROGRAM

## 2024-12-02 PROCEDURE — 6370000000 HC RX 637 (ALT 250 FOR IP): Performed by: STUDENT IN AN ORGANIZED HEALTH CARE EDUCATION/TRAINING PROGRAM

## 2024-12-02 PROCEDURE — 99233 SBSQ HOSP IP/OBS HIGH 50: CPT | Performed by: INTERNAL MEDICINE

## 2024-12-02 PROCEDURE — 36430 TRANSFUSION BLD/BLD COMPNT: CPT

## 2024-12-02 PROCEDURE — 6360000002 HC RX W HCPCS: Performed by: INTERNAL MEDICINE

## 2024-12-02 PROCEDURE — 85025 COMPLETE CBC W/AUTO DIFF WBC: CPT

## 2024-12-02 PROCEDURE — 85014 HEMATOCRIT: CPT

## 2024-12-02 PROCEDURE — 94660 CPAP INITIATION&MGMT: CPT

## 2024-12-02 PROCEDURE — 2700000000 HC OXYGEN THERAPY PER DAY

## 2024-12-02 PROCEDURE — P9016 RBC LEUKOCYTES REDUCED: HCPCS

## 2024-12-02 PROCEDURE — 83605 ASSAY OF LACTIC ACID: CPT

## 2024-12-02 PROCEDURE — 99222 1ST HOSP IP/OBS MODERATE 55: CPT | Performed by: STUDENT IN AN ORGANIZED HEALTH CARE EDUCATION/TRAINING PROGRAM

## 2024-12-02 PROCEDURE — 36415 COLL VENOUS BLD VENIPUNCTURE: CPT

## 2024-12-02 PROCEDURE — 84145 PROCALCITONIN (PCT): CPT

## 2024-12-02 PROCEDURE — 82728 ASSAY OF FERRITIN: CPT

## 2024-12-02 PROCEDURE — 6370000000 HC RX 637 (ALT 250 FOR IP): Performed by: INTERNAL MEDICINE

## 2024-12-02 PROCEDURE — 80053 COMPREHEN METABOLIC PANEL: CPT

## 2024-12-02 PROCEDURE — 85018 HEMOGLOBIN: CPT

## 2024-12-02 PROCEDURE — 82947 ASSAY GLUCOSE BLOOD QUANT: CPT

## 2024-12-02 PROCEDURE — 30233N1 TRANSFUSION OF NONAUTOLOGOUS RED BLOOD CELLS INTO PERIPHERAL VEIN, PERCUTANEOUS APPROACH: ICD-10-PCS | Performed by: INTERNAL MEDICINE

## 2024-12-02 PROCEDURE — 2580000003 HC RX 258: Performed by: INTERNAL MEDICINE

## 2024-12-02 PROCEDURE — 2060000000 HC ICU INTERMEDIATE R&B

## 2024-12-02 PROCEDURE — 87081 CULTURE SCREEN ONLY: CPT

## 2024-12-02 RX ORDER — PANTOPRAZOLE SODIUM 40 MG/10ML
40 INJECTION, POWDER, LYOPHILIZED, FOR SOLUTION INTRAVENOUS 2 TIMES DAILY
Status: DISCONTINUED | OUTPATIENT
Start: 2024-12-02 | End: 2024-12-09 | Stop reason: HOSPADM

## 2024-12-02 RX ORDER — INSULIN LISPRO 100 [IU]/ML
5 INJECTION, SOLUTION INTRAVENOUS; SUBCUTANEOUS
Status: DISCONTINUED | OUTPATIENT
Start: 2024-12-02 | End: 2024-12-04

## 2024-12-02 RX ORDER — GABAPENTIN 300 MG/1
300 CAPSULE ORAL 3 TIMES DAILY
Status: DISCONTINUED | OUTPATIENT
Start: 2024-12-02 | End: 2024-12-09 | Stop reason: HOSPADM

## 2024-12-02 RX ORDER — GLUCAGON 1 MG/ML
1 KIT INJECTION PRN
Status: DISCONTINUED | OUTPATIENT
Start: 2024-12-02 | End: 2024-12-09 | Stop reason: HOSPADM

## 2024-12-02 RX ORDER — INSULIN GLARGINE 100 [IU]/ML
15 INJECTION, SOLUTION SUBCUTANEOUS NIGHTLY
Status: DISCONTINUED | OUTPATIENT
Start: 2024-12-02 | End: 2024-12-04

## 2024-12-02 RX ORDER — PROPRANOLOL HYDROCHLORIDE 10 MG/1
10 TABLET ORAL DAILY
Status: DISCONTINUED | OUTPATIENT
Start: 2024-12-02 | End: 2024-12-02

## 2024-12-02 RX ORDER — SODIUM CHLORIDE, SODIUM LACTATE, POTASSIUM CHLORIDE, AND CALCIUM CHLORIDE .6; .31; .03; .02 G/100ML; G/100ML; G/100ML; G/100ML
500 INJECTION, SOLUTION INTRAVENOUS ONCE
Status: DISCONTINUED | OUTPATIENT
Start: 2024-12-02 | End: 2024-12-02

## 2024-12-02 RX ORDER — DEXTROSE MONOHYDRATE 100 MG/ML
INJECTION, SOLUTION INTRAVENOUS CONTINUOUS PRN
Status: DISCONTINUED | OUTPATIENT
Start: 2024-12-02 | End: 2024-12-09 | Stop reason: HOSPADM

## 2024-12-02 RX ORDER — INSULIN LISPRO 100 [IU]/ML
0-8 INJECTION, SOLUTION INTRAVENOUS; SUBCUTANEOUS
Status: DISCONTINUED | OUTPATIENT
Start: 2024-12-03 | End: 2024-12-09 | Stop reason: HOSPADM

## 2024-12-02 RX ORDER — FLUTICASONE PROPIONATE 50 MCG
1 SPRAY, SUSPENSION (ML) NASAL DAILY PRN
Status: DISCONTINUED | OUTPATIENT
Start: 2024-12-02 | End: 2024-12-09 | Stop reason: HOSPADM

## 2024-12-02 RX ORDER — SODIUM CHLORIDE 9 MG/ML
INJECTION, SOLUTION INTRAVENOUS PRN
Status: DISCONTINUED | OUTPATIENT
Start: 2024-12-02 | End: 2024-12-09 | Stop reason: HOSPADM

## 2024-12-02 RX ORDER — PROPRANOLOL HYDROCHLORIDE 10 MG/1
10 TABLET ORAL DAILY
Status: DISCONTINUED | OUTPATIENT
Start: 2024-12-02 | End: 2024-12-09 | Stop reason: HOSPADM

## 2024-12-02 RX ORDER — INSULIN GLARGINE 100 [IU]/ML
15 INJECTION, SOLUTION SUBCUTANEOUS NIGHTLY
Status: DISCONTINUED | OUTPATIENT
Start: 2024-12-02 | End: 2024-12-02

## 2024-12-02 RX ORDER — METOCLOPRAMIDE HYDROCHLORIDE 5 MG/ML
10 INJECTION INTRAMUSCULAR; INTRAVENOUS ONCE
Status: COMPLETED | OUTPATIENT
Start: 2024-12-02 | End: 2024-12-02

## 2024-12-02 RX ORDER — INSULIN LISPRO 100 [IU]/ML
0-8 INJECTION, SOLUTION INTRAVENOUS; SUBCUTANEOUS EVERY 6 HOURS SCHEDULED
Status: DISCONTINUED | OUTPATIENT
Start: 2024-12-02 | End: 2024-12-02

## 2024-12-02 RX ORDER — ROPINIROLE 0.5 MG/1
0.5 TABLET, FILM COATED ORAL 2 TIMES DAILY
Status: DISCONTINUED | OUTPATIENT
Start: 2024-12-02 | End: 2024-12-09 | Stop reason: HOSPADM

## 2024-12-02 RX ORDER — SODIUM CHLORIDE, SODIUM LACTATE, POTASSIUM CHLORIDE, AND CALCIUM CHLORIDE .6; .31; .03; .02 G/100ML; G/100ML; G/100ML; G/100ML
500 INJECTION, SOLUTION INTRAVENOUS ONCE
Status: DISCONTINUED | OUTPATIENT
Start: 2024-12-02 | End: 2024-12-04

## 2024-12-02 RX ADMIN — PROPRANOLOL HYDROCHLORIDE 10 MG: 10 TABLET ORAL at 14:41

## 2024-12-02 RX ADMIN — INSULIN GLARGINE 15 UNITS: 100 INJECTION, SOLUTION SUBCUTANEOUS at 14:38

## 2024-12-02 RX ADMIN — PANTOPRAZOLE SODIUM 40 MG: 40 INJECTION, POWDER, FOR SOLUTION INTRAVENOUS at 08:50

## 2024-12-02 RX ADMIN — ROPINIROLE HYDROCHLORIDE 0.5 MG: 0.5 TABLET, FILM COATED ORAL at 12:21

## 2024-12-02 RX ADMIN — METOCLOPRAMIDE 10 MG: 5 INJECTION, SOLUTION INTRAMUSCULAR; INTRAVENOUS at 22:43

## 2024-12-02 RX ADMIN — ROPINIROLE HYDROCHLORIDE 0.5 MG: 0.5 TABLET, FILM COATED ORAL at 21:37

## 2024-12-02 RX ADMIN — PANTOPRAZOLE SODIUM 40 MG: 40 INJECTION, POWDER, FOR SOLUTION INTRAVENOUS at 21:37

## 2024-12-02 RX ADMIN — CEFTRIAXONE SODIUM 2000 MG: 2 INJECTION, POWDER, FOR SOLUTION INTRAMUSCULAR; INTRAVENOUS at 14:39

## 2024-12-02 RX ADMIN — SODIUM CHLORIDE 1500 MG: 9 INJECTION, SOLUTION INTRAVENOUS at 14:44

## 2024-12-02 RX ADMIN — GABAPENTIN 300 MG: 300 CAPSULE ORAL at 14:37

## 2024-12-02 RX ADMIN — SODIUM CHLORIDE, PRESERVATIVE FREE 10 ML: 5 INJECTION INTRAVENOUS at 21:38

## 2024-12-02 RX ADMIN — GABAPENTIN 300 MG: 300 CAPSULE ORAL at 21:36

## 2024-12-02 RX ADMIN — INSULIN LISPRO 4 UNITS: 100 INJECTION, SOLUTION INTRAVENOUS; SUBCUTANEOUS at 18:22

## 2024-12-02 RX ADMIN — INSULIN LISPRO 5 UNITS: 100 INJECTION, SOLUTION INTRAVENOUS; SUBCUTANEOUS at 18:22

## 2024-12-02 RX ADMIN — INSULIN LISPRO 2 UNITS: 100 INJECTION, SOLUTION INTRAVENOUS; SUBCUTANEOUS at 14:39

## 2024-12-02 RX ADMIN — VANCOMYCIN HYDROCHLORIDE 1750 MG: 10 INJECTION, POWDER, LYOPHILIZED, FOR SOLUTION INTRAVENOUS at 01:02

## 2024-12-02 RX ADMIN — GABAPENTIN 300 MG: 300 CAPSULE ORAL at 08:50

## 2024-12-02 RX ADMIN — ACETAMINOPHEN 500 MG: 500 TABLET ORAL at 23:10

## 2024-12-02 RX ADMIN — FLUOXETINE HYDROCHLORIDE 60 MG: 20 CAPSULE ORAL at 08:50

## 2024-12-02 RX ADMIN — ACETAMINOPHEN 500 MG: 500 TABLET ORAL at 02:33

## 2024-12-02 RX ADMIN — SODIUM CHLORIDE, POTASSIUM CHLORIDE, SODIUM LACTATE AND CALCIUM CHLORIDE: 600; 310; 30; 20 INJECTION, SOLUTION INTRAVENOUS at 18:13

## 2024-12-02 ASSESSMENT — PAIN SCALES - GENERAL
PAINLEVEL_OUTOF10: 6
PAINLEVEL_OUTOF10: 3
PAINLEVEL_OUTOF10: 2
PAINLEVEL_OUTOF10: 0
PAINLEVEL_OUTOF10: 0

## 2024-12-02 ASSESSMENT — PAIN DESCRIPTION - DESCRIPTORS
DESCRIPTORS: ACHING
DESCRIPTORS: ACHING

## 2024-12-02 ASSESSMENT — PAIN - FUNCTIONAL ASSESSMENT: PAIN_FUNCTIONAL_ASSESSMENT: ACTIVITIES ARE NOT PREVENTED

## 2024-12-02 ASSESSMENT — PAIN DESCRIPTION - LOCATION
LOCATION: HEAD
LOCATION: HEAD

## 2024-12-02 ASSESSMENT — PAIN DESCRIPTION - ORIENTATION: ORIENTATION: OTHER (COMMENT)

## 2024-12-02 NOTE — PROGRESS NOTES
Physical Therapy    PT order received and medical chart reviewed on 12/2/24. PT evaluation on hold this AM 2/2 low Hb (5.8). Will hold PT evaluation for pt safety and re-attempt as able. Thank you.    Cinthya Kathleen, PT, DPT  [id number]

## 2024-12-02 NOTE — PROGRESS NOTES
4 Eyes Skin Assessment     NAME:  Haven Barber  YOB: 1950  MEDICAL RECORD NUMBER:  [id number]    The patient is being assessed for  {Reason for Assessment:13177}    I agree that at least one RN has performed a thorough Head to Toe Skin Assessment on the patient. ALL assessment sites listed below have been assessed.      Areas assessed by both nurses:    Head, Face, Ears, Shoulders, Back, Chest, Arms, Elbows, Hands, Sacrum. Buttock, Coccyx, Ischium, Legs. Feet and Heels, and Under Medical Devices         Does the Patient have a Wound? No noted wound(s)       Gordo Prevention initiated by RN: Yes  Wound Care Orders initiated by RN: No    Pressure Injury (Stage 3,4, Unstageable, DTI, NWPT, and Complex wounds) if present, place Wound referral order by RN under : No    New Ostomies, if present place, Ostomy referral order under : No     Nurse 1 eSignature: Electronically signed by Samantha Paulino RN on 12/2/24 at 2:00 AM EST    **SHARE this note so that the co-signing nurse can place an eSignature**    Nurse 2 eSignature: {Esignature:[id number]}

## 2024-12-02 NOTE — ED NOTES
Pt soiled brief and chucks with urine and diarrhea. Anne care provided, new chucks and pure wick in place.

## 2024-12-02 NOTE — H&P
[facility] Hospitalist Group History and Physical      CHIEF COMPLAINT:  presyncope, bloody BM's     History of Present Illness:  75 yo female w/ hx of GAVE, DM, hepatic steatosis/cirrhosis, HTN, AVM of colon, WARNER, sciatica/back pain, who p/w an episode of presyncope after having a bloody BM today. Pt appeared well in the ED with a UTI and WBC 22k, but pt then subsequently developed  tachypnea, tachycardia, and worsening respiratory status, prompting a CTA Chest. At the time of evaluation, pt is on BiPAP and history is difficult to obtain/limited due to pt's clinical condition. Pt denies CP, dyspnea, abd pain today. Did report dysuria to ED Provider (per ED Provider's report).         REVIEW OF SYSTEMS:  As per HPI.    PMH:  Past Medical History:   Diagnosis Date    Arthritis     Cataract     right    Chronic fatigue syndrome     Depression     Diabetes mellitus (HCC)     SC injection weekly    Fibromyalgia     Hypertension     Memory loss     Mononucleosis     Psoriasis     Sciatica     Sleep apnea     no c-pap    Vertigo        Surgical History:  Past Surgical History:   Procedure Laterality Date    CARPAL TUNNEL RELEASE Bilateral     CATARACT REMOVAL Right     COLONOSCOPY      OTHER SURGICAL HISTORY Bilateral 01/21/2021    BILATERAL L3,L4 MEDIAL BRANCH AND L5 DORSAL RAMUS RADIOFREQUENCY    PAIN MANAGEMENT PROCEDURE Bilateral 01/21/2021    BILATERAL L3,4 MEDIAL BRANCH AND L5 DORSAL RAMUS  RADIOFREQUENCY ABLATION WITH SEDATION  (CPT 45226) performed by Silas Quigley MD at [facility] OR    SPINE SURGERY N/A 03/19/2021    T11 KYPHOPLASTY (2 C-ARMS) performed by Silas Quigley MD at [facility] OR    TONSILLECTOMY AND ADENOIDECTOMY      UPPER GASTROINTESTINAL ENDOSCOPY N/A 01/16/2019    EGD BIOPSY performed by Katheryn Mooney MD at [facility] ENDOSCOPY    UPPER GASTROINTESTINAL ENDOSCOPY N/A 03/10/2021    EGD ESOPHAGOGASTRODUODENOSCOPY performed by Ar Braun MD at [facility] ENDOSCOPY       Medications Prior to      Code Status: DNR-CCA   DVT prophylaxis: SCD's       NOTE: This report was transcribed using voice recognition software. Every effort was made to ensure accuracy; however, inadvertent computerized transcription errors may be present and meaning should be derived from surrounding context.   Electronically signed by Emily Miller MD on 12/1/2024 at 10:19 PM

## 2024-12-02 NOTE — PLAN OF CARE
Problem: Pain  Goal: Verbalizes/displays adequate comfort level or baseline comfort level  12/2/2024 1155 by Maricel Justin RN  Outcome: Progressing  12/2/2024 0211 by Samantha Paulino RN  Outcome: Progressing     Problem: Safety - Adult  Goal: Free from fall injury  12/2/2024 1155 by Maricel Justin RN  Outcome: Progressing  12/2/2024 0211 by Samantha Paulino RN  Outcome: Progressing

## 2024-12-02 NOTE — PROGRESS NOTES
Hospitalist Progress Note      Synopsis: Patient admitted on 12/1/2024 75 yo female w/ hx of GAVE, DM, hepatic steatosis/cirrhosis, HTN, AVM of colon, WARNER, sciatica/back pain, who p/w an episode of presyncope after having a bloody BM today. Pt appeared well in the ED with a UTI and WBC 22k, but pt then subsequently developed  tachypnea, tachycardia, and worsening respiratory status, prompting a CTA Chest. At the time of evaluation, pt is on BiPAP and history is difficult to obtain/limited due to pt's clinical condition. Pt denies CP, dyspnea, abd pain today. Did report dysuria to ED Provider (per ED Provider's report).         ASSESSMENT:    Principal Problem:    Sepsis with acute hypoxic respiratory failure without septic shock, due to unspecified organism (HCC)  Active Problems:    Gastrointestinal hemorrhage associated with angiodysplasia of stomach and duodenum    Acute blood loss anemia    GAVE (gastric antral vascular ectasia)    Hepatic cirrhosis (HCC)    Acute cystitis with hematuria    Pneumonia of right middle lobe due to infectious organism    Lactic acidosis    Type 2 diabetes mellitus with hyperglycemia (HCC)  Resolved Problems:    * No resolved hospital problems. *       PLAN:    Sepsis 2/2 UTI vs PNA / Lactic Acidosis: start Rocephin/Doxy/Vanc. Bcx/Ucx pending. Will collect SpCx, ESR/CRP/Procal, MRSA nasal, urine strep/legionella. Resp viral panel negative. Lactate noted to be 7.5, IVF bolus + maintenance IVF's and monitor. Continue BiPAP and wean as able.  Patient notes she has had some increased frequency.  She denies cough but has had some chills no fever.  Continue antibiotics for now monitor cultures.  Procalcitonin elevated 2.6, 19.6, repeat tmrw        GI Bleed: hx of GAVE w/ chronic WARNER and multiple transfusion. Hgb 7.2.  Patient is established with GI at [facility].  She does not want to see GI here or want any endoscopy done here.  I have explained the serious nature of the patient's  problem and advised her that endoscopy might be necessary.  For now I have canceled the GI consult.  I did discuss with her the possibility of transfer to [facility].  We discussed the possibility of extended delay on transfer.  Patient wants to hold off on transfer.  She would like to be stabilized and discharged to follow-up as an outpatient with Coumadin clinic    Acute blood loss anemia complicating chronic blood loss and iron deficiency anemia IV iron ordered    Lactic acidosis secondary to above antibiotics, IV fluids, blood, monitor    DM 2 UNCONTROLLED NORMALLY ON GLIPIZIDE ANDTrulicity MBS, SSI, A1c Lantus, lispro scheduled prandial and SSI       Diet: Diet NPO Exceptions are: Ice Chips, Sips of Water with Meds  Code Status: Limited  PT/OT Eval Status:     DVT Prophylaxis:     Recommended disposition at discharge: Hopefully home in 24 to 48 hours    Subjective    Feeling better no complaints  No CP or SOB  No fever or chills   No uncontrolled pain  No vomiting or diarrhea   No events reported overnight     Exam:  BP (!) 101/53   Pulse 83   Temp 97.5 °F (36.4 °C) (Temporal)   Resp 18   Ht 1.6 m (5' 3\")   Wt 86.2 kg (190 lb)   LMP  (LMP Unknown)   SpO2 98%   BMI 33.66 kg/m²   General appearance: No apparent distress, appears stated age and cooperative.  HEENT: Pupils equal, round, and reactive to light. Conjunctivae/corneas clear.  Neck: Supple. No jugular venous distention. Trachea midline.  Respiratory:  Normal respiratory effort. Clear to auscultation, bilaterally without Rales/Wheezes/Rhonchi.  Cardiovascular: Regular rate and rhythm with normal S1/S2 without murmurs, rubs or gallops.  Abdomen: Soft, non-tender, non-distended with normal bowel sounds.  Musculoskeletal: No clubbing, cyanosis or edema bilaterally. Brisk capillary refill. 2+radial pulses.   Skin:  No rashes    Neurologic: awake, alert and following commands    Medications:  Reviewed    Infusion Medications    sodium chloride

## 2024-12-02 NOTE — CONSULTS
Gastroenterology, Hepatology, &  Advanced Endoscopy    Consult Note      Reason for Consult: GAVE    HPI:   Haven Barber is a 74 y.o. female w/ PMH of  has a past medical history of Arthritis, Cataract, Chronic fatigue syndrome, Depression, Diabetes mellitus (HCC), Fibromyalgia, Hypertension, Memory loss, Mononucleosis, Psoriasis, Sciatica, Sleep apnea, and Vertigo. who presents to the ED for near syncope.  Patient states that she has a history of GAVE, and has chronic intermittent GI bleeding with reported AVM's. She has a GI physician at [facility].  She has required endoscopic therapy in the past for her GAVE. She denies any abdominal pain but does report having recent dark stools consistent with previous bleeding episodes.       Recent Labs     12/01/24  1458 12/02/24  0550   INR 1.4  --    ALT 18 17   AST 35* 42*   ALKPHOS 94 69   BILITOT 1.3* 0.6   BILIDIR 0.4*  --      Lab Results   Component Value Date    WBC 23.5 (H) 12/02/2024    HGB 5.8 (LL) 12/02/2024    HCT 20.5 (L) 12/02/2024     (L) 12/02/2024     12/02/2024    K 3.8 12/02/2024     (H) 12/02/2024    CREATININE 1.0 12/02/2024    BUN 22 12/02/2024    CO2 18 (L) 12/02/2024    FOLATE 9.3 11/13/2023    HKLTOFVH52 1203 (H) 11/13/2023    AMMONIA 40.0 01/15/2019    GLUCOSE 325 (H) 12/02/2024    INR 1.4 12/01/2024    PROTIME 15.9 (H) 12/01/2024    TSH 2.12 12/01/2024    LABA1C 7.3 10/08/2024     Lab Results   Component Value Date    INR 1.4 12/01/2024    INR 1.3 11/14/2023    INR 1.1 07/13/2023    PROTIME 15.9 (H) 12/01/2024    PROTIME 15.1 (H) 11/14/2023    PROTIME 12.5 (H) 07/13/2023      MELD 3.0: 12 at 12/2/2024  5:50 AM  MELD-Na: 10 at 12/2/2024  5:50 AM  Calculated from:  Serum Creatinine: 1.0 mg/dL at 12/2/2024  5:50 AM  Serum Sodium: 139 mmol/L (Using max of 137 mmol/L) at 12/2/2024  5:50 AM  Total Bilirubin: 0.6 mg/dL (Using min of 1 mg/dL) at 12/2/2024  5:50 AM  Serum Albumin: 2.8 g/dL at 12/2/2024  5:50 AM  INR(ratio): 1.4 at  WITH CONTRAST 12/01/2024    Narrative  EXAMINATION:  CTA OF THE CHEST 12/1/2024 7:38 pm    TECHNIQUE:  CTA of the chest was performed after the administration of intravenous  contrast.  Multiplanar reformatted images are provided for review.  MIP  images are provided for review. Automated exposure control, iterative  reconstruction, and/or weight based adjustment of the mA/kV was utilized to  reduce the radiation dose to as low as reasonably achievable.    COMPARISON:  CT abdomen and pelvis a 12/01/2024.    HISTORY  ORDERING SYSTEM PROVIDED HISTORY: sudden onset tachycardia and hypoxia. lungs  clear on CXR from earlier today  TECHNOLOGIST PROVIDED HISTORY:  Reason for exam:->sudden onset tachycardia and hypoxia. lungs clear on CXR  from earlier today  Additional Contrast?->1  What reading provider will be dictating this exam?->CRC    FINDINGS:  Pulmonary Arteries: Pulmonary arteries are adequately opacified for  evaluation.  No evidence of intraluminal filling defect to suggest pulmonary  embolism.  Main pulmonary artery is normal in caliber.    Mediastinum: No evidence of mediastinal lymphadenopathy.  Single borderline  right paratracheal lymph node measures 1.0 cm in short axis.  The heart and  pericardium demonstrate no acute abnormality.  Moderate coronary artery  calcifications.  There is no acute abnormality of the thoracic aorta.    Lungs/pleura: Diffuse right-sided ground-glass and consolidative opacities  are new from comparison CT from earlier on the same day.    Upper Abdomen: Nodular anterior hepatic surface.  No acute findings in the  upper abdomen.  Small fat containing left-sided Bochdalek's hernia.    Soft Tissues/Bones: No acute osseous abnormality.    Impression  1.  No evidence of pulmonary embolism.    2.  Diffuse right-sided ground-glass and consolidative opacities, new from  comparison CT abdomen and pelvis from earlier on the same date, are  suspicious for acute pulmonary edema.    3.

## 2024-12-02 NOTE — PROGRESS NOTES
Pharmacy Consultation Note  (Antibiotic Dosing and Monitoring)    Initial consult date: 12/2/2024  Consulting physician/provider: Emily Miller MD  Drug: Vancomycin  Indication: Pneumonia (CAP)     Age/  Gender Height Weight IBW  Allergy Information   74 y.o./female 160 cm (5' 3\") 86.2 kg (190 lb)     Ideal body weight: 52.4 kg (115 lb 8.3 oz)  Adjusted ideal body weight: 65.9 kg (145 lb 5 oz)   Baclofen, Penicillins, and Nickel      Renal Function:  Recent Labs     12/01/24  1458 12/01/24  2117 12/02/24  0550   BUN 19 18 22   CREATININE 0.9 0.9 1.0     No intake or output data in the 24 hours ending 12/02/24 0922    Vancomycin Monitoring:  Trough:  No results for input(s): \"VANCOTROUGH\" in the last 72 hours.  Random:  No results for input(s): \"VANCORANDOM\" in the last 72 hours.    Vancomycin Administration Times:  Recent vancomycin administrations                     vancomycin (VANCOCIN) 1,750 mg in sodium chloride 0.9 % 500 mL IVPB (mg) 1,750 mg New Bag 12/02/24 0102                  Assessment:  Patient is a 74 y.o. female who has been initiated on vancomycin  Estimated Creatinine Clearance: 52 mL/min (based on SCr of 1 mg/dL).    Plan:  Vancomycin 1500 mg IV every 24 hours (estimated , estimated trough 12)  Will check vancomycin levels when appropriate - check random level tomorrow morning  Will continue to monitor renal function   Pharmacy to follow      Bailey Miller PharmD, BCPS 12/2/2024 9:22 AM    SEB: [phone]  SEY: [phone]  SJW: [phone]

## 2024-12-02 NOTE — PROGRESS NOTES
Pharmacy Consultation Note  (Antibiotic Dosing and Monitoring)    Initial consult date: 12/1/2024  Consulting physician/provider: Emily Miller MD  Drug: Vancomycin  Indication: Pneumonia (CAP)     Age/  Gender Height Weight IBW  Allergy Information   74 y.o./female 160 cm (5' 3\") 86.2 kg (190 lb)     Ideal body weight: 52.4 kg (115 lb 8.3 oz)  Adjusted ideal body weight: 65.9 kg (145 lb 5 oz)   Baclofen, Penicillins, and Nickel      Renal Function:  Recent Labs     12/01/24  1458 12/01/24 2117   BUN 19 18   CREATININE 0.9 0.9     No intake or output data in the 24 hours ending 12/01/24 2208    Vancomycin Monitoring:  Trough:  No results for input(s): \"VANCOTROUGH\" in the last 72 hours.  Random:  No results for input(s): \"VANCORANDOM\" in the last 72 hours.    Vancomycin Administration Times:  Recent vancomycin administrations        No vancomycin IV orders with administrations found.                    Assessment:  Patient is a 74 y.o. female who has been initiated on vancomycin  Estimated Creatinine Clearance: 58 mL/min (based on SCr of 0.9 mg/dL).    Plan:  Will continue vancomycin 1750 mg x 1, then Vancomycin 1500 mg IV every 24 hours (starting 12 hours after initial dose)  Will check vancomycin levels when appropriate  Will continue to monitor renal function   Pharmacy to follow      Kaur Wright, PharmD, [facility], BCPS 12/1/2024 10:08 PM  CHARLEE: [phone]  SEY: [phone]  SJW: [phone]

## 2024-12-02 NOTE — PROGRESS NOTES
Messaged Dr. Vines re: pt requests for a diet, her mediport to be accessed, and stating she does not want GI consult -prefers [facility] to follow for her GAVEs diagnosis

## 2024-12-03 ENCOUNTER — ANESTHESIA (OUTPATIENT)
Dept: ENDOSCOPY | Age: 74
DRG: 871 | End: 2024-12-03
Payer: MEDICARE

## 2024-12-03 ENCOUNTER — APPOINTMENT (OUTPATIENT)
Dept: GENERAL RADIOLOGY | Age: 74
DRG: 871 | End: 2024-12-03
Payer: MEDICARE

## 2024-12-03 ENCOUNTER — ANESTHESIA EVENT (OUTPATIENT)
Dept: ENDOSCOPY | Age: 74
DRG: 871 | End: 2024-12-03
Payer: MEDICARE

## 2024-12-03 PROBLEM — K92.1 MELENA: Status: ACTIVE | Noted: 2024-12-01

## 2024-12-03 PROBLEM — J96.01 ACUTE RESPIRATORY FAILURE WITH HYPOXIA: Status: ACTIVE | Noted: 2024-12-03

## 2024-12-03 LAB
ABO/RH: NORMAL
ALBUMIN SERPL-MCNC: 2.9 G/DL (ref 3.5–5.2)
ALP SERPL-CCNC: 86 U/L (ref 35–104)
ALT SERPL-CCNC: 20 U/L (ref 0–32)
ANION GAP SERPL CALCULATED.3IONS-SCNC: 12 MMOL/L (ref 7–16)
ANTIBODY SCREEN: NEGATIVE
ARM BAND NUMBER: NORMAL
AST SERPL-CCNC: 56 U/L (ref 0–31)
B.E.: -3 MMOL/L (ref -3–3)
BILIRUB SERPL-MCNC: 0.7 MG/DL (ref 0–1.2)
BLOOD BANK BLOOD PRODUCT EXPIRATION DATE: NORMAL
BLOOD BANK BLOOD PRODUCT EXPIRATION DATE: NORMAL
BLOOD BANK DISPENSE STATUS: NORMAL
BLOOD BANK DISPENSE STATUS: NORMAL
BLOOD BANK ISBT PRODUCT BLOOD TYPE: 6200
BLOOD BANK ISBT PRODUCT BLOOD TYPE: 6200
BLOOD BANK PRODUCT CODE: NORMAL
BLOOD BANK PRODUCT CODE: NORMAL
BLOOD BANK SAMPLE EXPIRATION: NORMAL
BLOOD BANK UNIT TYPE AND RH: NORMAL
BLOOD BANK UNIT TYPE AND RH: NORMAL
BNP SERPL-MCNC: 687 PG/ML (ref 0–125)
BPU ID: NORMAL
BPU ID: NORMAL
BUN SERPL-MCNC: 25 MG/DL (ref 6–23)
CALCIUM SERPL-MCNC: 8.4 MG/DL (ref 8.6–10.2)
CHLORIDE SERPL-SCNC: 106 MMOL/L (ref 98–107)
CO2 SERPL-SCNC: 19 MMOL/L (ref 22–29)
COHB: 0.8 % (ref 0–1.5)
COMMENT: ABNORMAL
COMPONENT: NORMAL
COMPONENT: NORMAL
CREAT SERPL-MCNC: 0.9 MG/DL (ref 0.5–1)
CRITICAL: ABNORMAL
CROSSMATCH RESULT: NORMAL
CROSSMATCH RESULT: NORMAL
DATE ANALYZED: ABNORMAL
DATE LAST DOSE: NORMAL
DATE OF COLLECTION: ABNORMAL
EKG ATRIAL RATE: 95 BPM
EKG P AXIS: 56 DEGREES
EKG P-R INTERVAL: 150 MS
EKG Q-T INTERVAL: 406 MS
EKG QRS DURATION: 86 MS
EKG QTC CALCULATION (BAZETT): 510 MS
EKG R AXIS: -19 DEGREES
EKG T AXIS: 10 DEGREES
EKG VENTRICULAR RATE: 95 BPM
ERYTHROCYTE [DISTWIDTH] IN BLOOD BY AUTOMATED COUNT: 19.2 % (ref 11.5–15)
GFR, ESTIMATED: 68 ML/MIN/1.73M2
GLUCOSE BLD-MCNC: 206 MG/DL (ref 74–99)
GLUCOSE BLD-MCNC: 218 MG/DL (ref 74–99)
GLUCOSE BLD-MCNC: 224 MG/DL (ref 74–99)
GLUCOSE BLD-MCNC: 253 MG/DL (ref 74–99)
GLUCOSE SERPL-MCNC: 198 MG/DL (ref 74–99)
HBA1C MFR BLD: 7 % (ref 4–5.6)
HCO3: 20.4 MMOL/L (ref 22–26)
HCT VFR BLD AUTO: 26 % (ref 34–48)
HCT VFR BLD AUTO: 27 % (ref 34–48)
HCT VFR BLD AUTO: 29.4 % (ref 34–48)
HGB BLD-MCNC: 8.2 G/DL (ref 11.5–15.5)
HGB BLD-MCNC: 8.2 G/DL (ref 11.5–15.5)
HGB BLD-MCNC: 9.1 G/DL (ref 11.5–15.5)
HHB: 8.8 % (ref 0–5)
LAB: ABNORMAL
Lab: 1625
MCH RBC QN AUTO: 24.6 PG (ref 26–35)
MCHC RBC AUTO-ENTMCNC: 30.4 G/DL (ref 32–34.5)
MCV RBC AUTO: 80.8 FL (ref 80–99.9)
METHB: 0.4 % (ref 0–1.5)
MODE: ABNORMAL
O2 SATURATION: 91.1 % (ref 92–98.5)
O2HB: 90 % (ref 94–97)
OPERATOR ID: 5100
PATIENT TEMP: 37 C
PCO2: 30.6 MMHG (ref 35–45)
PH BLOOD GAS: 7.44 (ref 7.35–7.45)
PLATELET # BLD AUTO: 120 K/UL (ref 130–450)
PMV BLD AUTO: 11.2 FL (ref 7–12)
PO2: 59.8 MMHG (ref 75–100)
POTASSIUM SERPL-SCNC: 3.2 MMOL/L (ref 3.5–5)
PROCALCITONIN SERPL-MCNC: 12.34 NG/ML (ref 0–0.08)
PROT SERPL-MCNC: 5.7 G/DL (ref 6.4–8.3)
RBC # BLD AUTO: 3.34 M/UL (ref 3.5–5.5)
SODIUM SERPL-SCNC: 137 MMOL/L (ref 132–146)
SOURCE, BLOOD GAS: ABNORMAL
THB: 10.2 G/DL (ref 11.5–16.5)
TIME ANALYZED: 1628
TME LAST DOSE: NORMAL H
TRANSFUSION STATUS: NORMAL
TRANSFUSION STATUS: NORMAL
UNIT DIVISION: 0
UNIT DIVISION: 0
UNIT ISSUE DATE/TIME: NORMAL
UNIT ISSUE DATE/TIME: NORMAL
VANCOMYCIN DOSE: NORMAL MG
VANCOMYCIN SERPL-MCNC: 21.2 UG/ML (ref 5–40)
WBC OTHER # BLD: 16.6 K/UL (ref 4.5–11.5)

## 2024-12-03 PROCEDURE — 6360000002 HC RX W HCPCS: Performed by: STUDENT IN AN ORGANIZED HEALTH CARE EDUCATION/TRAINING PROGRAM

## 2024-12-03 PROCEDURE — 6370000000 HC RX 637 (ALT 250 FOR IP): Performed by: INTERNAL MEDICINE

## 2024-12-03 PROCEDURE — 82947 ASSAY GLUCOSE BLOOD QUANT: CPT

## 2024-12-03 PROCEDURE — 85027 COMPLETE CBC AUTOMATED: CPT

## 2024-12-03 PROCEDURE — 94640 AIRWAY INHALATION TREATMENT: CPT

## 2024-12-03 PROCEDURE — 6360000002 HC RX W HCPCS: Performed by: INTERNAL MEDICINE

## 2024-12-03 PROCEDURE — 93010 ELECTROCARDIOGRAM REPORT: CPT | Performed by: INTERNAL MEDICINE

## 2024-12-03 PROCEDURE — 99232 SBSQ HOSP IP/OBS MODERATE 35: CPT | Performed by: NURSE PRACTITIONER

## 2024-12-03 PROCEDURE — 36600 WITHDRAWAL OF ARTERIAL BLOOD: CPT

## 2024-12-03 PROCEDURE — 82805 BLOOD GASES W/O2 SATURATION: CPT

## 2024-12-03 PROCEDURE — 83880 ASSAY OF NATRIURETIC PEPTIDE: CPT

## 2024-12-03 PROCEDURE — 85014 HEMATOCRIT: CPT

## 2024-12-03 PROCEDURE — 84145 PROCALCITONIN (PCT): CPT

## 2024-12-03 PROCEDURE — 80053 COMPREHEN METABOLIC PANEL: CPT

## 2024-12-03 PROCEDURE — 2580000003 HC RX 258: Performed by: STUDENT IN AN ORGANIZED HEALTH CARE EDUCATION/TRAINING PROGRAM

## 2024-12-03 PROCEDURE — 6370000000 HC RX 637 (ALT 250 FOR IP): Performed by: STUDENT IN AN ORGANIZED HEALTH CARE EDUCATION/TRAINING PROGRAM

## 2024-12-03 PROCEDURE — 83036 HEMOGLOBIN GLYCOSYLATED A1C: CPT

## 2024-12-03 PROCEDURE — 85018 HEMOGLOBIN: CPT

## 2024-12-03 PROCEDURE — 2580000003 HC RX 258: Performed by: INTERNAL MEDICINE

## 2024-12-03 PROCEDURE — 2060000000 HC ICU INTERMEDIATE R&B

## 2024-12-03 PROCEDURE — 2709999900 HC NON-CHARGEABLE SUPPLY: Performed by: STUDENT IN AN ORGANIZED HEALTH CARE EDUCATION/TRAINING PROGRAM

## 2024-12-03 PROCEDURE — 94660 CPAP INITIATION&MGMT: CPT

## 2024-12-03 PROCEDURE — 2700000000 HC OXYGEN THERAPY PER DAY

## 2024-12-03 PROCEDURE — 71045 X-RAY EXAM CHEST 1 VIEW: CPT

## 2024-12-03 PROCEDURE — 36415 COLL VENOUS BLD VENIPUNCTURE: CPT

## 2024-12-03 PROCEDURE — 5A0935A ASSISTANCE WITH RESPIRATORY VENTILATION, LESS THAN 24 CONSECUTIVE HOURS, HIGH NASAL FLOW/VELOCITY: ICD-10-PCS | Performed by: INTERNAL MEDICINE

## 2024-12-03 PROCEDURE — 80202 ASSAY OF VANCOMYCIN: CPT

## 2024-12-03 PROCEDURE — 99233 SBSQ HOSP IP/OBS HIGH 50: CPT | Performed by: INTERNAL MEDICINE

## 2024-12-03 RX ORDER — FUROSEMIDE 10 MG/ML
40 INJECTION INTRAMUSCULAR; INTRAVENOUS ONCE
Status: COMPLETED | OUTPATIENT
Start: 2024-12-03 | End: 2024-12-03

## 2024-12-03 RX ORDER — FUROSEMIDE 10 MG/ML
20 INJECTION INTRAMUSCULAR; INTRAVENOUS ONCE
Status: DISCONTINUED | OUTPATIENT
Start: 2024-12-03 | End: 2024-12-03

## 2024-12-03 RX ORDER — IPRATROPIUM BROMIDE AND ALBUTEROL SULFATE 2.5; .5 MG/3ML; MG/3ML
1 SOLUTION RESPIRATORY (INHALATION) EVERY 4 HOURS PRN
Status: DISCONTINUED | OUTPATIENT
Start: 2024-12-03 | End: 2024-12-09 | Stop reason: SDUPTHER

## 2024-12-03 RX ORDER — FUROSEMIDE 10 MG/ML
20 INJECTION INTRAMUSCULAR; INTRAVENOUS DAILY
Status: DISCONTINUED | OUTPATIENT
Start: 2024-12-04 | End: 2024-12-06

## 2024-12-03 RX ORDER — LOPERAMIDE HYDROCHLORIDE 2 MG/1
2 CAPSULE ORAL 4 TIMES DAILY PRN
Status: DISCONTINUED | OUTPATIENT
Start: 2024-12-03 | End: 2024-12-09 | Stop reason: HOSPADM

## 2024-12-03 RX ORDER — FUROSEMIDE 10 MG/ML
INJECTION INTRAMUSCULAR; INTRAVENOUS
Status: DISPENSED
Start: 2024-12-03 | End: 2024-12-04

## 2024-12-03 RX ADMIN — NITROGLYCERIN 1 INCH: 20 OINTMENT TOPICAL at 16:27

## 2024-12-03 RX ADMIN — INSULIN GLARGINE 15 UNITS: 100 INJECTION, SOLUTION SUBCUTANEOUS at 20:33

## 2024-12-03 RX ADMIN — WATER 2000 MG: 1 INJECTION INTRAMUSCULAR; INTRAVENOUS; SUBCUTANEOUS at 10:44

## 2024-12-03 RX ADMIN — PANTOPRAZOLE SODIUM 40 MG: 40 INJECTION, POWDER, FOR SOLUTION INTRAVENOUS at 09:26

## 2024-12-03 RX ADMIN — SODIUM CHLORIDE, PRESERVATIVE FREE 10 ML: 5 INJECTION INTRAVENOUS at 20:33

## 2024-12-03 RX ADMIN — ROPINIROLE HYDROCHLORIDE 0.5 MG: 0.5 TABLET, FILM COATED ORAL at 09:27

## 2024-12-03 RX ADMIN — POTASSIUM BICARBONATE 40 MEQ: 782 TABLET, EFFERVESCENT ORAL at 11:23

## 2024-12-03 RX ADMIN — ROPINIROLE HYDROCHLORIDE 0.5 MG: 0.5 TABLET, FILM COATED ORAL at 20:32

## 2024-12-03 RX ADMIN — GABAPENTIN 300 MG: 300 CAPSULE ORAL at 09:26

## 2024-12-03 RX ADMIN — IPRATROPIUM BROMIDE AND ALBUTEROL SULFATE 1 DOSE: .5; 3 SOLUTION RESPIRATORY (INHALATION) at 16:20

## 2024-12-03 RX ADMIN — INSULIN LISPRO 5 UNITS: 100 INJECTION, SOLUTION INTRAVENOUS; SUBCUTANEOUS at 11:17

## 2024-12-03 RX ADMIN — GABAPENTIN 300 MG: 300 CAPSULE ORAL at 20:33

## 2024-12-03 RX ADMIN — PROPRANOLOL HYDROCHLORIDE 10 MG: 10 TABLET ORAL at 09:27

## 2024-12-03 RX ADMIN — SODIUM CHLORIDE, PRESERVATIVE FREE 10 ML: 5 INJECTION INTRAVENOUS at 09:26

## 2024-12-03 RX ADMIN — GABAPENTIN 300 MG: 300 CAPSULE ORAL at 14:51

## 2024-12-03 RX ADMIN — FUROSEMIDE 40 MG: 10 INJECTION, SOLUTION INTRAMUSCULAR; INTRAVENOUS at 16:26

## 2024-12-03 RX ADMIN — INSULIN LISPRO 4 UNITS: 100 INJECTION, SOLUTION INTRAVENOUS; SUBCUTANEOUS at 20:33

## 2024-12-03 RX ADMIN — ACETAMINOPHEN 500 MG: 500 TABLET ORAL at 10:53

## 2024-12-03 RX ADMIN — INSULIN LISPRO 2 UNITS: 100 INJECTION, SOLUTION INTRAVENOUS; SUBCUTANEOUS at 11:18

## 2024-12-03 RX ADMIN — FLUOXETINE HYDROCHLORIDE 60 MG: 20 CAPSULE ORAL at 09:27

## 2024-12-03 RX ADMIN — PANTOPRAZOLE SODIUM 40 MG: 40 INJECTION, POWDER, FOR SOLUTION INTRAVENOUS at 20:33

## 2024-12-03 ASSESSMENT — PAIN SCALES - WONG BAKER: WONGBAKER_NUMERICALRESPONSE: NO HURT

## 2024-12-03 ASSESSMENT — PAIN DESCRIPTION - LOCATION: LOCATION: GENERALIZED

## 2024-12-03 ASSESSMENT — PAIN SCALES - GENERAL: PAINLEVEL_OUTOF10: 3

## 2024-12-03 ASSESSMENT — PAIN DESCRIPTION - DESCRIPTORS: DESCRIPTORS: ACHING

## 2024-12-03 NOTE — PROGRESS NOTES
Date: 12/3/2024    Time: 4:44 PM    Patient Placed On BIPAP/CPAP/ Non-Invasive Ventilation?  Yes    If no must comment.  Facial area red/color change? No           If YES are Blister/Lesion present?No   If yes must notify nursing staff  BIPAP/CPAP skin barrier?  Yes    Skin barrier type:mepilexlite       Comments:        Amanda Dobbs RCP

## 2024-12-03 NOTE — PROGRESS NOTES
Pt c/o tachypnea and sob. Elevated BP and increased o2 demands. Attending at bedside. Orders received. See flowsheet and emar.

## 2024-12-03 NOTE — PROGRESS NOTES
OT SESSION ATTEMPT     Date:12/3/2024  Patient Name: Haven Barber  MRN: [id number]  : 1950  [facility]     Occupational therapy orders received/chart review completed and OT session attempted this date:    [] unavailable due to other medical staff currently with pt   [] on hold, await MRI/ neurosurgical recommendations.   [] on hold per nursing staff secondary to lab / radiology results    [x] Pt politely declined Occupational Therapy  this date 2/2 fatigue. Benefits of participation in therapy reviewed with pt. Per pt & RN, pt has been participating in functional mobility to<>from [facility] in room w/ 1 person assist.   [] off unit   [] Other:     Will reattempt OT eval at a later time/date.    Eliana Brenardo OTR/L; [id number]

## 2024-12-03 NOTE — ANESTHESIA PRE PROCEDURE
Department of Anesthesiology  Preprocedure Note       Name:  Haven Barber   Age:  74 y.o.  :  1950                                          MRN:  [id number]         Date:  12/3/2024      Surgeon: Surgeon(s):  Mansoor Tyson MD    Procedure: Procedure(s):  ENTEROSCOPY PUSH DIAGNOSTIC    Medications prior to admission:   Prior to Admission medications    Medication Sig Start Date End Date Taking? Authorizing Provider   gabapentin (NEURONTIN) 300 MG capsule Take 1 capsule by mouth 3 times daily for 180 days. 1 capsule in the am 2 capsules in the evening 24 Yes Jesus Alvarado DO   rOPINIRole (REQUIP) 0.5 MG tablet Take 1 tablet by mouth 2 times daily 24  Yes Jesus Alvarado DO   propranolol (INDERAL) 10 MG tablet Take 1 tablet by mouth daily 10/3/24 4/1/25 Yes ProviderBautista MD   glipiZIDE (GLUCOTROL) 5 MG tablet Take 1 tablet by mouth daily 10/2/24  Yes Jesus Alvarado DO   Dulaglutide (TRULICITY) 4.5 MG/0.5ML SOPN Inject 4.5 mg into the skin once a week 24  Yes Jesus Alvarado DO   omeprazole (PRILOSEC) 20 MG delayed release capsule Take 2 caps twice daily 24  Yes Alexsandra Harley APRN - CNP   fluticasone (FLONASE) 50 MCG/ACT nasal spray USE 1 SPRAY IN EACH NOSTRIL EVERY DAY 24  Yes Alexsandra Harley APRN - CNP   FLUoxetine (PROZAC) 20 MG capsule Take 3 capsules by mouth daily 11/15/23  Yes David Treviño DO       Current medications:    Current Facility-Administered Medications   Medication Dose Route Frequency Provider Last Rate Last Admin    cefTRIAXone (ROCEPHIN) 2,000 mg in sterile water 20 mL IV syringe  2,000 mg IntraVENous Q24H Damian Vines MD   2,000 mg at 24 1044    sodium chloride (OCEAN, BABY AYR) 0.65 % nasal spray 1 spray  1 spray Each Nostril PRN Damian Vines MD        loperamide (IMODIUM) capsule 2 mg  2 mg Oral 4x Daily PRN Damian Vines MD        lactated ringers bolus 500 mL

## 2024-12-03 NOTE — PLAN OF CARE
Problem: Chronic Conditions and Co-morbidities  Goal: Patient's chronic conditions and co-morbidity symptoms are monitored and maintained or improved  Outcome: Progressing  Flowsheets (Taken 12/2/2024 2130)  Care Plan - Patient's Chronic Conditions and Co-Morbidity Symptoms are Monitored and Maintained or Improved: Monitor and assess patient's chronic conditions and comorbid symptoms for stability, deterioration, or improvement     Problem: Discharge Planning  Goal: Discharge to home or other facility with appropriate resources  Outcome: Progressing  Flowsheets (Taken 12/2/2024 2130)  Discharge to home or other facility with appropriate resources: Identify barriers to discharge with patient and caregiver     Problem: Pain  Goal: Verbalizes/displays adequate comfort level or baseline comfort level  12/3/2024 0017 by Wilbert Fu RN  Outcome: Progressing  Flowsheets (Taken 12/3/2024 0017)  Verbalizes/displays adequate comfort level or baseline comfort level:   Encourage patient to monitor pain and request assistance   Assess pain using appropriate pain scale   Implement non-pharmacological measures as appropriate and evaluate response  12/2/2024 1155 by Maricel Justin RN  Outcome: Progressing     Problem: Safety - Adult  Goal: Free from fall injury  12/3/2024 0017 by Wilbert Fu RN  Outcome: Progressing  Flowsheets (Taken 12/3/2024 0017)  Free From Fall Injury: Instruct family/caregiver on patient safety  12/2/2024 1155 by Maricel Justin RN  Outcome: Progressing     Problem: Skin/Tissue Integrity  Goal: Absence of new skin breakdown  Description: 1.  Monitor for areas of redness and/or skin breakdown  2.  Assess vascular access sites hourly  3.  Every 4-6 hours minimum:  Change oxygen saturation probe site  4.  Every 4-6 hours:  If on nasal continuous positive airway pressure, respiratory therapy assess nares and determine need for appliance change or resting period.  Outcome: Progressing

## 2024-12-03 NOTE — PROGRESS NOTES
Gastroenterology, Hepatology, &  Advanced Endoscopy    Progress Note      Reason for Consult: Anemia    HPI:   Haven Barber is a 74 y.o. female w/ PMH of  has a past medical history of Arthritis, Cataract, Chronic fatigue syndrome, Depression, Diabetes mellitus (HCC), Fibromyalgia, Hypertension, Memory loss, Mononucleosis, Psoriasis, Sciatica, Sleep apnea, and Vertigo. who presents to the ED  for near syncope.  Acute onset this morning.  Patient states that she has a history of GAVE, and has chronic intermittent GI bleeding with reported AVM's.  Does not drink alcohol. Has a GI doctor at [facility].       PMH:       Diagnosis Date    Arthritis     Cataract     right    Chronic fatigue syndrome     Depression     Diabetes mellitus (HCC)     SC injection weekly    Fibromyalgia     Hypertension     Memory loss     Mononucleosis     Psoriasis     Sciatica     Sleep apnea     no c-pap    Vertigo         PSH:       Procedure Laterality Date    CARPAL TUNNEL RELEASE Bilateral     CATARACT REMOVAL Right     COLONOSCOPY      OTHER SURGICAL HISTORY Bilateral 01/21/2021    BILATERAL L3,L4 MEDIAL BRANCH AND L5 DORSAL RAMUS RADIOFREQUENCY    PAIN MANAGEMENT PROCEDURE Bilateral 01/21/2021    BILATERAL L3,4 MEDIAL BRANCH AND L5 DORSAL RAMUS  RADIOFREQUENCY ABLATION WITH SEDATION  (CPT 51545) performed by Silas Quigley MD at [facility] OR    SPINE SURGERY N/A 03/19/2021    T11 KYPHOPLASTY (2 C-ARMS) performed by Silas Quigley MD at [facility] OR    TONSILLECTOMY AND ADENOIDECTOMY      UPPER GASTROINTESTINAL ENDOSCOPY N/A 01/16/2019    EGD BIOPSY performed by Katheryn Mooney MD at [facility] ENDOSCOPY    UPPER GASTROINTESTINAL ENDOSCOPY N/A 03/10/2021    EGD ESOPHAGOGASTRODUODENOSCOPY performed by Ar Braun MD at [facility] ENDOSCOPY        Family History:       Problem Relation Age of Onset    Hypertension Father     Heart Disease Father     Hypertension Paternal Grandfather     Heart Disease Paternal Grandfather         Social History:

## 2024-12-03 NOTE — PLAN OF CARE
RRT page received for patient. As this physician was arriving to RRT, patient's primary, Dr. Vines responded as well. He has assumed care of the patient at this time.     Electronically signed by Jayleen Kiran DO on 12/3/2024 at 4:21 PM

## 2024-12-03 NOTE — PROGRESS NOTES
Hospitalist Progress Note      Synopsis: Patient admitted on 12/1/2024 73 yo female w/ hx of GAVE, DM, hepatic steatosis/cirrhosis, HTN, AVM of colon, WARNER, sciatica/back pain, who p/w an episode of presyncope after having a bloody BM today. Pt appeared well in the ED with a UTI and WBC 22k, but pt then subsequently developed  tachypnea, tachycardia, and worsening respiratory status, prompting a CTA Chest. At the time of evaluation, pt is on BiPAP and history is difficult to obtain/limited due to pt's clinical condition. Pt denies CP, dyspnea, abd pain today. Did report dysuria to ED Provider (per ED Provider's report).         ASSESSMENT:    Principal Problem:    Sepsis with acute hypoxic respiratory failure without septic shock, due to unspecified organism (HCC)  Active Problems:    Gastrointestinal hemorrhage associated with angiodysplasia of stomach and duodenum    Acute blood loss anemia    GAVE (gastric antral vascular ectasia)    Hepatic cirrhosis (HCC)    Acute cystitis with hematuria    Pneumonia of right middle lobe due to infectious organism    Lactic acidosis    Type 2 diabetes mellitus with hyperglycemia (HCC)  Resolved Problems:    * No resolved hospital problems. *       PLAN:    Sepsis 2/2 UTI vs PNA / Lactic Acidosis: start Rocephin/Doxy/Vanc. Bcx/Ucx pending. Will collect SpCx, ESR/CRP/Procal, MRSA nasal, urine strep/legionella. Resp viral panel negative. Lactate noted to be 7.5, IVF bolus + maintenance IVF's and monitor. Continue BiPAP and wean as able.  Patient notes she has had some increased frequency.  She denies cough but has had some chills no fever.  Continue antibiotics for now monitor cultures.  Procalcitonin elevated 2.6, 19.6, repeat tmrw        GI Bleed: hx of GAVE w/ chronic WARNER and multiple transfusion. Hgb 7.2.  Patient is established with GI at [facility].  She does not want to see GI here or want any endoscopy done here.  I have explained the serious nature of the patient's

## 2024-12-03 NOTE — PROGRESS NOTES
Pharmacy Consultation Note  (Antibiotic Dosing and Monitoring)    Initial consult date: 12/3/2024  Consulting physician/provider: Emily Miller MD  Drug: Vancomycin  Indication: Pneumonia (CAP)     Age/  Gender Height Weight IBW  Allergy Information   74 y.o./female 160 cm (5' 3\") 86.2 kg (190 lb)     Ideal body weight: 52.4 kg (115 lb 8.3 oz)  Adjusted ideal body weight: 67.1 kg (147 lb 15.6 oz)   Baclofen, Penicillins, and Nickel      Renal Function:  Recent Labs     12/01/24  2117 12/02/24  0550 12/03/24  0348   BUN 18 22 25*   CREATININE 0.9 1.0 0.9       Intake/Output Summary (Last 24 hours) at 12/3/2024 0837  Last data filed at 12/2/2024 1821  Gross per 24 hour   Intake 1086.5 ml   Output 500 ml   Net 586.5 ml       Vancomycin Monitoring:  Trough:  No results for input(s): \"VANCOTROUGH\" in the last 72 hours.  Random:    Recent Labs     12/03/24  0348   VANCORANDOM 21.2       Recent vancomycin administrations                     vancomycin (VANCOCIN) 1,500 mg in sodium chloride 0.9 % 250 mL IVPB ([id number]) (mg) 1,500 mg New Bag 12/02/24 1444    vancomycin (VANCOCIN) 1,750 mg in sodium chloride 0.9 % 500 mL IVPB (mg) 1,750 mg New Bag 12/02/24 0102                  Assessment:  Patient is a 74 y.o. female who has been initiated on vancomycin for treatment of pneumonia  Estimated Creatinine Clearance: 59 mL/min (based on SCr of 0.9 mg/dL).    Plan:  Continue Vancomycin 1500 mg IV every 24 hours (estimated  estimated trough 12.3)  Will check vancomycin trough level on Thursday (not ordered yet)   Will continue to monitor renal function   Pharmacy to follow      Chika Mccoy, PharmD, BCPS 12/3/2024 8:38 AM  [phone]

## 2024-12-03 NOTE — CARE COORDINATION
Attempted to  meet with pt, pt left for an EGD, will follow up at a later time.Unique Jeff, MSW, LSW

## 2024-12-04 ENCOUNTER — APPOINTMENT (OUTPATIENT)
Dept: GENERAL RADIOLOGY | Age: 74
DRG: 871 | End: 2024-12-04
Payer: MEDICARE

## 2024-12-04 LAB
ALBUMIN SERPL-MCNC: 2.8 G/DL (ref 3.5–5.2)
ALP SERPL-CCNC: 73 U/L (ref 35–104)
ALT SERPL-CCNC: 21 U/L (ref 0–32)
ANION GAP SERPL CALCULATED.3IONS-SCNC: 13 MMOL/L (ref 7–16)
AST SERPL-CCNC: 47 U/L (ref 0–31)
BILIRUB SERPL-MCNC: 0.7 MG/DL (ref 0–1.2)
BUN SERPL-MCNC: 20 MG/DL (ref 6–23)
CALCIUM SERPL-MCNC: 8.1 MG/DL (ref 8.6–10.2)
CHLORIDE SERPL-SCNC: 106 MMOL/L (ref 98–107)
CO2 SERPL-SCNC: 21 MMOL/L (ref 22–29)
CREAT SERPL-MCNC: 0.8 MG/DL (ref 0.5–1)
ERYTHROCYTE [DISTWIDTH] IN BLOOD BY AUTOMATED COUNT: 19.6 % (ref 11.5–15)
GFR, ESTIMATED: 77 ML/MIN/1.73M2
GLUCOSE BLD-MCNC: 139 MG/DL (ref 74–99)
GLUCOSE BLD-MCNC: 165 MG/DL (ref 74–99)
GLUCOSE BLD-MCNC: 227 MG/DL (ref 74–99)
GLUCOSE BLD-MCNC: 271 MG/DL (ref 74–99)
GLUCOSE SERPL-MCNC: 205 MG/DL (ref 74–99)
HCT VFR BLD AUTO: 24.3 % (ref 34–48)
HCT VFR BLD AUTO: 30.4 % (ref 34–48)
HGB BLD-MCNC: 7.6 G/DL (ref 11.5–15.5)
HGB BLD-MCNC: 9.3 G/DL (ref 11.5–15.5)
MAGNESIUM SERPL-MCNC: 1.6 MG/DL (ref 1.6–2.6)
MCH RBC QN AUTO: 24.5 PG (ref 26–35)
MCHC RBC AUTO-ENTMCNC: 31.3 G/DL (ref 32–34.5)
MCV RBC AUTO: 78.4 FL (ref 80–99.9)
MICROORGANISM SPEC CULT: NORMAL
PHOSPHATE SERPL-MCNC: 2.7 MG/DL (ref 2.5–4.5)
PLATELET, FLUORESCENCE: 111 K/UL (ref 130–450)
PMV BLD AUTO: 10.4 FL (ref 7–12)
POTASSIUM SERPL-SCNC: 3.2 MMOL/L (ref 3.5–5)
PROCALCITONIN SERPL-MCNC: 5.03 NG/ML (ref 0–0.08)
PROT SERPL-MCNC: 5.2 G/DL (ref 6.4–8.3)
RBC # BLD AUTO: 3.1 M/UL (ref 3.5–5.5)
SODIUM SERPL-SCNC: 140 MMOL/L (ref 132–146)
SPECIMEN DESCRIPTION: NORMAL
WBC OTHER # BLD: 7 K/UL (ref 4.5–11.5)

## 2024-12-04 PROCEDURE — 6360000002 HC RX W HCPCS: Performed by: INTERNAL MEDICINE

## 2024-12-04 PROCEDURE — 6370000000 HC RX 637 (ALT 250 FOR IP): Performed by: INTERNAL MEDICINE

## 2024-12-04 PROCEDURE — 6370000000 HC RX 637 (ALT 250 FOR IP): Performed by: STUDENT IN AN ORGANIZED HEALTH CARE EDUCATION/TRAINING PROGRAM

## 2024-12-04 PROCEDURE — 94640 AIRWAY INHALATION TREATMENT: CPT

## 2024-12-04 PROCEDURE — 2580000003 HC RX 258: Performed by: STUDENT IN AN ORGANIZED HEALTH CARE EDUCATION/TRAINING PROGRAM

## 2024-12-04 PROCEDURE — 71045 X-RAY EXAM CHEST 1 VIEW: CPT

## 2024-12-04 PROCEDURE — 83735 ASSAY OF MAGNESIUM: CPT

## 2024-12-04 PROCEDURE — 97161 PT EVAL LOW COMPLEX 20 MIN: CPT

## 2024-12-04 PROCEDURE — 97530 THERAPEUTIC ACTIVITIES: CPT

## 2024-12-04 PROCEDURE — 99232 SBSQ HOSP IP/OBS MODERATE 35: CPT | Performed by: INTERNAL MEDICINE

## 2024-12-04 PROCEDURE — 84100 ASSAY OF PHOSPHORUS: CPT

## 2024-12-04 PROCEDURE — 6360000002 HC RX W HCPCS: Performed by: STUDENT IN AN ORGANIZED HEALTH CARE EDUCATION/TRAINING PROGRAM

## 2024-12-04 PROCEDURE — 85014 HEMATOCRIT: CPT

## 2024-12-04 PROCEDURE — 80053 COMPREHEN METABOLIC PANEL: CPT

## 2024-12-04 PROCEDURE — 99232 SBSQ HOSP IP/OBS MODERATE 35: CPT | Performed by: NURSE PRACTITIONER

## 2024-12-04 PROCEDURE — 84145 PROCALCITONIN (PCT): CPT

## 2024-12-04 PROCEDURE — 94660 CPAP INITIATION&MGMT: CPT

## 2024-12-04 PROCEDURE — 97165 OT EVAL LOW COMPLEX 30 MIN: CPT

## 2024-12-04 PROCEDURE — 2060000000 HC ICU INTERMEDIATE R&B

## 2024-12-04 PROCEDURE — 2700000000 HC OXYGEN THERAPY PER DAY

## 2024-12-04 PROCEDURE — 36415 COLL VENOUS BLD VENIPUNCTURE: CPT

## 2024-12-04 PROCEDURE — 85018 HEMOGLOBIN: CPT

## 2024-12-04 PROCEDURE — 85027 COMPLETE CBC AUTOMATED: CPT

## 2024-12-04 PROCEDURE — 97535 SELF CARE MNGMENT TRAINING: CPT

## 2024-12-04 PROCEDURE — 2580000003 HC RX 258: Performed by: INTERNAL MEDICINE

## 2024-12-04 PROCEDURE — 82947 ASSAY GLUCOSE BLOOD QUANT: CPT

## 2024-12-04 RX ORDER — MAGNESIUM SULFATE IN WATER 40 MG/ML
2000 INJECTION, SOLUTION INTRAVENOUS ONCE
Status: COMPLETED | OUTPATIENT
Start: 2024-12-04 | End: 2024-12-04

## 2024-12-04 RX ORDER — TRAMADOL HYDROCHLORIDE 50 MG/1
50 TABLET ORAL EVERY 6 HOURS PRN
Status: DISCONTINUED | OUTPATIENT
Start: 2024-12-04 | End: 2024-12-09 | Stop reason: HOSPADM

## 2024-12-04 RX ORDER — AZITHROMYCIN 250 MG/1
500 TABLET, FILM COATED ORAL DAILY
Status: COMPLETED | OUTPATIENT
Start: 2024-12-04 | End: 2024-12-06

## 2024-12-04 RX ORDER — IPRATROPIUM BROMIDE AND ALBUTEROL SULFATE 2.5; .5 MG/3ML; MG/3ML
1 SOLUTION RESPIRATORY (INHALATION)
Status: DISCONTINUED | OUTPATIENT
Start: 2024-12-04 | End: 2024-12-09

## 2024-12-04 RX ORDER — INSULIN GLARGINE 100 [IU]/ML
21 INJECTION, SOLUTION SUBCUTANEOUS NIGHTLY
Status: DISCONTINUED | OUTPATIENT
Start: 2024-12-04 | End: 2024-12-05

## 2024-12-04 RX ORDER — LANOLIN ALCOHOL/MO/W.PET/CERES
400 CREAM (GRAM) TOPICAL DAILY
Status: DISCONTINUED | OUTPATIENT
Start: 2024-12-05 | End: 2024-12-09 | Stop reason: HOSPADM

## 2024-12-04 RX ORDER — INSULIN LISPRO 100 [IU]/ML
7 INJECTION, SOLUTION INTRAVENOUS; SUBCUTANEOUS
Status: DISCONTINUED | OUTPATIENT
Start: 2024-12-04 | End: 2024-12-05

## 2024-12-04 RX ORDER — GUAIFENESIN/DEXTROMETHORPHAN 100-10MG/5
5 SYRUP ORAL EVERY 4 HOURS PRN
Status: DISCONTINUED | OUTPATIENT
Start: 2024-12-04 | End: 2024-12-09 | Stop reason: HOSPADM

## 2024-12-04 RX ADMIN — IPRATROPIUM BROMIDE AND ALBUTEROL SULFATE 1 DOSE: 2.5; .5 SOLUTION RESPIRATORY (INHALATION) at 20:23

## 2024-12-04 RX ADMIN — INSULIN LISPRO 5 UNITS: 100 INJECTION, SOLUTION INTRAVENOUS; SUBCUTANEOUS at 12:21

## 2024-12-04 RX ADMIN — PROPRANOLOL HYDROCHLORIDE 10 MG: 10 TABLET ORAL at 09:25

## 2024-12-04 RX ADMIN — SODIUM CHLORIDE, PRESERVATIVE FREE 10 ML: 5 INJECTION INTRAVENOUS at 09:24

## 2024-12-04 RX ADMIN — GABAPENTIN 300 MG: 300 CAPSULE ORAL at 09:22

## 2024-12-04 RX ADMIN — INSULIN GLARGINE 21 UNITS: 100 INJECTION, SOLUTION SUBCUTANEOUS at 21:25

## 2024-12-04 RX ADMIN — POTASSIUM BICARBONATE 40 MEQ: 782 TABLET, EFFERVESCENT ORAL at 21:24

## 2024-12-04 RX ADMIN — GABAPENTIN 300 MG: 300 CAPSULE ORAL at 21:25

## 2024-12-04 RX ADMIN — FLUOXETINE HYDROCHLORIDE 60 MG: 20 CAPSULE ORAL at 09:22

## 2024-12-04 RX ADMIN — SODIUM CHLORIDE, PRESERVATIVE FREE 10 ML: 5 INJECTION INTRAVENOUS at 21:24

## 2024-12-04 RX ADMIN — ROPINIROLE HYDROCHLORIDE 0.5 MG: 0.5 TABLET, FILM COATED ORAL at 09:22

## 2024-12-04 RX ADMIN — ACETAMINOPHEN 500 MG: 500 TABLET ORAL at 00:31

## 2024-12-04 RX ADMIN — FUROSEMIDE 20 MG: 10 INJECTION, SOLUTION INTRAMUSCULAR; INTRAVENOUS at 09:23

## 2024-12-04 RX ADMIN — GABAPENTIN 300 MG: 300 CAPSULE ORAL at 13:54

## 2024-12-04 RX ADMIN — MAGNESIUM SULFATE HEPTAHYDRATE 2000 MG: 40 INJECTION, SOLUTION INTRAVENOUS at 17:23

## 2024-12-04 RX ADMIN — TRAMADOL HYDROCHLORIDE 50 MG: 50 TABLET, COATED ORAL at 13:54

## 2024-12-04 RX ADMIN — INSULIN LISPRO 4 UNITS: 100 INJECTION, SOLUTION INTRAVENOUS; SUBCUTANEOUS at 12:21

## 2024-12-04 RX ADMIN — POTASSIUM BICARBONATE 40 MEQ: 782 TABLET, EFFERVESCENT ORAL at 17:20

## 2024-12-04 RX ADMIN — GUAIFENESIN SYRUP AND DEXTROMETHORPHAN 5 ML: 100; 10 SYRUP ORAL at 21:35

## 2024-12-04 RX ADMIN — ROPINIROLE HYDROCHLORIDE 0.5 MG: 0.5 TABLET, FILM COATED ORAL at 23:14

## 2024-12-04 RX ADMIN — GUAIFENESIN SYRUP AND DEXTROMETHORPHAN 5 ML: 100; 10 SYRUP ORAL at 13:55

## 2024-12-04 RX ADMIN — PANTOPRAZOLE SODIUM 40 MG: 40 INJECTION, POWDER, FOR SOLUTION INTRAVENOUS at 09:27

## 2024-12-04 RX ADMIN — INSULIN LISPRO 5 UNITS: 100 INJECTION, SOLUTION INTRAVENOUS; SUBCUTANEOUS at 09:27

## 2024-12-04 RX ADMIN — AZITHROMYCIN DIHYDRATE 500 MG: 250 TABLET ORAL at 15:51

## 2024-12-04 RX ADMIN — ACETAMINOPHEN 500 MG: 500 TABLET ORAL at 17:24

## 2024-12-04 RX ADMIN — SALINE NASAL SPRAY 1 SPRAY: 1.5 SOLUTION NASAL at 09:23

## 2024-12-04 RX ADMIN — WATER 2000 MG: 1 INJECTION INTRAMUSCULAR; INTRAVENOUS; SUBCUTANEOUS at 10:11

## 2024-12-04 RX ADMIN — PANTOPRAZOLE SODIUM 40 MG: 40 INJECTION, POWDER, FOR SOLUTION INTRAVENOUS at 21:24

## 2024-12-04 RX ADMIN — INSULIN LISPRO 7 UNITS: 100 INJECTION, SOLUTION INTRAVENOUS; SUBCUTANEOUS at 15:52

## 2024-12-04 RX ADMIN — INSULIN LISPRO 2 UNITS: 100 INJECTION, SOLUTION INTRAVENOUS; SUBCUTANEOUS at 06:38

## 2024-12-04 ASSESSMENT — PAIN SCALES - GENERAL
PAINLEVEL_OUTOF10: 0
PAINLEVEL_OUTOF10: 0
PAINLEVEL_OUTOF10: 3
PAINLEVEL_OUTOF10: 0
PAINLEVEL_OUTOF10: 5
PAINLEVEL_OUTOF10: 8
PAINLEVEL_OUTOF10: 4
PAINLEVEL_OUTOF10: 0

## 2024-12-04 ASSESSMENT — PAIN SCALES - WONG BAKER
WONGBAKER_NUMERICALRESPONSE: NO HURT
WONGBAKER_NUMERICALRESPONSE: NO HURT
WONGBAKER_NUMERICALRESPONSE: HURTS LITTLE MORE
WONGBAKER_NUMERICALRESPONSE: NO HURT
WONGBAKER_NUMERICALRESPONSE: HURTS WHOLE LOT
WONGBAKER_NUMERICALRESPONSE: NO HURT

## 2024-12-04 ASSESSMENT — PAIN DESCRIPTION - ONSET
ONSET: ON-GOING
ONSET: ON-GOING

## 2024-12-04 ASSESSMENT — PAIN DESCRIPTION - ORIENTATION
ORIENTATION: LOWER
ORIENTATION: ANTERIOR

## 2024-12-04 ASSESSMENT — PAIN DESCRIPTION - PAIN TYPE
TYPE: CHRONIC PAIN
TYPE: ACUTE PAIN

## 2024-12-04 ASSESSMENT — PAIN DESCRIPTION - DESCRIPTORS
DESCRIPTORS: ACHING;DULL;DISCOMFORT
DESCRIPTORS: ACHING;DISCOMFORT;SORE
DESCRIPTORS: ACHING;DULL;DISCOMFORT

## 2024-12-04 ASSESSMENT — PAIN - FUNCTIONAL ASSESSMENT
PAIN_FUNCTIONAL_ASSESSMENT: ACTIVITIES ARE NOT PREVENTED
PAIN_FUNCTIONAL_ASSESSMENT: ACTIVITIES ARE NOT PREVENTED

## 2024-12-04 ASSESSMENT — PAIN DESCRIPTION - FREQUENCY
FREQUENCY: CONTINUOUS
FREQUENCY: CONTINUOUS

## 2024-12-04 ASSESSMENT — PAIN DESCRIPTION - LOCATION
LOCATION: HEAD
LOCATION: BACK
LOCATION: HEAD

## 2024-12-04 NOTE — PROGRESS NOTES
Occupational Therapy    OCCUPATIONAL THERAPY INITIAL EVALUATION    [facility]  [address], [place]        Date:2024                                                  Patient Name: Haven Barber    MRN: [id number]    : 1950    [facility]: [facility]          Evaluating OT: Anne Marie Rajput, REMAD, OTR/L; [id number]      Occupational therapy physician order:         Pt presents to ED with a fall 2/2 dizziness and c/o abdominal pain and tarry stools      Diagnosis:    1. Sepsis with acute hypoxic respiratory failure without septic shock, due to unspecified organism (HCC)    2. Acute cystitis without hematuria    3. Pneumonia of right lower lobe due to infectious organism    4. Acute respiratory failure with hypoxia    5. Melena       Patient Active Problem List   Diagnosis    Obstructive sleep apnea syndrome    Lumbar radicular pain    Recurrent major depressive disorder, in partial remission (HCC)    Type 2 diabetes mellitus with hyperglycemia, without long-term current use of insulin (HCC)    Gastroesophageal reflux disease    Essential hypertension    Gastrointestinal hemorrhage associated with angiodysplasia of stomach and duodenum    Chronic pain syndrome    Lumbar spondylosis    Thoracic disc disease    Lumbar facet arthropathy    Spinal stenosis of lumbar region without neurogenic claudication    Migraine without aura and without status migrainosus, not intractable    Lumbar spondylolysis    Thoracic facet joint syndrome    Thoracic spondylosis    Lumbar disc disorder    Stenosis of lateral recess of lumbar spine    Lumbar radiculopathy    Disorder of sacrum    Iron deficiency anemia    Severe obesity (BMI 35.0-39.9) with comorbidity    Anemia    AVM (arteriovenous malformation) of colon    Acute blood loss anemia    GAVE (gastric antral vascular ectasia)    Hepatic cirrhosis (HCC)    Generalized weakness    Sepsis secondary to RSV  infection (HCC)    Chronic obstructive pulmonary disease, unspecified    Sepsis with acute hypoxic respiratory failure without septic shock, due to unspecified organism (HCC)    Acute cystitis with hematuria    Pneumonia of right middle lobe due to infectious organism    Lactic acidosis    Type 2 diabetes mellitus with hyperglycemia (HCC)    Melena    Acute respiratory failure with hypoxia          Pertinent Medical History:   Past Medical History:   Diagnosis Date    Arthritis     Cataract     right    Chronic fatigue syndrome     Depression     Diabetes mellitus (HCC)     SC injection weekly    Fibromyalgia     Hypertension     Memory loss     Mononucleosis     Psoriasis     Sciatica     Sleep apnea     no c-pap    Vertigo           Surgery/Procedures: none this admission        Recommended Adaptive Equipment: TBD        Precautions:  Fall Risk, +alarms, O2, continuous pulse ox     Assessment of current deficits    [x] Functional mobility  [x]ADLs  [x] Strength               [x]Cognition    [x] Functional transfers   [x] IADLs         [x] Safety Awareness   [x]Endurance    [x] Fine Coordination              [x] Balance      [] Vision/perception   []Sensation     []Gross Motor Coordination  [] ROM  [] Delirium                   [] Motor Control     OT PLAN OF CARE   OT POC based on physician orders, patient diagnosis and results of clinical assessment    Frequency/Duration 1-3 days/wk for 2 weeks PRN   Specific OT Treatment Interventions to include:   * Instruction/training on adapted ADL techniques and AE recommendations to increase functional independence within precautions       * Training on energy conservation strategies, correct breathing pattern and techniques to improve independence/tolerance for self-care routine  * Functional transfer/mobility training/DME recommendations for increased independence, safety, and fall prevention  * Patient/Family education to increase follow through with safety techniques

## 2024-12-04 NOTE — PLAN OF CARE
Problem: Chronic Conditions and Co-morbidities  Goal: Patient's chronic conditions and co-morbidity symptoms are monitored and maintained or improved  12/4/2024 0748 by Kaycee King RN  Outcome: Progressing  12/3/2024 2259 by Sheryl Baeza RN  Outcome: Progressing     Problem: Discharge Planning  Goal: Discharge to home or other facility with appropriate resources  12/4/2024 0748 by Kaycee King RN  Outcome: Progressing  12/3/2024 2259 by Sheryl Baeza RN  Outcome: Progressing     Problem: Pain  Goal: Verbalizes/displays adequate comfort level or baseline comfort level  12/4/2024 0748 by Kaycee King RN  Outcome: Progressing  12/3/2024 2259 by Sheryl Baeza RN  Outcome: Progressing     Problem: Safety - Adult  Goal: Free from fall injury  12/4/2024 0748 by Kaycee King RN  Outcome: Progressing  12/3/2024 2259 by Sheryl Baeza RN  Outcome: Progressing     Problem: Skin/Tissue Integrity  Goal: Absence of new skin breakdown  Description: 1.  Monitor for areas of redness and/or skin breakdown  2.  Assess vascular access sites hourly  3.  Every 4-6 hours minimum:  Change oxygen saturation probe site  4.  Every 4-6 hours:  If on nasal continuous positive airway pressure, respiratory therapy assess nares and determine need for appliance change or resting period.  12/4/2024 0748 by Kaycee King RN  Outcome: Progressing  12/3/2024 2259 by Sheryl Baeza RN  Outcome: Progressing

## 2024-12-04 NOTE — PROGRESS NOTES
Gastroenterology, Hepatology, &  Advanced Endoscopy    Progress Note      Reason for Consult: Anemia    HPI:   Haven Barber is a 74 y.o. female w/ PMH of  has a past medical history of Arthritis, Cataract, Chronic fatigue syndrome, Depression, Diabetes mellitus (HCC), Fibromyalgia, Hypertension, Memory loss, Mononucleosis, Psoriasis, Sciatica, Sleep apnea, and Vertigo. who presents to the ED  for near syncope.  Acute onset this morning.  Patient states that she has a history of GAVE, and has chronic intermittent GI bleeding with reported AVM's.  Does not drink alcohol. Has a GI doctor at [facility].       PMH:       Diagnosis Date    Arthritis     Cataract     right    Chronic fatigue syndrome     Depression     Diabetes mellitus (HCC)     SC injection weekly    Fibromyalgia     Hypertension     Memory loss     Mononucleosis     Psoriasis     Sciatica     Sleep apnea     no c-pap    Vertigo         PSH:       Procedure Laterality Date    CARPAL TUNNEL RELEASE Bilateral     CATARACT REMOVAL Right     COLONOSCOPY      OTHER SURGICAL HISTORY Bilateral 01/21/2021    BILATERAL L3,L4 MEDIAL BRANCH AND L5 DORSAL RAMUS RADIOFREQUENCY    PAIN MANAGEMENT PROCEDURE Bilateral 01/21/2021    BILATERAL L3,4 MEDIAL BRANCH AND L5 DORSAL RAMUS  RADIOFREQUENCY ABLATION WITH SEDATION  (CPT 12796) performed by Silas Quigley MD at [facility] OR    SPINE SURGERY N/A 03/19/2021    T11 KYPHOPLASTY (2 C-ARMS) performed by Silas Quigley MD at [facility] OR    TONSILLECTOMY AND ADENOIDECTOMY      UPPER GASTROINTESTINAL ENDOSCOPY N/A 01/16/2019    EGD BIOPSY performed by Katheryn Mooney MD at [facility]    UPPER GASTROINTESTINAL ENDOSCOPY N/A 03/10/2021    EGD ESOPHAGOGASTRODUODENOSCOPY performed by Ar Braun MD at [facility]        Family History:       Problem Relation Age of Onset    Hypertension Father     Heart Disease Father     Hypertension Paternal Grandfather     Heart Disease Paternal Grandfather         Social History:  unchanged.  4.  L4-L5 broad-based disc osteophyte complex with superimposed left  subarticular zone disc protrusion extending into the left neural foramen  similar to the previous examination causing severe left subarticular zone  stenosis with impingement of the traversing left L5 nerve with severe  left neural foraminal stenosis and probable impingement of the exiting  left L4 nerve.  These findings are similar to the previous exam.  5.  Stable moderate to severe right neural foraminal narrowing at L3-L4  secondary to asymmetric disc osteophyte complex to the right subarticular  zone and neural foramen.    Anatomic Lumbar Variant: None.  L4-5 is considered the level of the iliac  crest and assume there are 5 lumbar-type vertebrae.                        Transcribe Date/Time: Jul  3 2024  8:32A    Dictated by: GONZALO HERRERA MD    This examination was interpreted and the report reviewed and  electronically signed by:  GONZALO HERRERA MD on Jul  3 2024  8:53AM  EST        Thank you for allowing us to participate in the care of your patient.  Should there be any questions regarding this interpretation, please call  [phone].  If you are unable to reach us at the number above,  please feel free to contact [facility] at [phone].         Recent Labs     12/01/24  1458 12/02/24  0550 12/03/24  0348 12/04/24  0523   INR 1.4  --   --   --    ALT 18 17 20 21   AST 35* 42* 56* 47*   ALKPHOS 94 69 86 73   BILITOT 1.3* 0.6 0.7 0.7   BILIDIR 0.4*  --   --   --      Lab Results   Component Value Date    WBC 7.0 12/04/2024    HGB 7.6 (L) 12/04/2024    HCT 24.3 (L) 12/04/2024     (L) 12/03/2024     12/04/2024    K 3.2 (L) 12/04/2024     12/04/2024    CREATININE 0.8 12/04/2024    BUN 20 12/04/2024    CO2 21 (L) 12/04/2024    FOLATE 9.3 11/13/2023    DWCVUZEK44 1203 (H) 11/13/2023    AMMONIA 40.0 01/15/2019    GLUCOSE 205 (H) 12/04/2024    INR 1.4 12/01/2024    PROTIME 15.9 (H) 12/01/2024

## 2024-12-04 NOTE — PROGRESS NOTES
Vancomycin has been discontinued   Clinical Pharmacy to sign-off  Physician to re-consult pharmacy if future dosing is needed    Thank you for the consult,  Chika Mccoy PharmD, BCPS 12/4/2024 8:40 AM

## 2024-12-04 NOTE — PLAN OF CARE
Problem: Chronic Conditions and Co-morbidities  Goal: Patient's chronic conditions and co-morbidity symptoms are monitored and maintained or improved  12/4/2024 1818 by Luz Maria Rodriges RN  Outcome: Progressing  12/4/2024 0748 by Kaycee King RN  Outcome: Progressing     Problem: Discharge Planning  Goal: Discharge to home or other facility with appropriate resources  12/4/2024 1818 by Luz Maria Rodriges RN  Outcome: Progressing  12/4/2024 0748 by Kaycee King RN  Outcome: Progressing     Problem: Pain  Goal: Verbalizes/displays adequate comfort level or baseline comfort level  12/4/2024 1818 by Luz Maria Rodriges RN  Outcome: Progressing  12/4/2024 0748 by Kaycee King RN  Outcome: Progressing     Problem: Safety - Adult  Goal: Free from fall injury  12/4/2024 1818 by Luz Maria Rodriges RN  Outcome: Progressing  12/4/2024 0748 by Kaycee King RN  Outcome: Progressing     Problem: Skin/Tissue Integrity  Goal: Absence of new skin breakdown  Description: 1.  Monitor for areas of redness and/or skin breakdown  2.  Assess vascular access sites hourly  3.  Every 4-6 hours minimum:  Change oxygen saturation probe site  4.  Every 4-6 hours:  If on nasal continuous positive airway pressure, respiratory therapy assess nares and determine need for appliance change or resting period.  12/4/2024 1818 by Luz Maria Rodriges RN  Outcome: Progressing  12/4/2024 0748 by Kaycee King RN  Outcome: Progressing

## 2024-12-04 NOTE — CARE COORDINATION
Met with pt to discuss discharge planning/transition of care. Pt lives alone in a one story home about 3-4 steps to enter, hand rails on both sides. Pt states that she has a walker if needed, doesn't normally use. Pt does not drive, uses the [facility] in [place] for transport to/fro doctors office. Dr. Hu is PCP and goes to [facility] for medication needs. Pt currently on 5LO2, bedside RN weaning pt down. PT/OT pending. Current plan is home, no preference in DME company if O2 is needed, will need an ambulatory pulse ox. Will need transport home.Unique Jeff, MSW, LSW

## 2024-12-04 NOTE — PLAN OF CARE
Problem: Chronic Conditions and Co-morbidities  Goal: Patient's chronic conditions and co-morbidity symptoms are monitored and maintained or improved  12/3/2024 2259 by Sheryl Baeza RN  Outcome: Progressing  12/3/2024 1359 by Michelle Zaman RN  Outcome: Progressing     Problem: Discharge Planning  Goal: Discharge to home or other facility with appropriate resources  12/3/2024 2259 by Sheryl Baeza RN  Outcome: Progressing  12/3/2024 1359 by Michelle Zaman RN  Outcome: Progressing     Problem: Pain  Goal: Verbalizes/displays adequate comfort level or baseline comfort level  12/3/2024 2259 by Sheryl Baeza RN  Outcome: Progressing  12/3/2024 1359 by Michelle Zaman RN  Outcome: Progressing     Problem: Safety - Adult  Goal: Free from fall injury  12/3/2024 2259 by Sheryl Baeza RN  Outcome: Progressing  12/3/2024 1359 by Michelle Zaman RN  Outcome: Progressing     Problem: Skin/Tissue Integrity  Goal: Absence of new skin breakdown  Description: 1.  Monitor for areas of redness and/or skin breakdown  2.  Assess vascular access sites hourly  3.  Every 4-6 hours minimum:  Change oxygen saturation probe site  4.  Every 4-6 hours:  If on nasal continuous positive airway pressure, respiratory therapy assess nares and determine need for appliance change or resting period.  12/3/2024 2259 by Sheryl Baeza RN  Outcome: Progressing  12/3/2024 1359 by Michelle Zaman RN  Outcome: Progressing

## 2024-12-04 NOTE — PROGRESS NOTES
and rhythm with normal S1/S2 without murmurs, rubs or gallops.  Abdomen: Soft, non-tender, non-distended with normal bowel sounds.  Musculoskeletal: No clubbing, cyanosis or edema bilaterally. Brisk capillary refill. 2+radial pulses.   Skin:  No rashes    Neurologic: awake, alert and following commands    Medications:  Reviewed    Infusion Medications    sodium chloride      dextrose      sodium chloride       Scheduled Medications    cefTRIAXone (ROCEPHIN) IV  2,000 mg IntraVENous Q24H    psyllium husk-aspartame  1 packet Oral Daily    furosemide  20 mg IntraVENous Daily    pantoprazole  40 mg IntraVENous BID    FLUoxetine  60 mg Oral Daily    gabapentin  300 mg Oral TID    rOPINIRole  0.5 mg Oral BID    propranolol  10 mg Oral Daily    insulin glargine  15 Units SubCUTAneous Nightly    ferric gluconate (FERRLECIT) 125 mg in sodium chloride 0.9 % 100 mL IVPB  125 mg IntraVENous Daily    insulin lispro  5 Units SubCUTAneous TID WC    insulin lispro  0-8 Units SubCUTAneous 4x Daily AC & HS    sodium chloride flush  5-40 mL IntraVENous 2 times per day     PRN Meds: sodium chloride, loperamide, ipratropium 0.5 mg-albuterol 2.5 mg, sodium chloride, fluticasone, glucose, dextrose bolus **OR** dextrose bolus, glucagon (rDNA), dextrose, prochlorperazine, sodium chloride flush, sodium chloride, potassium chloride **OR** potassium alternative oral replacement **OR** potassium chloride, magnesium sulfate, polyethylene glycol, acetaminophen **OR** acetaminophen, melatonin    I/O    Intake/Output Summary (Last 24 hours) at 12/4/2024 0835  Last data filed at 12/3/2024 2009  Gross per 24 hour   Intake 0 ml   Output 3200 ml   Net -3200 ml       Labs:   Recent Labs     12/01/24  1458 12/02/24  0550 12/02/24  1943 12/03/24  0348 12/03/24  0937 12/04/24  0523   WBC 22.0* 23.5*  --  16.6*  --  7.0   HGB 7.2* 5.8*   < > 8.2* 9.1* 7.6*   HCT 24.6* 20.5*   < > 27.0* 29.4* 24.3*    108*  --  120*  --   --     < > = values in this

## 2024-12-04 NOTE — PROGRESS NOTES
Spiritual Health History and Assessment/Progress Note  [facility]    Initial Encounter, Spiritual/Emotional Needs,  ,  ,      Name: Haven Barber MRN: [id number]    Age: 74 y.o.     Sex: female   Language: English   Anab[employer]ism: [employer]   Sepsis with acute hypoxic respiratory failure without septic shock, due to unspecified organism (HCC)     Date: 12/4/2024                           Spiritual Assessment began in [facility]        Referral/Consult From: Rounding   Encounter Overview/Reason: Initial Encounter, Spiritual/Emotional Needs  Service Provided For: Patient    Mariza, Belief, Meaning:   Patient identifies as spiritual  Family/Friends identify as spiritual and No family/friends present      Importance and Influence:  Patient has spiritual/personal beliefs that influence decisions regarding their health  Family/Friends have spiritual/personal beliefs that influence decisions regarding the patient's health and No family/friends present    Community:  Patient is connected with a spiritual community  Family/Friends are connected with a spiritual community: and No family/friends present    Assessment and Plan of Care:     Patient Interventions include: Facilitated expression of thoughts and feelings, Affirmed coping skills/support systems, and Provided sacramental/Zoroastrianism ritual  Family/Friends Interventions include: Facilitated expression of thoughts and feelings, Affirmed coping skills/support systems, Provided sacramental/Zoroastrianism ritual, and No family/friends present    Patient Plan of Care: Spiritual Care available upon further referral  Family/Friends Plan of Care: Spiritual Care available upon further referral and No family/friends present    Electronically signed by Chaplain Aquilino on 12/4/2024 at 9:56 AM

## 2024-12-04 NOTE — PROGRESS NOTES
Physical Therapy  Physical Therapy Initial Assessment     Name: Haven Barber  : 1950  MRN: [id number]      Date of Service: 2024    Evaluating PT:  Cinthya Kathleen PT, DPT [id number]    Room #:  7423/7423-A  Diagnosis:  Acute cystitis without hematuria [N30.00]  Acute respiratory failure with hypoxia [J96.01]  Pneumonia of right lower lobe due to infectious organism [J18.9]  Sepsis with acute hypoxic respiratory failure without septic shock, due to unspecified organism (HCC) [A41.9, R65.20, J96.01]  PMHx/PSHx:   has a past medical history of Arthritis, Cataract, Chronic fatigue syndrome, Depression, Diabetes mellitus (HCC), Fibromyalgia, Hypertension, Memory loss, Mononucleosis, Psoriasis, Sciatica, Sleep apnea, and Vertigo.  Procedure/Surgery:  EGD (12/3/24)  Precautions:  Fall risk, alarms, O2, cognition  Equipment Needs:  TBD    SUBJECTIVE:    Pt lives alone in a 1 story home with 3-4 steps to enter and bilateral rail(s). Pt ambulated with no device PTA.    OBJECTIVE:   Initial Evaluation  Date: 24 Treatment Date:  Short Term/ Long Term   Goals   AM-PAC 6 Clicks      Was pt agreeable to Eval/treatment? Yes     Does pt have pain? Severe HA (RN notified)     Bed Mobility  Rolling: NT  Supine to sit: Vicki  Sit to supine: NT  Scooting: Vicki  Rolling: Independent  Supine to sit: Independent  Sit to supine: Independent  Scooting: Independent   Transfers Sit to stand: ModA  Stand to sit: ModA  Stand pivot: ModA with no device  Sit to stand: Independent  Stand to sit: Independent  Stand pivot: Mod I with AAD   Ambulation    3 feet to chair with no device and ModA  >150 feet with no device and Mod I   Stair negotiation: ascended and descended  NT  >4 steps with bilateral rail and Mod I   ROM BUE:  Refer to OT  BLE:  WFL     Strength BUE:  Refer to OT  BLE:  Not formally assessed     Balance Sitting EOB:  SBA  Dynamic Standing:  ModA with no device  Sitting EOB:  Independent  Dynamic Standing:  Mod I  contractures  [x] Safety and Education Training   [x] Patient/Caregiver Education   [x] HEP  [] Other     PT long term treatment goals are located in above grid    Frequency of treatments: 2-5x/week x 1-2 weeks.    Time in  1415  Time out  1438    Total Treatment Time  8 minutes     Evaluation Time includes thorough review of current medical information, gathering information on past medical history/social history and prior level of function, completion of standardized testing/informal observation of tasks, assessment of data and education on plan of care and goals.    CPT codes:  [x] Low Complexity PT evaluation 05836  [] Moderate Complexity PT evaluation 88763  [] High Complexity PT evaluation 80234  [] PT Re-evaluation 53062  [] Gait training 38286 0 minutes  [] Manual therapy 97227 0 minutes  [x] Therapeutic activities 01067 8 minutes  [] Therapeutic exercises 05905 0 minutes  [] Neuromuscular reeducation 90194 0 minutes     Cinthya Epp, PT, DPT  [id number]

## 2024-12-05 LAB
ALBUMIN SERPL-MCNC: 2.9 G/DL (ref 3.5–5.2)
ALP SERPL-CCNC: 79 U/L (ref 35–104)
ALT SERPL-CCNC: 20 U/L (ref 0–32)
ANION GAP SERPL CALCULATED.3IONS-SCNC: 8 MMOL/L (ref 7–16)
AST SERPL-CCNC: 43 U/L (ref 0–31)
BILIRUB SERPL-MCNC: 0.6 MG/DL (ref 0–1.2)
BUN SERPL-MCNC: 16 MG/DL (ref 6–23)
CALCIUM SERPL-MCNC: 8.2 MG/DL (ref 8.6–10.2)
CHLORIDE SERPL-SCNC: 104 MMOL/L (ref 98–107)
CO2 SERPL-SCNC: 28 MMOL/L (ref 22–29)
CREAT SERPL-MCNC: 0.7 MG/DL (ref 0.5–1)
ERYTHROCYTE [DISTWIDTH] IN BLOOD BY AUTOMATED COUNT: 19.8 % (ref 11.5–15)
GFR, ESTIMATED: 88 ML/MIN/1.73M2
GLUCOSE BLD-MCNC: 121 MG/DL (ref 74–99)
GLUCOSE BLD-MCNC: 150 MG/DL (ref 74–99)
GLUCOSE BLD-MCNC: 195 MG/DL (ref 74–99)
GLUCOSE BLD-MCNC: 224 MG/DL (ref 74–99)
GLUCOSE BLD-MCNC: 55 MG/DL (ref 74–99)
GLUCOSE SERPL-MCNC: 113 MG/DL (ref 74–99)
HCT VFR BLD AUTO: 25.8 % (ref 34–48)
HGB BLD-MCNC: 7.9 G/DL (ref 11.5–15.5)
MCH RBC QN AUTO: 24.8 PG (ref 26–35)
MCHC RBC AUTO-ENTMCNC: 30.6 G/DL (ref 32–34.5)
MCV RBC AUTO: 81.1 FL (ref 80–99.9)
MICROORGANISM SPEC CULT: ABNORMAL
MICROORGANISM SPEC CULT: ABNORMAL
PLATELET # BLD AUTO: 115 K/UL (ref 130–450)
PMV BLD AUTO: 10.9 FL (ref 7–12)
POTASSIUM SERPL-SCNC: 3.3 MMOL/L (ref 3.5–5)
PROT SERPL-MCNC: 5.6 G/DL (ref 6.4–8.3)
RBC # BLD AUTO: 3.18 M/UL (ref 3.5–5.5)
SERVICE CMNT-IMP: ABNORMAL
SODIUM SERPL-SCNC: 140 MMOL/L (ref 132–146)
SPECIMEN DESCRIPTION: ABNORMAL
WBC OTHER # BLD: 6.9 K/UL (ref 4.5–11.5)

## 2024-12-05 PROCEDURE — 94640 AIRWAY INHALATION TREATMENT: CPT

## 2024-12-05 PROCEDURE — 6370000000 HC RX 637 (ALT 250 FOR IP): Performed by: INTERNAL MEDICINE

## 2024-12-05 PROCEDURE — 82947 ASSAY GLUCOSE BLOOD QUANT: CPT

## 2024-12-05 PROCEDURE — 6360000002 HC RX W HCPCS: Performed by: INTERNAL MEDICINE

## 2024-12-05 PROCEDURE — 94660 CPAP INITIATION&MGMT: CPT

## 2024-12-05 PROCEDURE — 2060000000 HC ICU INTERMEDIATE R&B

## 2024-12-05 PROCEDURE — 85027 COMPLETE CBC AUTOMATED: CPT

## 2024-12-05 PROCEDURE — 2580000003 HC RX 258: Performed by: INTERNAL MEDICINE

## 2024-12-05 PROCEDURE — 2580000003 HC RX 258: Performed by: STUDENT IN AN ORGANIZED HEALTH CARE EDUCATION/TRAINING PROGRAM

## 2024-12-05 PROCEDURE — 6360000002 HC RX W HCPCS: Performed by: STUDENT IN AN ORGANIZED HEALTH CARE EDUCATION/TRAINING PROGRAM

## 2024-12-05 PROCEDURE — 99232 SBSQ HOSP IP/OBS MODERATE 35: CPT | Performed by: NURSE PRACTITIONER

## 2024-12-05 PROCEDURE — 6370000000 HC RX 637 (ALT 250 FOR IP): Performed by: STUDENT IN AN ORGANIZED HEALTH CARE EDUCATION/TRAINING PROGRAM

## 2024-12-05 PROCEDURE — 99232 SBSQ HOSP IP/OBS MODERATE 35: CPT | Performed by: INTERNAL MEDICINE

## 2024-12-05 PROCEDURE — 80053 COMPREHEN METABOLIC PANEL: CPT

## 2024-12-05 PROCEDURE — 2700000000 HC OXYGEN THERAPY PER DAY

## 2024-12-05 RX ORDER — INSULIN GLARGINE 100 [IU]/ML
20 INJECTION, SOLUTION SUBCUTANEOUS NIGHTLY
Status: DISCONTINUED | OUTPATIENT
Start: 2024-12-05 | End: 2024-12-09 | Stop reason: HOSPADM

## 2024-12-05 RX ORDER — INSULIN LISPRO 100 [IU]/ML
6 INJECTION, SOLUTION INTRAVENOUS; SUBCUTANEOUS
Status: DISCONTINUED | OUTPATIENT
Start: 2024-12-06 | End: 2024-12-09 | Stop reason: HOSPADM

## 2024-12-05 RX ADMIN — INSULIN LISPRO 4 UNITS: 100 INJECTION, SOLUTION INTRAVENOUS; SUBCUTANEOUS at 11:43

## 2024-12-05 RX ADMIN — POTASSIUM BICARBONATE 20 MEQ: 782 TABLET, EFFERVESCENT ORAL at 09:22

## 2024-12-05 RX ADMIN — INSULIN LISPRO 7 UNITS: 100 INJECTION, SOLUTION INTRAVENOUS; SUBCUTANEOUS at 15:53

## 2024-12-05 RX ADMIN — GABAPENTIN 300 MG: 300 CAPSULE ORAL at 14:23

## 2024-12-05 RX ADMIN — WATER 2000 MG: 1 INJECTION INTRAMUSCULAR; INTRAVENOUS; SUBCUTANEOUS at 10:44

## 2024-12-05 RX ADMIN — INSULIN GLARGINE 20 UNITS: 100 INJECTION, SOLUTION SUBCUTANEOUS at 21:06

## 2024-12-05 RX ADMIN — IPRATROPIUM BROMIDE AND ALBUTEROL SULFATE 1 DOSE: 2.5; .5 SOLUTION RESPIRATORY (INHALATION) at 16:39

## 2024-12-05 RX ADMIN — INSULIN LISPRO 7 UNITS: 100 INJECTION, SOLUTION INTRAVENOUS; SUBCUTANEOUS at 11:43

## 2024-12-05 RX ADMIN — SODIUM CHLORIDE, PRESERVATIVE FREE 10 ML: 5 INJECTION INTRAVENOUS at 09:23

## 2024-12-05 RX ADMIN — GABAPENTIN 300 MG: 300 CAPSULE ORAL at 21:06

## 2024-12-05 RX ADMIN — TRAMADOL HYDROCHLORIDE 50 MG: 50 TABLET, COATED ORAL at 05:18

## 2024-12-05 RX ADMIN — PANTOPRAZOLE SODIUM 40 MG: 40 INJECTION, POWDER, FOR SOLUTION INTRAVENOUS at 21:06

## 2024-12-05 RX ADMIN — POTASSIUM CHLORIDE 40 MEQ: 1500 TABLET, EXTENDED RELEASE ORAL at 15:52

## 2024-12-05 RX ADMIN — ROPINIROLE HYDROCHLORIDE 0.5 MG: 0.5 TABLET, FILM COATED ORAL at 09:21

## 2024-12-05 RX ADMIN — GABAPENTIN 300 MG: 300 CAPSULE ORAL at 09:21

## 2024-12-05 RX ADMIN — IPRATROPIUM BROMIDE AND ALBUTEROL SULFATE 1 DOSE: 2.5; .5 SOLUTION RESPIRATORY (INHALATION) at 20:17

## 2024-12-05 RX ADMIN — ROPINIROLE HYDROCHLORIDE 0.5 MG: 0.5 TABLET, FILM COATED ORAL at 21:06

## 2024-12-05 RX ADMIN — INSULIN LISPRO 2 UNITS: 100 INJECTION, SOLUTION INTRAVENOUS; SUBCUTANEOUS at 21:13

## 2024-12-05 RX ADMIN — PROPRANOLOL HYDROCHLORIDE 10 MG: 10 TABLET ORAL at 09:22

## 2024-12-05 RX ADMIN — IPRATROPIUM BROMIDE AND ALBUTEROL SULFATE 1 DOSE: 2.5; .5 SOLUTION RESPIRATORY (INHALATION) at 12:46

## 2024-12-05 RX ADMIN — AZITHROMYCIN DIHYDRATE 500 MG: 250 TABLET ORAL at 09:22

## 2024-12-05 RX ADMIN — IPRATROPIUM BROMIDE AND ALBUTEROL SULFATE 1 DOSE: 2.5; .5 SOLUTION RESPIRATORY (INHALATION) at 08:37

## 2024-12-05 RX ADMIN — PANTOPRAZOLE SODIUM 40 MG: 40 INJECTION, POWDER, FOR SOLUTION INTRAVENOUS at 09:22

## 2024-12-05 RX ADMIN — Medication 400 MG: at 09:21

## 2024-12-05 RX ADMIN — SODIUM CHLORIDE, PRESERVATIVE FREE 10 ML: 5 INJECTION INTRAVENOUS at 21:06

## 2024-12-05 RX ADMIN — GUAIFENESIN SYRUP AND DEXTROMETHORPHAN 5 ML: 100; 10 SYRUP ORAL at 15:52

## 2024-12-05 RX ADMIN — TRAMADOL HYDROCHLORIDE 50 MG: 50 TABLET, COATED ORAL at 21:13

## 2024-12-05 RX ADMIN — FLUOXETINE HYDROCHLORIDE 60 MG: 20 CAPSULE ORAL at 09:20

## 2024-12-05 ASSESSMENT — PAIN DESCRIPTION - LOCATION
LOCATION: HEAD
LOCATION: GENERALIZED

## 2024-12-05 ASSESSMENT — PAIN - FUNCTIONAL ASSESSMENT
PAIN_FUNCTIONAL_ASSESSMENT: PREVENTS OR INTERFERES SOME ACTIVE ACTIVITIES AND ADLS
PAIN_FUNCTIONAL_ASSESSMENT: ACTIVITIES ARE NOT PREVENTED

## 2024-12-05 ASSESSMENT — PAIN SCALES - GENERAL
PAINLEVEL_OUTOF10: 0
PAINLEVEL_OUTOF10: 8
PAINLEVEL_OUTOF10: 0
PAINLEVEL_OUTOF10: 6

## 2024-12-05 ASSESSMENT — PAIN SCALES - WONG BAKER
WONGBAKER_NUMERICALRESPONSE: NO HURT
WONGBAKER_NUMERICALRESPONSE: NO HURT

## 2024-12-05 ASSESSMENT — PAIN DESCRIPTION - ORIENTATION
ORIENTATION: LEFT
ORIENTATION: OTHER (COMMENT)

## 2024-12-05 ASSESSMENT — PAIN DESCRIPTION - DESCRIPTORS
DESCRIPTORS: ACHING
DESCRIPTORS: DISCOMFORT;ACHING

## 2024-12-05 NOTE — CARE COORDINATION
Reviewed chart, PT 12/24 3ft OT 14/24. Met with pt to discuss discharge planning/transition of care. Reviewed therapy scores, pt is agreeable for [facility]. Gave list of highlighted [employer] facilities, await review and choices. Envelope and ambullete form in soft chart. Will need a PASRR once accepting facility. Pt wants to speak to a friend Evette [phone], called Evette and called and left message for for a return call for choices.  3:20pm received call back from Evette, she is a NP at [facility] and NP at [facility] palliative. Pt has been at [facility] and that is the recommendation. Referral to Madeline at [facility] via message, await assessment and acceptance. [facility] to start precert if accepted.Unique Jeff, MSW, LSW

## 2024-12-05 NOTE — PROGRESS NOTES
Date: 12/4/2024    Time: 10:56 PM    Patient Placed On BIPAP/CPAP/ Non-Invasive Ventilation?  Yes    If no must comment.  Facial area red/color change? No           If YES are Blister/Lesion present?No   If yes must notify nursing staff  BIPAP/CPAP skin barrier?  Yes    Skin barrier type:mepilexlite       Comments:        Jennifer Miller RCP

## 2024-12-05 NOTE — PROGRESS NOTES
Patient original glucometer reading 55. Patient was asymptomatic and states \"There is no way my sugar is that low\". This RN rechecked blood glucose and new reading is 121.

## 2024-12-05 NOTE — PLAN OF CARE
Problem: Chronic Conditions and Co-morbidities  Goal: Patient's chronic conditions and co-morbidity symptoms are monitored and maintained or improved  12/4/2024 2300 by Maricel Chance RN  Outcome: Progressing  12/4/2024 1818 by Luz Maria Rodriges RN  Outcome: Progressing     Problem: Discharge Planning  Goal: Discharge to home or other facility with appropriate resources  12/4/2024 2300 by Maricel Chance RN  Outcome: Progressing  12/4/2024 1818 by Luz Maria Rodriges RN  Outcome: Progressing     Problem: Pain  Goal: Verbalizes/displays adequate comfort level or baseline comfort level  12/4/2024 2300 by Maricel Chance RN  Outcome: Progressing  12/4/2024 1818 by Luz Maria Rodriges RN  Outcome: Progressing     Problem: Safety - Adult  Goal: Free from fall injury  12/4/2024 2300 by Maricel Chance RN  Outcome: Progressing  12/4/2024 1818 by Luz Maria Rodriges RN  Outcome: Progressing     Problem: Skin/Tissue Integrity  Goal: Absence of new skin breakdown  Description: 1.  Monitor for areas of redness and/or skin breakdown  2.  Assess vascular access sites hourly  3.  Every 4-6 hours minimum:  Change oxygen saturation probe site  4.  Every 4-6 hours:  If on nasal continuous positive airway pressure, respiratory therapy assess nares and determine need for appliance change or resting period.  12/4/2024 2300 by Maricel Chance RN  Outcome: Progressing  12/4/2024 1818 by Luz Maria Rodriges RN  Outcome: Progressing

## 2024-12-05 NOTE — PLAN OF CARE
Problem: Chronic Conditions and Co-morbidities  Goal: Patient's chronic conditions and co-morbidity symptoms are monitored and maintained or improved  Outcome: Progressing  Flowsheets (Taken 12/5/2024 0815)  Care Plan - Patient's Chronic Conditions and Co-Morbidity Symptoms are Monitored and Maintained or Improved: Monitor and assess patient's chronic conditions and comorbid symptoms for stability, deterioration, or improvement     Problem: Discharge Planning  Goal: Discharge to home or other facility with appropriate resources  Outcome: Progressing  Flowsheets (Taken 12/5/2024 0815)  Discharge to home or other facility with appropriate resources: Identify barriers to discharge with patient and caregiver     Problem: Pain  Goal: Verbalizes/displays adequate comfort level or baseline comfort level  Outcome: Progressing  Flowsheets (Taken 12/5/2024 0715)  Verbalizes/displays adequate comfort level or baseline comfort level: Encourage patient to monitor pain and request assistance     Problem: Safety - Adult  Goal: Free from fall injury  Outcome: Progressing     Problem: Skin/Tissue Integrity  Goal: Absence of new skin breakdown  Description: 1.  Monitor for areas of redness and/or skin breakdown  2.  Assess vascular access sites hourly  3.  Every 4-6 hours minimum:  Change oxygen saturation probe site  4.  Every 4-6 hours:  If on nasal continuous positive airway pressure, respiratory therapy assess nares and determine need for appliance change or resting period.  Outcome: Progressing

## 2024-12-05 NOTE — PROGRESS NOTES
Gastroenterology, Hepatology, &  Advanced Endoscopy    Progress Note      Reason for Consult: Anemia    HPI:   Haven Barber is a 74 y.o. female w/ PMH of  has a past medical history of Arthritis, Cataract, Chronic fatigue syndrome, Depression, Diabetes mellitus (HCC), Fibromyalgia, Hypertension, Memory loss, Mononucleosis, Psoriasis, Sciatica, Sleep apnea, and Vertigo. who presents to the ED  for near syncope.  Acute onset this morning.  Patient states that she has a history of GAVE, and has chronic intermittent GI bleeding with reported AVM's.  Does not drink alcohol. Has a GI doctor at [facility].       PMH:       Diagnosis Date    Arthritis     Cataract     right    Chronic fatigue syndrome     Depression     Diabetes mellitus (HCC)     SC injection weekly    Fibromyalgia     Hypertension     Memory loss     Mononucleosis     Psoriasis     Sciatica     Sleep apnea     no c-pap    Vertigo         PSH:       Procedure Laterality Date    CARPAL TUNNEL RELEASE Bilateral     CATARACT REMOVAL Right     COLONOSCOPY      OTHER SURGICAL HISTORY Bilateral 01/21/2021    BILATERAL L3,L4 MEDIAL BRANCH AND L5 DORSAL RAMUS RADIOFREQUENCY    PAIN MANAGEMENT PROCEDURE Bilateral 01/21/2021    BILATERAL L3,4 MEDIAL BRANCH AND L5 DORSAL RAMUS  RADIOFREQUENCY ABLATION WITH SEDATION  (CPT 38398) performed by Silas Quigley MD at [facility] OR    SPINE SURGERY N/A 03/19/2021    T11 KYPHOPLASTY (2 C-ARMS) performed by Silas Quigley MD at [facility] OR    TONSILLECTOMY AND ADENOIDECTOMY      UPPER GASTROINTESTINAL ENDOSCOPY N/A 01/16/2019    EGD BIOPSY performed by Katheryn Mooney MD at [facility] ENDOSCOPY    UPPER GASTROINTESTINAL ENDOSCOPY N/A 03/10/2021    EGD ESOPHAGOGASTRODUODENOSCOPY performed by Ar Braun MD at [facility] ENDOSCOPY        Family History:       Problem Relation Age of Onset    Hypertension Father     Heart Disease Father     Hypertension Paternal Grandfather     Heart Disease Paternal Grandfather         Social History:

## 2024-12-05 NOTE — PROGRESS NOTES
Hospitalist Progress Note      Synopsis: Patient admitted on 12/1/2024 73 yo female w/ hx of GAVE, DM, hepatic steatosis/cirrhosis, HTN, AVM of colon, WARNER, sciatica/back pain, who p/w an episode of presyncope after having a bloody BM today. Pt appeared well in the ED with a UTI and WBC 22k, but pt then subsequently developed  tachypnea, tachycardia, and worsening respiratory status, prompting a CTA Chest. At the time of evaluation, pt is on BiPAP and history is difficult to obtain/limited due to pt's clinical condition. Pt denies CP, dyspnea, abd pain today. Did report dysuria to ED Provider (per ED Provider's report).         ASSESSMENT:    Principal Problem:    Sepsis with acute hypoxic respiratory failure without septic shock, due to unspecified organism (HCC)  Active Problems:    Gastrointestinal hemorrhage associated with angiodysplasia of stomach and duodenum    Acute blood loss anemia    GAVE (gastric antral vascular ectasia)    Hepatic cirrhosis (HCC)    Acute cystitis with hematuria    Pneumonia of right middle lobe due to infectious organism    Lactic acidosis    Type 2 diabetes mellitus with hyperglycemia (HCC)    Melena    Acute respiratory failure with hypoxia  Resolved Problems:    * No resolved hospital problems. *       PLAN:    Sepsis 2/2 UTI vs PNA / Lactic Acidosis: start Rocephin/Doxy/Vanc. Bcx/Ucx pending. Will collect SpCx, ESR/CRP/Procal, MRSA nasal, urine strep/legionella. Resp viral panel negative. Lactate noted to be 7.5, IVF bolus + maintenance IVF's and monitor. Continue BiPAP and wean as able.  Patient notes she has had some increased frequency.  Notes worsening cough   monitor cultures.  Procalcitonin elevated 2.6, 19.6, down to 5 with antibiotics indicating appropriate treatment   Continue course with Rocephin and azithromycin.  Patient can transition to cefdinir with discharge.     GI Bleed: hx of GAVE w/ chronic WARNER and multiple transfusion. Hgb 7.2.  Patient is established with

## 2024-12-06 ENCOUNTER — ANESTHESIA (OUTPATIENT)
Dept: ENDOSCOPY | Age: 74
End: 2024-12-06
Payer: MEDICARE

## 2024-12-06 ENCOUNTER — ANESTHESIA EVENT (OUTPATIENT)
Dept: ENDOSCOPY | Age: 74
End: 2024-12-06
Payer: MEDICARE

## 2024-12-06 LAB
ERYTHROCYTE [DISTWIDTH] IN BLOOD BY AUTOMATED COUNT: 19.8 % (ref 11.5–15)
GLUCOSE BLD-MCNC: 154 MG/DL (ref 74–99)
GLUCOSE BLD-MCNC: 158 MG/DL (ref 74–99)
GLUCOSE BLD-MCNC: 163 MG/DL (ref 74–99)
HCT VFR BLD AUTO: 25.9 % (ref 34–48)
HGB BLD-MCNC: 7.9 G/DL (ref 11.5–15.5)
MCH RBC QN AUTO: 24.7 PG (ref 26–35)
MCHC RBC AUTO-ENTMCNC: 30.5 G/DL (ref 32–34.5)
MCV RBC AUTO: 80.9 FL (ref 80–99.9)
MICROORGANISM SPEC CULT: NORMAL
MICROORGANISM SPEC CULT: NORMAL
PLATELET, FLUORESCENCE: 133 K/UL (ref 130–450)
PMV BLD AUTO: 11.6 FL (ref 7–12)
RBC # BLD AUTO: 3.2 M/UL (ref 3.5–5.5)
SERVICE CMNT-IMP: NORMAL
SERVICE CMNT-IMP: NORMAL
SPECIMEN DESCRIPTION: NORMAL
SPECIMEN DESCRIPTION: NORMAL
WBC OTHER # BLD: 7.6 K/UL (ref 4.5–11.5)

## 2024-12-06 PROCEDURE — 3700000001 HC ADD 15 MINUTES (ANESTHESIA): Performed by: STUDENT IN AN ORGANIZED HEALTH CARE EDUCATION/TRAINING PROGRAM

## 2024-12-06 PROCEDURE — 6370000000 HC RX 637 (ALT 250 FOR IP): Performed by: INTERNAL MEDICINE

## 2024-12-06 PROCEDURE — 2060000000 HC ICU INTERMEDIATE R&B

## 2024-12-06 PROCEDURE — 94664 DEMO&/EVAL PT USE INHALER: CPT

## 2024-12-06 PROCEDURE — 82947 ASSAY GLUCOSE BLOOD QUANT: CPT

## 2024-12-06 PROCEDURE — 2720000010 HC SURG SUPPLY STERILE: Performed by: STUDENT IN AN ORGANIZED HEALTH CARE EDUCATION/TRAINING PROGRAM

## 2024-12-06 PROCEDURE — 6360000002 HC RX W HCPCS: Performed by: STUDENT IN AN ORGANIZED HEALTH CARE EDUCATION/TRAINING PROGRAM

## 2024-12-06 PROCEDURE — 85027 COMPLETE CBC AUTOMATED: CPT

## 2024-12-06 PROCEDURE — 7100000001 HC PACU RECOVERY - ADDTL 15 MIN: Performed by: STUDENT IN AN ORGANIZED HEALTH CARE EDUCATION/TRAINING PROGRAM

## 2024-12-06 PROCEDURE — 3609014100 HC ENTEROSCOPY > 2ND PRTN W/CONTROL BLEEDING: Performed by: STUDENT IN AN ORGANIZED HEALTH CARE EDUCATION/TRAINING PROGRAM

## 2024-12-06 PROCEDURE — 6370000000 HC RX 637 (ALT 250 FOR IP): Performed by: STUDENT IN AN ORGANIZED HEALTH CARE EDUCATION/TRAINING PROGRAM

## 2024-12-06 PROCEDURE — 7100000000 HC PACU RECOVERY - FIRST 15 MIN: Performed by: STUDENT IN AN ORGANIZED HEALTH CARE EDUCATION/TRAINING PROGRAM

## 2024-12-06 PROCEDURE — 6360000002 HC RX W HCPCS

## 2024-12-06 PROCEDURE — 3609014000 HC ENTEROSCOPY BIOPSY: Performed by: STUDENT IN AN ORGANIZED HEALTH CARE EDUCATION/TRAINING PROGRAM

## 2024-12-06 PROCEDURE — 94640 AIRWAY INHALATION TREATMENT: CPT

## 2024-12-06 PROCEDURE — 3700000000 HC ANESTHESIA ATTENDED CARE: Performed by: STUDENT IN AN ORGANIZED HEALTH CARE EDUCATION/TRAINING PROGRAM

## 2024-12-06 PROCEDURE — 2709999900 HC NON-CHARGEABLE SUPPLY: Performed by: STUDENT IN AN ORGANIZED HEALTH CARE EDUCATION/TRAINING PROGRAM

## 2024-12-06 PROCEDURE — 2580000003 HC RX 258

## 2024-12-06 PROCEDURE — 88305 TISSUE EXAM BY PATHOLOGIST: CPT

## 2024-12-06 PROCEDURE — 2580000003 HC RX 258: Performed by: INTERNAL MEDICINE

## 2024-12-06 PROCEDURE — 99232 SBSQ HOSP IP/OBS MODERATE 35: CPT | Performed by: INTERNAL MEDICINE

## 2024-12-06 PROCEDURE — 0DB78ZX EXCISION OF STOMACH, PYLORUS, VIA NATURAL OR ARTIFICIAL OPENING ENDOSCOPIC, DIAGNOSTIC: ICD-10-PCS | Performed by: STUDENT IN AN ORGANIZED HEALTH CARE EDUCATION/TRAINING PROGRAM

## 2024-12-06 PROCEDURE — 88342 IMHCHEM/IMCYTCHM 1ST ANTB: CPT

## 2024-12-06 PROCEDURE — 2580000003 HC RX 258: Performed by: STUDENT IN AN ORGANIZED HEALTH CARE EDUCATION/TRAINING PROGRAM

## 2024-12-06 PROCEDURE — 2700000000 HC OXYGEN THERAPY PER DAY

## 2024-12-06 PROCEDURE — 6360000002 HC RX W HCPCS: Performed by: INTERNAL MEDICINE

## 2024-12-06 PROCEDURE — 0W3P8ZZ CONTROL BLEEDING IN GASTROINTESTINAL TRACT, VIA NATURAL OR ARTIFICIAL OPENING ENDOSCOPIC: ICD-10-PCS | Performed by: STUDENT IN AN ORGANIZED HEALTH CARE EDUCATION/TRAINING PROGRAM

## 2024-12-06 PROCEDURE — 94660 CPAP INITIATION&MGMT: CPT

## 2024-12-06 RX ORDER — SODIUM CHLORIDE 9 MG/ML
INJECTION, SOLUTION INTRAVENOUS
Status: DISCONTINUED | OUTPATIENT
Start: 2024-12-06 | End: 2024-12-06 | Stop reason: SDUPTHER

## 2024-12-06 RX ORDER — LIDOCAINE HYDROCHLORIDE 20 MG/ML
INJECTION, SOLUTION INTRAVENOUS
Status: DISCONTINUED | OUTPATIENT
Start: 2024-12-06 | End: 2024-12-06 | Stop reason: SDUPTHER

## 2024-12-06 RX ORDER — SODIUM CHLORIDE 9 MG/ML
INJECTION, SOLUTION INTRAVENOUS CONTINUOUS
Status: DISCONTINUED | OUTPATIENT
Start: 2024-12-06 | End: 2024-12-07

## 2024-12-06 RX ORDER — PROPOFOL 10 MG/ML
INJECTION, EMULSION INTRAVENOUS
Status: DISCONTINUED | OUTPATIENT
Start: 2024-12-06 | End: 2024-12-06 | Stop reason: SDUPTHER

## 2024-12-06 RX ADMIN — PSYLLIUM HUSK 1 PACKET: 3.4 POWDER ORAL at 09:12

## 2024-12-06 RX ADMIN — SODIUM CHLORIDE: 9 INJECTION, SOLUTION INTRAVENOUS at 18:51

## 2024-12-06 RX ADMIN — SODIUM CHLORIDE: 9 INJECTION, SOLUTION INTRAVENOUS at 17:07

## 2024-12-06 RX ADMIN — PANTOPRAZOLE SODIUM 40 MG: 40 INJECTION, POWDER, FOR SOLUTION INTRAVENOUS at 20:45

## 2024-12-06 RX ADMIN — GABAPENTIN 300 MG: 300 CAPSULE ORAL at 20:45

## 2024-12-06 RX ADMIN — SODIUM CHLORIDE, PRESERVATIVE FREE 10 ML: 5 INJECTION INTRAVENOUS at 20:45

## 2024-12-06 RX ADMIN — PROPRANOLOL HYDROCHLORIDE 10 MG: 10 TABLET ORAL at 09:27

## 2024-12-06 RX ADMIN — AZITHROMYCIN DIHYDRATE 500 MG: 250 TABLET ORAL at 09:25

## 2024-12-06 RX ADMIN — IPRATROPIUM BROMIDE AND ALBUTEROL SULFATE 1 DOSE: 2.5; .5 SOLUTION RESPIRATORY (INHALATION) at 21:11

## 2024-12-06 RX ADMIN — LIDOCAINE HYDROCHLORIDE 40 MG: 20 INJECTION, SOLUTION INTRAVENOUS at 17:12

## 2024-12-06 RX ADMIN — GABAPENTIN 300 MG: 300 CAPSULE ORAL at 13:42

## 2024-12-06 RX ADMIN — PANTOPRAZOLE SODIUM 40 MG: 40 INJECTION, POWDER, FOR SOLUTION INTRAVENOUS at 09:12

## 2024-12-06 RX ADMIN — GUAIFENESIN SYRUP AND DEXTROMETHORPHAN 5 ML: 100; 10 SYRUP ORAL at 02:47

## 2024-12-06 RX ADMIN — WATER 2000 MG: 1 INJECTION INTRAMUSCULAR; INTRAVENOUS; SUBCUTANEOUS at 09:12

## 2024-12-06 RX ADMIN — TRAMADOL HYDROCHLORIDE 50 MG: 50 TABLET, COATED ORAL at 04:23

## 2024-12-06 RX ADMIN — PROPOFOL 150 MG: 10 INJECTION, EMULSION INTRAVENOUS at 17:12

## 2024-12-06 RX ADMIN — SODIUM CHLORIDE 125 MG: 9 INJECTION, SOLUTION INTRAVENOUS at 09:26

## 2024-12-06 RX ADMIN — FLUOXETINE HYDROCHLORIDE 60 MG: 20 CAPSULE ORAL at 09:11

## 2024-12-06 RX ADMIN — Medication 400 MG: at 09:11

## 2024-12-06 RX ADMIN — SODIUM CHLORIDE, PRESERVATIVE FREE 10 ML: 5 INJECTION INTRAVENOUS at 09:28

## 2024-12-06 RX ADMIN — ROPINIROLE HYDROCHLORIDE 0.5 MG: 0.5 TABLET, FILM COATED ORAL at 09:27

## 2024-12-06 RX ADMIN — IPRATROPIUM BROMIDE AND ALBUTEROL SULFATE 1 DOSE: 2.5; .5 SOLUTION RESPIRATORY (INHALATION) at 12:57

## 2024-12-06 RX ADMIN — GABAPENTIN 300 MG: 300 CAPSULE ORAL at 09:11

## 2024-12-06 RX ADMIN — IPRATROPIUM BROMIDE AND ALBUTEROL SULFATE 1 DOSE: 2.5; .5 SOLUTION RESPIRATORY (INHALATION) at 08:52

## 2024-12-06 RX ADMIN — ROPINIROLE HYDROCHLORIDE 0.5 MG: 0.5 TABLET, FILM COATED ORAL at 20:45

## 2024-12-06 RX ADMIN — POTASSIUM CHLORIDE 40 MEQ: 1500 TABLET, EXTENDED RELEASE ORAL at 09:32

## 2024-12-06 RX ADMIN — TRAMADOL HYDROCHLORIDE 50 MG: 50 TABLET, COATED ORAL at 10:45

## 2024-12-06 ASSESSMENT — LIFESTYLE VARIABLES: SMOKING_STATUS: 0

## 2024-12-06 ASSESSMENT — PAIN SCALES - GENERAL
PAINLEVEL_OUTOF10: 0
PAINLEVEL_OUTOF10: 6
PAINLEVEL_OUTOF10: 8
PAINLEVEL_OUTOF10: 6
PAINLEVEL_OUTOF10: 0

## 2024-12-06 ASSESSMENT — PAIN DESCRIPTION - DESCRIPTORS
DESCRIPTORS: ACHING;DISCOMFORT;DULL
DESCRIPTORS: ACHING;DISCOMFORT

## 2024-12-06 ASSESSMENT — PAIN SCALES - WONG BAKER: WONGBAKER_NUMERICALRESPONSE: NO HURT

## 2024-12-06 ASSESSMENT — PAIN DESCRIPTION - LOCATION
LOCATION: BACK
LOCATION: HEAD

## 2024-12-06 ASSESSMENT — PAIN DESCRIPTION - ORIENTATION: ORIENTATION: OTHER (COMMENT)

## 2024-12-06 ASSESSMENT — PAIN - FUNCTIONAL ASSESSMENT: PAIN_FUNCTIONAL_ASSESSMENT: ACTIVITIES ARE NOT PREVENTED

## 2024-12-06 NOTE — PLAN OF CARE
Problem: Chronic Conditions and Co-morbidities  Goal: Patient's chronic conditions and co-morbidity symptoms are monitored and maintained or improved  12/5/2024 2235 by Wilbert Fu RN  Outcome: Progressing  Flowsheets (Taken 12/5/2024 0815 by Luz Maria Rodriges, RN)  Care Plan - Patient's Chronic Conditions and Co-Morbidity Symptoms are Monitored and Maintained or Improved: Monitor and assess patient's chronic conditions and comorbid symptoms for stability, deterioration, or improvement  12/5/2024 1708 by Luz Maria Rodriges RN  Outcome: Progressing  Flowsheets (Taken 12/5/2024 0815)  Care Plan - Patient's Chronic Conditions and Co-Morbidity Symptoms are Monitored and Maintained or Improved: Monitor and assess patient's chronic conditions and comorbid symptoms for stability, deterioration, or improvement     Problem: Discharge Planning  Goal: Discharge to home or other facility with appropriate resources  12/5/2024 2235 by Wilbert Fu RN  Outcome: Progressing  Flowsheets (Taken 12/5/2024 0815 by Luz Maria oRdriges, RN)  Discharge to home or other facility with appropriate resources: Identify barriers to discharge with patient and caregiver  12/5/2024 1708 by Luz Maria Rodriges RN  Outcome: Progressing  Flowsheets (Taken 12/5/2024 0815)  Discharge to home or other facility with appropriate resources: Identify barriers to discharge with patient and caregiver     Problem: Pain  Goal: Verbalizes/displays adequate comfort level or baseline comfort level  12/5/2024 2235 by Wilbert Fu RN  Outcome: Progressing  Flowsheets (Taken 12/5/2024 0715 by Luz Maria Rodriges, RN)  Verbalizes/displays adequate comfort level or baseline comfort level: Encourage patient to monitor pain and request assistance  12/5/2024 1708 by Luz Maira Rodriges, RN  Outcome: Progressing  Flowsheets (Taken 12/5/2024 0715)  Verbalizes/displays adequate comfort level or baseline comfort level: Encourage patient to monitor pain and request assistance     Problem: Safety -

## 2024-12-06 NOTE — PROGRESS NOTES
OT SESSION ATTEMPT     Date:2024  Patient Name: Haven Barber  MRN: [id number]  : 1950  Room: [address] [place] [address]-A     Occupational therapy orders received/chart review completed and OT session attempted this date:    [] unavailable due to other medical staff currently with pt   [] on hold, await MRI/ neurosurgical recommendations.   [] on hold per nursing staff secondary to lab / radiology results    [x] Pt declined Occupational Therapy  this date stating \"I really don't feel like it\".  Benefits of participation in therapy reviewed with pt. RN aware.   [] off unit   [] Other:     Will reattempt OT tx at a later time/date.    Eliana Bernardo OTR/L; [id number]

## 2024-12-06 NOTE — ANESTHESIA PRE PROCEDURE
results found for: \"HIV\", \"HEPCAB\"    COVID-19 Screening (If Applicable):   Lab Results   Component Value Date/Time    COVID19 Not Detected 12/01/2024 02:58 PM           Anesthesia Evaluation  Patient summary reviewed and Nursing notes reviewed   no history of anesthetic complications:   Airway: Mallampati: II          Dental: normal exam         Pulmonary:   (+) pneumonia: unresolved,  COPD:    sleep apnea:   decreased breath sounds        (-) not a current smoker (former)                          ROS comment: 2l nc   Cardiovascular:    (+) hypertension:      ECG reviewed  Rhythm: regular  Rate: normal                    Neuro/Psych:   (+) neuromuscular disease (sciatica, fibromyalgia):depression/anxiety              ROS comment: Memory loss GI/Hepatic/Renal:   (+) GERD:, liver disease (cirrhosis):, renal disease (UTI with sepsis):         ROS comment: GAVE (gastric antral vascular ectasia).   Endo/Other:    (+) DiabetesType II DM, blood dyscrasia: anemia, arthritis: OA., electrolyte abnormalities (k 3.3).                  ROS comment: Psoriasis     admitted with Sepsis with acute hypoxic respiratory failure without septic   shock   Abdominal:   (+) obese    Abdomen: soft.      Vascular:          Other Findings:             Anesthesia Plan      MAC     ASA 4       Induction: intravenous.      Anesthetic plan and risks discussed with patient.      Plan discussed with attending.                    Alexsandra Siu, MORALES - CRNA   12/6/2024

## 2024-12-06 NOTE — PROGRESS NOTES
Physician Progress Note      PATIENT:               ALVARO AKINS  CSN #:                  [id number]  :                       1950  ADMIT DATE:       2024 2:28 PM  DISCH DATE:  RESPONDING  PROVIDER #:        Damian Vines MD          QUERY TEXT:    Pt admitted with Sepsis with acute hypoxic respiratory failure without septic   shock. Noted to have Urine culture positive for E. Coli and Klebsiella   Pneumoniae. If possible, please document in progress notes and discharge   summary if the source of sepsis is:    The medical record reflects the following:  Risk Factors: sepsis, acute hypoxic respiratory failure, pneumonia  Clinical Indicators: H&P: \"Sepsis with acute hypoxic respiratory failure   without septic shock.\"; On admission:  HR , RR 16-36, SpO2 88% RA,   Lactic acid 7.5, Procalcitonin 2.6-19.56, CRP 65, WBC 22-23.5; UC positive for   E. Coli and Klebseilla Pneumoniae  Treatment: IVFB 1L, LR IVF, IV Rocephin, IV Doxycycline, IV Vanco, Duonebs,   labs and moitoring    Thank you,  Diana Henriquez   Clinical Documentation Improvement Specialist  W: [phone]  Options provided:  -- Severe sepsis without septic shock due to E. Coli and Klebsiella Pneumoniae   associated with organ dysfunction of Acute hypoxic respiratory failure   present on admission  -- Other - I will add my own diagnosis  -- Disagree - Not applicable / Not valid  -- Disagree - Clinically unable to determine / Unknown  -- Refer to Clinical Documentation Reviewer    PROVIDER RESPONSE TEXT:    This patient has Severe sepsis without septic shock due to E. Coli and   Klebsiella Pneumoniae associated with organ dysfunction of Acute hypoxic   respiratory failure present on admission    Query created by: Diana Henriquez on 2024 11:32 AM      Electronically signed by:  Damian Vines MD 2024 2:37 PM

## 2024-12-06 NOTE — ANESTHESIA POSTPROCEDURE EVALUATION
Department of Anesthesiology  Postprocedure Note    Patient: Haven Barber  MRN: [id number]  YOB: 1950  Date of evaluation: 12/6/2024    Procedure Summary       Date: 12/06/24 Room / Location: [place] [address] / [facility]    Anesthesia Start: 1707 Anesthesia Stop:     Procedures:       ENTEROSCOPY PUSH BIOPSY      ENTEROSCOPY PUSH CONTROL HEMORRHAGE Diagnosis:       Anemia, unspecified type      (Anemia, unspecified type [D64.9])    Surgeons: Mansoor Tyson MD Responsible Provider: Kala Harding MD    Anesthesia Type: MAC ASA Status: 4            Anesthesia Type: No value filed.    Kelly Phase I: Kelly Score: 9    Kelly Phase II:      Anesthesia Post Evaluation    Patient location during evaluation: PACU  Patient participation: complete - patient participated  Level of consciousness: awake  Airway patency: patent  Nausea & Vomiting: no nausea and no vomiting  Cardiovascular status: hemodynamically stable  Respiratory status: acceptable  Hydration status: euvolemic  Pain management: adequate    No notable events documented.

## 2024-12-06 NOTE — CARE COORDINATION
Reviewed chart, spoke with Emma at [facility], they accepted and started precert. Pt is NPO for EDG today. Envelope ambullete form, and completed PASRR in soft chart. Pt is precert pending for [facility].Unique Jeff, MSW, LSW

## 2024-12-06 NOTE — PROGRESS NOTES
Hospitalist Progress Note      Synopsis: Patient admitted on 12/1/2024 75 yo female w/ hx of GAVE, DM, hepatic steatosis/cirrhosis, HTN, AVM of colon, WARNER, sciatica/back pain, who p/w an episode of presyncope after having a bloody BM today. Pt appeared well in the ED with a UTI and WBC 22k, but pt then subsequently developed  tachypnea, tachycardia, and worsening respiratory status, prompting a CTA Chest. At the time of evaluation, pt is on BiPAP and history is difficult to obtain/limited due to pt's clinical condition. Pt denies CP, dyspnea, abd pain today. Did report dysuria to ED Provider (per ED Provider's report).         ASSESSMENT:    Principal Problem:    Sepsis with acute hypoxic respiratory failure without septic shock, due to unspecified organism (HCC)  Active Problems:    Gastrointestinal hemorrhage associated with angiodysplasia of stomach and duodenum    Acute blood loss anemia    GAVE (gastric antral vascular ectasia)    Hepatic cirrhosis (HCC)    Acute cystitis with hematuria    Pneumonia of right middle lobe due to infectious organism    Lactic acidosis    Type 2 diabetes mellitus with hyperglycemia (HCC)    Melena    Acute respiratory failure with hypoxia  Resolved Problems:    * No resolved hospital problems. *       PLAN:    Sepsis 2/2 UTI vs PNA / Lactic Acidosis: start Rocephin/Doxy/Vanc. Bcx/Ucx pending. Will collect SpCx, ESR/CRP/Procal, MRSA nasal, urine strep/legionella. Resp viral panel negative. Lactate noted to be 7.5, IVF bolus + maintenance IVF's and monitor. Continue BiPAP and wean as able.  Patient notes she has had some increased frequency.  Notes worsening cough   monitor cultures.  Procalcitonin elevated 2.6, 19.6, down to 5 with antibiotics indicating appropriate treatment   Continue course with Rocephin and azithromycin.  Patient can transition to cefdinir with discharge.     GI Bleed: hx of GAVE w/ chronic WARNER and multiple transfusion. Hgb 7.2.  Patient is established with

## 2024-12-07 LAB
ANION GAP SERPL CALCULATED.3IONS-SCNC: 5 MMOL/L (ref 7–16)
BASOPHILS # BLD: 0 K/UL (ref 0–0.2)
BASOPHILS NFR BLD: 0 % (ref 0–2)
BUN SERPL-MCNC: 11 MG/DL (ref 6–23)
CALCIUM SERPL-MCNC: 8.6 MG/DL (ref 8.6–10.2)
CHLORIDE SERPL-SCNC: 102 MMOL/L (ref 98–107)
CO2 SERPL-SCNC: 26 MMOL/L (ref 22–29)
CREAT SERPL-MCNC: 0.7 MG/DL (ref 0.5–1)
EOSINOPHIL # BLD: 0.41 K/UL (ref 0.05–0.5)
EOSINOPHILS RELATIVE PERCENT: 5 % (ref 0–6)
ERYTHROCYTE [DISTWIDTH] IN BLOOD BY AUTOMATED COUNT: 20.2 % (ref 11.5–15)
GFR, ESTIMATED: 85 ML/MIN/1.73M2
GLUCOSE BLD-MCNC: 128 MG/DL (ref 74–99)
GLUCOSE BLD-MCNC: 157 MG/DL (ref 74–99)
GLUCOSE BLD-MCNC: 197 MG/DL (ref 74–99)
GLUCOSE BLD-MCNC: 271 MG/DL (ref 74–99)
GLUCOSE SERPL-MCNC: 210 MG/DL (ref 74–99)
HCT VFR BLD AUTO: 24.4 % (ref 34–48)
HGB BLD-MCNC: 7.4 G/DL (ref 11.5–15.5)
LYMPHOCYTES NFR BLD: 0.34 K/UL (ref 1.5–4)
LYMPHOCYTES RELATIVE PERCENT: 4 % (ref 20–42)
MCH RBC QN AUTO: 24.8 PG (ref 26–35)
MCHC RBC AUTO-ENTMCNC: 30.3 G/DL (ref 32–34.5)
MCV RBC AUTO: 81.9 FL (ref 80–99.9)
MONOCYTES NFR BLD: 0.82 K/UL (ref 0.1–0.95)
MONOCYTES NFR BLD: 10 % (ref 2–12)
NEUTROPHILS NFR BLD: 80 % (ref 43–80)
NEUTS SEG NFR BLD: 6.32 K/UL (ref 1.8–7.3)
PLATELET # BLD AUTO: 143 K/UL (ref 130–450)
PMV BLD AUTO: 10.8 FL (ref 7–12)
POTASSIUM SERPL-SCNC: 3.6 MMOL/L (ref 3.5–5)
RBC # BLD AUTO: 2.98 M/UL (ref 3.5–5.5)
RBC # BLD: ABNORMAL 10*6/UL
SODIUM SERPL-SCNC: 133 MMOL/L (ref 132–146)
WBC OTHER # BLD: 7.9 K/UL (ref 4.5–11.5)

## 2024-12-07 PROCEDURE — 2060000000 HC ICU INTERMEDIATE R&B

## 2024-12-07 PROCEDURE — 85025 COMPLETE CBC W/AUTO DIFF WBC: CPT

## 2024-12-07 PROCEDURE — 94669 MECHANICAL CHEST WALL OSCILL: CPT

## 2024-12-07 PROCEDURE — 2580000003 HC RX 258: Performed by: STUDENT IN AN ORGANIZED HEALTH CARE EDUCATION/TRAINING PROGRAM

## 2024-12-07 PROCEDURE — 99232 SBSQ HOSP IP/OBS MODERATE 35: CPT | Performed by: INTERNAL MEDICINE

## 2024-12-07 PROCEDURE — 6360000002 HC RX W HCPCS: Performed by: INTERNAL MEDICINE

## 2024-12-07 PROCEDURE — 6360000002 HC RX W HCPCS: Performed by: STUDENT IN AN ORGANIZED HEALTH CARE EDUCATION/TRAINING PROGRAM

## 2024-12-07 PROCEDURE — 6370000000 HC RX 637 (ALT 250 FOR IP): Performed by: STUDENT IN AN ORGANIZED HEALTH CARE EDUCATION/TRAINING PROGRAM

## 2024-12-07 PROCEDURE — 6370000000 HC RX 637 (ALT 250 FOR IP): Performed by: INTERNAL MEDICINE

## 2024-12-07 PROCEDURE — 82947 ASSAY GLUCOSE BLOOD QUANT: CPT

## 2024-12-07 PROCEDURE — 2700000000 HC OXYGEN THERAPY PER DAY

## 2024-12-07 PROCEDURE — 2580000003 HC RX 258: Performed by: INTERNAL MEDICINE

## 2024-12-07 PROCEDURE — 80048 BASIC METABOLIC PNL TOTAL CA: CPT

## 2024-12-07 PROCEDURE — 94640 AIRWAY INHALATION TREATMENT: CPT

## 2024-12-07 PROCEDURE — 99232 SBSQ HOSP IP/OBS MODERATE 35: CPT | Performed by: STUDENT IN AN ORGANIZED HEALTH CARE EDUCATION/TRAINING PROGRAM

## 2024-12-07 PROCEDURE — 94660 CPAP INITIATION&MGMT: CPT

## 2024-12-07 RX ORDER — CHOLESTYRAMINE 4 G/9G
1 POWDER, FOR SUSPENSION ORAL 2 TIMES DAILY
Status: DISCONTINUED | OUTPATIENT
Start: 2024-12-07 | End: 2024-12-09 | Stop reason: HOSPADM

## 2024-12-07 RX ADMIN — GABAPENTIN 300 MG: 300 CAPSULE ORAL at 08:42

## 2024-12-07 RX ADMIN — PANTOPRAZOLE SODIUM 40 MG: 40 INJECTION, POWDER, FOR SOLUTION INTRAVENOUS at 20:58

## 2024-12-07 RX ADMIN — INSULIN LISPRO 4 UNITS: 100 INJECTION, SOLUTION INTRAVENOUS; SUBCUTANEOUS at 11:25

## 2024-12-07 RX ADMIN — SODIUM CHLORIDE 125 MG: 9 INJECTION, SOLUTION INTRAVENOUS at 16:56

## 2024-12-07 RX ADMIN — SODIUM CHLORIDE, PRESERVATIVE FREE 10 ML: 5 INJECTION INTRAVENOUS at 20:59

## 2024-12-07 RX ADMIN — CHOLESTYRAMINE 4 G: 4 POWDER, FOR SUSPENSION ORAL at 14:32

## 2024-12-07 RX ADMIN — WATER 2000 MG: 1 INJECTION INTRAMUSCULAR; INTRAVENOUS; SUBCUTANEOUS at 14:28

## 2024-12-07 RX ADMIN — FLUOXETINE HYDROCHLORIDE 60 MG: 20 CAPSULE ORAL at 08:41

## 2024-12-07 RX ADMIN — ROPINIROLE HYDROCHLORIDE 0.5 MG: 0.5 TABLET, FILM COATED ORAL at 08:42

## 2024-12-07 RX ADMIN — GABAPENTIN 300 MG: 300 CAPSULE ORAL at 20:58

## 2024-12-07 RX ADMIN — GABAPENTIN 300 MG: 300 CAPSULE ORAL at 14:35

## 2024-12-07 RX ADMIN — IPRATROPIUM BROMIDE AND ALBUTEROL SULFATE 1 DOSE: 2.5; .5 SOLUTION RESPIRATORY (INHALATION) at 21:05

## 2024-12-07 RX ADMIN — GUAIFENESIN SYRUP AND DEXTROMETHORPHAN 5 ML: 100; 10 SYRUP ORAL at 19:25

## 2024-12-07 RX ADMIN — INSULIN LISPRO 6 UNITS: 100 INJECTION, SOLUTION INTRAVENOUS; SUBCUTANEOUS at 16:52

## 2024-12-07 RX ADMIN — CALCIUM POLYCARBOPHIL 625 MG: 625 TABLET ORAL at 20:58

## 2024-12-07 RX ADMIN — PANTOPRAZOLE SODIUM 40 MG: 40 INJECTION, POWDER, FOR SOLUTION INTRAVENOUS at 08:42

## 2024-12-07 RX ADMIN — PROPRANOLOL HYDROCHLORIDE 10 MG: 10 TABLET ORAL at 08:42

## 2024-12-07 RX ADMIN — TRAMADOL HYDROCHLORIDE 50 MG: 50 TABLET, COATED ORAL at 04:28

## 2024-12-07 RX ADMIN — TRAMADOL HYDROCHLORIDE 50 MG: 50 TABLET, COATED ORAL at 15:53

## 2024-12-07 RX ADMIN — GUAIFENESIN SYRUP AND DEXTROMETHORPHAN 5 ML: 100; 10 SYRUP ORAL at 08:50

## 2024-12-07 RX ADMIN — TRAMADOL HYDROCHLORIDE 50 MG: 50 TABLET, COATED ORAL at 22:01

## 2024-12-07 RX ADMIN — CALCIUM POLYCARBOPHIL 625 MG: 625 TABLET ORAL at 14:35

## 2024-12-07 RX ADMIN — IPRATROPIUM BROMIDE AND ALBUTEROL SULFATE 1 DOSE: 2.5; .5 SOLUTION RESPIRATORY (INHALATION) at 12:52

## 2024-12-07 RX ADMIN — ROPINIROLE HYDROCHLORIDE 0.5 MG: 0.5 TABLET, FILM COATED ORAL at 20:58

## 2024-12-07 RX ADMIN — IPRATROPIUM BROMIDE AND ALBUTEROL SULFATE 1 DOSE: 2.5; .5 SOLUTION RESPIRATORY (INHALATION) at 09:10

## 2024-12-07 RX ADMIN — SODIUM CHLORIDE, PRESERVATIVE FREE 10 ML: 5 INJECTION INTRAVENOUS at 14:36

## 2024-12-07 RX ADMIN — IPRATROPIUM BROMIDE AND ALBUTEROL SULFATE 1 DOSE: 2.5; .5 SOLUTION RESPIRATORY (INHALATION) at 15:45

## 2024-12-07 RX ADMIN — Medication 400 MG: at 08:41

## 2024-12-07 RX ADMIN — PSYLLIUM HUSK 1 PACKET: 3.4 POWDER ORAL at 08:42

## 2024-12-07 ASSESSMENT — PAIN SCALES - GENERAL
PAINLEVEL_OUTOF10: 7
PAINLEVEL_OUTOF10: 7
PAINLEVEL_OUTOF10: 0
PAINLEVEL_OUTOF10: 0
PAINLEVEL_OUTOF10: 8
PAINLEVEL_OUTOF10: 5
PAINLEVEL_OUTOF10: 0
PAINLEVEL_OUTOF10: 0

## 2024-12-07 ASSESSMENT — PAIN DESCRIPTION - DESCRIPTORS
DESCRIPTORS: ACHING;SORE;TENDER
DESCRIPTORS: ACHING
DESCRIPTORS: ACHING

## 2024-12-07 ASSESSMENT — PAIN - FUNCTIONAL ASSESSMENT
PAIN_FUNCTIONAL_ASSESSMENT: PREVENTS OR INTERFERES SOME ACTIVE ACTIVITIES AND ADLS
PAIN_FUNCTIONAL_ASSESSMENT: PREVENTS OR INTERFERES WITH MANY ACTIVE NOT PASSIVE ACTIVITIES
PAIN_FUNCTIONAL_ASSESSMENT: PREVENTS OR INTERFERES WITH MANY ACTIVE NOT PASSIVE ACTIVITIES

## 2024-12-07 ASSESSMENT — PAIN DESCRIPTION - LOCATION
LOCATION: BACK

## 2024-12-07 ASSESSMENT — PAIN SCALES - WONG BAKER: WONGBAKER_NUMERICALRESPONSE: NO HURT

## 2024-12-07 ASSESSMENT — PAIN DESCRIPTION - PAIN TYPE: TYPE: ACUTE PAIN

## 2024-12-07 ASSESSMENT — PAIN DESCRIPTION - ONSET: ONSET: ON-GOING

## 2024-12-07 ASSESSMENT — PAIN DESCRIPTION - FREQUENCY: FREQUENCY: INTERMITTENT

## 2024-12-07 ASSESSMENT — PAIN DESCRIPTION - ORIENTATION: ORIENTATION: RIGHT;LEFT

## 2024-12-07 NOTE — PROGRESS NOTES
Manjit Rodriguez updated via perfect serve that patient accidentally pulled port needle out of place when attempting to get up for the bathroom. IV team consult placed to have re-accessed.

## 2024-12-07 NOTE — PROGRESS NOTES
Hospitalist Progress Note      Synopsis: Patient admitted on 12/1/2024 75 yo female w/ hx of GAVE, DM, hepatic steatosis/cirrhosis, HTN, AVM of colon, WARNER, sciatica/back pain, who p/w an episode of presyncope after having a bloody BM today. Pt appeared well in the ED with a UTI and WBC 22k, but pt then subsequently developed  tachypnea, tachycardia, and worsening respiratory status, prompting a CTA Chest. At the time of evaluation, pt is on BiPAP and history is difficult to obtain/limited due to pt's clinical condition. Pt denies CP, dyspnea, abd pain today. Did report dysuria to ED Provider (per ED Provider's report).         ASSESSMENT:    Principal Problem:    Sepsis with acute hypoxic respiratory failure without septic shock, due to unspecified organism (HCC)  Active Problems:    Gastrointestinal hemorrhage associated with angiodysplasia of stomach and duodenum    Acute blood loss anemia    GAVE (gastric antral vascular ectasia)    Hepatic cirrhosis (HCC)    Acute cystitis with hematuria    Pneumonia of right middle lobe due to infectious organism    Lactic acidosis    Type 2 diabetes mellitus with hyperglycemia (HCC)    Melena    Acute respiratory failure with hypoxia  Resolved Problems:    * No resolved hospital problems. *       PLAN:    Sepsis 2/2 UTI and PNA / Lactic Acidosis: start Rocephin/Doxy/Vanc. Bcx/Ucx pending. Will collect SpCx, ESR/CRP/Procal, MRSA nasal, urine strep/legionella. Resp viral panel negative. Lactate noted to be 7.5, IVF bolus + maintenance IVF's and monitor. Continue BiPAP and wean as able.  Patient notes she has had some increased frequency.  Notes worsening cough   monitor cultures.  Procalcitonin elevated 2.6, 19.6, down to 5 with antibiotics indicating appropriate treatment   Continue course with Rocephin and azithromycin.  Antibiotics completedGI Bleed: hx of GAVE w/ chronic WARNER and multiple transfusion. Hgb 7.2.  Patient is established with GI at [facility].  Plan for

## 2024-12-07 NOTE — BRIEF OP NOTE
Brief Postoperative Note      Patient: Haven Barber  YOB: 1950  MRN: [id number]    Date of Procedure: 12/6/2024    Pre-Op Diagnosis Codes:      * Anemia, unspecified type [D64.9]    Post-Op Diagnosis: Same.        Procedure(s):  ENTEROSCOPY PUSH BIOPSY  ENTEROSCOPY PUSH CONTROL HEMORRHAGE    Surgeon(s):  Mansoor Tyson MD    Assistant:  * No surgical staff found *    Anesthesia: Monitor Anesthesia Care    Estimated Blood Loss (mL): less than 50     Complications: None    Specimens:   ID Type Source Tests Collected by Time Destination   A : Small bowel bx-->r/o celiac disease Gastric Gastric SURGICAL PATHOLOGY Mansoor Tyson MD 12/6/2024 1715    B : Antrum bx-->r/o H Pylori Gastric Gastric SURGICAL PATHOLOGY Mansoor Tyson MD 12/6/2024 1721        Implants:  * No implants in log *      Drains:   External Urinary Catheter (Active)   Site Assessment Clean,dry & intact 12/05/24 0332   Placement Replaced 12/05/24 0332   Catheter Care Catheter/Wick replaced;Suction Canister/Tubing changed 12/05/24 0332   Perineal Care Yes 12/05/24 0332   Suction 40 mmgHg continuous 12/04/24 1700   Urine Color Yellow 12/05/24 0332   Urine Appearance Clear 12/05/24 0332   Output (mL) 400 mL 12/04/24 1700       Findings:    Normal esophagus.  Small sliding hiatal hernia with erythematous mucosal change to gastric aspect.  GAVE in antrum. Treated with APC. H pylori biopsies obtained.  Normal duodenum and jejunum.    Electronically signed by Mansoor Tyson MD on 12/6/2024 at 7:24 PM

## 2024-12-08 PROBLEM — K76.0 HEPATIC STEATOSIS: Status: ACTIVE | Noted: 2024-12-08

## 2024-12-08 LAB
ANION GAP SERPL CALCULATED.3IONS-SCNC: 5 MMOL/L (ref 7–16)
BASOPHILS # BLD: 0.07 K/UL (ref 0–0.2)
BASOPHILS NFR BLD: 1 % (ref 0–2)
BUN SERPL-MCNC: 9 MG/DL (ref 6–23)
CALCIUM SERPL-MCNC: 8.3 MG/DL (ref 8.6–10.2)
CHLORIDE SERPL-SCNC: 103 MMOL/L (ref 98–107)
CO2 SERPL-SCNC: 27 MMOL/L (ref 22–29)
CREAT SERPL-MCNC: 0.6 MG/DL (ref 0.5–1)
EOSINOPHIL # BLD: 0.33 K/UL (ref 0.05–0.5)
EOSINOPHILS RELATIVE PERCENT: 4 % (ref 0–6)
ERYTHROCYTE [DISTWIDTH] IN BLOOD BY AUTOMATED COUNT: 20.3 % (ref 11.5–15)
GFR, ESTIMATED: >90 ML/MIN/1.73M2
GLUCOSE BLD-MCNC: 141 MG/DL (ref 74–99)
GLUCOSE BLD-MCNC: 141 MG/DL (ref 74–99)
GLUCOSE BLD-MCNC: 168 MG/DL (ref 74–99)
GLUCOSE BLD-MCNC: 263 MG/DL (ref 74–99)
GLUCOSE SERPL-MCNC: 140 MG/DL (ref 74–99)
HCT VFR BLD AUTO: 24.8 % (ref 34–48)
HGB BLD-MCNC: 7.4 G/DL (ref 11.5–15.5)
LYMPHOCYTES NFR BLD: 0.39 K/UL (ref 1.5–4)
LYMPHOCYTES RELATIVE PERCENT: 5 % (ref 20–42)
MCH RBC QN AUTO: 24.3 PG (ref 26–35)
MCHC RBC AUTO-ENTMCNC: 29.8 G/DL (ref 32–34.5)
MCV RBC AUTO: 81.6 FL (ref 80–99.9)
MONOCYTES NFR BLD: 0.46 K/UL (ref 0.1–0.95)
MONOCYTES NFR BLD: 6 % (ref 2–12)
NEUTROPHILS NFR BLD: 83 % (ref 43–80)
NEUTS SEG NFR BLD: 6.16 K/UL (ref 1.8–7.3)
PLATELET # BLD AUTO: 167 K/UL (ref 130–450)
PMV BLD AUTO: 10.7 FL (ref 7–12)
POTASSIUM SERPL-SCNC: 3.3 MMOL/L (ref 3.5–5)
RBC # BLD AUTO: 3.04 M/UL (ref 3.5–5.5)
RBC # BLD: ABNORMAL 10*6/UL
SODIUM SERPL-SCNC: 135 MMOL/L (ref 132–146)
WBC OTHER # BLD: 7.4 K/UL (ref 4.5–11.5)

## 2024-12-08 PROCEDURE — 6370000000 HC RX 637 (ALT 250 FOR IP): Performed by: STUDENT IN AN ORGANIZED HEALTH CARE EDUCATION/TRAINING PROGRAM

## 2024-12-08 PROCEDURE — 2580000003 HC RX 258: Performed by: STUDENT IN AN ORGANIZED HEALTH CARE EDUCATION/TRAINING PROGRAM

## 2024-12-08 PROCEDURE — 85025 COMPLETE CBC W/AUTO DIFF WBC: CPT

## 2024-12-08 PROCEDURE — 2700000000 HC OXYGEN THERAPY PER DAY

## 2024-12-08 PROCEDURE — 2060000000 HC ICU INTERMEDIATE R&B

## 2024-12-08 PROCEDURE — 97530 THERAPEUTIC ACTIVITIES: CPT

## 2024-12-08 PROCEDURE — 97535 SELF CARE MNGMENT TRAINING: CPT

## 2024-12-08 PROCEDURE — 94640 AIRWAY INHALATION TREATMENT: CPT

## 2024-12-08 PROCEDURE — 82947 ASSAY GLUCOSE BLOOD QUANT: CPT

## 2024-12-08 PROCEDURE — 2580000003 HC RX 258: Performed by: INTERNAL MEDICINE

## 2024-12-08 PROCEDURE — 6370000000 HC RX 637 (ALT 250 FOR IP): Performed by: INTERNAL MEDICINE

## 2024-12-08 PROCEDURE — 80048 BASIC METABOLIC PNL TOTAL CA: CPT

## 2024-12-08 PROCEDURE — 6360000002 HC RX W HCPCS: Performed by: INTERNAL MEDICINE

## 2024-12-08 PROCEDURE — 6360000002 HC RX W HCPCS: Performed by: STUDENT IN AN ORGANIZED HEALTH CARE EDUCATION/TRAINING PROGRAM

## 2024-12-08 PROCEDURE — 94660 CPAP INITIATION&MGMT: CPT

## 2024-12-08 PROCEDURE — 99232 SBSQ HOSP IP/OBS MODERATE 35: CPT | Performed by: INTERNAL MEDICINE

## 2024-12-08 RX ADMIN — ROPINIROLE HYDROCHLORIDE 0.5 MG: 0.5 TABLET, FILM COATED ORAL at 07:56

## 2024-12-08 RX ADMIN — Medication 400 MG: at 07:55

## 2024-12-08 RX ADMIN — SODIUM CHLORIDE, PRESERVATIVE FREE 10 ML: 5 INJECTION INTRAVENOUS at 20:53

## 2024-12-08 RX ADMIN — IPRATROPIUM BROMIDE AND ALBUTEROL SULFATE 1 DOSE: 2.5; .5 SOLUTION RESPIRATORY (INHALATION) at 05:51

## 2024-12-08 RX ADMIN — INSULIN LISPRO 6 UNITS: 100 INJECTION, SOLUTION INTRAVENOUS; SUBCUTANEOUS at 11:09

## 2024-12-08 RX ADMIN — GABAPENTIN 300 MG: 300 CAPSULE ORAL at 18:09

## 2024-12-08 RX ADMIN — SODIUM CHLORIDE 125 MG: 9 INJECTION, SOLUTION INTRAVENOUS at 08:12

## 2024-12-08 RX ADMIN — GABAPENTIN 300 MG: 300 CAPSULE ORAL at 20:51

## 2024-12-08 RX ADMIN — CALCIUM POLYCARBOPHIL 625 MG: 625 TABLET ORAL at 20:51

## 2024-12-08 RX ADMIN — IPRATROPIUM BROMIDE AND ALBUTEROL SULFATE 1 DOSE: 2.5; .5 SOLUTION RESPIRATORY (INHALATION) at 20:05

## 2024-12-08 RX ADMIN — PANTOPRAZOLE SODIUM 40 MG: 40 INJECTION, POWDER, FOR SOLUTION INTRAVENOUS at 20:52

## 2024-12-08 RX ADMIN — TRAMADOL HYDROCHLORIDE 50 MG: 50 TABLET, COATED ORAL at 18:29

## 2024-12-08 RX ADMIN — CALCIUM POLYCARBOPHIL 625 MG: 625 TABLET ORAL at 07:55

## 2024-12-08 RX ADMIN — FLUOXETINE HYDROCHLORIDE 60 MG: 20 CAPSULE ORAL at 07:55

## 2024-12-08 RX ADMIN — GABAPENTIN 300 MG: 300 CAPSULE ORAL at 07:55

## 2024-12-08 RX ADMIN — IPRATROPIUM BROMIDE AND ALBUTEROL SULFATE 1 DOSE: 2.5; .5 SOLUTION RESPIRATORY (INHALATION) at 16:41

## 2024-12-08 RX ADMIN — PSYLLIUM HUSK 1 PACKET: 3.4 POWDER ORAL at 07:55

## 2024-12-08 RX ADMIN — PANTOPRAZOLE SODIUM 40 MG: 40 INJECTION, POWDER, FOR SOLUTION INTRAVENOUS at 07:55

## 2024-12-08 RX ADMIN — SODIUM CHLORIDE, PRESERVATIVE FREE 10 ML: 5 INJECTION INTRAVENOUS at 07:58

## 2024-12-08 RX ADMIN — INSULIN LISPRO 4 UNITS: 100 INJECTION, SOLUTION INTRAVENOUS; SUBCUTANEOUS at 11:09

## 2024-12-08 RX ADMIN — INSULIN LISPRO 6 UNITS: 100 INJECTION, SOLUTION INTRAVENOUS; SUBCUTANEOUS at 18:09

## 2024-12-08 RX ADMIN — PROPRANOLOL HYDROCHLORIDE 10 MG: 10 TABLET ORAL at 07:56

## 2024-12-08 RX ADMIN — IPRATROPIUM BROMIDE AND ALBUTEROL SULFATE 1 DOSE: 2.5; .5 SOLUTION RESPIRATORY (INHALATION) at 13:40

## 2024-12-08 RX ADMIN — CHOLESTYRAMINE 4 G: 4 POWDER, FOR SUSPENSION ORAL at 07:56

## 2024-12-08 RX ADMIN — ROPINIROLE HYDROCHLORIDE 0.5 MG: 0.5 TABLET, FILM COATED ORAL at 20:51

## 2024-12-08 ASSESSMENT — PAIN SCALES - GENERAL
PAINLEVEL_OUTOF10: 0
PAINLEVEL_OUTOF10: 0
PAINLEVEL_OUTOF10: 8
PAINLEVEL_OUTOF10: 0

## 2024-12-08 ASSESSMENT — PAIN DESCRIPTION - LOCATION: LOCATION: BACK

## 2024-12-08 ASSESSMENT — PAIN DESCRIPTION - DESCRIPTORS: DESCRIPTORS: ACHING;DISCOMFORT;NAGGING

## 2024-12-08 NOTE — PROGRESS NOTES
[facility].  Plan for EGD here postponed on Wednesday due to hypoxia  Acute blood loss anemia complicating chronic blood loss and iron deficiency anemia IV iron ordered patient is refusing  Transfuse as needed to maintain hemoglobin for hemoglobin less than 7  Status post EGD 12/6 with APC of GAVE with biopsies taken  Discussed with GI today    Lactic acidosis secondary to above antibiotics, IV fluids, blood, monitor resolved    DM 2 UNCONTROLLED NORMALLY ON GLIPIZIDE AND Trulicity MBS, SSI, A1c Lantus, lispro scheduled prandial and SSI insulin adjusted    Acute hypoxic respiratory failure suspected secondary to pneumonia   EGD cancelled   RRT  Treated with on NRB  NTG paste and Lasix ordered  ABG w hypoxia  Bipap overnight  Antibiotics as above  DC Lasix    Diet: ADULT DIET; Regular; 3 carb choices (45 gm/meal); Low Fat/Low Chol/High Fiber/SHAWN  Code Status: Limited  PT/OT Eval Status:     DVT Prophylaxis:     Recommended disposition at discharge: SNF pending pre-CERT likely Monday  Subjective    Feeling better today no complaints  No CP or SOB  No fever or chills   No uncontrolled pain  No vomiting or diarrhea   No events reported overnight     Exam:  BP (!) 146/63   Pulse 70   Temp 97.9 °F (36.6 °C) (Temporal)   Resp 22   Ht 1.6 m (5' 3\")   Wt 86.8 kg (191 lb 5.8 oz)   LMP  (LMP Unknown)   SpO2 96%   BMI 33.90 kg/m²   General appearance: No apparent distress, appears stated age and cooperative.  HEENT: Pupils equal, round, and reactive to light. Conjunctivae/corneas clear.  Neck: Supple. No jugular venous distention. Trachea midline.  Respiratory:  Normal respiratory effort. Clear to auscultation, bilaterally without Rales/Wheezes/Rhonchi.  Cardiovascular: Regular rate and rhythm with normal S1/S2 without murmurs, rubs or gallops.  Abdomen: Soft, non-tender, non-distended with normal bowel sounds.  Musculoskeletal: No clubbing, cyanosis or edema bilaterally. Brisk capillary refill. 2+radial pulses.  hours.    Invalid input(s): \"PROT\", \"ALB\"      No results for input(s): \"INR\" in the last 72 hours.      No results for input(s): \"CKTOTAL\", \"TROPONINI\" in the last 72 hours.    Chronic labs:  Lab Results   Component Value Date    CHOL 201 (H) 10/08/2024    TRIG 84 10/08/2024    HDL 63 10/08/2024    TSH 2.12 12/01/2024    INR 1.4 12/01/2024    LABA1C 7.0 (H) 12/03/2024       Radiology:  Imaging studies reviewed today.        +++++++++++++++++++++++++++++++++++++++++++++++++  Damian Vines MD   [facility].  +++++++++++++++++++++++++++++++++++++++++++++++++  NOTE: This report was transcribed using voice recognition software. Every effort was made to ensure accuracy; however, inadvertent computerized transcription errors may be present.

## 2024-12-08 NOTE — PROGRESS NOTES
dextrose bolus 10% 125 mL  125 mL IntraVENous PRN Damian Vines MD        Or    dextrose bolus 10% 250 mL  250 mL IntraVENous PRN Damian Vines MD        glucagon injection 1 mg  1 mg SubCUTAneous PRN Damian Vines MD        dextrose 10 % infusion   IntraVENous Continuous PRN Damian Vines MD        propranolol (INDERAL) tablet 10 mg  10 mg Oral Daily Damian Vines MD   10 mg at 12/07/24 0842    insulin lispro (HUMALOG,ADMELOG) injection vial 0-8 Units  0-8 Units SubCUTAneous 4x Daily AC & HS Nayeli Hussein, APRN - NP   4 Units at 12/07/24 1125    prochlorperazine (COMPAZINE) injection 10 mg  10 mg IntraVENous Q6H PRN Emily Miller MD        sodium chloride flush 0.9 % injection 5-40 mL  5-40 mL IntraVENous 2 times per day Emily Miller MD   10 mL at 12/07/24 2059    sodium chloride flush 0.9 % injection 5-40 mL  5-40 mL IntraVENous PRN Emily Miller MD        0.9 % sodium chloride infusion   IntraVENous PRN Emily Miller MD        potassium chloride (KLOR-CON M) extended release tablet 40 mEq  40 mEq Oral PRN Emily Miller MD   40 mEq at 12/06/24 0932    Or    potassium bicarb-citric acid (EFFER-K) effervescent tablet 40 mEq  40 mEq Oral PRN Emily Miller MD   40 mEq at 12/03/24 1123    Or    potassium chloride 10 mEq/100 mL IVPB (Peripheral Line)  10 mEq IntraVENous PRN Emily Miller MD        magnesium sulfate 2000 mg in 50 mL IVPB premix  2,000 mg IntraVENous PRN Eimly Miller MD        polyethylene glycol (GLYCOLAX) packet 17 g  17 g Oral Daily PRN Emily Miller MD        acetaminophen (TYLENOL) tablet 500 mg  500 mg Oral Q6H PRN Emily Miller MD   500 mg at 12/04/24 1724    Or    acetaminophen (TYLENOL) suppository 325 mg  325 mg Rectal Q6H PRN Emily Miller MD        melatonin tablet 3 mg  3 mg Oral Nightly PRN Emily Miller MD           OBJECTIVE      Physical    VITALS:  /69   Pulse 77   Temp 97.4 °F (36.3 °C) (Temporal)   Resp 18   Ht 1.6 m (5'  3\")   Wt 86.8 kg (191 lb 5.8 oz)   LMP  (LMP Unknown)   SpO2 97%   BMI 33.90 kg/m²   Physical Exam:  General: Overall well-appearing, NAD  HEENT: PERRLA, EOMI, Anicteric sclera, MMM, no rhinorrhea  Cards: RRR, no LE edema  Resp: Breathing comfortably on room air, good air movement, no use of accessory muscles, no audible wheezing  Abdomen: soft, NT, ND.   Extremities: Moves all extremities, no effusions or bruising.  Skin: No rashes or jaundice  Neuro: A&O x 3, CN grossly intact, non-focal exam       ASSESSMENT AND PLAN      74y/F presents to the ED with loose stool and concern for bleeding due to a history of GAVE.    She is now s/p EGD which showed non-bleeding GAVE s/p APC. She continues to have loose stool.    PLAN:  Loose Stool:  - Will begin Fiber and Questran.   - If no improvement, will proceed with Stool Studies.     2. GAVE:  - PPI BID.    3. Hepatic Steatosis:  - Will ensure complete serologic workup.  - US Elastography.    We will follow.    Thank you for including us in the care of this patient. Please do not hesitate to contact us with any additional questions or concerns.    Mansoor Tyson MD  Gastroenterology/Hepatology  [facility]

## 2024-12-08 NOTE — PROGRESS NOTES
seated SBA  Sitting in chair to brush hair  Mod I       UB Dressing Min A   seated Min A  To don gown around back sitting EOB  Mod I       LB Dressing Total/Dependent     Max A  Pt able to assist with clothing management in standing Mod I       Bathing Max A    Max A  Per last tx  Mod I       Toileting Max A    Max A  For clothing management/hygiene using BSC, pt would not attempt posterior hygiene  Mod I       Bed Mobility  Rolling L/R: NT   Supine to sit: Min A    Sit to supine: NT  Rolling L/R: NT   Supine to sit: Min A    Sit to supine: Min A   Supine to sit: Mod I    Sit to supine: Mod I     Functional Transfers Sit to stand: Mod A    Stand to sit: Mod A    Stand pivot: Mod A  with HHA Sit to stand: Min A    Stand to sit: Min A    Stand pivot: Min A  with HHA  Sit to stand: Mod I    Stand to sit: Mod I    Stand pivot: Mod I    Functional Mobility Mod A   with HHA for a few steps at bedside  Min A  Using ww in room to/from bathroom, vc for safe negotiation of AD in small spaces  Mod I     Balance Sitting:    Static: Stand by assist     Dynamic: Stand by assist    Standing: Mod A   Sitting:    Static: Stand by assist     Dynamic: Stand by assist    Standing: Min A   Sitting:    Static: Mod I     Dynamic: Mod I    Standing: Mod I     Activity Tolerance Fair -  Pt on 5L o2 via nc throughout session; SpO2 88-97%  Fair-   O2 >92% on 3L with activity  /58 Good      Visual/  Perceptual WFL        Safety Fair  fair Good during ADL completion   Vitals HR and SPO2 stable throughout session             Hand Dominance right     AROM (PROM) Strength /FMC Goal: (PRN)   RUE   WFL     grossly functional good  and wfl FMC/dexterity noted during ADL tasks        LUE WFL     grossly functional good  and wfl FMC/dexterity noted during ADL tasks              Fine Motor Coordination:  WFL  Hearing: WFL   Sensation:  no c/o numbness or tingling    Tone:  WFL    Edema: mild BLE edema         Comments: Upon arrival

## 2024-12-08 NOTE — PROGRESS NOTES
Physical Therapy  Treatment Note    Name: Haven Barber  : 1950  MRN: [id number]      Date of Service: 2024    Evaluating PT:  Cinthya Kathleen, PT, DPT [id number]    Room #:  7423/7423-A  Diagnosis:  Acute cystitis without hematuria [N30.00]  Acute respiratory failure with hypoxia [J96.01]  Pneumonia of right lower lobe due to infectious organism [J18.9]  Sepsis with acute hypoxic respiratory failure without septic shock, due to unspecified organism (HCC) [A41.9, R65.20, J96.01]  PMHx/PSHx:   has a past medical history of Arthritis, Cataract, Chronic fatigue syndrome, Depression, Diabetes mellitus (HCC), Fibromyalgia, Hypertension, Memory loss, Mononucleosis, Psoriasis, Sciatica, Sleep apnea, and Vertigo.  Procedure/Surgery:  EGD (12/3/24)  Precautions:  Fall risk, alarms, 3 L O2  Equipment Needs:  TBD    SUBJECTIVE:    Pt lives alone in a 1 story home with 3-4 steps to enter and bilateral rail(s). Pt ambulated with no device PTA.    OBJECTIVE:   Initial Evaluation  Date: 24 Treatment Date: 24 Short Term/ Long Term   Goals   AM-PAC 6 Clicks     Was pt agreeable to Eval/treatment? Yes yes    Does pt have pain? Severe HA (RN notified) Moderate generalized body aches    Bed Mobility  Rolling: NT  Supine to sit: Vicki  Sit to supine: NT  Scooting: Vicki Rolling: NT  Supine to sit: Vicki  Sit to supine: NT  Scooting: Vicki Rolling: Independent  Supine to sit: Independent  Sit to supine: Independent  Scooting: Independent   Transfers Sit to stand: ModA  Stand to sit: ModA  Stand pivot: ModA with no device Sit to stand: Vicki  Stand to sit: Vicki  Stand pivot: Vicki WW Sit to stand: Independent  Stand to sit: Independent  Stand pivot: Mod I with AAD   Ambulation    3 feet to chair with no device and ModA 20', 5', Vicki WW >150 feet with no device and Mod I   Stair negotiation: ascended and descended  NT  >4 steps with bilateral rail and Mod I   ROM BUE:  Refer to OT  BLE:  WFL     Strength BUE:  Refer

## 2024-12-09 ENCOUNTER — APPOINTMENT (OUTPATIENT)
Dept: ULTRASOUND IMAGING | Age: 74
DRG: 871 | End: 2024-12-09
Payer: MEDICARE

## 2024-12-09 VITALS
BODY MASS INDEX: 33.91 KG/M2 | HEART RATE: 72 BPM | HEIGHT: 63 IN | WEIGHT: 191.36 LBS | OXYGEN SATURATION: 95 % | SYSTOLIC BLOOD PRESSURE: 138 MMHG | RESPIRATION RATE: 20 BRPM | DIASTOLIC BLOOD PRESSURE: 61 MMHG | TEMPERATURE: 98 F

## 2024-12-09 LAB
ERYTHROCYTE [DISTWIDTH] IN BLOOD BY AUTOMATED COUNT: 20.6 % (ref 11.5–15)
GLUCOSE BLD-MCNC: 121 MG/DL (ref 74–99)
HCT VFR BLD AUTO: 25.6 % (ref 34–48)
HGB BLD-MCNC: 7.7 G/DL (ref 11.5–15.5)
MCH RBC QN AUTO: 24.8 PG (ref 26–35)
MCHC RBC AUTO-ENTMCNC: 30.1 G/DL (ref 32–34.5)
MCV RBC AUTO: 82.6 FL (ref 80–99.9)
PLATELET # BLD AUTO: 200 K/UL (ref 130–450)
PMV BLD AUTO: 10.7 FL (ref 7–12)
RBC # BLD AUTO: 3.1 M/UL (ref 3.5–5.5)
WBC OTHER # BLD: 7.4 K/UL (ref 4.5–11.5)

## 2024-12-09 PROCEDURE — 76981 USE PARENCHYMA: CPT

## 2024-12-09 PROCEDURE — 6370000000 HC RX 637 (ALT 250 FOR IP): Performed by: INTERNAL MEDICINE

## 2024-12-09 PROCEDURE — 6360000002 HC RX W HCPCS: Performed by: INTERNAL MEDICINE

## 2024-12-09 PROCEDURE — 6360000002 HC RX W HCPCS: Performed by: STUDENT IN AN ORGANIZED HEALTH CARE EDUCATION/TRAINING PROGRAM

## 2024-12-09 PROCEDURE — 99232 SBSQ HOSP IP/OBS MODERATE 35: CPT | Performed by: NURSE PRACTITIONER

## 2024-12-09 PROCEDURE — 2580000003 HC RX 258: Performed by: STUDENT IN AN ORGANIZED HEALTH CARE EDUCATION/TRAINING PROGRAM

## 2024-12-09 PROCEDURE — 6370000000 HC RX 637 (ALT 250 FOR IP): Performed by: STUDENT IN AN ORGANIZED HEALTH CARE EDUCATION/TRAINING PROGRAM

## 2024-12-09 PROCEDURE — 2700000000 HC OXYGEN THERAPY PER DAY

## 2024-12-09 PROCEDURE — 94660 CPAP INITIATION&MGMT: CPT

## 2024-12-09 PROCEDURE — 99239 HOSP IP/OBS DSCHRG MGMT >30: CPT | Performed by: INTERNAL MEDICINE

## 2024-12-09 PROCEDURE — 82947 ASSAY GLUCOSE BLOOD QUANT: CPT

## 2024-12-09 PROCEDURE — 2580000003 HC RX 258: Performed by: INTERNAL MEDICINE

## 2024-12-09 PROCEDURE — 85027 COMPLETE CBC AUTOMATED: CPT

## 2024-12-09 RX ORDER — HEPARIN 100 UNIT/ML
500 SYRINGE INTRAVENOUS PRN
Status: DISCONTINUED | OUTPATIENT
Start: 2024-12-09 | End: 2024-12-09 | Stop reason: HOSPADM

## 2024-12-09 RX ORDER — IPRATROPIUM BROMIDE AND ALBUTEROL SULFATE 2.5; .5 MG/3ML; MG/3ML
1 SOLUTION RESPIRATORY (INHALATION) EVERY 6 HOURS PRN
Status: DISCONTINUED | OUTPATIENT
Start: 2024-12-09 | End: 2024-12-09 | Stop reason: HOSPADM

## 2024-12-09 RX ORDER — LOPERAMIDE HYDROCHLORIDE 2 MG/1
2 CAPSULE ORAL 4 TIMES DAILY PRN
DISCHARGE
Start: 2024-12-09 | End: 2024-12-19

## 2024-12-09 RX ADMIN — INSULIN LISPRO 6 UNITS: 100 INJECTION, SOLUTION INTRAVENOUS; SUBCUTANEOUS at 09:58

## 2024-12-09 RX ADMIN — CALCIUM POLYCARBOPHIL 625 MG: 625 TABLET ORAL at 09:59

## 2024-12-09 RX ADMIN — TRAMADOL HYDROCHLORIDE 50 MG: 50 TABLET, COATED ORAL at 03:47

## 2024-12-09 RX ADMIN — PROPRANOLOL HYDROCHLORIDE 10 MG: 10 TABLET ORAL at 09:59

## 2024-12-09 RX ADMIN — GABAPENTIN 300 MG: 300 CAPSULE ORAL at 09:59

## 2024-12-09 RX ADMIN — FLUOXETINE HYDROCHLORIDE 60 MG: 20 CAPSULE ORAL at 09:59

## 2024-12-09 RX ADMIN — ROPINIROLE HYDROCHLORIDE 0.5 MG: 0.5 TABLET, FILM COATED ORAL at 09:59

## 2024-12-09 RX ADMIN — HEPARIN 500 UNITS: 100 SYRINGE at 13:00

## 2024-12-09 RX ADMIN — Medication 400 MG: at 10:01

## 2024-12-09 RX ADMIN — PANTOPRAZOLE SODIUM 40 MG: 40 INJECTION, POWDER, FOR SOLUTION INTRAVENOUS at 09:58

## 2024-12-09 RX ADMIN — SODIUM CHLORIDE, PRESERVATIVE FREE 10 ML: 5 INJECTION INTRAVENOUS at 10:00

## 2024-12-09 RX ADMIN — TRAMADOL HYDROCHLORIDE 50 MG: 50 TABLET, COATED ORAL at 12:34

## 2024-12-09 RX ADMIN — SODIUM CHLORIDE 125 MG: 9 INJECTION, SOLUTION INTRAVENOUS at 10:33

## 2024-12-09 ASSESSMENT — PAIN DESCRIPTION - DESCRIPTORS: DESCRIPTORS: ACHING;THROBBING;DISCOMFORT

## 2024-12-09 ASSESSMENT — PAIN SCALES - GENERAL
PAINLEVEL_OUTOF10: 8
PAINLEVEL_OUTOF10: 5

## 2024-12-09 ASSESSMENT — PAIN SCALES - WONG BAKER: WONGBAKER_NUMERICALRESPONSE: NO HURT

## 2024-12-09 ASSESSMENT — PAIN DESCRIPTION - ORIENTATION: ORIENTATION: LOWER

## 2024-12-09 ASSESSMENT — PAIN DESCRIPTION - LOCATION: LOCATION: BACK

## 2024-12-09 NOTE — DISCHARGE SUMMARY
Physician Discharge Summary     Patient ID:  Haven Barber  [id number]  74 y.o.  1950    Admit date: 12/1/2024    Discharge date and time:  12/9/2024    Discharge Diagnoses: Principal Problem:    Sepsis with acute hypoxic respiratory failure without septic shock, due to unspecified organism (HCC)  Active Problems:    Gastrointestinal hemorrhage associated with angiodysplasia of stomach and duodenum    Acute blood loss anemia    GAVE (gastric antral vascular ectasia)    Hepatic cirrhosis (HCC)    Acute cystitis with hematuria    Pneumonia of right middle lobe due to infectious organism    Lactic acidosis    Type 2 diabetes mellitus with hyperglycemia (HCC)    Melena    Acute respiratory failure with hypoxia    Hepatic steatosis  Resolved Problems:    * No resolved hospital problems. *      Consults: IP CONSULT TO INTERNAL MEDICINE  IP CONSULT TO PHARMACY  IP CONSULT TO VASCULAR ACCESS TEAM  IP CONSULT TO VASCULAR ACCESS TEAM    Procedures: See below    Hospital Course:  Patient admitted on 12/1/2024 75 yo female w/ hx of GAVE, DM, hepatic steatosis/cirrhosis, HTN, AVM of colon, WARNER, sciatica/back pain, who p/w an episode of presyncope after having a bloody BM today. Pt appeared well in the ED with a UTI and WBC 22k, but pt then subsequently developed  tachypnea, tachycardia, and worsening respiratory status, prompting a CTA Chest. At the time of evaluation, pt is on BiPAP and history is difficult to obtain/limited due to pt's clinical condition. Pt denies CP, dyspnea, abd pain today. Did report dysuria to ED Provider (per ED Provider's report).            Sepsis 2/2 UTI and PNA / Lactic Acidosis: start Rocephin/Doxy/Vanc. Bcx/Ucx pending. Will collect SpCx, ESR/CRP/Procal, MRSA nasal, urine strep/legionella. Resp viral panel negative. Lactate noted to be 7.5, IVF bolus + maintenance IVF's and monitor. Continue BiPAP and wean as able.  Patient notes she has had some increased frequency.  Notes worsening cough

## 2024-12-09 NOTE — RT PROTOCOL NOTE
RT Inhaler-Nebulizer Bronchodilator Protocol Note    There is a bronchodilator order in the chart from a provider indicating to follow the RT Bronchodilator Protocol and there is an “Initiate RT Inhaler-Nebulizer Bronchodilator Protocol” order as well (see protocol at bottom of note).    CXR Findings:  No results found.    The findings from the last RT Protocol Assessment were as follows:   History Pulmonary Disease: None or smoker <15 pack years (13)  Respiratory Pattern: Regular pattern and RR 12-20 bpm  Breath Sounds: Clear breath sounds  Cough: Strong, spontaneous, non-productive  Indication for Bronchodilator Therapy:    Bronchodilator Assessment Score: 0    Aerosolized bronchodilator medication orders have been revised according to the RT Inhaler-Nebulizer Bronchodilator Protocol below.    Respiratory Therapist to perform RT Therapy Protocol Assessment initially then follow the protocol.  Repeat RT Therapy Protocol Assessment PRN for score 0-3 or on second treatment, BID, and PRN for scores above 3.    No Indications - adjust the frequency to every 6 hours PRN wheezing or bronchospasm, if no treatments needed after 48 hours then discontinue using Per Protocol order mode.     If indication present, adjust the RT bronchodilator orders based on the Bronchodilator Assessment Score as indicated below.  Use Inhaler orders unless patient has one or more of the following: on home nebulizer, not able to hold breath for 10 seconds, is not alert and oriented, cannot activate and use MDI correctly, or respiratory rate 25 breaths per minute or more, then use the equivalent nebulizer order(s) with same Frequency and PRN reasons based on the score.  If a patient is on this medication at home then do not decrease Frequency below that used at home.    0-3 - enter or revise RT bronchodilator order(s) to equivalent RT Bronchodilator order with Frequency of every 4 hours PRN for wheezing or increased work of breathing using Per  Rx Refill Note  Requested Prescriptions     Pending Prescriptions Disp Refills   • Synthroid 50 MCG tablet [Pharmacy Med Name: SYNTHROID TAB 50MCG] 90 tablet 0     Sig: TAKE 1 TABLET DAILY ON AN  EMPTY STOMACH AND WAIT 30  MINUTES BEFORE EATING OR   TAKING OTHER MEDICATIONS      Last office visit with prescribing clinician: 9/14/2022     Next office visit with prescribing clinician: Visit date not found        05/18/2022    Cristiane Lopez MA  10/14/22, 13:17 EDT   Protocol order mode.        4-6 - enter or revise RT Bronchodilator order(s) to two equivalent RT bronchodilator orders with one order with BID Frequency and one order with Frequency of every 4 hours PRN wheezing or increased work of breathing using Per Protocol order mode.        7-10 - enter or revise RT Bronchodilator order(s) to two equivalent RT bronchodilator orders with one order with TID Frequency and one order with Frequency of every 4 hours PRN wheezing or increased work of breathing using Per Protocol order mode.       11-13 - enter or revise RT Bronchodilator order(s) to one equivalent RT bronchodilator order with QID Frequency and an Albuterol order with Frequency of every 4 hours PRN wheezing or increased work of breathing using Per Protocol order mode.      Greater than 13 - enter or revise RT Bronchodilator order(s) to one equivalent RT bronchodilator order with every 4 hours Frequency and an Albuterol order with Frequency of every 2 hours PRN wheezing or increased work of breathing using Per Protocol order mode.     RT to enter RT Home Evaluation for COPD & MDI Assessment order using Per Protocol order mode.    Electronically signed by Lenora Garcia RCP on 12/9/2024 at 7:51 AM

## 2024-12-09 NOTE — PROGRESS NOTES
Gastroenterology, Hepatology, &  Advanced Endoscopy    Progress Note      HPI:   EGD with GAVE but without associated hemorrhage. APC treated to prevent bleeding. Biopsies of small bowel obtained.    She reports continued loose stool. She also reports significant deconditioning.     Lab Results   Component Value Date    INR 1.4 12/01/2024    INR 1.3 11/14/2023    INR 1.1 07/13/2023    PROTIME 15.9 (H) 12/01/2024    PROTIME 15.1 (H) 11/14/2023    PROTIME 12.5 (H) 07/13/2023         ALT   Date Value Ref Range Status   12/05/2024 20 0 - 32 U/L Final   12/04/2024 21 0 - 32 U/L Final   12/03/2024 20 0 - 32 U/L Final     AST   Date Value Ref Range Status   12/05/2024 43 (H) 0 - 31 U/L Final   12/04/2024 47 (H) 0 - 31 U/L Final   12/03/2024 56 (H) 0 - 31 U/L Final     Alkaline Phosphatase   Date Value Ref Range Status   12/05/2024 79 35 - 104 U/L Final   12/04/2024 73 35 - 104 U/L Final   12/03/2024 86 35 - 104 U/L Final     Total Bilirubin   Date Value Ref Range Status   12/05/2024 0.6 0.0 - 1.2 mg/dL Final   12/04/2024 0.7 0.0 - 1.2 mg/dL Final   12/03/2024 0.7 0.0 - 1.2 mg/dL Final     Bilirubin, Direct   Date Value Ref Range Status   12/01/2024 0.4 (H) 0.0 - 0.3 mg/dL Final   02/07/2023 0.3 0.0 - 0.3 mg/dL Final      Lab Results   Component Value Date    WBC 7.4 12/09/2024    HGB 7.7 (L) 12/09/2024    HCT 25.6 (L) 12/09/2024     12/09/2024     12/08/2024    K 3.3 (L) 12/08/2024     12/08/2024    CREATININE 0.6 12/08/2024    BUN 9 12/08/2024    CO2 27 12/08/2024    FOLATE 9.3 11/13/2023    [id number] 1203 (H) 11/13/2023    AMMONIA 40.0 01/15/2019    GLUCOSE 140 (H) 12/08/2024    INR 1.4 12/01/2024    PROTIME 15.9 (H) 12/01/2024    TSH 2.12 12/01/2024    LABA1C 7.0 (H) 12/03/2024        Sed Rate, Automated   Date Value Ref Range Status   12/01/2024 33 (H) 0 - 20 mm/Hr Final          US Result (most recent):  No results found for this or any previous visit from the past 3650 days.     CT Result (most  Anicteric sclera, MMM, no rhinorrhea  Cards: RRR, no LE edema  Resp: Breathing comfortably on room air, good air movement, no use of accessory muscles, no audible wheezing  Abdomen: soft, NT, ND.   Extremities: Moves all extremities, no effusions or bruising.  Skin: No rashes or jaundice  Neuro: A&O x 3, CN grossly intact, non-focal exam       ASSESSMENT AND PLAN      74y/F presents to the ED with loose stool and concern for bleeding due to a history of GAVE.    She is now s/p EGD which showed non-bleeding GAVE s/p APC. She continues to have loose stool.      OK for discharge from GI POV.     PLAN:  Loose Stool:  - Continue Fiber and Questran.   - If no improvement, will proceed with Stool Studies.     2. GAVE:  - PPI BID.  - Monitor Hgb, transfuse to keep >7.0  Hgb 7.7    3. Hepatic Steatosis:  - Will ensure complete serologic workup.  - US Elastography.   Liver fibrosis staging: Metavir Stage F4  (Advanced fibrosis and/or  cirrhosis).    We will follow.    Thank you for including us in the care of this patient. Please do not hesitate to contact us with any additional questions or concerns.    Discussed with Mansoor Tyson MD  Gastroenterology/Hepatology  Advanced Endoscopy

## 2024-12-09 NOTE — CARE COORDINATION
Reviewed chart, spoke with Haven FIGUEROA assistant, precert was obtained good through 12/11. Notified attending, pt discharging. Valor for 1pm  via ambullete. Per [facility]. Notified charge RN, bedside RN, pt, and michi at [facility] of discharge/time. Envelope, ambullete form, and completed PASRR in soft chart. Faxed ambullete script and H&P to [phone].Unique Jeff, MSW, LSW

## 2024-12-09 NOTE — PLAN OF CARE
Problem: Pain  Goal: Verbalizes/displays adequate comfort level or baseline comfort level  12/9/2024 1043 by Maricel Justin RN  Outcome: Progressing  12/8/2024 2323 by Kyle Yi, RN  Outcome: Progressing     Problem: Safety - Adult  Goal: Free from fall injury  12/9/2024 1043 by Maricel Justin, RN  Outcome: Progressing  12/8/2024 2323 by Kyle Yi, RN  Outcome: Progressing

## 2024-12-09 NOTE — PROGRESS NOTES
Hospitalist Progress Note      Synopsis: Patient admitted on 12/1/2024 75 yo female w/ hx of GAVE, DM, hepatic steatosis/cirrhosis, HTN, AVM of colon, WARNER, sciatica/back pain, who p/w an episode of presyncope after having a bloody BM today. Pt appeared well in the ED with a UTI and WBC 22k, but pt then subsequently developed  tachypnea, tachycardia, and worsening respiratory status, prompting a CTA Chest. At the time of evaluation, pt is on BiPAP and history is difficult to obtain/limited due to pt's clinical condition. Pt denies CP, dyspnea, abd pain today. Did report dysuria to ED Provider (per ED Provider's report).         ASSESSMENT:    Principal Problem:    Sepsis with acute hypoxic respiratory failure without septic shock, due to unspecified organism (HCC)  Active Problems:    Gastrointestinal hemorrhage associated with angiodysplasia of stomach and duodenum    Acute blood loss anemia    GAVE (gastric antral vascular ectasia)    Hepatic cirrhosis (HCC)    Acute cystitis with hematuria    Pneumonia of right middle lobe due to infectious organism    Lactic acidosis    Type 2 diabetes mellitus with hyperglycemia (HCC)    Melena    Acute respiratory failure with hypoxia    Hepatic steatosis  Resolved Problems:    * No resolved hospital problems. *       PLAN:    Sepsis 2/2 UTI and PNA / Lactic Acidosis: start Rocephin/Doxy/Vanc. Bcx/Ucx pending. Will collect SpCx, ESR/CRP/Procal, MRSA nasal, urine strep/legionella. Resp viral panel negative. Lactate noted to be 7.5, IVF bolus + maintenance IVF's and monitor. Continue BiPAP and wean as able.  Patient notes she has had some increased frequency.  Notes worsening cough   monitor cultures.  Procalcitonin elevated 2.6, 19.6, down to 5 with antibiotics indicating appropriate treatment   Continue course with Rocephin and azithromycin.  Antibiotics completed    GI Bleed: hx of GAVE w/ chronic WARNER and multiple transfusion. Hgb 7.2.  Patient is established with GI at

## 2024-12-09 NOTE — PLAN OF CARE
Problem: Chronic Conditions and Co-morbidities  Goal: Patient's chronic conditions and co-morbidity symptoms are monitored and maintained or improved  12/9/2024 1203 by Maricel Justin RN  Outcome: Completed  12/8/2024 2323 by Kyle Yi RN  Outcome: Progressing     Problem: Discharge Planning  Goal: Discharge to home or other facility with appropriate resources  12/9/2024 1203 by Maricel Justin RN  Outcome: Completed  12/8/2024 2323 by Kyle Yi RN  Outcome: Progressing     Problem: Pain  Goal: Verbalizes/displays adequate comfort level or baseline comfort level  12/9/2024 1203 by Maricel Justin RN  Outcome: Completed  12/9/2024 1043 by Maricel Justin RN  Outcome: Progressing  12/8/2024 2323 by Kyle Yi RN  Outcome: Progressing     Problem: Safety - Adult  Goal: Free from fall injury  12/9/2024 1203 by Maricel Justin RN  Outcome: Completed  12/9/2024 1043 by Maricel Justin RN  Outcome: Progressing  12/8/2024 2323 by Kyle Yi RN  Outcome: Progressing     Problem: Skin/Tissue Integrity  Goal: Absence of new skin breakdown  Description: 1.  Monitor for areas of redness and/or skin breakdown  2.  Assess vascular access sites hourly  3.  Every 4-6 hours minimum:  Change oxygen saturation probe site  4.  Every 4-6 hours:  If on nasal continuous positive airway pressure, respiratory therapy assess nares and determine need for appliance change or resting period.  12/9/2024 1203 by Maricel Justin RN  Outcome: Completed  12/8/2024 2323 by Kyle Yi RN  Outcome: Progressing

## 2024-12-09 NOTE — DISCHARGE INSTR - COC
Continuity of Care Form    Patient Name: Haven Barber   :  1950  MRN:  [id number]    Admit date:  2024  Discharge date:  24    Code Status Order: Limited   Advance Directives:   Advance Care Flowsheet Documentation             Admitting Physician:  Emily Miller MD  PCP: Jesus Alvarado DO    Discharging Nurse: Maricel Justin RN  Discharging Hospital Unit/Room#: 7423/7423-A  Discharging Unit Phone Number: [phone]    Emergency Contact:   Extended Emergency Contact Information  Primary Emergency Contact: Lorrie Barber  Home Phone: [phone]  Mobile Phone: [phone]  Relation: Brother/Sister   needed? No  Secondary Emergency Contact: Nirmala Bruce  Home Phone: [phone]  Mobile Phone: [phone]  Relation: Other   needed? No    Past Surgical History:  Past Surgical History:   Procedure Laterality Date    CARPAL TUNNEL RELEASE Bilateral     CATARACT REMOVAL Right     COLONOSCOPY      OTHER SURGICAL HISTORY Bilateral 2021    BILATERAL L3,L4 MEDIAL BRANCH AND L5 DORSAL RAMUS RADIOFREQUENCY    PAIN MANAGEMENT PROCEDURE Bilateral 2021    BILATERAL L3,4 MEDIAL BRANCH AND L5 DORSAL RAMUS  RADIOFREQUENCY ABLATION WITH SEDATION  (CPT 56992) performed by Silas Quigley MD at [facility] OR    SPINE SURGERY N/A 2021    T11 KYPHOPLASTY (2 C-ARMS) performed by Silas Quigley MD at [facility] OR    TONSILLECTOMY AND ADENOIDECTOMY      UPPER GASTROINTESTINAL ENDOSCOPY N/A 2019    EGD BIOPSY performed by Katheryn Mooney MD at [facility] ENDOSCOPY    UPPER GASTROINTESTINAL ENDOSCOPY N/A 03/10/2021    EGD ESOPHAGOGASTRODUODENOSCOPY performed by Ar Braun MD at [facility] ENDOSCOPY    UPPER GASTROINTESTINAL ENDOSCOPY N/A 2024    ENTEROSCOPY PUSH BIOPSY performed by Mansoor Tyson MD at [facility] ENDOSCOPY    UPPER GASTROINTESTINAL ENDOSCOPY  2024    ENTEROSCOPY PUSH CONTROL HEMORRHAGE performed by Mansoor Tyson MD at [facility] ENDOSCOPY       Immunization

## 2024-12-09 NOTE — PROGRESS NOTES
Patient refused to allow nurse to change changing to right side chest port. Patient A&Ox4, patient educated on risk factors.

## 2024-12-10 ENCOUNTER — OUTSIDE SERVICES (OUTPATIENT)
Dept: INTERNAL MEDICINE CLINIC | Age: 74
End: 2024-12-10

## 2024-12-10 DIAGNOSIS — J18.9 PNEUMONIA OF RIGHT MIDDLE LOBE DUE TO INFECTIOUS ORGANISM: ICD-10-CM

## 2024-12-10 DIAGNOSIS — F32.A DEPRESSION, UNSPECIFIED DEPRESSION TYPE: ICD-10-CM

## 2024-12-10 DIAGNOSIS — I10 ESSENTIAL (PRIMARY) HYPERTENSION: ICD-10-CM

## 2024-12-10 DIAGNOSIS — E11.65 TYPE 2 DIABETES MELLITUS WITH HYPERGLYCEMIA, UNSPECIFIED WHETHER LONG TERM INSULIN USE (HCC): ICD-10-CM

## 2024-12-10 DIAGNOSIS — K31.811 GASTROINTESTINAL HEMORRHAGE ASSOCIATED WITH ANGIODYSPLASIA OF STOMACH AND DUODENUM: Primary | ICD-10-CM

## 2024-12-10 DIAGNOSIS — M54.30 SCIATICA, UNSPECIFIED LATERALITY: ICD-10-CM

## 2024-12-10 DIAGNOSIS — M62.81 MUSCLE WEAKNESS (GENERALIZED): ICD-10-CM

## 2024-12-10 DIAGNOSIS — K31.819 GAVE (GASTRIC ANTRAL VASCULAR ECTASIA): ICD-10-CM

## 2024-12-10 DIAGNOSIS — D50.9 IRON DEFICIENCY ANEMIA, UNSPECIFIED IRON DEFICIENCY ANEMIA TYPE: ICD-10-CM

## 2024-12-10 DIAGNOSIS — Z74.1 NEED FOR ASSISTANCE WITH PERSONAL CARE: ICD-10-CM

## 2024-12-10 DIAGNOSIS — M79.7 FIBROMYALGIA: ICD-10-CM

## 2024-12-10 DIAGNOSIS — K74.60 HEPATIC CIRRHOSIS, UNSPECIFIED HEPATIC CIRRHOSIS TYPE, UNSPECIFIED WHETHER ASCITES PRESENT (HCC): ICD-10-CM

## 2024-12-10 NOTE — PROGRESS NOTES
Spoke to bart RN at rehab facility [facility] in [place], regarding appointment for Pt to have  blood tomorrow, results from todays CBC not in yet and  yesterdays was 7.7 which ifs out of the range to have a unit of blood, and appointment needs to be cancelled for tomorrow

## 2024-12-11 ENCOUNTER — HOSPITAL ENCOUNTER (OUTPATIENT)
Dept: INFUSION THERAPY | Age: 74
End: 2024-12-11

## 2024-12-12 LAB — SURGICAL PATHOLOGY REPORT: NORMAL

## 2024-12-18 ENCOUNTER — HOSPITAL ENCOUNTER (OUTPATIENT)
Dept: INFUSION THERAPY | Age: 74
Setting detail: INFUSION SERIES
Discharge: HOME | End: 2024-12-18

## 2024-12-27 DIAGNOSIS — D50.0 IRON DEFICIENCY ANEMIA DUE TO CHRONIC BLOOD LOSS: Primary | ICD-10-CM

## 2024-12-27 NOTE — PROGRESS NOTES
Called pt re: appt Monday for blood transfusion and need for updated CBC w/ type & screen. Pt verbalized understanding. States she has trouble w/ transportation and will try to come early Monday morning for labs.

## 2024-12-30 ENCOUNTER — HOSPITAL ENCOUNTER (OUTPATIENT)
Dept: INFUSION THERAPY | Age: 74
Setting detail: INFUSION SERIES
Discharge: HOME | End: 2024-12-30

## 2024-12-30 VITALS
BODY MASS INDEX: 34.01 KG/M2 | OXYGEN SATURATION: 98 % | TEMPERATURE: 98.2 F | SYSTOLIC BLOOD PRESSURE: 141 MMHG | HEART RATE: 80 BPM | RESPIRATION RATE: 18 BRPM | WEIGHT: 192 LBS | DIASTOLIC BLOOD PRESSURE: 76 MMHG

## 2024-12-30 ASSESSMENT — ENCOUNTER SYMPTOMS
SHORTNESS OF BREATH: 0
BLOOD IN STOOL: 0
WHEEZING: 0
VOMITING: 0
VOICE CHANGE: 0
NAUSEA: 0
DIARRHEA: 0
CHEST TIGHTNESS: 0
TROUBLE SWALLOWING: 0
COUGH: 0
ABDOMINAL PAIN: 0
CONSTIPATION: 0
BACK PAIN: 0
SORE THROAT: 0
EYE PAIN: 0
PHOTOPHOBIA: 0
RHINORRHEA: 0

## 2024-12-31 NOTE — PROGRESS NOTES
Visit Date: 12/10/2024 - [facility]  Haven Barber  1950  female 74 y.o.      Subjective:    CC: Patient presents stating she feels okay with no blood in stool. She has occasional nose bleed.     HPI: Patient is a 74-year-old lady with a past medical history of diabetes, cirrhosis, GAVE, hypertension, AVM of colon, back pain, and sciatic. She went to ER with complaint of bloody bowel movement. Her urinalysis showed UTI. She became tachycardic and had elevated respirations and placed on BiPAP. She was diagnosed with sepsis 2/2, UTI, and pneumonia and placed on vancomycin , doxycycline, and rocephin. Her hemoglobin was 7.2. She declined GI evaluation. CTA in ER showed no pulmonary embolism but did show right-sided consolidation. She gradually improved respiratory wise and was off antibiotics on discharged. She received iron infusions. Had EGD 12/6 with APC of GAVE. Hemoglobin was 7.7 on discharged 12/9/24.  The patient is seen and examined. Medications on the chart reviewed. Labs are reviewed. Discussed with nursing staff.       ROS:  Review of Systems   Constitutional:  Negative for chills, fatigue, fever and unexpected weight change.   HENT:  Negative for congestion, postnasal drip, rhinorrhea, sore throat, trouble swallowing and voice change.    Eyes:  Negative for photophobia, pain and visual disturbance.   Respiratory:  Negative for cough, chest tightness, shortness of breath and wheezing.    Cardiovascular:  Negative for chest pain and palpitations.   Gastrointestinal:  Negative for abdominal pain, blood in stool, constipation, diarrhea, nausea and vomiting.   Endocrine: Negative for heat intolerance, polydipsia and polyuria.   Genitourinary:  Negative for difficulty urinating, dysuria, frequency, hematuria and urgency.   Musculoskeletal:  Negative for arthralgias, back pain, neck pain and neck stiffness.   Skin:  Positive for pallor (mild).   Neurological: Negative.  Negative for dizziness and

## 2025-01-03 NOTE — PROGRESS NOTES
Called pt to remind labs needed before appt for blood transfusion on 1/6. Pt states she is coming to [facility] @ 0800 on 1/6.

## 2025-01-06 ENCOUNTER — APPOINTMENT (OUTPATIENT)
Dept: GENERAL RADIOLOGY | Age: 75
End: 2025-01-06
Payer: MEDICARE

## 2025-01-06 ENCOUNTER — HOSPITAL ENCOUNTER (OUTPATIENT)
Age: 75
Setting detail: OBSERVATION
LOS: 1 days | Discharge: LEFT BEFORE DISCHARGE | End: 2025-01-06
Attending: EMERGENCY MEDICINE | Admitting: INTERNAL MEDICINE
Payer: MEDICARE

## 2025-01-06 ENCOUNTER — HOSPITAL ENCOUNTER (OUTPATIENT)
Dept: INFUSION THERAPY | Age: 75
Setting detail: INFUSION SERIES
Discharge: HOME | End: 2025-01-06

## 2025-01-06 VITALS
SYSTOLIC BLOOD PRESSURE: 134 MMHG | DIASTOLIC BLOOD PRESSURE: 69 MMHG | OXYGEN SATURATION: 99 % | RESPIRATION RATE: 16 BRPM | BODY MASS INDEX: 31.89 KG/M2 | HEIGHT: 63 IN | TEMPERATURE: 98.3 F | HEART RATE: 78 BPM | WEIGHT: 180 LBS

## 2025-01-06 DIAGNOSIS — N39.0 UTI (URINARY TRACT INFECTION), UNCOMPLICATED: ICD-10-CM

## 2025-01-06 DIAGNOSIS — R53.1 GENERAL WEAKNESS: ICD-10-CM

## 2025-01-06 DIAGNOSIS — R06.02 SHORTNESS OF BREATH: Primary | ICD-10-CM

## 2025-01-06 PROBLEM — N30.00 ACUTE CYSTITIS WITHOUT HEMATURIA: Status: ACTIVE | Noted: 2024-12-01

## 2025-01-06 PROBLEM — R01.1 MURMUR, HEART: Status: ACTIVE | Noted: 2025-01-06

## 2025-01-06 PROBLEM — R42 DIZZINESS: Status: ACTIVE | Noted: 2025-01-06

## 2025-01-06 PROBLEM — R94.31 ABNORMAL EKG: Status: ACTIVE | Noted: 2025-01-06

## 2025-01-06 LAB
ABO + RH BLD: NORMAL
ALBUMIN SERPL-MCNC: 3.2 G/DL (ref 3.5–5.2)
ALP SERPL-CCNC: 164 U/L (ref 35–104)
ALT SERPL-CCNC: 36 U/L (ref 0–32)
ANION GAP SERPL CALCULATED.3IONS-SCNC: 10 MMOL/L (ref 7–16)
ARM BAND NUMBER: NORMAL
AST SERPL-CCNC: 66 U/L (ref 0–31)
BACTERIA URNS QL MICRO: ABNORMAL
BASOPHILS # BLD: 0.04 K/UL (ref 0–0.2)
BASOPHILS NFR BLD: 1 % (ref 0–2)
BILIRUB SERPL-MCNC: 0.3 MG/DL (ref 0–1.2)
BILIRUB UR QL STRIP: NEGATIVE
BLOOD BANK SAMPLE EXPIRATION: NORMAL
BLOOD GROUP ANTIBODIES SERPL: NEGATIVE
BNP SERPL-MCNC: 127 PG/ML (ref 0–125)
BUN SERPL-MCNC: 12 MG/DL (ref 6–23)
CALCIUM SERPL-MCNC: 9.1 MG/DL (ref 8.6–10.2)
CHLORIDE SERPL-SCNC: 105 MMOL/L (ref 98–107)
CLARITY UR: ABNORMAL
CO2 SERPL-SCNC: 24 MMOL/L (ref 22–29)
COLOR UR: YELLOW
CREAT SERPL-MCNC: 0.7 MG/DL (ref 0.5–1)
EKG ATRIAL RATE: 75 BPM
EKG P AXIS: 33 DEGREES
EKG P-R INTERVAL: 144 MS
EKG Q-T INTERVAL: 426 MS
EKG QRS DURATION: 88 MS
EKG QTC CALCULATION (BAZETT): 475 MS
EKG R AXIS: -19 DEGREES
EKG T AXIS: -13 DEGREES
EKG VENTRICULAR RATE: 75 BPM
EOSINOPHIL # BLD: 0.34 K/UL (ref 0.05–0.5)
EOSINOPHILS RELATIVE PERCENT: 6 % (ref 0–6)
ERYTHROCYTE [DISTWIDTH] IN BLOOD BY AUTOMATED COUNT: 21.2 % (ref 11.5–15)
GFR, ESTIMATED: >90 ML/MIN/1.73M2
GLUCOSE BLD-MCNC: 184 MG/DL (ref 74–99)
GLUCOSE SERPL-MCNC: 225 MG/DL (ref 74–99)
GLUCOSE UR STRIP-MCNC: >=1000 MG/DL
HCT VFR BLD AUTO: 30.5 % (ref 34–48)
HGB BLD-MCNC: 9.3 G/DL (ref 11.5–15.5)
HGB UR QL STRIP.AUTO: ABNORMAL
IMM GRANULOCYTES # BLD AUTO: <0.03 K/UL (ref 0–0.58)
IMM GRANULOCYTES NFR BLD: 0 % (ref 0–5)
KETONES UR STRIP-MCNC: NEGATIVE MG/DL
LEUKOCYTE ESTERASE UR QL STRIP: ABNORMAL
LYMPHOCYTES NFR BLD: 0.93 K/UL (ref 1.5–4)
LYMPHOCYTES RELATIVE PERCENT: 16 % (ref 20–42)
MAGNESIUM SERPL-MCNC: 1.9 MG/DL (ref 1.6–2.6)
MCH RBC QN AUTO: 26.3 PG (ref 26–35)
MCHC RBC AUTO-ENTMCNC: 30.5 G/DL (ref 32–34.5)
MCV RBC AUTO: 86.2 FL (ref 80–99.9)
MONOCYTES NFR BLD: 0.6 K/UL (ref 0.1–0.95)
MONOCYTES NFR BLD: 10 % (ref 2–12)
NEUTROPHILS NFR BLD: 67 % (ref 43–80)
NEUTS SEG NFR BLD: 3.85 K/UL (ref 1.8–7.3)
NITRITE UR QL STRIP: POSITIVE
PH UR STRIP: 6 [PH] (ref 5–9)
PLATELET # BLD AUTO: 146 K/UL (ref 130–450)
PMV BLD AUTO: 10.2 FL (ref 7–12)
POTASSIUM SERPL-SCNC: 3.2 MMOL/L (ref 3.5–5)
PROT SERPL-MCNC: 6.3 G/DL (ref 6.4–8.3)
PROT UR STRIP-MCNC: 30 MG/DL
RBC # BLD AUTO: 3.54 M/UL (ref 3.5–5.5)
RBC # BLD: ABNORMAL 10*6/UL
RBC #/AREA URNS HPF: ABNORMAL /HPF
SODIUM SERPL-SCNC: 139 MMOL/L (ref 132–146)
SP GR UR STRIP: 1.01 (ref 1–1.03)
TROPONIN I SERPL HS-MCNC: 10 NG/L (ref 0–9)
TROPONIN I SERPL HS-MCNC: 11 NG/L (ref 0–9)
UROBILINOGEN UR STRIP-ACNC: 0.2 EU/DL (ref 0–1)
WBC #/AREA URNS HPF: ABNORMAL /HPF
WBC OTHER # BLD: 5.8 K/UL (ref 4.5–11.5)

## 2025-01-06 PROCEDURE — 80053 COMPREHEN METABOLIC PANEL: CPT

## 2025-01-06 PROCEDURE — 96360 HYDRATION IV INFUSION INIT: CPT

## 2025-01-06 PROCEDURE — 81001 URINALYSIS AUTO W/SCOPE: CPT

## 2025-01-06 PROCEDURE — 86900 BLOOD TYPING SEROLOGIC ABO: CPT

## 2025-01-06 PROCEDURE — 87086 URINE CULTURE/COLONY COUNT: CPT

## 2025-01-06 PROCEDURE — 86901 BLOOD TYPING SEROLOGIC RH(D): CPT

## 2025-01-06 PROCEDURE — 84484 ASSAY OF TROPONIN QUANT: CPT

## 2025-01-06 PROCEDURE — 71045 X-RAY EXAM CHEST 1 VIEW: CPT

## 2025-01-06 PROCEDURE — 6370000000 HC RX 637 (ALT 250 FOR IP): Performed by: FAMILY MEDICINE

## 2025-01-06 PROCEDURE — 83735 ASSAY OF MAGNESIUM: CPT

## 2025-01-06 PROCEDURE — 93010 ELECTROCARDIOGRAM REPORT: CPT | Performed by: INTERNAL MEDICINE

## 2025-01-06 PROCEDURE — 2580000003 HC RX 258: Performed by: FAMILY MEDICINE

## 2025-01-06 PROCEDURE — 85025 COMPLETE CBC W/AUTO DIFF WBC: CPT

## 2025-01-06 PROCEDURE — 99285 EMERGENCY DEPT VISIT HI MDM: CPT

## 2025-01-06 PROCEDURE — 83880 ASSAY OF NATRIURETIC PEPTIDE: CPT

## 2025-01-06 PROCEDURE — 6360000002 HC RX W HCPCS: Performed by: EMERGENCY MEDICINE

## 2025-01-06 PROCEDURE — 82962 GLUCOSE BLOOD TEST: CPT

## 2025-01-06 PROCEDURE — G0378 HOSPITAL OBSERVATION PER HR: HCPCS

## 2025-01-06 PROCEDURE — 96361 HYDRATE IV INFUSION ADD-ON: CPT

## 2025-01-06 PROCEDURE — 87077 CULTURE AEROBIC IDENTIFY: CPT

## 2025-01-06 PROCEDURE — 86850 RBC ANTIBODY SCREEN: CPT

## 2025-01-06 PROCEDURE — 99223 1ST HOSP IP/OBS HIGH 75: CPT | Performed by: NURSE PRACTITIONER

## 2025-01-06 PROCEDURE — 96374 THER/PROPH/DIAG INJ IV PUSH: CPT

## 2025-01-06 PROCEDURE — 2500000003 HC RX 250 WO HCPCS: Performed by: EMERGENCY MEDICINE

## 2025-01-06 PROCEDURE — 93005 ELECTROCARDIOGRAM TRACING: CPT | Performed by: EMERGENCY MEDICINE

## 2025-01-06 RX ORDER — FLUTICASONE PROPIONATE 50 MCG
1 SPRAY, SUSPENSION (ML) NASAL DAILY
Status: DISCONTINUED | OUTPATIENT
Start: 2025-01-06 | End: 2025-01-06 | Stop reason: HOSPADM

## 2025-01-06 RX ORDER — CALCIUM CARBONATE 500 MG/1
500 TABLET, CHEWABLE ORAL 3 TIMES DAILY PRN
Status: DISCONTINUED | OUTPATIENT
Start: 2025-01-06 | End: 2025-01-06 | Stop reason: HOSPADM

## 2025-01-06 RX ORDER — HYDROXYZINE HYDROCHLORIDE 10 MG/1
25 TABLET, FILM COATED ORAL 3 TIMES DAILY PRN
Status: DISCONTINUED | OUTPATIENT
Start: 2025-01-06 | End: 2025-01-06 | Stop reason: HOSPADM

## 2025-01-06 RX ORDER — PANTOPRAZOLE SODIUM 40 MG/1
40 TABLET, DELAYED RELEASE ORAL
Status: DISCONTINUED | OUTPATIENT
Start: 2025-01-06 | End: 2025-01-06 | Stop reason: HOSPADM

## 2025-01-06 RX ORDER — SODIUM CHLORIDE 0.9 % (FLUSH) 0.9 %
5-40 SYRINGE (ML) INJECTION PRN
Status: DISCONTINUED | OUTPATIENT
Start: 2025-01-06 | End: 2025-01-06 | Stop reason: HOSPADM

## 2025-01-06 RX ORDER — SODIUM CHLORIDE 9 MG/ML
INJECTION, SOLUTION INTRAVENOUS CONTINUOUS
Status: DISCONTINUED | OUTPATIENT
Start: 2025-01-06 | End: 2025-01-06 | Stop reason: HOSPADM

## 2025-01-06 RX ORDER — BENZONATATE 100 MG/1
100 CAPSULE ORAL 3 TIMES DAILY PRN
Status: DISCONTINUED | OUTPATIENT
Start: 2025-01-06 | End: 2025-01-06 | Stop reason: HOSPADM

## 2025-01-06 RX ORDER — DEXTROSE MONOHYDRATE 25 G/50ML
50 INJECTION, SOLUTION INTRAVENOUS PRN
Status: DISCONTINUED | OUTPATIENT
Start: 2025-01-06 | End: 2025-01-06 | Stop reason: HOSPADM

## 2025-01-06 RX ORDER — DEXTROSE MONOHYDRATE 25 G/50ML
25 INJECTION, SOLUTION INTRAVENOUS PRN
Status: DISCONTINUED | OUTPATIENT
Start: 2025-01-06 | End: 2025-01-06 | Stop reason: HOSPADM

## 2025-01-06 RX ORDER — MECLIZINE HCL 12.5 MG 12.5 MG/1
12.5 TABLET ORAL 3 TIMES DAILY PRN
Status: DISCONTINUED | OUTPATIENT
Start: 2025-01-06 | End: 2025-01-06 | Stop reason: HOSPADM

## 2025-01-06 RX ORDER — PROPRANOLOL HYDROCHLORIDE 10 MG/1
10 TABLET ORAL DAILY
Status: DISCONTINUED | OUTPATIENT
Start: 2025-01-06 | End: 2025-01-06 | Stop reason: HOSPADM

## 2025-01-06 RX ORDER — ONDANSETRON 2 MG/ML
4 INJECTION INTRAMUSCULAR; INTRAVENOUS EVERY 6 HOURS PRN
Status: DISCONTINUED | OUTPATIENT
Start: 2025-01-06 | End: 2025-01-06 | Stop reason: HOSPADM

## 2025-01-06 RX ORDER — HYDRALAZINE HYDROCHLORIDE 20 MG/ML
10 INJECTION INTRAMUSCULAR; INTRAVENOUS EVERY 6 HOURS PRN
Status: DISCONTINUED | OUTPATIENT
Start: 2025-01-06 | End: 2025-01-06 | Stop reason: HOSPADM

## 2025-01-06 RX ORDER — SODIUM CHLORIDE 0.9 % (FLUSH) 0.9 %
5-40 SYRINGE (ML) INJECTION EVERY 12 HOURS SCHEDULED
Status: DISCONTINUED | OUTPATIENT
Start: 2025-01-06 | End: 2025-01-06 | Stop reason: HOSPADM

## 2025-01-06 RX ORDER — ENOXAPARIN SODIUM 100 MG/ML
40 INJECTION SUBCUTANEOUS DAILY
Status: DISCONTINUED | OUTPATIENT
Start: 2025-01-06 | End: 2025-01-06 | Stop reason: HOSPADM

## 2025-01-06 RX ORDER — MECOBALAMIN 5000 MCG
5 TABLET,DISINTEGRATING ORAL EVERY EVENING
Status: DISCONTINUED | OUTPATIENT
Start: 2025-01-06 | End: 2025-01-06 | Stop reason: HOSPADM

## 2025-01-06 RX ORDER — MAGNESIUM SULFATE IN WATER 40 MG/ML
2000 INJECTION, SOLUTION INTRAVENOUS PRN
Status: DISCONTINUED | OUTPATIENT
Start: 2025-01-06 | End: 2025-01-06 | Stop reason: HOSPADM

## 2025-01-06 RX ORDER — POTASSIUM CHLORIDE 7.45 MG/ML
10 INJECTION INTRAVENOUS PRN
Status: DISCONTINUED | OUTPATIENT
Start: 2025-01-06 | End: 2025-01-06 | Stop reason: HOSPADM

## 2025-01-06 RX ORDER — ONDANSETRON 4 MG/1
4 TABLET, ORALLY DISINTEGRATING ORAL EVERY 8 HOURS PRN
Status: DISCONTINUED | OUTPATIENT
Start: 2025-01-06 | End: 2025-01-06 | Stop reason: HOSPADM

## 2025-01-06 RX ORDER — ROPINIROLE 0.5 MG/1
0.5 TABLET, FILM COATED ORAL 2 TIMES DAILY
Status: DISCONTINUED | OUTPATIENT
Start: 2025-01-06 | End: 2025-01-06 | Stop reason: HOSPADM

## 2025-01-06 RX ORDER — VITAMIN B COMPLEX
1000 TABLET ORAL DAILY
Status: DISCONTINUED | OUTPATIENT
Start: 2025-01-06 | End: 2025-01-06 | Stop reason: HOSPADM

## 2025-01-06 RX ORDER — MECOBALAMIN 5000 MCG
5 TABLET,DISINTEGRATING ORAL NIGHTLY PRN
Status: DISCONTINUED | OUTPATIENT
Start: 2025-01-06 | End: 2025-01-06 | Stop reason: HOSPADM

## 2025-01-06 RX ORDER — POTASSIUM CHLORIDE 1500 MG/1
40 TABLET, EXTENDED RELEASE ORAL PRN
Status: DISCONTINUED | OUTPATIENT
Start: 2025-01-06 | End: 2025-01-06 | Stop reason: HOSPADM

## 2025-01-06 RX ORDER — SODIUM CHLORIDE 9 MG/ML
INJECTION, SOLUTION INTRAVENOUS PRN
Status: DISCONTINUED | OUTPATIENT
Start: 2025-01-06 | End: 2025-01-06 | Stop reason: HOSPADM

## 2025-01-06 RX ORDER — GLUCAGON 1 MG/ML
1 KIT INJECTION PRN
Status: DISCONTINUED | OUTPATIENT
Start: 2025-01-06 | End: 2025-01-06 | Stop reason: HOSPADM

## 2025-01-06 RX ORDER — POLYETHYLENE GLYCOL 3350 17 G/17G
17 POWDER, FOR SOLUTION ORAL DAILY PRN
Status: DISCONTINUED | OUTPATIENT
Start: 2025-01-06 | End: 2025-01-06 | Stop reason: HOSPADM

## 2025-01-06 RX ORDER — GABAPENTIN 300 MG/1
300 CAPSULE ORAL 3 TIMES DAILY
Status: DISCONTINUED | OUTPATIENT
Start: 2025-01-06 | End: 2025-01-06 | Stop reason: HOSPADM

## 2025-01-06 RX ORDER — DEXTROSE MONOHYDRATE 100 MG/ML
INJECTION, SOLUTION INTRAVENOUS CONTINUOUS PRN
Status: DISCONTINUED | OUTPATIENT
Start: 2025-01-06 | End: 2025-01-06 | Stop reason: HOSPADM

## 2025-01-06 RX ORDER — POTASSIUM CHLORIDE 1500 MG/1
40 TABLET, EXTENDED RELEASE ORAL ONCE
Status: DISCONTINUED | OUTPATIENT
Start: 2025-01-06 | End: 2025-01-06 | Stop reason: HOSPADM

## 2025-01-06 RX ORDER — INSULIN LISPRO 100 [IU]/ML
0-8 INJECTION, SOLUTION INTRAVENOUS; SUBCUTANEOUS
Status: DISCONTINUED | OUTPATIENT
Start: 2025-01-06 | End: 2025-01-06 | Stop reason: HOSPADM

## 2025-01-06 RX ORDER — ACETAMINOPHEN 325 MG/1
650 TABLET ORAL EVERY 6 HOURS PRN
Status: DISCONTINUED | OUTPATIENT
Start: 2025-01-06 | End: 2025-01-06 | Stop reason: HOSPADM

## 2025-01-06 RX ORDER — ACETAMINOPHEN 650 MG/1
650 SUPPOSITORY RECTAL EVERY 6 HOURS PRN
Status: DISCONTINUED | OUTPATIENT
Start: 2025-01-06 | End: 2025-01-06 | Stop reason: HOSPADM

## 2025-01-06 RX ADMIN — WATER 1000 MG: 1 INJECTION INTRAMUSCULAR; INTRAVENOUS; SUBCUTANEOUS at 05:57

## 2025-01-06 RX ADMIN — INSULIN LISPRO 2 UNITS: 100 INJECTION, SOLUTION INTRAVENOUS; SUBCUTANEOUS at 06:07

## 2025-01-06 RX ADMIN — SODIUM CHLORIDE: 9 INJECTION, SOLUTION INTRAVENOUS at 06:08

## 2025-01-06 ASSESSMENT — PAIN - FUNCTIONAL ASSESSMENT: PAIN_FUNCTIONAL_ASSESSMENT: NONE - DENIES PAIN

## 2025-01-06 NOTE — ED PROVIDER NOTES
LABS:  Results for orders placed or performed during the hospital encounter of 01/06/25   CBC with Auto Differential   Result Value Ref Range    WBC 5.8 4.5 - 11.5 k/uL    RBC 3.54 3.50 - 5.50 m/uL    Hemoglobin 9.3 (L) 11.5 - 15.5 g/dL    Hematocrit 30.5 (L) 34.0 - 48.0 %    MCV 86.2 80.0 - 99.9 fL    MCH 26.3 26.0 - 35.0 pg    MCHC 30.5 (L) 32.0 - 34.5 g/dL    RDW 21.2 (H) 11.5 - 15.0 %    Platelets 146 130 - 450 k/uL    MPV 10.2 7.0 - 12.0 fL    Neutrophils % PENDING %    Lymphocytes % PENDING %    Monocytes % PENDING %    Eosinophils % PENDING %    Basophils % PENDING %    Immature Granulocytes % PENDING 0 %    Metamyelocytes PENDING 0 %    Myelocytes PENDING 0 %    Promyelocytes PENDING 0 %    Blasts PENDING 0 %    Atypical Lymphocytes PENDING %    Plasma Cells PENDING %    Other Cell PENDING %    nRBC PENDING per 100 WBC    Neutrophils Absolute PENDING k/uL    Lymphocytes Absolute PENDING k/uL    Monocytes Absolute PENDING k/uL    Eosinophils Absolute PENDING k/uL    Basophils Absolute PENDING 0.0 - 0.2 k/uL    Immature Granulocytes Absolute PENDING 0.00 - 0.30 k/uL    Absolute Bands PENDING k/uL    Metamyelocytes Absolute PENDING 0 k/uL    Myelocytes Absolute PENDING 0 k/uL    Promyelocytes Absolute PENDING 0 k/uL    Blasts Absolute PENDING 0 k/uL    Atypical Lymphocytes Absolute PENDING k/uL    ABSOLUTE PLASMA CELLS PENDING k/uL    WBC Morphology PENDING     RBC Morphology PENDING     Platelet Estimate PENDING    Comprehensive Metabolic Panel   Result Value Ref Range    Sodium 139 132 - 146 mmol/L    Potassium 3.2 (L) 3.5 - 5.0 mmol/L    Chloride 105 98 - 107 mmol/L    CO2 24 22 - 29 mmol/L    Anion Gap 10 7 - 16 mmol/L    Glucose 225 (H) 74 - 99 mg/dL    BUN 12 6 - 23 mg/dL    Creatinine 0.7 0.50 - 1.00 mg/dL    Est, Glom Filt Rate >90 >60 mL/min/1.73m2    Calcium 9.1 8.6 - 10.2 mg/dL    Total Protein 6.3 (L) 6.4 - 8.3 g/dL    Albumin 3.2 (L) 3.5 - 5.2 g/dL    Total Bilirubin 0.3 0.0 - 1.2 mg/dL

## 2025-01-06 NOTE — H&P
Hospitalist History & Physical      PCP: Jesus Alvarado DO    Date of Service: Pt seen/examined on 1/6/2025     Chief Complaint:  had concerns including Illness (Pt states she has been feeling on/off dizziness recently and has an autoimmune disease. Pt states she was scheduled to get a blood transfusion but feels her hemoglobin is low ) and Dizziness.    History of Present Illness:    Ms. Haven Barber, a 74 y.o. year old female  who  has a past medical history of Arthritis, Cataract, Chronic fatigue syndrome, Depression, Diabetes mellitus (HCC), Fibromyalgia, Hypertension, Memory loss, Mononucleosis, Psoriasis, Sciatica, Sleep apnea, and Vertigo.     Patient presented to the ED for complaints of dizziness and fear of falling.  She reports this has been intermittent for months. Around thanksgiving she was dizzy resulting in a fall and admission and subsequent discharge to rehab.  She just returned home approximately a week ago.  She denies any palpitations, chest pain, syncope, unilateral weakness or paresthesia, fevers, chills, N/V/D.  She does report some exertional SOB that has been ongoing.  She reports poor intake d/t access.  She reports she has a bad back and is unable to cook for herself or get around well.  She is not interested in going to rehab again. She has home PT/OT set up but they have not come yet.      ER COURSE:  In ED vitals were stable.  Laboratory workup significant for hypokalemia, elevated LFTs, hyperglycemia, normocytic anemia.  UA consistent with UTI.  Culture pending.  CXR reviewed and with a right perihilar opacity.  EKG with T wave inversions and ST depression.  She was given ceftriaxone in ED.    PREVIOUS HOSPITALIZATION:  Admission from 12/1 - 12/9 reviewed.  Admitted and treated for sepsis secondary to UTI and pneumonia, acute hypoxic respiratory failure, GI bleed      Past Medical History:        Diagnosis Date    Arthritis     Cataract     right    Chronic fatigue

## 2025-01-06 NOTE — ED NOTES
Patient does not wish to stay in hospital any further.  Encouraged patient to stay, but patient declined.  Patient signed AMA form & placed on paper chart.   No

## 2025-01-06 NOTE — CONSULTS
11/12/2023 05:10 AM    PROBNP 71 02/06/2023 11:05 PM    PROBNP 97 01/09/2023 11:39 AM     PT/INR: No results for input(s): \"PROTIME\", \"INR\" in the last 72 hours.  APTT:No results for input(s): \"APTT\" in the last 72 hours.  TROPONIN:  Lab Results   Component Value Date/Time    TROPHS 10 01/06/2025 05:24 AM    TROPHS 11 01/06/2025 03:25 AM    TROPHS 15 12/01/2024 02:58 PM    TROPHS 9 10/21/2024 03:30 PM    TROPHS 22 11/12/2023 06:15 AM     CK:No results found for: \"CKTOTAL\"  FASTING LIPID PANEL:  Lab Results   Component Value Date/Time    CHOL 201 10/08/2024 02:14 PM    HDL 63 10/08/2024 02:14 PM    TRIG 84 10/08/2024 02:14 PM     LIVER PROFILE:  Recent Labs     01/06/25  0325   AST 66*   ALT 36*     Assessment/plan follows per Dr. Lowe.    NOTE: This report was transcribed using voice recognition software. Every effort was made to ensure accuracy; however, inadvertent computerized transcription errors may be present.      MORALES Jones-CNP  [facility]

## 2025-01-08 LAB
MICROORGANISM SPEC CULT: ABNORMAL
SERVICE CMNT-IMP: ABNORMAL
SPECIMEN DESCRIPTION: ABNORMAL

## 2025-01-09 ENCOUNTER — TELEPHONE (OUTPATIENT)
Dept: FAMILY MEDICINE CLINIC | Age: 75
End: 2025-01-09

## 2025-01-09 DIAGNOSIS — N39.0 URINARY TRACT INFECTION WITHOUT HEMATURIA, SITE UNSPECIFIED: Primary | ICD-10-CM

## 2025-01-09 RX ORDER — CIPROFLOXACIN 250 MG/1
250 TABLET, FILM COATED ORAL 2 TIMES DAILY
Qty: 6 TABLET | Refills: 0 | Status: SHIPPED | OUTPATIENT
Start: 2025-01-09 | End: 2025-01-12

## 2025-01-09 NOTE — TELEPHONE ENCOUNTER
Patient states she was in the ER 1/06/25 she has a UTI. She been having bladder spasms and incontinence wants to know if you can call something in. She is to weak to come into the office and I offered her a VV.

## 2025-01-09 NOTE — TELEPHONE ENCOUNTER
On chart review looks like patient was supposed to be admitted and then left AMA so I do recommend she go back to the hospital.

## 2025-01-09 NOTE — TELEPHONE ENCOUNTER
Called in 3 days of Cipro based on culture result.  However the hospital does not just keep people for simple UTIs so given this information I would strongly encourage her to go back to the hospital for further evaluation.  [facility] is not the only hospital in the area she could also explore [facility] or [facility].

## 2025-01-14 ENCOUNTER — TELEPHONE (OUTPATIENT)
Dept: FAMILY MEDICINE CLINIC | Age: 75
End: 2025-01-14

## 2025-01-14 DIAGNOSIS — I10 ESSENTIAL HYPERTENSION, MALIGNANT: Primary | ICD-10-CM

## 2025-01-15 ENCOUNTER — TELEPHONE (OUTPATIENT)
Dept: FAMILY MEDICINE CLINIC | Age: 75
End: 2025-01-15

## 2025-01-15 DIAGNOSIS — E11.65 TYPE 2 DIABETES MELLITUS WITH HYPERGLYCEMIA, UNSPECIFIED WHETHER LONG TERM INSULIN USE (HCC): Primary | ICD-10-CM

## 2025-01-15 NOTE — TELEPHONE ENCOUNTER
Haven needs refill on trulicity.   Name of Medication(s) Requested:  Requested Prescriptions      No prescriptions requested or ordered in this encounter       Medication is on current medication list Yes    Dosage and directions were verified? Yes    Quantity verified: 90 day supply     Pharmacy Verified?  Yes    Last Appointment:  10/8/2024    Future appts:  Future Appointments   Date Time Provider Department Center   1/28/2025  1:45 PM Silas Quigley MD [facility]   1/31/2025  1:50 PM Jesus Alvarado DO [facility] ECC DEP        (If no appt send self scheduling link. .REFILLAPPT)  Scheduling request sent?     [] Yes  [] No    Does patient need updated?  [] Yes  [] No

## 2025-01-20 ENCOUNTER — HOSPITAL ENCOUNTER (OUTPATIENT)
Age: 75
Discharge: HOME | End: 2025-01-20
Payer: MEDICARE

## 2025-01-20 LAB
BASOPHILS # BLD: 0.04 K/UL (ref 0–0.2)
BASOPHILS NFR BLD: 1 % (ref 0–2)
EOSINOPHIL # BLD: 0.48 K/UL (ref 0.05–0.5)
EOSINOPHILS RELATIVE PERCENT: 8 % (ref 0–6)
ERYTHROCYTE [DISTWIDTH] IN BLOOD BY AUTOMATED COUNT: 20.8 % (ref 11.5–15)
HCT VFR BLD AUTO: 16.9 % (ref 34–48)
HGB BLD-MCNC: 5 G/DL (ref 11.5–15.5)
IMM GRANULOCYTES # BLD AUTO: 0.04 K/UL (ref 0–0.58)
IMM GRANULOCYTES NFR BLD: 1 % (ref 0–5)
LYMPHOCYTES NFR BLD: 1.11 K/UL (ref 1.5–4)
LYMPHOCYTES RELATIVE PERCENT: 18 % (ref 20–42)
MCH RBC QN AUTO: 25 PG (ref 26–35)
MCHC RBC AUTO-ENTMCNC: 29.6 G/DL (ref 32–34.5)
MCV RBC AUTO: 84.5 FL (ref 80–99.9)
MONOCYTES NFR BLD: 0.56 K/UL (ref 0.1–0.95)
MONOCYTES NFR BLD: 9 % (ref 2–12)
NEUTROPHILS NFR BLD: 65 % (ref 43–80)
NEUTS SEG NFR BLD: 4.11 K/UL (ref 1.8–7.3)
PLATELET # BLD AUTO: 214 K/UL (ref 130–450)
PMV BLD AUTO: 11 FL (ref 7–12)
RBC # BLD AUTO: 2 M/UL (ref 3.5–5.5)
RBC # BLD: ABNORMAL 10*6/UL
WBC OTHER # BLD: 6.3 K/UL (ref 4.5–11.5)

## 2025-01-20 PROCEDURE — 85025 COMPLETE CBC W/AUTO DIFF WBC: CPT

## 2025-01-20 PROCEDURE — 86923 COMPATIBILITY TEST ELECTRIC: CPT

## 2025-01-20 PROCEDURE — 36415 COLL VENOUS BLD VENIPUNCTURE: CPT

## 2025-01-20 PROCEDURE — 86900 BLOOD TYPING SEROLOGIC ABO: CPT

## 2025-01-20 PROCEDURE — 86850 RBC ANTIBODY SCREEN: CPT

## 2025-01-20 PROCEDURE — 86901 BLOOD TYPING SEROLOGIC RH(D): CPT

## 2025-01-21 ENCOUNTER — HOSPITAL ENCOUNTER (EMERGENCY)
Age: 75
Discharge: ANOTHER HOSPITAL | End: 2025-01-21
Attending: EMERGENCY MEDICINE
Payer: MEDICARE

## 2025-01-21 ENCOUNTER — HOSPITAL ENCOUNTER (INPATIENT)
Age: 75
LOS: 4 days | Discharge: HOME HEALTH | End: 2025-01-25
Attending: INTERNAL MEDICINE | Admitting: STUDENT IN AN ORGANIZED HEALTH CARE EDUCATION/TRAINING PROGRAM
Payer: MEDICARE

## 2025-01-21 VITALS
HEART RATE: 85 BPM | DIASTOLIC BLOOD PRESSURE: 49 MMHG | OXYGEN SATURATION: 99 % | TEMPERATURE: 98.6 F | SYSTOLIC BLOOD PRESSURE: 105 MMHG | BODY MASS INDEX: 31.87 KG/M2 | RESPIRATION RATE: 17 BRPM | WEIGHT: 179.9 LBS

## 2025-01-21 DIAGNOSIS — D62 ACUTE BLOOD LOSS ANEMIA: Primary | ICD-10-CM

## 2025-01-21 LAB
ALBUMIN SERPL-MCNC: 2.8 G/DL (ref 3.5–5.2)
ALBUMIN SERPL-MCNC: 3 G/DL (ref 3.5–5.2)
ALP SERPL-CCNC: 113 U/L (ref 35–104)
ALP SERPL-CCNC: 97 U/L (ref 35–104)
ALT SERPL-CCNC: 22 U/L (ref 0–32)
ALT SERPL-CCNC: 23 U/L (ref 0–32)
ANION GAP SERPL CALCULATED.3IONS-SCNC: 10 MMOL/L (ref 7–16)
ANION GAP SERPL CALCULATED.3IONS-SCNC: 12 MMOL/L (ref 7–16)
AST SERPL-CCNC: 25 U/L (ref 0–31)
AST SERPL-CCNC: 30 U/L (ref 0–31)
BASOPHILS # BLD: 0.13 K/UL (ref 0–0.2)
BASOPHILS NFR BLD: 2 % (ref 0–2)
BILIRUB SERPL-MCNC: 0.6 MG/DL (ref 0–1.2)
BILIRUB SERPL-MCNC: 1.7 MG/DL (ref 0–1.2)
BUN SERPL-MCNC: 13 MG/DL (ref 6–23)
BUN SERPL-MCNC: 15 MG/DL (ref 6–23)
CALCIUM SERPL-MCNC: 8.4 MG/DL (ref 8.6–10.2)
CALCIUM SERPL-MCNC: 8.5 MG/DL (ref 8.6–10.2)
CHLORIDE SERPL-SCNC: 106 MMOL/L (ref 98–107)
CHLORIDE SERPL-SCNC: 107 MMOL/L (ref 98–107)
CO2 SERPL-SCNC: 19 MMOL/L (ref 22–29)
CO2 SERPL-SCNC: 20 MMOL/L (ref 22–29)
CREAT SERPL-MCNC: 0.6 MG/DL (ref 0.5–1)
CREAT SERPL-MCNC: 0.7 MG/DL (ref 0.5–1)
EOSINOPHIL # BLD: 0.39 K/UL (ref 0.05–0.5)
EOSINOPHILS RELATIVE PERCENT: 5 % (ref 0–6)
ERYTHROCYTE [DISTWIDTH] IN BLOOD BY AUTOMATED COUNT: 20.9 % (ref 11.5–15)
FIBRINOGEN PPP-MCNC: 226 MG/DL (ref 200–400)
GFR, ESTIMATED: >90 ML/MIN/1.73M2
GFR, ESTIMATED: >90 ML/MIN/1.73M2
GLUCOSE SERPL-MCNC: 163 MG/DL (ref 74–99)
GLUCOSE SERPL-MCNC: 238 MG/DL (ref 74–99)
HCT VFR BLD AUTO: 14 % (ref 34–48)
HCT VFR BLD AUTO: 15.8 % (ref 34–48)
HGB BLD-MCNC: 4.1 G/DL (ref 11.5–15.5)
HGB BLD-MCNC: 4.9 G/DL (ref 11.5–15.5)
INR PPP: 1.3
LACTATE BLDV-SCNC: 1.9 MMOL/L (ref 0.5–2.2)
LACTATE BLDV-SCNC: 3.7 MMOL/L (ref 0.5–1.9)
LYMPHOCYTES NFR BLD: 0.9 K/UL (ref 1.5–4)
LYMPHOCYTES RELATIVE PERCENT: 12 % (ref 20–42)
MAGNESIUM SERPL-MCNC: 1.7 MG/DL (ref 1.6–2.6)
MAGNESIUM SERPL-MCNC: 1.8 MG/DL (ref 1.6–2.6)
MCH RBC QN AUTO: 24.4 PG (ref 26–35)
MCHC RBC AUTO-ENTMCNC: 29.3 G/DL (ref 32–34.5)
MCV RBC AUTO: 83.3 FL (ref 80–99.9)
MONOCYTES NFR BLD: 0.45 K/UL (ref 0.1–0.95)
MONOCYTES NFR BLD: 6 % (ref 2–12)
NEUTROPHILS NFR BLD: 75 % (ref 43–80)
NEUTS SEG NFR BLD: 5.53 K/UL (ref 1.8–7.3)
NUCLEATED RED BLOOD CELLS: 1 PER 100 WBC
PARTIAL THROMBOPLASTIN TIME: 28.8 SEC (ref 24.5–35.1)
PHOSPHATE SERPL-MCNC: 2.7 MG/DL (ref 2.5–4.5)
PLATELET # BLD AUTO: 181 K/UL (ref 130–450)
PMV BLD AUTO: 11 FL (ref 7–12)
POTASSIUM SERPL-SCNC: 3.3 MMOL/L (ref 3.5–5)
POTASSIUM SERPL-SCNC: 3.5 MMOL/L (ref 3.5–5)
PROT SERPL-MCNC: 5.2 G/DL (ref 6.4–8.3)
PROT SERPL-MCNC: 5.5 G/DL (ref 6.4–8.3)
PROTHROMBIN TIME: 14.1 SEC (ref 9.3–12.4)
RBC # BLD AUTO: 1.68 M/UL (ref 3.5–5.5)
RBC # BLD: ABNORMAL 10*6/UL
SODIUM SERPL-SCNC: 136 MMOL/L (ref 132–146)
SODIUM SERPL-SCNC: 138 MMOL/L (ref 132–146)
TROPONIN I SERPL HS-MCNC: 14 NG/L (ref 0–9)
TROPONIN I SERPL HS-MCNC: 14 NG/L (ref 0–9)
WBC OTHER # BLD: 7.4 K/UL (ref 4.5–11.5)

## 2025-01-21 PROCEDURE — 2500000003 HC RX 250 WO HCPCS: Performed by: NURSE PRACTITIONER

## 2025-01-21 PROCEDURE — 99291 CRITICAL CARE FIRST HOUR: CPT | Performed by: STUDENT IN AN ORGANIZED HEALTH CARE EDUCATION/TRAINING PROGRAM

## 2025-01-21 PROCEDURE — 83605 ASSAY OF LACTIC ACID: CPT

## 2025-01-21 PROCEDURE — 6360000002 HC RX W HCPCS: Performed by: NURSE PRACTITIONER

## 2025-01-21 PROCEDURE — 85730 THROMBOPLASTIN TIME PARTIAL: CPT

## 2025-01-21 PROCEDURE — 36430 TRANSFUSION BLD/BLD COMPNT: CPT

## 2025-01-21 PROCEDURE — 2000000000 HC ICU R&B

## 2025-01-21 PROCEDURE — 96374 THER/PROPH/DIAG INJ IV PUSH: CPT

## 2025-01-21 PROCEDURE — 6370000000 HC RX 637 (ALT 250 FOR IP): Performed by: EMERGENCY MEDICINE

## 2025-01-21 PROCEDURE — 6360000002 HC RX W HCPCS: Performed by: EMERGENCY MEDICINE

## 2025-01-21 PROCEDURE — 83735 ASSAY OF MAGNESIUM: CPT

## 2025-01-21 PROCEDURE — 93005 ELECTROCARDIOGRAM TRACING: CPT | Performed by: EMERGENCY MEDICINE

## 2025-01-21 PROCEDURE — 30233N1 TRANSFUSION OF NONAUTOLOGOUS RED BLOOD CELLS INTO PERIPHERAL VEIN, PERCUTANEOUS APPROACH: ICD-10-PCS | Performed by: STUDENT IN AN ORGANIZED HEALTH CARE EDUCATION/TRAINING PROGRAM

## 2025-01-21 PROCEDURE — P9016 RBC LEUKOCYTES REDUCED: HCPCS

## 2025-01-21 PROCEDURE — 80053 COMPREHEN METABOLIC PANEL: CPT

## 2025-01-21 PROCEDURE — 86923 COMPATIBILITY TEST ELECTRIC: CPT

## 2025-01-21 PROCEDURE — 99222 1ST HOSP IP/OBS MODERATE 55: CPT | Performed by: STUDENT IN AN ORGANIZED HEALTH CARE EDUCATION/TRAINING PROGRAM

## 2025-01-21 PROCEDURE — 99291 CRITICAL CARE FIRST HOUR: CPT | Performed by: NURSE PRACTITIONER

## 2025-01-21 PROCEDURE — 86901 BLOOD TYPING SEROLOGIC RH(D): CPT

## 2025-01-21 PROCEDURE — 85014 HEMATOCRIT: CPT

## 2025-01-21 PROCEDURE — 86850 RBC ANTIBODY SCREEN: CPT

## 2025-01-21 PROCEDURE — 2580000003 HC RX 258

## 2025-01-21 PROCEDURE — 85018 HEMOGLOBIN: CPT

## 2025-01-21 PROCEDURE — 6360000002 HC RX W HCPCS

## 2025-01-21 PROCEDURE — 99285 EMERGENCY DEPT VISIT HI MDM: CPT

## 2025-01-21 PROCEDURE — 85610 PROTHROMBIN TIME: CPT

## 2025-01-21 PROCEDURE — 2580000003 HC RX 258: Performed by: NURSE PRACTITIONER

## 2025-01-21 PROCEDURE — 84100 ASSAY OF PHOSPHORUS: CPT

## 2025-01-21 PROCEDURE — 85384 FIBRINOGEN ACTIVITY: CPT

## 2025-01-21 PROCEDURE — 86900 BLOOD TYPING SEROLOGIC ABO: CPT

## 2025-01-21 PROCEDURE — 87081 CULTURE SCREEN ONLY: CPT

## 2025-01-21 PROCEDURE — 85025 COMPLETE CBC W/AUTO DIFF WBC: CPT

## 2025-01-21 PROCEDURE — 84484 ASSAY OF TROPONIN QUANT: CPT

## 2025-01-21 RX ORDER — ONDANSETRON 4 MG/1
4 TABLET, ORALLY DISINTEGRATING ORAL EVERY 8 HOURS PRN
Status: DISCONTINUED | OUTPATIENT
Start: 2025-01-21 | End: 2025-01-25 | Stop reason: HOSPADM

## 2025-01-21 RX ORDER — ACETAMINOPHEN 325 MG/1
650 TABLET ORAL EVERY 6 HOURS PRN
Status: DISCONTINUED | OUTPATIENT
Start: 2025-01-21 | End: 2025-01-25 | Stop reason: HOSPADM

## 2025-01-21 RX ORDER — ACETAMINOPHEN 650 MG/1
650 SUPPOSITORY RECTAL EVERY 6 HOURS PRN
Status: DISCONTINUED | OUTPATIENT
Start: 2025-01-21 | End: 2025-01-25 | Stop reason: HOSPADM

## 2025-01-21 RX ORDER — SODIUM CHLORIDE 9 MG/ML
INJECTION, SOLUTION INTRAVENOUS PRN
Status: DISCONTINUED | OUTPATIENT
Start: 2025-01-21 | End: 2025-01-25 | Stop reason: HOSPADM

## 2025-01-21 RX ORDER — SODIUM CHLORIDE 9 MG/ML
INJECTION, SOLUTION INTRAVENOUS PRN
Status: DISCONTINUED | OUTPATIENT
Start: 2025-01-21 | End: 2025-01-21 | Stop reason: HOSPADM

## 2025-01-21 RX ORDER — SODIUM CHLORIDE 0.9 % (FLUSH) 0.9 %
5-40 SYRINGE (ML) INJECTION EVERY 12 HOURS SCHEDULED
Status: DISCONTINUED | OUTPATIENT
Start: 2025-01-21 | End: 2025-01-25 | Stop reason: HOSPADM

## 2025-01-21 RX ORDER — SODIUM CHLORIDE 0.9 % (FLUSH) 0.9 %
5-40 SYRINGE (ML) INJECTION PRN
Status: DISCONTINUED | OUTPATIENT
Start: 2025-01-21 | End: 2025-01-25 | Stop reason: HOSPADM

## 2025-01-21 RX ORDER — POTASSIUM CHLORIDE 1500 MG/1
40 TABLET, EXTENDED RELEASE ORAL ONCE
Status: COMPLETED | OUTPATIENT
Start: 2025-01-21 | End: 2025-01-21

## 2025-01-21 RX ORDER — ONDANSETRON 2 MG/ML
4 INJECTION INTRAMUSCULAR; INTRAVENOUS EVERY 6 HOURS PRN
Status: DISCONTINUED | OUTPATIENT
Start: 2025-01-21 | End: 2025-01-25 | Stop reason: HOSPADM

## 2025-01-21 RX ORDER — PANTOPRAZOLE SODIUM 40 MG/10ML
80 INJECTION, POWDER, LYOPHILIZED, FOR SOLUTION INTRAVENOUS ONCE
Status: COMPLETED | OUTPATIENT
Start: 2025-01-21 | End: 2025-01-21

## 2025-01-21 RX ADMIN — POTASSIUM CHLORIDE 40 MEQ: 1500 TABLET, EXTENDED RELEASE ORAL at 15:44

## 2025-01-21 RX ADMIN — PANTOPRAZOLE SODIUM 80 MG: 40 INJECTION, POWDER, FOR SOLUTION INTRAVENOUS at 15:43

## 2025-01-21 RX ADMIN — SODIUM CHLORIDE, PRESERVATIVE FREE 40 MG: 5 INJECTION INTRAVENOUS at 22:26

## 2025-01-21 RX ADMIN — SODIUM CHLORIDE, PRESERVATIVE FREE 10 ML: 5 INJECTION INTRAVENOUS at 22:46

## 2025-01-21 NOTE — ED NOTES
Nurse to nurse called to [facility] ICU. Pt going to bed 212. Accepting RN updated on pt plan and condition. Pt to be transported to accepting facility via PAS. Will continue to closely monitor pt.

## 2025-01-21 NOTE — ED PROVIDER NOTES
DEPARTMENT COURSE    Vitals:    Vitals:    01/21/25 1502 01/21/25 1517 01/21/25 1532 01/21/25 1615   BP: (!) 118/52  (!) 107/53    Pulse: (!) 102 82 93    Resp: 14 20 18    Temp:       TempSrc:       SpO2:  97% 98%    Weight:    81.6 kg (179 lb 14.3 oz)       Patient was given the following medications:  Medications   0.9 % sodium chloride infusion (has no administration in time range)   ferric gluconate (FERRLECIT) 25 mg in sodium chloride 0.9 % 50 mL (test dose) IVPB (has no administration in time range)     Followed by   ferric gluconate (FERRLECIT) 100 mg in sodium chloride 0.9 % 100 mL IVPB (has no administration in time range)     Followed by   ferric gluconate (FERRLECIT) 125 mg in sodium chloride 0.9 % 100 mL IVPB (has no administration in time range)   pantoprazole (PROTONIX) injection 80 mg (80 mg IntraVENous Given 1/21/25 1543)   potassium chloride (KLOR-CON M) extended release tablet 40 mEq (40 mEq Oral Given 1/21/25 1544)                  Medical Decision Making/Differential Diagnosis:    CC/HPI Summary, Social Determinants of health, Records Reviewed, DDx, testing done/not done, ED Course, Reassessment, disposition considerations/shared decision making with patient, consults, disposition:       The following labs are interpreted by me:    Recent Results (from the past 24 hour(s))   CBC with Auto Differential    Collection Time: 01/21/25  1:15 PM   Result Value Ref Range    WBC 7.4 4.5 - 11.5 k/uL    RBC 1.68 (L) 3.50 - 5.50 m/uL    Hemoglobin 4.1 (LL) 11.5 - 15.5 g/dL    Hematocrit 14.0 (LL) 34.0 - 48.0 %    MCV 83.3 80.0 - 99.9 fL    MCH 24.4 (L) 26.0 - 35.0 pg    MCHC 29.3 (L) 32.0 - 34.5 g/dL    RDW 20.9 (H) 11.5 - 15.0 %    Platelets 181 130 - 450 k/uL    MPV 11.0 7.0 - 12.0 fL    Neutrophils % 75 43.0 - 80.0 %    Lymphocytes % 12 (L) 20.0 - 42.0 %    Monocytes % 6 2.0 - 12.0 %    Eosinophils % 5 0 - 6 %    Basophils % 2 0.0 - 2.0 %    nRBC 1 per 100 WBC    Neutrophils Absolute 5.53 1.80 - 7.30

## 2025-01-21 NOTE — CONSULTS
flexible sigmoidoscopy on file    PMH:       Diagnosis Date    Arthritis     Cataract     right    Chronic fatigue syndrome     Depression     Diabetes mellitus (HCC)     SC injection weekly    Fibromyalgia     Hypertension     Memory loss     Mononucleosis     Psoriasis     Sciatica     Sleep apnea     no c-pap    Vertigo         PSH:       Procedure Laterality Date    CARPAL TUNNEL RELEASE Bilateral     CATARACT REMOVAL Right     COLONOSCOPY      OTHER SURGICAL HISTORY Bilateral 2021    BILATERAL L3,L4 MEDIAL BRANCH AND L5 DORSAL RAMUS RADIOFREQUENCY    PAIN MANAGEMENT PROCEDURE Bilateral 2021    BILATERAL L3,4 MEDIAL BRANCH AND L5 DORSAL RAMUS  RADIOFREQUENCY ABLATION WITH SEDATION  (CPT 35245) performed by Silas Quigley MD at [facility] OR    SPINE SURGERY N/A 2021    T11 KYPHOPLASTY (2 C-ARMS) performed by Silas Quigley MD at [facility] OR    TONSILLECTOMY AND ADENOIDECTOMY      UPPER GASTROINTESTINAL ENDOSCOPY N/A 2019    EGD BIOPSY performed by Katheryn Mooney MD at [facility] ENDOSCOPY    UPPER GASTROINTESTINAL ENDOSCOPY N/A 03/10/2021    EGD ESOPHAGOGASTRODUODENOSCOPY performed by Ar Braun MD at [facility] ENDOSCOPY    UPPER GASTROINTESTINAL ENDOSCOPY N/A 2024    ENTEROSCOPY PUSH BIOPSY performed by Mansoor Tyson MD at [facility] ENDOSCOPY    UPPER GASTROINTESTINAL ENDOSCOPY  2024    ENTEROSCOPY PUSH CONTROL HEMORRHAGE performed by Mansoor Tyson MD at [facility] ENDOSCOPY        Family History:       Problem Relation Age of Onset    Hypertension Father     Heart Disease Father     Hypertension Paternal Grandfather     Heart Disease Paternal Grandfather         Social History:   Social History     Tobacco Use    Smoking status: Former     Current packs/day: 0.00     Average packs/day: 1 pack/day for 13.0 years (13.0 ttl pk-yrs)     Types: Cigarettes     Start date:      Quit date:      Years since quittin.0    Smokeless tobacco: Never   Vaping Use    Vaping status: Never Used

## 2025-01-21 NOTE — CONSENT
Informed Consent for Blood Component Transfusion Note    I have discussed with the patient the rationale for blood component transfusion; its benefits in treating or preventing fatigue, organ damage, or death; and its risk which includes mild transfusion reactions, rare risk of blood borne infection, or more serious but rare reactions. I have discussed the alternatives to transfusion, including the risk and consequences of not receiving transfusion. The patient had an opportunity to ask questions and had agreed to proceed with transfusion of blood components.    Electronically signed by Js Jones MD on 1/21/25 at 1:38 PM EST

## 2025-01-22 PROBLEM — D64.9 SEVERE ANEMIA: Status: ACTIVE | Noted: 2025-01-22

## 2025-01-22 PROBLEM — E86.1 HYPOTENSION DUE TO HYPOVOLEMIA: Status: ACTIVE | Noted: 2025-01-22

## 2025-01-22 PROBLEM — E87.6 HYPOKALEMIA: Status: ACTIVE | Noted: 2025-01-22

## 2025-01-22 PROBLEM — F32.A DEPRESSION: Status: ACTIVE | Noted: 2025-01-22

## 2025-01-22 PROBLEM — K25.4 GASTROINTESTINAL HEMORRHAGE ASSOCIATED WITH GASTRIC ULCER: Status: ACTIVE | Noted: 2019-01-14

## 2025-01-22 LAB
ALBUMIN SERPL-MCNC: 2.8 G/DL (ref 3.5–5.2)
ALP SERPL-CCNC: 96 U/L (ref 35–104)
ALT SERPL-CCNC: 21 U/L (ref 0–32)
AMMONIA PLAS-SCNC: 61 UMOL/L (ref 11–51)
ANION GAP SERPL CALCULATED.3IONS-SCNC: 11 MMOL/L (ref 7–16)
AST SERPL-CCNC: 29 U/L (ref 0–31)
BILIRUB SERPL-MCNC: 2.1 MG/DL (ref 0–1.2)
BUN SERPL-MCNC: 13 MG/DL (ref 6–23)
CA-I BLD-SCNC: 1.19 MMOL/L (ref 1.15–1.33)
CALCIUM SERPL-MCNC: 8.3 MG/DL (ref 8.6–10.2)
CHLORIDE SERPL-SCNC: 109 MMOL/L (ref 98–107)
CO2 SERPL-SCNC: 19 MMOL/L (ref 22–29)
CREAT SERPL-MCNC: 0.7 MG/DL (ref 0.5–1)
ERYTHROCYTE [DISTWIDTH] IN BLOOD BY AUTOMATED COUNT: 17.1 % (ref 11.5–15)
FIBRINOGEN PPP-MCNC: 218 MG/DL (ref 200–400)
GFR, ESTIMATED: >90 ML/MIN/1.73M2
GLUCOSE BLD-MCNC: 307 MG/DL (ref 74–99)
GLUCOSE BLD-MCNC: 364 MG/DL (ref 74–99)
GLUCOSE SERPL-MCNC: 157 MG/DL (ref 74–99)
HCT VFR BLD AUTO: 22.3 % (ref 34–48)
HCT VFR BLD AUTO: 23 % (ref 34–48)
HGB BLD-MCNC: 7.3 G/DL (ref 11.5–15.5)
HGB BLD-MCNC: 7.3 G/DL (ref 11.5–15.5)
INR PPP: 1.4
IRON SATN MFR SERPL: 35 % (ref 15–50)
IRON SERPL-MCNC: 106 UG/DL (ref 37–145)
LACTATE BLDV-SCNC: 1.4 MMOL/L (ref 0.5–2.2)
MAGNESIUM SERPL-MCNC: 1.8 MG/DL (ref 1.6–2.6)
MCH RBC QN AUTO: 26.8 PG (ref 26–35)
MCHC RBC AUTO-ENTMCNC: 31.7 G/DL (ref 32–34.5)
MCV RBC AUTO: 84.6 FL (ref 80–99.9)
PARTIAL THROMBOPLASTIN TIME: 27.3 SEC (ref 24.5–35.1)
PHOSPHATE SERPL-MCNC: 3.1 MG/DL (ref 2.5–4.5)
PLATELET # BLD AUTO: 153 K/UL (ref 130–450)
PMV BLD AUTO: 11 FL (ref 7–12)
POTASSIUM SERPL-SCNC: 3.7 MMOL/L (ref 3.5–5)
PROT SERPL-MCNC: 5.1 G/DL (ref 6.4–8.3)
PROTHROMBIN TIME: 14.6 SEC (ref 9.3–12.4)
RBC # BLD AUTO: 2.72 M/UL (ref 3.5–5.5)
SODIUM SERPL-SCNC: 139 MMOL/L (ref 132–146)
TIBC SERPL-MCNC: 305 UG/DL (ref 250–450)
WBC OTHER # BLD: 5.8 K/UL (ref 4.5–11.5)

## 2025-01-22 PROCEDURE — P9016 RBC LEUKOCYTES REDUCED: HCPCS

## 2025-01-22 PROCEDURE — 85610 PROTHROMBIN TIME: CPT

## 2025-01-22 PROCEDURE — 6370000000 HC RX 637 (ALT 250 FOR IP): Performed by: NURSE PRACTITIONER

## 2025-01-22 PROCEDURE — 85027 COMPLETE CBC AUTOMATED: CPT

## 2025-01-22 PROCEDURE — 36430 TRANSFUSION BLD/BLD COMPNT: CPT

## 2025-01-22 PROCEDURE — 2580000003 HC RX 258: Performed by: NURSE PRACTITIONER

## 2025-01-22 PROCEDURE — 85384 FIBRINOGEN ACTIVITY: CPT

## 2025-01-22 PROCEDURE — 82962 GLUCOSE BLOOD TEST: CPT

## 2025-01-22 PROCEDURE — 6370000000 HC RX 637 (ALT 250 FOR IP): Performed by: INTERNAL MEDICINE

## 2025-01-22 PROCEDURE — 85730 THROMBOPLASTIN TIME PARTIAL: CPT

## 2025-01-22 PROCEDURE — 83550 IRON BINDING TEST: CPT

## 2025-01-22 PROCEDURE — 99233 SBSQ HOSP IP/OBS HIGH 50: CPT | Performed by: STUDENT IN AN ORGANIZED HEALTH CARE EDUCATION/TRAINING PROGRAM

## 2025-01-22 PROCEDURE — 83540 ASSAY OF IRON: CPT

## 2025-01-22 PROCEDURE — 2500000003 HC RX 250 WO HCPCS: Performed by: NURSE PRACTITIONER

## 2025-01-22 PROCEDURE — 85014 HEMATOCRIT: CPT

## 2025-01-22 PROCEDURE — 99222 1ST HOSP IP/OBS MODERATE 55: CPT | Performed by: NURSE PRACTITIONER

## 2025-01-22 PROCEDURE — 6360000002 HC RX W HCPCS: Performed by: NURSE PRACTITIONER

## 2025-01-22 PROCEDURE — 6370000000 HC RX 637 (ALT 250 FOR IP)

## 2025-01-22 PROCEDURE — 36591 DRAW BLOOD OFF VENOUS DEVICE: CPT

## 2025-01-22 PROCEDURE — 82140 ASSAY OF AMMONIA: CPT

## 2025-01-22 PROCEDURE — 1200000000 HC SEMI PRIVATE

## 2025-01-22 PROCEDURE — 83605 ASSAY OF LACTIC ACID: CPT

## 2025-01-22 PROCEDURE — 85018 HEMOGLOBIN: CPT

## 2025-01-22 PROCEDURE — 83735 ASSAY OF MAGNESIUM: CPT

## 2025-01-22 PROCEDURE — 6360000002 HC RX W HCPCS

## 2025-01-22 PROCEDURE — 80053 COMPREHEN METABOLIC PANEL: CPT

## 2025-01-22 PROCEDURE — 82330 ASSAY OF CALCIUM: CPT

## 2025-01-22 PROCEDURE — 84100 ASSAY OF PHOSPHORUS: CPT

## 2025-01-22 RX ORDER — GABAPENTIN 300 MG/1
300 CAPSULE ORAL 3 TIMES DAILY
Status: DISCONTINUED | OUTPATIENT
Start: 2025-01-22 | End: 2025-01-25 | Stop reason: HOSPADM

## 2025-01-22 RX ORDER — ROPINIROLE 1 MG/1
0.5 TABLET, FILM COATED ORAL 2 TIMES DAILY
Status: DISCONTINUED | OUTPATIENT
Start: 2025-01-22 | End: 2025-01-25 | Stop reason: HOSPADM

## 2025-01-22 RX ORDER — FENTANYL CITRATE 50 UG/ML
50 INJECTION, SOLUTION INTRAMUSCULAR; INTRAVENOUS ONCE
Status: COMPLETED | OUTPATIENT
Start: 2025-01-22 | End: 2025-01-22

## 2025-01-22 RX ORDER — MAGNESIUM SULFATE IN WATER 40 MG/ML
2000 INJECTION, SOLUTION INTRAVENOUS ONCE
Status: COMPLETED | OUTPATIENT
Start: 2025-01-22 | End: 2025-01-22

## 2025-01-22 RX ORDER — DEXTROSE MONOHYDRATE 100 MG/ML
INJECTION, SOLUTION INTRAVENOUS CONTINUOUS PRN
Status: DISCONTINUED | OUTPATIENT
Start: 2025-01-22 | End: 2025-01-25 | Stop reason: HOSPADM

## 2025-01-22 RX ORDER — INSULIN LISPRO 100 [IU]/ML
0-4 INJECTION, SOLUTION INTRAVENOUS; SUBCUTANEOUS EVERY 6 HOURS
Status: DISCONTINUED | OUTPATIENT
Start: 2025-01-22 | End: 2025-01-25 | Stop reason: HOSPADM

## 2025-01-22 RX ORDER — PROPRANOLOL HYDROCHLORIDE 10 MG/1
10 TABLET ORAL DAILY
Status: DISCONTINUED | OUTPATIENT
Start: 2025-01-22 | End: 2025-01-25 | Stop reason: HOSPADM

## 2025-01-22 RX ORDER — GLUCAGON 1 MG/ML
1 KIT INJECTION PRN
Status: DISCONTINUED | OUTPATIENT
Start: 2025-01-22 | End: 2025-01-25 | Stop reason: HOSPADM

## 2025-01-22 RX ORDER — POTASSIUM CHLORIDE 29.8 MG/ML
20 INJECTION INTRAVENOUS
Status: COMPLETED | OUTPATIENT
Start: 2025-01-22 | End: 2025-01-22

## 2025-01-22 RX ADMIN — SODIUM CHLORIDE, PRESERVATIVE FREE 40 MG: 5 INJECTION INTRAVENOUS at 20:29

## 2025-01-22 RX ADMIN — POTASSIUM CHLORIDE 20 MEQ: 29.8 INJECTION, SOLUTION INTRAVENOUS at 08:02

## 2025-01-22 RX ADMIN — INSULIN LISPRO 4 UNITS: 100 INJECTION, SOLUTION INTRAVENOUS; SUBCUTANEOUS at 18:01

## 2025-01-22 RX ADMIN — GABAPENTIN 300 MG: 300 CAPSULE ORAL at 11:30

## 2025-01-22 RX ADMIN — SODIUM CHLORIDE, PRESERVATIVE FREE 10 ML: 5 INJECTION INTRAVENOUS at 20:29

## 2025-01-22 RX ADMIN — FENTANYL CITRATE 50 MCG: 50 INJECTION INTRAMUSCULAR; INTRAVENOUS at 08:07

## 2025-01-22 RX ADMIN — MAGNESIUM SULFATE HEPTAHYDRATE 2000 MG: 40 INJECTION, SOLUTION INTRAVENOUS at 06:39

## 2025-01-22 RX ADMIN — ACETAMINOPHEN 650 MG: 325 TABLET ORAL at 00:21

## 2025-01-22 RX ADMIN — PROPRANOLOL HYDROCHLORIDE 10 MG: 10 TABLET ORAL at 11:31

## 2025-01-22 RX ADMIN — SODIUM CHLORIDE, PRESERVATIVE FREE 40 MG: 5 INJECTION INTRAVENOUS at 11:31

## 2025-01-22 RX ADMIN — POTASSIUM CHLORIDE 20 MEQ: 29.8 INJECTION, SOLUTION INTRAVENOUS at 06:36

## 2025-01-22 RX ADMIN — ROPINIROLE HYDROCHLORIDE 0.5 MG: 1 TABLET, FILM COATED ORAL at 11:31

## 2025-01-22 RX ADMIN — GABAPENTIN 300 MG: 300 CAPSULE ORAL at 16:34

## 2025-01-22 RX ADMIN — INSULIN LISPRO 3 UNITS: 100 INJECTION, SOLUTION INTRAVENOUS; SUBCUTANEOUS at 11:36

## 2025-01-22 RX ADMIN — ROPINIROLE HYDROCHLORIDE 0.5 MG: 1 TABLET, FILM COATED ORAL at 20:29

## 2025-01-22 RX ADMIN — FLUOXETINE HYDROCHLORIDE 60 MG: 20 CAPSULE ORAL at 11:30

## 2025-01-22 RX ADMIN — SODIUM CHLORIDE, PRESERVATIVE FREE 10 ML: 5 INJECTION INTRAVENOUS at 08:09

## 2025-01-22 RX ADMIN — ACETAMINOPHEN 650 MG: 325 TABLET ORAL at 18:02

## 2025-01-22 ASSESSMENT — PAIN SCALES - GENERAL
PAINLEVEL_OUTOF10: 4
PAINLEVEL_OUTOF10: 6

## 2025-01-22 NOTE — ACP (ADVANCE CARE PLANNING)
Advance Care Planning   Healthcare Decision Maker:    Primary Decision Maker: Lorrie Barber - Brother/Sister - [phone]    Primary Decision Maker: Todd Casiano - Child - [phone]    Click here to complete Healthcare Decision Makers including selection of the Healthcare Decision Maker Relationship (ie \"Primary\").

## 2025-01-22 NOTE — CONSULTS
Critical Care Admit/Consult Note     Patient - Haven Barber   MRN -  [id number]   [id number]   - 1950      Date of Admission -  2025  8:06 PM  Date of evaluation -  2025  021/0210-A   Hospital Day - 0      ADMIT/CONSULT DETAILS     Reason for Admit/Consult   GI Bleed    Consulting Service/Physician   Consulting - Juan Crum DO  Primary Care Physician - Jesus Alvarado DO         HPI   Ms. Barber presented to the ED today for evaluation of melena for the past few days. She had blood drawn with finding of H/H 5.0/16.9 - referred to the ED  Medical history is significant for iron deficiency anemia requiring transfusions, type II diabetes, hepatic steatosis, Behcet's disease with angiodysplasia of stomach and duodenum, gastric antral vascular ectasia with history of bleeding episodes most recently requiring admission December. EGD done on  showing normal esophagus, duodenum, and jejunum - atral GAVE noted and treated with APC. She reports there is no further intervention for her Bechet's disease and was previously followed at [facility] for this, discharged due to absence of treatment options. I offered to consult local Hematology but she refused.     Today she is found to hypotensive on arrival to the ED with BP 76/60. H/H in the ED 4.1/14.0.  Coags were within range.  Lactate 3.7.  She was transfused 1 PRBC and transferred to [place] for further management. On arrival she tells me that she does not want aggressive management - she does not want CPR, intubation, resuscitative meds, or shock.  She is a retired ICU nurse of 3 decades and has understanding of refusal of these interventions. Palliative was discussed and agreed to.     Past Medical History         Diagnosis Date    Arthritis     Cataract     right    Chronic fatigue syndrome     Depression     Diabetes mellitus (HCC)     SC injection weekly    Fibromyalgia     Hypertension     Memory loss

## 2025-01-22 NOTE — H&P
[facility] Hospitalist Group History and Physical      CHIEF COMPLAINT: Melena    History of Present Illness: A 74-year-old lady with past medical history significant for iron deficiency anemia requiring blood transfusions, type 2 diabetes, Bechets disease, hepatic steatosis, angiodysplasia of stomach and duodenum along with hypertension, fibromyalgia and psoriasis presented with melena and fatigue.  She had blood drawn which showed hemoglobin of 5 and was referred to ED.  During workup in ED she was hypotensive with blood pressure around 76/60, repeat hemoglobin 4 and hematocrit of 14, lactate of 3.7.  She was transfused with 1 unit of PRBC and transferred to [place] ICU for further management.  Denied any chest pain, belly pain, nausea vomiting or diarrhea, no cough or shortness of breath, no lightheadedness or dizziness, no fever or chills or focal deficits.    Informant(s) for H&P:    REVIEW OF SYSTEMS:  A comprehensive review of systems was negative except for: what is in the HPI      PMH:  Past Medical History:   Diagnosis Date    Arthritis     Cataract     right    Chronic fatigue syndrome     Depression     Diabetes mellitus (HCC)     SC injection weekly    Fibromyalgia     Hypertension     Memory loss     Mononucleosis     Psoriasis     Sciatica     Sleep apnea     no c-pap    Vertigo        Surgical History:  Past Surgical History:   Procedure Laterality Date    CARPAL TUNNEL RELEASE Bilateral     CATARACT REMOVAL Right     COLONOSCOPY      OTHER SURGICAL HISTORY Bilateral 01/21/2021    BILATERAL L3,L4 MEDIAL BRANCH AND L5 DORSAL RAMUS RADIOFREQUENCY    PAIN MANAGEMENT PROCEDURE Bilateral 01/21/2021    BILATERAL L3,4 MEDIAL BRANCH AND L5 DORSAL RAMUS  RADIOFREQUENCY ABLATION WITH SEDATION  (CPT 58088) performed by Silas Quigley MD at [facility] OR    SPINE SURGERY N/A 03/19/2021    T11 KYPHOPLASTY (2 C-ARMS) performed by Silas Quigley MD at [facility] OR    TONSILLECTOMY AND ADENOIDECTOMY      UPPER

## 2025-01-22 NOTE — PROGRESS NOTES
[facility] Hospitalist Progress Note    Admitting Date and Time: 1/21/2025  8:06 PM  Admit Dx: Severe anemia [D64.9]    Subjective:  Patient is being followed for Severe anemia [D64.9]       Patient was seen and examined.     ROS: denies fever, chills, cp, sob, n/v, HA unless stated above.      insulin lispro  0-4 Units SubCUTAneous Q6H    rOPINIRole  0.5 mg Oral BID    gabapentin  300 mg Oral TID    propranolol  10 mg Oral Daily    FLUoxetine  60 mg Oral Daily    sodium chloride flush  5-40 mL IntraVENous 2 times per day    pantoprazole (PROTONIX) 40 mg in sodium chloride (PF) 0.9 % 10 mL injection  40 mg IntraVENous Q12H     dextrose bolus, 125 mL, PRN   Or  dextrose bolus, 250 mL, PRN  glucagon (rDNA), 1 mg, PRN  dextrose, , Continuous PRN  acetaminophen, 650 mg, Q6H PRN   Or  acetaminophen, 650 mg, Q6H PRN  ondansetron, 4 mg, Q8H PRN   Or  ondansetron, 4 mg, Q6H PRN  sodium chloride flush, 5-40 mL, PRN  sodium chloride, , PRN  sodium chloride, , PRN         Objective:    BP (!) 122/53   Pulse (!) 101   Temp 97.8 °F (36.6 °C) (Axillary)   Resp 19   LMP  (LMP Unknown)   SpO2 100%     General Appearance: alert and oriented to person, place and time and in no acute distress  Skin: warm and dry  Head: normocephalic and atraumatic  Eyes: pupils equal, round, and reactive to light, extraocular eye movements intact, conjunctivae normal  Neck: neck supple and non tender without mass   Pulmonary/Chest: clear to auscultation bilaterally- no wheezes, rales or rhonchi, normal air movement, no respiratory distress  Cardiovascular: normal rate, normal S1 and S2 and no carotid bruits  Abdomen: soft, non-tender, non-distended, normal bowel sounds, no masses or organomegaly  Extremities: no cyanosis, no clubbing and no edema  Neurologic: no cranial nerve deficit and speech normal        Recent Labs     01/21/25  1315 01/21/25  2228 01/22/25  0450    136 139   K 3.3* 3.5 3.7    106 109*   CO2 19* 20* 19*

## 2025-01-22 NOTE — PROGRESS NOTES
4 Eyes Skin Assessment     NAME:  Haven Barber  YOB: 1950  MEDICAL RECORD NUMBER:  [id number]    The patient is being assessed for  Admission    I agree that at least one RN has performed a thorough Head to Toe Skin Assessment on the patient. ALL assessment sites listed below have been assessed.      Areas assessed by both nurses:    Head, Face, Ears, Shoulders, Back, Chest, Arms, Elbows, Hands, Sacrum. Buttock, Coccyx, Ischium, Legs. Feet and Heels, and Under Medical Devices         Does the Patient have a Wound? No noted wound(s)       Gordo Prevention initiated by RN: No  Wound Care Orders initiated by RN: No    Pressure Injury (Stage 3,4, Unstageable, DTI, NWPT, and Complex wounds) if present, place Wound referral order by RN under : No    New Ostomies, if present place, Ostomy referral order under : No     Nurse 1 eSignature: Electronically signed by Shreyas Moody RN on 1/22/25 at 1:35 AM EST    **SHARE this note so that the co-signing nurse can place an eSignature**    Nurse 2 eSignature: {Esignature:[id number]}

## 2025-01-22 NOTE — CONSULTS
Palliative Care Department  [phone]  Palliative Care Initial Consult  Provider MORALES Damian CNP     Haven Barber  [id number]  Hospital Day: 2  Date of Initial Consult: 1/21/2025  Referring Provider:  Uziel Agarwal APRN - CNP  Palliative Medicine was consulted for assistance with: Goals of care    HPI:   Haven Barber is a 74 y.o. 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  The patient was found to have an H&H of 5/16.9.  The patient was recently hospitalized in December and had an EGD done on 12/6 showing normal esophagus, duodenum, and jejunum - atral GAVE noted and treated with APC. She reports there is no further intervention for her Bechet's disease and was previously followed at [facility] for this. She was transfused with 1 unit of PRBC and transferred to [place] ICU for further management.  Palliative medicine consulted for further assistance.    ASSESSMENT/PLAN:     Pertinent Hospital Diagnoses     Acute blood loss anemia  GI bleed history GAVE  Melena  Hypotension    Palliative Care Encounter / Counseling Regarding Goals of Care  Please see detailed goals of care discussion as below  At this time, Haven Barber, Does have capacity for medical decision-making.  Capacity is time limited and situation/question specific  During encounter there was no surrogate medical decision-maker needed  Outcome of goals of care meeting:   Confirmed limited code- DNR-CCA/DNI  Code status Limited no to all options  Advanced Directives: no POA or living will in [facility]  Surrogate/Legal NOK:  Toddallyn Casiano (child) [phone]  Lorrie Barber (sibling) [phone]    Spiritual assessment: no spiritual distress

## 2025-01-22 NOTE — INTERDISCIPLINARY ROUNDS
Intensive Care Daily Quality Rounding Checklist      ICU Team Members:  Dr. Petersen, Residents, Bedside nurse, Charge nurse, Respiratory therapist, Clinical pharmacist     ICU Day #: NUMBER: 1    SOFA Score:    Intubation Date:     Ventilator Day #:    Central Line Insertion Date: PORT  January 21        Day #: NUMBER: 1        Indication: CVCIndication: Hemodynamically unstable requiring volume resuscitation     Arterial Line Insertion Date:       Day #:     Temporary Hemodialysis Catheter Insertion Date:       Day #     DVT Prophylaxis:     GI Prophylaxis: Protonix    Love Catheter Insertion Date:         Day #:       Indications:       Continued need (if yes, reason documented and discussed with physician):     Skin Issues/ Wounds and ordered treatment discussed on rounds: No    Goals/ Plans for the Day: Monitor labs and vitals, treat/replace as needed. Start soft diet.  Resume home meds. EDG per GI.  Transfer to medical bed.     Reviewed plan and goals for day with patient and/or representative: Yes

## 2025-01-22 NOTE — CONSENT
Informed Consent for Blood Component Transfusion Note    I have discussed with the patient the rationale for blood component transfusion; its benefits in treating or preventing fatigue, organ damage, or death; and its risk which includes mild transfusion reactions, rare risk of blood borne infection, or more serious but rare reactions. I have discussed the alternatives to transfusion, including the risk and consequences of not receiving transfusion. The patient had an opportunity to ask questions and had agreed to proceed with transfusion of blood components.    Electronically signed by MORALES Meyers CNP on 1/21/25 at 9:58 PM EST

## 2025-01-22 NOTE — PROGRESS NOTES
Spiritual Health History and Assessment/Progress Note  [facility]    Attempted Encounter, Palliative Care,  ,  ,  Patient was asleep when  rounded.  left prayer card. No Family Members present.    Name: Haven Barber MRN: [id number]    Age: 74 y.o.     Sex: female   Language: English   Mu-ism: [employer]   Severe anemia     Date: 1/22/2025                           Spiritual Assessment began in [address] ICU        Referral/Consult From: Palliative Care   Encounter Overview/Reason: Attempted Encounter, Palliative Care  Service Provided For: Patient    Mariza, Belief, Meaning:   Patient unable to assess at this time  Family/Friends No family/friends present      Importance and Influence:  Patient unable to assess at this time  Family/Friends No family/friends present    Community:  Patient Other: N/A  Family/Friends No family/friends present    Assessment and Plan of Care:     Patient Interventions include: Other: N/A  Family/Friends Interventions include: No family/friends present    Patient Plan of Care: Spiritual Care available upon further referral  Family/Friends Plan of Care: Spiritual Care available upon further referral    Electronically signed by ED Burkett on 1/22/2025 at 3:20 PM

## 2025-01-22 NOTE — PROGRESS NOTES
Critical Care Team - Daily Progress Note      Date and time: 1/22/2025 12:45 PM  Patient's name:  Haven Barber  Medical Record Number: [id number]  Patient's account/billing number: [id number]  Patient's YOB: 1950  Age: 74 y.o.  Date of Admission: 1/21/2025  8:06 PM  Length of stay during current admission: 1      Primary Care Physician: Jesus Alvarado DO  ICU Attending Physician: Dr. Petersen    Code Status: Limited    Reason for ICU admission: GI bleed    SUBJECTIVE:     OVERNIGHT EVENTS: Patient was transferred from [facility] to [place] ICU arrival last night.  She received 2 units of PRBCs last night in the ICU.      Intake/Output:   I/O this shift:  In: 300 [P.O.:300]  Out: -   I/O last 3 completed shifts:  In: 647 [Blood:647]  Out: -     Awake and following commands: Yes  Current Ventilation: - No ventilator support  Secretions: None  Sedation: none  Paralyzed: No  Vasopressors: None    ROS:  Unless stated above, ROS is otherwise negative.    Initial HPI + past overnight events:     Ms. Barber presented to the ED today for evaluation of melena for the past few days. She had blood drawn with finding of H/H 5.0/16.9 - referred to the ED  Medical history is significant for iron deficiency anemia requiring transfusions, type II diabetes, hepatic steatosis, Behcet's disease with angiodysplasia of stomach and duodenum, gastric antral vascular ectasia with history of bleeding episodes most recently requiring admission December. EGD done on 12/6 showing normal esophagus, duodenum, and jejunum - atral GAVE noted and treated with APC. She reports there is no further intervention for her Bechet's disease and was previously followed at [facility] for this, discharged due to absence of treatment options. I offered to consult local Hematology but she refused.      Today she is found to hypotensive on arrival to the ED with BP 76/60. H/H in the ED 4.1/14.0.  Coags were

## 2025-01-22 NOTE — PROGRESS NOTES
Patient admitted from [facility] to icu 210, with the following belongings; Purse,pants,shoes, shirt, phone, placed on monitor, patient oriented to room and unit visiting hours.  Patient guide at bedside, reviewed patient rights and responsibilities. MRSA nasal swab obtained.  Bed alarm on.  Call light within reach.

## 2025-01-22 NOTE — CARE COORDINATION
Social work / Discharge planning:          Patient is from home alone in a one story home and ambulates independent.   She is active with [facility] and states that she already informed them she is in the hospital.    Will need ROSALIE order prior to discharge.    Patient had a recent stay at [facility].    Patient anticipates discharge home with [facility].   Patient has an order to transfer to  Med Surg.    Electronically signed by THOM Garcia on 1/22/2025 at 2:48 PM

## 2025-01-23 ENCOUNTER — ANESTHESIA (OUTPATIENT)
Dept: ENDOSCOPY | Age: 75
End: 2025-01-23
Payer: MEDICARE

## 2025-01-23 ENCOUNTER — ANESTHESIA EVENT (OUTPATIENT)
Dept: ENDOSCOPY | Age: 75
End: 2025-01-23
Payer: MEDICARE

## 2025-01-23 PROBLEM — K92.2 GI BLEED: Status: ACTIVE | Noted: 2025-01-23

## 2025-01-23 LAB
ALBUMIN SERPL-MCNC: 2.8 G/DL (ref 3.5–5.2)
ALP SERPL-CCNC: 98 U/L (ref 35–104)
ALT SERPL-CCNC: 21 U/L (ref 0–32)
ANION GAP SERPL CALCULATED.3IONS-SCNC: 8 MMOL/L (ref 7–16)
AST SERPL-CCNC: 30 U/L (ref 0–31)
BASOPHILS # BLD: 0.03 K/UL (ref 0–0.2)
BASOPHILS NFR BLD: 1 % (ref 0–2)
BILIRUB SERPL-MCNC: 1.1 MG/DL (ref 0–1.2)
BUN SERPL-MCNC: 14 MG/DL (ref 6–23)
CA-I BLD-SCNC: 1.2 MMOL/L (ref 1.15–1.33)
CALCIUM SERPL-MCNC: 8 MG/DL (ref 8.6–10.2)
CHLORIDE SERPL-SCNC: 107 MMOL/L (ref 98–107)
CO2 SERPL-SCNC: 21 MMOL/L (ref 22–29)
CREAT SERPL-MCNC: 0.7 MG/DL (ref 0.5–1)
EKG ATRIAL RATE: 85 BPM
EKG P AXIS: 71 DEGREES
EKG P-R INTERVAL: 172 MS
EKG Q-T INTERVAL: 430 MS
EKG QRS DURATION: 82 MS
EKG QTC CALCULATION (BAZETT): 511 MS
EKG R AXIS: -17 DEGREES
EKG T AXIS: 3 DEGREES
EKG VENTRICULAR RATE: 85 BPM
EOSINOPHIL # BLD: 0.34 K/UL (ref 0.05–0.5)
EOSINOPHILS RELATIVE PERCENT: 7 % (ref 0–6)
ERYTHROCYTE [DISTWIDTH] IN BLOOD BY AUTOMATED COUNT: 17.4 % (ref 11.5–15)
ERYTHROCYTE [DISTWIDTH] IN BLOOD BY AUTOMATED COUNT: 17.9 % (ref 11.5–15)
GFR, ESTIMATED: >90 ML/MIN/1.73M2
GLUCOSE BLD-MCNC: 232 MG/DL (ref 74–99)
GLUCOSE BLD-MCNC: 238 MG/DL (ref 74–99)
GLUCOSE BLD-MCNC: 275 MG/DL (ref 74–99)
GLUCOSE BLD-MCNC: 318 MG/DL (ref 74–99)
GLUCOSE BLD-MCNC: 324 MG/DL (ref 74–99)
GLUCOSE BLD-MCNC: 366 MG/DL (ref 74–99)
GLUCOSE SERPL-MCNC: 227 MG/DL (ref 74–99)
HCT VFR BLD AUTO: 18.7 % (ref 34–48)
HCT VFR BLD AUTO: 21.7 % (ref 34–48)
HCT VFR BLD AUTO: 22.2 % (ref 34–48)
HGB BLD-MCNC: 5.8 G/DL (ref 11.5–15.5)
HGB BLD-MCNC: 7 G/DL (ref 11.5–15.5)
HGB BLD-MCNC: 7.1 G/DL (ref 11.5–15.5)
IMM GRANULOCYTES # BLD AUTO: 0.03 K/UL (ref 0–0.58)
IMM GRANULOCYTES NFR BLD: 1 % (ref 0–5)
LYMPHOCYTES NFR BLD: 1.02 K/UL (ref 1.5–4)
LYMPHOCYTES RELATIVE PERCENT: 19 % (ref 20–42)
MAGNESIUM SERPL-MCNC: 2 MG/DL (ref 1.6–2.6)
MCH RBC QN AUTO: 27.2 PG (ref 26–35)
MCH RBC QN AUTO: 28.2 PG (ref 26–35)
MCHC RBC AUTO-ENTMCNC: 31.5 G/DL (ref 32–34.5)
MCHC RBC AUTO-ENTMCNC: 32.7 G/DL (ref 32–34.5)
MCV RBC AUTO: 86.1 FL (ref 80–99.9)
MCV RBC AUTO: 86.4 FL (ref 80–99.9)
MICROORGANISM SPEC CULT: NORMAL
MONOCYTES NFR BLD: 0.62 K/UL (ref 0.1–0.95)
MONOCYTES NFR BLD: 12 % (ref 2–12)
NEUTROPHILS NFR BLD: 61 % (ref 43–80)
NEUTS SEG NFR BLD: 3.21 K/UL (ref 1.8–7.3)
PHOSPHATE SERPL-MCNC: 3.2 MG/DL (ref 2.5–4.5)
PLATELET # BLD AUTO: 135 K/UL (ref 130–450)
PLATELET # BLD AUTO: 139 K/UL (ref 130–450)
PMV BLD AUTO: 10.9 FL (ref 7–12)
PMV BLD AUTO: 11.4 FL (ref 7–12)
POTASSIUM SERPL-SCNC: 3.6 MMOL/L (ref 3.5–5)
PROT SERPL-MCNC: 5.1 G/DL (ref 6.4–8.3)
RBC # BLD AUTO: 2.52 M/UL (ref 3.5–5.5)
RBC # BLD AUTO: 2.57 M/UL (ref 3.5–5.5)
SODIUM SERPL-SCNC: 136 MMOL/L (ref 132–146)
SPECIMEN DESCRIPTION: NORMAL
WBC OTHER # BLD: 5.3 K/UL (ref 4.5–11.5)
WBC OTHER # BLD: 5.3 K/UL (ref 4.5–11.5)

## 2025-01-23 PROCEDURE — 0W3P8ZZ CONTROL BLEEDING IN GASTROINTESTINAL TRACT, VIA NATURAL OR ARTIFICIAL OPENING ENDOSCOPIC: ICD-10-PCS | Performed by: STUDENT IN AN ORGANIZED HEALTH CARE EDUCATION/TRAINING PROGRAM

## 2025-01-23 PROCEDURE — 6360000002 HC RX W HCPCS: Performed by: NURSE PRACTITIONER

## 2025-01-23 PROCEDURE — 7100000010 HC PHASE II RECOVERY - FIRST 15 MIN: Performed by: STUDENT IN AN ORGANIZED HEALTH CARE EDUCATION/TRAINING PROGRAM

## 2025-01-23 PROCEDURE — 93010 ELECTROCARDIOGRAM REPORT: CPT | Performed by: INTERNAL MEDICINE

## 2025-01-23 PROCEDURE — 43255 EGD CONTROL BLEEDING ANY: CPT | Performed by: STUDENT IN AN ORGANIZED HEALTH CARE EDUCATION/TRAINING PROGRAM

## 2025-01-23 PROCEDURE — 2709999900 HC NON-CHARGEABLE SUPPLY: Performed by: STUDENT IN AN ORGANIZED HEALTH CARE EDUCATION/TRAINING PROGRAM

## 2025-01-23 PROCEDURE — 82962 GLUCOSE BLOOD TEST: CPT

## 2025-01-23 PROCEDURE — 6370000000 HC RX 637 (ALT 250 FOR IP): Performed by: INTERNAL MEDICINE

## 2025-01-23 PROCEDURE — 6360000002 HC RX W HCPCS

## 2025-01-23 PROCEDURE — 2500000003 HC RX 250 WO HCPCS

## 2025-01-23 PROCEDURE — 85025 COMPLETE CBC W/AUTO DIFF WBC: CPT

## 2025-01-23 PROCEDURE — 2580000003 HC RX 258: Performed by: NURSE PRACTITIONER

## 2025-01-23 PROCEDURE — 85018 HEMOGLOBIN: CPT

## 2025-01-23 PROCEDURE — 3700000001 HC ADD 15 MINUTES (ANESTHESIA): Performed by: STUDENT IN AN ORGANIZED HEALTH CARE EDUCATION/TRAINING PROGRAM

## 2025-01-23 PROCEDURE — 1200000000 HC SEMI PRIVATE

## 2025-01-23 PROCEDURE — 2500000003 HC RX 250 WO HCPCS: Performed by: NURSE PRACTITIONER

## 2025-01-23 PROCEDURE — 85027 COMPLETE CBC AUTOMATED: CPT

## 2025-01-23 PROCEDURE — 84100 ASSAY OF PHOSPHORUS: CPT

## 2025-01-23 PROCEDURE — 7100000011 HC PHASE II RECOVERY - ADDTL 15 MIN: Performed by: STUDENT IN AN ORGANIZED HEALTH CARE EDUCATION/TRAINING PROGRAM

## 2025-01-23 PROCEDURE — 3609013000 HC EGD TRANSORAL CONTROL BLEEDING ANY METHOD: Performed by: STUDENT IN AN ORGANIZED HEALTH CARE EDUCATION/TRAINING PROGRAM

## 2025-01-23 PROCEDURE — 6370000000 HC RX 637 (ALT 250 FOR IP): Performed by: NURSE PRACTITIONER

## 2025-01-23 PROCEDURE — 2580000003 HC RX 258

## 2025-01-23 PROCEDURE — 85014 HEMATOCRIT: CPT

## 2025-01-23 PROCEDURE — 99232 SBSQ HOSP IP/OBS MODERATE 35: CPT | Performed by: INTERNAL MEDICINE

## 2025-01-23 PROCEDURE — 83735 ASSAY OF MAGNESIUM: CPT

## 2025-01-23 PROCEDURE — 82330 ASSAY OF CALCIUM: CPT

## 2025-01-23 PROCEDURE — 2720000010 HC SURG SUPPLY STERILE: Performed by: STUDENT IN AN ORGANIZED HEALTH CARE EDUCATION/TRAINING PROGRAM

## 2025-01-23 PROCEDURE — 80053 COMPREHEN METABOLIC PANEL: CPT

## 2025-01-23 PROCEDURE — 3700000000 HC ANESTHESIA ATTENDED CARE: Performed by: STUDENT IN AN ORGANIZED HEALTH CARE EDUCATION/TRAINING PROGRAM

## 2025-01-23 RX ORDER — SODIUM CHLORIDE 9 MG/ML
INJECTION, SOLUTION INTRAVENOUS PRN
Status: DISCONTINUED | OUTPATIENT
Start: 2025-01-23 | End: 2025-01-23

## 2025-01-23 RX ORDER — NALOXONE HYDROCHLORIDE 0.4 MG/ML
INJECTION, SOLUTION INTRAMUSCULAR; INTRAVENOUS; SUBCUTANEOUS PRN
Status: DISCONTINUED | OUTPATIENT
Start: 2025-01-23 | End: 2025-01-23 | Stop reason: HOSPADM

## 2025-01-23 RX ORDER — HYDRALAZINE HYDROCHLORIDE 20 MG/ML
5 INJECTION INTRAMUSCULAR; INTRAVENOUS
Status: DISCONTINUED | OUTPATIENT
Start: 2025-01-23 | End: 2025-01-23 | Stop reason: HOSPADM

## 2025-01-23 RX ORDER — SODIUM CHLORIDE 0.9 % (FLUSH) 0.9 %
5-40 SYRINGE (ML) INJECTION PRN
Status: DISCONTINUED | OUTPATIENT
Start: 2025-01-23 | End: 2025-01-23 | Stop reason: HOSPADM

## 2025-01-23 RX ORDER — DIPHENHYDRAMINE HYDROCHLORIDE 50 MG/ML
12.5 INJECTION INTRAMUSCULAR; INTRAVENOUS
Status: DISCONTINUED | OUTPATIENT
Start: 2025-01-23 | End: 2025-01-23 | Stop reason: HOSPADM

## 2025-01-23 RX ORDER — PROCHLORPERAZINE EDISYLATE 5 MG/ML
5 INJECTION INTRAMUSCULAR; INTRAVENOUS
Status: DISCONTINUED | OUTPATIENT
Start: 2025-01-23 | End: 2025-01-23 | Stop reason: HOSPADM

## 2025-01-23 RX ORDER — SODIUM CHLORIDE 0.9 % (FLUSH) 0.9 %
5-40 SYRINGE (ML) INJECTION PRN
Status: DISCONTINUED | OUTPATIENT
Start: 2025-01-23 | End: 2025-01-25 | Stop reason: HOSPADM

## 2025-01-23 RX ORDER — LABETALOL HYDROCHLORIDE 5 MG/ML
5 INJECTION, SOLUTION INTRAVENOUS
Status: DISCONTINUED | OUTPATIENT
Start: 2025-01-23 | End: 2025-01-23 | Stop reason: HOSPADM

## 2025-01-23 RX ORDER — SODIUM CHLORIDE 9 MG/ML
INJECTION, SOLUTION INTRAVENOUS PRN
Status: DISCONTINUED | OUTPATIENT
Start: 2025-01-23 | End: 2025-01-23 | Stop reason: HOSPADM

## 2025-01-23 RX ORDER — SODIUM CHLORIDE 9 MG/ML
INJECTION, SOLUTION INTRAVENOUS PRN
Status: DISCONTINUED | OUTPATIENT
Start: 2025-01-23 | End: 2025-01-25 | Stop reason: HOSPADM

## 2025-01-23 RX ORDER — MEPERIDINE HYDROCHLORIDE 25 MG/ML
12.5 INJECTION INTRAMUSCULAR; INTRAVENOUS; SUBCUTANEOUS ONCE
Status: DISCONTINUED | OUTPATIENT
Start: 2025-01-23 | End: 2025-01-23 | Stop reason: HOSPADM

## 2025-01-23 RX ORDER — SODIUM CHLORIDE 0.9 % (FLUSH) 0.9 %
5-40 SYRINGE (ML) INJECTION EVERY 12 HOURS SCHEDULED
Status: DISCONTINUED | OUTPATIENT
Start: 2025-01-23 | End: 2025-01-23 | Stop reason: HOSPADM

## 2025-01-23 RX ORDER — SODIUM CHLORIDE 9 MG/ML
INJECTION, SOLUTION INTRAVENOUS
Status: DISCONTINUED | OUTPATIENT
Start: 2025-01-23 | End: 2025-01-23 | Stop reason: SDUPTHER

## 2025-01-23 RX ORDER — FENTANYL CITRATE 50 UG/ML
25 INJECTION, SOLUTION INTRAMUSCULAR; INTRAVENOUS EVERY 5 MIN PRN
Status: DISCONTINUED | OUTPATIENT
Start: 2025-01-23 | End: 2025-01-23 | Stop reason: HOSPADM

## 2025-01-23 RX ORDER — INSULIN GLARGINE 100 [IU]/ML
10 INJECTION, SOLUTION SUBCUTANEOUS NIGHTLY
Status: DISCONTINUED | OUTPATIENT
Start: 2025-01-23 | End: 2025-01-23

## 2025-01-23 RX ORDER — PROPOFOL 10 MG/ML
INJECTION, EMULSION INTRAVENOUS
Status: DISCONTINUED | OUTPATIENT
Start: 2025-01-23 | End: 2025-01-23 | Stop reason: SDUPTHER

## 2025-01-23 RX ORDER — INSULIN GLARGINE 100 [IU]/ML
12 INJECTION, SOLUTION SUBCUTANEOUS NIGHTLY
Status: DISCONTINUED | OUTPATIENT
Start: 2025-01-23 | End: 2025-01-25

## 2025-01-23 RX ADMIN — SODIUM CHLORIDE, PRESERVATIVE FREE 10 ML: 5 INJECTION INTRAVENOUS at 20:46

## 2025-01-23 RX ADMIN — INSULIN LISPRO 2 UNITS: 100 INJECTION, SOLUTION INTRAVENOUS; SUBCUTANEOUS at 00:34

## 2025-01-23 RX ADMIN — ACETAMINOPHEN 650 MG: 325 TABLET ORAL at 12:15

## 2025-01-23 RX ADMIN — GABAPENTIN 300 MG: 300 CAPSULE ORAL at 23:30

## 2025-01-23 RX ADMIN — ROPINIROLE HYDROCHLORIDE 0.5 MG: 1 TABLET, FILM COATED ORAL at 20:46

## 2025-01-23 RX ADMIN — SODIUM CHLORIDE, PRESERVATIVE FREE 10 ML: 5 INJECTION INTRAVENOUS at 08:30

## 2025-01-23 RX ADMIN — INSULIN GLARGINE 12 UNITS: 100 INJECTION, SOLUTION SUBCUTANEOUS at 20:53

## 2025-01-23 RX ADMIN — GABAPENTIN 300 MG: 300 CAPSULE ORAL at 12:16

## 2025-01-23 RX ADMIN — GABAPENTIN 300 MG: 300 CAPSULE ORAL at 00:29

## 2025-01-23 RX ADMIN — PROPOFOL 30 MG: 10 INJECTION, EMULSION INTRAVENOUS at 10:32

## 2025-01-23 RX ADMIN — PROPOFOL 20 MG: 10 INJECTION, EMULSION INTRAVENOUS at 10:40

## 2025-01-23 RX ADMIN — SODIUM CHLORIDE 1 MCG/KG/HR: 9 INJECTION, SOLUTION INTRAVENOUS at 10:32

## 2025-01-23 RX ADMIN — INSULIN LISPRO 1 UNITS: 100 INJECTION, SOLUTION INTRAVENOUS; SUBCUTANEOUS at 12:16

## 2025-01-23 RX ADMIN — INSULIN LISPRO 3 UNITS: 100 INJECTION, SOLUTION INTRAVENOUS; SUBCUTANEOUS at 16:16

## 2025-01-23 RX ADMIN — INSULIN LISPRO 1 UNITS: 100 INJECTION, SOLUTION INTRAVENOUS; SUBCUTANEOUS at 05:13

## 2025-01-23 RX ADMIN — INSULIN LISPRO 4 UNITS: 100 INJECTION, SOLUTION INTRAVENOUS; SUBCUTANEOUS at 23:30

## 2025-01-23 RX ADMIN — SODIUM CHLORIDE, PRESERVATIVE FREE 40 MG: 5 INJECTION INTRAVENOUS at 20:46

## 2025-01-23 RX ADMIN — PROPOFOL 20 MG: 10 INJECTION, EMULSION INTRAVENOUS at 10:35

## 2025-01-23 RX ADMIN — GABAPENTIN 300 MG: 300 CAPSULE ORAL at 16:01

## 2025-01-23 RX ADMIN — SODIUM CHLORIDE: 9 INJECTION, SOLUTION INTRAVENOUS at 10:29

## 2025-01-23 ASSESSMENT — PAIN SCALES - GENERAL
PAINLEVEL_OUTOF10: 5
PAINLEVEL_OUTOF10: 0

## 2025-01-23 ASSESSMENT — ENCOUNTER SYMPTOMS
RESPIRATORY NEGATIVE: 1
EYES NEGATIVE: 1
GASTROINTESTINAL NEGATIVE: 1
DYSPNEA ACTIVITY LEVEL: NO INTERVAL CHANGE

## 2025-01-23 ASSESSMENT — PAIN DESCRIPTION - DESCRIPTORS: DESCRIPTORS: ACHING;DISCOMFORT

## 2025-01-23 ASSESSMENT — LIFESTYLE VARIABLES: SMOKING_STATUS: 0

## 2025-01-23 ASSESSMENT — COPD QUESTIONNAIRES: CAT_SEVERITY: MILD

## 2025-01-23 ASSESSMENT — PAIN - FUNCTIONAL ASSESSMENT: PAIN_FUNCTIONAL_ASSESSMENT: ACTIVITIES ARE NOT PREVENTED

## 2025-01-23 ASSESSMENT — PAIN DESCRIPTION - ORIENTATION: ORIENTATION: LEFT

## 2025-01-23 ASSESSMENT — PAIN DESCRIPTION - LOCATION: LOCATION: BACK;RIB CAGE

## 2025-01-23 NOTE — ANESTHESIA PRE PROCEDURE
Department of Anesthesiology  Preprocedure Note       Name:  Haven Barber   Age:  74 y.o.  :  1950                                          MRN:  [id number]         Date:  2025      Surgeon: Surgeon(s):  Mansoor Tyson MD    Procedure: Procedure(s):  ESOPHAGOGASTRODUODENOSCOPY CONTROL HEMORRHAGE    Medications prior to admission:   Prior to Admission medications    Medication Sig Start Date End Date Taking? Authorizing Provider   Dulaglutide 4.5 MG/0.5ML SOAJ Inject 4.5 mg into the skin once a week 1/15/25   Jesus Alvarado DO   gabapentin (NEURONTIN) 300 MG capsule Take 1 capsule by mouth 3 times daily for 180 days. 1 capsule in the am 2 capsules in the evening 24  Jesus Alvarado DO   rOPINIRole (REQUIP) 0.5 MG tablet Take 1 tablet by mouth 2 times daily 24   Jesus Alvarado DO   propranolol (INDERAL) 10 MG tablet Take 1 tablet by mouth daily 10/3/24 4/1/25  Provider, MD Bautista   glipiZIDE (GLUCOTROL) 5 MG tablet Take 1 tablet by mouth daily 10/2/24   Jesus Alvarado DO   omeprazole (PRILOSEC) 20 MG delayed release capsule Take 2 caps twice daily 24   Alexsandra Harley APRN - CNP   fluticasone (FLONASE) 50 MCG/ACT nasal spray USE 1 SPRAY IN EACH NOSTRIL EVERY DAY 24   Alexsandra Harley APRN - CNP   FLUoxetine (PROZAC) 20 MG capsule Take 3 capsules by mouth daily 11/15/23   David Treviño DO       Current medications:    Current Facility-Administered Medications   Medication Dose Route Frequency Provider Last Rate Last Admin    insulin lispro (HUMALOG,ADMELOG) injection vial 0-4 Units  0-4 Units SubCUTAneous Q6H Temo Petersen DO   1 Units at 25 05    rOPINIRole (REQUIP) tablet 0.5 mg  0.5 mg Oral BID Temo Petersen DO   0.5 mg at 25    gabapentin (NEURONTIN) capsule 300 mg  300 mg Oral TID Temo Petersen DO   300 mg at 25 0029    dextrose bolus 10% 125 mL  125 mL IntraVENous PRN Temo Petersen, DO

## 2025-01-23 NOTE — PLAN OF CARE
Problem: Chronic Conditions and Co-morbidities  Goal: Patient's chronic conditions and co-morbidity symptoms are monitored and maintained or improved  Outcome: Progressing     Problem: Discharge Planning  Goal: Discharge to home or other facility with appropriate resources  Outcome: Progressing  Flowsheets (Taken 1/22/2025 1941 by Diana Peterson)  Discharge to home or other facility with appropriate resources: Arrange for needed discharge resources and transportation as appropriate     Problem: Safety - Adult  Goal: Free from fall injury  Outcome: Progressing     Problem: ABCDS Injury Assessment  Goal: Absence of physical injury  Outcome: Progressing

## 2025-01-23 NOTE — CARE COORDINATION
Social Work discharge planning  Pt continues to plan for home with [facility] resumption. Will need to notify [facility] when discharge date known [phone]. Will need resume hhc order for discharge.   Electronically signed by JUAN De Santiago on 1/23/2025 at 4:42 PM

## 2025-01-23 NOTE — PROGRESS NOTES
4 Eyes Skin Assessment     NAME:  Haven Barber  YOB: 1950  MEDICAL RECORD NUMBER:  [id number]    The patient is being assessed for  Transfer to New Unit    I agree that at least one RN has performed a thorough Head to Toe Skin Assessment on the patient. ALL assessment sites listed below have been assessed.      Areas assessed by both nurses:    Head, Face, Ears, Shoulders, Back, Chest, Arms, Elbows, Hands, Sacrum. Buttock, Coccyx, Ischium, Legs. Feet and Heels, and Under Medical Devices         Does the Patient have a Wound? No noted wound(s)       Gordo Prevention initiated by RN: No  Wound Care Orders initiated by RN: No    Pressure Injury (Stage 3,4, Unstageable, DTI, NWPT, and Complex wounds) if present, place Wound referral order by RN under : No    New Ostomies, if present place, Ostomy referral order under : No     Nurse 1 eSignature: Electronically signed by Dick Greco RN on 1/23/25 at 1:05 AM EST    **SHARE this note so that the co-signing nurse can place an eSignature**    Nurse 2 eSignature: Electronically signed by Yashira Calvin RN on 1/23/25 at 3:43 AM EST

## 2025-01-23 NOTE — ANESTHESIA POSTPROCEDURE EVALUATION
Department of Anesthesiology  Postprocedure Note    Patient: Haven Barber  MRN: [id number]  YOB: 1950  Date of evaluation: 1/23/2025    Procedure Summary       Date: 01/23/25 Room / Location: [place] [address] / [facility]    Anesthesia Start: 1029 Anesthesia Stop: 1045    Procedure: ESOPHAGOGASTRODUODENOSCOPY CONTROL HEMORRHAGE (Upper GI Region) Diagnosis:       GAVE (gastric antral vascular ectasia)      (GAVE (gastric antral vascular ectasia) [K31.819])    Surgeons: Mansoor Tyson MD Responsible Provider: Tre Palacios DO    Anesthesia Type: MAC ASA Status: 4            Anesthesia Type: No value filed.    Kelly Phase I: Kelly Score: 10    Kelly Phase II:      Anesthesia Post Evaluation    Level of consciousness: awake  Pain score: 1  Airway patency: patent  Nausea & Vomiting: no vomiting and no nausea  Cardiovascular status: hemodynamically stable  Respiratory status: acceptable  Hydration status: stable  Pain management: adequate    No notable events documented.

## 2025-01-23 NOTE — PROGRESS NOTES
Spiritual Health History and Assessment/Progress Note  [facility]    Advance Care Planning,  ,  ,      Name: Haven Barber MRN: [id number]    Age: 74 y.o.     Sex: female   Language: English   Anglican: [employer]   Severe anemia     Date: 1/22/2025                           Spiritual Assessment began in [facility] 2W ICU        Referral/Consult From: Palliative Care   Encounter Overview/Reason: Advance Care Planning  Service Provided For: Patient    Mariza, Belief, Meaning:   Patient has beliefs or practices that help with coping during difficult times  Family/Friends No family/friends present      Importance and Influence:  Patient has no beliefs influential to healthcare decision-making identified during this visit  Family/Friends No family/friends present    Community:  Patient feels well-supported. Support system includes: Extended family  Family/Friends No family/friends present    Assessment and Plan of Care:     Patient Interventions include: Facilitated life review and/ or legacy and Assisted in Advance Care Planning conversation  Family/Friends Interventions include: No family/friends present    Patient Plan of Care: Spiritual Care available upon further referral  Family/Friends Plan of Care: No family/friends present    Electronically signed by Chaplain Luma on 1/22/2025 at 7:40 PM

## 2025-01-23 NOTE — PROGRESS NOTES
provided HCPOA/LW documents to patient. 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

## 2025-01-23 NOTE — CONSULTS
Justine Armando MD   ·   MD Ailyn Garcia APRN  ·  MORALES Carvajal      [place] [facility] in [place]  [address] [place] [address]  P: [phone]  F: [phone] [place] [facility] in [place]  [address] [place] [address]  P: [phone]  F: [phone]  [place] [facility] in [place]  [address] [place] [address]  P: [phone]  F: [phone]           Inpatient Medical Oncology/Hematology Consult Note            Patient Name: Haven Barber  YOB: 1950    DATE OF ADMISSION: 1/21/2025  DATE OF CONSULTATION: 1/23/2025  CONSULTING PROVIDER: Dr. Juan Crum   REASON FOR CONSULTATION: Anemia  PCP: Jesus Alvarado    Room: [facility]      CHIEF COMPLAINT:  Fatigue    HISTORY OF PRESENT ILLNESS   Patient is a 74 y.o. female presented ED on 1/21/2025 for melena for the last several days.  She had her blood drawn her H&H was 5.0 referred to ED.  Patient has a significant medical history of iron deficiency anemia requiring transfusions, type 2 diabetes, hepatic steatosis, B. Luis disease with angiodysplasia of stomach and duodenum, gastric antral vascular ectasia with history of bleeding episodes most recently requiring admission December 2024.  EGD done on 12 6 showed a normal esophagus duodenum and jejunum atrial GAVE noted and treated with APC.  Patient was admitted transfuse with packed red blood cells.  Patient received a total of 3 units packed red blood cells Protonix started GI consulted made NPO.  Patient underwent EGD today with Dr. Tyson for control of hemorrhage.  Our service has been consulted for anemia.  Patient is known to Dr. Kolton Armando she was last by our team in office with Dr. Kolton Armando for her normocytic anemia, secondary to iron deficiency , she then decided to receive her hematology care through [facility] with whom she has followed since. PT seen at bedside , she reports she has standing orders from her hematology team    Intake 200 ml   Output 600 ml   Net -400 ml           DIAGNOSTIC DATA:   Lab Results   Component Value Date    WBC 5.3 01/23/2025    HGB 7.1 (L) 01/23/2025    HCT 21.7 (L) 01/23/2025    MCV 86.1 01/23/2025     01/23/2025     Lab Results   Component Value Date    NEUTROABS 5.53 01/21/2025     Lab Results   Component Value Date    ALT 21 01/23/2025    AST 30 01/23/2025    ALKPHOS 98 01/23/2025    BILITOT 1.1 01/23/2025     Lab Results   Component Value Date    CREATININE 0.7 01/23/2025    BUN 14 01/23/2025     01/23/2025    K 3.6 01/23/2025     01/23/2025    CO2 21 (L) 01/23/2025       Pathology:     Radiology:        ASSESSMENT     Severe anemia secondary to GI bleed with history of severe GAVE and who is  transfusion dependant   Patient is established and follows with GI and hematology at [facility]      PLAN     GI following,  EGD today control of hemorrhage upper GI region  Watch hemoglobin closely transfuse to keep above 7 patient has received 3 units PRBC so far , hemoglobin today 7.1  Pt is undecided if she would like to follow locally or continue to follow with her [facility] team who currently manages transfusions locally through [facility]  . She was seen by us last 2 years ago . She was instructed if she would like to follow she can call office and make appointment .   Iron studies on 1/22/2025 106, 305 iron saturation 35      Approximately spent 40  minutes with patient, discussing the laboratory, imaging, and clinical findings; and documentation, and I have discussed the clinical implications and recommendations. More than 50% of time was spent counseling patient. The patient verbalized understanding.    Ailyn Carrera APRN, ACNS-BC,AGACNP-BC   [facility] MEDICAL ONCOLOGY  [address] [place] [address]  Dept: [phone]  Loc: [phone]

## 2025-01-23 NOTE — PROGRESS NOTES
[facility] Hospitalist Progress Note    Admitting Date and Time: 1/21/2025  8:06 PM  Admit Dx: Severe anemia [D64.9]    Subjective:  Patient is being followed for Severe anemia [D64.9]         insulin glargine  10 Units SubCUTAneous Nightly    insulin lispro  0-4 Units SubCUTAneous Q6H    rOPINIRole  0.5 mg Oral BID    gabapentin  300 mg Oral TID    propranolol  10 mg Oral Daily    FLUoxetine  60 mg Oral Daily    sodium chloride flush  5-40 mL IntraVENous 2 times per day    pantoprazole (PROTONIX) 40 mg in sodium chloride (PF) 0.9 % 10 mL injection  40 mg IntraVENous Q12H     dextrose bolus, 125 mL, PRN   Or  dextrose bolus, 250 mL, PRN  glucagon (rDNA), 1 mg, PRN  dextrose, , Continuous PRN  acetaminophen, 650 mg, Q6H PRN   Or  acetaminophen, 650 mg, Q6H PRN  ondansetron, 4 mg, Q8H PRN   Or  ondansetron, 4 mg, Q6H PRN  sodium chloride flush, 5-40 mL, PRN  sodium chloride, , PRN  sodium chloride, , PRN         Objective:    /88   Pulse 73   Temp 98.2 °F (36.8 °C) (Oral)   Resp 20   Ht 1.6 m (5' 3\")   Wt 65.8 kg (145 lb)   LMP  (LMP Unknown)   SpO2 100%   BMI 25.69 kg/m²     General Appearance: alert and oriented to person, place and time and in no acute distress  Skin: warm and dry  Head: normocephalic and atraumatic  Eyes: pupils equal, round, and reactive to light, extraocular eye movements intact, conjunctivae normal  Neck: neck supple and non tender without mass   Pulmonary/Chest: clear to auscultation bilaterally- no wheezes, rales or rhonchi, normal air movement, no respiratory distress  Cardiovascular: normal rate, normal S1 and S2 and no carotid bruits  Abdomen: soft, non-tender, non-distended, normal bowel sounds, no masses or organomegaly  Extremities: no cyanosis, no clubbing and no edema  Neurologic: no cranial nerve deficit and speech normal        Recent Labs     01/21/25  2228 01/22/25  0450 01/23/25  0508    139 136   K 3.5 3.7 3.6    109* 107   CO2 20* 19* 21*

## 2025-01-23 NOTE — PROGRESS NOTES
Patient primary care service and Dr. Tyson notified of patient transfer by [facility].  Responses from both were received acknowledging information.

## 2025-01-23 NOTE — PROGRESS NOTES
Patient transferring from ICU room 210 to 508, report called to 5W RN, patient belongings consisting of brown coat, glove, black boots, socks, shirt, jeans, sweatshirt, purse, cell phone transported with patient.

## 2025-01-24 PROBLEM — M79.7 FIBROMYALGIA: Status: ACTIVE | Noted: 2025-01-24

## 2025-01-24 PROBLEM — Q27.30 AVM (ARTERIOVENOUS MALFORMATION): Status: ACTIVE | Noted: 2025-01-24

## 2025-01-24 LAB
ABO + RH BLD: NORMAL
ABO/RH: NORMAL
ALBUMIN SERPL-MCNC: 2.7 G/DL (ref 3.5–5.2)
ALP SERPL-CCNC: 96 U/L (ref 35–104)
ALT SERPL-CCNC: 20 U/L (ref 0–32)
ANION GAP SERPL CALCULATED.3IONS-SCNC: 9 MMOL/L (ref 7–16)
ANTIBODY SCREEN: NEGATIVE
ARM BAND NUMBER: NORMAL
ARM BAND NUMBER: NORMAL
AST SERPL-CCNC: 28 U/L (ref 0–31)
BASOPHILS # BLD: 0.03 K/UL (ref 0–0.2)
BASOPHILS NFR BLD: 1 % (ref 0–2)
BILIRUB SERPL-MCNC: 0.6 MG/DL (ref 0–1.2)
BLOOD BANK BLOOD PRODUCT EXPIRATION DATE: NORMAL
BLOOD BANK DISPENSE STATUS: NORMAL
BLOOD BANK DISPENSE STATUS: NORMAL
BLOOD BANK ISBT PRODUCT BLOOD TYPE: 6200
BLOOD BANK PRODUCT CODE: NORMAL
BLOOD BANK SAMPLE EXPIRATION: NORMAL
BLOOD BANK SAMPLE EXPIRATION: NORMAL
BLOOD BANK UNIT TYPE AND RH: NORMAL
BLOOD GROUP ANTIBODIES SERPL: NEGATIVE
BPU ID: NORMAL
BPU ID: NORMAL
BUN SERPL-MCNC: 10 MG/DL (ref 6–23)
CALCIUM SERPL-MCNC: 8 MG/DL (ref 8.6–10.2)
CHLORIDE SERPL-SCNC: 107 MMOL/L (ref 98–107)
CO2 SERPL-SCNC: 21 MMOL/L (ref 22–29)
COMPONENT: NORMAL
COMPONENT: NORMAL
CREAT SERPL-MCNC: 0.7 MG/DL (ref 0.5–1)
CROSSMATCH RESULT: NORMAL
CROSSMATCH RESULT: NORMAL
EOSINOPHIL # BLD: 0.36 K/UL (ref 0.05–0.5)
EOSINOPHILS RELATIVE PERCENT: 8 % (ref 0–6)
ERYTHROCYTE [DISTWIDTH] IN BLOOD BY AUTOMATED COUNT: 17.8 % (ref 11.5–15)
ERYTHROCYTE [DISTWIDTH] IN BLOOD BY AUTOMATED COUNT: 18.1 % (ref 11.5–15)
FERRITIN SERPL-MCNC: 29 NG/ML
GFR, ESTIMATED: >90 ML/MIN/1.73M2
GLUCOSE BLD-MCNC: 230 MG/DL (ref 74–99)
GLUCOSE BLD-MCNC: 284 MG/DL (ref 74–99)
GLUCOSE BLD-MCNC: 338 MG/DL (ref 74–99)
GLUCOSE BLD-MCNC: 358 MG/DL (ref 74–99)
GLUCOSE SERPL-MCNC: 214 MG/DL (ref 74–99)
HAPTOGLOB SERPL-MCNC: 54 MG/DL (ref 30–200)
HCT VFR BLD AUTO: 21.2 % (ref 34–48)
HCT VFR BLD AUTO: 21.5 % (ref 34–48)
HCT VFR BLD AUTO: 24.9 % (ref 34–48)
HGB BLD-MCNC: 6.5 G/DL (ref 11.5–15.5)
HGB BLD-MCNC: 6.5 G/DL (ref 11.5–15.5)
HGB BLD-MCNC: 7.6 G/DL (ref 11.5–15.5)
IMM GRANULOCYTES # BLD AUTO: <0.03 K/UL (ref 0–0.58)
IMM GRANULOCYTES NFR BLD: 0 % (ref 0–5)
LDH SERPL-CCNC: 191 U/L (ref 135–214)
LYMPHOCYTES NFR BLD: 0.88 K/UL (ref 1.5–4)
LYMPHOCYTES RELATIVE PERCENT: 19 % (ref 20–42)
MAGNESIUM SERPL-MCNC: 2 MG/DL (ref 1.6–2.6)
MCH RBC QN AUTO: 26.4 PG (ref 26–35)
MCH RBC QN AUTO: 27 PG (ref 26–35)
MCHC RBC AUTO-ENTMCNC: 30.2 G/DL (ref 32–34.5)
MCHC RBC AUTO-ENTMCNC: 30.7 G/DL (ref 32–34.5)
MCV RBC AUTO: 87.4 FL (ref 80–99.9)
MCV RBC AUTO: 88 FL (ref 80–99.9)
MONOCYTES NFR BLD: 0.48 K/UL (ref 0.1–0.95)
MONOCYTES NFR BLD: 10 % (ref 2–12)
NEUTROPHILS NFR BLD: 62 % (ref 43–80)
NEUTS SEG NFR BLD: 2.89 K/UL (ref 1.8–7.3)
PATH REV BLD -IMP: NORMAL
PHOSPHATE SERPL-MCNC: 3.1 MG/DL (ref 2.5–4.5)
PLATELET # BLD AUTO: 108 K/UL (ref 130–450)
PLATELET, FLUORESCENCE: 122 K/UL (ref 130–450)
PMV BLD AUTO: 10.9 FL (ref 7–12)
PMV BLD AUTO: 11.7 FL (ref 7–12)
POTASSIUM SERPL-SCNC: 3.7 MMOL/L (ref 3.5–5)
PROT SERPL-MCNC: 5 G/DL (ref 6.4–8.3)
RBC # BLD AUTO: 2.41 M/UL (ref 3.5–5.5)
RBC # BLD AUTO: 2.46 M/UL (ref 3.5–5.5)
SODIUM SERPL-SCNC: 137 MMOL/L (ref 132–146)
TRANSFUSION STATUS: NORMAL
TRANSFUSION STATUS: NORMAL
UNIT DIVISION: 0
UNIT DIVISION: 0
UNIT ISSUE DATE/TIME: NORMAL
WBC OTHER # BLD: 4.7 K/UL (ref 4.5–11.5)
WBC OTHER # BLD: 4.9 K/UL (ref 4.5–11.5)

## 2025-01-24 PROCEDURE — 86900 BLOOD TYPING SEROLOGIC ABO: CPT

## 2025-01-24 PROCEDURE — 85025 COMPLETE CBC W/AUTO DIFF WBC: CPT

## 2025-01-24 PROCEDURE — 99232 SBSQ HOSP IP/OBS MODERATE 35: CPT | Performed by: CLINICAL NURSE SPECIALIST

## 2025-01-24 PROCEDURE — 6360000002 HC RX W HCPCS: Performed by: NURSE PRACTITIONER

## 2025-01-24 PROCEDURE — 6370000000 HC RX 637 (ALT 250 FOR IP)

## 2025-01-24 PROCEDURE — 86850 RBC ANTIBODY SCREEN: CPT

## 2025-01-24 PROCEDURE — 2500000003 HC RX 250 WO HCPCS: Performed by: NURSE PRACTITIONER

## 2025-01-24 PROCEDURE — 84100 ASSAY OF PHOSPHORUS: CPT

## 2025-01-24 PROCEDURE — 6370000000 HC RX 637 (ALT 250 FOR IP): Performed by: NURSE PRACTITIONER

## 2025-01-24 PROCEDURE — 6360000002 HC RX W HCPCS: Performed by: INTERNAL MEDICINE

## 2025-01-24 PROCEDURE — 36430 TRANSFUSION BLD/BLD COMPNT: CPT

## 2025-01-24 PROCEDURE — 80053 COMPREHEN METABOLIC PANEL: CPT

## 2025-01-24 PROCEDURE — 85018 HEMOGLOBIN: CPT

## 2025-01-24 PROCEDURE — 82962 GLUCOSE BLOOD TEST: CPT

## 2025-01-24 PROCEDURE — 86901 BLOOD TYPING SEROLOGIC RH(D): CPT

## 2025-01-24 PROCEDURE — P9016 RBC LEUKOCYTES REDUCED: HCPCS

## 2025-01-24 PROCEDURE — 2580000003 HC RX 258: Performed by: NURSE PRACTITIONER

## 2025-01-24 PROCEDURE — 82728 ASSAY OF FERRITIN: CPT

## 2025-01-24 PROCEDURE — 83735 ASSAY OF MAGNESIUM: CPT

## 2025-01-24 PROCEDURE — 99233 SBSQ HOSP IP/OBS HIGH 50: CPT | Performed by: INTERNAL MEDICINE

## 2025-01-24 PROCEDURE — 1200000000 HC SEMI PRIVATE

## 2025-01-24 PROCEDURE — 85027 COMPLETE CBC AUTOMATED: CPT

## 2025-01-24 PROCEDURE — 85014 HEMATOCRIT: CPT

## 2025-01-24 PROCEDURE — 83010 ASSAY OF HAPTOGLOBIN QUANT: CPT

## 2025-01-24 PROCEDURE — 6370000000 HC RX 637 (ALT 250 FOR IP): Performed by: INTERNAL MEDICINE

## 2025-01-24 PROCEDURE — 83615 LACTATE (LD) (LDH) ENZYME: CPT

## 2025-01-24 RX ORDER — SODIUM CHLORIDE 9 MG/ML
INJECTION, SOLUTION INTRAVENOUS PRN
Status: DISCONTINUED | OUTPATIENT
Start: 2025-01-24 | End: 2025-01-25 | Stop reason: HOSPADM

## 2025-01-24 RX ORDER — TRAMADOL HYDROCHLORIDE 50 MG/1
50 TABLET ORAL EVERY 6 HOURS PRN
Status: DISCONTINUED | OUTPATIENT
Start: 2025-01-24 | End: 2025-01-25 | Stop reason: HOSPADM

## 2025-01-24 RX ORDER — KETOROLAC TROMETHAMINE 15 MG/ML
15 INJECTION, SOLUTION INTRAMUSCULAR; INTRAVENOUS ONCE
Status: COMPLETED | OUTPATIENT
Start: 2025-01-24 | End: 2025-01-24

## 2025-01-24 RX ORDER — INSULIN LISPRO 100 [IU]/ML
2 INJECTION, SOLUTION INTRAVENOUS; SUBCUTANEOUS ONCE
Status: COMPLETED | OUTPATIENT
Start: 2025-01-24 | End: 2025-01-24

## 2025-01-24 RX ORDER — INSULIN LISPRO 100 [IU]/ML
4 INJECTION, SOLUTION INTRAVENOUS; SUBCUTANEOUS
Status: DISCONTINUED | OUTPATIENT
Start: 2025-01-24 | End: 2025-01-25 | Stop reason: HOSPADM

## 2025-01-24 RX ADMIN — ROPINIROLE HYDROCHLORIDE 0.5 MG: 1 TABLET, FILM COATED ORAL at 08:41

## 2025-01-24 RX ADMIN — INSULIN LISPRO 4 UNITS: 100 INJECTION, SOLUTION INTRAVENOUS; SUBCUTANEOUS at 16:37

## 2025-01-24 RX ADMIN — SODIUM CHLORIDE, PRESERVATIVE FREE 10 ML: 5 INJECTION INTRAVENOUS at 08:41

## 2025-01-24 RX ADMIN — FLUOXETINE HYDROCHLORIDE 60 MG: 20 CAPSULE ORAL at 08:41

## 2025-01-24 RX ADMIN — GABAPENTIN 300 MG: 300 CAPSULE ORAL at 16:37

## 2025-01-24 RX ADMIN — INSULIN LISPRO 4 UNITS: 100 INJECTION, SOLUTION INTRAVENOUS; SUBCUTANEOUS at 08:40

## 2025-01-24 RX ADMIN — SODIUM CHLORIDE, PRESERVATIVE FREE 10 ML: 5 INJECTION INTRAVENOUS at 21:35

## 2025-01-24 RX ADMIN — INSULIN GLARGINE 12 UNITS: 100 INJECTION, SOLUTION SUBCUTANEOUS at 21:39

## 2025-01-24 RX ADMIN — GABAPENTIN 300 MG: 300 CAPSULE ORAL at 10:47

## 2025-01-24 RX ADMIN — INSULIN LISPRO 2 UNITS: 100 INJECTION, SOLUTION INTRAVENOUS; SUBCUTANEOUS at 16:46

## 2025-01-24 RX ADMIN — SODIUM CHLORIDE, PRESERVATIVE FREE 40 MG: 5 INJECTION INTRAVENOUS at 21:32

## 2025-01-24 RX ADMIN — ACETAMINOPHEN 650 MG: 325 TABLET ORAL at 10:47

## 2025-01-24 RX ADMIN — GABAPENTIN 300 MG: 300 CAPSULE ORAL at 23:30

## 2025-01-24 RX ADMIN — INSULIN LISPRO 4 UNITS: 100 INJECTION, SOLUTION INTRAVENOUS; SUBCUTANEOUS at 11:23

## 2025-01-24 RX ADMIN — PROPRANOLOL HYDROCHLORIDE 10 MG: 10 TABLET ORAL at 08:42

## 2025-01-24 RX ADMIN — INSULIN LISPRO 3 UNITS: 100 INJECTION, SOLUTION INTRAVENOUS; SUBCUTANEOUS at 11:23

## 2025-01-24 RX ADMIN — KETOROLAC TROMETHAMINE 15 MG: 15 INJECTION, SOLUTION INTRAMUSCULAR; INTRAVENOUS at 10:47

## 2025-01-24 RX ADMIN — SODIUM CHLORIDE, PRESERVATIVE FREE 40 MG: 5 INJECTION INTRAVENOUS at 08:41

## 2025-01-24 RX ADMIN — INSULIN LISPRO 1 UNITS: 100 INJECTION, SOLUTION INTRAVENOUS; SUBCUTANEOUS at 06:33

## 2025-01-24 RX ADMIN — ROPINIROLE HYDROCHLORIDE 0.5 MG: 1 TABLET, FILM COATED ORAL at 21:31

## 2025-01-24 ASSESSMENT — PAIN DESCRIPTION - PAIN TYPE: TYPE: CHRONIC PAIN

## 2025-01-24 ASSESSMENT — ENCOUNTER SYMPTOMS
EYES NEGATIVE: 1
RESPIRATORY NEGATIVE: 1
GASTROINTESTINAL NEGATIVE: 1

## 2025-01-24 ASSESSMENT — PAIN DESCRIPTION - DESCRIPTORS
DESCRIPTORS: ACHING;SORE
DESCRIPTORS: ACHING

## 2025-01-24 ASSESSMENT — PAIN DESCRIPTION - FREQUENCY: FREQUENCY: CONTINUOUS

## 2025-01-24 ASSESSMENT — PAIN DESCRIPTION - ORIENTATION
ORIENTATION: RIGHT;LEFT
ORIENTATION: LEFT

## 2025-01-24 ASSESSMENT — PAIN SCALES - GENERAL
PAINLEVEL_OUTOF10: 6
PAINLEVEL_OUTOF10: 6

## 2025-01-24 ASSESSMENT — PAIN DESCRIPTION - LOCATION
LOCATION: BACK;RIB CAGE
LOCATION: BACK;RIB CAGE

## 2025-01-24 ASSESSMENT — PAIN DESCRIPTION - ONSET: ONSET: ON-GOING

## 2025-01-24 NOTE — CARE COORDINATION
Case Management.. Discharge plan is home with [facility]. Resume HHC orders placed.  Will need to notify [facility] when discharge date known [phone].   Electronically signed by Helena Swartz RN on 1/24/2025 at 8:37 AM

## 2025-01-24 NOTE — PROGRESS NOTES
Hemoglobin 5.8 this evening. Notified JAMI Kirk. One unit of blood was ordered. But level was rechecked via peripheral stick before blood administration. It was then 7.0. Notified JAMI Kirk. Said to hold off on the one unit of blood. Orders for one unit discontinued.

## 2025-01-24 NOTE — PROGRESS NOTES
Palliative Care Department  [phone]  Palliative Care Progress Note  Provider Danisha hWitlock PA-C     Haven Barber  [id number]  Hospital Day: 4  Date of Initial Consult: 1/21/2025  Referring Provider:  Uziel Agarwal APRN - CNP  Palliative Medicine was consulted for assistance with: Goals of care    HPI:   Haven Barber is a 74 y.o. 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  The patient was found to have an H&H of 5/16.9.  The patient was recently hospitalized in December and had an EGD done on 12/6 showing normal esophagus, duodenum, and jejunum - atral GAVE noted and treated with APC. She reports there is no further intervention for her Bechet's disease and was previously followed at [facility] for this. She was transfused with 1 unit of PRBC and transferred to [place] ICU for further management.  Palliative medicine consulted for further assistance.    ASSESSMENT/PLAN:     Pertinent Hospital Diagnoses     Acute blood loss anemia  GI bleed history GAVE  Melena  Hypotension    Palliative Care Encounter / Counseling Regarding Goals of Care  Please see detailed goals of care discussion as below  At this time, Haven Barber, Does have capacity for medical decision-making.  Capacity is time limited and situation/question specific  During encounter there was no surrogate medical decision-maker needed  Outcome of goals of care meeting:   Confirmed limited code- DNR-CCA/DNI  Code status Limited no to all options  Advanced Directives: no POA or living will in [facility]  Surrogate/Legal NOK:  Todd Casiano (child) [phone]  Lorrie Barber (sibling) [phone]    Spiritual assessment: no spiritual distress identified  Bereavement and  grief: to be determined  Referrals to: none today  SUBJECTIVE:     Current medical issues leading to Palliative Medicine involvement include   Active Hospital Problems    Diagnosis Date Noted    Fibromyalgia [M79.7] 01/24/2025    AVM (arteriovenous malformation) [Q27.30] 01/24/2025    GI bleed [K92.2] 01/23/2025    Severe anemia [D64.9] 01/22/2025    Hypotension due to hypovolemia [E86.1] 01/22/2025    Hypokalemia [E87.6] 01/22/2025    Depression [F32.A] 01/22/2025    GAVE (gastric antral vascular ectasia) [K31.819] 07/22/2023    Gastrointestinal hemorrhage associated with gastric ulcer [K25.4] 01/14/2019       Details of Conversation:    Patient sitting up in bed on room air.  Unaccompanied by friends/family.  She is alert and oriented x 3.  States she is feeling well today.  She received a blood transfusion this morning.  Most recent hemoglobin is 7.6.  Patient's CODE STATUS remains DNR/DNI.  Her son and her sister are her primary decision makers if she would be unable to make her own decisions.  We reviewed the necessary POA forms if she would want her sister to be the sole medical decision maker.  She would like to hold off at this time.  She would like to continue medical management of her GAVE syndrome.  States she tolerates blood transfusions well.  Continue current level of medical care.  CODE STATUS DNR/DNI.  Goals have been outlined.  CODE STATUS confirmed.  Palliative care will sign off.  Please reconsult if any new palliative needs arise.  Thank you.      OBJECTIVE:   Prognosis: Guarded    Physical Exam:  BP (!) 100/42   Pulse 77   Temp 98.8 °F (37.1 °C) (Oral)   Resp 16   Ht 1.6 m (5' 3\")   Wt 66 kg (145 lb 8 oz)   LMP  (LMP Unknown)   SpO2 98%   BMI 25.77 kg/m²   Constitutional: awake, alert  Head: normocephalic atraumatic  Lungs: respirations unlabored on room air  Skin: No rash or lesion  Extremities: moving all extremities  Neuro: Alert, following commands    Objective data reviewed: labs,

## 2025-01-24 NOTE — PROGRESS NOTES
[facility] Hospitalist Progress Note    Admitting Date and Time: 1/21/2025  8:06 PM  Admit Dx: Severe anemia [D64.9]  GI bleed [K92.2]    Subjective:  Patient is being followed for Severe anemia [D64.9]  GI bleed [K92.2]      Patient is seen this a.m.  She endorses generalized body pain from fibromyalgia.  She otherwise has no acute concerns.     insulin lispro  4 Units SubCUTAneous TID WC    insulin glargine  12 Units SubCUTAneous Nightly    insulin lispro  0-4 Units SubCUTAneous Q6H    rOPINIRole  0.5 mg Oral BID    gabapentin  300 mg Oral TID    propranolol  10 mg Oral Daily    FLUoxetine  60 mg Oral Daily    sodium chloride flush  5-40 mL IntraVENous 2 times per day    pantoprazole (PROTONIX) 40 mg in sodium chloride (PF) 0.9 % 10 mL injection  40 mg IntraVENous Q12H     sodium chloride, , PRN  sodium chloride flush, 5-40 mL, PRN  sodium chloride, , PRN  dextrose bolus, 125 mL, PRN   Or  dextrose bolus, 250 mL, PRN  glucagon (rDNA), 1 mg, PRN  dextrose, , Continuous PRN  acetaminophen, 650 mg, Q6H PRN   Or  acetaminophen, 650 mg, Q6H PRN  ondansetron, 4 mg, Q8H PRN   Or  ondansetron, 4 mg, Q6H PRN  sodium chloride flush, 5-40 mL, PRN  sodium chloride, , PRN  sodium chloride, , PRN         Objective:    BP (!) 123/53   Pulse 99   Temp 98.1 °F (36.7 °C) (Oral)   Resp 18   Ht 1.6 m (5' 3\")   Wt 66 kg (145 lb 8 oz)   LMP  (LMP Unknown)   SpO2 97%   BMI 25.77 kg/m²     General Appearance AOx3.  No acute distress  Skin: warm and dry  Head: normocephalic and atraumatic  Eyes: pupils equal, round, and reactive to light, extraocular eye movements intact, conjunctivae normal  Neck: neck supple and non tender without mass   Pulmonary/Chest: clear to auscultation bilaterally- no wheezes, rales or rhonchi, normal air movement, no respiratory distress  Cardiovascular: 2/6 systolic murmur, normal S1 and S2.  Abdomen: soft, non-tender, non-distended, normal bowel sounds, no masses or organomegaly  Extremities:  no cyanosis, no clubbing and no edema  Neurologic: no cranial nerve deficit and speech normal        Recent Labs     01/22/25  0450 01/23/25  0508 01/24/25  0640    136 137   K 3.7 3.6 3.7   * 107 107   CO2 19* 21* 21*   BUN 13 14 10   CREATININE 0.7 0.7 0.7   GLUCOSE 157* 227* 214*   CALCIUM 8.3* 8.0* 8.0*       Recent Labs     01/23/25  0508 01/23/25  1843 01/23/25 2003 01/24/25  0640   WBC 5.3  --  5.3 4.9   RBC 2.52*  --  2.57* 2.46*   HGB 7.1* 5.8* 7.0* 6.5*   HCT 21.7* 18.7* 22.2* 21.5*   MCV 86.1  --  86.4 87.4   MCH 28.2  --  27.2 26.4   MCHC 32.7  --  31.5* 30.2*   RDW 17.4*  --  17.9* 17.8*     --  135 108*   MPV 10.9  --  11.4 10.9         Assessment:    Principal Problem:    Severe anemia  Active Problems:    Gastrointestinal hemorrhage associated with gastric ulcer    GAVE (gastric antral vascular ectasia)    Hypotension due to hypovolemia    Hypokalemia    Depression    GI bleed    Fibromyalgia    AVM (arteriovenous malformation)  Resolved Problems:    * No resolved hospital problems. *      Plan:  Acute blood loss Anemia: 2/2 GAVE vs ?Hyede.  Presence of AVM in colon noted on capsule endoscope. As present, concern possible AV malformation playing a role. S/p EGD on 1/23 w/ Bx. Hgb 6.5, 1 U PRBC ordered.   GAVE: on CCB. Continue. Monitor HH.   WARNER: check iron panel. Iron supplementation   Fibromyalgia: trial tramadol for now. Will avoid NSAID or steroids given GI bleed. Duloxetine maybe helpful long term  Continue PPI BID  Resume home meds as able   Monitor H/H q12 and transfuse to keep hgb > 7  Monitor electrolytes and replete as needed.  Resume home meds.    Code status: Full  DVT/GI Prophylaxis; PCD/PPI     Dispo: Pending clinical course     NOTE: This report was transcribed using voice recognition software. Every effort was made to ensure accuracy; however, inadvertent computerized transcription errors may be present.  Electronically signed by Curt Juárez MD on

## 2025-01-24 NOTE — PLAN OF CARE
Problem: Chronic Conditions and Co-morbidities  Goal: Patient's chronic conditions and co-morbidity symptoms are monitored and maintained or improved  1/24/2025 1133 by Lesly Mcclure RN  Outcome: Progressing     Problem: Discharge Planning  Goal: Discharge to home or other facility with appropriate resources  1/24/2025 1133 by Lesly Mcclure RN  Outcome: Progressing     Problem: Safety - Adult  Goal: Free from fall injury  1/24/2025 1133 by Lesly Mcclure RN  Outcome: Progressing     Problem: ABCDS Injury Assessment  Goal: Absence of physical injury  1/24/2025 1133 by Lesly Mcclure RN  Outcome: Progressing     Problem: Pain  Goal: Verbalizes/displays adequate comfort level or baseline comfort level  1/24/2025 1133 by Lesly Mcclure RN  Outcome: Progressing     Problem: Hematologic - Adult  Goal: Maintains hematologic stability  Outcome: Progressing

## 2025-01-24 NOTE — PROGRESS NOTES
Justine Armando MD   ·   MD Ailyn Garcia APRN  ·  MORALES Carvajal      [place] [facility] in [place]  [address] [place] [address]  P: [phone]  F: [phone] [place] [facility] in [place]  [address] [place] [address]  P: [phone]  F: [phone]  [place] [facility] in [place]  [address] [place] [address]  P: [phone]  F: [phone]           Inpatient Medical Oncology/Hematology progress Note              Room: 0508/[facility]-A         Hx today :feeling better today currently receiving 1 U PRBC , no event overnight       CHIEF COMPLAINT:  Fatigue    HISTORY OF PRESENT ILLNESS   Patient is a 74 y.o. female presented ED on 1/21/2025 for melena for the last several days.  She had her blood drawn her H&H was 5.0 referred to ED.  Patient has a significant medical history of iron deficiency anemia requiring transfusions, type 2 diabetes, hepatic steatosis, B. Luis disease with angiodysplasia of stomach and duodenum, gastric antral vascular ectasia with history of bleeding episodes most recently requiring admission December 2024.  EGD done on 12 6 showed a normal esophagus duodenum and jejunum atrial GAVE noted and treated with APC.  Patient was admitted transfuse with packed red blood cells.  Patient received a total of 3 units packed red blood cells Protonix started GI consulted made NPO.  Patient underwent EGD today with Dr. Tyson for control of hemorrhage.  Our service has been consulted for anemia.  Patient is known to Dr. Kolton Armando she was last by our team in office with Dr. Kolton Armando for her normocytic anemia, secondary to iron deficiency , she then decided to receive her hematology care through [facility] with whom she has followed since. PT seen at bedside , she reports she has standing orders from her hematology team at [facility] for blood transfusion in medical infusion here at [facility] , per epic review she has standing orders from that office

## 2025-01-24 NOTE — PROGRESS NOTES
[facility] Quality Flow/Interdisciplinary Rounds Progress Note        Quality Flow Rounds held on January 24, 2025    Disciplines Attending:  Bedside Nurse, , , and Nursing Unit Leadership    Haven Barber was admitted on 1/21/2025  8:06 PM    Anticipated Discharge Date:       Disposition:    Gordo Score:  Gordo Scale Score: 22    BS RISK OF UNPLANNED READMISSION 2.0             20.8 Total Score        Discussed patient goal for the day, patient clinical progression, and barriers to discharge.  The following Goal(s) of the Day/Commitment(s) have been identified:  Diagnostics - Report Results and Labs - Report Results      Jessy Devine RN  January 24, 2025

## 2025-01-24 NOTE — PROGRESS NOTES
Reason for consult:  Hyperglycemia     A1C: 7%            Patient states the following concerns/barriers to diabetes self-management:     [] None       [] Medication cost   [] Food cost/availability        [] Reading  [] Hearing   [] Vision                [] Work    [] Transportation  [] No insurance  [x] Physical limitations    [] Other:        Patient states the following about their diabetes/health:   [x]  She was diagnosed with diabetes many years ago.   [x]  She takes glipizide and Trulicity at home.   [x]  She does own a glucose meter, but does not regularly test.    [x]  She eats twice daily, but has trouble preparing her own meals d/t physical weakness. She keeps her meals simple, such as soup or a sandwich with some fruit. She does also utilize Meals On Wheels.   [x]   She is mostly bed/chair ridden d/t chronic anemia/weakness. She does own a walker, but has trouble using it in her house since it is a small space.      Diabetes survival packet provided to:   [x] Patient     [] Other:    Information given:   Definition of diabetes   Target glucose ranges/A1C   Self-monitoring of blood glucose   Prevention/symptoms/treatment of hypo-/hyperglycemia   Medication adherence             The plate method/meal planning guidelines             The benefits of exercise and recommendations             Reducing the risk of chronic complications     Diabetes medications reviewed (use, purpose, action): metformin, Trulicity             Post-education Assessment  [x]  Attentive to teaching  [x]  Answered questions appropriately when asked   [x]  Seems able to apply concepts to daily lifestyle  [x]  Seems motivated to do well  [x]  Verbalized an understanding of plate method  [x]  Verbalized an understanding of prescribed antidiabetes medications   [x]  Verbalized an understanding of target glucose ranges/A1C level  [x]  Expresses an intent to comply with treatment plan   []  Showed very little interest in complying with

## 2025-01-24 NOTE — PROGRESS NOTES
Notified Dr. Cho of critical Hbg 6.5 this AM.    Dr. Cho not covering- Dr Fung notified of critical Hbg.

## 2025-01-25 VITALS
DIASTOLIC BLOOD PRESSURE: 55 MMHG | RESPIRATION RATE: 16 BRPM | SYSTOLIC BLOOD PRESSURE: 109 MMHG | HEART RATE: 71 BPM | HEIGHT: 63 IN | WEIGHT: 145.5 LBS | TEMPERATURE: 97.9 F | BODY MASS INDEX: 25.78 KG/M2 | OXYGEN SATURATION: 97 %

## 2025-01-25 LAB
ABO/RH: NORMAL
ALBUMIN SERPL-MCNC: 2.7 G/DL (ref 3.5–5.2)
ALP SERPL-CCNC: 99 U/L (ref 35–104)
ALT SERPL-CCNC: 22 U/L (ref 0–32)
ANION GAP SERPL CALCULATED.3IONS-SCNC: 7 MMOL/L (ref 7–16)
ANTIBODY SCREEN: NEGATIVE
ARM BAND NUMBER: NORMAL
AST SERPL-CCNC: 33 U/L (ref 0–31)
BILIRUB SERPL-MCNC: 0.8 MG/DL (ref 0–1.2)
BLOOD BANK BLOOD PRODUCT EXPIRATION DATE: NORMAL
BLOOD BANK DISPENSE STATUS: NORMAL
BLOOD BANK ISBT PRODUCT BLOOD TYPE: 6200
BLOOD BANK PRODUCT CODE: NORMAL
BLOOD BANK SAMPLE EXPIRATION: NORMAL
BLOOD BANK UNIT TYPE AND RH: NORMAL
BPU ID: NORMAL
BUN SERPL-MCNC: 14 MG/DL (ref 6–23)
CALCIUM SERPL-MCNC: 8.1 MG/DL (ref 8.6–10.2)
CHLORIDE SERPL-SCNC: 105 MMOL/L (ref 98–107)
CO2 SERPL-SCNC: 23 MMOL/L (ref 22–29)
COMPONENT: NORMAL
CREAT SERPL-MCNC: 0.7 MG/DL (ref 0.5–1)
CROSSMATCH RESULT: NORMAL
GFR, ESTIMATED: >90 ML/MIN/1.73M2
GLUCOSE BLD-MCNC: 186 MG/DL (ref 74–99)
GLUCOSE BLD-MCNC: 258 MG/DL (ref 74–99)
GLUCOSE SERPL-MCNC: 204 MG/DL (ref 74–99)
HCT VFR BLD AUTO: 25 % (ref 34–48)
HGB BLD-MCNC: 7.7 G/DL (ref 11.5–15.5)
MAGNESIUM SERPL-MCNC: 1.9 MG/DL (ref 1.6–2.6)
PHOSPHATE SERPL-MCNC: 3.2 MG/DL (ref 2.5–4.5)
POTASSIUM SERPL-SCNC: 3.8 MMOL/L (ref 3.5–5)
PROT SERPL-MCNC: 5.1 G/DL (ref 6.4–8.3)
SODIUM SERPL-SCNC: 135 MMOL/L (ref 132–146)
TRANSFUSION STATUS: NORMAL
UNIT DIVISION: 0
UNIT ISSUE DATE/TIME: NORMAL

## 2025-01-25 PROCEDURE — 6370000000 HC RX 637 (ALT 250 FOR IP)

## 2025-01-25 PROCEDURE — 6370000000 HC RX 637 (ALT 250 FOR IP): Performed by: INTERNAL MEDICINE

## 2025-01-25 PROCEDURE — 83735 ASSAY OF MAGNESIUM: CPT

## 2025-01-25 PROCEDURE — 84100 ASSAY OF PHOSPHORUS: CPT

## 2025-01-25 PROCEDURE — 99232 SBSQ HOSP IP/OBS MODERATE 35: CPT | Performed by: INTERNAL MEDICINE

## 2025-01-25 PROCEDURE — 2500000003 HC RX 250 WO HCPCS: Performed by: NURSE PRACTITIONER

## 2025-01-25 PROCEDURE — 85014 HEMATOCRIT: CPT

## 2025-01-25 PROCEDURE — 80053 COMPREHEN METABOLIC PANEL: CPT

## 2025-01-25 PROCEDURE — 6370000000 HC RX 637 (ALT 250 FOR IP): Performed by: NURSE PRACTITIONER

## 2025-01-25 PROCEDURE — 85018 HEMOGLOBIN: CPT

## 2025-01-25 PROCEDURE — 82962 GLUCOSE BLOOD TEST: CPT

## 2025-01-25 PROCEDURE — 6360000002 HC RX W HCPCS: Performed by: INTERNAL MEDICINE

## 2025-01-25 RX ORDER — POLYETHYLENE GLYCOL 3350 17 G/17G
17 POWDER, FOR SOLUTION ORAL DAILY
Status: DISCONTINUED | OUTPATIENT
Start: 2025-01-25 | End: 2025-01-25 | Stop reason: HOSPADM

## 2025-01-25 RX ORDER — POLYETHYLENE GLYCOL 3350 17 G/17G
17 POWDER, FOR SOLUTION ORAL DAILY
Qty: 15 PACKET | Refills: 0 | Status: SHIPPED | OUTPATIENT
Start: 2025-01-25 | End: 2025-02-09

## 2025-01-25 RX ORDER — PROPRANOLOL HYDROCHLORIDE 10 MG/1
10 TABLET ORAL DAILY
Qty: 90 TABLET | Refills: 1 | Status: SHIPPED | OUTPATIENT
Start: 2025-01-25 | End: 2025-01-31 | Stop reason: SDUPTHER

## 2025-01-25 RX ORDER — HEPARIN 100 UNIT/ML
300 SYRINGE INTRAVENOUS ONCE
Status: COMPLETED | OUTPATIENT
Start: 2025-01-25 | End: 2025-01-25

## 2025-01-25 RX ORDER — INSULIN GLARGINE 100 [IU]/ML
13 INJECTION, SOLUTION SUBCUTANEOUS NIGHTLY
Status: DISCONTINUED | OUTPATIENT
Start: 2025-01-25 | End: 2025-01-25 | Stop reason: HOSPADM

## 2025-01-25 RX ADMIN — ACETAMINOPHEN 650 MG: 325 TABLET ORAL at 06:01

## 2025-01-25 RX ADMIN — FLUOXETINE HYDROCHLORIDE 60 MG: 20 CAPSULE ORAL at 08:21

## 2025-01-25 RX ADMIN — TRAMADOL HYDROCHLORIDE 50 MG: 50 TABLET, COATED ORAL at 00:26

## 2025-01-25 RX ADMIN — Medication 300 UNITS: at 11:05

## 2025-01-25 RX ADMIN — ROPINIROLE HYDROCHLORIDE 0.5 MG: 1 TABLET, FILM COATED ORAL at 08:21

## 2025-01-25 RX ADMIN — SODIUM CHLORIDE, PRESERVATIVE FREE 10 ML: 5 INJECTION INTRAVENOUS at 08:21

## 2025-01-25 RX ADMIN — GABAPENTIN 300 MG: 300 CAPSULE ORAL at 11:55

## 2025-01-25 RX ADMIN — INSULIN LISPRO 4 UNITS: 100 INJECTION, SOLUTION INTRAVENOUS; SUBCUTANEOUS at 06:03

## 2025-01-25 RX ADMIN — INSULIN LISPRO 2 UNITS: 100 INJECTION, SOLUTION INTRAVENOUS; SUBCUTANEOUS at 00:23

## 2025-01-25 RX ADMIN — INSULIN LISPRO 1 UNITS: 100 INJECTION, SOLUTION INTRAVENOUS; SUBCUTANEOUS at 06:03

## 2025-01-25 RX ADMIN — PROPRANOLOL HYDROCHLORIDE 10 MG: 10 TABLET ORAL at 08:20

## 2025-01-25 ASSESSMENT — PAIN DESCRIPTION - DESCRIPTORS
DESCRIPTORS: ACHING
DESCRIPTORS: ACHING

## 2025-01-25 ASSESSMENT — PAIN SCALES - GENERAL
PAINLEVEL_OUTOF10: 6
PAINLEVEL_OUTOF10: 3

## 2025-01-25 ASSESSMENT — PAIN DESCRIPTION - LOCATION
LOCATION: GENERALIZED
LOCATION: GENERALIZED

## 2025-01-25 ASSESSMENT — PAIN - FUNCTIONAL ASSESSMENT
PAIN_FUNCTIONAL_ASSESSMENT: PREVENTS OR INTERFERES SOME ACTIVE ACTIVITIES AND ADLS
PAIN_FUNCTIONAL_ASSESSMENT: PREVENTS OR INTERFERES SOME ACTIVE ACTIVITIES AND ADLS

## 2025-01-25 NOTE — PROGRESS NOTES
[facility] Hospitalist Progress Note    Admitting Date and Time: 1/21/2025  8:06 PM  Admit Dx: Severe anemia [D64.9]  GI bleed [K92.2]    Subjective:  Patient is being followed for Severe anemia [D64.9]  GI bleed [K92.2]      Patient is seen this a.m.  She endorses generalized body pain from fibromyalgia.  She otherwise has no acute concerns.     insulin lispro  4 Units SubCUTAneous TID WC    insulin glargine  12 Units SubCUTAneous Nightly    insulin lispro  0-4 Units SubCUTAneous Q6H    rOPINIRole  0.5 mg Oral BID    gabapentin  300 mg Oral TID    propranolol  10 mg Oral Daily    FLUoxetine  60 mg Oral Daily    sodium chloride flush  5-40 mL IntraVENous 2 times per day    pantoprazole (PROTONIX) 40 mg in sodium chloride (PF) 0.9 % 10 mL injection  40 mg IntraVENous Q12H     sodium chloride, , PRN  traMADol, 50 mg, Q6H PRN  sodium chloride flush, 5-40 mL, PRN  sodium chloride, , PRN  dextrose bolus, 125 mL, PRN   Or  dextrose bolus, 250 mL, PRN  glucagon (rDNA), 1 mg, PRN  dextrose, , Continuous PRN  acetaminophen, 650 mg, Q6H PRN   Or  acetaminophen, 650 mg, Q6H PRN  ondansetron, 4 mg, Q8H PRN   Or  ondansetron, 4 mg, Q6H PRN  sodium chloride flush, 5-40 mL, PRN  sodium chloride, , PRN  sodium chloride, , PRN         Objective:    BP (!) 109/55   Pulse 71   Temp 97.9 °F (36.6 °C) (Oral)   Resp 16   Ht 1.6 m (5' 3\")   Wt 66 kg (145 lb 8 oz)   LMP  (LMP Unknown)   SpO2 97%   BMI 25.77 kg/m²     General Appearance AOx3.  No acute distress  Skin: warm and dry  Head: normocephalic and atraumatic  Eyes: pupils equal, round, and reactive to light, extraocular eye movements intact, conjunctivae normal  Neck: neck supple and non tender without mass   Pulmonary/Chest: clear to auscultation bilaterally- no wheezes, rales or rhonchi, normal air movement, no respiratory distress  Cardiovascular: 2/6 systolic murmur, normal S1 and S2.  Abdomen: soft, non-tender, non-distended, normal bowel sounds, no masses or

## 2025-01-25 NOTE — PROGRESS NOTES
Justine Armando MD   ·   MD Ailyn Garcia APRN  ·  MORALES Carvajal      [facility] in [place]  [address] [place] [address]  P: [phone]  F: [phone] [facility] [place]  [address] [place] [address]  P: [phone]  F: [phone]  [facility] in [place]  [address] [place] [address]  P: [phone]  F: [phone]           Inpatient Medical Oncology/Hematology progress Note              [facility]      CHIEF COMPLAINT:  Fatigue    Subjective:    The patient was seen and examined, she is feeling well at this time, she denies any melena or hematochezia.  She reports history of blood dyscrasia and Behcet's in the family.        Vitals:    01/25/25 0735   BP: (!) 109/55   Pulse: 71   Resp: 16   Temp: 97.9 °F (36.6 °C)   SpO2: 97%     Physical Exam  Constitutional:       Appearance: Normal appearance.   HENT:      Head: Normocephalic and atraumatic.      Nose: Nose normal.   Eyes:      Pupils: Pupils are equal, round, and reactive to light.   Cardiovascular:      Rate and Rhythm: Normal rate.      Comments: murmur  Pulmonary:      Effort: Pulmonary effort is normal.      Breath sounds: Normal breath sounds.   Abdominal:      General: Bowel sounds are normal.   Musculoskeletal:         General: Normal range of motion.      Cervical back: Normal range of motion.   Neurological:      Mental Status: She is alert.          ECOG PS: 1          Intake/Output Summary (Last 24 hours) at 1/25/2025 1037  Last data filed at 1/25/2025 0900  Gross per 24 hour   Intake 748.83 ml   Output --   Net 748.83 ml             DIAGNOSTIC DATA:   Lab Results   Component Value Date    WBC 4.9 01/24/2025    WBC 4.7 01/24/2025    HGB 7.7 (L) 01/25/2025    HCT 25.0 (L) 01/25/2025    MCV 87.4 01/24/2025    MCV 88.0 01/24/2025     (L) 01/24/2025     Lab Results   Component Value Date    NEUTROABS 2.89 01/24/2025     Lab Results   Component

## 2025-01-25 NOTE — DISCHARGE SUMMARY
[facility] Hospitalist Physician Discharge Summary       [employer]  [address] [address]  [phone]          Activity level: As tolerated     Dispo: Home       Condition on discharge: Stable     Patient ID:  Haven Barber  [id number]  74 y.o.  1950    Admit date: 1/21/2025    Discharge date and time:  1/25/2025  10:26 AM    Admission Diagnoses: Principal Problem:    Severe anemia  Active Problems:    Gastrointestinal hemorrhage associated with gastric ulcer    GAVE (gastric antral vascular ectasia)    Hypotension due to hypovolemia    Hypokalemia    Depression    GI bleed    Fibromyalgia    AVM (arteriovenous malformation)  Resolved Problems:    * No resolved hospital problems. *      Discharge Diagnoses: Principal Problem:    Severe anemia  Active Problems:    Gastrointestinal hemorrhage associated with gastric ulcer    GAVE (gastric antral vascular ectasia)    Hypotension due to hypovolemia    Hypokalemia    Depression    GI bleed    Fibromyalgia    AVM (arteriovenous malformation)  Resolved Problems:    * No resolved hospital problems. *      Consults:  IP CONSULT TO GI  IP CONSULT TO PALLIATIVE CARE  IP CONSULT TO PODIATRY  IP CONSULT TO SPIRITUAL SERVICES  IP CONSULT TO ONCOLOGY  IP CONSULT TO DIABETES EDUCATOR    Procedures: EGD    Hospital Course:   Patient Haven Barber is a 74 y.o. presented with Severe anemia [D64.9]  GI bleed [K92.2]  Patient is a 74-year-old female with a history of iron deficiency anemia requiring transfusions, type 2 diabetes mellitus, Behcet’s disease, hepatic steatosis, angiodysplasia of the stomach and duodenum, gastric antral vascular ectasia (GAVE), hypertension, fibromyalgia, and psoriasis presented with melena and fatigue. She was referred to the ED after lab work showed a hemoglobin of 5. On arrival, she was hypotensive with a blood pressure of 76/60, hemoglobin of 4.1, and lactate of 3.7. She received 1 unit of PRBC in the  [Blood:388.8]  Out: -   No intake/output data recorded.      LABS:  Recent Labs     01/23/25  0508 01/24/25  0640 01/25/25  0220    137 135   K 3.6 3.7 3.8    107 105   CO2 21* 21* 23   BUN 14 10 14   CREATININE 0.7 0.7 0.7   GLUCOSE 227* 214* 204*   CALCIUM 8.0* 8.0* 8.1*       Recent Labs     01/23/25  0508 01/23/25  1843 01/23/25 2003 01/24/25  0640 01/24/25  1405 01/25/25  0220   WBC 5.3  --  5.3 4.7  4.9  --   --    RBC 2.52*  --  2.57* 2.41*  2.46*  --   --    HGB 7.1*   < > 7.0* 6.5*  6.5* 7.6* 7.7*   HCT 21.7*   < > 22.2* 21.2*  21.5* 24.9* 25.0*   MCV 86.1  --  86.4 88.0  87.4  --   --    MCH 28.2  --  27.2 27.0  26.4  --   --    MCHC 32.7  --  31.5* 30.7*  30.2*  --   --    RDW 17.4*  --  17.9* 18.1*  17.8*  --   --      --  135 108*  --   --    MPV 10.9  --  11.4 11.7  10.9  --   --     < > = values in this interval not displayed.       Recent Labs     01/24/25  1538 01/24/25  2137 01/25/25  0022 01/25/25  0555   POCGLU 358* 284* 258* 186*       Imaging:  No results found.    Patient Instructions:      Medication List        START taking these medications      polyethylene glycol 17 g packet  Commonly known as: GLYCOLAX  Take 1 packet by mouth daily for 15 days            CHANGE how you take these medications      propranolol 10 MG tablet  Commonly known as: INDERAL  Take 1 tablet by mouth daily Hold for HR <60 and/or SBP <100  What changed: additional instructions            CONTINUE taking these medications      Dulaglutide 4.5 MG/0.5ML Soaj  Inject 4.5 mg into the skin once a week     FLUoxetine 20 MG capsule  Commonly known as: PROZAC  Take 3 capsules by mouth daily     fluticasone 50 MCG/ACT nasal spray  Commonly known as: FLONASE  USE 1 SPRAY IN EACH NOSTRIL EVERY DAY     gabapentin 300 MG capsule  Commonly known as: NEURONTIN  Take 1 capsule by mouth 3 times daily for 180 days. 1 capsule in the am 2 capsules in the evening     glipiZIDE 5 MG tablet  Commonly known as:

## 2025-01-25 NOTE — PLAN OF CARE
Problem: Chronic Conditions and Co-morbidities  Goal: Patient's chronic conditions and co-morbidity symptoms are monitored and maintained or improved  1/24/2025 2240 by Alisson Bocanegra  Outcome: Progressing  1/24/2025 1133 by Lesly Mcclure RN  Outcome: Progressing     Problem: Discharge Planning  Goal: Discharge to home or other facility with appropriate resources  1/24/2025 2240 by Alisson Bocanegra  Outcome: Progressing  1/24/2025 1133 by Lesly Mcclure RN  Outcome: Progressing     Problem: Safety - Adult  Goal: Free from fall injury  1/24/2025 2240 by Alisson Bocanegra  Outcome: Progressing  1/24/2025 1133 by Lesly Mcclure RN  Outcome: Progressing     Problem: ABCDS Injury Assessment  Goal: Absence of physical injury  1/24/2025 2240 by Alisson Bocanegra  Outcome: Progressing  1/24/2025 1133 by Lesly Mcclure RN  Outcome: Progressing     Problem: Pain  Goal: Verbalizes/displays adequate comfort level or baseline comfort level  1/24/2025 2240 by Alisson Bocanegra  Outcome: Progressing  1/24/2025 1133 by Lesly Mcclure RN  Outcome: Progressing     Problem: Hematologic - Adult  Goal: Maintains hematologic stability  1/24/2025 2240 by Alisson Bocanegra  Outcome: Progressing  1/24/2025 1133 by Lesly Mcclure RN  Outcome: Progressing

## 2025-01-30 ENCOUNTER — HOSPITAL ENCOUNTER (OUTPATIENT)
Age: 75
Discharge: HOME | End: 2025-01-30
Payer: MEDICARE

## 2025-01-30 LAB
ABO + RH BLD: NORMAL
ARM BAND NUMBER: NORMAL
BASOPHILS # BLD: 0.05 K/UL (ref 0–0.2)
BASOPHILS NFR BLD: 1 % (ref 0–2)
BLOOD BANK SAMPLE EXPIRATION: NORMAL
BLOOD GROUP ANTIBODIES SERPL: NEGATIVE
EOSINOPHIL # BLD: 0.35 K/UL (ref 0.05–0.5)
EOSINOPHILS RELATIVE PERCENT: 7 % (ref 0–6)
ERYTHROCYTE [DISTWIDTH] IN BLOOD BY AUTOMATED COUNT: 17.6 % (ref 11.5–15)
HCT VFR BLD AUTO: 24.2 % (ref 34–48)
HGB BLD-MCNC: 7.4 G/DL (ref 11.5–15.5)
IMM GRANULOCYTES # BLD AUTO: <0.03 K/UL (ref 0–0.58)
IMM GRANULOCYTES NFR BLD: 0 % (ref 0–5)
LYMPHOCYTES NFR BLD: 0.96 K/UL (ref 1.5–4)
LYMPHOCYTES RELATIVE PERCENT: 19 % (ref 20–42)
MCH RBC QN AUTO: 26.6 PG (ref 26–35)
MCHC RBC AUTO-ENTMCNC: 30.6 G/DL (ref 32–34.5)
MCV RBC AUTO: 87.1 FL (ref 80–99.9)
MONOCYTES NFR BLD: 0.48 K/UL (ref 0.1–0.95)
MONOCYTES NFR BLD: 10 % (ref 2–12)
NEUTROPHILS NFR BLD: 63 % (ref 43–80)
NEUTS SEG NFR BLD: 3.21 K/UL (ref 1.8–7.3)
PLATELET # BLD AUTO: 185 K/UL (ref 130–450)
PMV BLD AUTO: 11 FL (ref 7–12)
RBC # BLD AUTO: 2.78 M/UL (ref 3.5–5.5)
WBC OTHER # BLD: 5.1 K/UL (ref 4.5–11.5)

## 2025-01-30 PROCEDURE — 85025 COMPLETE CBC W/AUTO DIFF WBC: CPT

## 2025-01-30 PROCEDURE — 36415 COLL VENOUS BLD VENIPUNCTURE: CPT

## 2025-01-30 PROCEDURE — 86900 BLOOD TYPING SEROLOGIC ABO: CPT

## 2025-01-30 PROCEDURE — 86901 BLOOD TYPING SEROLOGIC RH(D): CPT

## 2025-01-30 PROCEDURE — 86850 RBC ANTIBODY SCREEN: CPT

## 2025-01-31 ENCOUNTER — OFFICE VISIT (OUTPATIENT)
Dept: FAMILY MEDICINE CLINIC | Age: 75
End: 2025-01-31
Payer: MEDICARE

## 2025-01-31 VITALS
BODY MASS INDEX: 33.13 KG/M2 | TEMPERATURE: 98.4 F | OXYGEN SATURATION: 98 % | SYSTOLIC BLOOD PRESSURE: 110 MMHG | HEIGHT: 63 IN | HEART RATE: 79 BPM | DIASTOLIC BLOOD PRESSURE: 60 MMHG | WEIGHT: 187 LBS | RESPIRATION RATE: 18 BRPM

## 2025-01-31 DIAGNOSIS — E11.65 TYPE 2 DIABETES MELLITUS WITH HYPERGLYCEMIA, UNSPECIFIED WHETHER LONG TERM INSULIN USE (HCC): ICD-10-CM

## 2025-01-31 DIAGNOSIS — J44.9 CHRONIC OBSTRUCTIVE PULMONARY DISEASE, UNSPECIFIED COPD TYPE (HCC): ICD-10-CM

## 2025-01-31 DIAGNOSIS — Z00.00 MEDICARE ANNUAL WELLNESS VISIT, SUBSEQUENT: Primary | ICD-10-CM

## 2025-01-31 DIAGNOSIS — D63.8 ANEMIA OF CHRONIC DISEASE: ICD-10-CM

## 2025-01-31 DIAGNOSIS — J96.01 ACUTE RESPIRATORY FAILURE WITH HYPOXIA: ICD-10-CM

## 2025-01-31 DIAGNOSIS — M54.16 LUMBAR RADICULAR PAIN: ICD-10-CM

## 2025-01-31 DIAGNOSIS — R32 URINARY INCONTINENCE, UNSPECIFIED TYPE: ICD-10-CM

## 2025-01-31 DIAGNOSIS — K31.819 GAVE (GASTRIC ANTRAL VASCULAR ECTASIA): ICD-10-CM

## 2025-01-31 PROCEDURE — 3074F SYST BP LT 130 MM HG: CPT | Performed by: STUDENT IN AN ORGANIZED HEALTH CARE EDUCATION/TRAINING PROGRAM

## 2025-01-31 PROCEDURE — 1123F ACP DISCUSS/DSCN MKR DOCD: CPT | Performed by: STUDENT IN AN ORGANIZED HEALTH CARE EDUCATION/TRAINING PROGRAM

## 2025-01-31 PROCEDURE — 3078F DIAST BP <80 MM HG: CPT | Performed by: STUDENT IN AN ORGANIZED HEALTH CARE EDUCATION/TRAINING PROGRAM

## 2025-01-31 PROCEDURE — G0439 PPPS, SUBSEQ VISIT: HCPCS | Performed by: STUDENT IN AN ORGANIZED HEALTH CARE EDUCATION/TRAINING PROGRAM

## 2025-01-31 PROCEDURE — 1159F MED LIST DOCD IN RCRD: CPT | Performed by: STUDENT IN AN ORGANIZED HEALTH CARE EDUCATION/TRAINING PROGRAM

## 2025-01-31 PROCEDURE — 1160F RVW MEDS BY RX/DR IN RCRD: CPT | Performed by: STUDENT IN AN ORGANIZED HEALTH CARE EDUCATION/TRAINING PROGRAM

## 2025-01-31 RX ORDER — ONDANSETRON 4 MG/1
TABLET, FILM COATED ORAL EVERY 8 HOURS PRN
COMMUNITY
Start: 2024-07-19

## 2025-01-31 RX ORDER — PROCHLORPERAZINE MALEATE 5 MG/1
TABLET ORAL EVERY 6 HOURS PRN
COMMUNITY
Start: 2024-07-19

## 2025-01-31 RX ORDER — TRAMADOL HYDROCHLORIDE 50 MG/1
50 TABLET ORAL EVERY 8 HOURS PRN
Qty: 90 TABLET | Refills: 0 | Status: SHIPPED | OUTPATIENT
Start: 2025-01-31 | End: 2025-03-02

## 2025-01-31 RX ORDER — PROPRANOLOL HYDROCHLORIDE 10 MG/1
10 TABLET ORAL DAILY
Qty: 90 TABLET | Refills: 1 | Status: SHIPPED | OUTPATIENT
Start: 2025-01-31 | End: 2025-07-30

## 2025-01-31 SDOH — ECONOMIC STABILITY: FOOD INSECURITY: WITHIN THE PAST 12 MONTHS, YOU WORRIED THAT YOUR FOOD WOULD RUN OUT BEFORE YOU GOT MONEY TO BUY MORE.: SOMETIMES TRUE

## 2025-01-31 SDOH — ECONOMIC STABILITY: FOOD INSECURITY: WITHIN THE PAST 12 MONTHS, THE FOOD YOU BOUGHT JUST DIDN'T LAST AND YOU DIDN'T HAVE MONEY TO GET MORE.: SOMETIMES TRUE

## 2025-01-31 ASSESSMENT — PATIENT HEALTH QUESTIONNAIRE - PHQ9: DEPRESSION UNABLE TO ASSESS: PT REFUSES

## 2025-01-31 NOTE — ACP (ADVANCE CARE PLANNING)
Advance Care Planning     Advance Care Planning (ACP) Physician/NP/PA Conversation    Date of Conversation: 1/31/2025  Conducted with: Patient with Decision Making Capacity    Healthcare Decision Maker:      Primary Decision Maker: Lorrie Barber - Brother/Sister - [phone]    Primary Decision Maker: Todd Casiano - Child - [phone]    Click here to complete Healthcare Decision Makers including selection of the Healthcare Decision Maker Relationship (ie \"Primary\")  Today we documented Decision Maker(s) consistent with Legal Next of Kin hierarchy.    Care Preferences:    Hospitalization:  \"If your health worsens and it becomes clear that your chance of recovery is unlikely, what would be your preference regarding hospitalization?\"  The patient would prefer comfort-focused treatment without hospitalization.    Ventilation:  \"If you were unable to breath on your own and your chance of recovery was unlikely, what would be your preference about the use of a ventilator (breathing machine) if it was available to you?\"  The patient would NOT desire the use of a ventilator.    Resuscitation:  \"In the event your heart stopped as a result of an underlying serious health condition, would you want attempts made to restart your heart, or would you prefer a natural death?\"  No, do NOT attempt to resuscitate.    treatment goals, ventilation preferences, hospitalization preferences, and resuscitation preferences    Conversation Outcomes / Follow-Up Plan:  ACP incomplete - refer to ACP Clinical Specialist  Reviewed DNR/DNI and patient elects DNR order - referred to ACP Clinical Specialist & placed order    Length of Voluntary ACP Conversation in minutes:  <16 minutes (Non-Billable)    Jesus Hu,

## 2025-01-31 NOTE — PROGRESS NOTES
Medicare Annual Wellness Visit    Haven Barber is here for Medicare AWV    Assessment & Plan   Medicare annual wellness visit, subsequent  GAVE (gastric antral vascular ectasia)  -     [facility] Referral to [facility] Clinical Specialist  -     [facility] by Lakisha Emery  -     propranolol (INDERAL) 10 MG tablet; Take 1 tablet by mouth daily Hold for HR <60 and/or SBP <100, Disp-90 tablet, R-1Normal  Anemia of chronic disease  -     [facility] Referral to [facility] Clinical Specialist  -     [facility] by Lakisha Emery  Acute respiratory failure with hypoxia  Chronic obstructive pulmonary disease, unspecified COPD type (HCC)  Type 2 diabetes mellitus with hyperglycemia, unspecified whether long term insulin use ([facility])  Lumbar radicular pain  -     traMADol (ULTRAM) 50 MG tablet; Take 1 tablet by mouth every 8 hours as needed for Pain for up to 30 days. Intended supply: 5 days. Take lowest dose possible to manage pain Max Daily Amount: 150 mg, Disp-90 tablet, R-0Normal  Urinary incontinence, unspecified type  -     Incontinence Supplies KIT; PRN Starting Fri 1/31/2025, Disp-5 kit, R-5, Normal       Return if symptoms worsen or fail to improve.     Subjective   The following acute and/or chronic problems were also addressed today:      Anemia of chronic disease/GAVE syndrome/concern for Bechet's  Multiple hospitalizations over the past couple months  Persistent transfusions with minimal improvement in blood count  Has been doing this for a couple of years now  Patient is exhausted and sick of doctors visits and hospitalizations  Preferring to pursue comfort care at this time  Would also like to fill out a DNR  Would prefer not to be hospitalized  Requesting hospice    Patient's complete Health Risk Assessment and screening values have been reviewed and are found in Flowsheets. The following problems were reviewed today and where indicated follow up appointments were made 
No

## 2025-02-03 ENCOUNTER — CLINICAL DOCUMENTATION (OUTPATIENT)
Dept: SPIRITUAL SERVICES | Age: 75
End: 2025-02-03

## 2025-02-03 NOTE — ACP (ADVANCE CARE PLANNING)
Advance Care Planning   Ambulatory ACP Specialist Patient Outreach    Date:  2/3/2025    ACP Specialist:  JUAN Simeon    Outreach call to patient in follow-up to ACP Specialist referral from:Jesus Alvarado DO    [x] PCP  [] Provider   [] Ambulatory Care Management [] Other     For:                  [] Advance Directive Assistance              [x] Complete Portable DNR order              [] Complete POST/POLST/MOST              [] Code Status Discussion             [] Discuss Goals of Care             [] Early ACP Decision-Making              [x] Other (Specify): Patient wants to be DNR, thinks she may have filled out papers at one time but would like updated papers    Date Referral Received:01/31/2025    Next Step:   [] ACP scheduled conversation  [x] Outreach again in one week               [] Email / Mail ACP Info Sheets  [] Email / Mail Advance Directive   [] Closing referral.  Routing closure to referring provider/staff and to ACP Specialist .    [] Closure letter mailed to patient with invitation to contact ACP Specialist if / when ready.   [] Other (Specify here):       [x] At this time, Healthcare Decision Maker Is:         [] Primary agent named in scanned advance directive.    [x] Legal Next of Kin.     [] Unable to determine legal decision maker at this time.    Outreaches:       [x] 1st -  Date:  02/03/2025               Intervention:  [] Spoke with Patient   [x] Left Voice mail [] Email / Mail    [] Cycle[employer]  [] Other (Specify) :     Outcomes: ACP Specialist Referral received. Placed call to home/mobile [phone] and there was no answer. Left VM with SW contact and encouraged return call. Will make another outreach attempt in one week.            [] 2nd -  Date:                 Intervention:  [] Spoke with Patient  [] Left Voice mail [] Email / Mail    [] [employer]  [] Other (Specify) :              Outcomes:                [] 3rd -  Date:                Intervention:  []

## 2025-02-05 ENCOUNTER — HOSPITAL ENCOUNTER (OUTPATIENT)
Age: 75
Discharge: HOME | End: 2025-02-05
Payer: MEDICARE

## 2025-02-05 LAB
BASOPHILS # BLD: 0.04 K/UL (ref 0–0.2)
BASOPHILS NFR BLD: 1 % (ref 0–2)
EOSINOPHIL # BLD: 0.37 K/UL (ref 0.05–0.5)
EOSINOPHILS RELATIVE PERCENT: 6 % (ref 0–6)
ERYTHROCYTE [DISTWIDTH] IN BLOOD BY AUTOMATED COUNT: 17.9 % (ref 11.5–15)
HCT VFR BLD AUTO: 19 % (ref 34–48)
HGB BLD-MCNC: 5.7 G/DL (ref 11.5–15.5)
IMM GRANULOCYTES # BLD AUTO: 0.04 K/UL (ref 0–0.58)
IMM GRANULOCYTES NFR BLD: 1 % (ref 0–5)
LYMPHOCYTES NFR BLD: 0.8 K/UL (ref 1.5–4)
LYMPHOCYTES RELATIVE PERCENT: 12 % (ref 20–42)
MCH RBC QN AUTO: 26.1 PG (ref 26–35)
MCHC RBC AUTO-ENTMCNC: 30 G/DL (ref 32–34.5)
MCV RBC AUTO: 87.2 FL (ref 80–99.9)
MONOCYTES NFR BLD: 0.55 K/UL (ref 0.1–0.95)
MONOCYTES NFR BLD: 8 % (ref 2–12)
NEUTROPHILS NFR BLD: 73 % (ref 43–80)
NEUTS SEG NFR BLD: 4.97 K/UL (ref 1.8–7.3)
PLATELET # BLD AUTO: 188 K/UL (ref 130–450)
PMV BLD AUTO: 11.3 FL (ref 7–12)
RBC # BLD AUTO: 2.18 M/UL (ref 3.5–5.5)
WBC OTHER # BLD: 6.8 K/UL (ref 4.5–11.5)

## 2025-02-05 PROCEDURE — 85025 COMPLETE CBC W/AUTO DIFF WBC: CPT

## 2025-02-05 PROCEDURE — 36415 COLL VENOUS BLD VENIPUNCTURE: CPT

## 2025-02-05 RX ORDER — SODIUM CHLORIDE 9 MG/ML
INJECTION, SOLUTION INTRAVENOUS PRN
Status: COMPLETED | OUTPATIENT
Start: 2025-02-05 | End: 2025-02-06

## 2025-02-06 ENCOUNTER — HOSPITAL ENCOUNTER (OUTPATIENT)
Dept: INFUSION THERAPY | Age: 75
Setting detail: INFUSION SERIES
Discharge: HOME | End: 2025-02-06
Payer: MEDICARE

## 2025-02-06 ENCOUNTER — HOSPITAL ENCOUNTER (OUTPATIENT)
Age: 75
Discharge: HOME | End: 2025-02-06
Payer: MEDICARE

## 2025-02-06 VITALS
OXYGEN SATURATION: 99 % | RESPIRATION RATE: 16 BRPM | TEMPERATURE: 97.6 F | SYSTOLIC BLOOD PRESSURE: 121 MMHG | DIASTOLIC BLOOD PRESSURE: 53 MMHG | HEART RATE: 79 BPM

## 2025-02-06 PROCEDURE — 2580000003 HC RX 258: Performed by: INTERNAL MEDICINE

## 2025-02-06 PROCEDURE — 86901 BLOOD TYPING SEROLOGIC RH(D): CPT

## 2025-02-06 PROCEDURE — 86900 BLOOD TYPING SEROLOGIC ABO: CPT

## 2025-02-06 PROCEDURE — 86850 RBC ANTIBODY SCREEN: CPT

## 2025-02-06 PROCEDURE — 36430 TRANSFUSION BLD/BLD COMPNT: CPT

## 2025-02-06 PROCEDURE — 86923 COMPATIBILITY TEST ELECTRIC: CPT

## 2025-02-06 PROCEDURE — 36415 COLL VENOUS BLD VENIPUNCTURE: CPT

## 2025-02-06 PROCEDURE — P9016 RBC LEUKOCYTES REDUCED: HCPCS

## 2025-02-06 RX ADMIN — SODIUM CHLORIDE: 9 INJECTION, SOLUTION INTRAVENOUS at 15:19

## 2025-02-06 NOTE — FLOWSHEET NOTE
Patient tolerated blood transfusion well. Remained on unit for one hour and 45 minutes after treatment. Patient alert and oriented x3. No distress noted. Vital signs stable. Patient denies any new or worsening pain. Educated patient on possible side effects and treatment of medication.     Patient verbalized understanding. Offered patient education and/or discharge material. Patient declined.. Patient denies any needs. All questions answered. D/C in stable condition. Patient refuses to go to ER due to wait time and will wait until Tuesday to come to Medical Infusion for the next transfusion. She will have labs and Type and Cross match on Monday 2/10/2023.

## 2025-02-06 NOTE — PROGRESS NOTES
Spoke with [facility], 's office and spoke with his nurse Chelsie. Patient blood count is 5.7 as of 2/5/2025. Patient wants to get another unit of blood and Chelsie states that she should do that and go to emergency today. Patient declines to go to ER due to it is so busy. She requests that [facility] be called and they are also very busy.  Patient refuses to go to emergency and wishes to wait until Tuesday and come to the infusion center and get transfused.  Line de- accessed and flushed with normal saline. Awaiting for her ride coming from [employer].

## 2025-02-06 NOTE — PROGRESS NOTES
Informed consent from Juan Crum (permit is from 1/23/2025) verified and on patient chart. Patient continues to agree to treatment of blood transfusion.     Patient educated on signs and symptoms of transfusion reaction.  Patient verbalizes understanding.

## 2025-02-06 NOTE — PROGRESS NOTES
Informed consent from Dr. Oneal verified and on patient chart.   Patient received 477cc of PRBC .  Patient tolerated well.    Vital signs stable.  Reviewed orally and provided with written information regarding potential delayed reaction and instructions on what to do if reaction occurs. Questions answered to apparent satisfaction. Patient observed for 30 minutes after transfusion.

## 2025-02-08 PROCEDURE — 99285 EMERGENCY DEPT VISIT HI MDM: CPT

## 2025-02-09 ENCOUNTER — HOSPITAL ENCOUNTER (EMERGENCY)
Age: 75
Discharge: HOME | End: 2025-02-09
Attending: EMERGENCY MEDICINE
Payer: MEDICARE

## 2025-02-09 ENCOUNTER — APPOINTMENT (OUTPATIENT)
Dept: GENERAL RADIOLOGY | Age: 75
End: 2025-02-09
Payer: MEDICARE

## 2025-02-09 VITALS
HEART RATE: 83 BPM | DIASTOLIC BLOOD PRESSURE: 72 MMHG | HEIGHT: 63 IN | SYSTOLIC BLOOD PRESSURE: 116 MMHG | WEIGHT: 180 LBS | OXYGEN SATURATION: 95 % | TEMPERATURE: 98.7 F | RESPIRATION RATE: 24 BRPM | BODY MASS INDEX: 31.89 KG/M2

## 2025-02-09 DIAGNOSIS — D64.9 ANEMIA, UNSPECIFIED TYPE: ICD-10-CM

## 2025-02-09 DIAGNOSIS — B34.8 RHINOVIRUS: ICD-10-CM

## 2025-02-09 DIAGNOSIS — M35.2 BEHCET'S DISEASE (HCC): Primary | ICD-10-CM

## 2025-02-09 LAB
ABO/RH: NORMAL
ALBUMIN SERPL-MCNC: 2.9 G/DL (ref 3.5–5.2)
ALP SERPL-CCNC: 108 U/L (ref 35–104)
ALT SERPL-CCNC: 23 U/L (ref 0–32)
ANION GAP SERPL CALCULATED.3IONS-SCNC: 10 MMOL/L (ref 7–16)
ANTIBODY SCREEN: NEGATIVE
ARM BAND NUMBER: NORMAL
AST SERPL-CCNC: 49 U/L (ref 0–31)
B PARAP IS1001 DNA NPH QL NAA+NON-PROBE: NOT DETECTED
B PERT DNA SPEC QL NAA+PROBE: NOT DETECTED
BASOPHILS # BLD: 0.06 K/UL (ref 0–0.2)
BASOPHILS NFR BLD: 1 % (ref 0–2)
BILIRUB SERPL-MCNC: 0.4 MG/DL (ref 0–1.2)
BILIRUB UR QL STRIP: NEGATIVE
BLOOD BANK BLOOD PRODUCT EXPIRATION DATE: NORMAL
BLOOD BANK DISPENSE STATUS: NORMAL
BLOOD BANK DISPENSE STATUS: NORMAL
BLOOD BANK ISBT PRODUCT BLOOD TYPE: 6200
BLOOD BANK PRODUCT CODE: NORMAL
BLOOD BANK SAMPLE EXPIRATION: NORMAL
BLOOD BANK UNIT TYPE AND RH: NORMAL
BPU ID: NORMAL
BPU ID: NORMAL
BUN SERPL-MCNC: 13 MG/DL (ref 6–23)
C PNEUM DNA NPH QL NAA+NON-PROBE: NOT DETECTED
CALCIUM SERPL-MCNC: 8.7 MG/DL (ref 8.6–10.2)
CHLORIDE SERPL-SCNC: 108 MMOL/L (ref 98–107)
CLARITY UR: CLEAR
CO2 SERPL-SCNC: 20 MMOL/L (ref 22–29)
COLOR UR: YELLOW
COMPONENT: NORMAL
COMPONENT: NORMAL
CREAT SERPL-MCNC: 0.6 MG/DL (ref 0.5–1)
CROSSMATCH RESULT: NORMAL
CROSSMATCH RESULT: NORMAL
EOSINOPHIL # BLD: 0.62 K/UL (ref 0.05–0.5)
EOSINOPHILS RELATIVE PERCENT: 11 % (ref 0–6)
ERYTHROCYTE [DISTWIDTH] IN BLOOD BY AUTOMATED COUNT: 17.1 % (ref 11.5–15)
FLUAV RNA NPH QL NAA+NON-PROBE: NOT DETECTED
FLUBV RNA NPH QL NAA+NON-PROBE: NOT DETECTED
GFR, ESTIMATED: >90 ML/MIN/1.73M2
GLUCOSE SERPL-MCNC: 179 MG/DL (ref 74–99)
GLUCOSE UR STRIP-MCNC: 100 MG/DL
HADV DNA NPH QL NAA+NON-PROBE: NOT DETECTED
HCOV 229E RNA NPH QL NAA+NON-PROBE: NOT DETECTED
HCOV HKU1 RNA NPH QL NAA+NON-PROBE: NOT DETECTED
HCOV NL63 RNA NPH QL NAA+NON-PROBE: NOT DETECTED
HCOV OC43 RNA NPH QL NAA+NON-PROBE: NOT DETECTED
HCT VFR BLD AUTO: 20.3 % (ref 34–48)
HGB BLD-MCNC: 6.1 G/DL (ref 11.5–15.5)
HGB UR QL STRIP.AUTO: NEGATIVE
HMPV RNA NPH QL NAA+NON-PROBE: NOT DETECTED
HPIV1 RNA NPH QL NAA+NON-PROBE: NOT DETECTED
HPIV2 RNA NPH QL NAA+NON-PROBE: NOT DETECTED
HPIV3 RNA NPH QL NAA+NON-PROBE: NOT DETECTED
HPIV4 RNA NPH QL NAA+NON-PROBE: NOT DETECTED
IMM GRANULOCYTES # BLD AUTO: 0.03 K/UL (ref 0–0.58)
IMM GRANULOCYTES NFR BLD: 1 % (ref 0–5)
INR PPP: 1.2
KETONES UR STRIP-MCNC: NEGATIVE MG/DL
LEUKOCYTE ESTERASE UR QL STRIP: NEGATIVE
LIPASE SERPL-CCNC: 27 U/L (ref 13–60)
LYMPHOCYTES NFR BLD: 0.82 K/UL (ref 1.5–4)
LYMPHOCYTES RELATIVE PERCENT: 14 % (ref 20–42)
M PNEUMO DNA NPH QL NAA+NON-PROBE: NOT DETECTED
MCH RBC QN AUTO: 25.7 PG (ref 26–35)
MCHC RBC AUTO-ENTMCNC: 30 G/DL (ref 32–34.5)
MCV RBC AUTO: 85.7 FL (ref 80–99.9)
MONOCYTES NFR BLD: 0.73 K/UL (ref 0.1–0.95)
MONOCYTES NFR BLD: 12 % (ref 2–12)
NEUTROPHILS NFR BLD: 62 % (ref 43–80)
NEUTS SEG NFR BLD: 3.66 K/UL (ref 1.8–7.3)
NITRITE UR QL STRIP: NEGATIVE
PH UR STRIP: 6 [PH] (ref 5–8)
PLATELET CONFIRMATION: NORMAL
PLATELET, FLUORESCENCE: 167 K/UL (ref 130–450)
PMV BLD AUTO: 11.7 FL (ref 7–12)
POTASSIUM SERPL-SCNC: 3.9 MMOL/L (ref 3.5–5)
POTASSIUM SERPL-SCNC: 4.3 MMOL/L (ref 3.5–5)
PROT SERPL-MCNC: 5.6 G/DL (ref 6.4–8.3)
PROT UR STRIP-MCNC: NEGATIVE MG/DL
PROTHROMBIN TIME: 12.7 SEC (ref 9.3–12.4)
RBC # BLD AUTO: 2.37 M/UL (ref 3.5–5.5)
RBC #/AREA URNS HPF: ABNORMAL /HPF
RSV RNA NPH QL NAA+NON-PROBE: NOT DETECTED
RV+EV RNA NPH QL NAA+NON-PROBE: DETECTED
SARS-COV-2 RNA NPH QL NAA+NON-PROBE: NOT DETECTED
SODIUM SERPL-SCNC: 138 MMOL/L (ref 132–146)
SP GR UR STRIP: 1.01 (ref 1–1.03)
SPECIMEN DESCRIPTION: ABNORMAL
TRANSFUSION STATUS: NORMAL
TRANSFUSION STATUS: NORMAL
TROPONIN I SERPL HS-MCNC: 11 NG/L (ref 0–9)
UNIT DIVISION: 0
UNIT DIVISION: 0
UNIT ISSUE DATE/TIME: NORMAL
UROBILINOGEN UR STRIP-ACNC: 0.2 EU/DL (ref 0–1)
WBC #/AREA URNS HPF: ABNORMAL /HPF
WBC OTHER # BLD: 5.9 K/UL (ref 4.5–11.5)

## 2025-02-09 PROCEDURE — 86900 BLOOD TYPING SEROLOGIC ABO: CPT

## 2025-02-09 PROCEDURE — 80053 COMPREHEN METABOLIC PANEL: CPT

## 2025-02-09 PROCEDURE — P9016 RBC LEUKOCYTES REDUCED: HCPCS

## 2025-02-09 PROCEDURE — 94664 DEMO&/EVAL PT USE INHALER: CPT

## 2025-02-09 PROCEDURE — 93005 ELECTROCARDIOGRAM TRACING: CPT | Performed by: EMERGENCY MEDICINE

## 2025-02-09 PROCEDURE — 85025 COMPLETE CBC W/AUTO DIFF WBC: CPT

## 2025-02-09 PROCEDURE — 81001 URINALYSIS AUTO W/SCOPE: CPT

## 2025-02-09 PROCEDURE — 83690 ASSAY OF LIPASE: CPT

## 2025-02-09 PROCEDURE — 6370000000 HC RX 637 (ALT 250 FOR IP): Performed by: EMERGENCY MEDICINE

## 2025-02-09 PROCEDURE — 86901 BLOOD TYPING SEROLOGIC RH(D): CPT

## 2025-02-09 PROCEDURE — 36430 TRANSFUSION BLD/BLD COMPNT: CPT

## 2025-02-09 PROCEDURE — 86923 COMPATIBILITY TEST ELECTRIC: CPT

## 2025-02-09 PROCEDURE — 84132 ASSAY OF SERUM POTASSIUM: CPT

## 2025-02-09 PROCEDURE — 85610 PROTHROMBIN TIME: CPT

## 2025-02-09 PROCEDURE — 71045 X-RAY EXAM CHEST 1 VIEW: CPT

## 2025-02-09 PROCEDURE — 0202U NFCT DS 22 TRGT SARS-COV-2: CPT

## 2025-02-09 PROCEDURE — 84484 ASSAY OF TROPONIN QUANT: CPT

## 2025-02-09 PROCEDURE — 86850 RBC ANTIBODY SCREEN: CPT

## 2025-02-09 RX ORDER — SODIUM CHLORIDE 9 MG/ML
INJECTION, SOLUTION INTRAVENOUS PRN
Status: DISCONTINUED | OUTPATIENT
Start: 2025-02-09 | End: 2025-02-09 | Stop reason: HOSPADM

## 2025-02-09 RX ORDER — IPRATROPIUM BROMIDE AND ALBUTEROL SULFATE 2.5; .5 MG/3ML; MG/3ML
1 SOLUTION RESPIRATORY (INHALATION) ONCE
Status: COMPLETED | OUTPATIENT
Start: 2025-02-09 | End: 2025-02-09

## 2025-02-09 RX ADMIN — IPRATROPIUM BROMIDE AND ALBUTEROL SULFATE 1 DOSE: 2.5; .5 SOLUTION RESPIRATORY (INHALATION) at 09:15

## 2025-02-09 ASSESSMENT — PAIN - FUNCTIONAL ASSESSMENT: PAIN_FUNCTIONAL_ASSESSMENT: NONE - DENIES PAIN

## 2025-02-09 NOTE — ED PROVIDER NOTES
[facility] EMERGENCY DEPARTMENT  EMERGENCY DEPARTMENT ENCOUNTER        Pt Name: Haven Barber  MRN: [id number]  Birthdate 1950  Date of evaluation: 2/8/2025  Provider: Damian Okeefe DO  PCP: Jesus Alvarado DO  Note Started: 4:19 AM EST 2/9/25    CHIEF COMPLAINT       Chief Complaint   Patient presents with    Fatigue     Has autoimmune disease, states having weakness. Hgb 5.7, had transfusion on Thurs. Pt has productive cough       HISTORY OF PRESENT ILLNESS: 1 or more Elements   History From: Patient    Limitations to history : None    Haven Barber is a 74 y.o. female who presents to the emergency department for generalized fatigue.  The patient states she has a history of Behcet's disease.  She states that she has to get frequent transfusions.  She states that on Thursday her hemoglobin was 5.7 she had a transfusion.  She states that she has been feeling fatigued and weak.  She states that over the last few days she has also noticed that she has had some URI symptoms.  She states she has having some congestion as well as a dry cough.  No fevers or chills.  No body aches.  No nausea vomiting or diarrhea.  No chest pain.  No shortness of breath.    Nursing Notes were all reviewed and agreed with or any disagreements were addressed in the HPI.      REVIEW OF EXTERNAL NOTE :       PDMP Monitoring:    Last PDMP Giancarlo as Reviewed:  Review User Review Instant Review Result   ERIKA MOJICA 8/16/2022  3:20 PM Reviewed PDMP [1]     Last Controlled Substance Monitoring Documentation      Flowsheet Row Office Visit from 1/31/2025 in [facility]   Periodic Controlled Substance Monitoring Possible medication side effects, risk of tolerance/dependence & alternative treatments discussed. filed at 01/31/2025 0328   All MEDD Reviewed of previous treatment and response to treatment, patients adherence to medication and non-medication treatment filed

## 2025-02-09 NOTE — ED NOTES
Patient informed this RN that her arm was hurting around the infusion site. This RN felt the arm and it was warm. This RN stopped the blood and re-took temperature. No change in vitals. After blood was stopped, patients arm felt back to baseline and patient was no longer complaining of pain. This RN informed Dr. Long, this RN advised to drop the rate.

## 2025-02-09 NOTE — ED NOTES
Report given to ROBERTO Brody from ED.   Report method in person   The following was reviewed with receiving RN:   Current vital signs:  BP (!) 114/59   Pulse 78   Temp 97.5 °F (36.4 °C) (Oral)   Resp 20   Ht 1.6 m (5' 3\")   Wt 81.6 kg (180 lb)   LMP  (LMP Unknown)   SpO2 98%   BMI 31.89 kg/m²                MEWS Score: 2     Any medication or safety alerts were reviewed. Any pending diagnostics and notifications were also reviewed, as well as any safety concerns or issues, abnormal labs, abnormal imaging, and abnormal assessment findings. Questions were answered.

## 2025-02-10 LAB
ABO/RH: NORMAL
ANTIBODY SCREEN: NEGATIVE
ARM BAND NUMBER: NORMAL
BLOOD BANK BLOOD PRODUCT EXPIRATION DATE: NORMAL
BLOOD BANK BLOOD PRODUCT EXPIRATION DATE: NORMAL
BLOOD BANK DISPENSE STATUS: NORMAL
BLOOD BANK DISPENSE STATUS: NORMAL
BLOOD BANK ISBT PRODUCT BLOOD TYPE: 6200
BLOOD BANK ISBT PRODUCT BLOOD TYPE: 6200
BLOOD BANK PRODUCT CODE: NORMAL
BLOOD BANK PRODUCT CODE: NORMAL
BLOOD BANK SAMPLE EXPIRATION: NORMAL
BLOOD BANK UNIT TYPE AND RH: NORMAL
BLOOD BANK UNIT TYPE AND RH: NORMAL
BPU ID: NORMAL
BPU ID: NORMAL
COMPONENT: NORMAL
COMPONENT: NORMAL
CROSSMATCH RESULT: NORMAL
CROSSMATCH RESULT: NORMAL
EKG ATRIAL RATE: 73 BPM
EKG P AXIS: 50 DEGREES
EKG P-R INTERVAL: 192 MS
EKG Q-T INTERVAL: 428 MS
EKG QRS DURATION: 64 MS
EKG QTC CALCULATION (BAZETT): 471 MS
EKG R AXIS: -16 DEGREES
EKG T AXIS: 14 DEGREES
EKG VENTRICULAR RATE: 73 BPM
TRANSFUSION STATUS: NORMAL
TRANSFUSION STATUS: NORMAL
UNIT DIVISION: 0
UNIT DIVISION: 0
UNIT ISSUE DATE/TIME: NORMAL
UNIT ISSUE DATE/TIME: NORMAL

## 2025-02-10 PROCEDURE — 93010 ELECTROCARDIOGRAM REPORT: CPT | Performed by: INTERNAL MEDICINE

## 2025-02-11 ENCOUNTER — HOSPITAL ENCOUNTER (OUTPATIENT)
Dept: INFUSION THERAPY | Age: 75
Setting detail: INFUSION SERIES
End: 2025-02-11

## 2025-02-14 ENCOUNTER — TELEPHONE (OUTPATIENT)
Dept: FAMILY MEDICINE CLINIC | Age: 75
End: 2025-02-14

## 2025-02-19 RX ORDER — SODIUM CHLORIDE 9 MG/ML
INJECTION, SOLUTION INTRAVENOUS PRN
Status: DISCONTINUED | OUTPATIENT
Start: 2025-02-19 | End: 2025-02-19 | Stop reason: CLARIF

## 2025-02-20 ENCOUNTER — HOSPITAL ENCOUNTER (OUTPATIENT)
Dept: INFUSION THERAPY | Age: 75
Setting detail: INFUSION SERIES
Discharge: HOME | End: 2025-02-20
Payer: MEDICARE

## 2025-02-20 ENCOUNTER — HOSPITAL ENCOUNTER (OUTPATIENT)
Age: 75
Discharge: HOME | End: 2025-02-20
Payer: MEDICARE

## 2025-02-20 VITALS
TEMPERATURE: 97.6 F | RESPIRATION RATE: 16 BRPM | OXYGEN SATURATION: 98 % | DIASTOLIC BLOOD PRESSURE: 70 MMHG | SYSTOLIC BLOOD PRESSURE: 126 MMHG | HEART RATE: 87 BPM

## 2025-02-20 LAB
ABO/RH: NORMAL
ANTIBODY SCREEN: NEGATIVE
ARM BAND NUMBER: NORMAL
BLOOD BANK DISPENSE STATUS: NORMAL
BLOOD BANK SAMPLE EXPIRATION: NORMAL
BPU ID: NORMAL
COMPONENT: NORMAL
CROSSMATCH RESULT: NORMAL
ERYTHROCYTE [DISTWIDTH] IN BLOOD BY AUTOMATED COUNT: 17.3 % (ref 11.5–15)
ERYTHROCYTE [DISTWIDTH] IN BLOOD BY AUTOMATED COUNT: NORMAL % (ref 11.8–14.4)
HCT VFR BLD AUTO: 25 % (ref 34–48)
HCT VFR BLD AUTO: NORMAL % (ref 36.3–47.1)
HGB BLD-MCNC: 7.6 G/DL (ref 11.5–15.5)
HGB BLD-MCNC: NORMAL G/DL (ref 11.9–15.1)
MCH RBC QN AUTO: 26.4 PG (ref 26–35)
MCH RBC QN AUTO: NORMAL PG (ref 25.2–33.5)
MCHC RBC AUTO-ENTMCNC: 30.4 G/DL (ref 32–34.5)
MCHC RBC AUTO-ENTMCNC: NORMAL G/DL (ref 28.4–34.8)
MCV RBC AUTO: 86.8 FL (ref 80–99.9)
MCV RBC AUTO: NORMAL FL (ref 82.6–102.9)
NRBC BLD-RTO: NORMAL PER 100 WBC
PLATELET # BLD AUTO: 244 K/UL (ref 130–450)
PLATELET # BLD AUTO: NORMAL K/UL (ref 138–453)
PLATELET, FLUORESCENCE: NORMAL K/UL (ref 138–453)
PLATELETS.RETICULATED NFR BLD AUTO: NORMAL % (ref 1.1–10.3)
PMV BLD AUTO: 10.7 FL (ref 7–12)
PMV BLD AUTO: NORMAL FL (ref 8.1–13.5)
RBC # BLD AUTO: 2.88 M/UL (ref 3.5–5.5)
RBC # BLD AUTO: NORMAL M/UL (ref 3.95–5.11)
TRANSFUSION STATUS: NORMAL
UNIT DIVISION: 0
WBC OTHER # BLD: 7.1 K/UL (ref 4.5–11.5)
WBC OTHER # BLD: NORMAL K/UL (ref 3.5–11.3)

## 2025-02-20 PROCEDURE — 2500000003 HC RX 250 WO HCPCS: Performed by: INTERNAL MEDICINE

## 2025-02-20 PROCEDURE — 86850 RBC ANTIBODY SCREEN: CPT

## 2025-02-20 PROCEDURE — 99211 OFF/OP EST MAY X REQ PHY/QHP: CPT

## 2025-02-20 PROCEDURE — 85027 COMPLETE CBC AUTOMATED: CPT

## 2025-02-20 PROCEDURE — 36415 COLL VENOUS BLD VENIPUNCTURE: CPT

## 2025-02-20 PROCEDURE — 86901 BLOOD TYPING SEROLOGIC RH(D): CPT

## 2025-02-20 PROCEDURE — 86923 COMPATIBILITY TEST ELECTRIC: CPT

## 2025-02-20 PROCEDURE — 86900 BLOOD TYPING SEROLOGIC ABO: CPT

## 2025-02-20 RX ORDER — SODIUM CHLORIDE 0.9 % (FLUSH) 0.9 %
5-40 SYRINGE (ML) INJECTION PRN
Status: DISCONTINUED | OUTPATIENT
Start: 2025-02-20 | End: 2025-02-21 | Stop reason: HOSPADM

## 2025-02-20 RX ORDER — SODIUM CHLORIDE 9 MG/ML
INJECTION, SOLUTION INTRAVENOUS PRN
Status: DISCONTINUED | OUTPATIENT
Start: 2025-02-20 | End: 2025-02-21 | Stop reason: HOSPADM

## 2025-02-20 RX ADMIN — SODIUM CHLORIDE, PRESERVATIVE FREE 10 ML: 5 INJECTION INTRAVENOUS at 13:26

## 2025-02-20 RX ADMIN — SODIUM CHLORIDE, PRESERVATIVE FREE 10 ML: 5 INJECTION INTRAVENOUS at 12:47

## 2025-02-20 NOTE — PROGRESS NOTES
Informed consent from Dr. Oneal and Dr. Mon verified and on patient chart. Patient continues to agree to treatment of blood transfusion.     Patient educated on signs and symptoms of transfusion reaction.  Patient verbalizes understanding.

## 2025-02-20 NOTE — PROGRESS NOTES
Pt here for blood transfusion. Repeat labs show hgb 7.6. blood transfusion not needed. Pt d/c home in stable condition.

## 2025-02-24 LAB
ABO/RH: NORMAL
ANTIBODY SCREEN: NEGATIVE
ARM BAND NUMBER: NORMAL
BLOOD BANK DISPENSE STATUS: NORMAL
BLOOD BANK SAMPLE EXPIRATION: NORMAL
BPU ID: NORMAL
COMPONENT: NORMAL
CROSSMATCH RESULT: NORMAL
TRANSFUSION STATUS: NORMAL
UNIT DIVISION: 0

## 2025-02-27 ENCOUNTER — HOSPITAL ENCOUNTER (OUTPATIENT)
Dept: INFUSION THERAPY | Age: 75
Setting detail: INFUSION SERIES
Discharge: HOME | End: 2025-02-27
Payer: MEDICARE

## 2025-02-27 ENCOUNTER — HOSPITAL ENCOUNTER (OUTPATIENT)
Age: 75
Discharge: HOME | End: 2025-02-27
Payer: MEDICARE

## 2025-02-27 VITALS
DIASTOLIC BLOOD PRESSURE: 49 MMHG | RESPIRATION RATE: 16 BRPM | TEMPERATURE: 96.8 F | OXYGEN SATURATION: 99 % | HEART RATE: 90 BPM | SYSTOLIC BLOOD PRESSURE: 109 MMHG

## 2025-02-27 LAB
BASOPHILS # BLD: 0.04 K/UL (ref 0–0.2)
BASOPHILS NFR BLD: 1 % (ref 0–2)
EOSINOPHIL # BLD: 0.49 K/UL (ref 0.05–0.5)
EOSINOPHILS RELATIVE PERCENT: 7 % (ref 0–6)
ERYTHROCYTE [DISTWIDTH] IN BLOOD BY AUTOMATED COUNT: 17.8 % (ref 11.5–15)
HCT VFR BLD AUTO: 22.2 % (ref 34–48)
HGB BLD-MCNC: 6.7 G/DL (ref 11.5–15.5)
IMM GRANULOCYTES # BLD AUTO: 0.03 K/UL (ref 0–0.58)
IMM GRANULOCYTES NFR BLD: 0 % (ref 0–5)
LYMPHOCYTES NFR BLD: 1.05 K/UL (ref 1.5–4)
LYMPHOCYTES RELATIVE PERCENT: 15 % (ref 20–42)
MCH RBC QN AUTO: 25.7 PG (ref 26–35)
MCHC RBC AUTO-ENTMCNC: 30.2 G/DL (ref 32–34.5)
MCV RBC AUTO: 85.1 FL (ref 80–99.9)
MONOCYTES NFR BLD: 0.68 K/UL (ref 0.1–0.95)
MONOCYTES NFR BLD: 10 % (ref 2–12)
NEUTROPHILS NFR BLD: 67 % (ref 43–80)
NEUTS SEG NFR BLD: 4.61 K/UL (ref 1.8–7.3)
PLATELET # BLD AUTO: 259 K/UL (ref 130–450)
PMV BLD AUTO: 10.3 FL (ref 7–12)
RBC # BLD AUTO: 2.61 M/UL (ref 3.5–5.5)
WBC OTHER # BLD: 6.9 K/UL (ref 4.5–11.5)

## 2025-02-27 PROCEDURE — 2580000003 HC RX 258: Performed by: INTERNAL MEDICINE

## 2025-02-27 PROCEDURE — 2500000003 HC RX 250 WO HCPCS: Performed by: INTERNAL MEDICINE

## 2025-02-27 PROCEDURE — 86850 RBC ANTIBODY SCREEN: CPT

## 2025-02-27 PROCEDURE — 86900 BLOOD TYPING SEROLOGIC ABO: CPT

## 2025-02-27 PROCEDURE — P9016 RBC LEUKOCYTES REDUCED: HCPCS

## 2025-02-27 PROCEDURE — 36430 TRANSFUSION BLD/BLD COMPNT: CPT

## 2025-02-27 PROCEDURE — 85025 COMPLETE CBC W/AUTO DIFF WBC: CPT

## 2025-02-27 PROCEDURE — 36415 COLL VENOUS BLD VENIPUNCTURE: CPT

## 2025-02-27 PROCEDURE — 86901 BLOOD TYPING SEROLOGIC RH(D): CPT

## 2025-02-27 PROCEDURE — 86923 COMPATIBILITY TEST ELECTRIC: CPT

## 2025-02-27 RX ORDER — SODIUM CHLORIDE 0.9 % (FLUSH) 0.9 %
5-40 SYRINGE (ML) INJECTION PRN
Status: DISCONTINUED | OUTPATIENT
Start: 2025-02-27 | End: 2025-02-28 | Stop reason: HOSPADM

## 2025-02-27 RX ORDER — SODIUM CHLORIDE 9 MG/ML
INJECTION, SOLUTION INTRAVENOUS PRN
Status: COMPLETED | OUTPATIENT
Start: 2025-02-27 | End: 2025-02-27

## 2025-02-27 RX ADMIN — SODIUM CHLORIDE, PRESERVATIVE FREE 10 ML: 5 INJECTION INTRAVENOUS at 12:31

## 2025-02-27 RX ADMIN — SODIUM CHLORIDE: 9 INJECTION, SOLUTION INTRAVENOUS at 14:53

## 2025-02-27 RX ADMIN — SODIUM CHLORIDE, PRESERVATIVE FREE 10 ML: 5 INJECTION INTRAVENOUS at 15:08

## 2025-02-27 NOTE — PROGRESS NOTES
Informed consent from Dr Oneal verified and on patient chart.   Patient received one unit PRBCs.  Patient tolerated well.    Vital signs stable.  Reviewed orally and provided with written information regarding potential delayed reaction and instructions on what to do if reaction occurs. Questions answered to apparent satisfaction. Patient observed for 45 minutes after transfusion. Patient discharged to home in stable condition. [employer] picked her up.

## 2025-02-27 NOTE — PROGRESS NOTES
Informed consent from Dr Oneal verified and on patient chart. Patient continues to agree to treatment of blood transfusion.     Patient educated on signs and symptoms of transfusion reaction.  Patient verbalizes understanding.

## 2025-03-04 RX ORDER — SODIUM CHLORIDE 9 MG/ML
INJECTION, SOLUTION INTRAVENOUS PRN
Status: CANCELLED | OUTPATIENT
Start: 2025-03-04

## 2025-03-05 ENCOUNTER — HOSPITAL ENCOUNTER (OUTPATIENT)
Age: 75
Discharge: HOME | End: 2025-03-05
Payer: MEDICARE

## 2025-03-05 ENCOUNTER — HOSPITAL ENCOUNTER (OUTPATIENT)
Dept: INFUSION THERAPY | Age: 75
Setting detail: INFUSION SERIES
Discharge: HOME | End: 2025-03-05

## 2025-03-05 LAB
ABO/RH: NORMAL
ANTIBODY SCREEN: NEGATIVE
ARM BAND NUMBER: NORMAL
BLOOD BANK DISPENSE STATUS: NORMAL
BLOOD BANK DISPENSE STATUS: NORMAL
BLOOD BANK SAMPLE EXPIRATION: NORMAL
BPU ID: NORMAL
BPU ID: NORMAL
COMPONENT: NORMAL
COMPONENT: NORMAL
CROSSMATCH RESULT: NORMAL
CROSSMATCH RESULT: NORMAL
ERYTHROCYTE [DISTWIDTH] IN BLOOD BY AUTOMATED COUNT: 17.2 % (ref 11.5–15)
HCT VFR BLD AUTO: 23.4 % (ref 34–48)
HGB BLD-MCNC: 7.4 G/DL (ref 11.5–15.5)
MCH RBC QN AUTO: 26 PG (ref 26–35)
MCHC RBC AUTO-ENTMCNC: 31.6 G/DL (ref 32–34.5)
MCV RBC AUTO: 82.1 FL (ref 80–99.9)
PLATELET # BLD AUTO: 219 K/UL (ref 130–450)
PMV BLD AUTO: 10 FL (ref 7–12)
RBC # BLD AUTO: 2.85 M/UL (ref 3.5–5.5)
TRANSFUSION STATUS: NORMAL
TRANSFUSION STATUS: NORMAL
UNIT DIVISION: 0
UNIT DIVISION: 0
WBC OTHER # BLD: 6.3 K/UL (ref 4.5–11.5)

## 2025-03-05 PROCEDURE — 36415 COLL VENOUS BLD VENIPUNCTURE: CPT

## 2025-03-05 PROCEDURE — 85027 COMPLETE CBC AUTOMATED: CPT

## 2025-03-05 PROCEDURE — 86901 BLOOD TYPING SEROLOGIC RH(D): CPT

## 2025-03-05 PROCEDURE — 86923 COMPATIBILITY TEST ELECTRIC: CPT

## 2025-03-05 PROCEDURE — 86900 BLOOD TYPING SEROLOGIC ABO: CPT

## 2025-03-05 PROCEDURE — 86850 RBC ANTIBODY SCREEN: CPT

## 2025-03-05 NOTE — PROGRESS NOTES
Patient arrived for her blood transfusion but her hgb was over 7 so she did not need a transfusion. Her ride was called and she went home. She was notified of her next appointment and was given a card.

## 2025-03-06 DIAGNOSIS — E11.65 TYPE 2 DIABETES MELLITUS WITH HYPERGLYCEMIA, WITHOUT LONG-TERM CURRENT USE OF INSULIN (HCC): ICD-10-CM

## 2025-03-07 DIAGNOSIS — E11.65 TYPE 2 DIABETES MELLITUS WITH HYPERGLYCEMIA, WITHOUT LONG-TERM CURRENT USE OF INSULIN (HCC): ICD-10-CM

## 2025-03-07 RX ORDER — GLIPIZIDE 5 MG/1
5 TABLET ORAL DAILY
Qty: 90 TABLET | Refills: 0 | Status: SHIPPED | OUTPATIENT
Start: 2025-03-07

## 2025-03-07 RX ORDER — GLIPIZIDE 5 MG/1
5 TABLET ORAL DAILY
Qty: 90 TABLET | Refills: 0 | OUTPATIENT
Start: 2025-03-07

## 2025-03-07 NOTE — TELEPHONE ENCOUNTER
Name of Medication(s) Requested:  Requested Prescriptions     Pending Prescriptions Disp Refills    glipiZIDE (GLUCOTROL) 5 MG tablet 90 tablet 0     Sig: Take 1 tablet by mouth daily       Medication is on current medication list Yes    Dosage and directions were verified? Yes    Quantity verified: 90 day supply     Pharmacy Verified?  Yes    Last Appointment:  1/31/2025    Future appts:  Future Appointments   Date Time Provider Department Center   3/10/2025  9:45 AM [facility] FAST TRACK 3 [facility]   3/10/2025 10:00 AM Stephen Decker MD MED ONC [facility]   3/13/2025 12:30 PM [facility]        (If no appt send self scheduling link. .REFILLAPPT)  Scheduling request sent?     [] Yes  [] No    Does patient need updated?  [] Yes  [] No

## 2025-03-12 ENCOUNTER — HOSPITAL ENCOUNTER (OUTPATIENT)
Age: 75
Discharge: HOME | End: 2025-03-12
Payer: MEDICARE

## 2025-03-12 ENCOUNTER — HOSPITAL ENCOUNTER (OUTPATIENT)
Dept: INFUSION THERAPY | Age: 75
Setting detail: INFUSION SERIES
Discharge: HOME | End: 2025-03-12
Payer: MEDICARE

## 2025-03-12 VITALS
TEMPERATURE: 97.6 F | DIASTOLIC BLOOD PRESSURE: 74 MMHG | RESPIRATION RATE: 16 BRPM | SYSTOLIC BLOOD PRESSURE: 128 MMHG | HEART RATE: 84 BPM | OXYGEN SATURATION: 100 %

## 2025-03-12 LAB
BASOPHILS # BLD: 0.08 K/UL (ref 0–0.2)
BASOPHILS NFR BLD: 1 % (ref 0–2)
EOSINOPHIL # BLD: 0.47 K/UL (ref 0.05–0.5)
EOSINOPHILS RELATIVE PERCENT: 8 % (ref 0–6)
ERYTHROCYTE [DISTWIDTH] IN BLOOD BY AUTOMATED COUNT: 17.7 % (ref 11.5–15)
HCT VFR BLD AUTO: 23.3 % (ref 34–48)
HGB BLD-MCNC: 6.8 G/DL (ref 11.5–15.5)
IMM GRANULOCYTES # BLD AUTO: <0.03 K/UL (ref 0–0.58)
IMM GRANULOCYTES NFR BLD: 0 % (ref 0–5)
LYMPHOCYTES NFR BLD: 0.81 K/UL (ref 1.5–4)
LYMPHOCYTES RELATIVE PERCENT: 14 % (ref 20–42)
MCH RBC QN AUTO: 24.3 PG (ref 26–35)
MCHC RBC AUTO-ENTMCNC: 29.2 G/DL (ref 32–34.5)
MCV RBC AUTO: 83.2 FL (ref 80–99.9)
MONOCYTES NFR BLD: 0.39 K/UL (ref 0.1–0.95)
MONOCYTES NFR BLD: 7 % (ref 2–12)
NEUTROPHILS NFR BLD: 70 % (ref 43–80)
NEUTS SEG NFR BLD: 4.01 K/UL (ref 1.8–7.3)
PLATELET # BLD AUTO: 252 K/UL (ref 130–450)
PMV BLD AUTO: 10.8 FL (ref 7–12)
RBC # BLD AUTO: 2.8 M/UL (ref 3.5–5.5)
WBC OTHER # BLD: 5.8 K/UL (ref 4.5–11.5)

## 2025-03-12 PROCEDURE — 2500000003 HC RX 250 WO HCPCS: Performed by: INTERNAL MEDICINE

## 2025-03-12 PROCEDURE — 36415 COLL VENOUS BLD VENIPUNCTURE: CPT

## 2025-03-12 PROCEDURE — 85025 COMPLETE CBC W/AUTO DIFF WBC: CPT

## 2025-03-12 PROCEDURE — 86850 RBC ANTIBODY SCREEN: CPT

## 2025-03-12 PROCEDURE — 86900 BLOOD TYPING SEROLOGIC ABO: CPT

## 2025-03-12 PROCEDURE — 2580000003 HC RX 258: Performed by: INTERNAL MEDICINE

## 2025-03-12 PROCEDURE — 86923 COMPATIBILITY TEST ELECTRIC: CPT

## 2025-03-12 PROCEDURE — 86901 BLOOD TYPING SEROLOGIC RH(D): CPT

## 2025-03-12 PROCEDURE — P9016 RBC LEUKOCYTES REDUCED: HCPCS

## 2025-03-12 PROCEDURE — 36430 TRANSFUSION BLD/BLD COMPNT: CPT

## 2025-03-12 RX ORDER — SODIUM CHLORIDE 0.9 % (FLUSH) 0.9 %
5-40 SYRINGE (ML) INJECTION EVERY 12 HOURS SCHEDULED
Status: DISCONTINUED | OUTPATIENT
Start: 2025-03-12 | End: 2025-03-13 | Stop reason: HOSPADM

## 2025-03-12 RX ORDER — SODIUM CHLORIDE 0.9 % (FLUSH) 0.9 %
5-40 SYRINGE (ML) INJECTION EVERY 12 HOURS SCHEDULED
Status: CANCELLED | OUTPATIENT
Start: 2025-03-12

## 2025-03-12 RX ORDER — SODIUM CHLORIDE 9 MG/ML
INJECTION, SOLUTION INTRAVENOUS PRN
Status: COMPLETED | OUTPATIENT
Start: 2025-03-12 | End: 2025-03-12

## 2025-03-12 RX ORDER — SODIUM CHLORIDE 9 MG/ML
INJECTION, SOLUTION INTRAVENOUS PRN
Status: CANCELLED | OUTPATIENT
Start: 2025-03-12

## 2025-03-12 RX ADMIN — SODIUM CHLORIDE, PRESERVATIVE FREE 10 ML: 5 INJECTION INTRAVENOUS at 14:16

## 2025-03-12 RX ADMIN — SODIUM CHLORIDE: 9 INJECTION, SOLUTION INTRAVENOUS at 13:47

## 2025-03-12 RX ADMIN — SODIUM CHLORIDE, PRESERVATIVE FREE 10 ML: 5 INJECTION INTRAVENOUS at 14:15

## 2025-03-12 NOTE — PROGRESS NOTES
Informed consent from Dr. Oneal verified and on patient chart. Patient continues to agree to treatment of blood transfusion.     Patient educated on signs and symptoms of transfusion reaction.  Patient verbalizes understanding.

## 2025-03-12 NOTE — FLOWSHEET NOTE
Patient tolerated blood tranfusion well. Remained on unit for 30 minutes after treatment. Patient alert and oriented x3. No distress noted. Vital signs stable. Patient denies any new or worsening pain. Educated patient on possible side effects and treatment of medication.     Patient verbalized understanding. Offered patient education and/or discharge material. Patient declined. Patient denies any needs. All questions answered. D/C in stable condition.

## 2025-03-12 NOTE — PROGRESS NOTES
Informed consent from Dr. Oneal verified and on patient chart.   Patient received 255.33 of PRBC .  Patient tolerated well.    Vital signs stable.  Reviewed orally and provided with written information regarding potential delayed reaction and instructions on what to do if reaction occurs. Questions answered to apparent satisfaction. Patient observed for 30 minutes after transfusion.

## 2025-03-13 ENCOUNTER — HOSPITAL ENCOUNTER (OUTPATIENT)
Dept: INFUSION THERAPY | Age: 75
Discharge: HOME | End: 2025-03-13

## 2025-03-19 RX ORDER — SODIUM CHLORIDE 9 MG/ML
INJECTION, SOLUTION INTRAVENOUS PRN
Status: DISCONTINUED | OUTPATIENT
Start: 2025-03-19 | End: 2025-03-21 | Stop reason: HOSPADM

## 2025-03-19 RX ORDER — SODIUM CHLORIDE 0.9 % (FLUSH) 0.9 %
5-40 SYRINGE (ML) INJECTION PRN
Status: CANCELLED | OUTPATIENT
Start: 2025-03-19

## 2025-03-19 RX ORDER — SODIUM CHLORIDE 9 MG/ML
INJECTION, SOLUTION INTRAVENOUS PRN
Status: CANCELLED | OUTPATIENT
Start: 2025-03-19

## 2025-03-20 ENCOUNTER — HOSPITAL ENCOUNTER (EMERGENCY)
Age: 75
Discharge: HOME | End: 2025-03-20
Attending: EMERGENCY MEDICINE
Payer: MEDICARE

## 2025-03-20 ENCOUNTER — HOSPITAL ENCOUNTER (OUTPATIENT)
Dept: INFUSION THERAPY | Age: 75
Setting detail: INFUSION SERIES
Discharge: HOME | End: 2025-03-20
Payer: MEDICARE

## 2025-03-20 ENCOUNTER — HOSPITAL ENCOUNTER (OUTPATIENT)
Age: 75
Discharge: HOME | End: 2025-03-20
Payer: MEDICARE

## 2025-03-20 VITALS
HEART RATE: 88 BPM | DIASTOLIC BLOOD PRESSURE: 72 MMHG | RESPIRATION RATE: 13 BRPM | TEMPERATURE: 98.3 F | SYSTOLIC BLOOD PRESSURE: 137 MMHG | OXYGEN SATURATION: 98 %

## 2025-03-20 DIAGNOSIS — D64.9 ANEMIA, UNSPECIFIED TYPE: Primary | ICD-10-CM

## 2025-03-20 LAB
ANION GAP SERPL CALCULATED.3IONS-SCNC: 15 MMOL/L (ref 7–16)
BASOPHILS # BLD: 0.06 K/UL (ref 0–0.2)
BASOPHILS # BLD: 0.06 K/UL (ref 0–0.2)
BASOPHILS NFR BLD: 1 % (ref 0–2)
BASOPHILS NFR BLD: 1 % (ref 0–2)
BUN SERPL-MCNC: 18 MG/DL (ref 6–23)
CALCIUM SERPL-MCNC: 9.4 MG/DL (ref 8.6–10.2)
CHLORIDE SERPL-SCNC: 104 MMOL/L (ref 98–107)
CO2 SERPL-SCNC: 16 MMOL/L (ref 22–29)
CREAT SERPL-MCNC: 0.6 MG/DL (ref 0.5–1)
EOSINOPHIL # BLD: 0.4 K/UL (ref 0.05–0.5)
EOSINOPHIL # BLD: 0.45 K/UL (ref 0.05–0.5)
EOSINOPHILS RELATIVE PERCENT: 5 % (ref 0–6)
EOSINOPHILS RELATIVE PERCENT: 6 % (ref 0–6)
ERYTHROCYTE [DISTWIDTH] IN BLOOD BY AUTOMATED COUNT: 18.1 % (ref 11.5–15)
ERYTHROCYTE [DISTWIDTH] IN BLOOD BY AUTOMATED COUNT: 18.2 % (ref 11.5–15)
GFR, ESTIMATED: >90 ML/MIN/1.73M2
GLUCOSE SERPL-MCNC: 352 MG/DL (ref 74–99)
HCT VFR BLD AUTO: 22.7 % (ref 34–48)
HCT VFR BLD AUTO: 23.3 % (ref 34–48)
HCT VFR BLD AUTO: 24.6 % (ref 34–48)
HGB BLD-MCNC: 7 G/DL (ref 11.5–15.5)
HGB BLD-MCNC: 7 G/DL (ref 11.5–15.5)
HGB BLD-MCNC: 7.5 G/DL (ref 11.5–15.5)
IMM GRANULOCYTES # BLD AUTO: 0.04 K/UL (ref 0–0.58)
IMM GRANULOCYTES # BLD AUTO: 0.04 K/UL (ref 0–0.58)
IMM GRANULOCYTES NFR BLD: 1 % (ref 0–5)
IMM GRANULOCYTES NFR BLD: 1 % (ref 0–5)
LYMPHOCYTES NFR BLD: 0.86 K/UL (ref 1.5–4)
LYMPHOCYTES NFR BLD: 0.97 K/UL (ref 1.5–4)
LYMPHOCYTES RELATIVE PERCENT: 11 % (ref 20–42)
LYMPHOCYTES RELATIVE PERCENT: 12 % (ref 20–42)
MCH RBC QN AUTO: 24.6 PG (ref 26–35)
MCH RBC QN AUTO: 24.8 PG (ref 26–35)
MCHC RBC AUTO-ENTMCNC: 30 G/DL (ref 32–34.5)
MCHC RBC AUTO-ENTMCNC: 30.5 G/DL (ref 32–34.5)
MCV RBC AUTO: 81.5 FL (ref 80–99.9)
MCV RBC AUTO: 82 FL (ref 80–99.9)
MONOCYTES NFR BLD: 0.7 K/UL (ref 0.1–0.95)
MONOCYTES NFR BLD: 0.71 K/UL (ref 0.1–0.95)
MONOCYTES NFR BLD: 9 % (ref 2–12)
MONOCYTES NFR BLD: 9 % (ref 2–12)
NEUTROPHILS NFR BLD: 74 % (ref 43–80)
NEUTROPHILS NFR BLD: 74 % (ref 43–80)
NEUTS SEG NFR BLD: 5.89 K/UL (ref 1.8–7.3)
NEUTS SEG NFR BLD: 6.05 K/UL (ref 1.8–7.3)
PLATELET # BLD AUTO: 197 K/UL (ref 130–450)
PLATELET # BLD AUTO: 207 K/UL (ref 130–450)
PMV BLD AUTO: 10 FL (ref 7–12)
PMV BLD AUTO: 10.4 FL (ref 7–12)
POTASSIUM SERPL-SCNC: 4.3 MMOL/L (ref 3.5–5)
RBC # BLD AUTO: 2.84 M/UL (ref 3.5–5.5)
RBC # BLD AUTO: 3.02 M/UL (ref 3.5–5.5)
SODIUM SERPL-SCNC: 135 MMOL/L (ref 132–146)
WBC OTHER # BLD: 8 K/UL (ref 4.5–11.5)
WBC OTHER # BLD: 8.2 K/UL (ref 4.5–11.5)

## 2025-03-20 PROCEDURE — 85018 HEMOGLOBIN: CPT

## 2025-03-20 PROCEDURE — 36415 COLL VENOUS BLD VENIPUNCTURE: CPT

## 2025-03-20 PROCEDURE — 36430 TRANSFUSION BLD/BLD COMPNT: CPT

## 2025-03-20 PROCEDURE — 99285 EMERGENCY DEPT VISIT HI MDM: CPT

## 2025-03-20 PROCEDURE — 86850 RBC ANTIBODY SCREEN: CPT

## 2025-03-20 PROCEDURE — 86900 BLOOD TYPING SEROLOGIC ABO: CPT

## 2025-03-20 PROCEDURE — P9016 RBC LEUKOCYTES REDUCED: HCPCS

## 2025-03-20 PROCEDURE — 86923 COMPATIBILITY TEST ELECTRIC: CPT

## 2025-03-20 PROCEDURE — 80048 BASIC METABOLIC PNL TOTAL CA: CPT

## 2025-03-20 PROCEDURE — 85025 COMPLETE CBC W/AUTO DIFF WBC: CPT

## 2025-03-20 PROCEDURE — 86901 BLOOD TYPING SEROLOGIC RH(D): CPT

## 2025-03-20 PROCEDURE — 85014 HEMATOCRIT: CPT

## 2025-03-20 RX ORDER — SODIUM CHLORIDE 9 MG/ML
INJECTION, SOLUTION INTRAVENOUS PRN
Status: DISCONTINUED | OUTPATIENT
Start: 2025-03-20 | End: 2025-03-20 | Stop reason: HOSPADM

## 2025-03-20 NOTE — ED PROVIDER NOTES
HPI:  3/20/25,   Time: 11:24 AM EDT       Haven Barber is a 74 y.o. female presenting to the ED for anemia, beginning chronic ago.  The complaint has been persistent, moderate in severity, and worsened by nothing.  Brought in by private vehicle.  Sent from infusion center.  Was scheduled to have scheduled infusion today.  Of packed red cells.  However infusion center states her hemoglobin greater than 7 so she could get transfused there.  Sent to ER.  Complains of fatigue with anemia.  This is a chronic issue.  Has Berta    Review of Systems:   Pertinent positives and negatives are stated within HPI, all other systems reviewed and are negative.          --------------------------------------------- PAST HISTORY ---------------------------------------------  Past Medical History:  has a past medical history of Arthritis, Cataract, Chronic fatigue syndrome, Depression, Diabetes mellitus (HCC), Fibromyalgia, History of blood transfusion, Hypertension, Memory loss, Mononucleosis, Psoriasis, Sciatica, Sleep apnea, and Vertigo.    Past Surgical History:  has a past surgical history that includes Upper gastrointestinal endoscopy (N/A, 01/16/2019); Carpal tunnel release (Bilateral); Tonsillectomy and adenoidectomy; Cataract removal (Right); other surgical history (Bilateral, 01/21/2021); Pain management procedure (Bilateral, 01/21/2021); Upper gastrointestinal endoscopy (N/A, 03/10/2021); Spine surgery (N/A, 03/19/2021); Colonoscopy; Upper gastrointestinal endoscopy (N/A, 12/6/2024); Upper gastrointestinal endoscopy (12/6/2024); and Upper gastrointestinal endoscopy (N/A, 1/23/2025).    Social History:  reports that she quit smoking about 40 years ago. Her smoking use included cigarettes. She started smoking about 53 years ago. She has a 13 pack-year smoking history. She has never used smokeless tobacco. She reports that she does not currently use alcohol after a past usage of about 1.0 standard drink of alcohol per    Comparison:       ------------------------- NURSING NOTES AND VITALS REVIEWED ---------------------------   The nursing notes within the ED encounter and vital signs as below have been reviewed by myself.  /62   Pulse 88   Temp 97.6 °F (36.4 °C)   Resp 18   LMP  (LMP Unknown)   SpO2 97%   Oxygen Saturation Interpretation: Normal    The patient’s available past medical records and past encounters were reviewed.        ------------------------------ ED COURSE/MEDICAL DECISION MAKING----------------------  Medications   0.9 % sodium chloride infusion (has no administration in time range)         ED COURSE:             This patient's ED course included: a personal history and physicial examination    This patient has remained hemodynamically stable during their ED course.      Re-Evaluations:             Re-evaluation.  Patient’s symptoms are improving    Re-examination  3/20/25   11:24 AM EDT          Vital Signs:   Vitals:    03/20/25 1104 03/20/25 1109   BP:  127/62   Pulse: 88 88   Resp:  18   Temp: 97.6 °F (36.4 °C)    SpO2: 96% 97%       Consultations:                 Critical Care:   33 min excluding billable procedures        Counseling:   The emergency provider has spoken with the patient and discussed today’s results, in addition to providing specific details for the plan of care and counseling regarding the diagnosis and prognosis.  Questions are answered at this time and they are agreeable with the plan.       --------------------------------- IMPRESSION AND DISPOSITION ---------------------------------    IMPRESSION  No diagnosis found.    DISPOSITION  Disposition: Discharge to home  Patient condition is stable    NOTE: This report was transcribed using voice recognition software. Every effort was made to ensure accuracy; however, inadvertent computerized transcription errors may be present        Yuval Carrillo MD  03/20/25 [id number]

## 2025-03-24 DIAGNOSIS — K21.9 GASTROESOPHAGEAL REFLUX DISEASE, UNSPECIFIED WHETHER ESOPHAGITIS PRESENT: ICD-10-CM

## 2025-03-24 RX ORDER — SODIUM CHLORIDE 9 MG/ML
INJECTION, SOLUTION INTRAVENOUS PRN
Status: DISCONTINUED | OUTPATIENT
Start: 2025-03-24 | End: 2025-03-28 | Stop reason: HOSPADM

## 2025-03-24 NOTE — TELEPHONE ENCOUNTER
Name of Medication(s) Requested:  Requested Prescriptions     Pending Prescriptions Disp Refills    omeprazole (PRILOSEC) 20 MG delayed release capsule 180 capsule 0     Sig: Take 2 caps twice daily       Medication is on current medication list Yes    Dosage and directions were verified? Yes    Quantity verified: 90 day supply     Pharmacy Verified?  Yes    Last Appointment:  1/31/2025    Future appts:  Future Appointments   Date Time Provider Department Center   3/27/2025 10:00 AM [facility] INF SER [facility]   4/14/2025  1:15 PM [facility] MED ONC FAST TRACK 3 [facility] Med Onc [facility]   4/14/2025  1:30 PM Stephen Decker MD MED ONC [facility]        (If no appt send self scheduling link. .REFILLAPPT)  Scheduling request sent?     [] Yes  [] No    Does patient need updated?  [] Yes  [] No

## 2025-03-25 RX ORDER — OMEPRAZOLE 20 MG/1
CAPSULE, DELAYED RELEASE ORAL
Qty: 180 CAPSULE | Refills: 0 | Status: SHIPPED | OUTPATIENT
Start: 2025-03-25

## 2025-03-27 ENCOUNTER — HOSPITAL ENCOUNTER (OUTPATIENT)
Dept: INFUSION THERAPY | Age: 75
Setting detail: INFUSION SERIES
Discharge: HOME | End: 2025-03-27
Payer: MEDICARE

## 2025-03-27 ENCOUNTER — RESULTS FOLLOW-UP (OUTPATIENT)
Dept: FAMILY MEDICINE CLINIC | Age: 75
End: 2025-03-27

## 2025-03-27 ENCOUNTER — HOSPITAL ENCOUNTER (OUTPATIENT)
Age: 75
Discharge: HOME | End: 2025-03-27
Payer: MEDICARE

## 2025-03-27 VITALS
OXYGEN SATURATION: 100 % | TEMPERATURE: 97.5 F | DIASTOLIC BLOOD PRESSURE: 57 MMHG | RESPIRATION RATE: 16 BRPM | SYSTOLIC BLOOD PRESSURE: 125 MMHG | HEART RATE: 83 BPM

## 2025-03-27 LAB
BASOPHILS # BLD: 0.06 K/UL (ref 0–0.2)
BASOPHILS NFR BLD: 1 % (ref 0–2)
EOSINOPHIL # BLD: 0.54 K/UL (ref 0.05–0.5)
EOSINOPHILS RELATIVE PERCENT: 7 % (ref 0–6)
ERYTHROCYTE [DISTWIDTH] IN BLOOD BY AUTOMATED COUNT: 19.8 % (ref 11.5–15)
HCT VFR BLD AUTO: 22.4 % (ref 34–48)
HGB BLD-MCNC: 6.8 G/DL (ref 11.5–15.5)
IMM GRANULOCYTES # BLD AUTO: 0.04 K/UL (ref 0–0.58)
IMM GRANULOCYTES NFR BLD: 1 % (ref 0–5)
LYMPHOCYTES NFR BLD: 1.14 K/UL (ref 1.5–4)
LYMPHOCYTES RELATIVE PERCENT: 15 % (ref 20–42)
MCH RBC QN AUTO: 25.1 PG (ref 26–35)
MCHC RBC AUTO-ENTMCNC: 30.4 G/DL (ref 32–34.5)
MCV RBC AUTO: 82.7 FL (ref 80–99.9)
MONOCYTES NFR BLD: 0.6 K/UL (ref 0.1–0.95)
MONOCYTES NFR BLD: 8 % (ref 2–12)
NEUTROPHILS NFR BLD: 68 % (ref 43–80)
NEUTS SEG NFR BLD: 5.03 K/UL (ref 1.8–7.3)
PLATELET # BLD AUTO: 192 K/UL (ref 130–450)
PMV BLD AUTO: 11.1 FL (ref 7–12)
RBC # BLD AUTO: 2.71 M/UL (ref 3.5–5.5)
WBC OTHER # BLD: 7.4 K/UL (ref 4.5–11.5)

## 2025-03-27 PROCEDURE — P9016 RBC LEUKOCYTES REDUCED: HCPCS

## 2025-03-27 PROCEDURE — 86901 BLOOD TYPING SEROLOGIC RH(D): CPT

## 2025-03-27 PROCEDURE — 86900 BLOOD TYPING SEROLOGIC ABO: CPT

## 2025-03-27 PROCEDURE — 36430 TRANSFUSION BLD/BLD COMPNT: CPT

## 2025-03-27 PROCEDURE — 85025 COMPLETE CBC W/AUTO DIFF WBC: CPT

## 2025-03-27 PROCEDURE — 86850 RBC ANTIBODY SCREEN: CPT

## 2025-03-27 PROCEDURE — 2580000003 HC RX 258: Performed by: INTERNAL MEDICINE

## 2025-03-27 PROCEDURE — 2500000003 HC RX 250 WO HCPCS: Performed by: INTERNAL MEDICINE

## 2025-03-27 PROCEDURE — 6360000002 HC RX W HCPCS: Performed by: INTERNAL MEDICINE

## 2025-03-27 PROCEDURE — 86923 COMPATIBILITY TEST ELECTRIC: CPT

## 2025-03-27 PROCEDURE — 36415 COLL VENOUS BLD VENIPUNCTURE: CPT

## 2025-03-27 RX ORDER — SODIUM CHLORIDE 9 MG/ML
INJECTION, SOLUTION INTRAVENOUS PRN
Status: COMPLETED | OUTPATIENT
Start: 2025-03-27 | End: 2025-03-27

## 2025-03-27 RX ORDER — HEPARIN 100 UNIT/ML
500 SYRINGE INTRAVENOUS PRN
Status: DISCONTINUED | OUTPATIENT
Start: 2025-03-27 | End: 2025-03-28 | Stop reason: HOSPADM

## 2025-03-27 RX ORDER — SODIUM CHLORIDE 0.9 % (FLUSH) 0.9 %
5-40 SYRINGE (ML) INJECTION PRN
Status: DISCONTINUED | OUTPATIENT
Start: 2025-03-27 | End: 2025-03-28 | Stop reason: HOSPADM

## 2025-03-27 RX ADMIN — SODIUM CHLORIDE, PRESERVATIVE FREE 10 ML: 5 INJECTION INTRAVENOUS at 10:11

## 2025-03-27 RX ADMIN — SODIUM CHLORIDE, PRESERVATIVE FREE 10 ML: 5 INJECTION INTRAVENOUS at 12:38

## 2025-03-27 RX ADMIN — HEPARIN 500 UNITS: 100 SYRINGE at 12:38

## 2025-03-27 RX ADMIN — SODIUM CHLORIDE: 9 INJECTION, SOLUTION INTRAVENOUS at 10:12

## 2025-03-27 NOTE — RESULT ENCOUNTER NOTE
Called lab spoke with Sharri at 9:13 am at main Lab in Reference to Type and Cross and CBC. This the 2nd called place about Dr. Hayes name being place on order's she didn't order.

## 2025-03-28 ENCOUNTER — TELEPHONE (OUTPATIENT)
Dept: FAMILY MEDICINE CLINIC | Age: 75
End: 2025-03-28

## 2025-03-28 NOTE — TELEPHONE ENCOUNTER
Patient notified of information. Patient said she must of over did it with her birthday cake the other day but she a little better, but will tough it out. She to weak to come, but thanks.

## 2025-03-28 NOTE — TELEPHONE ENCOUNTER
Patient feels she has an urinary infection and wants something called in today. Experiencing polyuria, constant incontinents. To [facility] in [place]

## 2025-04-02 RX ORDER — SODIUM CHLORIDE 0.9 % (FLUSH) 0.9 %
5-40 SYRINGE (ML) INJECTION PRN
OUTPATIENT
Start: 2025-04-02

## 2025-04-02 RX ORDER — SODIUM CHLORIDE 9 MG/ML
INJECTION, SOLUTION INTRAVENOUS PRN
OUTPATIENT
Start: 2025-04-02

## 2025-04-03 ENCOUNTER — TELEPHONE (OUTPATIENT)
Dept: ONCOLOGY | Age: 75
End: 2025-04-03

## 2025-04-03 ENCOUNTER — HOSPITAL ENCOUNTER (OUTPATIENT)
Age: 75
Discharge: HOME | End: 2025-04-03
Payer: MEDICARE

## 2025-04-03 ENCOUNTER — HOSPITAL ENCOUNTER (OUTPATIENT)
Dept: INFUSION THERAPY | Age: 75
Setting detail: INFUSION SERIES
Discharge: HOME | End: 2025-04-03

## 2025-04-03 LAB
ABO/RH: NORMAL
ANTIBODY SCREEN: NEGATIVE
ARM BAND NUMBER: NORMAL
BASOPHILS # BLD: 0.06 K/UL (ref 0–0.2)
BASOPHILS NFR BLD: 1 % (ref 0–2)
BLOOD BANK DISPENSE STATUS: NORMAL
BLOOD BANK SAMPLE EXPIRATION: NORMAL
BPU ID: NORMAL
COMPONENT: NORMAL
CROSSMATCH RESULT: NORMAL
EOSINOPHIL # BLD: 0.48 K/UL (ref 0.05–0.5)
EOSINOPHILS RELATIVE PERCENT: 7 % (ref 0–6)
ERYTHROCYTE [DISTWIDTH] IN BLOOD BY AUTOMATED COUNT: 18.6 % (ref 11.5–15)
HCT VFR BLD AUTO: 24.2 % (ref 34–48)
HGB BLD-MCNC: 7.2 G/DL (ref 11.5–15.5)
IMM GRANULOCYTES # BLD AUTO: 0.04 K/UL (ref 0–0.58)
IMM GRANULOCYTES NFR BLD: 1 % (ref 0–5)
LYMPHOCYTES NFR BLD: 1.22 K/UL (ref 1.5–4)
LYMPHOCYTES RELATIVE PERCENT: 17 % (ref 20–42)
MCH RBC QN AUTO: 24.6 PG (ref 26–35)
MCHC RBC AUTO-ENTMCNC: 29.8 G/DL (ref 32–34.5)
MCV RBC AUTO: 82.6 FL (ref 80–99.9)
MONOCYTES NFR BLD: 0.76 K/UL (ref 0.1–0.95)
MONOCYTES NFR BLD: 10 % (ref 2–12)
NEUTROPHILS NFR BLD: 65 % (ref 43–80)
NEUTS SEG NFR BLD: 4.75 K/UL (ref 1.8–7.3)
PLATELET # BLD AUTO: 212 K/UL (ref 130–450)
PMV BLD AUTO: 9.8 FL (ref 7–12)
RBC # BLD AUTO: 2.93 M/UL (ref 3.5–5.5)
TRANSFUSION STATUS: NORMAL
UNIT DIVISION: 0
WBC OTHER # BLD: 7.3 K/UL (ref 4.5–11.5)

## 2025-04-03 PROCEDURE — 86850 RBC ANTIBODY SCREEN: CPT

## 2025-04-03 PROCEDURE — 86900 BLOOD TYPING SEROLOGIC ABO: CPT

## 2025-04-03 PROCEDURE — 86901 BLOOD TYPING SEROLOGIC RH(D): CPT

## 2025-04-03 PROCEDURE — 86923 COMPATIBILITY TEST ELECTRIC: CPT

## 2025-04-03 PROCEDURE — 85025 COMPLETE CBC W/AUTO DIFF WBC: CPT

## 2025-04-03 PROCEDURE — 36415 COLL VENOUS BLD VENIPUNCTURE: CPT

## 2025-04-03 NOTE — TELEPHONE ENCOUNTER
I received a call from Dr. Oneal's office from [facility] regarding transition of care.  We had seen the patient in the hospital recently for anemia.  We had offered follow-up with us for local care.      Upon my contact with Dr. Oneal, it was addressed that patient has been very symptomatic with her challenges with anemia, and is currently transfusion dependent.    Patient is noted to have increased difficulty traveling to [facility] for her appointments with Dr. Oneal.   Fortunately patient is scheduled to see us in office with Dr. Decker in a few weeks.     Dr. Oneal had suggested patient's transfusion needs may entail 2 units of blood at a time.  I offered reaching to our staff to accommodate for patient's needs.

## 2025-04-08 DIAGNOSIS — D50.0 IRON DEFICIENCY ANEMIA DUE TO CHRONIC BLOOD LOSS: Primary | ICD-10-CM

## 2025-04-08 RX ORDER — SODIUM CHLORIDE 9 MG/ML
INJECTION, SOLUTION INTRAVENOUS PRN
Status: DISCONTINUED | OUTPATIENT
Start: 2025-04-08 | End: 2025-04-10 | Stop reason: HOSPADM

## 2025-04-08 RX ORDER — SODIUM CHLORIDE 9 MG/ML
INJECTION, SOLUTION INTRAVENOUS PRN
Status: DISCONTINUED | OUTPATIENT
Start: 2025-04-08 | End: 2025-04-08

## 2025-04-09 ENCOUNTER — HOSPITAL ENCOUNTER (OUTPATIENT)
Age: 75
Discharge: HOME | End: 2025-04-09
Payer: MEDICARE

## 2025-04-09 ENCOUNTER — HOSPITAL ENCOUNTER (OUTPATIENT)
Dept: INFUSION THERAPY | Age: 75
Setting detail: INFUSION SERIES
Discharge: HOME | End: 2025-04-09
Payer: MEDICARE

## 2025-04-09 VITALS
TEMPERATURE: 97.6 F | OXYGEN SATURATION: 99 % | HEART RATE: 87 BPM | DIASTOLIC BLOOD PRESSURE: 48 MMHG | RESPIRATION RATE: 12 BRPM | SYSTOLIC BLOOD PRESSURE: 110 MMHG

## 2025-04-09 LAB
BASOPHILS # BLD: 0.22 K/UL (ref 0–0.2)
BASOPHILS NFR BLD: 4 % (ref 0–2)
EOSINOPHIL # BLD: 0.56 K/UL (ref 0.05–0.5)
EOSINOPHILS RELATIVE PERCENT: 9 % (ref 0–6)
ERYTHROCYTE [DISTWIDTH] IN BLOOD BY AUTOMATED COUNT: 18.6 % (ref 11.5–15)
HCT VFR BLD AUTO: 21.6 % (ref 34–48)
HGB BLD-MCNC: 6.3 G/DL (ref 11.5–15.5)
LYMPHOCYTES NFR BLD: 0.73 K/UL (ref 1.5–4)
LYMPHOCYTES RELATIVE PERCENT: 11 % (ref 20–42)
MCH RBC QN AUTO: 23.5 PG (ref 26–35)
MCHC RBC AUTO-ENTMCNC: 29.2 G/DL (ref 32–34.5)
MCV RBC AUTO: 80.6 FL (ref 80–99.9)
MONOCYTES NFR BLD: 0.11 K/UL (ref 0.1–0.95)
MONOCYTES NFR BLD: 2 % (ref 2–12)
NEUTROPHILS NFR BLD: 75 % (ref 43–80)
NEUTS SEG NFR BLD: 4.77 K/UL (ref 1.8–7.3)
PLATELET # BLD AUTO: 188 K/UL (ref 130–450)
PMV BLD AUTO: 11 FL (ref 7–12)
RBC # BLD AUTO: 2.68 M/UL (ref 3.5–5.5)
RBC # BLD: ABNORMAL 10*6/UL
WBC OTHER # BLD: 6.4 K/UL (ref 4.5–11.5)

## 2025-04-09 PROCEDURE — 2500000003 HC RX 250 WO HCPCS: Performed by: INTERNAL MEDICINE

## 2025-04-09 PROCEDURE — 86900 BLOOD TYPING SEROLOGIC ABO: CPT

## 2025-04-09 PROCEDURE — 85025 COMPLETE CBC W/AUTO DIFF WBC: CPT

## 2025-04-09 PROCEDURE — P9016 RBC LEUKOCYTES REDUCED: HCPCS

## 2025-04-09 PROCEDURE — 36415 COLL VENOUS BLD VENIPUNCTURE: CPT

## 2025-04-09 PROCEDURE — 86901 BLOOD TYPING SEROLOGIC RH(D): CPT

## 2025-04-09 PROCEDURE — 2580000003 HC RX 258: Performed by: INTERNAL MEDICINE

## 2025-04-09 PROCEDURE — 86850 RBC ANTIBODY SCREEN: CPT

## 2025-04-09 PROCEDURE — 36430 TRANSFUSION BLD/BLD COMPNT: CPT

## 2025-04-09 PROCEDURE — 86923 COMPATIBILITY TEST ELECTRIC: CPT

## 2025-04-09 RX ORDER — SODIUM CHLORIDE 0.9 % (FLUSH) 0.9 %
5-40 SYRINGE (ML) INJECTION PRN
Status: DISCONTINUED | OUTPATIENT
Start: 2025-04-09 | End: 2025-04-10 | Stop reason: HOSPADM

## 2025-04-09 RX ORDER — SODIUM CHLORIDE 9 MG/ML
INJECTION, SOLUTION INTRAVENOUS PRN
Status: COMPLETED | OUTPATIENT
Start: 2025-04-09 | End: 2025-04-09

## 2025-04-09 RX ORDER — SODIUM CHLORIDE 0.9 % (FLUSH) 0.9 %
5-40 SYRINGE (ML) INJECTION EVERY 12 HOURS SCHEDULED
Status: DISCONTINUED | OUTPATIENT
Start: 2025-04-09 | End: 2025-04-10 | Stop reason: HOSPADM

## 2025-04-09 RX ADMIN — SODIUM CHLORIDE 50 ML: 9 INJECTION, SOLUTION INTRAVENOUS at 12:32

## 2025-04-09 RX ADMIN — SODIUM CHLORIDE, PRESERVATIVE FREE 20 ML: 5 INJECTION INTRAVENOUS at 12:52

## 2025-04-09 RX ADMIN — SODIUM CHLORIDE, PRESERVATIVE FREE 10 ML: 5 INJECTION INTRAVENOUS at 10:04

## 2025-04-09 ASSESSMENT — PAIN SCALES - GENERAL
PAINLEVEL_OUTOF10: 6
PAINLEVEL_OUTOF10: 6

## 2025-04-09 ASSESSMENT — PAIN DESCRIPTION - ONSET: ONSET: ON-GOING

## 2025-04-09 ASSESSMENT — PAIN - FUNCTIONAL ASSESSMENT: PAIN_FUNCTIONAL_ASSESSMENT: PREVENTS OR INTERFERES SOME ACTIVE ACTIVITIES AND ADLS

## 2025-04-09 ASSESSMENT — PAIN DESCRIPTION - DIRECTION: RADIATING_TOWARDS: BACK OF NECK

## 2025-04-09 ASSESSMENT — PAIN DESCRIPTION - PAIN TYPE: TYPE: CHRONIC PAIN

## 2025-04-09 ASSESSMENT — PAIN DESCRIPTION - DESCRIPTORS: DESCRIPTORS: DISCOMFORT;PRESSURE

## 2025-04-09 ASSESSMENT — PAIN DESCRIPTION - ORIENTATION: ORIENTATION: ANTERIOR

## 2025-04-09 ASSESSMENT — PAIN DESCRIPTION - LOCATION: LOCATION: HEAD

## 2025-04-09 ASSESSMENT — PAIN DESCRIPTION - FREQUENCY: FREQUENCY: CONTINUOUS

## 2025-04-09 NOTE — PROGRESS NOTES
Informed consent from Dr. Primitivo Oneal MD verified and on patient chart.   Patient received 1 unit RBC's. Patient tolerated well. Vital signs stable. Reviewed orally  regarding potential delayed reaction and instructions on what to do if reaction occurs. She declines printed d/c instructions or education on blood. Questions answered to apparent satisfaction. Patient observed for 20 minutes after transfusion-she could not stay for full 30 minutes due to ride issues and has had no issue with blood transfusions in the past. D/C in stable condition via transport service.

## 2025-04-09 NOTE — PROGRESS NOTES
Informed consent from Dr. Primitivo Oneal MD verified and on patient chart. Patient continues to agree to treatment of blood transfusion.     Patient educated on signs and symptoms of transfusion reaction.  Patient verbalizes understanding.

## 2025-04-14 ENCOUNTER — OFFICE VISIT (OUTPATIENT)
Dept: ONCOLOGY | Age: 75
End: 2025-04-14
Payer: MEDICARE

## 2025-04-14 ENCOUNTER — HOSPITAL ENCOUNTER (OUTPATIENT)
Dept: INFUSION THERAPY | Age: 75
Discharge: HOME | End: 2025-04-14
Payer: MEDICARE

## 2025-04-14 VITALS
DIASTOLIC BLOOD PRESSURE: 65 MMHG | SYSTOLIC BLOOD PRESSURE: 137 MMHG | OXYGEN SATURATION: 98 % | HEART RATE: 97 BPM | HEIGHT: 63 IN | WEIGHT: 186.6 LBS | TEMPERATURE: 97 F | BODY MASS INDEX: 33.06 KG/M2

## 2025-04-14 DIAGNOSIS — I35.0 NONRHEUMATIC AORTIC VALVE STENOSIS: Primary | ICD-10-CM

## 2025-04-14 DIAGNOSIS — D62 ACUTE BLOOD LOSS ANEMIA: Primary | ICD-10-CM

## 2025-04-14 DIAGNOSIS — I35.0 NONRHEUMATIC AORTIC VALVE STENOSIS: ICD-10-CM

## 2025-04-14 DIAGNOSIS — D50.0 IRON DEFICIENCY ANEMIA DUE TO CHRONIC BLOOD LOSS: ICD-10-CM

## 2025-04-14 LAB
ALBUMIN SERPL-MCNC: 3.4 G/DL (ref 3.5–5.2)
ALP SERPL-CCNC: 112 U/L (ref 35–104)
ALT SERPL-CCNC: 24 U/L (ref 0–32)
ANION GAP SERPL CALCULATED.3IONS-SCNC: 12 MMOL/L (ref 7–16)
AST SERPL-CCNC: 33 U/L (ref 0–31)
BASOPHILS # BLD: 0.04 K/UL (ref 0–0.2)
BASOPHILS NFR BLD: 1 % (ref 0–2)
BILIRUB SERPL-MCNC: 0.5 MG/DL (ref 0–1.2)
BUN SERPL-MCNC: 14 MG/DL (ref 6–23)
CALCIUM SERPL-MCNC: 8.8 MG/DL (ref 8.6–10.2)
CHLORIDE SERPL-SCNC: 104 MMOL/L (ref 98–107)
CO2 SERPL-SCNC: 17 MMOL/L (ref 22–29)
CREAT SERPL-MCNC: 0.7 MG/DL (ref 0.5–1)
EOSINOPHIL # BLD: 0.39 K/UL (ref 0.05–0.5)
EOSINOPHILS RELATIVE PERCENT: 7 % (ref 0–6)
ERYTHROCYTE [DISTWIDTH] IN BLOOD BY AUTOMATED COUNT: 18 % (ref 11.5–15)
FOLATE SERPL-MCNC: 12.5 NG/ML (ref 4.8–24.2)
GFR, ESTIMATED: 88 ML/MIN/1.73M2
GLUCOSE SERPL-MCNC: 352 MG/DL (ref 74–99)
HCT VFR BLD AUTO: 23.6 % (ref 34–48)
HGB BLD-MCNC: 7.2 G/DL (ref 11.5–15.5)
IMM GRANULOCYTES # BLD AUTO: 0.03 K/UL (ref 0–0.58)
IMM GRANULOCYTES NFR BLD: 1 % (ref 0–5)
IMM RETICS NFR: 13.1 % (ref 3–15.9)
LYMPHOCYTES NFR BLD: 1.03 K/UL (ref 1.5–4)
LYMPHOCYTES RELATIVE PERCENT: 17 % (ref 20–42)
MCH RBC QN AUTO: 24.6 PG (ref 26–35)
MCHC RBC AUTO-ENTMCNC: 30.5 G/DL (ref 32–34.5)
MCV RBC AUTO: 80.5 FL (ref 80–99.9)
MONOCYTES NFR BLD: 0.64 K/UL (ref 0.1–0.95)
MONOCYTES NFR BLD: 11 % (ref 2–12)
NEUTROPHILS NFR BLD: 64 % (ref 43–80)
NEUTS SEG NFR BLD: 3.86 K/UL (ref 1.8–7.3)
PLATELET # BLD AUTO: 162 K/UL (ref 130–450)
PMV BLD AUTO: 9.9 FL (ref 7–12)
POTASSIUM SERPL-SCNC: 3.9 MMOL/L (ref 3.5–5)
PROT SERPL-MCNC: 5.9 G/DL (ref 6.4–8.3)
RBC # BLD AUTO: 2.93 M/UL (ref 3.5–5.5)
RETIC HEMOGLOBIN: 20.8 PG (ref 28.2–36.6)
RETICS # AUTO: 0.11 M/UL
RETICS/RBC NFR AUTO: 3.9 % (ref 0.4–1.9)
SODIUM SERPL-SCNC: 133 MMOL/L (ref 132–146)
VIT B12 SERPL-MCNC: 974 PG/ML (ref 211–946)
WBC OTHER # BLD: 6 K/UL (ref 4.5–11.5)

## 2025-04-14 PROCEDURE — 3075F SYST BP GE 130 - 139MM HG: CPT | Performed by: INTERNAL MEDICINE

## 2025-04-14 PROCEDURE — 86901 BLOOD TYPING SEROLOGIC RH(D): CPT

## 2025-04-14 PROCEDURE — 1160F RVW MEDS BY RX/DR IN RCRD: CPT | Performed by: INTERNAL MEDICINE

## 2025-04-14 PROCEDURE — 85045 AUTOMATED RETICULOCYTE COUNT: CPT

## 2025-04-14 PROCEDURE — 86900 BLOOD TYPING SEROLOGIC ABO: CPT

## 2025-04-14 PROCEDURE — 3078F DIAST BP <80 MM HG: CPT | Performed by: INTERNAL MEDICINE

## 2025-04-14 PROCEDURE — 85025 COMPLETE CBC W/AUTO DIFF WBC: CPT

## 2025-04-14 PROCEDURE — 82607 VITAMIN B-12: CPT

## 2025-04-14 PROCEDURE — 88185 FLOWCYTOMETRY/TC ADD-ON: CPT

## 2025-04-14 PROCEDURE — 2500000003 HC RX 250 WO HCPCS: Performed by: INTERNAL MEDICINE

## 2025-04-14 PROCEDURE — 82746 ASSAY OF FOLIC ACID SERUM: CPT

## 2025-04-14 PROCEDURE — 1125F AMNT PAIN NOTED PAIN PRSNT: CPT | Performed by: INTERNAL MEDICINE

## 2025-04-14 PROCEDURE — 99215 OFFICE O/P EST HI 40 MIN: CPT | Performed by: INTERNAL MEDICINE

## 2025-04-14 PROCEDURE — 86850 RBC ANTIBODY SCREEN: CPT

## 2025-04-14 PROCEDURE — 99214 OFFICE O/P EST MOD 30 MIN: CPT

## 2025-04-14 PROCEDURE — 6360000002 HC RX W HCPCS: Performed by: INTERNAL MEDICINE

## 2025-04-14 PROCEDURE — 1123F ACP DISCUSS/DSCN MKR DOCD: CPT | Performed by: INTERNAL MEDICINE

## 2025-04-14 PROCEDURE — 1159F MED LIST DOCD IN RCRD: CPT | Performed by: INTERNAL MEDICINE

## 2025-04-14 PROCEDURE — 88184 FLOWCYTOMETRY/ TC 1 MARKER: CPT

## 2025-04-14 PROCEDURE — 86923 COMPATIBILITY TEST ELECTRIC: CPT

## 2025-04-14 PROCEDURE — 80053 COMPREHEN METABOLIC PANEL: CPT

## 2025-04-14 RX ORDER — SODIUM CHLORIDE 0.9 % (FLUSH) 0.9 %
5-40 SYRINGE (ML) INJECTION PRN
Status: CANCELLED | OUTPATIENT
Start: 2025-04-14

## 2025-04-14 RX ORDER — SODIUM CHLORIDE 9 MG/ML
25 INJECTION, SOLUTION INTRAVENOUS PRN
OUTPATIENT
Start: 2025-04-14

## 2025-04-14 RX ORDER — HEPARIN 100 UNIT/ML
500 SYRINGE INTRAVENOUS PRN
Status: CANCELLED | OUTPATIENT
Start: 2025-04-14

## 2025-04-14 RX ORDER — SODIUM CHLORIDE 0.9 % (FLUSH) 0.9 %
5-40 SYRINGE (ML) INJECTION PRN
Status: DISCONTINUED | OUTPATIENT
Start: 2025-04-14 | End: 2025-04-15 | Stop reason: HOSPADM

## 2025-04-14 RX ORDER — SODIUM CHLORIDE 9 MG/ML
25 INJECTION, SOLUTION INTRAVENOUS PRN
Status: CANCELLED | OUTPATIENT
Start: 2025-04-14

## 2025-04-14 RX ORDER — HEPARIN 100 UNIT/ML
500 SYRINGE INTRAVENOUS PRN
Status: DISCONTINUED | OUTPATIENT
Start: 2025-04-14 | End: 2025-04-15 | Stop reason: HOSPADM

## 2025-04-14 RX ADMIN — SODIUM CHLORIDE, PRESERVATIVE FREE 10 ML: 5 INJECTION INTRAVENOUS at 13:19

## 2025-04-14 RX ADMIN — HEPARIN 500 UNITS: 100 SYRINGE at 13:19

## 2025-04-14 NOTE — PROGRESS NOTES
[facility] MED ONCOLOGY  [address]  [facility] [address]  Dept: [phone]  Loc: [phone]    Clinic Consultation Note      Date of Encounter: 04/14/2025     Referring Provider:  outside provider    Reason for Visit:   anemia        PCP:  Jesus Alvarado DO    Demographics: 75 y.o. female    Chief Complaint   Patient presents with    Anemia    Follow-up         History of Present Illness of Initial Consultation :  The patient is a 75 y.o. female with history of anemia, arthritis, cataract, chronic fatigue, depression, diabetes, psoriasis, sciatica and chronic blood transfusion dependence.  Patient has been having chronic melena and GI bleeding since about 2021 as it seems, she has been following with [facility] Dr. Oneal and she has been on chronic transfusion almost once a week for the last couple years.  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  No alcohol drinking and no smoking, she said that she has a mild degree of cirrhosis  She said that she had upper and lower endoscopies couple times and she thinks that her bleeding has improved actually after the last endoscopy she did with Dr. Tyson  No history of cancer in herself and no history of cancer in her first-degree family, no history of clots or bleeding disorder otherwise in herself  She is a retired ICU nurse    Inpatient hx from last admission in our system:  Patient is a 75 y.o. female presented ED on 1/21/2025 for melena for the last

## 2025-04-15 LAB
EOSINOPHIL # BLD: 0.48 K/UL (ref 0.05–0.5)
EOSINOPHILS RELATIVE PERCENT: 7 % (ref 0–6)
ERYTHROCYTE [DISTWIDTH] IN BLOOD BY AUTOMATED COUNT: 18.7 % (ref 11.5–15)
HCT VFR BLD AUTO: 25.8 % (ref 34–48)
HGB BLD-MCNC: 7.3 G/DL (ref 11.5–15.5)
LYMPHOCYTES NFR BLD: 0.83 K/UL (ref 1.5–4)
LYMPHOCYTES RELATIVE PERCENT: 12 % (ref 20–42)
MCH RBC QN AUTO: 24.4 PG (ref 26–35)
MCHC RBC AUTO-ENTMCNC: 28.3 G/DL (ref 32–34.5)
MCV RBC AUTO: 86.3 FL (ref 80–99.9)
MONOCYTES NFR BLD: 0.21 K/UL (ref 0.1–0.95)
MONOCYTES NFR BLD: 3 % (ref 2–12)
NEUTROPHILS NFR BLD: 78 % (ref 43–80)
NEUTS SEG NFR BLD: 5.38 K/UL (ref 1.8–7.3)
NUCLEATED RED BLOOD CELLS: 1 PER 100 WBC
PATH REV BLD -IMP: NORMAL
PLATELET # BLD AUTO: 167 K/UL (ref 130–450)
PLATELETS.RETICULATED NFR BLD AUTO: 3.8 % (ref 1.1–10.3)
PMV BLD AUTO: 11 FL (ref 7–12)
RBC # BLD AUTO: 2.99 M/UL (ref 3.5–5.5)
RBC # BLD: ABNORMAL 10*6/UL
WBC OTHER # BLD: 6.9 K/UL (ref 4.5–11.5)

## 2025-04-16 ENCOUNTER — TELEPHONE (OUTPATIENT)
Dept: ONCOLOGY | Age: 75
End: 2025-04-16

## 2025-04-16 RX ORDER — EPINEPHRINE 1 MG/ML
0.3 INJECTION, SOLUTION, CONCENTRATE INTRAVENOUS PRN
Status: CANCELLED | OUTPATIENT
Start: 2025-04-16

## 2025-04-16 RX ORDER — ONDANSETRON 2 MG/ML
8 INJECTION INTRAMUSCULAR; INTRAVENOUS
Status: CANCELLED | OUTPATIENT
Start: 2025-04-16

## 2025-04-16 RX ORDER — SODIUM CHLORIDE 9 MG/ML
25 INJECTION, SOLUTION INTRAVENOUS PRN
Status: CANCELLED | OUTPATIENT
Start: 2025-04-16

## 2025-04-16 RX ORDER — ACETAMINOPHEN 325 MG/1
650 TABLET ORAL
Status: CANCELLED | OUTPATIENT
Start: 2025-04-16

## 2025-04-16 RX ORDER — SODIUM CHLORIDE 9 MG/ML
INJECTION, SOLUTION INTRAVENOUS CONTINUOUS
Status: CANCELLED | OUTPATIENT
Start: 2025-04-16

## 2025-04-16 RX ORDER — ALBUTEROL SULFATE 90 UG/1
4 INHALANT RESPIRATORY (INHALATION) PRN
Status: CANCELLED | OUTPATIENT
Start: 2025-04-16

## 2025-04-16 RX ORDER — DIPHENHYDRAMINE HYDROCHLORIDE 50 MG/ML
50 INJECTION, SOLUTION INTRAMUSCULAR; INTRAVENOUS
Status: CANCELLED | OUTPATIENT
Start: 2025-04-16

## 2025-04-16 RX ORDER — HYDROCORTISONE SODIUM SUCCINATE 100 MG/2ML
100 INJECTION INTRAMUSCULAR; INTRAVENOUS
Status: CANCELLED | OUTPATIENT
Start: 2025-04-16

## 2025-04-16 NOTE — TELEPHONE ENCOUNTER
04/15/25 Echocardiogram was ordered. No authorization is required per Avail[facility] portal.   Echocardiogram scheduled w/ [facility] for 05/09/25 @ 8:00 AM.   Patient stated that this date may not work for her schedule. She was given dept phone number and will call to reschedule for a better date and time.

## 2025-04-17 ENCOUNTER — HOSPITAL ENCOUNTER (OUTPATIENT)
Dept: INFUSION THERAPY | Age: 75
Discharge: HOME | End: 2025-04-17
Payer: MEDICARE

## 2025-04-17 VITALS
DIASTOLIC BLOOD PRESSURE: 60 MMHG | HEART RATE: 76 BPM | SYSTOLIC BLOOD PRESSURE: 126 MMHG | TEMPERATURE: 97.8 F | OXYGEN SATURATION: 99 % | RESPIRATION RATE: 16 BRPM

## 2025-04-17 DIAGNOSIS — D62 ACUTE BLOOD LOSS ANEMIA: Primary | ICD-10-CM

## 2025-04-17 DIAGNOSIS — N30.00 ACUTE CYSTITIS WITHOUT HEMATURIA: ICD-10-CM

## 2025-04-17 LAB
SEND OUT REPORT: NORMAL
TEST NAME: NORMAL

## 2025-04-17 PROCEDURE — 2500000003 HC RX 250 WO HCPCS: Performed by: INTERNAL MEDICINE

## 2025-04-17 PROCEDURE — 36591 DRAW BLOOD OFF VENOUS DEVICE: CPT

## 2025-04-17 PROCEDURE — P9016 RBC LEUKOCYTES REDUCED: HCPCS

## 2025-04-17 PROCEDURE — 2580000003 HC RX 258: Performed by: INTERNAL MEDICINE

## 2025-04-17 PROCEDURE — 36430 TRANSFUSION BLD/BLD COMPNT: CPT

## 2025-04-17 RX ORDER — EPINEPHRINE 1 MG/ML
0.3 INJECTION, SOLUTION, CONCENTRATE INTRAVENOUS PRN
Status: CANCELLED | OUTPATIENT
Start: 2025-04-17

## 2025-04-17 RX ORDER — SODIUM CHLORIDE 9 MG/ML
INJECTION, SOLUTION INTRAVENOUS CONTINUOUS
Status: CANCELLED | OUTPATIENT
Start: 2025-04-17

## 2025-04-17 RX ORDER — SODIUM CHLORIDE 9 MG/ML
20 INJECTION, SOLUTION INTRAVENOUS CONTINUOUS
Status: DISCONTINUED | OUTPATIENT
Start: 2025-04-17 | End: 2025-04-18 | Stop reason: HOSPADM

## 2025-04-17 RX ORDER — ALBUTEROL SULFATE 90 UG/1
4 INHALANT RESPIRATORY (INHALATION) PRN
Status: CANCELLED | OUTPATIENT
Start: 2025-04-17

## 2025-04-17 RX ORDER — SODIUM CHLORIDE 9 MG/ML
20 INJECTION, SOLUTION INTRAVENOUS CONTINUOUS
Status: CANCELLED | OUTPATIENT
Start: 2025-04-17

## 2025-04-17 RX ORDER — SODIUM CHLORIDE 0.9 % (FLUSH) 0.9 %
5-40 SYRINGE (ML) INJECTION PRN
Status: CANCELLED | OUTPATIENT
Start: 2025-04-17

## 2025-04-17 RX ORDER — SODIUM CHLORIDE 0.9 % (FLUSH) 0.9 %
5-40 SYRINGE (ML) INJECTION PRN
Status: DISCONTINUED | OUTPATIENT
Start: 2025-04-17 | End: 2025-04-18 | Stop reason: HOSPADM

## 2025-04-17 RX ORDER — DIPHENHYDRAMINE HYDROCHLORIDE 50 MG/ML
50 INJECTION, SOLUTION INTRAMUSCULAR; INTRAVENOUS
Status: CANCELLED | OUTPATIENT
Start: 2025-04-17

## 2025-04-17 RX ORDER — SODIUM CHLORIDE 9 MG/ML
25 INJECTION, SOLUTION INTRAVENOUS PRN
Status: CANCELLED | OUTPATIENT
Start: 2025-04-17

## 2025-04-17 RX ORDER — HYDROCORTISONE SODIUM SUCCINATE 100 MG/2ML
100 INJECTION INTRAMUSCULAR; INTRAVENOUS
Status: CANCELLED | OUTPATIENT
Start: 2025-04-17

## 2025-04-17 RX ORDER — ACETAMINOPHEN 325 MG/1
650 TABLET ORAL
Status: CANCELLED | OUTPATIENT
Start: 2025-04-17

## 2025-04-17 RX ORDER — ONDANSETRON 2 MG/ML
8 INJECTION INTRAMUSCULAR; INTRAVENOUS
Status: CANCELLED | OUTPATIENT
Start: 2025-04-17

## 2025-04-17 RX ADMIN — SODIUM CHLORIDE 20 ML/HR: 0.9 INJECTION, SOLUTION INTRAVENOUS at 11:21

## 2025-04-17 RX ADMIN — SODIUM CHLORIDE, PRESERVATIVE FREE 10 ML: 5 INJECTION INTRAVENOUS at 13:26

## 2025-04-17 NOTE — PROGRESS NOTES
Patient tolerated treatment well without complications or complaints. Alert and oriented x3. Patient aware of potential side effects and denies questions regarding treatment. Pain assessed, patient denies any new or worsening pain. Patient refuse to wait 30 post transfusion.

## 2025-04-24 ENCOUNTER — HOSPITAL ENCOUNTER (EMERGENCY)
Age: 75
Discharge: LEFT BEFORE DISCHARGE | End: 2025-04-24

## 2025-04-24 ENCOUNTER — HOSPITAL ENCOUNTER (OUTPATIENT)
Dept: INFUSION THERAPY | Age: 75
Discharge: HOME | End: 2025-04-24
Payer: MEDICARE

## 2025-04-24 VITALS
OXYGEN SATURATION: 99 % | RESPIRATION RATE: 16 BRPM | TEMPERATURE: 97.9 F | SYSTOLIC BLOOD PRESSURE: 133 MMHG | DIASTOLIC BLOOD PRESSURE: 62 MMHG | HEART RATE: 83 BPM

## 2025-04-24 DIAGNOSIS — D50.0 IRON DEFICIENCY ANEMIA DUE TO CHRONIC BLOOD LOSS: ICD-10-CM

## 2025-04-24 DIAGNOSIS — N30.00 ACUTE CYSTITIS WITHOUT HEMATURIA: ICD-10-CM

## 2025-04-24 DIAGNOSIS — D62 ACUTE BLOOD LOSS ANEMIA: Primary | ICD-10-CM

## 2025-04-24 DIAGNOSIS — D62 ACUTE BLOOD LOSS ANEMIA: ICD-10-CM

## 2025-04-24 LAB
ABO + RH BLD: NORMAL
ALBUMIN SERPL-MCNC: 3.3 G/DL (ref 3.5–5.2)
ALP SERPL-CCNC: 102 U/L (ref 35–104)
ALT SERPL-CCNC: 23 U/L (ref 0–35)
ANION GAP SERPL CALCULATED.3IONS-SCNC: 9 MMOL/L (ref 7–16)
ARM BAND NUMBER: NORMAL
AST SERPL-CCNC: 37 U/L (ref 0–35)
BASOPHILS # BLD: 0.05 K/UL (ref 0–0.2)
BASOPHILS NFR BLD: 1 % (ref 0–2)
BILIRUB SERPL-MCNC: 0.3 MG/DL (ref 0–1.2)
BLOOD BANK SAMPLE EXPIRATION: NORMAL
BLOOD GROUP ANTIBODIES SERPL: NEGATIVE
BUN SERPL-MCNC: 14 MG/DL (ref 8–23)
CALCIUM SERPL-MCNC: 9.2 MG/DL (ref 8.8–10.2)
CHLORIDE SERPL-SCNC: 105 MMOL/L (ref 98–107)
CO2 SERPL-SCNC: 21 MMOL/L (ref 22–29)
CREAT SERPL-MCNC: 0.6 MG/DL (ref 0.5–1)
EOSINOPHIL # BLD: 0.42 K/UL (ref 0.05–0.5)
EOSINOPHILS RELATIVE PERCENT: 8 % (ref 0–6)
ERYTHROCYTE [DISTWIDTH] IN BLOOD BY AUTOMATED COUNT: 19.6 % (ref 11.5–15)
GFR, ESTIMATED: >90 ML/MIN/1.73M2
GLUCOSE SERPL-MCNC: 285 MG/DL (ref 74–99)
HCT VFR BLD AUTO: 25.1 % (ref 34–48)
HGB BLD-MCNC: 7.8 G/DL (ref 11.5–15.5)
IMM GRANULOCYTES # BLD AUTO: <0.03 K/UL (ref 0–0.58)
IMM GRANULOCYTES NFR BLD: 0 % (ref 0–5)
LYMPHOCYTES NFR BLD: 0.94 K/UL (ref 1.5–4)
LYMPHOCYTES RELATIVE PERCENT: 18 % (ref 20–42)
MCH RBC QN AUTO: 25.3 PG (ref 26–35)
MCHC RBC AUTO-ENTMCNC: 31.1 G/DL (ref 32–34.5)
MCV RBC AUTO: 81.5 FL (ref 80–99.9)
MONOCYTES NFR BLD: 0.56 K/UL (ref 0.1–0.95)
MONOCYTES NFR BLD: 11 % (ref 2–12)
NEUTROPHILS NFR BLD: 63 % (ref 43–80)
NEUTS SEG NFR BLD: 3.36 K/UL (ref 1.8–7.3)
PLATELET # BLD AUTO: 189 K/UL (ref 130–450)
PMV BLD AUTO: 9.9 FL (ref 7–12)
POTASSIUM SERPL-SCNC: 4 MMOL/L (ref 3.5–5.1)
PROT SERPL-MCNC: 6.2 G/DL (ref 6.4–8.3)
RBC # BLD AUTO: 3.08 M/UL (ref 3.5–5.5)
SODIUM SERPL-SCNC: 135 MMOL/L (ref 136–145)
WBC OTHER # BLD: 5.4 K/UL (ref 4.5–11.5)

## 2025-04-24 PROCEDURE — 36591 DRAW BLOOD OFF VENOUS DEVICE: CPT

## 2025-04-24 PROCEDURE — 86850 RBC ANTIBODY SCREEN: CPT

## 2025-04-24 PROCEDURE — 85025 COMPLETE CBC W/AUTO DIFF WBC: CPT

## 2025-04-24 PROCEDURE — 6360000002 HC RX W HCPCS: Performed by: INTERNAL MEDICINE

## 2025-04-24 PROCEDURE — 86901 BLOOD TYPING SEROLOGIC RH(D): CPT

## 2025-04-24 PROCEDURE — 86900 BLOOD TYPING SEROLOGIC ABO: CPT

## 2025-04-24 PROCEDURE — 80053 COMPREHEN METABOLIC PANEL: CPT

## 2025-04-24 PROCEDURE — 2500000003 HC RX 250 WO HCPCS: Performed by: INTERNAL MEDICINE

## 2025-04-24 RX ORDER — EPINEPHRINE 1 MG/ML
0.3 INJECTION, SOLUTION, CONCENTRATE INTRAVENOUS PRN
Status: CANCELLED | OUTPATIENT
Start: 2025-04-24

## 2025-04-24 RX ORDER — SODIUM CHLORIDE 9 MG/ML
20 INJECTION, SOLUTION INTRAVENOUS CONTINUOUS
Status: CANCELLED | OUTPATIENT
Start: 2025-04-24

## 2025-04-24 RX ORDER — ALBUTEROL SULFATE 90 UG/1
4 INHALANT RESPIRATORY (INHALATION) PRN
Status: CANCELLED | OUTPATIENT
Start: 2025-04-24

## 2025-04-24 RX ORDER — SODIUM CHLORIDE 9 MG/ML
25 INJECTION, SOLUTION INTRAVENOUS PRN
Status: CANCELLED | OUTPATIENT
Start: 2025-04-24

## 2025-04-24 RX ORDER — HEPARIN 100 UNIT/ML
500 SYRINGE INTRAVENOUS PRN
Status: CANCELLED | OUTPATIENT
Start: 2025-04-24

## 2025-04-24 RX ORDER — SODIUM CHLORIDE 0.9 % (FLUSH) 0.9 %
5-40 SYRINGE (ML) INJECTION PRN
Status: DISCONTINUED | OUTPATIENT
Start: 2025-04-24 | End: 2025-04-25 | Stop reason: HOSPADM

## 2025-04-24 RX ORDER — ONDANSETRON 2 MG/ML
8 INJECTION INTRAMUSCULAR; INTRAVENOUS
Status: CANCELLED | OUTPATIENT
Start: 2025-04-24

## 2025-04-24 RX ORDER — ACETAMINOPHEN 325 MG/1
650 TABLET ORAL
Status: CANCELLED | OUTPATIENT
Start: 2025-04-24

## 2025-04-24 RX ORDER — SODIUM CHLORIDE 0.9 % (FLUSH) 0.9 %
5-40 SYRINGE (ML) INJECTION PRN
Status: CANCELLED | OUTPATIENT
Start: 2025-04-24

## 2025-04-24 RX ORDER — HYDROCORTISONE SODIUM SUCCINATE 100 MG/2ML
100 INJECTION INTRAMUSCULAR; INTRAVENOUS
Status: CANCELLED | OUTPATIENT
Start: 2025-04-24

## 2025-04-24 RX ORDER — SODIUM CHLORIDE 9 MG/ML
INJECTION, SOLUTION INTRAVENOUS CONTINUOUS
Status: CANCELLED | OUTPATIENT
Start: 2025-04-24

## 2025-04-24 RX ORDER — HEPARIN 100 UNIT/ML
500 SYRINGE INTRAVENOUS PRN
Status: DISCONTINUED | OUTPATIENT
Start: 2025-04-24 | End: 2025-04-25 | Stop reason: HOSPADM

## 2025-04-24 RX ORDER — DIPHENHYDRAMINE HYDROCHLORIDE 50 MG/ML
50 INJECTION, SOLUTION INTRAMUSCULAR; INTRAVENOUS
Status: CANCELLED | OUTPATIENT
Start: 2025-04-24

## 2025-04-24 RX ADMIN — HEPARIN 500 UNITS: 100 SYRINGE at 10:53

## 2025-04-24 RX ADMIN — SODIUM CHLORIDE, PRESERVATIVE FREE 10 ML: 5 INJECTION INTRAVENOUS at 10:53

## 2025-04-24 NOTE — PROGRESS NOTES
Per conversation with Dr. Decker, patient was to receive a unit of blood based on the following parameters:    \"consider 1 unit of PRBC if below 8 and symptomatic \"    HMG is 7.8 and patient is feeling tired which is a typical symptom according to the patient when she needs a unit of blood. Patient was agreeable to receive the unit today in our clinic but due to longer processing time of the Type/Screen, we are unable to give the unit today as it is too late in the day. Patient was offered to come back tomorrow for the unit but stated she would rather go to the ER to receive the unit today as she has to arrange transportation. Explained to patient that she may have a longer wait time in the ER due to increased patient volumes currently. Attempted to contact the  for possible assistance with transportation but had to leave a voicemail. Prepare and Transfuse are in place if patients decides to come back to our clinic tomorrow for the unit.     Spoke with ER Nurse Oly who was covering for the charge explaining the situation and the transfusion parameters set by our provider since the ER protocol differs from the patients needed plan.

## 2025-04-24 NOTE — PROGRESS NOTES
Port accessed.  Labs obtained.  Left accessed for possible blood transfusion today.  Transparent dressing applied..

## 2025-04-25 ENCOUNTER — TELEPHONE (OUTPATIENT)
Dept: ONCOLOGY | Age: 75
End: 2025-04-25

## 2025-04-25 ENCOUNTER — TELEPHONE (OUTPATIENT)
Dept: INFUSION THERAPY | Age: 75
End: 2025-04-25

## 2025-04-25 NOTE — TELEPHONE ENCOUNTER
Reached out to patient via phone at the request of Onc  re: transportation. Per medical team, patient needs a blood transfusion and is having transportation issues, stating she needs at least a 48 hour notice to arrange transport and cannot get to the office on Monday at 830am; patient was instructed to go to ED if symptomatic.     Confirmed with patient that she uses [facility] [phone] for transport and agency needs a 48 hour notice. Discussed [facility], as agency only needs 24 hr notice, however, patient advised that she needs physical assistance with getting in/out of vehicle and [employer] [facility] will not assist. Patient advised that if transfusion can occur on Tuesday at 830am, she can get transport through [facility];  [facility] notified.     Confirmed patient has a care manager through [facility], Alisson N [phone], stating she gets MOW and has an aide MWF. Confirmed patient does NOT have transport arranged through insurance.     Spoke to Alisson from [facility] in regards to the above. Confirmed that on 5/1, patient's [place] [employer] will switch over to [facility]; therefore, patient will be able to start using her [facility] transport benefit.     Called patient back and advised that  aware of need for Tuesday AM appointment and encouraged patient to make transport arrangements today. Encouraged patient to reach out to Alisson to learn more about her [facility] transport benefit that will begin in May. Patient verbalized she is aware to call 911 if she feels ill.     No additional needs identified at this time. Encouraged patient to reach out to [facility] if further needs arise.     Clarissa Mcclellan, MSW, LSW  Oncology Social Worker

## 2025-04-25 NOTE — TELEPHONE ENCOUNTER
Patient went to ER after leaving clinic but left without being seen as they were too busy and she was here all day. Patient was encouraged to return to the ER for the unit of blood as we are limited on treatments we can do today since we are down a provider after a certain time. Patient will come to clinic on Monday at 8:30 am for a CBC/w diff and Type and Screen and receive a transfusion per the orders set by provider.

## 2025-04-28 ENCOUNTER — TELEPHONE (OUTPATIENT)
Dept: ONCOLOGY | Age: 75
End: 2025-04-28

## 2025-04-28 NOTE — TELEPHONE ENCOUNTER
Received message from  staff at [facility], stating that patient called to confirm appointment and indicated that she does not have transport arranged, stating she was under the impression this SW was going to make arrangements. This worker advised patient on Friday that patient would need to make transport arrangements for appointment on Tuesday.     This worker called [facility] [phone] and confirmed that patient already has transport arranged for tomorrow AM for appointment, p/u from home is 730am.     Discussed above with patient via phone, re: appt beginning at 8:30am tomorrow, transport arranged. Patient verbalized understanding and had questions about lab draw orders; encouraged patient to discuss/ask questions tomorrow during her appointment.     No additional needs identified at this time.     Clarissa Mcclellan MSW, LSW  Oncology Social Worker

## 2025-04-29 ENCOUNTER — HOSPITAL ENCOUNTER (OUTPATIENT)
Dept: INFUSION THERAPY | Age: 75
Discharge: HOME | End: 2025-04-29
Payer: MEDICARE

## 2025-04-29 VITALS
DIASTOLIC BLOOD PRESSURE: 86 MMHG | SYSTOLIC BLOOD PRESSURE: 123 MMHG | TEMPERATURE: 98.1 F | OXYGEN SATURATION: 100 % | HEART RATE: 86 BPM | RESPIRATION RATE: 16 BRPM

## 2025-04-29 DIAGNOSIS — N30.00 ACUTE CYSTITIS WITHOUT HEMATURIA: ICD-10-CM

## 2025-04-29 DIAGNOSIS — D62 ACUTE BLOOD LOSS ANEMIA: ICD-10-CM

## 2025-04-29 DIAGNOSIS — D62 ACUTE BLOOD LOSS ANEMIA: Primary | ICD-10-CM

## 2025-04-29 DIAGNOSIS — D50.0 IRON DEFICIENCY ANEMIA DUE TO CHRONIC BLOOD LOSS: Primary | ICD-10-CM

## 2025-04-29 LAB
ALBUMIN SERPL-MCNC: 3.2 G/DL (ref 3.5–5.2)
ALP SERPL-CCNC: 110 U/L (ref 35–104)
ALT SERPL-CCNC: 24 U/L (ref 0–35)
ANION GAP SERPL CALCULATED.3IONS-SCNC: 11 MMOL/L (ref 7–16)
AST SERPL-CCNC: 35 U/L (ref 0–35)
BASOPHILS # BLD: 0.04 K/UL (ref 0–0.2)
BASOPHILS NFR BLD: 1 % (ref 0–2)
BILIRUB SERPL-MCNC: 0.4 MG/DL (ref 0–1.2)
BUN SERPL-MCNC: 16 MG/DL (ref 8–23)
CALCIUM SERPL-MCNC: 9.3 MG/DL (ref 8.8–10.2)
CHLORIDE SERPL-SCNC: 101 MMOL/L (ref 98–107)
CO2 SERPL-SCNC: 19 MMOL/L (ref 22–29)
CREAT SERPL-MCNC: 0.7 MG/DL (ref 0.5–1)
EOSINOPHIL # BLD: 0.4 K/UL (ref 0.05–0.5)
EOSINOPHILS RELATIVE PERCENT: 7 % (ref 0–6)
ERYTHROCYTE [DISTWIDTH] IN BLOOD BY AUTOMATED COUNT: 19.6 % (ref 11.5–15)
GFR, ESTIMATED: >90 ML/MIN/1.73M2
GLUCOSE SERPL-MCNC: 318 MG/DL (ref 74–99)
HCT VFR BLD AUTO: 23.6 % (ref 34–48)
HGB BLD-MCNC: 7.2 G/DL (ref 11.5–15.5)
IMM GRANULOCYTES # BLD AUTO: 0.03 K/UL (ref 0–0.58)
IMM GRANULOCYTES NFR BLD: 1 % (ref 0–5)
LYMPHOCYTES NFR BLD: 0.85 K/UL (ref 1.5–4)
LYMPHOCYTES RELATIVE PERCENT: 14 % (ref 20–42)
MCH RBC QN AUTO: 24.5 PG (ref 26–35)
MCHC RBC AUTO-ENTMCNC: 30.5 G/DL (ref 32–34.5)
MCV RBC AUTO: 80.3 FL (ref 80–99.9)
MONOCYTES NFR BLD: 0.67 K/UL (ref 0.1–0.95)
MONOCYTES NFR BLD: 11 % (ref 2–12)
NEUTROPHILS NFR BLD: 67 % (ref 43–80)
NEUTS SEG NFR BLD: 4.08 K/UL (ref 1.8–7.3)
PLATELET # BLD AUTO: 177 K/UL (ref 130–450)
PMV BLD AUTO: 10.3 FL (ref 7–12)
POTASSIUM SERPL-SCNC: 3.9 MMOL/L (ref 3.5–5.1)
PROT SERPL-MCNC: 6.1 G/DL (ref 6.4–8.3)
RBC # BLD AUTO: 2.94 M/UL (ref 3.5–5.5)
SODIUM SERPL-SCNC: 132 MMOL/L (ref 136–145)
WBC OTHER # BLD: 6.1 K/UL (ref 4.5–11.5)

## 2025-04-29 PROCEDURE — 86900 BLOOD TYPING SEROLOGIC ABO: CPT

## 2025-04-29 PROCEDURE — P9016 RBC LEUKOCYTES REDUCED: HCPCS

## 2025-04-29 PROCEDURE — 85025 COMPLETE CBC W/AUTO DIFF WBC: CPT

## 2025-04-29 PROCEDURE — 86850 RBC ANTIBODY SCREEN: CPT

## 2025-04-29 PROCEDURE — 2500000003 HC RX 250 WO HCPCS: Performed by: INTERNAL MEDICINE

## 2025-04-29 PROCEDURE — 86923 COMPATIBILITY TEST ELECTRIC: CPT

## 2025-04-29 PROCEDURE — 36591 DRAW BLOOD OFF VENOUS DEVICE: CPT

## 2025-04-29 PROCEDURE — 80053 COMPREHEN METABOLIC PANEL: CPT

## 2025-04-29 PROCEDURE — 86901 BLOOD TYPING SEROLOGIC RH(D): CPT

## 2025-04-29 PROCEDURE — 36430 TRANSFUSION BLD/BLD COMPNT: CPT

## 2025-04-29 PROCEDURE — 6360000002 HC RX W HCPCS: Performed by: INTERNAL MEDICINE

## 2025-04-29 PROCEDURE — 2580000003 HC RX 258: Performed by: INTERNAL MEDICINE

## 2025-04-29 RX ORDER — HEPARIN 100 UNIT/ML
500 SYRINGE INTRAVENOUS PRN
Status: CANCELLED | OUTPATIENT
Start: 2025-04-29

## 2025-04-29 RX ORDER — SODIUM CHLORIDE 0.9 % (FLUSH) 0.9 %
5-40 SYRINGE (ML) INJECTION PRN
Status: CANCELLED | OUTPATIENT
Start: 2025-04-29

## 2025-04-29 RX ORDER — ACETAMINOPHEN 325 MG/1
650 TABLET ORAL
OUTPATIENT
Start: 2025-04-29

## 2025-04-29 RX ORDER — HEPARIN 100 UNIT/ML
500 SYRINGE INTRAVENOUS PRN
OUTPATIENT
Start: 2025-04-29

## 2025-04-29 RX ORDER — DIPHENHYDRAMINE HYDROCHLORIDE 50 MG/ML
50 INJECTION, SOLUTION INTRAMUSCULAR; INTRAVENOUS
OUTPATIENT
Start: 2025-04-29

## 2025-04-29 RX ORDER — ALBUTEROL SULFATE 90 UG/1
4 INHALANT RESPIRATORY (INHALATION) PRN
OUTPATIENT
Start: 2025-04-29

## 2025-04-29 RX ORDER — SODIUM CHLORIDE 9 MG/ML
25 INJECTION, SOLUTION INTRAVENOUS PRN
OUTPATIENT
Start: 2025-04-29

## 2025-04-29 RX ORDER — SODIUM CHLORIDE 0.9 % (FLUSH) 0.9 %
5-40 SYRINGE (ML) INJECTION PRN
Status: DISCONTINUED | OUTPATIENT
Start: 2025-04-29 | End: 2025-04-30 | Stop reason: HOSPADM

## 2025-04-29 RX ORDER — HYDROCORTISONE SODIUM SUCCINATE 100 MG/2ML
100 INJECTION INTRAMUSCULAR; INTRAVENOUS
OUTPATIENT
Start: 2025-04-29

## 2025-04-29 RX ORDER — HEPARIN 100 UNIT/ML
500 SYRINGE INTRAVENOUS PRN
Status: DISCONTINUED | OUTPATIENT
Start: 2025-04-29 | End: 2025-04-30 | Stop reason: HOSPADM

## 2025-04-29 RX ORDER — SODIUM CHLORIDE 9 MG/ML
INJECTION, SOLUTION INTRAVENOUS CONTINUOUS
OUTPATIENT
Start: 2025-04-29

## 2025-04-29 RX ORDER — ONDANSETRON 2 MG/ML
8 INJECTION INTRAMUSCULAR; INTRAVENOUS
OUTPATIENT
Start: 2025-04-29

## 2025-04-29 RX ORDER — SODIUM CHLORIDE 0.9 % (FLUSH) 0.9 %
5-40 SYRINGE (ML) INJECTION PRN
OUTPATIENT
Start: 2025-04-29

## 2025-04-29 RX ORDER — EPINEPHRINE 1 MG/ML
0.3 INJECTION, SOLUTION, CONCENTRATE INTRAVENOUS PRN
OUTPATIENT
Start: 2025-04-29

## 2025-04-29 RX ORDER — SODIUM CHLORIDE 9 MG/ML
20 INJECTION, SOLUTION INTRAVENOUS CONTINUOUS
Status: DISCONTINUED | OUTPATIENT
Start: 2025-04-29 | End: 2025-04-30 | Stop reason: HOSPADM

## 2025-04-29 RX ORDER — SODIUM CHLORIDE 9 MG/ML
20 INJECTION, SOLUTION INTRAVENOUS CONTINUOUS
Status: CANCELLED | OUTPATIENT
Start: 2025-04-29

## 2025-04-29 RX ADMIN — SODIUM CHLORIDE 20 ML/HR: 0.9 INJECTION, SOLUTION INTRAVENOUS at 10:59

## 2025-04-29 RX ADMIN — HEPARIN 500 UNITS: 100 SYRINGE at 13:28

## 2025-04-29 RX ADMIN — SODIUM CHLORIDE, PRESERVATIVE FREE 10 ML: 5 INJECTION INTRAVENOUS at 08:11

## 2025-04-29 RX ADMIN — SODIUM CHLORIDE, PRESERVATIVE FREE 10 ML: 5 INJECTION INTRAVENOUS at 13:28

## 2025-04-29 RX ADMIN — HEPARIN 500 UNITS: 100 SYRINGE at 08:11

## 2025-04-29 NOTE — PROGRESS NOTES
Port accessed.  Labs obtained.  Left accessed for possible treatment/transfusion today.  Transparent dressing applied.

## 2025-04-30 ENCOUNTER — OFFICE VISIT (OUTPATIENT)
Dept: FAMILY MEDICINE CLINIC | Age: 75
End: 2025-04-30

## 2025-04-30 VITALS
BODY MASS INDEX: 32.96 KG/M2 | HEIGHT: 63 IN | HEART RATE: 73 BPM | DIASTOLIC BLOOD PRESSURE: 60 MMHG | OXYGEN SATURATION: 97 % | RESPIRATION RATE: 20 BRPM | TEMPERATURE: 97.3 F | WEIGHT: 186 LBS | SYSTOLIC BLOOD PRESSURE: 130 MMHG

## 2025-04-30 DIAGNOSIS — R39.9 UTI SYMPTOMS: Primary | ICD-10-CM

## 2025-04-30 LAB
ABO/RH: NORMAL
ANTIBODY SCREEN: NEGATIVE
ARM BAND NUMBER: NORMAL
BILIRUBIN, POC: NEGATIVE
BLOOD BANK BLOOD PRODUCT EXPIRATION DATE: NORMAL
BLOOD BANK DISPENSE STATUS: NORMAL
BLOOD BANK ISBT PRODUCT BLOOD TYPE: 6200
BLOOD BANK PRODUCT CODE: NORMAL
BLOOD BANK SAMPLE EXPIRATION: NORMAL
BLOOD BANK UNIT TYPE AND RH: NORMAL
BLOOD URINE, POC: NEGATIVE
BPU ID: NORMAL
CLARITY, POC: CLEAR
COLOR, POC: YELLOW
COMPONENT: NORMAL
CROSSMATCH RESULT: NORMAL
GLUCOSE URINE, POC: 500 MG/DL
KETONES, POC: NEGATIVE MG/DL
LEUKOCYTE EST, POC: NEGATIVE
NITRITE, POC: NEGATIVE
PH, POC: 6
PROTEIN, POC: NEGATIVE MG/DL
SPECIFIC GRAVITY, POC: 1.02
TRANSFUSION STATUS: NORMAL
UNIT DIVISION: 0
UNIT ISSUE DATE/TIME: NORMAL
UROBILINOGEN, POC: 0.2 MG/DL

## 2025-04-30 RX ORDER — SULFAMETHOXAZOLE AND TRIMETHOPRIM 800; 160 MG/1; MG/1
1 TABLET ORAL EVERY 12 HOURS
Qty: 14 TABLET | Refills: 0 | Status: SHIPPED | OUTPATIENT
Start: 2025-04-30 | End: 2025-05-07

## 2025-04-30 RX ORDER — ECHINACEA PURPUREA EXTRACT 125 MG
4 TABLET ORAL
COMMUNITY
Start: 2024-12-10

## 2025-04-30 RX ORDER — LOPERAMIDE HYDROCHLORIDE 2 MG/1
TABLET ORAL
COMMUNITY
Start: 2024-12-09

## 2025-04-30 RX ORDER — TRAMADOL HYDROCHLORIDE 50 MG/1
TABLET ORAL
COMMUNITY
Start: 2024-12-10

## 2025-04-30 NOTE — PROGRESS NOTES
Chief Complaint       Spasms (Patient say's she is having bladder spasms.) and Urinary Frequency (And having incontinence.)    History of Present Illness   Source of history provided by:  patient.  History of Present Illness  The patient is a 76-year-old female who presents for evaluation of urinary tract infection and bladder spasms.    Bladder spasms have been progressively intensifying over the past 5 days. There is no associated back pain, pressure, dysuria, or hematuria. Urinary symptoms began approximately 2 years ago, coinciding with her transition from [facility] to a rehabilitation facility. Prior to this, no history of incontinence or urinary tract infections is reported.    An autoimmune condition causing bleeding is noted. She was placed on Lasix for a month. Transfusions have been received, but not sufficient to necessitate a return to the clinic. A fall a year ago resulted in a broken bone in her ankle, with subsequent swelling and edema at the bottom of her foot.    SOCIAL HISTORY  She worked as an intensive care nurse for 40 years.    All other ROS negative unless otherwise stated in HPI.      ROS    Unless otherwise stated in this report or unable to obtain because of the patient's clinical or mental status as evidenced by the medical record, this patients's positive and negative responses for Review of Systems, constitutional, psych, eyes, ENT, cardiovascular, respiratory, gastrointestinal, neurological, genitourinary, musculoskeletal, integument systems and systems related to the presenting problem are either stated in the preceding or were not pertinent or were negative for the symptoms and/or complaints related to the medical problem.    Physical Exam         VS:  /60   Pulse 73   Temp 97.3 °F (36.3 °C) (Temporal)   Resp 20   Ht 1.6 m (5' 3\")   Wt 84.4 kg (186 lb)   LMP  (LMP Unknown)   SpO2 97%   BMI 32.95 kg/m²    Oxygen Saturation Interpretation:

## 2025-05-02 ENCOUNTER — RESULTS FOLLOW-UP (OUTPATIENT)
Dept: FAMILY MEDICINE CLINIC | Age: 75
End: 2025-05-02

## 2025-05-02 LAB
CULTURE: NORMAL
CULTURE: NORMAL
SPECIMEN DESCRIPTION: NORMAL

## 2025-05-07 ENCOUNTER — TELEPHONE (OUTPATIENT)
Age: 75
End: 2025-05-07

## 2025-05-08 ENCOUNTER — HOSPITAL ENCOUNTER (OUTPATIENT)
Age: 75
Discharge: HOME | End: 2025-05-10
Attending: INTERNAL MEDICINE
Payer: MEDICARE

## 2025-05-08 ENCOUNTER — HOSPITAL ENCOUNTER (OUTPATIENT)
Dept: INFUSION THERAPY | Age: 75
Discharge: HOME | End: 2025-05-08
Payer: MEDICARE

## 2025-05-08 VITALS
HEART RATE: 86 BPM | RESPIRATION RATE: 18 BRPM | OXYGEN SATURATION: 97 % | TEMPERATURE: 97.9 F | DIASTOLIC BLOOD PRESSURE: 57 MMHG | SYSTOLIC BLOOD PRESSURE: 129 MMHG

## 2025-05-08 DIAGNOSIS — D62 ACUTE BLOOD LOSS ANEMIA: Primary | ICD-10-CM

## 2025-05-08 DIAGNOSIS — D50.0 IRON DEFICIENCY ANEMIA DUE TO CHRONIC BLOOD LOSS: ICD-10-CM

## 2025-05-08 DIAGNOSIS — N30.00 ACUTE CYSTITIS WITHOUT HEMATURIA: ICD-10-CM

## 2025-05-08 DIAGNOSIS — D62 ACUTE BLOOD LOSS ANEMIA: ICD-10-CM

## 2025-05-08 DIAGNOSIS — I35.0 NONRHEUMATIC AORTIC VALVE STENOSIS: ICD-10-CM

## 2025-05-08 DIAGNOSIS — I35.0 AORTIC VALVE STENOSIS, ETIOLOGY OF CARDIAC VALVE DISEASE UNSPECIFIED: Primary | ICD-10-CM

## 2025-05-08 LAB
ALBUMIN SERPL-MCNC: 3.4 G/DL (ref 3.5–5.2)
ALP SERPL-CCNC: 114 U/L (ref 35–104)
ALT SERPL-CCNC: 26 U/L (ref 0–35)
ANION GAP SERPL CALCULATED.3IONS-SCNC: 11 MMOL/L (ref 7–16)
AST SERPL-CCNC: 39 U/L (ref 0–35)
BASOPHILS # BLD: 0.05 K/UL (ref 0–0.2)
BASOPHILS NFR BLD: 1 % (ref 0–2)
BILIRUB SERPL-MCNC: 0.3 MG/DL (ref 0–1.2)
BUN SERPL-MCNC: 18 MG/DL (ref 8–23)
CALCIUM SERPL-MCNC: 9.2 MG/DL (ref 8.8–10.2)
CHLORIDE SERPL-SCNC: 104 MMOL/L (ref 98–107)
CO2 SERPL-SCNC: 19 MMOL/L (ref 22–29)
CREAT SERPL-MCNC: 0.7 MG/DL (ref 0.5–1)
ECHO AO ASC DIAM: 2.9 CM
ECHO AR MAX VEL PISA: 3.4 M/S
ECHO AV ACCELERATION TIME: 87.66 MS
ECHO AV AREA PEAK VELOCITY: 1.1 CM2
ECHO AV AREA VTI: 1.2 CM2
ECHO AV CUSP MM: 0.9 CM
ECHO AV MEAN GRADIENT: 27 MMHG
ECHO AV MEAN VELOCITY: 2.5 M/S
ECHO AV PEAK GRADIENT: 46 MMHG
ECHO AV PEAK VELOCITY: 3.4 M/S
ECHO AV REGURGITANT PHT: 325.5 MS
ECHO AV VELOCITY RATIO: 0.41
ECHO AV VTI: 74.1 CM
ECHO EST RA PRESSURE: 3 MMHG
ECHO LA VOL A-L A2C: 78 ML (ref 22–52)
ECHO LA VOL A-L A4C: 69 ML (ref 22–52)
ECHO LA VOL MOD A2C: 75 ML (ref 22–52)
ECHO LA VOL MOD A4C: 64 ML (ref 22–52)
ECHO LA VOLUME AREA LENGTH: 75 ML
ECHO LV EDV A2C: 83 ML
ECHO LV EDV A4C: 73 ML
ECHO LV EDV BP: 77 ML (ref 56–104)
ECHO LV EF PHYSICIAN: 55 %
ECHO LV EJECTION FRACTION A2C: 77 %
ECHO LV EJECTION FRACTION A4C: 69 %
ECHO LV EJECTION FRACTION BIPLANE: 73 % (ref 55–100)
ECHO LV ESV A2C: 19 ML
ECHO LV ESV A4C: 23 ML
ECHO LV ESV BP: 21 ML (ref 19–49)
ECHO LV FRACTIONAL SHORTENING: 45 % (ref 28–44)
ECHO LV INTERNAL DIMENSION DIASTOLIC: 5.1 CM (ref 3.9–5.3)
ECHO LV INTERNAL DIMENSION SYSTOLIC: 2.8 CM
ECHO LV ISOVOLUMETRIC RELAXATION TIME (IVRT): 87.7 MS
ECHO LVOT AREA: 2.8 CM2
ECHO LVOT AV VTI INDEX: 0.44
ECHO LVOT DIAM: 1.9 CM
ECHO LVOT MEAN GRADIENT: 5 MMHG
ECHO LVOT PEAK GRADIENT: 7 MMHG
ECHO LVOT PEAK VELOCITY: 1.4 M/S
ECHO LVOT SV: 93 ML
ECHO LVOT VTI: 32.8 CM
ECHO MV "A" WAVE DURATION: 138.4 MSEC
ECHO MV A VELOCITY: 1.47 M/S
ECHO MV AREA PHT: 4.4 CM2
ECHO MV AREA VTI: 3 CM2
ECHO MV E DECELERATION TIME (DT): 180.2 MS
ECHO MV E VELOCITY: 1.18 M/S
ECHO MV E/A RATIO: 0.8
ECHO MV LVOT VTI INDEX: 0.95
ECHO MV MAX VELOCITY: 1.7 M/S
ECHO MV MEAN GRADIENT: 6 MMHG
ECHO MV MEAN VELOCITY: 1.2 M/S
ECHO MV PEAK GRADIENT: 11 MMHG
ECHO MV PRESSURE HALF TIME (PHT): 50.5 MS
ECHO MV VTI: 31.1 CM
ECHO PULMONARY ARTERY SYSTOLIC PRESSURE (PASP): 39 MMHG
ECHO PV MAX VELOCITY: 1.6 M/S
ECHO PV MEAN GRADIENT: 6 MMHG
ECHO PV MEAN VELOCITY: 1.2 M/S
ECHO PV PEAK GRADIENT: 11 MMHG
ECHO PV VTI: 38.4 CM
ECHO PVEIN A DURATION: 133.8 MS
ECHO PVEIN A VELOCITY: 0.5 M/S
ECHO PVEIN PEAK S VELOCITY: 0.7 M/S
ECHO RIGHT VENTRICULAR SYSTOLIC PRESSURE (RVSP): 39 MMHG
ECHO TV REGURGITANT MAX VELOCITY: 3.02 M/S
ECHO TV REGURGITANT PEAK GRADIENT: 36 MMHG
EOSINOPHIL # BLD: 0.43 K/UL (ref 0.05–0.5)
EOSINOPHILS RELATIVE PERCENT: 6 % (ref 0–6)
ERYTHROCYTE [DISTWIDTH] IN BLOOD BY AUTOMATED COUNT: 19.9 % (ref 11.5–15)
GFR, ESTIMATED: >90 ML/MIN/1.73M2
GLUCOSE SERPL-MCNC: 291 MG/DL (ref 74–99)
HCT VFR BLD AUTO: 23.5 % (ref 34–48)
HGB BLD-MCNC: 7.3 G/DL (ref 11.5–15.5)
IMM GRANULOCYTES # BLD AUTO: 0.03 K/UL (ref 0–0.58)
IMM GRANULOCYTES NFR BLD: 0 % (ref 0–5)
LYMPHOCYTES NFR BLD: 0.88 K/UL (ref 1.5–4)
LYMPHOCYTES RELATIVE PERCENT: 13 % (ref 20–42)
MCH RBC QN AUTO: 25.3 PG (ref 26–35)
MCHC RBC AUTO-ENTMCNC: 31.1 G/DL (ref 32–34.5)
MCV RBC AUTO: 81.3 FL (ref 80–99.9)
MONOCYTES NFR BLD: 0.66 K/UL (ref 0.1–0.95)
MONOCYTES NFR BLD: 10 % (ref 2–12)
NEUTROPHILS NFR BLD: 70 % (ref 43–80)
NEUTS SEG NFR BLD: 4.74 K/UL (ref 1.8–7.3)
PLATELET # BLD AUTO: 179 K/UL (ref 130–450)
PMV BLD AUTO: 10.3 FL (ref 7–12)
POTASSIUM SERPL-SCNC: 4.6 MMOL/L (ref 3.5–5.1)
PROT SERPL-MCNC: 6.4 G/DL (ref 6.4–8.3)
RBC # BLD AUTO: 2.89 M/UL (ref 3.5–5.5)
SODIUM SERPL-SCNC: 135 MMOL/L (ref 136–145)
WBC OTHER # BLD: 6.8 K/UL (ref 4.5–11.5)

## 2025-05-08 PROCEDURE — 6360000004 HC RX CONTRAST MEDICATION: Performed by: INTERNAL MEDICINE

## 2025-05-08 PROCEDURE — P9016 RBC LEUKOCYTES REDUCED: HCPCS

## 2025-05-08 PROCEDURE — 86923 COMPATIBILITY TEST ELECTRIC: CPT

## 2025-05-08 PROCEDURE — 36591 DRAW BLOOD OFF VENOUS DEVICE: CPT

## 2025-05-08 PROCEDURE — 2500000003 HC RX 250 WO HCPCS: Performed by: INTERNAL MEDICINE

## 2025-05-08 PROCEDURE — 2580000003 HC RX 258: Performed by: INTERNAL MEDICINE

## 2025-05-08 PROCEDURE — C8929 TTE W OR WO FOL WCON,DOPPLER: HCPCS

## 2025-05-08 PROCEDURE — 85025 COMPLETE CBC W/AUTO DIFF WBC: CPT

## 2025-05-08 PROCEDURE — 6360000002 HC RX W HCPCS: Performed by: INTERNAL MEDICINE

## 2025-05-08 PROCEDURE — 86850 RBC ANTIBODY SCREEN: CPT

## 2025-05-08 PROCEDURE — 80053 COMPREHEN METABOLIC PANEL: CPT

## 2025-05-08 PROCEDURE — 86900 BLOOD TYPING SEROLOGIC ABO: CPT

## 2025-05-08 PROCEDURE — 36430 TRANSFUSION BLD/BLD COMPNT: CPT

## 2025-05-08 PROCEDURE — 86901 BLOOD TYPING SEROLOGIC RH(D): CPT

## 2025-05-08 RX ORDER — ACETAMINOPHEN 325 MG/1
650 TABLET ORAL
Status: CANCELLED | OUTPATIENT
Start: 2025-05-08

## 2025-05-08 RX ORDER — SODIUM CHLORIDE 0.9 % (FLUSH) 0.9 %
5-40 SYRINGE (ML) INJECTION PRN
Status: CANCELLED | OUTPATIENT
Start: 2025-05-08

## 2025-05-08 RX ORDER — HEPARIN 100 UNIT/ML
500 SYRINGE INTRAVENOUS PRN
Status: DISCONTINUED | OUTPATIENT
Start: 2025-05-08 | End: 2025-05-09 | Stop reason: HOSPADM

## 2025-05-08 RX ORDER — SODIUM CHLORIDE 9 MG/ML
INJECTION, SOLUTION INTRAVENOUS CONTINUOUS
Status: CANCELLED | OUTPATIENT
Start: 2025-05-08

## 2025-05-08 RX ORDER — SODIUM CHLORIDE 0.9 % (FLUSH) 0.9 %
5-40 SYRINGE (ML) INJECTION PRN
Status: DISCONTINUED | OUTPATIENT
Start: 2025-05-08 | End: 2025-05-09 | Stop reason: HOSPADM

## 2025-05-08 RX ORDER — SODIUM CHLORIDE 9 MG/ML
25 INJECTION, SOLUTION INTRAVENOUS PRN
Status: CANCELLED | OUTPATIENT
Start: 2025-05-08

## 2025-05-08 RX ORDER — EPINEPHRINE 1 MG/ML
0.3 INJECTION, SOLUTION, CONCENTRATE INTRAVENOUS PRN
Status: CANCELLED | OUTPATIENT
Start: 2025-05-08

## 2025-05-08 RX ORDER — SODIUM CHLORIDE 9 MG/ML
20 INJECTION, SOLUTION INTRAVENOUS CONTINUOUS
Status: CANCELLED | OUTPATIENT
Start: 2025-05-08

## 2025-05-08 RX ORDER — HYDROCORTISONE SODIUM SUCCINATE 100 MG/2ML
100 INJECTION INTRAMUSCULAR; INTRAVENOUS
Status: CANCELLED | OUTPATIENT
Start: 2025-05-08

## 2025-05-08 RX ORDER — DIPHENHYDRAMINE HYDROCHLORIDE 50 MG/ML
50 INJECTION, SOLUTION INTRAMUSCULAR; INTRAVENOUS
Status: CANCELLED | OUTPATIENT
Start: 2025-05-08

## 2025-05-08 RX ORDER — SODIUM CHLORIDE 9 MG/ML
20 INJECTION, SOLUTION INTRAVENOUS CONTINUOUS
Status: DISCONTINUED | OUTPATIENT
Start: 2025-05-08 | End: 2025-05-09 | Stop reason: HOSPADM

## 2025-05-08 RX ORDER — ONDANSETRON 2 MG/ML
8 INJECTION INTRAMUSCULAR; INTRAVENOUS
Status: CANCELLED | OUTPATIENT
Start: 2025-05-08

## 2025-05-08 RX ORDER — ALBUTEROL SULFATE 90 UG/1
4 INHALANT RESPIRATORY (INHALATION) PRN
Status: CANCELLED | OUTPATIENT
Start: 2025-05-08

## 2025-05-08 RX ORDER — SODIUM CHLORIDE 0.9 % (FLUSH) 0.9 %
5-40 SYRINGE (ML) INJECTION PRN
Status: DISCONTINUED | OUTPATIENT
Start: 2025-05-08 | End: 2025-05-08

## 2025-05-08 RX ORDER — SODIUM CHLORIDE 9 MG/ML
20 INJECTION, SOLUTION INTRAVENOUS CONTINUOUS
Status: DISCONTINUED | OUTPATIENT
Start: 2025-05-08 | End: 2025-05-08

## 2025-05-08 RX ORDER — HEPARIN 100 UNIT/ML
500 SYRINGE INTRAVENOUS PRN
Status: CANCELLED | OUTPATIENT
Start: 2025-05-08

## 2025-05-08 RX ADMIN — SODIUM CHLORIDE, PRESERVATIVE FREE 10 ML: 5 INJECTION INTRAVENOUS at 12:55

## 2025-05-08 RX ADMIN — HEPARIN 500 UNITS: 100 SYRINGE at 14:56

## 2025-05-08 RX ADMIN — SODIUM CHLORIDE, PRESERVATIVE FREE 10 ML: 5 INJECTION INTRAVENOUS at 09:14

## 2025-05-08 RX ADMIN — SODIUM CHLORIDE, PRESERVATIVE FREE 10 ML: 5 INJECTION INTRAVENOUS at 14:56

## 2025-05-08 RX ADMIN — HEPARIN 500 UNITS: 100 SYRINGE at 09:14

## 2025-05-08 RX ADMIN — SULFUR HEXAFLUORIDE 2 ML: 60.7; .19; .19 INJECTION, POWDER, LYOPHILIZED, FOR SUSPENSION INTRAVENOUS; INTRAVESICAL at 10:33

## 2025-05-08 RX ADMIN — SODIUM CHLORIDE 20 ML/HR: 0.9 INJECTION, SOLUTION INTRAVENOUS at 12:55

## 2025-05-08 NOTE — PROGRESS NOTES
Informed consent from Dr. Decker verified and on patient chart.   Patient received 1 unit PRBC .  Patient tolerated well.    Vital signs stable.  Reviewed orally and provided with written information regarding potential delayed reaction and instructions on what to do if reaction occurs. Questions answered to apparent satisfaction. Patient observed for 30 minutes after transfusion.

## 2025-05-13 ENCOUNTER — OFFICE VISIT (OUTPATIENT)
Dept: PAIN MANAGEMENT | Age: 75
End: 2025-05-13
Payer: MEDICARE

## 2025-05-13 VITALS
SYSTOLIC BLOOD PRESSURE: 126 MMHG | WEIGHT: 186 LBS | DIASTOLIC BLOOD PRESSURE: 68 MMHG | HEART RATE: 74 BPM | RESPIRATION RATE: 18 BRPM | TEMPERATURE: 97.1 F | BODY MASS INDEX: 32.96 KG/M2 | HEIGHT: 63 IN | OXYGEN SATURATION: 98 %

## 2025-05-13 DIAGNOSIS — M54.16 LUMBAR RADICULOPATHY: ICD-10-CM

## 2025-05-13 DIAGNOSIS — M47.816 LUMBAR SPONDYLOSIS: ICD-10-CM

## 2025-05-13 DIAGNOSIS — M51.9 LUMBAR DISC DISORDER: ICD-10-CM

## 2025-05-13 DIAGNOSIS — M48.061 STENOSIS OF LATERAL RECESS OF LUMBAR SPINE: ICD-10-CM

## 2025-05-13 DIAGNOSIS — M47.816 LUMBAR FACET ARTHROPATHY: Primary | ICD-10-CM

## 2025-05-13 PROCEDURE — 3017F COLORECTAL CA SCREEN DOC REV: CPT | Performed by: PAIN MEDICINE

## 2025-05-13 PROCEDURE — 3078F DIAST BP <80 MM HG: CPT | Performed by: PAIN MEDICINE

## 2025-05-13 PROCEDURE — G8417 CALC BMI ABV UP PARAM F/U: HCPCS | Performed by: PAIN MEDICINE

## 2025-05-13 PROCEDURE — 1123F ACP DISCUSS/DSCN MKR DOCD: CPT | Performed by: PAIN MEDICINE

## 2025-05-13 PROCEDURE — G8400 PT W/DXA NO RESULTS DOC: HCPCS | Performed by: PAIN MEDICINE

## 2025-05-13 PROCEDURE — 1160F RVW MEDS BY RX/DR IN RCRD: CPT | Performed by: PAIN MEDICINE

## 2025-05-13 PROCEDURE — 1090F PRES/ABSN URINE INCON ASSESS: CPT | Performed by: PAIN MEDICINE

## 2025-05-13 PROCEDURE — 3074F SYST BP LT 130 MM HG: CPT | Performed by: PAIN MEDICINE

## 2025-05-13 PROCEDURE — 1159F MED LIST DOCD IN RCRD: CPT | Performed by: PAIN MEDICINE

## 2025-05-13 PROCEDURE — 1036F TOBACCO NON-USER: CPT | Performed by: PAIN MEDICINE

## 2025-05-13 PROCEDURE — 99214 OFFICE O/P EST MOD 30 MIN: CPT | Performed by: PAIN MEDICINE

## 2025-05-13 PROCEDURE — G8427 DOCREV CUR MEDS BY ELIG CLIN: HCPCS | Performed by: PAIN MEDICINE

## 2025-05-13 RX ORDER — TRAMADOL HYDROCHLORIDE 50 MG/1
50 TABLET ORAL DAILY PRN
Qty: 30 TABLET | Refills: 0 | Status: SHIPPED | OUTPATIENT
Start: 2025-05-13 | End: 2025-06-12

## 2025-05-13 NOTE — PROGRESS NOTES
Haven Barber presents to the [facility] on 5/13/2025. Haven is complaining of pain in her lower back that radiates to BLE. The pain is constant. The pain is described as aching, throbbing, sharp, and spasms. Pain is rated on her best day at a 6, on her worst day at a 9, and on average at a 6 on the VAS scale. She took her last dose of Tramadol and Neurontin today.  Also using ice therapy.    Any procedures since your last visit: No    She is not on NSAIDS and  is not on anticoagulation medications to include none and is managed by NA.     Pacemaker or defibrillator: No     Medication Contract and Consent for Opioid Use Documents Filed        No documents found                       Resp 18   Ht 1.6 m (5' 3\")   Wt 84.4 kg (186 lb)   LMP  (LMP Unknown)   BMI 32.95 kg/m²      No LMP recorded (lmp unknown). Patient is postmenopausal.  
regular, no distress     Abdomen:     Shape:obese, non-distended and normal  Tenderness:none     Lumbar spine:     Spine inspection:normal   CVA tenderness:No   Palpation:tenderness paravertebral muscles, facet loading, left, right and positive.Right worse than Left  Range of motion:abnormal moderately Lateral bending, flexion, extension rotation bilateral and is  painful.     Musculoskeletal:     Trigger points in Paraveteral:absent bilaterally  SI joint tenderness:negative right, negative left  SLR:negative right, negative left, sitting      Extremities:     Tremors:None bilaterally upper and lower  Range of motion:pain with internal rotation of hips positive right  Intact:Yes  Edema:Normal     Neurological:     Sensory:normal to light touch bilateral lower extremities.  Motor:                             Right Quadriceps5/5          Left Quadriceps5/5           Right Gastrocnemius5/5    Left Gastrocnemius5/5  Right Ant Tibialis5/5  Left Ant Tibialis5/5  Reflexes:    Right Quadriceps reflex2+  Left Quadriceps reflex2+  Right Achilles reflex2+  Left Achilles reflex2+  Gait:in a wheelchair     Dermatology:     Skin:no unusual rashes and no skin lesions     Impression:  Chronic low back pain with non dermatomal radiation to bilateral lower extremities.  Lumbar spine MRI 2014 multilevel degenerative disc disease with multilevel facet arthropathy.  Patient had LESI and lumbar facet RFA, per patient RFA had lasted for 5 month.  Patient mentioned that she had physical therapy before which had aggravated her pain.  Plan:  Office extension for worsening low back pain with radiation to bilateral lower extremity down to her foot, last seen 08/2022.  Independently reviewed lumbar spine imaging studies.  Discussed treatment options including referral to physical therapy/facet MB RFA and LESI, patient mentioned that she doesn't want to try interventional procedures.  Will start patient on Ultram 50 mg QD PRN, discussed side

## 2025-05-14 ENCOUNTER — HOSPITAL ENCOUNTER (OUTPATIENT)
Dept: INFUSION THERAPY | Age: 75
Discharge: HOME | End: 2025-05-14

## 2025-05-14 DIAGNOSIS — D62 ACUTE BLOOD LOSS ANEMIA: Primary | ICD-10-CM

## 2025-05-14 RX ORDER — SODIUM CHLORIDE 0.9 % (FLUSH) 0.9 %
5-40 SYRINGE (ML) INJECTION PRN
Status: CANCELLED | OUTPATIENT
Start: 2025-05-14

## 2025-05-14 RX ORDER — SODIUM CHLORIDE 0.9 % (FLUSH) 0.9 %
5-40 SYRINGE (ML) INJECTION PRN
Status: ACTIVE | OUTPATIENT
Start: 2025-05-14 | End: 2025-05-15

## 2025-05-14 RX ORDER — SODIUM CHLORIDE 9 MG/ML
25 INJECTION, SOLUTION INTRAVENOUS PRN
Status: CANCELLED | OUTPATIENT
Start: 2025-05-14

## 2025-05-14 RX ORDER — HEPARIN 100 UNIT/ML
500 SYRINGE INTRAVENOUS PRN
Status: ACTIVE | OUTPATIENT
Start: 2025-05-14 | End: 2025-05-15

## 2025-05-14 RX ORDER — HEPARIN 100 UNIT/ML
500 SYRINGE INTRAVENOUS PRN
Status: DISCONTINUED | OUTPATIENT
Start: 2025-05-14 | End: 2025-05-15 | Stop reason: HOSPADM

## 2025-05-14 RX ORDER — HEPARIN 100 UNIT/ML
500 SYRINGE INTRAVENOUS PRN
Status: CANCELLED | OUTPATIENT
Start: 2025-05-14

## 2025-05-14 RX ORDER — SODIUM CHLORIDE 0.9 % (FLUSH) 0.9 %
5-40 SYRINGE (ML) INJECTION PRN
Status: DISCONTINUED | OUTPATIENT
Start: 2025-05-14 | End: 2025-05-15 | Stop reason: HOSPADM

## 2025-05-19 ENCOUNTER — OFFICE VISIT (OUTPATIENT)
Age: 75
End: 2025-05-19
Payer: MEDICARE

## 2025-05-19 ENCOUNTER — HOSPITAL ENCOUNTER (EMERGENCY)
Age: 75
Discharge: HOME | End: 2025-05-20
Attending: STUDENT IN AN ORGANIZED HEALTH CARE EDUCATION/TRAINING PROGRAM
Payer: MEDICARE

## 2025-05-19 ENCOUNTER — HOSPITAL ENCOUNTER (OUTPATIENT)
Dept: INFUSION THERAPY | Age: 75
Discharge: HOME | End: 2025-05-19
Payer: MEDICARE

## 2025-05-19 ENCOUNTER — TELEPHONE (OUTPATIENT)
Dept: CARDIOLOGY CLINIC | Age: 75
End: 2025-05-19

## 2025-05-19 VITALS
DIASTOLIC BLOOD PRESSURE: 64 MMHG | HEIGHT: 62 IN | HEART RATE: 85 BPM | BODY MASS INDEX: 34.69 KG/M2 | TEMPERATURE: 97.5 F | SYSTOLIC BLOOD PRESSURE: 146 MMHG | OXYGEN SATURATION: 95 % | WEIGHT: 188.5 LBS

## 2025-05-19 DIAGNOSIS — N30.00 ACUTE CYSTITIS WITHOUT HEMATURIA: Primary | ICD-10-CM

## 2025-05-19 DIAGNOSIS — I35.0 NONRHEUMATIC AORTIC VALVE STENOSIS: ICD-10-CM

## 2025-05-19 DIAGNOSIS — D62 ACUTE BLOOD LOSS ANEMIA: Primary | ICD-10-CM

## 2025-05-19 DIAGNOSIS — D64.9 ANEMIA, UNSPECIFIED TYPE: ICD-10-CM

## 2025-05-19 DIAGNOSIS — K31.819 GAVE (GASTRIC ANTRAL VASCULAR ECTASIA): Primary | ICD-10-CM

## 2025-05-19 DIAGNOSIS — D64.9 ANEMIA, UNSPECIFIED TYPE: Primary | ICD-10-CM

## 2025-05-19 LAB
ALBUMIN SERPL-MCNC: 3.3 G/DL (ref 3.5–5.2)
ALP SERPL-CCNC: 103 U/L (ref 35–104)
ALT SERPL-CCNC: 30 U/L (ref 0–35)
ANION GAP SERPL CALCULATED.3IONS-SCNC: 11 MMOL/L (ref 7–16)
AST SERPL-CCNC: 61 U/L (ref 0–35)
BASOPHILS # BLD: 0.03 K/UL (ref 0–0.2)
BASOPHILS NFR BLD: 1 % (ref 0–2)
BILIRUB SERPL-MCNC: 0.4 MG/DL (ref 0–1.2)
BUN SERPL-MCNC: 13 MG/DL (ref 8–23)
CALCIUM SERPL-MCNC: 9 MG/DL (ref 8.8–10.2)
CHLORIDE SERPL-SCNC: 105 MMOL/L (ref 98–107)
CO2 SERPL-SCNC: 17 MMOL/L (ref 22–29)
CREAT SERPL-MCNC: 0.7 MG/DL (ref 0.5–1)
EOSINOPHIL # BLD: 0.52 K/UL (ref 0.05–0.5)
EOSINOPHILS RELATIVE PERCENT: 9 % (ref 0–6)
ERYTHROCYTE [DISTWIDTH] IN BLOOD BY AUTOMATED COUNT: 19.3 % (ref 11.5–15)
FERRITIN SERPL-MCNC: 18 NG/ML
GFR, ESTIMATED: >90 ML/MIN/1.73M2
GLUCOSE SERPL-MCNC: 284 MG/DL (ref 74–99)
HCT VFR BLD AUTO: 21.2 % (ref 34–48)
HCT VFR BLD AUTO: 23.4 % (ref 34–48)
HGB BLD-MCNC: 6.4 G/DL (ref 11.5–15.5)
HGB BLD-MCNC: 7.2 G/DL (ref 11.5–15.5)
IMM GRANULOCYTES # BLD AUTO: <0.03 K/UL (ref 0–0.58)
IMM GRANULOCYTES NFR BLD: 0 % (ref 0–5)
INR PPP: 1.2
IRON SATN MFR SERPL: 4 % (ref 15–50)
IRON SERPL-MCNC: 16 UG/DL (ref 37–145)
LYMPHOCYTES NFR BLD: 0.91 K/UL (ref 1.5–4)
LYMPHOCYTES RELATIVE PERCENT: 16 % (ref 20–42)
MCH RBC QN AUTO: 25 PG (ref 26–35)
MCHC RBC AUTO-ENTMCNC: 30.2 G/DL (ref 32–34.5)
MCV RBC AUTO: 82.8 FL (ref 80–99.9)
MONOCYTES NFR BLD: 0.64 K/UL (ref 0.1–0.95)
MONOCYTES NFR BLD: 11 % (ref 2–12)
NEUTROPHILS NFR BLD: 63 % (ref 43–80)
NEUTS SEG NFR BLD: 3.52 K/UL (ref 1.8–7.3)
PLATELET # BLD AUTO: 161 K/UL (ref 130–450)
PMV BLD AUTO: 10.4 FL (ref 7–12)
POTASSIUM SERPL-SCNC: 4.4 MMOL/L (ref 3.5–5.1)
PROT SERPL-MCNC: 6.3 G/DL (ref 6.4–8.3)
PROTHROMBIN TIME: 12.9 SEC (ref 9.3–12.4)
RBC # BLD AUTO: 2.56 M/UL (ref 3.5–5.5)
SODIUM SERPL-SCNC: 134 MMOL/L (ref 136–145)
TIBC SERPL-MCNC: 448 UG/DL (ref 250–450)
WBC OTHER # BLD: 5.6 K/UL (ref 4.5–11.5)

## 2025-05-19 PROCEDURE — 83550 IRON BINDING TEST: CPT

## 2025-05-19 PROCEDURE — 86850 RBC ANTIBODY SCREEN: CPT

## 2025-05-19 PROCEDURE — 1123F ACP DISCUSS/DSCN MKR DOCD: CPT | Performed by: INTERNAL MEDICINE

## 2025-05-19 PROCEDURE — 86901 BLOOD TYPING SEROLOGIC RH(D): CPT

## 2025-05-19 PROCEDURE — 6360000002 HC RX W HCPCS: Performed by: INTERNAL MEDICINE

## 2025-05-19 PROCEDURE — G8417 CALC BMI ABV UP PARAM F/U: HCPCS | Performed by: INTERNAL MEDICINE

## 2025-05-19 PROCEDURE — 3078F DIAST BP <80 MM HG: CPT | Performed by: INTERNAL MEDICINE

## 2025-05-19 PROCEDURE — 1090F PRES/ABSN URINE INCON ASSESS: CPT | Performed by: INTERNAL MEDICINE

## 2025-05-19 PROCEDURE — 86900 BLOOD TYPING SEROLOGIC ABO: CPT

## 2025-05-19 PROCEDURE — 99285 EMERGENCY DEPT VISIT HI MDM: CPT

## 2025-05-19 PROCEDURE — 1036F TOBACCO NON-USER: CPT | Performed by: INTERNAL MEDICINE

## 2025-05-19 PROCEDURE — 99214 OFFICE O/P EST MOD 30 MIN: CPT | Performed by: INTERNAL MEDICINE

## 2025-05-19 PROCEDURE — 36430 TRANSFUSION BLD/BLD COMPNT: CPT

## 2025-05-19 PROCEDURE — 36591 DRAW BLOOD OFF VENOUS DEVICE: CPT

## 2025-05-19 PROCEDURE — 83540 ASSAY OF IRON: CPT

## 2025-05-19 PROCEDURE — 3017F COLORECTAL CA SCREEN DOC REV: CPT | Performed by: INTERNAL MEDICINE

## 2025-05-19 PROCEDURE — 85014 HEMATOCRIT: CPT

## 2025-05-19 PROCEDURE — 85018 HEMOGLOBIN: CPT

## 2025-05-19 PROCEDURE — G8400 PT W/DXA NO RESULTS DOC: HCPCS | Performed by: INTERNAL MEDICINE

## 2025-05-19 PROCEDURE — 82728 ASSAY OF FERRITIN: CPT

## 2025-05-19 PROCEDURE — 80053 COMPREHEN METABOLIC PANEL: CPT

## 2025-05-19 PROCEDURE — 3077F SYST BP >= 140 MM HG: CPT | Performed by: INTERNAL MEDICINE

## 2025-05-19 PROCEDURE — 1125F AMNT PAIN NOTED PAIN PRSNT: CPT | Performed by: INTERNAL MEDICINE

## 2025-05-19 PROCEDURE — P9016 RBC LEUKOCYTES REDUCED: HCPCS

## 2025-05-19 PROCEDURE — G8427 DOCREV CUR MEDS BY ELIG CLIN: HCPCS | Performed by: INTERNAL MEDICINE

## 2025-05-19 PROCEDURE — 86923 COMPATIBILITY TEST ELECTRIC: CPT

## 2025-05-19 PROCEDURE — 1159F MED LIST DOCD IN RCRD: CPT | Performed by: INTERNAL MEDICINE

## 2025-05-19 PROCEDURE — 85610 PROTHROMBIN TIME: CPT

## 2025-05-19 PROCEDURE — 85025 COMPLETE CBC W/AUTO DIFF WBC: CPT

## 2025-05-19 PROCEDURE — 2500000003 HC RX 250 WO HCPCS: Performed by: INTERNAL MEDICINE

## 2025-05-19 RX ORDER — HEPARIN 100 UNIT/ML
500 SYRINGE INTRAVENOUS PRN
Status: CANCELLED | OUTPATIENT
Start: 2025-05-19

## 2025-05-19 RX ORDER — HEPARIN 100 UNIT/ML
500 SYRINGE INTRAVENOUS PRN
Status: DISCONTINUED | OUTPATIENT
Start: 2025-05-19 | End: 2025-05-20 | Stop reason: HOSPADM

## 2025-05-19 RX ORDER — SODIUM CHLORIDE 9 MG/ML
INJECTION, SOLUTION INTRAVENOUS PRN
Status: DISCONTINUED | OUTPATIENT
Start: 2025-05-19 | End: 2025-05-20 | Stop reason: HOSPADM

## 2025-05-19 RX ORDER — SODIUM CHLORIDE 9 MG/ML
25 INJECTION, SOLUTION INTRAVENOUS PRN
OUTPATIENT
Start: 2025-05-19

## 2025-05-19 RX ORDER — SODIUM CHLORIDE 0.9 % (FLUSH) 0.9 %
5-40 SYRINGE (ML) INJECTION PRN
Status: CANCELLED | OUTPATIENT
Start: 2025-05-19

## 2025-05-19 RX ORDER — SODIUM CHLORIDE 0.9 % (FLUSH) 0.9 %
5-40 SYRINGE (ML) INJECTION PRN
Status: DISCONTINUED | OUTPATIENT
Start: 2025-05-19 | End: 2025-05-20 | Stop reason: HOSPADM

## 2025-05-19 RX ADMIN — SODIUM CHLORIDE, PRESERVATIVE FREE 10 ML: 5 INJECTION INTRAVENOUS at 12:57

## 2025-05-19 RX ADMIN — HEPARIN 500 UNITS: 100 SYRINGE at 12:57

## 2025-05-19 ASSESSMENT — PAIN - FUNCTIONAL ASSESSMENT: PAIN_FUNCTIONAL_ASSESSMENT: NONE - DENIES PAIN

## 2025-05-19 NOTE — PROGRESS NOTES
Patient sent to ER for a critical HM.3. Type and Screen sent, blood band placed on patient. Report called to ER nurse and patient transported via wheelchair by staff member.

## 2025-05-19 NOTE — ED NOTES
This RN accessed pt R subclavian port through sterile technique with 0.75 dewey powerport. Brisk blood return.

## 2025-05-19 NOTE — ED PROVIDER NOTES
Haven Barber is a 75-year-old female present emergency department concern for anemia.  Patient reported that she had routine lab work significant for anemia. Patient was sent in for evaluation. Does have a history of iron deficiency anemia or AVM.     The history is provided by the patient and medical records.        Review of Systems   Constitutional:  Positive for fatigue. Negative for chills, diaphoresis and fever.   Eyes:  Negative for photophobia and visual disturbance.   Respiratory:  Negative for cough, chest tightness and shortness of breath.    Cardiovascular:  Negative for chest pain, palpitations and leg swelling.   Gastrointestinal:  Negative for abdominal distention, abdominal pain, diarrhea, nausea and vomiting.   Genitourinary:  Negative for dysuria.   Musculoskeletal:  Negative for neck pain and neck stiffness.   Skin:  Negative for pallor and rash.   Neurological:  Negative for headaches.   Psychiatric/Behavioral:  Negative for confusion.         Physical Exam  Vitals and nursing note reviewed.   Constitutional:       General: She is not in acute distress.     Appearance: Normal appearance. She is not ill-appearing.   HENT:      Head: Normocephalic and atraumatic.   Eyes:      General: No scleral icterus.     Conjunctiva/sclera: Conjunctivae normal.      Pupils: Pupils are equal, round, and reactive to light.   Cardiovascular:      Rate and Rhythm: Normal rate and regular rhythm.   Pulmonary:      Effort: Pulmonary effort is normal.      Breath sounds: Normal breath sounds.   Abdominal:      General: Bowel sounds are normal. There is no distension.      Palpations: Abdomen is soft.      Tenderness: There is no abdominal tenderness. There is no guarding or rebound.   Musculoskeletal:      Cervical back: Normal range of motion and neck supple. No rigidity. No muscular tenderness.      Right lower leg: No edema.      Left lower leg: No edema.   Skin:     General: Skin is warm and dry.       excluding separately billable procedures.      Diagnosis:  1. Anemia, unspecified type        Disposition:  Patient's disposition: Discharge to home  Patient's condition is stable.         Maria Victoria Cruz MD  05/20/25 [id number]

## 2025-05-19 NOTE — TELEPHONE ENCOUNTER
Referring office called informing the patient is at the hospital.  They are checking on status of the referral that was sent over for the patient.  Want to ensure the patient is called soon.  States patient has not seen any cardiologist in the last 10 years outside [facility].

## 2025-05-19 NOTE — PROGRESS NOTES
[facility] PHYSICIANS [facility] MED ONCOLOGY  [address]  [facility] [address]  Dept: [phone]  Loc: [phone]  Outpatient Follow-up Note      Identification: This is a 75 y.o. yo female     Chief Complaint:   Chief Complaint   Patient presents with    Anemia    Follow-up       Interval hx :   5/19/2025:  She is having her chronic pains increasing, she is feeling that she is more forgetful. 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         Review of Systems  14 points ROS negative except as mentioned above        Past Medical/Social History:   Past Medical History:   Diagnosis Date    Anemia     Arthritis     Cataract     right    Chronic fatigue syndrome     Depression     Diabetes mellitus (HCC)     SC injection weekly    Fibromyalgia     History of blood transfusion     Hypertension     Memory loss     Mononucleosis     Psoriasis     Sciatica     Sleep apnea     no c-pap    Vertigo      Past Surgical History:   Procedure Laterality Date    CARPAL TUNNEL RELEASE Bilateral     CATARACT REMOVAL Right     COLONOSCOPY      OTHER SURGICAL HISTORY Bilateral 01/21/2021    BILATERAL L3,L4 MEDIAL BRANCH AND L5 DORSAL RAMUS RADIOFREQUENCY    PAIN MANAGEMENT PROCEDURE Bilateral 01/21/2021    BILATERAL L3,4 MEDIAL BRANCH AND L5 DORSAL RAMUS  RADIOFREQUENCY ABLATION WITH SEDATION  (CPT 80255) performed by Silas Quigley MD at [facility] OR    SPINE SURGERY N/A 03/19/2021    T11 KYPHOPLASTY (2 C-ARMS) performed by Silas Quigley MD at [facility] OR    TONSILLECTOMY AND ADENOIDECTOMY

## 2025-05-19 NOTE — CONSULTS
Gastroenterology, Hepatology, &  Advanced Endoscopy    Consult Note      Reason for Consult: ***    HPI:   Haven Barber is a 75 y.o. female w/ PMH of  has a past medical history of Anemia, Arthritis, Cataract, Chronic fatigue syndrome, Depression, Diabetes mellitus (HCC), Fibromyalgia, History of blood transfusion, Hypertension, Memory loss, Mononucleosis, Psoriasis, Sciatica, Sleep apnea, and Vertigo. who presents to the ***        Recent Labs     05/19/25  1600   INR 1.2   ALT 30   AST 61*   ALKPHOS 103   BILITOT 0.4     Lab Results   Component Value Date    WBC 5.6 05/19/2025    HGB 6.4 (LL) 05/19/2025    HCT 21.2 (L) 05/19/2025     05/19/2025     (L) 05/19/2025    K 4.4 05/19/2025     05/19/2025    CREATININE 0.7 05/19/2025    BUN 13 05/19/2025    CO2 17 (L) 05/19/2025    FOLATE 12.5 04/14/2025    OXTGNQGZ56 974 (H) 04/14/2025    AMMONIA 61 (H) 01/22/2025    GLUCOSE 284 (H) 05/19/2025    INR 1.2 05/19/2025    PROTIME 12.9 (H) 05/19/2025    TSH 2.12 12/01/2024    LABA1C 7.0 (H) 12/03/2024     Lab Results   Component Value Date    INR 1.2 05/19/2025    INR 1.2 02/09/2025    INR 1.4 01/22/2025    PROTIME 12.9 (H) 05/19/2025    PROTIME 12.7 (H) 02/09/2025    PROTIME 14.6 (H) 01/22/2025      MELD 3.0: 12 at 5/19/2025  4:00 PM  MELD-Na: 8 at 5/19/2025  4:00 PM  Calculated from:  Serum Creatinine: 0.7 mg/dL (Using min of 1 mg/dL) at 5/19/2025  4:00 PM  Serum Sodium: 134 mmol/L at 5/19/2025  4:00 PM  Total Bilirubin: 0.4 mg/dL (Using min of 1 mg/dL) at 5/19/2025  4:00 PM  Serum Albumin: 3.3 g/dL at 5/19/2025  4:00 PM  INR(ratio): 1.2 at 5/19/2025  4:00 PM  Age at listing (hypothetical): 75 years  Sex: Female at 5/19/2025  4:00 PM     Sed Rate, Automated   Date Value Ref Range Status   12/01/2024 33 (H) 0 - 20 mm/Hr Final     Lipase   Date Value Ref Range Status   02/09/2025 27 13 - 60 U/L Final     Comment:     SPECIMEN SLIGHTLY HEMOLYZED, RESULTS MAY BE ADVERSELY AFFECTED.          US Result      Current Outpatient Medications   Medication Sig Dispense Refill    traMADol (ULTRAM) 50 MG tablet Take 1 tablet by mouth daily as needed for Pain for up to 30 days. Max Daily Amount: 50 mg 30 tablet 0    loperamide (IMODIUM A-D) 2 MG tablet Take by mouth (Patient not taking: Reported on 5/19/2025)      sodium chloride (OCEAN) 0.65 % nasal spray 4 sprays by Nasal route      omeprazole (PRILOSEC) 20 MG delayed release capsule Take 2 caps twice daily 180 capsule 0    glipiZIDE (GLUCOTROL) 5 MG tablet Take 1 tablet by mouth daily 90 tablet 0    diclofenac sodium (VOLTAREN) 1 % GEL Apply 2 g topically 4 times daily as needed for Pain      ondansetron (ZOFRAN) 4 MG tablet Take by mouth every 8 hours as needed (Patient not taking: Reported on 5/19/2025)      propranolol (INDERAL) 10 MG tablet Take 1 tablet by mouth daily Hold for HR <60 and/or SBP <100 90 tablet 1    Incontinence Supplies KIT 1 kit by Does not apply route as needed (change after accidents) 5 kit 5    Dulaglutide 4.5 MG/0.5ML SOAJ Inject 4.5 mg into the skin once a week 2 mL 3    gabapentin (NEURONTIN) 300 MG capsule Take 1 capsule by mouth 3 times daily for 180 days. 1 capsule in the am 2 capsules in the evening 90 capsule 1    rOPINIRole (REQUIP) 0.5 MG tablet Take 1 tablet by mouth 2 times daily 180 tablet 1    fluticasone (FLONASE) 50 MCG/ACT nasal spray USE 1 SPRAY IN EACH NOSTRIL EVERY DAY 32 g 3    FLUoxetine (PROZAC) 20 MG capsule Take 3 capsules by mouth daily 30 capsule 3     Facility-Administered Medications Ordered in Other Encounters   Medication Dose Route Frequency Provider Last Rate Last Admin    sodium chloride flush 0.9 % injection 5-40 mL  5-40 mL IntraVENous PRN Stephen Decker MD   10 mL at 05/19/25 1257    heparin (PF) 100 UNIT/ML injection 500 Units  500 Units IntraCATHeter PRN Stephen Decker MD   500 Units at 05/19/25 1257         REVIEW OF SYSTEMS:   General: Patient denies n/v/f/c or weight loss.  HEENT: Patient denies

## 2025-05-19 NOTE — ED NOTES
Confirmed yina Abbott in blood bank we can use her BBD# [id number] from Oncology ordered earlier this afternoon.

## 2025-05-20 VITALS
OXYGEN SATURATION: 98 % | RESPIRATION RATE: 19 BRPM | SYSTOLIC BLOOD PRESSURE: 120 MMHG | HEART RATE: 78 BPM | TEMPERATURE: 98 F | DIASTOLIC BLOOD PRESSURE: 64 MMHG

## 2025-05-20 LAB
ABO/RH: NORMAL
ANTIBODY SCREEN: NEGATIVE
ARM BAND NUMBER: NORMAL
BLOOD BANK BLOOD PRODUCT EXPIRATION DATE: NORMAL
BLOOD BANK BLOOD PRODUCT EXPIRATION DATE: NORMAL
BLOOD BANK DISPENSE STATUS: NORMAL
BLOOD BANK DISPENSE STATUS: NORMAL
BLOOD BANK ISBT PRODUCT BLOOD TYPE: 600
BLOOD BANK ISBT PRODUCT BLOOD TYPE: 6200
BLOOD BANK PRODUCT CODE: NORMAL
BLOOD BANK PRODUCT CODE: NORMAL
BLOOD BANK SAMPLE EXPIRATION: NORMAL
BLOOD BANK UNIT TYPE AND RH: NORMAL
BLOOD BANK UNIT TYPE AND RH: NORMAL
BPU ID: NORMAL
BPU ID: NORMAL
COMPONENT: NORMAL
COMPONENT: NORMAL
CROSSMATCH RESULT: NORMAL
CROSSMATCH RESULT: NORMAL
TRANSFUSION STATUS: NORMAL
TRANSFUSION STATUS: NORMAL
UNIT DIVISION: 0
UNIT DIVISION: 0
UNIT ISSUE DATE/TIME: NORMAL
UNIT ISSUE DATE/TIME: NORMAL

## 2025-05-20 ASSESSMENT — ENCOUNTER SYMPTOMS
SHORTNESS OF BREATH: 0
NAUSEA: 0
ABDOMINAL PAIN: 0
CHEST TIGHTNESS: 0
COUGH: 0
VOMITING: 0
DIARRHEA: 0
PHOTOPHOBIA: 0
ABDOMINAL DISTENTION: 0

## 2025-05-28 DIAGNOSIS — K21.9 GASTROESOPHAGEAL REFLUX DISEASE, UNSPECIFIED WHETHER ESOPHAGITIS PRESENT: ICD-10-CM

## 2025-05-28 RX ORDER — OMEPRAZOLE 20 MG/1
CAPSULE, DELAYED RELEASE ORAL
Qty: 180 CAPSULE | Refills: 0 | Status: SHIPPED | OUTPATIENT
Start: 2025-05-28

## 2025-05-28 NOTE — TELEPHONE ENCOUNTER
Name of Medication(s) Requested:  Requested Prescriptions     Pending Prescriptions Disp Refills    omeprazole (PRILOSEC) 20 MG delayed release capsule 180 capsule 0     Sig: Take 2 caps twice daily       Medication is on current medication list Yes    Dosage and directions were verified? Yes    Quantity verified: 90 day supply     Pharmacy Verified?  Yes    Last Appointment:  1/31/2025    Future appts:  Future Appointments   Date Time Provider Department Center   5/29/2025  8:00 AM [facility] MED [facility]C FAST TRACK 2 [facility] Med [facility]c [facility]   6/5/2025  8:00 AM [facility]C FAST TRACK 2 [facility]   6/12/2025  8:00 AM [facility]C FAST TRACK 2 [facility]   6/19/2025  8:00 AM [facility] ONC FAST TRACK 2 [facility] Med On[facility]   6/26/2025  8:00 AM [facility] ONC FAST TRACK 2 [facility] Med On[facility]   7/3/2025  8:00 AM [facility] ONC FAST TRACK 2 [facility] Med On[facility]   7/10/2025  8:00 AM [facility] MED ONC FAST TRACK 2 [facility] Med On[facility]   7/17/2025  8:00 AM [facility] MED ONC FAST TRACK 2 [facility] Med Onc [facility]   7/21/2025 10:30 AM [facility] ONC FAST TRACK 2 [facility] Med On[facility]   7/21/2025 11:00 AM Stephen Decker MD MED ONC [facility]        (If no appt send self scheduling link. .REFILLAPPT)  Scheduling request sent?     [] Yes  [] No    Does patient need updated?  [] Yes  [] No

## 2025-05-29 ENCOUNTER — HOSPITAL ENCOUNTER (OUTPATIENT)
Dept: INFUSION THERAPY | Age: 75
Discharge: HOME | End: 2025-05-29

## 2025-05-29 DIAGNOSIS — D62 ACUTE BLOOD LOSS ANEMIA: Primary | ICD-10-CM

## 2025-05-29 RX ORDER — SODIUM CHLORIDE 0.9 % (FLUSH) 0.9 %
5-40 SYRINGE (ML) INJECTION PRN
Status: DISCONTINUED | OUTPATIENT
Start: 2025-05-29 | End: 2025-05-30 | Stop reason: HOSPADM

## 2025-05-29 RX ORDER — HEPARIN 100 UNIT/ML
500 SYRINGE INTRAVENOUS PRN
Status: DISCONTINUED | OUTPATIENT
Start: 2025-05-29 | End: 2025-05-30 | Stop reason: HOSPADM

## 2025-05-29 RX ORDER — SODIUM CHLORIDE 9 MG/ML
25 INJECTION, SOLUTION INTRAVENOUS PRN
OUTPATIENT
Start: 2025-05-29

## 2025-05-29 RX ORDER — HEPARIN 100 UNIT/ML
500 SYRINGE INTRAVENOUS PRN
OUTPATIENT
Start: 2025-05-29

## 2025-05-29 RX ORDER — SODIUM CHLORIDE 0.9 % (FLUSH) 0.9 %
5-40 SYRINGE (ML) INJECTION PRN
OUTPATIENT
Start: 2025-05-29

## 2025-06-03 ENCOUNTER — HOSPITAL ENCOUNTER (OUTPATIENT)
Dept: INFUSION THERAPY | Age: 75
Discharge: HOME | End: 2025-06-03
Payer: MEDICARE

## 2025-06-03 VITALS
DIASTOLIC BLOOD PRESSURE: 61 MMHG | SYSTOLIC BLOOD PRESSURE: 110 MMHG | HEART RATE: 85 BPM | RESPIRATION RATE: 16 BRPM | OXYGEN SATURATION: 96 % | TEMPERATURE: 97.4 F

## 2025-06-03 DIAGNOSIS — D62 ACUTE BLOOD LOSS ANEMIA: Primary | ICD-10-CM

## 2025-06-03 DIAGNOSIS — D64.9 ANEMIA, UNSPECIFIED TYPE: ICD-10-CM

## 2025-06-03 DIAGNOSIS — N30.00 ACUTE CYSTITIS WITHOUT HEMATURIA: ICD-10-CM

## 2025-06-03 DIAGNOSIS — D50.0 IRON DEFICIENCY ANEMIA DUE TO CHRONIC BLOOD LOSS: ICD-10-CM

## 2025-06-03 DIAGNOSIS — K31.819 GAVE (GASTRIC ANTRAL VASCULAR ECTASIA): ICD-10-CM

## 2025-06-03 LAB
ALBUMIN SERPL-MCNC: 3 G/DL (ref 3.5–5.2)
ALP SERPL-CCNC: 101 U/L (ref 35–104)
ALT SERPL-CCNC: 22 U/L (ref 0–35)
ANION GAP SERPL CALCULATED.3IONS-SCNC: 9 MMOL/L (ref 7–16)
AST SERPL-CCNC: 29 U/L (ref 0–35)
BASOPHILS # BLD: 0.04 K/UL (ref 0–0.2)
BASOPHILS NFR BLD: 1 % (ref 0–2)
BILIRUB SERPL-MCNC: 0.4 MG/DL (ref 0–1.2)
BUN SERPL-MCNC: 12 MG/DL (ref 8–23)
CALCIUM SERPL-MCNC: 8.8 MG/DL (ref 8.8–10.2)
CHLORIDE SERPL-SCNC: 105 MMOL/L (ref 98–107)
CO2 SERPL-SCNC: 21 MMOL/L (ref 22–29)
CREAT SERPL-MCNC: 0.7 MG/DL (ref 0.5–1)
EOSINOPHIL # BLD: 0.45 K/UL (ref 0.05–0.5)
EOSINOPHILS RELATIVE PERCENT: 9 % (ref 0–6)
ERYTHROCYTE [DISTWIDTH] IN BLOOD BY AUTOMATED COUNT: 18 % (ref 11.5–15)
FERRITIN SERPL-MCNC: 13 NG/ML
GFR, ESTIMATED: >90 ML/MIN/1.73M2
GLUCOSE SERPL-MCNC: 330 MG/DL (ref 74–99)
HCT VFR BLD AUTO: 22.9 % (ref 34–48)
HGB BLD-MCNC: 7.1 G/DL (ref 11.5–15.5)
IMM GRANULOCYTES # BLD AUTO: <0.03 K/UL (ref 0–0.58)
IMM GRANULOCYTES NFR BLD: 0 % (ref 0–5)
IRON SATN MFR SERPL: 4 % (ref 15–50)
IRON SERPL-MCNC: 16 UG/DL (ref 37–145)
LYMPHOCYTES NFR BLD: 0.82 K/UL (ref 1.5–4)
LYMPHOCYTES RELATIVE PERCENT: 16 % (ref 20–42)
MCH RBC QN AUTO: 25.5 PG (ref 26–35)
MCHC RBC AUTO-ENTMCNC: 31 G/DL (ref 32–34.5)
MCV RBC AUTO: 82.4 FL (ref 80–99.9)
MONOCYTES NFR BLD: 0.54 K/UL (ref 0.1–0.95)
MONOCYTES NFR BLD: 10 % (ref 2–12)
NEUTROPHILS NFR BLD: 64 % (ref 43–80)
NEUTS SEG NFR BLD: 3.33 K/UL (ref 1.8–7.3)
PLATELET # BLD AUTO: 156 K/UL (ref 130–450)
PMV BLD AUTO: 11.1 FL (ref 7–12)
POTASSIUM SERPL-SCNC: 4.1 MMOL/L (ref 3.5–5.1)
PROT SERPL-MCNC: 5.7 G/DL (ref 6.4–8.3)
RBC # BLD AUTO: 2.78 M/UL (ref 3.5–5.5)
SODIUM SERPL-SCNC: 135 MMOL/L (ref 136–145)
TIBC SERPL-MCNC: 365 UG/DL (ref 250–450)
WBC OTHER # BLD: 5.2 K/UL (ref 4.5–11.5)

## 2025-06-03 PROCEDURE — 80053 COMPREHEN METABOLIC PANEL: CPT

## 2025-06-03 PROCEDURE — 86850 RBC ANTIBODY SCREEN: CPT

## 2025-06-03 PROCEDURE — 36430 TRANSFUSION BLD/BLD COMPNT: CPT

## 2025-06-03 PROCEDURE — 86900 BLOOD TYPING SEROLOGIC ABO: CPT

## 2025-06-03 PROCEDURE — 86923 COMPATIBILITY TEST ELECTRIC: CPT

## 2025-06-03 PROCEDURE — 83540 ASSAY OF IRON: CPT

## 2025-06-03 PROCEDURE — 36591 DRAW BLOOD OFF VENOUS DEVICE: CPT

## 2025-06-03 PROCEDURE — P9016 RBC LEUKOCYTES REDUCED: HCPCS

## 2025-06-03 PROCEDURE — 85025 COMPLETE CBC W/AUTO DIFF WBC: CPT

## 2025-06-03 PROCEDURE — 6360000002 HC RX W HCPCS: Performed by: INTERNAL MEDICINE

## 2025-06-03 PROCEDURE — 2500000003 HC RX 250 WO HCPCS: Performed by: INTERNAL MEDICINE

## 2025-06-03 PROCEDURE — 82728 ASSAY OF FERRITIN: CPT

## 2025-06-03 PROCEDURE — 86901 BLOOD TYPING SEROLOGIC RH(D): CPT

## 2025-06-03 PROCEDURE — 83550 IRON BINDING TEST: CPT

## 2025-06-03 PROCEDURE — 36415 COLL VENOUS BLD VENIPUNCTURE: CPT

## 2025-06-03 RX ORDER — ONDANSETRON 2 MG/ML
8 INJECTION INTRAMUSCULAR; INTRAVENOUS
Status: CANCELLED | OUTPATIENT
Start: 2025-06-03

## 2025-06-03 RX ORDER — SODIUM CHLORIDE 0.9 % (FLUSH) 0.9 %
5-40 SYRINGE (ML) INJECTION PRN
Status: DISCONTINUED | OUTPATIENT
Start: 2025-06-03 | End: 2025-06-04 | Stop reason: HOSPADM

## 2025-06-03 RX ORDER — ALBUTEROL SULFATE 90 UG/1
4 INHALANT RESPIRATORY (INHALATION) PRN
Status: CANCELLED | OUTPATIENT
Start: 2025-06-03

## 2025-06-03 RX ORDER — SODIUM CHLORIDE 9 MG/ML
25 INJECTION, SOLUTION INTRAVENOUS PRN
Status: DISCONTINUED | OUTPATIENT
Start: 2025-06-03 | End: 2025-06-04 | Stop reason: HOSPADM

## 2025-06-03 RX ORDER — SODIUM CHLORIDE 0.9 % (FLUSH) 0.9 %
5-40 SYRINGE (ML) INJECTION PRN
Status: CANCELLED | OUTPATIENT
Start: 2025-06-03

## 2025-06-03 RX ORDER — EPINEPHRINE 1 MG/ML
0.3 INJECTION, SOLUTION, CONCENTRATE INTRAVENOUS PRN
Status: CANCELLED | OUTPATIENT
Start: 2025-06-03

## 2025-06-03 RX ORDER — SODIUM CHLORIDE 9 MG/ML
INJECTION, SOLUTION INTRAVENOUS CONTINUOUS
Status: CANCELLED | OUTPATIENT
Start: 2025-06-03

## 2025-06-03 RX ORDER — SODIUM CHLORIDE 9 MG/ML
25 INJECTION, SOLUTION INTRAVENOUS PRN
OUTPATIENT
Start: 2025-06-03

## 2025-06-03 RX ORDER — SODIUM CHLORIDE 9 MG/ML
20 INJECTION, SOLUTION INTRAVENOUS CONTINUOUS
Status: CANCELLED | OUTPATIENT
Start: 2025-06-03

## 2025-06-03 RX ORDER — SODIUM CHLORIDE 9 MG/ML
20 INJECTION, SOLUTION INTRAVENOUS CONTINUOUS
Status: DISCONTINUED | OUTPATIENT
Start: 2025-06-03 | End: 2025-06-04 | Stop reason: HOSPADM

## 2025-06-03 RX ORDER — SODIUM CHLORIDE 9 MG/ML
25 INJECTION, SOLUTION INTRAVENOUS PRN
Status: CANCELLED | OUTPATIENT
Start: 2025-06-03

## 2025-06-03 RX ORDER — ACETAMINOPHEN 325 MG/1
650 TABLET ORAL
Status: CANCELLED | OUTPATIENT
Start: 2025-06-03

## 2025-06-03 RX ORDER — DIPHENHYDRAMINE HYDROCHLORIDE 50 MG/ML
50 INJECTION, SOLUTION INTRAMUSCULAR; INTRAVENOUS
Status: CANCELLED | OUTPATIENT
Start: 2025-06-03

## 2025-06-03 RX ORDER — HYDROCORTISONE SODIUM SUCCINATE 100 MG/2ML
100 INJECTION INTRAMUSCULAR; INTRAVENOUS
Status: CANCELLED | OUTPATIENT
Start: 2025-06-03

## 2025-06-03 RX ORDER — HEPARIN 100 UNIT/ML
500 SYRINGE INTRAVENOUS PRN
Status: CANCELLED | OUTPATIENT
Start: 2025-06-03

## 2025-06-03 RX ORDER — HEPARIN 100 UNIT/ML
500 SYRINGE INTRAVENOUS PRN
Status: DISCONTINUED | OUTPATIENT
Start: 2025-06-03 | End: 2025-06-04 | Stop reason: HOSPADM

## 2025-06-03 RX ADMIN — HEPARIN 500 UNITS: 100 SYRINGE at 13:33

## 2025-06-03 RX ADMIN — SODIUM CHLORIDE, PRESERVATIVE FREE 10 ML: 5 INJECTION INTRAVENOUS at 13:33

## 2025-06-03 RX ADMIN — SODIUM CHLORIDE, PRESERVATIVE FREE 10 ML: 5 INJECTION INTRAVENOUS at 08:12

## 2025-06-03 ASSESSMENT — PAIN DESCRIPTION - ONSET: ONSET: ON-GOING

## 2025-06-03 ASSESSMENT — PAIN DESCRIPTION - DESCRIPTORS: DESCRIPTORS: ACHING;DISCOMFORT

## 2025-06-03 ASSESSMENT — PAIN DESCRIPTION - LOCATION: LOCATION: BACK

## 2025-06-03 ASSESSMENT — PAIN - FUNCTIONAL ASSESSMENT: PAIN_FUNCTIONAL_ASSESSMENT: PREVENTS OR INTERFERES SOME ACTIVE ACTIVITIES AND ADLS

## 2025-06-03 ASSESSMENT — PAIN DESCRIPTION - DIRECTION: RADIATING_TOWARDS: DOWN LEGS

## 2025-06-03 ASSESSMENT — PAIN DESCRIPTION - ORIENTATION: ORIENTATION: LOWER

## 2025-06-03 ASSESSMENT — PAIN DESCRIPTION - FREQUENCY: FREQUENCY: CONTINUOUS

## 2025-06-03 ASSESSMENT — PAIN DESCRIPTION - PAIN TYPE: TYPE: CHRONIC PAIN

## 2025-06-03 ASSESSMENT — PAIN SCALES - GENERAL: PAINLEVEL_OUTOF10: 8

## 2025-06-03 NOTE — PROGRESS NOTES
Informed consent verified and on patient chart.   Patient received 1unit PRBC .  Patient tolerated well.    Vital signs stable.  Reviewed orally and provided with written information regarding potential delayed reaction and instructions on what to do if reaction occurs. Questions answered to apparent satisfaction. Patient observed for 30 minutes after transfusion. D/c in stable condition.

## 2025-06-04 ENCOUNTER — CARE COORDINATION (OUTPATIENT)
Dept: CARE COORDINATION | Age: 75
End: 2025-06-04

## 2025-06-04 SDOH — HEALTH STABILITY: MENTAL HEALTH
DO YOU FEEL STRESS - TENSE, RESTLESS, NERVOUS, OR ANXIOUS, OR UNABLE TO SLEEP AT NIGHT BECAUSE YOUR MIND IS TROUBLED ALL THE TIME - THESE DAYS?: ONLY A LITTLE

## 2025-06-04 SDOH — ECONOMIC STABILITY: FOOD INSECURITY: HOW HARD IS IT FOR YOU TO PAY FOR THE VERY BASICS LIKE FOOD, HOUSING, MEDICAL CARE, AND HEATING?: NOT HARD AT ALL

## 2025-06-04 SDOH — ECONOMIC STABILITY: FOOD INSECURITY
WITHIN THE PAST 12 MONTHS, YOU WORRIED THAT YOUR FOOD WOULD RUN OUT BEFORE YOU GOT THE MONEY TO BUY MORE.: SOMETIMES TRUE

## 2025-06-04 SDOH — SOCIAL STABILITY: SOCIAL INSECURITY: ARE YOU MARRIED, WIDOWED, DIVORCED, SEPARATED, NEVER MARRIED, OR LIVING WITH A PARTNER?: DIVORCED

## 2025-06-04 SDOH — ECONOMIC STABILITY: HOUSING INSECURITY: IN THE LAST 12 MONTHS, WAS THERE A TIME WHEN YOU WERE NOT ABLE TO PAY THE MORTGAGE OR RENT ON TIME?: NO

## 2025-06-04 SDOH — SOCIAL STABILITY: SOCIAL NETWORK: HOW OFTEN DO YOU ATTEND CHURCH OR RELIGIOUS SERVICES?: NEVER

## 2025-06-04 SDOH — SOCIAL STABILITY: SOCIAL NETWORK: IN A TYPICAL WEEK, HOW MANY TIMES DO YOU TALK ON THE PHONE WITH FAMILY, FRIENDS, OR NEIGHBORS?: NEVER

## 2025-06-04 SDOH — HEALTH STABILITY: PHYSICAL HEALTH: ON AVERAGE, HOW MANY DAYS PER WEEK DO YOU ENGAGE IN MODERATE TO STRENUOUS EXERCISE (LIKE A BRISK WALK)?: 2 DAYS

## 2025-06-04 SDOH — SOCIAL STABILITY: SOCIAL NETWORK: HOW OFTEN DO YOU ATTEND MEETINGS OF THE CLUBS OR ORGANIZATIONS YOU BELONG TO?: NEVER

## 2025-06-04 SDOH — HEALTH STABILITY: PHYSICAL HEALTH: ON AVERAGE, HOW MANY MINUTES DO YOU ENGAGE IN EXERCISE AT THIS LEVEL?: 20 MIN

## 2025-06-04 SDOH — SOCIAL STABILITY: SOCIAL NETWORK
DO YOU BELONG TO ANY CLUBS OR ORGANIZATIONS SUCH AS CHURCH GROUPS, UNIONS, FRATERNAL OR ATHLETIC GROUPS, OR SCHOOL GROUPS?: NO

## 2025-06-04 SDOH — SOCIAL STABILITY: SOCIAL NETWORK: HOW OFTEN DO YOU GET TOGETHER WITH FRIENDS OR RELATIVES?: ONCE A WEEK

## 2025-06-04 SDOH — ECONOMIC STABILITY: TRANSPORTATION INSECURITY: IN THE PAST 12 MONTHS, HAS LACK OF TRANSPORTATION KEPT YOU FROM MEDICAL APPOINTMENTS OR FROM GETTING MEDICATIONS?: NO

## 2025-06-04 ASSESSMENT — ACTIVITIES OF DAILY LIVING (ADL): LACK_OF_TRANSPORTATION: NO

## 2025-06-04 NOTE — CARE COORDINATION
Ambulatory Care Coordination Note     2025 11:52 AM     Patient Current Location:  Home: [facility] [place] [address]     This patient was received as a referral from [facility] health report .    ACM contacted the patient by telephone. Verified name and  with patient as identifiers. Provided introduction to self, and explanation of the ACM role.   Patient accepted care management services at this time.          ACM: Shyam Henderson RN     Challenges to be reviewed by the provider   Additional needs identified to be addressed with provider No  none               Method of communication with provider: none.    Utilization: Initial Call - N/A    Care Summary Note: Haven lives alone in a rental.  She has aid services ([employer]) three times weekly. Haven is active with [employer] Whe[employer].  She uses her insurance benefits for transportation ([employer]).  Haven uses a walker when out.  She doesn't use it in the house stating the house is \"too small\".  She has difficulty getting out of her home.  [employer] have been assisting her to get down the steps and to the vehicle.  She is concerned that this will not be an option soon.  She is interested in a ramp and wheelchair for outings/medical appointments.  She would like to speak with social work regarding this.  Haven is receiving botox injections at the clinic for migraines.  She states they have helped.  Haven states she does not sleep well as she has difficulty \"getting comfortable\".            Offered patient enrollment in the Remote Patient Monitoring (RPM) program for in-home monitoring: Yes, but did not enroll at this time: controlled chronic disease management.     Assessments Completed:       2025    11:16 AM   Amb Fall Risk Assessment and TUG Test   Do you feel unsteady or are you worried about falling?  yes   2 or more falls in past year? no   Fall with injury in past year? yes    ,   COPD Assessment    Does the patient understand

## 2025-06-06 ENCOUNTER — CARE COORDINATION (OUTPATIENT)
Dept: CARE COORDINATION | Age: 75
End: 2025-06-06

## 2025-06-06 DIAGNOSIS — M54.16 LUMBAR RADICULOPATHY: ICD-10-CM

## 2025-06-06 DIAGNOSIS — E66.01 SEVERE OBESITY (BMI 35.0-39.9) WITH COMORBIDITY (HCC): ICD-10-CM

## 2025-06-06 DIAGNOSIS — M79.7 FIBROMYALGIA: Primary | ICD-10-CM

## 2025-06-06 DIAGNOSIS — J44.9 CHRONIC OBSTRUCTIVE PULMONARY DISEASE, UNSPECIFIED COPD TYPE (HCC): ICD-10-CM

## 2025-06-06 RX ORDER — SODIUM CHLORIDE 0.9 % (FLUSH) 0.9 %
5-40 SYRINGE (ML) INJECTION PRN
OUTPATIENT
Start: 2025-06-06

## 2025-06-06 RX ORDER — SODIUM CHLORIDE 9 MG/ML
5-250 INJECTION, SOLUTION INTRAVENOUS PRN
OUTPATIENT
Start: 2025-06-06

## 2025-06-06 RX ORDER — ALBUTEROL SULFATE 90 UG/1
4 INHALANT RESPIRATORY (INHALATION) PRN
OUTPATIENT
Start: 2025-06-06

## 2025-06-06 RX ORDER — HYDROCORTISONE SODIUM SUCCINATE 100 MG/2ML
100 INJECTION INTRAMUSCULAR; INTRAVENOUS
OUTPATIENT
Start: 2025-06-06

## 2025-06-06 RX ORDER — SODIUM CHLORIDE 9 MG/ML
INJECTION, SOLUTION INTRAVENOUS CONTINUOUS
OUTPATIENT
Start: 2025-06-06

## 2025-06-06 RX ORDER — EPINEPHRINE 1 MG/ML
0.3 INJECTION, SOLUTION, CONCENTRATE INTRAVENOUS PRN
OUTPATIENT
Start: 2025-06-06

## 2025-06-06 RX ORDER — HEPARIN 100 UNIT/ML
500 SYRINGE INTRAVENOUS PRN
OUTPATIENT
Start: 2025-06-06

## 2025-06-06 RX ORDER — DIPHENHYDRAMINE HYDROCHLORIDE 50 MG/ML
50 INJECTION, SOLUTION INTRAMUSCULAR; INTRAVENOUS
OUTPATIENT
Start: 2025-06-06

## 2025-06-06 RX ORDER — ONDANSETRON 2 MG/ML
8 INJECTION INTRAMUSCULAR; INTRAVENOUS
OUTPATIENT
Start: 2025-06-06

## 2025-06-06 RX ORDER — ACETAMINOPHEN 325 MG/1
650 TABLET ORAL
OUTPATIENT
Start: 2025-06-06

## 2025-06-06 NOTE — PROGRESS NOTES
She has at least 1 medical appointment every week and is very weak and concerned about balance/fatigue.  She relies on Medicaid transportation and that  is currently helping her to get out of the home(down/up steps) and into the van.  They aren't supposed to assist hands on but this  has been very helpful.  She can get a w/c ramp via [employer] to use if she is able to get a w/c.  This would make leaving the home and getting around medical appointments much easier for her.

## 2025-06-12 ENCOUNTER — HOSPITAL ENCOUNTER (OUTPATIENT)
Dept: INFUSION THERAPY | Age: 75
Discharge: HOME | End: 2025-06-12
Payer: MEDICARE

## 2025-06-12 VITALS
HEART RATE: 87 BPM | RESPIRATION RATE: 18 BRPM | OXYGEN SATURATION: 94 % | SYSTOLIC BLOOD PRESSURE: 129 MMHG | TEMPERATURE: 97.6 F | DIASTOLIC BLOOD PRESSURE: 59 MMHG

## 2025-06-12 DIAGNOSIS — D62 ACUTE BLOOD LOSS ANEMIA: Primary | ICD-10-CM

## 2025-06-12 DIAGNOSIS — N30.00 ACUTE CYSTITIS WITHOUT HEMATURIA: ICD-10-CM

## 2025-06-12 DIAGNOSIS — D62 ACUTE BLOOD LOSS ANEMIA: ICD-10-CM

## 2025-06-12 LAB
BASOPHILS # BLD: 0.04 K/UL (ref 0–0.2)
BASOPHILS NFR BLD: 1 % (ref 0–2)
EOSINOPHIL # BLD: 0.45 K/UL (ref 0.05–0.5)
EOSINOPHILS RELATIVE PERCENT: 7 % (ref 0–6)
ERYTHROCYTE [DISTWIDTH] IN BLOOD BY AUTOMATED COUNT: 18.3 % (ref 11.5–15)
HCT VFR BLD AUTO: 22.5 % (ref 34–48)
HGB BLD-MCNC: 6.9 G/DL (ref 11.5–15.5)
IMM GRANULOCYTES # BLD AUTO: <0.03 K/UL (ref 0–0.58)
IMM GRANULOCYTES NFR BLD: 0 % (ref 0–5)
LYMPHOCYTES NFR BLD: 0.83 K/UL (ref 1.5–4)
LYMPHOCYTES RELATIVE PERCENT: 13 % (ref 20–42)
MCH RBC QN AUTO: 25.1 PG (ref 26–35)
MCHC RBC AUTO-ENTMCNC: 30.7 G/DL (ref 32–34.5)
MCV RBC AUTO: 81.8 FL (ref 80–99.9)
MONOCYTES NFR BLD: 0.77 K/UL (ref 0.1–0.95)
MONOCYTES NFR BLD: 12 % (ref 2–12)
NEUTROPHILS NFR BLD: 67 % (ref 43–80)
NEUTS SEG NFR BLD: 4.28 K/UL (ref 1.8–7.3)
PLATELET # BLD AUTO: 151 K/UL (ref 130–450)
PMV BLD AUTO: 10.9 FL (ref 7–12)
RBC # BLD AUTO: 2.75 M/UL (ref 3.5–5.5)
WBC OTHER # BLD: 6.4 K/UL (ref 4.5–11.5)

## 2025-06-12 PROCEDURE — 86900 BLOOD TYPING SEROLOGIC ABO: CPT

## 2025-06-12 PROCEDURE — 6360000002 HC RX W HCPCS: Performed by: INTERNAL MEDICINE

## 2025-06-12 PROCEDURE — 36591 DRAW BLOOD OFF VENOUS DEVICE: CPT

## 2025-06-12 PROCEDURE — 86923 COMPATIBILITY TEST ELECTRIC: CPT

## 2025-06-12 PROCEDURE — 2580000003 HC RX 258: Performed by: INTERNAL MEDICINE

## 2025-06-12 PROCEDURE — 85025 COMPLETE CBC W/AUTO DIFF WBC: CPT

## 2025-06-12 PROCEDURE — 86850 RBC ANTIBODY SCREEN: CPT

## 2025-06-12 PROCEDURE — 2500000003 HC RX 250 WO HCPCS: Performed by: INTERNAL MEDICINE

## 2025-06-12 PROCEDURE — 36430 TRANSFUSION BLD/BLD COMPNT: CPT

## 2025-06-12 PROCEDURE — 86901 BLOOD TYPING SEROLOGIC RH(D): CPT

## 2025-06-12 PROCEDURE — P9016 RBC LEUKOCYTES REDUCED: HCPCS

## 2025-06-12 RX ORDER — SODIUM CHLORIDE 9 MG/ML
25 INJECTION, SOLUTION INTRAVENOUS PRN
Status: CANCELLED | OUTPATIENT
Start: 2025-06-12

## 2025-06-12 RX ORDER — ACETAMINOPHEN 325 MG/1
650 TABLET ORAL
Status: CANCELLED | OUTPATIENT
Start: 2025-06-12

## 2025-06-12 RX ORDER — ALBUTEROL SULFATE 90 UG/1
4 INHALANT RESPIRATORY (INHALATION) PRN
Status: CANCELLED | OUTPATIENT
Start: 2025-06-12

## 2025-06-12 RX ORDER — EPINEPHRINE 1 MG/ML
0.3 INJECTION, SOLUTION, CONCENTRATE INTRAVENOUS PRN
Status: CANCELLED | OUTPATIENT
Start: 2025-06-12

## 2025-06-12 RX ORDER — SODIUM CHLORIDE 0.9 % (FLUSH) 0.9 %
5-40 SYRINGE (ML) INJECTION PRN
Status: CANCELLED | OUTPATIENT
Start: 2025-06-12

## 2025-06-12 RX ORDER — HEPARIN 100 UNIT/ML
500 SYRINGE INTRAVENOUS PRN
Status: CANCELLED | OUTPATIENT
Start: 2025-06-12

## 2025-06-12 RX ORDER — HEPARIN 100 UNIT/ML
500 SYRINGE INTRAVENOUS PRN
Status: DISCONTINUED | OUTPATIENT
Start: 2025-06-12 | End: 2025-06-13 | Stop reason: HOSPADM

## 2025-06-12 RX ORDER — SODIUM CHLORIDE 9 MG/ML
20 INJECTION, SOLUTION INTRAVENOUS CONTINUOUS
Status: DISCONTINUED | OUTPATIENT
Start: 2025-06-12 | End: 2025-06-13 | Stop reason: HOSPADM

## 2025-06-12 RX ORDER — ONDANSETRON 2 MG/ML
8 INJECTION INTRAMUSCULAR; INTRAVENOUS
OUTPATIENT
Start: 2025-06-12

## 2025-06-12 RX ORDER — DIPHENHYDRAMINE HYDROCHLORIDE 50 MG/ML
50 INJECTION, SOLUTION INTRAMUSCULAR; INTRAVENOUS
OUTPATIENT
Start: 2025-06-12

## 2025-06-12 RX ORDER — SODIUM CHLORIDE 9 MG/ML
INJECTION, SOLUTION INTRAVENOUS CONTINUOUS
OUTPATIENT
Start: 2025-06-12

## 2025-06-12 RX ORDER — SODIUM CHLORIDE 9 MG/ML
20 INJECTION, SOLUTION INTRAVENOUS CONTINUOUS
Status: CANCELLED | OUTPATIENT
Start: 2025-06-12

## 2025-06-12 RX ORDER — ACETAMINOPHEN 325 MG/1
650 TABLET ORAL
OUTPATIENT
Start: 2025-06-12

## 2025-06-12 RX ORDER — EPINEPHRINE 1 MG/ML
0.3 INJECTION, SOLUTION, CONCENTRATE INTRAVENOUS PRN
OUTPATIENT
Start: 2025-06-12

## 2025-06-12 RX ORDER — ALBUTEROL SULFATE 90 UG/1
4 INHALANT RESPIRATORY (INHALATION) PRN
OUTPATIENT
Start: 2025-06-12

## 2025-06-12 RX ORDER — SODIUM CHLORIDE 9 MG/ML
25 INJECTION, SOLUTION INTRAVENOUS PRN
Status: DISCONTINUED | OUTPATIENT
Start: 2025-06-12 | End: 2025-06-13 | Stop reason: HOSPADM

## 2025-06-12 RX ORDER — SODIUM CHLORIDE 0.9 % (FLUSH) 0.9 %
5-40 SYRINGE (ML) INJECTION PRN
Status: DISCONTINUED | OUTPATIENT
Start: 2025-06-12 | End: 2025-06-13 | Stop reason: HOSPADM

## 2025-06-12 RX ORDER — HYDROCORTISONE SODIUM SUCCINATE 100 MG/2ML
100 INJECTION INTRAMUSCULAR; INTRAVENOUS
OUTPATIENT
Start: 2025-06-12

## 2025-06-12 RX ORDER — ONDANSETRON 2 MG/ML
8 INJECTION INTRAMUSCULAR; INTRAVENOUS
Status: CANCELLED | OUTPATIENT
Start: 2025-06-12

## 2025-06-12 RX ORDER — DIPHENHYDRAMINE HYDROCHLORIDE 50 MG/ML
50 INJECTION, SOLUTION INTRAMUSCULAR; INTRAVENOUS
Status: CANCELLED | OUTPATIENT
Start: 2025-06-12

## 2025-06-12 RX ORDER — SODIUM CHLORIDE 9 MG/ML
25 INJECTION, SOLUTION INTRAVENOUS PRN
OUTPATIENT
Start: 2025-06-12

## 2025-06-12 RX ORDER — HYDROCORTISONE SODIUM SUCCINATE 100 MG/2ML
100 INJECTION INTRAMUSCULAR; INTRAVENOUS
Status: CANCELLED | OUTPATIENT
Start: 2025-06-12

## 2025-06-12 RX ORDER — SODIUM CHLORIDE 9 MG/ML
INJECTION, SOLUTION INTRAVENOUS CONTINUOUS
Status: CANCELLED | OUTPATIENT
Start: 2025-06-12

## 2025-06-12 RX ADMIN — SODIUM CHLORIDE, PRESERVATIVE FREE 10 ML: 5 INJECTION INTRAVENOUS at 08:19

## 2025-06-12 RX ADMIN — HEPARIN 500 UNITS: 100 SYRINGE at 15:58

## 2025-06-12 RX ADMIN — SODIUM CHLORIDE 20 ML/HR: 0.9 INJECTION, SOLUTION INTRAVENOUS at 11:47

## 2025-06-12 RX ADMIN — SODIUM CHLORIDE, PRESERVATIVE FREE 10 ML: 5 INJECTION INTRAVENOUS at 15:57

## 2025-06-12 ASSESSMENT — PAIN SCALES - GENERAL: PAINLEVEL_OUTOF10: 5

## 2025-06-12 ASSESSMENT — PAIN DESCRIPTION - DESCRIPTORS: DESCRIPTORS: ACHING

## 2025-06-12 ASSESSMENT — PAIN DESCRIPTION - ORIENTATION: ORIENTATION: LOWER

## 2025-06-12 ASSESSMENT — PAIN DESCRIPTION - LOCATION: LOCATION: BACK

## 2025-06-12 NOTE — PROGRESS NOTES
Informed consent from dr umu velásquez verified and on patient chart.   Patient received 2 units prbc .  Patient tolerated well.    Vital signs stable.  Reviewed orally and provided with written information regarding potential delayed reaction and instructions on what to do if reaction occurs. Questions answered to apparent satisfaction. Patient observed for 30 minutes after transfusion.

## 2025-06-13 LAB
ABO/RH: NORMAL
ANTIBODY SCREEN: NEGATIVE
ARM BAND NUMBER: NORMAL
BLOOD BANK BLOOD PRODUCT EXPIRATION DATE: NORMAL
BLOOD BANK BLOOD PRODUCT EXPIRATION DATE: NORMAL
BLOOD BANK DISPENSE STATUS: NORMAL
BLOOD BANK DISPENSE STATUS: NORMAL
BLOOD BANK ISBT PRODUCT BLOOD TYPE: 6200
BLOOD BANK ISBT PRODUCT BLOOD TYPE: 6200
BLOOD BANK PRODUCT CODE: NORMAL
BLOOD BANK PRODUCT CODE: NORMAL
BLOOD BANK SAMPLE EXPIRATION: NORMAL
BLOOD BANK UNIT TYPE AND RH: NORMAL
BLOOD BANK UNIT TYPE AND RH: NORMAL
BPU ID: NORMAL
BPU ID: NORMAL
COMPONENT: NORMAL
COMPONENT: NORMAL
CROSSMATCH RESULT: NORMAL
CROSSMATCH RESULT: NORMAL
TRANSFUSION STATUS: NORMAL
TRANSFUSION STATUS: NORMAL
UNIT DIVISION: 0
UNIT DIVISION: 0
UNIT ISSUE DATE/TIME: NORMAL
UNIT ISSUE DATE/TIME: NORMAL

## 2025-06-16 DIAGNOSIS — M25.571 RIGHT ANKLE PAIN, UNSPECIFIED CHRONICITY: Primary | ICD-10-CM

## 2025-06-18 ENCOUNTER — CARE COORDINATION (OUTPATIENT)
Dept: CARE COORDINATION | Age: 75
End: 2025-06-18

## 2025-06-18 DIAGNOSIS — K21.9 GASTROESOPHAGEAL REFLUX DISEASE, UNSPECIFIED WHETHER ESOPHAGITIS PRESENT: ICD-10-CM

## 2025-06-18 DIAGNOSIS — K31.819 GAVE (GASTRIC ANTRAL VASCULAR ECTASIA): ICD-10-CM

## 2025-06-18 DIAGNOSIS — E11.65 TYPE 2 DIABETES MELLITUS WITH HYPERGLYCEMIA, WITHOUT LONG-TERM CURRENT USE OF INSULIN (HCC): ICD-10-CM

## 2025-06-18 RX ORDER — OMEPRAZOLE 20 MG/1
CAPSULE, DELAYED RELEASE ORAL
Qty: 180 CAPSULE | Refills: 0 | Status: SHIPPED | OUTPATIENT
Start: 2025-06-18

## 2025-06-18 RX ORDER — PROPRANOLOL HYDROCHLORIDE 10 MG/1
10 TABLET ORAL DAILY
Qty: 90 TABLET | Refills: 1 | Status: SHIPPED | OUTPATIENT
Start: 2025-06-18 | End: 2025-12-15

## 2025-06-18 RX ORDER — ROPINIROLE 0.5 MG/1
0.5 TABLET, FILM COATED ORAL 2 TIMES DAILY
Qty: 180 TABLET | Refills: 1 | Status: SHIPPED | OUTPATIENT
Start: 2025-06-18

## 2025-06-18 RX ORDER — GLIPIZIDE 5 MG/1
5 TABLET ORAL DAILY
Qty: 90 TABLET | Refills: 0 | Status: SHIPPED | OUTPATIENT
Start: 2025-06-18

## 2025-06-18 NOTE — CARE COORDINATION
[facility] placed a follow up call to Haevn today.   Reviewed order for w/c on last outreach call.  Haven states she did receive the w/c but hasn't been able to use it yet.  She reports it is for outside and going to appointments.      [facility] inquired if Haven has spoken with Bartolo COLES about getting the w/c ramp.  Haven states she has and she did update her that the landlord said it was \"fine to get\".      Haven states she has been very weak the past few days due to her autoimmune disease.  She states she has to get a blood transfusion every week.  Her HHA is in the home with her during the call and could be heard in the background.  Haven states she has been \"out of it and can't think straight\" and reports her symptoms are more sever than typical.  She states the change in weather doesn't help.        Empathic and reflective listening provided.       Haven states she has been active with passport for about a month.  She states she received something in the mail from [employer] that looks like a bill.  She states it is from [facility] and is for $360.00.  She reports her income is only about $1800 per month.  She isn't feeling well enough to really review that with [facility].   Haven states it may be a bill for a medical thing, she isn't sure.        [facility] offered to follow up in July after Haven's infusion date to assist with the bill/letter she received from [facility] stated above and to f/u on the w/c ramp being installed.

## 2025-06-18 NOTE — PROGRESS NOTES
Patient switched pharmacies and needs [facility].

## 2025-06-19 ENCOUNTER — HOSPITAL ENCOUNTER (OUTPATIENT)
Dept: INFUSION THERAPY | Age: 75
Discharge: HOME | End: 2025-06-19
Payer: MEDICARE

## 2025-06-19 VITALS
DIASTOLIC BLOOD PRESSURE: 54 MMHG | TEMPERATURE: 97.9 F | SYSTOLIC BLOOD PRESSURE: 135 MMHG | OXYGEN SATURATION: 99 % | RESPIRATION RATE: 16 BRPM | HEART RATE: 87 BPM

## 2025-06-19 DIAGNOSIS — D50.0 IRON DEFICIENCY ANEMIA DUE TO CHRONIC BLOOD LOSS: Primary | ICD-10-CM

## 2025-06-19 DIAGNOSIS — D64.9 ANEMIA, UNSPECIFIED TYPE: ICD-10-CM

## 2025-06-19 DIAGNOSIS — D62 ACUTE BLOOD LOSS ANEMIA: Primary | ICD-10-CM

## 2025-06-19 DIAGNOSIS — N30.00 ACUTE CYSTITIS WITHOUT HEMATURIA: ICD-10-CM

## 2025-06-19 DIAGNOSIS — D50.0 IRON DEFICIENCY ANEMIA DUE TO CHRONIC BLOOD LOSS: ICD-10-CM

## 2025-06-19 LAB
ALBUMIN SERPL-MCNC: 3.3 G/DL (ref 3.5–5.2)
ALP SERPL-CCNC: 104 U/L (ref 35–104)
ALT SERPL-CCNC: 25 U/L (ref 0–35)
ANION GAP SERPL CALCULATED.3IONS-SCNC: 10 MMOL/L (ref 7–16)
AST SERPL-CCNC: 41 U/L (ref 0–35)
BASOPHILS # BLD: 0.05 K/UL (ref 0–0.2)
BASOPHILS NFR BLD: 1 % (ref 0–2)
BILIRUB SERPL-MCNC: 0.5 MG/DL (ref 0–1.2)
BUN SERPL-MCNC: 13 MG/DL (ref 8–23)
CALCIUM SERPL-MCNC: 8.9 MG/DL (ref 8.8–10.2)
CHLORIDE SERPL-SCNC: 104 MMOL/L (ref 98–107)
CO2 SERPL-SCNC: 20 MMOL/L (ref 22–29)
CREAT SERPL-MCNC: 0.7 MG/DL (ref 0.5–1)
EOSINOPHIL # BLD: 0.4 K/UL (ref 0.05–0.5)
EOSINOPHILS RELATIVE PERCENT: 7 % (ref 0–6)
ERYTHROCYTE [DISTWIDTH] IN BLOOD BY AUTOMATED COUNT: 17.9 % (ref 11.5–15)
GFR, ESTIMATED: 88 ML/MIN/1.73M2
GLUCOSE SERPL-MCNC: 352 MG/DL (ref 74–99)
HCT VFR BLD AUTO: 28.2 % (ref 34–48)
HGB BLD-MCNC: 8.7 G/DL (ref 11.5–15.5)
IMM GRANULOCYTES # BLD AUTO: <0.03 K/UL (ref 0–0.58)
IMM GRANULOCYTES NFR BLD: 0 % (ref 0–5)
LYMPHOCYTES NFR BLD: 0.79 K/UL (ref 1.5–4)
LYMPHOCYTES RELATIVE PERCENT: 14 % (ref 20–42)
MCH RBC QN AUTO: 25.7 PG (ref 26–35)
MCHC RBC AUTO-ENTMCNC: 30.9 G/DL (ref 32–34.5)
MCV RBC AUTO: 83.2 FL (ref 80–99.9)
MONOCYTES NFR BLD: 0.68 K/UL (ref 0.1–0.95)
MONOCYTES NFR BLD: 12 % (ref 2–12)
NEUTROPHILS NFR BLD: 65 % (ref 43–80)
NEUTS SEG NFR BLD: 3.55 K/UL (ref 1.8–7.3)
PLATELET # BLD AUTO: 144 K/UL (ref 130–450)
PMV BLD AUTO: 10.7 FL (ref 7–12)
POTASSIUM SERPL-SCNC: 4 MMOL/L (ref 3.5–5.1)
PROT SERPL-MCNC: 6 G/DL (ref 6.4–8.3)
RBC # BLD AUTO: 3.39 M/UL (ref 3.5–5.5)
SODIUM SERPL-SCNC: 134 MMOL/L (ref 136–145)
WBC OTHER # BLD: 5.5 K/UL (ref 4.5–11.5)

## 2025-06-19 PROCEDURE — 96374 THER/PROPH/DIAG INJ IV PUSH: CPT

## 2025-06-19 PROCEDURE — 36591 DRAW BLOOD OFF VENOUS DEVICE: CPT

## 2025-06-19 PROCEDURE — 80053 COMPREHEN METABOLIC PANEL: CPT

## 2025-06-19 PROCEDURE — 6360000002 HC RX W HCPCS: Performed by: INTERNAL MEDICINE

## 2025-06-19 PROCEDURE — 2500000003 HC RX 250 WO HCPCS: Performed by: INTERNAL MEDICINE

## 2025-06-19 PROCEDURE — 85025 COMPLETE CBC W/AUTO DIFF WBC: CPT

## 2025-06-19 RX ORDER — SODIUM CHLORIDE 0.9 % (FLUSH) 0.9 %
5-40 SYRINGE (ML) INJECTION PRN
Status: DISCONTINUED | OUTPATIENT
Start: 2025-06-19 | End: 2025-06-20 | Stop reason: HOSPADM

## 2025-06-19 RX ORDER — ACETAMINOPHEN 325 MG/1
650 TABLET ORAL
OUTPATIENT
Start: 2025-06-26

## 2025-06-19 RX ORDER — ONDANSETRON 2 MG/ML
8 INJECTION INTRAMUSCULAR; INTRAVENOUS
OUTPATIENT
Start: 2025-06-26

## 2025-06-19 RX ORDER — EPINEPHRINE 1 MG/ML
0.3 INJECTION, SOLUTION, CONCENTRATE INTRAVENOUS PRN
OUTPATIENT
Start: 2025-06-26

## 2025-06-19 RX ORDER — SODIUM CHLORIDE 9 MG/ML
5-250 INJECTION, SOLUTION INTRAVENOUS PRN
OUTPATIENT
Start: 2025-06-26

## 2025-06-19 RX ORDER — SODIUM CHLORIDE 9 MG/ML
25 INJECTION, SOLUTION INTRAVENOUS PRN
OUTPATIENT
Start: 2025-06-19

## 2025-06-19 RX ORDER — HEPARIN 100 UNIT/ML
500 SYRINGE INTRAVENOUS PRN
Status: DISCONTINUED | OUTPATIENT
Start: 2025-06-19 | End: 2025-06-20 | Stop reason: HOSPADM

## 2025-06-19 RX ORDER — HEPARIN 100 UNIT/ML
500 SYRINGE INTRAVENOUS PRN
OUTPATIENT
Start: 2025-06-26

## 2025-06-19 RX ORDER — ALBUTEROL SULFATE 90 UG/1
4 INHALANT RESPIRATORY (INHALATION) PRN
OUTPATIENT
Start: 2025-06-26

## 2025-06-19 RX ORDER — SODIUM CHLORIDE 0.9 % (FLUSH) 0.9 %
5-40 SYRINGE (ML) INJECTION PRN
OUTPATIENT
Start: 2025-06-19

## 2025-06-19 RX ORDER — DIPHENHYDRAMINE HYDROCHLORIDE 50 MG/ML
50 INJECTION, SOLUTION INTRAMUSCULAR; INTRAVENOUS
OUTPATIENT
Start: 2025-06-26

## 2025-06-19 RX ORDER — HEPARIN 100 UNIT/ML
500 SYRINGE INTRAVENOUS PRN
OUTPATIENT
Start: 2025-06-19

## 2025-06-19 RX ORDER — SODIUM CHLORIDE 0.9 % (FLUSH) 0.9 %
5-40 SYRINGE (ML) INJECTION PRN
OUTPATIENT
Start: 2025-06-26

## 2025-06-19 RX ORDER — SODIUM CHLORIDE 9 MG/ML
INJECTION, SOLUTION INTRAVENOUS CONTINUOUS
OUTPATIENT
Start: 2025-06-26

## 2025-06-19 RX ORDER — HYDROCORTISONE SODIUM SUCCINATE 100 MG/2ML
100 INJECTION INTRAMUSCULAR; INTRAVENOUS
OUTPATIENT
Start: 2025-06-26

## 2025-06-19 RX ADMIN — IRON SUCROSE 200 MG: 20 INJECTION, SOLUTION INTRAVENOUS at 09:29

## 2025-06-19 RX ADMIN — SODIUM CHLORIDE, PRESERVATIVE FREE 10 ML: 5 INJECTION INTRAVENOUS at 09:55

## 2025-06-19 RX ADMIN — HEPARIN 500 UNITS: 100 SYRINGE at 09:56

## 2025-06-19 RX ADMIN — SODIUM CHLORIDE, PRESERVATIVE FREE 10 ML: 5 INJECTION INTRAVENOUS at 08:14

## 2025-06-19 NOTE — PROGRESS NOTES
Patient presents to clinic for labs today. SQ port accessed per policy using 20G 0.75in Azevedo needle for good blood return.  Aspirate for waste and specimen sent to lab.  Site flushed easily with 10 mL NSS and left accessed for possible treatment today.  Dry sterile dressing to area.  Tolerated procedure well.

## 2025-06-20 RX ORDER — GABAPENTIN 300 MG/1
300 CAPSULE ORAL 3 TIMES DAILY
Qty: 90 CAPSULE | Refills: 1 | Status: SHIPPED | OUTPATIENT
Start: 2025-06-20 | End: 2025-08-19

## 2025-06-24 ENCOUNTER — CARE COORDINATION (OUTPATIENT)
Dept: CARE COORDINATION | Age: 75
End: 2025-06-24

## 2025-06-24 NOTE — CARE COORDINATION
Ambulatory Care Coordination Note     6/24/2025 10:21 AM     Patient outreach attempt by this ACM today to perform care management follow up . ACM was unable to reach the patient by telephone today;   left voice message requesting a return phone call to this ACM.     ACM: Shyam Henderson RN     Care Summary Note: na    PCP/Specialist follow up:   Future Appointments         Provider Specialty Dept Phone    6/26/2025  8:00 AM [facility] MED ONC FAST TRACK 2 Infusion Therapy [phone]    6/26/2025 9:00 AM [facility] MED ONC BED 1 Infusion Therapy [phone]    7/3/2025 8:00 AM [facility] ONC FAST TRACK 2 Infusion Therapy [phone]    7/3/2025 9:00 AM [facility]C CHAIR 1 Infusion Therapy [phone]    7/10/2025 8:00 AM [facility] ONC FAST TRACK 2 Infusion Therapy [phone]    7/10/2025 9:00 AM [facility] ONC CHAIR 2 Infusion Therapy [phone]    7/17/2025 8:00 AM [facility] MED ONC FAST TRACK 2 Infusion Therapy [phone]    7/17/2025 9:00 AM [facility] MED ONC CHAIR 1 Infusion Therapy [phone]    7/21/2025 10:30 AM [facility] ONC FAST TRACK 2 Infusion Therapy [phone]    7/21/2025 11:00 AM Stephen Decker MD Oncology [phone]    8/1/2025 12:00 PM Chirag Singh MD Cardiology [phone]            Follow Up:   Plan for next ACM outreach in approximately 1 week to complete:  - goal progression.

## 2025-06-26 ENCOUNTER — HOSPITAL ENCOUNTER (OUTPATIENT)
Dept: INFUSION THERAPY | Age: 75
End: 2025-06-26

## 2025-06-30 ENCOUNTER — HOSPITAL ENCOUNTER (EMERGENCY)
Age: 75
Discharge: HOME | End: 2025-06-30
Attending: EMERGENCY MEDICINE
Payer: MEDICARE

## 2025-06-30 VITALS
RESPIRATION RATE: 26 BRPM | HEART RATE: 84 BPM | SYSTOLIC BLOOD PRESSURE: 138 MMHG | OXYGEN SATURATION: 97 % | TEMPERATURE: 98.3 F | DIASTOLIC BLOOD PRESSURE: 91 MMHG

## 2025-06-30 DIAGNOSIS — D64.9 ANEMIA, UNSPECIFIED TYPE: Primary | ICD-10-CM

## 2025-06-30 DIAGNOSIS — R73.9 HYPERGLYCEMIA: ICD-10-CM

## 2025-06-30 DIAGNOSIS — E83.51 HYPOCALCEMIA: ICD-10-CM

## 2025-06-30 LAB
ALBUMIN SERPL-MCNC: 3.2 G/DL (ref 3.5–5.2)
ALP SERPL-CCNC: 99 U/L (ref 35–104)
ALT SERPL-CCNC: 23 U/L (ref 0–35)
ANION GAP SERPL CALCULATED.3IONS-SCNC: 13 MMOL/L (ref 7–16)
AST SERPL-CCNC: 36 U/L (ref 0–35)
BASOPHILS # BLD: 0.03 K/UL (ref 0–0.2)
BASOPHILS NFR BLD: 1 % (ref 0–2)
BILIRUB SERPL-MCNC: 0.5 MG/DL (ref 0–1.2)
BUN SERPL-MCNC: 13 MG/DL (ref 8–23)
CALCIUM SERPL-MCNC: 7.7 MG/DL (ref 8.8–10.2)
CHLORIDE SERPL-SCNC: 104 MMOL/L (ref 98–107)
CO2 SERPL-SCNC: 18 MMOL/L (ref 22–29)
CREAT SERPL-MCNC: 0.6 MG/DL (ref 0.5–1)
EOSINOPHIL # BLD: 0.39 K/UL (ref 0.05–0.5)
EOSINOPHILS RELATIVE PERCENT: 7 % (ref 0–6)
ERYTHROCYTE [DISTWIDTH] IN BLOOD BY AUTOMATED COUNT: 19.7 % (ref 11.5–15)
GFR, ESTIMATED: >90 ML/MIN/1.73M2
GLUCOSE SERPL-MCNC: 403 MG/DL (ref 74–99)
HCT VFR BLD AUTO: 24.6 % (ref 34–48)
HGB BLD-MCNC: 7.8 G/DL (ref 11.5–15.5)
IMM GRANULOCYTES # BLD AUTO: <0.03 K/UL (ref 0–0.58)
IMM GRANULOCYTES NFR BLD: 0 % (ref 0–5)
LYMPHOCYTES NFR BLD: 1.01 K/UL (ref 1.5–4)
LYMPHOCYTES RELATIVE PERCENT: 17 % (ref 20–42)
MCH RBC QN AUTO: 26.7 PG (ref 26–35)
MCHC RBC AUTO-ENTMCNC: 31.7 G/DL (ref 32–34.5)
MCV RBC AUTO: 84.2 FL (ref 80–99.9)
MONOCYTES NFR BLD: 0.58 K/UL (ref 0.1–0.95)
MONOCYTES NFR BLD: 10 % (ref 2–12)
NEUTROPHILS NFR BLD: 65 % (ref 43–80)
NEUTS SEG NFR BLD: 3.78 K/UL (ref 1.8–7.3)
PLATELET # BLD AUTO: 150 K/UL (ref 130–450)
PMV BLD AUTO: 10.2 FL (ref 7–12)
POTASSIUM SERPL-SCNC: 3.7 MMOL/L (ref 3.5–5.1)
PROT SERPL-MCNC: 5.9 G/DL (ref 6.4–8.3)
RBC # BLD AUTO: 2.92 M/UL (ref 3.5–5.5)
SODIUM SERPL-SCNC: 136 MMOL/L (ref 136–145)
WBC OTHER # BLD: 5.8 K/UL (ref 4.5–11.5)

## 2025-06-30 PROCEDURE — 86900 BLOOD TYPING SEROLOGIC ABO: CPT

## 2025-06-30 PROCEDURE — 86923 COMPATIBILITY TEST ELECTRIC: CPT

## 2025-06-30 PROCEDURE — 36430 TRANSFUSION BLD/BLD COMPNT: CPT

## 2025-06-30 PROCEDURE — 86850 RBC ANTIBODY SCREEN: CPT

## 2025-06-30 PROCEDURE — 99285 EMERGENCY DEPT VISIT HI MDM: CPT

## 2025-06-30 PROCEDURE — 86901 BLOOD TYPING SEROLOGIC RH(D): CPT

## 2025-06-30 PROCEDURE — 85025 COMPLETE CBC W/AUTO DIFF WBC: CPT

## 2025-06-30 PROCEDURE — P9016 RBC LEUKOCYTES REDUCED: HCPCS

## 2025-06-30 PROCEDURE — 6370000000 HC RX 637 (ALT 250 FOR IP): Performed by: EMERGENCY MEDICINE

## 2025-06-30 PROCEDURE — 80053 COMPREHEN METABOLIC PANEL: CPT

## 2025-06-30 RX ORDER — CALCIUM CARBONATE 500 MG/1
500 TABLET, CHEWABLE ORAL ONCE
Status: COMPLETED | OUTPATIENT
Start: 2025-06-30 | End: 2025-06-30

## 2025-06-30 RX ORDER — SODIUM CHLORIDE 9 MG/ML
INJECTION, SOLUTION INTRAVENOUS PRN
Status: DISCONTINUED | OUTPATIENT
Start: 2025-06-30 | End: 2025-06-30 | Stop reason: HOSPADM

## 2025-06-30 RX ADMIN — CALCIUM CARBONATE 500 MG: 500 TABLET, CHEWABLE ORAL at 15:31

## 2025-06-30 ASSESSMENT — PAIN - FUNCTIONAL ASSESSMENT
PAIN_FUNCTIONAL_ASSESSMENT: NONE - DENIES PAIN

## 2025-06-30 NOTE — CONSENT
Informed Consent for Blood Component Transfusion Note    I have discussed with the patient the rationale for blood component transfusion; its benefits in treating or preventing fatigue, organ damage, or death; and its risk which includes mild transfusion reactions, rare risk of blood borne infection, or more serious but rare reactions. I have discussed the alternatives to transfusion, including the risk and consequences of not receiving transfusion. The patient had an opportunity to ask questions and had agreed to proceed with transfusion of blood components.    Electronically signed by Avelino Peterson II, DO on 6/30/25 at 2:58 PM EDT

## 2025-06-30 NOTE — ED NOTES
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 She wants her implantable port accessed for blood work & transfusion at this time.

## 2025-07-01 ENCOUNTER — CARE COORDINATION (OUTPATIENT)
Dept: CARE COORDINATION | Age: 75
End: 2025-07-01

## 2025-07-01 NOTE — ED PROVIDER NOTES
[facility] EMERGENCY DEPARTMENT  EMERGENCY DEPARTMENT ENCOUNTER        Pt Name: Haven Barber  MRN: [id number]  Birthdate 1950  Date of evaluation: 6/30/2025  Provider: Avelino Peterson II, DO  PCP: Jesus Alvarado DO  Note Started: 10:19 PM EDT 6/30/25    CHIEF COMPLAINT       Chief Complaint   Patient presents with    Fatigue     Chronic generalized weakness. Weekly blood transfusions. Patient states she feels as though her HgB is low       HISTORY OF PRESENT ILLNESS: 1 or more Elements            Haven Barber is a 75 y.o. female history of chronic anemia, presents ER for complaint of fatigue and generalized weakness.  Denies any recent hematochezia, no lightheadedness or dizziness, no chest pain or shortness of breath.  Patient states that she follows closely with GI, had a recent lower endoscopy which was nonconcerning.  Patient also states that she follows closely with hematology and receives frequent blood transfusions.    Nursing Notes were all reviewed and agreed with or any disagreements were addressed in the HPI.      REVIEW OF EXTERNAL NOTE :       Records reviewed for medical hx, surgical, hx, and medication lists      Chart Review/External Note Review    Last Echo reviewed by Me:  Lab Results   Component Value Date    LVEF 65 04/16/2021             Controlled Substance Monitoring:    Acute and Chronic Pain Monitoring:   RX Monitoring Periodic Controlled Substance Monitoring   1/31/2025   2:04 PM Possible medication side effects, risk of tolerance/dependence & alternative treatments discussed.           REVIEW OF SYSTEMS :      Positives and Pertinent negatives as per HPI.     SURGICAL HISTORY     Past Surgical History:   Procedure Laterality Date    CARPAL TUNNEL RELEASE Bilateral     CATARACT REMOVAL Right     COLONOSCOPY      OTHER SURGICAL HISTORY Bilateral 01/21/2021    BILATERAL L3,L4 MEDIAL BRANCH AND L5 DORSAL RAMUS RADIOFREQUENCY    PAIN

## 2025-07-03 ENCOUNTER — HOSPITAL ENCOUNTER (OUTPATIENT)
Dept: INFUSION THERAPY | Age: 75
Discharge: HOME | End: 2025-07-03

## 2025-07-03 DIAGNOSIS — D62 ACUTE BLOOD LOSS ANEMIA: Primary | ICD-10-CM

## 2025-07-03 RX ORDER — SODIUM CHLORIDE 9 MG/ML
25 INJECTION, SOLUTION INTRAVENOUS PRN
OUTPATIENT
Start: 2025-07-03

## 2025-07-03 RX ORDER — SODIUM CHLORIDE 0.9 % (FLUSH) 0.9 %
5-40 SYRINGE (ML) INJECTION PRN
Status: DISCONTINUED | OUTPATIENT
Start: 2025-07-03 | End: 2025-07-04 | Stop reason: HOSPADM

## 2025-07-03 RX ORDER — HEPARIN 100 UNIT/ML
500 SYRINGE INTRAVENOUS PRN
OUTPATIENT
Start: 2025-07-03

## 2025-07-03 RX ORDER — HEPARIN 100 UNIT/ML
500 SYRINGE INTRAVENOUS PRN
Status: DISCONTINUED | OUTPATIENT
Start: 2025-07-03 | End: 2025-07-04 | Stop reason: HOSPADM

## 2025-07-03 RX ORDER — SODIUM CHLORIDE 0.9 % (FLUSH) 0.9 %
5-40 SYRINGE (ML) INJECTION PRN
Status: CANCELLED | OUTPATIENT
Start: 2025-07-03

## 2025-07-03 RX ORDER — GABAPENTIN 300 MG/1
300 CAPSULE ORAL 3 TIMES DAILY
Qty: 90 CAPSULE | Refills: 1 | OUTPATIENT
Start: 2025-07-03 | End: 2025-09-01

## 2025-07-03 RX ORDER — HEPARIN 100 UNIT/ML
500 SYRINGE INTRAVENOUS PRN
Status: CANCELLED | OUTPATIENT
Start: 2025-07-03

## 2025-07-03 RX ORDER — SODIUM CHLORIDE 0.9 % (FLUSH) 0.9 %
5-40 SYRINGE (ML) INJECTION PRN
OUTPATIENT
Start: 2025-07-03

## 2025-07-03 NOTE — CARE COORDINATION
Ambulatory Care Coordination Note     7/3/2025 12:22 PM     Patient outreach attempt by this ACM today to perform care management follow up . ACM was unable to reach the patient by telephone today;   left voice message requesting a return phone call to this ACM.     ACM: Shyam Henderson RN     Care Summary Note: na    PCP/Specialist follow up:   Future Appointments         Provider Specialty Dept Phone    7/17/2025 8:00 AM [facility] MED ONC FAST TRACK 2 Infusion Therapy [phone]    7/17/2025 9:00 AM [facility] ONC BED 2 Infusion Therapy [phone]    7/22/2025 10:50 AM Jesus Alvarado, DO Family Medicine [phone]    7/24/2025 8:00 AM [facility] MED ONC FAST TRACK 2 Infusion Therapy [phone]    7/24/2025 8:45 AM [facility] MED ONC CHAIR 2 Infusion Therapy [phone]    7/28/2025 11:00 AM [facility] MED ONC FAST TRACK 3 Infusion Therapy [phone]    7/28/2025 11:30 AM Stephen Decker MD Oncology [phone]    8/1/2025 12:00 PM Chirag Singh MD Cardiology [phone]            Follow Up:   Plan for next ACM outreach in approximately 2 weeks to complete:  - disease specific assessments  - education .

## 2025-07-07 ENCOUNTER — TELEPHONE (OUTPATIENT)
Dept: FAMILY MEDICINE CLINIC | Age: 75
End: 2025-07-07

## 2025-07-07 DIAGNOSIS — E11.65 TYPE 2 DIABETES MELLITUS WITH HYPERGLYCEMIA, WITHOUT LONG-TERM CURRENT USE OF INSULIN (HCC): ICD-10-CM

## 2025-07-07 RX ORDER — GABAPENTIN 300 MG/1
300 CAPSULE ORAL 3 TIMES DAILY
Qty: 90 CAPSULE | Refills: 1 | Status: SHIPPED | OUTPATIENT
Start: 2025-07-07 | End: 2025-09-05

## 2025-07-07 RX ORDER — GLIPIZIDE 5 MG/1
5 TABLET ORAL DAILY
Qty: 90 TABLET | Refills: 0 | Status: CANCELLED | OUTPATIENT
Start: 2025-07-07

## 2025-07-07 RX ORDER — GLIPIZIDE 5 MG/1
5 TABLET ORAL DAILY
Qty: 90 TABLET | Refills: 0 | Status: SHIPPED | OUTPATIENT
Start: 2025-07-07

## 2025-07-07 RX ORDER — GABAPENTIN 300 MG/1
300 CAPSULE ORAL 3 TIMES DAILY
Qty: 90 CAPSULE | Refills: 1 | Status: CANCELLED | OUTPATIENT
Start: 2025-07-07 | End: 2025-09-05

## 2025-07-07 NOTE — TELEPHONE ENCOUNTER
Name of Medication(s) Requested:  Requested Prescriptions     Pending Prescriptions Disp Refills    gabapentin (NEURONTIN) 300 MG capsule 90 capsule 1     Sig: Take 1 capsule by mouth 3 times daily for 180 doses. 1 capsule in the am 2 capsules in the evening    glipiZIDE (GLUCOTROL) 5 MG tablet 90 tablet 0     Sig: Take 1 tablet by mouth daily       Medication is on current medication list Yes    Dosage and directions were verified? Yes    Quantity verified: 90 day supply     Pharmacy Verified?  Yes    Last Appointment:  1/31/2025    Future appts:  Future Appointments   Date Time Provider Department Center   7/17/2025  8:00 AM [facility] MED ONC FAST TRACK 2 [facility] Med [facility]   7/17/2025  9:00 AM [facility] MED ONC BED 2 [facility] Med [facility]   7/22/2025 10:50 AM Jesus Alvarado,  [facility] DEP   7/24/2025  8:00 AM [facility] MED ONC FAST TRACK 2 [facility] Med On[facility]   7/24/2025  8:45 AM [facility] ONC CHAIR 2 [facility] Med Onc [facility]   7/28/2025 11:00 AM [facility] ONC FAST TRACK 3 [facility] Med [facility]   7/28/2025 11:30 AM Stephen Decker MD MED ONC [facility]   8/1/2025 12:00 PM [facility]        (If no appt send self scheduling link. .REFILLAPPT)  Scheduling request sent?     [] Yes  [] No    Does patient need updated?  [] Yes  [] No

## 2025-07-11 ENCOUNTER — HOSPITAL ENCOUNTER (EMERGENCY)
Age: 75
Discharge: HOME | End: 2025-07-12
Attending: EMERGENCY MEDICINE
Payer: MEDICARE

## 2025-07-11 ENCOUNTER — CARE COORDINATION (OUTPATIENT)
Dept: CARE COORDINATION | Age: 75
End: 2025-07-11

## 2025-07-11 ENCOUNTER — APPOINTMENT (OUTPATIENT)
Dept: GENERAL RADIOLOGY | Age: 75
End: 2025-07-11
Payer: MEDICARE

## 2025-07-11 VITALS
RESPIRATION RATE: 19 BRPM | TEMPERATURE: 98.2 F | HEART RATE: 94 BPM | OXYGEN SATURATION: 100 % | SYSTOLIC BLOOD PRESSURE: 145 MMHG | DIASTOLIC BLOOD PRESSURE: 50 MMHG

## 2025-07-11 DIAGNOSIS — D64.9 ANEMIA REQUIRING TRANSFUSIONS: Primary | ICD-10-CM

## 2025-07-11 DIAGNOSIS — R73.9 HYPERGLYCEMIA: ICD-10-CM

## 2025-07-11 DIAGNOSIS — N30.00 ACUTE CYSTITIS WITHOUT HEMATURIA: ICD-10-CM

## 2025-07-11 LAB
ALBUMIN SERPL-MCNC: 2.9 G/DL (ref 3.5–5.2)
ALP SERPL-CCNC: 93 U/L (ref 35–104)
ALT SERPL-CCNC: 26 U/L (ref 0–35)
ANION GAP SERPL CALCULATED.3IONS-SCNC: 11 MMOL/L (ref 7–16)
AST SERPL-CCNC: 35 U/L (ref 0–35)
B-OH-BUTYR SERPL-MCNC: 0.13 MMOL/L (ref 0.02–0.27)
BACTERIA URNS QL MICRO: ABNORMAL
BASOPHILS # BLD: 0.02 K/UL (ref 0–0.2)
BASOPHILS NFR BLD: 0 % (ref 0–2)
BILIRUB SERPL-MCNC: 0.4 MG/DL (ref 0–1.2)
BILIRUB UR QL STRIP: NEGATIVE
BNP SERPL-MCNC: 88 PG/ML (ref 0–450)
BUN SERPL-MCNC: 14 MG/DL (ref 8–23)
CALCIUM SERPL-MCNC: 8.7 MG/DL (ref 8.8–10.2)
CHLORIDE SERPL-SCNC: 104 MMOL/L (ref 98–107)
CHP ED QC CHECK: YES
CLARITY UR: CLEAR
CO2 SERPL-SCNC: 18 MMOL/L (ref 22–29)
COLOR UR: YELLOW
CREAT SERPL-MCNC: 0.7 MG/DL (ref 0.5–1)
EKG ATRIAL RATE: 90 BPM
EKG P AXIS: 32 DEGREES
EKG P-R INTERVAL: 158 MS
EKG Q-T INTERVAL: 396 MS
EKG QRS DURATION: 82 MS
EKG QTC CALCULATION (BAZETT): 484 MS
EKG R AXIS: -28 DEGREES
EKG T AXIS: -4 DEGREES
EKG VENTRICULAR RATE: 90 BPM
EOSINOPHIL # BLD: 0.32 K/UL (ref 0.05–0.5)
EOSINOPHILS RELATIVE PERCENT: 6 % (ref 0–6)
EPI CELLS #/AREA URNS HPF: ABNORMAL /HPF
ERYTHROCYTE [DISTWIDTH] IN BLOOD BY AUTOMATED COUNT: 18.2 % (ref 11.5–15)
GFR, ESTIMATED: >90 ML/MIN/1.73M2
GLUCOSE BLD-MCNC: 343 MG/DL
GLUCOSE BLD-MCNC: 343 MG/DL (ref 74–99)
GLUCOSE BLD-MCNC: 418 MG/DL
GLUCOSE BLD-MCNC: 418 MG/DL (ref 74–99)
GLUCOSE SERPL-MCNC: 456 MG/DL (ref 74–99)
GLUCOSE UR STRIP-MCNC: >=1000 MG/DL
HCT VFR BLD AUTO: 20.4 % (ref 34–48)
HCT VFR BLD AUTO: 23.3 % (ref 34–48)
HGB BLD-MCNC: 6.5 G/DL (ref 11.5–15.5)
HGB BLD-MCNC: 7.4 G/DL (ref 11.5–15.5)
HGB UR QL STRIP.AUTO: ABNORMAL
IMM GRANULOCYTES # BLD AUTO: <0.03 K/UL (ref 0–0.58)
IMM GRANULOCYTES NFR BLD: 0 % (ref 0–5)
KETONES UR STRIP-MCNC: NEGATIVE MG/DL
LEUKOCYTE ESTERASE UR QL STRIP: ABNORMAL
LYMPHOCYTES NFR BLD: 0.85 K/UL (ref 1.5–4)
LYMPHOCYTES RELATIVE PERCENT: 16 % (ref 20–42)
MAGNESIUM SERPL-MCNC: 2 MG/DL (ref 1.6–2.4)
MCH RBC QN AUTO: 26.7 PG (ref 26–35)
MCHC RBC AUTO-ENTMCNC: 31.9 G/DL (ref 32–34.5)
MCV RBC AUTO: 84 FL (ref 80–99.9)
MONOCYTES NFR BLD: 0.58 K/UL (ref 0.1–0.95)
MONOCYTES NFR BLD: 11 % (ref 2–12)
NEUTROPHILS NFR BLD: 67 % (ref 43–80)
NEUTS SEG NFR BLD: 3.61 K/UL (ref 1.8–7.3)
NITRITE UR QL STRIP: POSITIVE
PH UR STRIP: 6 [PH] (ref 5–8)
PH VENOUS: 7.41 (ref 7.35–7.45)
PLATELET # BLD AUTO: 105 K/UL (ref 130–450)
PMV BLD AUTO: 10.4 FL (ref 7–12)
POTASSIUM SERPL-SCNC: 3.9 MMOL/L (ref 3.5–5.1)
PROT SERPL-MCNC: 5.5 G/DL (ref 6.4–8.3)
PROT UR STRIP-MCNC: NEGATIVE MG/DL
RBC # BLD AUTO: 2.43 M/UL (ref 3.5–5.5)
RBC #/AREA URNS HPF: ABNORMAL /HPF
SODIUM SERPL-SCNC: 133 MMOL/L (ref 136–145)
SP GR UR STRIP: 1.01 (ref 1–1.03)
TROPONIN I SERPL HS-MCNC: 10 NG/L (ref 0–14)
UROBILINOGEN UR STRIP-ACNC: 0.2 EU/DL (ref 0–1)
WBC #/AREA URNS HPF: ABNORMAL /HPF
WBC OTHER # BLD: 5.4 K/UL (ref 4.5–11.5)

## 2025-07-11 PROCEDURE — 85018 HEMOGLOBIN: CPT

## 2025-07-11 PROCEDURE — 6360000002 HC RX W HCPCS

## 2025-07-11 PROCEDURE — 86901 BLOOD TYPING SEROLOGIC RH(D): CPT

## 2025-07-11 PROCEDURE — 99285 EMERGENCY DEPT VISIT HI MDM: CPT

## 2025-07-11 PROCEDURE — 81001 URINALYSIS AUTO W/SCOPE: CPT

## 2025-07-11 PROCEDURE — 82962 GLUCOSE BLOOD TEST: CPT

## 2025-07-11 PROCEDURE — 93010 ELECTROCARDIOGRAM REPORT: CPT | Performed by: INTERNAL MEDICINE

## 2025-07-11 PROCEDURE — 71045 X-RAY EXAM CHEST 1 VIEW: CPT

## 2025-07-11 PROCEDURE — P9016 RBC LEUKOCYTES REDUCED: HCPCS

## 2025-07-11 PROCEDURE — 96374 THER/PROPH/DIAG INJ IV PUSH: CPT

## 2025-07-11 PROCEDURE — 86900 BLOOD TYPING SEROLOGIC ABO: CPT

## 2025-07-11 PROCEDURE — 83735 ASSAY OF MAGNESIUM: CPT

## 2025-07-11 PROCEDURE — 86923 COMPATIBILITY TEST ELECTRIC: CPT

## 2025-07-11 PROCEDURE — 93005 ELECTROCARDIOGRAM TRACING: CPT

## 2025-07-11 PROCEDURE — 83880 ASSAY OF NATRIURETIC PEPTIDE: CPT

## 2025-07-11 PROCEDURE — 2500000003 HC RX 250 WO HCPCS

## 2025-07-11 PROCEDURE — 82800 BLOOD PH: CPT

## 2025-07-11 PROCEDURE — 86850 RBC ANTIBODY SCREEN: CPT

## 2025-07-11 PROCEDURE — 87086 URINE CULTURE/COLONY COUNT: CPT

## 2025-07-11 PROCEDURE — 85025 COMPLETE CBC W/AUTO DIFF WBC: CPT

## 2025-07-11 PROCEDURE — 84484 ASSAY OF TROPONIN QUANT: CPT

## 2025-07-11 PROCEDURE — 80053 COMPREHEN METABOLIC PANEL: CPT

## 2025-07-11 PROCEDURE — 87077 CULTURE AEROBIC IDENTIFY: CPT

## 2025-07-11 PROCEDURE — 82010 KETONE BODYS QUAN: CPT

## 2025-07-11 PROCEDURE — 36430 TRANSFUSION BLD/BLD COMPNT: CPT

## 2025-07-11 PROCEDURE — 85014 HEMATOCRIT: CPT

## 2025-07-11 RX ORDER — CEFDINIR 300 MG/1
300 CAPSULE ORAL 2 TIMES DAILY
Qty: 14 CAPSULE | Refills: 0 | Status: SHIPPED | OUTPATIENT
Start: 2025-07-11 | End: 2025-07-18

## 2025-07-11 RX ORDER — SODIUM CHLORIDE 9 MG/ML
INJECTION, SOLUTION INTRAVENOUS PRN
Status: DISCONTINUED | OUTPATIENT
Start: 2025-07-11 | End: 2025-07-12 | Stop reason: HOSPADM

## 2025-07-11 RX ADMIN — WATER 1000 MG: 1 INJECTION INTRAMUSCULAR; INTRAVENOUS; SUBCUTANEOUS at 20:52

## 2025-07-11 ASSESSMENT — LIFESTYLE VARIABLES
HOW OFTEN DO YOU HAVE A DRINK CONTAINING ALCOHOL: NEVER
HOW MANY STANDARD DRINKS CONTAINING ALCOHOL DO YOU HAVE ON A TYPICAL DAY: PATIENT DOES NOT DRINK

## 2025-07-11 NOTE — ED PROVIDER NOTES
[facility] EMERGENCY DEPARTMENT  EMERGENCY DEPARTMENT ENCOUNTER        Pt Name: Haven Barber  MRN: [id number]  Birthdate 1950  Date of evaluation: 7/11/2025  Provider: Lilli Barry MD  PCP: Jesus Alvarado DO  Note Started: 2:20 PM EDT 7/11/25    CHIEF COMPLAINT       Chief Complaint   Patient presents with    Shortness of Breath     Xdays, hx Bechet's disease. 97% RA.       HISTORY OF PRESENT ILLNESS: 1 or more Elements   History From: Patient    Limitations to history : None    Haven Barber is a 75 y.o. female with history of Behcet's disease, GAVE (gastric antral vascular ectasia), DM, HTN, anemia requiring blood transfusions who presents to the ED for shortness of breath.  Patient reports feeling increasingly short of breath over the last couple days.  Worse with exertion.  Associated generalized weakness and fatigue.  Patient denies any chest pain.  Per patient, this feels like when she becomes anemic.  She notes being here recently for anemia and states she has chronic melena.  Patient follows with Dr. Tyson and has seen him recently.  Patient has had previous blood transfusions. Denies any reaction to transfusions.  Patient is not on any anticoagulants.  No LOC, fall, fever, cough, congestion, abdominal pain, nausea, vomiting.      Nursing Notes were all reviewed and agreed with or any disagreements were addressed in the HPI.    REVIEW OF EXTERNAL NOTES :         6/30/2025: Patient presented the ED for fatigue and was found to be anemic.    5/19/2025: Patient was seen by Dr. Tyson    REVIEW OF SYSTEMS :        Positives and Pertinent negatives as per HPI.     SURGICAL HISTORY     Past Surgical History:   Procedure Laterality Date    CARPAL TUNNEL RELEASE Bilateral     CATARACT REMOVAL Right     COLONOSCOPY      OTHER SURGICAL HISTORY Bilateral 01/21/2021    BILATERAL L3,L4 MEDIAL BRANCH AND L5 DORSAL RAMUS RADIOFREQUENCY    PAIN MANAGEMENT PROCEDURE  Return precautions given.  Patient understands and is agreeable.  Stable for discharge at this time.      CONSULTS:  None      FINAL IMPRESSION      1. Anemia requiring transfusions    2. Acute cystitis without hematuria    3. Hyperglycemia          DISPOSITION/PLAN     DISPOSITION Decision To Discharge 07/11/2025 09:16:53 PM    DISPOSITION  Disposition: Discharge to home  Patient condition is stable    7/11/25, 2:20 PM EDT.    Lilli Barry MD  Emergency Medicine    PATIENT REFERRED TO:  Jesus Alavrado DO  [address] [address]  [phone]    Call in 3 days  For hospital follow-up    Mansoor Tyson MD  [address] [id number] [phone]    Call   For hospital follow-up    [facility] Emergency Department  [address] [id number] [phone]    As needed, If symptoms worsen      DISCHARGE MEDICATIONS:  New Prescriptions    CEFDINIR (OMNICEF) 300 MG CAPSULE    Take 1 capsule by mouth 2 times daily for 7 days       DISCONTINUED MEDICATIONS:  Discontinued Medications    No medications on file              (Please note that portions of this note were completed with a voice recognition program.  Efforts were made to edit the dictations but occasionally words are mis-transcribed.)    Lilli Barry MD (electronically signed)

## 2025-07-11 NOTE — ED NOTES
Pt refusing to be poked and have peripheral IV placed. Pt insisting RN access port as that is was it was placed for. Risks explained to patients and patient verbalized understanding. Post accessed using sterile technique. Pt tolerated well. No complications

## 2025-07-11 NOTE — CARE COORDINATION
[facility] placed a follow call to Haven.  Haven states she sees her PCP next week and is considering Hospice for herself.        Haven states that the infusion center didn't have her on the schedule for yesterday.  She states she always goes on Thursday's and doesn't know what happened with yesterday's appointment.  She states she will most likely have to go to the ER today.  [facility] inquired if Haven did need ER and she states she does and will need an ambulance.  [facility] tried to reach [facility] for coordination on this; AC unavailable; Teams message sent requesting a call.        Haven states she doesn't want the pill packs.   [facility] reviewed that the priority is to get Haven to the ER if that is the need.   Haven states she is very weak.   She will need an ambulance to transport her.   Wants to go to [facility] but doesn't have any way to get there.  Haven states she spoke with Bartolo Shoemaker CM today and felt there wasn't a lot of help given.  Haven states Bartolo did say they would cover transport to [facility] but didn't help with getting that set up and Haven is too weak to do it or recall what/where/who she as to call.   [facility] assisted with a call to Bartolo but there was no answer; [facility] requested supervisor and call was transferred to Michelle MERCADO Who was not in the office until 7/21.  [facility] went back to the front office staff to see if there was anyone else to assist.  [facility] was answering.  Sherry took a verbal message to take to Bartolo.      Both ALANNAH Howe and Bartolo shoemaker [facility] called back and joined merged call.  There is no transportation on emergent basis to get to [facility] unless an ambulance is called.  Bartolo is not certain if there would be a bill for the ambulance if haven used it to get to [facility].   Haven states she can access [facility] local labs but her specialist is Dr. Oneal at [facility].   Offered ambulance to be called to take Haven to local [facility] hospital and if admitted she can request a tx to [facility] to be cared for by specialist.       Haven kept

## 2025-07-11 NOTE — ED NOTES
Pt taken to bathroom to collect urine sample. Pt states she started peeing as soon as she sat down and was not able to catch urine. Primary RN notified.

## 2025-07-12 LAB
ABO/RH: NORMAL
ANTIBODY SCREEN: NEGATIVE
ARM BAND NUMBER: NORMAL
BLOOD BANK BLOOD PRODUCT EXPIRATION DATE: NORMAL
BLOOD BANK DISPENSE STATUS: NORMAL
BLOOD BANK ISBT PRODUCT BLOOD TYPE: 6200
BLOOD BANK PRODUCT CODE: NORMAL
BLOOD BANK SAMPLE EXPIRATION: NORMAL
BLOOD BANK UNIT TYPE AND RH: NORMAL
BPU ID: NORMAL
COMPONENT: NORMAL
CROSSMATCH RESULT: NORMAL
EKG ATRIAL RATE: 101 BPM
EKG P AXIS: 63 DEGREES
EKG P-R INTERVAL: 172 MS
EKG Q-T INTERVAL: 374 MS
EKG QRS DURATION: 80 MS
EKG QTC CALCULATION (BAZETT): 484 MS
EKG R AXIS: -32 DEGREES
EKG T AXIS: -1 DEGREES
EKG VENTRICULAR RATE: 101 BPM
TRANSFUSION STATUS: NORMAL
UNIT DIVISION: 0
UNIT ISSUE DATE/TIME: NORMAL

## 2025-07-12 NOTE — DISCHARGE INSTRUCTIONS
Please call and follow-up with your family doctor and GI doctor.   your prescription from the pharmacy.  Return to the ED if your symptoms worsen.

## 2025-07-12 NOTE — ED NOTES
Per pt she does not heparinize her port prior to de-accessing it. Per pt, she has never had heparin in it, just flush with normal saline and deaccess.

## 2025-07-12 NOTE — ED NOTES
No other wheelchair transport available from [facility], [facility], or [facility]. [facility] ETA is 5am.I also called independent taxi but pt states she has 4 steps into her house she would need assistance up and independent taxi will not help people in their home or up steps.

## 2025-07-13 LAB
MICROORGANISM SPEC CULT: ABNORMAL
SERVICE CMNT-IMP: ABNORMAL
SPECIMEN DESCRIPTION: ABNORMAL

## 2025-07-14 ENCOUNTER — CARE COORDINATION (OUTPATIENT)
Dept: CARE COORDINATION | Age: 75
End: 2025-07-14

## 2025-07-14 NOTE — CARE COORDINATION
Ambulatory Care Coordination Note     2025 3:32 PM     Patient Current Location:  Home: [address] [place] [address]  [place] [address]     ACM contacted the patient by telephone. Verified name and  with patient as identifiers.         ACM: Awilda Sparks RN     Challenges to be reviewed by the provider   Additional needs identified to be addressed with provider Yes  Palliative care               Method of communication with provider: chart routing.    Utilization: Has the patient been seen in the ED since your last call? Yes,   Discharge Date: 25   Discharge Facility: [facility]  Reason for ED Visit: shortness of breath  Visit Diagnosis: anemia requiring transfusion    Number of ED visits in the last 6 months: 8      Do you have any ongoing symptoms? Yes, my symptoms have improved.   Current symptoms: fatigue.    Did you call your PCP prior to going to the ED? No, did not call the PCP office.     Review of Discharge Instructions:   [x] AVS discharge instructions  [x] Right Care, Right Place, Right Time document  [x] Medication changes  [x] Follow up appointments  [] Referral follow up   []        Care Summary Note:   ACM contacted Haven for ED follow up. She states she has chronic anemia and receives weekly blood transfusions. She states she may be \"ready for hospice\". ACM reviewed palliative care as an alternative to hospice.   future appointments were reviewed.    Offered patient enrollment in the Remote Patient Monitoring (RPM) program for in-home monitoring: Yes, but did not enroll at this time: controlled chronic disease management.     Assessments Completed:   Care Coordination Interventions    Referral from Primary Care Provider: No  Suggested Interventions and Community Resources  Fall Risk Prevention: Completed (Comment: fall prevention education)  Palliative Care: Not Started  Social Work: Completed  Other Services: Declined (Comment: RPM)  Transportation Support: Completed

## 2025-07-14 NOTE — CARE COORDINATION
[facility] received a three way call from ELISEO Mccoy (social work) and Haven.  Haven has not had a blood transfusion in two weeks.  She typically receives transfusions weekly.  Haven is SOB, extremely lethargic and experiencing \"brain fog\".  Haven is very reluctant to go to the hospital to be evaluated.  She states she knows what the \"end game\" of her condition means and is considering hospice.  Both [place] and [facility] offered reassurance and emphasized her need for care immediately.  She agreed to [facility] call [place] in 15 minutes (wants to finish her lunch).  Call ended and EMS called 15 minutes later.

## 2025-07-17 ENCOUNTER — TELEPHONE (OUTPATIENT)
Age: 75
End: 2025-07-17

## 2025-07-17 ENCOUNTER — HOSPITAL ENCOUNTER (OUTPATIENT)
Dept: INFUSION THERAPY | Age: 75
Discharge: HOME | End: 2025-07-17

## 2025-07-17 DIAGNOSIS — D62 ACUTE BLOOD LOSS ANEMIA: Primary | ICD-10-CM

## 2025-07-17 RX ORDER — SODIUM CHLORIDE 0.9 % (FLUSH) 0.9 %
5-40 SYRINGE (ML) INJECTION PRN
Status: DISCONTINUED | OUTPATIENT
Start: 2025-07-17 | End: 2025-07-18 | Stop reason: HOSPADM

## 2025-07-17 RX ORDER — HEPARIN 100 UNIT/ML
500 SYRINGE INTRAVENOUS PRN
Status: DISCONTINUED | OUTPATIENT
Start: 2025-07-17 | End: 2025-07-18 | Stop reason: HOSPADM

## 2025-07-17 RX ORDER — SODIUM CHLORIDE 9 MG/ML
25 INJECTION, SOLUTION INTRAVENOUS PRN
OUTPATIENT
Start: 2025-07-17

## 2025-07-17 RX ORDER — SODIUM CHLORIDE 0.9 % (FLUSH) 0.9 %
5-40 SYRINGE (ML) INJECTION PRN
Status: CANCELLED | OUTPATIENT
Start: 2025-07-17

## 2025-07-17 RX ORDER — HEPARIN 100 UNIT/ML
500 SYRINGE INTRAVENOUS PRN
Status: CANCELLED | OUTPATIENT
Start: 2025-07-17

## 2025-07-17 NOTE — TELEPHONE ENCOUNTER
07/17/25 Patient called and stated that she is canceling this weeks appointment. She is going to visit her PCP soon and is considering Hospice care. She did want to leave next week's appointment on, just incase she decides differently. She stated that she will call us to let us know either way.

## 2025-07-18 ENCOUNTER — CARE COORDINATION (OUTPATIENT)
Dept: CARE COORDINATION | Age: 75
End: 2025-07-18

## 2025-07-18 NOTE — CARE COORDINATION
[facility] placed a follow up call to Haven.   She states today she is a little dizzy and weak.  She also reports having a migraine today.      [facility] reviewed with Haven that she didn't go for her transfusion this week (yesterday) but did have herself left on the schedule for next week.   Haven is aware that without them she'll die but she also is tired of going weekly for them.        Haven did ask questions about the difference between palliative care and hospice care.  [facility] answered questions Haven had.   Haven asked what benefits come from Palliative care, [facility] reviewed Symptom relief, education or advocacy, social support, and spiritual support if desired.   Reviewed it can be a volunteer, nurse, , or spiritual provider, as well as the doctor.  It does not replace the care team in place now and should not be reason for [facility] to remove [facility] HHA services that Haven needs.       Reflective listening provided to Haven while she talked about her recent ER experience.  Blameless apology was offered for Haven's experience.          [facility] placed a merged call to [facility] ([facility]) Palliative care to inquire if they will provide transfusions at home.  Spoke with Nayeli who was able to talk with Haven and get information so Haven could receive a return call from a coordinator or nurse that can call Haven back and answer all questions especially if they are able to do the weekly blood transfusions.  Haven agrees this would be extremely helpful to her.      After giving all information Nayeli found that they do not have providers for palliative care in Haven's area.  They offer Hospice care which Haven isn't sure she wants at this time.      Haven will still talk to [facility] while [facility] looks for other palliative care options for Haven.         [facility] reviewed with Haven her PCP OV on 7/22 and the importance of keeping her next transfusion appointment too.  Haven verbalized understanding and agreement.

## 2025-07-21 NOTE — PROGRESS NOTES
her son and talking about her diagnosis and prognosis with him  - Discussed also the potential of moving into assisted living in [place] and having care there  - Patient is to consider options at this time    2. Severe episode of recurrent major depressive disorder, without psychotic features (HCC)  -Agreeable to restart Prozac regularly for depression  - FLUoxetine (PROZAC) 20 MG capsule; Take 1 capsule by mouth daily  Dispense: 90 capsule; Refill: 2    3. Type 2 diabetes mellitus with hyperglycemia, without long-term current use of insulin (HCC)  -Unable to obtain accurate A1c with her blood count today  - Will continue to monitor    4. Migraine without aura and without status migrainosus, not intractable  -Patient previously had Botox injections to help with migraines at [facility] but was unable to continue driving there  -Requesting local evaluation  - Sahra Garcia DO, Physical Medicine and Rehabilitation, [place]    5. Type 2 diabetes mellitus with hyperglycemia, unspecified whether long term insulin use (HCC)  -As above  - Dulaglutide 4.5 MG/0.5ML SOAJ; Inject 4.5 mg into the skin once a week  Dispense: 2 mL; Refill: 3      Return to office: Return in about 6 months (around 1/22/2026).    Patient agrees with the above stated plan.    Haven received counseling on the following healthy behaviors: nutrition, exercise, and medication adherence.    As above.  Call or go to ED immediately if symptoms worsen or persist.  Follow up in 6 mo regarding chronic conditions/AWV, or sooner if necessary.      Counseled regarding above diagnosis, including possible risks and complications, especially if left uncontrolled. I encourage you to do some reading and education about your health. The American Academy [employer] provides information on many health conditions:http://familydoctor.org     Counseled regarding the possible side effects, risks, benefits and alternatives to treatment; patient

## 2025-07-22 ENCOUNTER — OFFICE VISIT (OUTPATIENT)
Dept: FAMILY MEDICINE CLINIC | Age: 75
End: 2025-07-22
Payer: MEDICARE

## 2025-07-22 VITALS
OXYGEN SATURATION: 97 % | WEIGHT: 192 LBS | SYSTOLIC BLOOD PRESSURE: 118 MMHG | BODY MASS INDEX: 34.02 KG/M2 | HEIGHT: 63 IN | TEMPERATURE: 97.3 F | HEART RATE: 89 BPM | DIASTOLIC BLOOD PRESSURE: 58 MMHG

## 2025-07-22 DIAGNOSIS — F33.2 SEVERE EPISODE OF RECURRENT MAJOR DEPRESSIVE DISORDER, WITHOUT PSYCHOTIC FEATURES (HCC): ICD-10-CM

## 2025-07-22 DIAGNOSIS — E11.65 TYPE 2 DIABETES MELLITUS WITH HYPERGLYCEMIA, WITHOUT LONG-TERM CURRENT USE OF INSULIN (HCC): ICD-10-CM

## 2025-07-22 DIAGNOSIS — E11.65 TYPE 2 DIABETES MELLITUS WITH HYPERGLYCEMIA, UNSPECIFIED WHETHER LONG TERM INSULIN USE (HCC): ICD-10-CM

## 2025-07-22 DIAGNOSIS — G43.009 MIGRAINE WITHOUT AURA AND WITHOUT STATUS MIGRAINOSUS, NOT INTRACTABLE: ICD-10-CM

## 2025-07-22 DIAGNOSIS — K31.819 GAVE (GASTRIC ANTRAL VASCULAR ECTASIA): Primary | ICD-10-CM

## 2025-07-22 PROCEDURE — 3078F DIAST BP <80 MM HG: CPT | Performed by: STUDENT IN AN ORGANIZED HEALTH CARE EDUCATION/TRAINING PROGRAM

## 2025-07-22 PROCEDURE — 3017F COLORECTAL CA SCREEN DOC REV: CPT | Performed by: STUDENT IN AN ORGANIZED HEALTH CARE EDUCATION/TRAINING PROGRAM

## 2025-07-22 PROCEDURE — 2022F DILAT RTA XM EVC RTNOPTHY: CPT | Performed by: STUDENT IN AN ORGANIZED HEALTH CARE EDUCATION/TRAINING PROGRAM

## 2025-07-22 PROCEDURE — 3074F SYST BP LT 130 MM HG: CPT | Performed by: STUDENT IN AN ORGANIZED HEALTH CARE EDUCATION/TRAINING PROGRAM

## 2025-07-22 PROCEDURE — 99214 OFFICE O/P EST MOD 30 MIN: CPT | Performed by: STUDENT IN AN ORGANIZED HEALTH CARE EDUCATION/TRAINING PROGRAM

## 2025-07-22 PROCEDURE — 1123F ACP DISCUSS/DSCN MKR DOCD: CPT | Performed by: STUDENT IN AN ORGANIZED HEALTH CARE EDUCATION/TRAINING PROGRAM

## 2025-07-22 PROCEDURE — G8427 DOCREV CUR MEDS BY ELIG CLIN: HCPCS | Performed by: STUDENT IN AN ORGANIZED HEALTH CARE EDUCATION/TRAINING PROGRAM

## 2025-07-22 PROCEDURE — G8417 CALC BMI ABV UP PARAM F/U: HCPCS | Performed by: STUDENT IN AN ORGANIZED HEALTH CARE EDUCATION/TRAINING PROGRAM

## 2025-07-22 PROCEDURE — 1090F PRES/ABSN URINE INCON ASSESS: CPT | Performed by: STUDENT IN AN ORGANIZED HEALTH CARE EDUCATION/TRAINING PROGRAM

## 2025-07-22 PROCEDURE — 1159F MED LIST DOCD IN RCRD: CPT | Performed by: STUDENT IN AN ORGANIZED HEALTH CARE EDUCATION/TRAINING PROGRAM

## 2025-07-22 PROCEDURE — G8400 PT W/DXA NO RESULTS DOC: HCPCS | Performed by: STUDENT IN AN ORGANIZED HEALTH CARE EDUCATION/TRAINING PROGRAM

## 2025-07-22 PROCEDURE — 1036F TOBACCO NON-USER: CPT | Performed by: STUDENT IN AN ORGANIZED HEALTH CARE EDUCATION/TRAINING PROGRAM

## 2025-07-22 PROCEDURE — 3046F HEMOGLOBIN A1C LEVEL >9.0%: CPT | Performed by: STUDENT IN AN ORGANIZED HEALTH CARE EDUCATION/TRAINING PROGRAM

## 2025-07-22 RX ORDER — FLUCONAZOLE 150 MG/1
TABLET ORAL
Status: ON HOLD | COMMUNITY
End: 2025-07-26 | Stop reason: HOSPADM

## 2025-07-22 ASSESSMENT — ENCOUNTER SYMPTOMS
ABDOMINAL PAIN: 0
SHORTNESS OF BREATH: 0
COUGH: 0
BLOOD IN STOOL: 1
NAUSEA: 0
DIARRHEA: 1

## 2025-07-22 ASSESSMENT — COLUMBIA-SUICIDE SEVERITY RATING SCALE - C-SSRS
1. WITHIN THE PAST MONTH, HAVE YOU WISHED YOU WERE DEAD OR WISHED YOU COULD GO TO SLEEP AND NOT WAKE UP?: YES
6. HAVE YOU EVER DONE ANYTHING, STARTED TO DO ANYTHING, OR PREPARED TO DO ANYTHING TO END YOUR LIFE?: NO
2. HAVE YOU ACTUALLY HAD ANY THOUGHTS OF KILLING YOURSELF?: NO

## 2025-07-22 ASSESSMENT — PATIENT HEALTH QUESTIONNAIRE - PHQ9
7. TROUBLE CONCENTRATING ON THINGS, SUCH AS READING THE NEWSPAPER OR WATCHING TELEVISION: NOT AT ALL
SUM OF ALL RESPONSES TO PHQ QUESTIONS 1-9: 17
SUM OF ALL RESPONSES TO PHQ QUESTIONS 1-9: 19
SUM OF ALL RESPONSES TO PHQ QUESTIONS 1-9: 19
5. POOR APPETITE OR OVEREATING: NEARLY EVERY DAY
SUM OF ALL RESPONSES TO PHQ QUESTIONS 1-9: 19
1. LITTLE INTEREST OR PLEASURE IN DOING THINGS: NEARLY EVERY DAY
3. TROUBLE FALLING OR STAYING ASLEEP: NEARLY EVERY DAY
10. IF YOU CHECKED OFF ANY PROBLEMS, HOW DIFFICULT HAVE THESE PROBLEMS MADE IT FOR YOU TO DO YOUR WORK, TAKE CARE OF THINGS AT HOME, OR GET ALONG WITH OTHER PEOPLE: VERY DIFFICULT
2. FEELING DOWN, DEPRESSED OR HOPELESS: NEARLY EVERY DAY
6. FEELING BAD ABOUT YOURSELF - OR THAT YOU ARE A FAILURE OR HAVE LET YOURSELF OR YOUR FAMILY DOWN: NOT AT ALL
4. FEELING TIRED OR HAVING LITTLE ENERGY: NEARLY EVERY DAY
8. MOVING OR SPEAKING SO SLOWLY THAT OTHER PEOPLE COULD HAVE NOTICED. OR THE OPPOSITE, BEING SO FIGETY OR RESTLESS THAT YOU HAVE BEEN MOVING AROUND A LOT MORE THAN USUAL: MORE THAN HALF THE DAYS
9. THOUGHTS THAT YOU WOULD BE BETTER OFF DEAD, OR OF HURTING YOURSELF: MORE THAN HALF THE DAYS

## 2025-07-24 ENCOUNTER — RESULTS FOLLOW-UP (OUTPATIENT)
Age: 75
End: 2025-07-24

## 2025-07-24 ENCOUNTER — HOSPITAL ENCOUNTER (INPATIENT)
Age: 75
LOS: 2 days | Discharge: HOME | DRG: 378 | End: 2025-07-26
Attending: EMERGENCY MEDICINE | Admitting: STUDENT IN AN ORGANIZED HEALTH CARE EDUCATION/TRAINING PROGRAM
Payer: MEDICARE

## 2025-07-24 ENCOUNTER — CARE COORDINATION (OUTPATIENT)
Dept: CARE COORDINATION | Age: 75
End: 2025-07-24

## 2025-07-24 ENCOUNTER — HOSPITAL ENCOUNTER (OUTPATIENT)
Dept: INFUSION THERAPY | Age: 75
Discharge: HOME | DRG: 378 | End: 2025-07-24
Payer: MEDICARE

## 2025-07-24 DIAGNOSIS — K25.4 GASTROINTESTINAL HEMORRHAGE ASSOCIATED WITH GASTRIC ULCER: ICD-10-CM

## 2025-07-24 DIAGNOSIS — D64.9 ANEMIA, UNSPECIFIED TYPE: ICD-10-CM

## 2025-07-24 DIAGNOSIS — K92.1 MELENA: ICD-10-CM

## 2025-07-24 DIAGNOSIS — D50.0 IRON DEFICIENCY ANEMIA DUE TO CHRONIC BLOOD LOSS: ICD-10-CM

## 2025-07-24 DIAGNOSIS — D62 ACUTE BLOOD LOSS ANEMIA: Primary | ICD-10-CM

## 2025-07-24 DIAGNOSIS — Q27.30 AVM (ARTERIOVENOUS MALFORMATION): ICD-10-CM

## 2025-07-24 DIAGNOSIS — D64.9 ANEMIA REQUIRING TRANSFUSIONS: ICD-10-CM

## 2025-07-24 DIAGNOSIS — D64.9 SYMPTOMATIC ANEMIA: Primary | ICD-10-CM

## 2025-07-24 PROBLEM — Z71.89 GOALS OF CARE, COUNSELING/DISCUSSION: Status: ACTIVE | Noted: 2025-07-24

## 2025-07-24 PROBLEM — Z51.5 PALLIATIVE CARE ENCOUNTER: Status: ACTIVE | Noted: 2025-07-24

## 2025-07-24 LAB
ALBUMIN SERPL-MCNC: 2.9 G/DL (ref 3.5–5.2)
ALP SERPL-CCNC: 94 U/L (ref 35–104)
ALT SERPL-CCNC: 26 U/L (ref 0–35)
ANION GAP SERPL CALCULATED.3IONS-SCNC: 10 MMOL/L (ref 7–16)
ANION GAP SERPL CALCULATED.3IONS-SCNC: 12 MMOL/L (ref 7–16)
AST SERPL-CCNC: 37 U/L (ref 0–35)
BASOPHILS # BLD: 0.03 K/UL (ref 0–0.2)
BASOPHILS NFR BLD: 1 % (ref 0–2)
BILIRUB SERPL-MCNC: 0.6 MG/DL (ref 0–1.2)
BUN SERPL-MCNC: 8 MG/DL (ref 8–23)
BUN SERPL-MCNC: 8 MG/DL (ref 8–23)
CALCIUM SERPL-MCNC: 8.1 MG/DL (ref 8.8–10.2)
CALCIUM SERPL-MCNC: 8.2 MG/DL (ref 8.8–10.2)
CHLORIDE SERPL-SCNC: 103 MMOL/L (ref 98–107)
CHLORIDE SERPL-SCNC: 107 MMOL/L (ref 98–107)
CO2 SERPL-SCNC: 17 MMOL/L (ref 22–29)
CO2 SERPL-SCNC: 19 MMOL/L (ref 22–29)
CREAT SERPL-MCNC: 0.7 MG/DL (ref 0.5–1)
CREAT SERPL-MCNC: 0.7 MG/DL (ref 0.5–1)
EKG ATRIAL RATE: 85 BPM
EKG P AXIS: 51 DEGREES
EKG P-R INTERVAL: 198 MS
EKG Q-T INTERVAL: 398 MS
EKG QRS DURATION: 72 MS
EKG QTC CALCULATION (BAZETT): 473 MS
EKG R AXIS: -20 DEGREES
EKG T AXIS: -11 DEGREES
EKG VENTRICULAR RATE: 85 BPM
EOSINOPHIL # BLD: 0.44 K/UL (ref 0.05–0.5)
EOSINOPHILS RELATIVE PERCENT: 8 % (ref 0–6)
ERYTHROCYTE [DISTWIDTH] IN BLOOD BY AUTOMATED COUNT: 18.5 % (ref 11.5–15)
FERRITIN SERPL-MCNC: 14 NG/ML
GFR, ESTIMATED: 89 ML/MIN/1.73M2
GFR, ESTIMATED: >90 ML/MIN/1.73M2
GLUCOSE BLD-MCNC: 206 MG/DL (ref 74–99)
GLUCOSE BLD-MCNC: 267 MG/DL (ref 74–99)
GLUCOSE BLD-MCNC: 428 MG/DL (ref 74–99)
GLUCOSE SERPL-MCNC: 175 MG/DL (ref 74–99)
GLUCOSE SERPL-MCNC: 411 MG/DL (ref 74–99)
HCT VFR BLD AUTO: 16.7 % (ref 34–48)
HCT VFR BLD AUTO: 21.6 % (ref 34–48)
HGB BLD-MCNC: 5.1 G/DL (ref 11.5–15.5)
HGB BLD-MCNC: 7.2 G/DL (ref 11.5–15.5)
IMM GRANULOCYTES # BLD AUTO: <0.03 K/UL (ref 0–0.58)
IMM GRANULOCYTES NFR BLD: 0 % (ref 0–5)
IRON SATN MFR SERPL: 2 % (ref 15–50)
IRON SERPL-MCNC: 8 UG/DL (ref 37–145)
LYMPHOCYTES NFR BLD: 0.77 K/UL (ref 1.5–4)
LYMPHOCYTES RELATIVE PERCENT: 14 % (ref 20–42)
MCH RBC QN AUTO: 25 PG (ref 26–35)
MCHC RBC AUTO-ENTMCNC: 30.5 G/DL (ref 32–34.5)
MCV RBC AUTO: 81.9 FL (ref 80–99.9)
MONOCYTES NFR BLD: 0.62 K/UL (ref 0.1–0.95)
MONOCYTES NFR BLD: 11 % (ref 2–12)
NEUTROPHILS NFR BLD: 66 % (ref 43–80)
NEUTS SEG NFR BLD: 3.59 K/UL (ref 1.8–7.3)
PLATELET # BLD AUTO: 148 K/UL (ref 130–450)
PMV BLD AUTO: 11.2 FL (ref 7–12)
POTASSIUM SERPL-SCNC: 3.6 MMOL/L (ref 3.5–5.1)
POTASSIUM SERPL-SCNC: 3.7 MMOL/L (ref 3.5–5.1)
PROT SERPL-MCNC: 5.2 G/DL (ref 6.4–8.3)
RBC # BLD AUTO: 2.04 M/UL (ref 3.5–5.5)
SODIUM SERPL-SCNC: 132 MMOL/L (ref 136–145)
SODIUM SERPL-SCNC: 136 MMOL/L (ref 136–145)
TIBC SERPL-MCNC: 335 UG/DL (ref 250–450)
WBC OTHER # BLD: 5.5 K/UL (ref 4.5–11.5)

## 2025-07-24 PROCEDURE — 85014 HEMATOCRIT: CPT

## 2025-07-24 PROCEDURE — 83540 ASSAY OF IRON: CPT

## 2025-07-24 PROCEDURE — 6370000000 HC RX 637 (ALT 250 FOR IP): Performed by: STUDENT IN AN ORGANIZED HEALTH CARE EDUCATION/TRAINING PROGRAM

## 2025-07-24 PROCEDURE — 6370000000 HC RX 637 (ALT 250 FOR IP): Performed by: NURSE PRACTITIONER

## 2025-07-24 PROCEDURE — 82728 ASSAY OF FERRITIN: CPT

## 2025-07-24 PROCEDURE — 36591 DRAW BLOOD OFF VENOUS DEVICE: CPT

## 2025-07-24 PROCEDURE — 99222 1ST HOSP IP/OBS MODERATE 55: CPT | Performed by: NURSE PRACTITIONER

## 2025-07-24 PROCEDURE — 99223 1ST HOSP IP/OBS HIGH 75: CPT | Performed by: STUDENT IN AN ORGANIZED HEALTH CARE EDUCATION/TRAINING PROGRAM

## 2025-07-24 PROCEDURE — 6360000002 HC RX W HCPCS: Performed by: INTERNAL MEDICINE

## 2025-07-24 PROCEDURE — 83550 IRON BINDING TEST: CPT

## 2025-07-24 PROCEDURE — 85025 COMPLETE CBC W/AUTO DIFF WBC: CPT

## 2025-07-24 PROCEDURE — 80048 BASIC METABOLIC PNL TOTAL CA: CPT

## 2025-07-24 PROCEDURE — 6360000002 HC RX W HCPCS: Performed by: NURSE PRACTITIONER

## 2025-07-24 PROCEDURE — 85018 HEMOGLOBIN: CPT

## 2025-07-24 PROCEDURE — 82962 GLUCOSE BLOOD TEST: CPT

## 2025-07-24 PROCEDURE — 93010 ELECTROCARDIOGRAM REPORT: CPT | Performed by: INTERNAL MEDICINE

## 2025-07-24 PROCEDURE — P9016 RBC LEUKOCYTES REDUCED: HCPCS

## 2025-07-24 PROCEDURE — 86900 BLOOD TYPING SEROLOGIC ABO: CPT

## 2025-07-24 PROCEDURE — 6360000002 HC RX W HCPCS

## 2025-07-24 PROCEDURE — 30233N1 TRANSFUSION OF NONAUTOLOGOUS RED BLOOD CELLS INTO PERIPHERAL VEIN, PERCUTANEOUS APPROACH: ICD-10-PCS | Performed by: STUDENT IN AN ORGANIZED HEALTH CARE EDUCATION/TRAINING PROGRAM

## 2025-07-24 PROCEDURE — 36430 TRANSFUSION BLD/BLD COMPNT: CPT

## 2025-07-24 PROCEDURE — 80053 COMPREHEN METABOLIC PANEL: CPT

## 2025-07-24 PROCEDURE — 2140000000 HC CCU INTERMEDIATE R&B

## 2025-07-24 PROCEDURE — 86923 COMPATIBILITY TEST ELECTRIC: CPT

## 2025-07-24 PROCEDURE — 2580000003 HC RX 258

## 2025-07-24 PROCEDURE — APPSS60 APP SPLIT SHARED TIME 46-60 MINUTES: Performed by: STUDENT IN AN ORGANIZED HEALTH CARE EDUCATION/TRAINING PROGRAM

## 2025-07-24 PROCEDURE — 2500000003 HC RX 250 WO HCPCS: Performed by: INTERNAL MEDICINE

## 2025-07-24 PROCEDURE — 86850 RBC ANTIBODY SCREEN: CPT

## 2025-07-24 PROCEDURE — 93005 ELECTROCARDIOGRAM TRACING: CPT

## 2025-07-24 PROCEDURE — 99285 EMERGENCY DEPT VISIT HI MDM: CPT

## 2025-07-24 PROCEDURE — 2500000003 HC RX 250 WO HCPCS: Performed by: NURSE PRACTITIONER

## 2025-07-24 PROCEDURE — 86901 BLOOD TYPING SEROLOGIC RH(D): CPT

## 2025-07-24 PROCEDURE — 36415 COLL VENOUS BLD VENIPUNCTURE: CPT

## 2025-07-24 PROCEDURE — 2500000003 HC RX 250 WO HCPCS

## 2025-07-24 RX ORDER — CEFDINIR 300 MG/1
300 CAPSULE ORAL 2 TIMES DAILY
Status: ON HOLD | COMMUNITY
Start: 2025-07-17 | End: 2025-07-26 | Stop reason: HOSPADM

## 2025-07-24 RX ORDER — DEXTROSE MONOHYDRATE 100 MG/ML
INJECTION, SOLUTION INTRAVENOUS CONTINUOUS PRN
Status: DISCONTINUED | OUTPATIENT
Start: 2025-07-24 | End: 2025-07-26 | Stop reason: HOSPADM

## 2025-07-24 RX ORDER — SODIUM CHLORIDE 9 MG/ML
INJECTION, SOLUTION INTRAVENOUS PRN
Status: DISCONTINUED | OUTPATIENT
Start: 2025-07-24 | End: 2025-07-26 | Stop reason: HOSPADM

## 2025-07-24 RX ORDER — SODIUM CHLORIDE 0.9 % (FLUSH) 0.9 %
5-40 SYRINGE (ML) INJECTION PRN
Status: DISCONTINUED | OUTPATIENT
Start: 2025-07-24 | End: 2025-07-25 | Stop reason: HOSPADM

## 2025-07-24 RX ORDER — MAGNESIUM SULFATE IN WATER 40 MG/ML
2000 INJECTION, SOLUTION INTRAVENOUS PRN
Status: DISCONTINUED | OUTPATIENT
Start: 2025-07-24 | End: 2025-07-26 | Stop reason: HOSPADM

## 2025-07-24 RX ORDER — ONDANSETRON 2 MG/ML
4 INJECTION INTRAMUSCULAR; INTRAVENOUS EVERY 6 HOURS PRN
Status: DISCONTINUED | OUTPATIENT
Start: 2025-07-24 | End: 2025-07-26 | Stop reason: HOSPADM

## 2025-07-24 RX ORDER — GLUCAGON 1 MG/ML
1 KIT INJECTION PRN
Status: DISCONTINUED | OUTPATIENT
Start: 2025-07-24 | End: 2025-07-26 | Stop reason: HOSPADM

## 2025-07-24 RX ORDER — PROPRANOLOL HYDROCHLORIDE 10 MG/1
10 TABLET ORAL DAILY
Status: DISCONTINUED | OUTPATIENT
Start: 2025-07-25 | End: 2025-07-26 | Stop reason: HOSPADM

## 2025-07-24 RX ORDER — POTASSIUM CHLORIDE 7.45 MG/ML
10 INJECTION INTRAVENOUS PRN
Status: DISCONTINUED | OUTPATIENT
Start: 2025-07-24 | End: 2025-07-26 | Stop reason: HOSPADM

## 2025-07-24 RX ORDER — GABAPENTIN 300 MG/1
300 CAPSULE ORAL EVERY MORNING
COMMUNITY

## 2025-07-24 RX ORDER — SODIUM CHLORIDE 9 MG/ML
INJECTION, SOLUTION INTRAVENOUS PRN
Status: COMPLETED | OUTPATIENT
Start: 2025-07-24 | End: 2025-07-25

## 2025-07-24 RX ORDER — ROPINIROLE 0.5 MG/1
0.5 TABLET, FILM COATED ORAL 2 TIMES DAILY
Status: DISCONTINUED | OUTPATIENT
Start: 2025-07-24 | End: 2025-07-26 | Stop reason: HOSPADM

## 2025-07-24 RX ORDER — PANTOPRAZOLE SODIUM 40 MG/10ML
40 INJECTION, POWDER, LYOPHILIZED, FOR SOLUTION INTRAVENOUS 2 TIMES DAILY
Status: DISCONTINUED | OUTPATIENT
Start: 2025-07-24 | End: 2025-07-24

## 2025-07-24 RX ORDER — INSULIN LISPRO 100 [IU]/ML
0-8 INJECTION, SOLUTION INTRAVENOUS; SUBCUTANEOUS
Status: DISCONTINUED | OUTPATIENT
Start: 2025-07-24 | End: 2025-07-26 | Stop reason: HOSPADM

## 2025-07-24 RX ORDER — SODIUM CHLORIDE 0.9 % (FLUSH) 0.9 %
5-40 SYRINGE (ML) INJECTION PRN
Status: CANCELLED | OUTPATIENT
Start: 2025-07-24

## 2025-07-24 RX ORDER — GABAPENTIN 300 MG/1
600 CAPSULE ORAL NIGHTLY
COMMUNITY

## 2025-07-24 RX ORDER — SODIUM CHLORIDE 0.9 % (FLUSH) 0.9 %
5-40 SYRINGE (ML) INJECTION PRN
Status: DISCONTINUED | OUTPATIENT
Start: 2025-07-24 | End: 2025-07-26 | Stop reason: HOSPADM

## 2025-07-24 RX ORDER — HEPARIN 100 UNIT/ML
500 SYRINGE INTRAVENOUS PRN
Status: DISCONTINUED | OUTPATIENT
Start: 2025-07-24 | End: 2025-07-25 | Stop reason: HOSPADM

## 2025-07-24 RX ORDER — INSULIN GLARGINE 100 [IU]/ML
12 INJECTION, SOLUTION SUBCUTANEOUS DAILY
Status: DISCONTINUED | OUTPATIENT
Start: 2025-07-24 | End: 2025-07-26 | Stop reason: HOSPADM

## 2025-07-24 RX ORDER — INSULIN LISPRO 100 [IU]/ML
10 INJECTION, SOLUTION INTRAVENOUS; SUBCUTANEOUS ONCE
Status: COMPLETED | OUTPATIENT
Start: 2025-07-24 | End: 2025-07-24

## 2025-07-24 RX ORDER — ACETAMINOPHEN 650 MG/1
650 SUPPOSITORY RECTAL EVERY 6 HOURS PRN
Status: DISCONTINUED | OUTPATIENT
Start: 2025-07-24 | End: 2025-07-26 | Stop reason: HOSPADM

## 2025-07-24 RX ORDER — GABAPENTIN 300 MG/1
600 CAPSULE ORAL NIGHTLY
Status: DISCONTINUED | OUTPATIENT
Start: 2025-07-24 | End: 2025-07-26 | Stop reason: HOSPADM

## 2025-07-24 RX ORDER — POTASSIUM CHLORIDE 1500 MG/1
40 TABLET, EXTENDED RELEASE ORAL PRN
Status: DISCONTINUED | OUTPATIENT
Start: 2025-07-24 | End: 2025-07-26 | Stop reason: HOSPADM

## 2025-07-24 RX ORDER — ONDANSETRON 4 MG/1
4 TABLET, ORALLY DISINTEGRATING ORAL EVERY 8 HOURS PRN
Status: DISCONTINUED | OUTPATIENT
Start: 2025-07-24 | End: 2025-07-26 | Stop reason: HOSPADM

## 2025-07-24 RX ORDER — SODIUM CHLORIDE 0.9 % (FLUSH) 0.9 %
5-40 SYRINGE (ML) INJECTION EVERY 12 HOURS SCHEDULED
Status: DISCONTINUED | OUTPATIENT
Start: 2025-07-24 | End: 2025-07-26 | Stop reason: HOSPADM

## 2025-07-24 RX ORDER — SODIUM CHLORIDE 9 MG/ML
25 INJECTION, SOLUTION INTRAVENOUS PRN
OUTPATIENT
Start: 2025-07-24

## 2025-07-24 RX ORDER — GABAPENTIN 300 MG/1
300 CAPSULE ORAL EVERY MORNING
Status: DISCONTINUED | OUTPATIENT
Start: 2025-07-25 | End: 2025-07-26 | Stop reason: HOSPADM

## 2025-07-24 RX ORDER — SODIUM BICARBONATE 650 MG/1
650 TABLET ORAL 2 TIMES DAILY
Status: DISCONTINUED | OUTPATIENT
Start: 2025-07-24 | End: 2025-07-26 | Stop reason: HOSPADM

## 2025-07-24 RX ORDER — POLYETHYLENE GLYCOL 3350 17 G/17G
17 POWDER, FOR SOLUTION ORAL DAILY PRN
Status: DISCONTINUED | OUTPATIENT
Start: 2025-07-24 | End: 2025-07-26 | Stop reason: HOSPADM

## 2025-07-24 RX ORDER — ACETAMINOPHEN 325 MG/1
650 TABLET ORAL EVERY 6 HOURS PRN
Status: DISCONTINUED | OUTPATIENT
Start: 2025-07-24 | End: 2025-07-26 | Stop reason: HOSPADM

## 2025-07-24 RX ORDER — HEPARIN 100 UNIT/ML
500 SYRINGE INTRAVENOUS PRN
Status: CANCELLED | OUTPATIENT
Start: 2025-07-24

## 2025-07-24 RX ADMIN — PANTOPRAZOLE SODIUM 40 MG: 40 INJECTION, POWDER, FOR SOLUTION INTRAVENOUS at 21:07

## 2025-07-24 RX ADMIN — GABAPENTIN 600 MG: 300 CAPSULE ORAL at 21:06

## 2025-07-24 RX ADMIN — ROPINIROLE HYDROCHLORIDE 0.5 MG: 0.5 TABLET, FILM COATED ORAL at 12:34

## 2025-07-24 RX ADMIN — SODIUM CHLORIDE 125 MG: 9 INJECTION, SOLUTION INTRAVENOUS at 19:30

## 2025-07-24 RX ADMIN — INSULIN LISPRO 2 UNITS: 100 INJECTION, SOLUTION INTRAVENOUS; SUBCUTANEOUS at 21:44

## 2025-07-24 RX ADMIN — SODIUM BICARBONATE 650 MG: 650 TABLET ORAL at 21:06

## 2025-07-24 RX ADMIN — INSULIN LISPRO 8 UNITS: 100 INJECTION, SOLUTION INTRAVENOUS; SUBCUTANEOUS at 11:52

## 2025-07-24 RX ADMIN — INSULIN LISPRO 10 UNITS: 100 INJECTION, SOLUTION INTRAVENOUS; SUBCUTANEOUS at 12:24

## 2025-07-24 RX ADMIN — INSULIN GLARGINE 12 UNITS: 100 INJECTION, SOLUTION SUBCUTANEOUS at 12:34

## 2025-07-24 RX ADMIN — SODIUM CHLORIDE, PRESERVATIVE FREE 10 ML: 5 INJECTION INTRAVENOUS at 22:05

## 2025-07-24 RX ADMIN — INSULIN LISPRO 4 UNITS: 100 INJECTION, SOLUTION INTRAVENOUS; SUBCUTANEOUS at 17:38

## 2025-07-24 RX ADMIN — SODIUM CHLORIDE, PRESERVATIVE FREE 10 ML: 5 INJECTION INTRAVENOUS at 08:13

## 2025-07-24 RX ADMIN — ROPINIROLE HYDROCHLORIDE 0.5 MG: 0.5 TABLET, FILM COATED ORAL at 21:44

## 2025-07-24 RX ADMIN — PANTOPRAZOLE SODIUM 40 MG: 40 INJECTION, POWDER, FOR SOLUTION INTRAVENOUS at 11:41

## 2025-07-24 RX ADMIN — HEPARIN 500 UNITS: 100 SYRINGE at 08:13

## 2025-07-24 RX ADMIN — SODIUM BICARBONATE 650 MG: 650 TABLET ORAL at 12:34

## 2025-07-24 ASSESSMENT — ENCOUNTER SYMPTOMS
SORE THROAT: 0
ABDOMINAL PAIN: 0
ABDOMINAL PAIN: 1
NAUSEA: 0
CHEST TIGHTNESS: 0
BLOOD IN STOOL: 1
VOMITING: 0
BACK PAIN: 0
COUGH: 0
SHORTNESS OF BREATH: 1

## 2025-07-24 NOTE — CONSULTS
Palliative Care Department  [phone]  Palliative Care Initial Consult  Provider Dhara Vincent, APRN - CNP    Havne Barber  [id number]  Hospital Day: 1  Date of Initial Consult: 7/24/2025  Referring Provider: Gino Urban MD   Palliative Medicine was consulted for assistance with: Goals of care    HPI:   Haven Barber is a 75 y.o. with a past medical history of vertigo, sleep apnea, migraines, psoriasis, GAVE, hypertension, fibromyalgia, depression, diabetes mellitus, depression, chronic fatigue syndrome, Behcet's disease, arthritis, anemia, former smoker who was admitted on 7/24/2025 from home with a CHIEF COMPLAINT of anemia.  Patient has a longstanding history of anemia requiring weekly blood transfusions.  She follows with the blood and cancer center.  She has had multiple emergency room visits in the recent months for her anemia.  She was sent to the ED by hematology for her hemoglobin creasing to 5.1.  Her other ER labs were significant for glucose of 411, hematocrit 16.7. she reportedly has been experiencing increased tarry stools. She was admitted to the hospital for further management. Oncology and GI have been consulted. Palliative care consulted for goals of care discussion.     ASSESSMENT/PLAN:     Pertinent Hospital Diagnoses     Acute on chronic anemia   Behcet's diease   GAVE      Palliative Care Encounter / Counseling Regarding Goals of Care  Please see detailed goals of care discussion as below  At this time, Haven Barber, Does have capacity for medical decision-making.  Capacity is time limited and situation/question specific  During encounter a surrogate medical decision-maker was not needed.   Outcome of goals of care meeting:   Continue current plan of care with plan for endoscopy with Dr. Tyson.   Confirmed DNR CCA.   Ultimate goal is to transition back to [place] with her family.   Code status DNR-CCA  Advanced Directives:   No HC-POA or living will available for review in

## 2025-07-24 NOTE — PROGRESS NOTES
4 Eyes Skin Assessment     NAME:  Haven Barber  YOB: 1950  MEDICAL RECORD NUMBER:  [id number]    The patient is being assessed for  Admission    I agree that at least one RN has performed a thorough Head to Toe Skin Assessment on the patient. ALL assessment sites listed below have been assessed.      Areas assessed by both nurses:    Head, Face, Ears, Shoulders, Back, Chest, Arms, Elbows, Hands, Sacrum. Buttock, Coccyx, Ischium, Legs. Feet and Heels, and Under Medical Devices         Does the Patient have a Wound? No noted wound(s)       Gordo Prevention initiated by RN: No  Wound Care Orders initiated by RN: No    For hospital-acquired stage 1 & 2 and ALL Stage 3,4, Unstageable, DTI, NWPT, and Complex wounds: place order “IP Wound Care/Ostomy Nurse Eval and Treat” by RN under : No    New Ostomies, if present place, Ostomy referral order under : No     Nurse 1 eSignature: Electronically signed by Awilda Noriega RN on 7/24/25 at 3:06 PM EDT    **SHARE this note so that the co-signing nurse can place an eSignature**    Nurse 2 eSignature: Electronically signed by Izabela Mosquera RN on 7/24/25 at 6:35 PM EDT

## 2025-07-24 NOTE — CONSULTS
Justine Armando MD   ·   Phi Farr MD    ·    MD Ailyn Lane APRN  ·  MORALES Carvajal      [place] [facility] [place]  [address] [place] [address]  P: [phone]  F: [phone] [place] [facility] [place]  [address] [place] [address]  P: [phone]  F: [phone]  [place] [facility] in [place]  [address] [place] [address]  P: [phone]  F: [phone]           Inpatient Medical Oncology/Hematology Consult Note            Patient Name: Haven Barber  YOB: 1950    DATE OF ADMISSION: 7/24/2025  DATE OF CONSULTATION: 7/24/25  CONSULTING PROVIDER: Dr. Farr  REASON FOR CONSULTATION: anemia  PCP: Jesus Alvarado    Room: [facility]      CHIEF COMPLAINT:  anemia    HISTORY OF PRESENT ILLNESS   Patient is a 75 y.o. female with PMHx of T2DM, hepatic cirrhosis, HTN, AVM of colon,  GAVE (gastric antral vascular ectasia) , ISABEL, lumbar radiculopathy,Polyarticular psoriatic arthritis,  iron deficiency anemia, chronic pain syndrome presented to ED for low hemoglobin level that was noticed in hospital for oncology appointment. Hgb was 5.1, she was sent to ER for further evaluation. She has been having tarry stool for past 3 years, recently she felt more fatigue, lightheaded, blurry visions. She denied chest pain or shortness of breath, no nausea or vomiting. Last GI note from 6/19/25 revealed \"plan on scheduling for outpatient follow-up and possible outpatient EGD \" GI service is consulted at this admission. She was given 1U of RBC, GI planned for scope tomorrow.       ONCOLOGIC HISTORY:  She has history of AVM of colon,  GAVE (gastric antral vascular ectasia)   She has been having tarry stool for past 3 years  She is getting blood transfusion about once a week for chronic anemia    Oncology History    No history exists.             Review of Systems   Constitutional:  Positive for appetite change and fatigue. Negative  PRN Carrier, MORALES Villasenor CNP        ondansetron (ZOFRAN-ODT) disintegrating tablet 4 mg  4 mg Oral Q8H PRN Carrier, MORALES Villasenor CNP        Or    ondansetron (ZOFRAN) injection 4 mg  4 mg IntraVENous Q6H PRN Carrier, MORALES Villasenor CNP        polyethylene glycol (GLYCOLAX) packet 17 g  17 g Oral Daily PRN Carrier, MORALES Villasenor CNP        acetaminophen (TYLENOL) tablet 650 mg  650 mg Oral Q6H PRN Carrier, MORALES Villasenor CNP        Or    acetaminophen (TYLENOL) suppository 650 mg  650 mg Rectal Q6H PRN Carrier, MORALES Villasenor CNP        [START ON 7/25/2025] gabapentin (NEURONTIN) capsule 300 mg  300 mg Oral QAM Carrier, MORALES Villasenor CNP        gabapentin (NEURONTIN) capsule 600 mg  600 mg Oral Nightly Carrier, MORALES Villasenor CNP        [START ON 7/25/2025] FLUoxetine (PROZAC) capsule 20 mg  20 mg Oral Daily Carrier, MORALES Villasenor CNP        [START ON 7/25/2025] propranolol (INDERAL) immediate release tablet 10 mg  10 mg Oral Daily Carrier, MORALES Villasenor CNP        rOPINIRole (REQUIP) tablet 0.5 mg  0.5 mg Oral BID Carrier, MORALES Villasenor CNP   0.5 mg at 07/24/25 1234    pantoprazole (PROTONIX) 40 mg in sodium chloride (PF) 0.9 % 10 mL injection  40 mg IntraVENous Q12H CarrierJacklyn APRN - CNP        glucose chewable tablet 16 g  4 tablet Oral PRN Carrier, MORALES Villasenor CNP        dextrose bolus 10% 125 mL  125 mL IntraVENous PRN Carrier, MORALES Villasenor CNP        Or    dextrose bolus 10% 250 mL  250 mL IntraVENous PRN CarrierJacklyn APRN - CNP        glucagon injection 1 mg  1 mg SubCUTAneous PRN Carrier, MORALES Villasenor CNP        dextrose 10 % infusion   IntraVENous Continuous PRN Carrier, MORALES Villasenor CNP        insulin lispro (HUMALOG,ADMELOG) injection vial 0-8 Units  0-8 Units SubCUTAneous 4x Daily AC & HS CarrierCecilth, APRN - CNP   8 Units at 07/24/25 1152    sodium bicarbonate tablet 650 mg  650 mg Oral BID Gino Urban MD   650 mg

## 2025-07-24 NOTE — CONSULTS
Gastroenterology, Hepatology, &  Advanced Endoscopy    Consult Note      Reason for Consult: ***    HPI:   Haven Barber is a 75 y.o. female w/ PMH of  has a past medical history of Anemia, Arthritis, Behcet's disease (HCC), Cataract, Chronic fatigue syndrome, Depression, Diabetes mellitus (HCC), Fibromyalgia, History of blood transfusion, Hypertension, Memory loss, Mononucleosis, Psoriasis, Sciatica, Sleep apnea, and Vertigo. who presents to the ***          Recent Labs     07/24/25  0810   ALT 26   AST 37*   ALKPHOS 94   BILITOT 0.6     Lab Results   Component Value Date    WBC 5.5 07/24/2025    HGB 5.1 (LL) 07/24/2025    HCT 16.7 (L) 07/24/2025     07/24/2025     (L) 07/24/2025    K 3.7 07/24/2025     07/24/2025    CREATININE 0.7 07/24/2025    BUN 8 07/24/2025    CO2 17 (L) 07/24/2025    FOLATE 12.5 04/14/2025    [id number] 974 (H) 04/14/2025    AMMONIA 61 (H) 01/22/2025    GLUCOSE 411 (H) 07/24/2025    INR 1.2 05/19/2025    PROTIME 12.9 (H) 05/19/2025    TSH 2.12 12/01/2024    LABA1C 7.0 (H) 12/03/2024     Lab Results   Component Value Date    INR 1.2 05/19/2025    INR 1.2 02/09/2025    INR 1.4 01/22/2025    PROTIME 12.9 (H) 05/19/2025    PROTIME 12.7 (H) 02/09/2025    PROTIME 14.6 (H) 01/22/2025      MELD 3.0: 12 at 5/19/2025  4:00 PM  MELD-Na: 8 at 5/19/2025  4:00 PM  Calculated from:  Serum Creatinine: 0.7 mg/dL (Using min of 1 mg/dL) at 5/19/2025  4:00 PM  Serum Sodium: 134 mmol/L at 5/19/2025  4:00 PM  Total Bilirubin: 0.4 mg/dL (Using min of 1 mg/dL) at 5/19/2025  4:00 PM  Serum Albumin: 3.3 g/dL at 5/19/2025  4:00 PM  INR(ratio): 1.2 at 5/19/2025  4:00 PM  Age at listing (hypothetical): 75 years  Sex: Female at 5/19/2025  4:00 PM     Sed Rate, Automated   Date Value Ref Range Status   12/01/2024 33 (H) 0 - 20 mm/Hr Final     Lipase   Date Value Ref Range Status   02/09/2025 27 13 - 60 U/L Final     Comment:     SPECIMEN SLIGHTLY HEMOLYZED, RESULTS MAY BE ADVERSELY AFFECTED.     12/01/2024.    HISTORY  ORDERING SYSTEM PROVIDED HISTORY: sudden onset tachycardia and hypoxia. lungs  clear on CXR from earlier today  TECHNOLOGIST PROVIDED HISTORY:  Reason for exam:->sudden onset tachycardia and hypoxia. lungs clear on CXR  from earlier today  Additional Contrast?->1  What reading provider will be dictating this exam?->CRC    FINDINGS:  Pulmonary Arteries: Pulmonary arteries are adequately opacified for  evaluation.  No evidence of intraluminal filling defect to suggest pulmonary  embolism.  Main pulmonary artery is normal in caliber.    Mediastinum: No evidence of mediastinal lymphadenopathy.  Single borderline  right paratracheal lymph node measures 1.0 cm in short axis.  The heart and  pericardium demonstrate no acute abnormality.  Moderate coronary artery  calcifications.  There is no acute abnormality of the thoracic aorta.    Lungs/pleura: Diffuse right-sided ground-glass and consolidative opacities  are new from comparison CT from earlier on the same day.    Upper Abdomen: Nodular anterior hepatic surface.  No acute findings in the  upper abdomen.  Small fat containing left-sided Bochdalek's hernia.    Soft Tissues/Bones: No acute osseous abnormality.    Impression  1.  No evidence of pulmonary embolism.    2.  Diffuse right-sided ground-glass and consolidative opacities, new from  comparison CT abdomen and pelvis from earlier on the same date, are  suspicious for acute pulmonary edema.    3.  Findings of hepatic cirrhosis.     MRI Result (most recent):  MRI LUMBAR SPINE WO CONTRAST     Narrative  * * *Final Report* * *    DATE OF EXAM: Jul 2 2024  1:59PM    [facility]   0303  -  MRI LUMBAR SPINE WO IVCON  / ACCESSION #  [id number]    PROCEDURE REASON: CHRONIC APOLINAR LBP WITH APOLINAR SCIATICA    * * * * Physician Interpretation * * * *        RESULT: EXAMINATION:  MRI LUMBAR SPINE WO IVCON    CLINICAL HISTORY:  CHRONIC APOLINAR LBP WITH APOLINAR SCIATICA    TECHNIQUE: Routine lumbosacral spine MR protocol without

## 2025-07-24 NOTE — PROGRESS NOTES
Blood transfusion complete  Dr. Urban and Windy Barber NP notified.  Per Windy Barber NP, 1 additional unit PRBC.  Then recheck hgb  Izabela Mosquera RN

## 2025-07-24 NOTE — PLAN OF CARE
Problem: Chronic Conditions and Co-morbidities  Goal: Patient's chronic conditions and co-morbidity symptoms are monitored and maintained or improved  Outcome: Progressing  Flowsheets (Taken 7/24/2025 1315)  Care Plan - Patient's Chronic Conditions and Co-Morbidity Symptoms are Monitored and Maintained or Improved: Monitor and assess patient's chronic conditions and comorbid symptoms for stability, deterioration, or improvement     Problem: Discharge Planning  Goal: Discharge to home or other facility with appropriate resources  Outcome: Progressing  Flowsheets (Taken 7/24/2025 1315)  Discharge to home or other facility with appropriate resources: Identify barriers to discharge with patient and caregiver     Problem: Safety - Adult  Goal: Free from fall injury  Outcome: Progressing

## 2025-07-24 NOTE — TELEPHONE ENCOUNTER
Spoke with Juana MEJIA regarding critical hemoglobin of 5.1. Patient will require at least 2 to 3 units of packed red blood cells with lab review for additional transfusions.  Recommend patient seeking care in ED due to inability to transfuse that amount of blood outpatient.  In the event patient refuses to seek care in ED, attempt to give 2 units of packed red blood cells outpatient today, have patient return in a.m. to repeat labs for additional blood transfusion.

## 2025-07-24 NOTE — PROGRESS NOTES
Palliative Medicine Social Work     Patient Name: Haven Barber    Age: 75 y.o.    Marital Status:      Status: no    Next of Kin:son Todd Casiano in [place], sister Lorrie Barber     Additional Support: [employer] [facility]  [facility], home health aid 4 days a week/ 3 hours a day, [employer], and [facility] transportation benefit    Minor Children: no    Advanced Directives: no, offered assistance with documents if desired. Currently declines. She has one adult chid who would be next of kin    Confirm Code Status: DNRCCA    Current Goals of Care: pt plans to continue current car with outpatient transfusions weekly until she can no longer do so.She states she is realistic that her health will eventually decline.     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 She was also a [employer]  at one time.     Living Situation: pt lives alone,she has an aide thru [employer] 4 x a week, [employer], and [facility] transportation benefit. She and her family have spoken about her moving to an assisted living facility in [place] where they live.She is considering this.    Physical Care Needs Met: yes    Emotional Needs Met: time spent exploring pts thoughts and feelings, providing support through active listening and validation of feelings.    Assessment: 76 yo  female . who presents to the ED by private vehicle for evaluation of a low hemoglobin level that was noticed while here in the hospital at her oncology appointment. hx of  has a past medical history of Anemia, Arthritis, Behcet's disease (HCC), Cataract, Chronic fatigue syndrome, Depression, Diabetes mellitus (HCC), Fibromyalgia, History of blood transfusion, Hypertension, Memory loss, Mononucleosis, Psoriasis, Sciatica, Sleep apnea, and Vertigo.Seen today with NP to assess needs and provide support.

## 2025-07-24 NOTE — ONCOLOGY
Consult received. Provider will be by later to see patient. In the meantime, patient received 1 unit of packed RBC. Instructed to give one more unit of packed RBC and recheck after transfusion. Maintain hemoglobin greater than 7.

## 2025-07-24 NOTE — CONSENT
Informed Consent for Blood Component Transfusion Note    I have discussed with the patient the rationale for blood component transfusion; its benefits in treating or preventing fatigue, organ damage, or death; and its risk which includes mild transfusion reactions, rare risk of blood borne infection, or more serious but rare reactions. I have discussed the alternatives to transfusion, including the risk and consequences of not receiving transfusion. The patient had an opportunity to ask questions and had agreed to proceed with transfusion of blood components.    Electronically signed by Danis Brown PA-C on 7/24/25 at 9:45 AM EDT

## 2025-07-24 NOTE — H&P
[facility] Hospitalist Group History and Physical      CHIEF COMPLAINT: Anemia    History of Present Illness: This is a 75-year-old female with a past medical history of  arthritis, anemia, chronic fatigue syndrome, depression, type II DM, HTN, fibromyalgia, and vertigo who presents to the ED due to anemia.  Patient was previously at her oncology appointment when it was noted on her lab work that her hemoglobin was low.  Patient has a history of chronic anemia and requires frequent transfusions secondary to autoimmune Bechets disease.  Patient reports increased fatigue over the past month as well as black tarry stools. She denies any abdominal pain.  She has followed with Dr. Tyson in the past and has known AVMs.     ED course is as follows: Vital signs-T97.5, HR 85, /66, SpO2 98% on room air.  Sodium 132, CO2 17, glucose 411, calcium 8.1, Hgb 5.1.    Patient will be admitted for further management.    Informant(s) for H&P: Patient    REVIEW OF SYSTEMS:  A comprehensive review of systems was negative except for: what is in the HPI      PMH:  Past Medical History:   Diagnosis Date    Anemia     Arthritis     Behcet's disease (HCC)     Cataract     right    Chronic fatigue syndrome     Depression     Diabetes mellitus (HCC)     SC injection weekly    Fibromyalgia     History of blood transfusion     Hypertension     Memory loss     Mononucleosis     Psoriasis     Sciatica     Sleep apnea     no c-pap    Vertigo        Surgical History:  Past Surgical History:   Procedure Laterality Date    CARPAL TUNNEL RELEASE Bilateral     CATARACT REMOVAL Right     COLONOSCOPY      OTHER SURGICAL HISTORY Bilateral 01/21/2021    BILATERAL L3,L4 MEDIAL BRANCH AND L5 DORSAL RAMUS RADIOFREQUENCY    PAIN MANAGEMENT PROCEDURE Bilateral 01/21/2021    BILATERAL L3,4 MEDIAL BRANCH AND L5 DORSAL RAMUS  RADIOFREQUENCY ABLATION WITH SEDATION  (CPT 01632) performed by Silas Quigley MD at [facility] OR    SPINE SURGERY N/A

## 2025-07-24 NOTE — ED PROVIDER NOTES
ED Attending  CC: Yes symptomatic anemia requiring blood transfusion.         [facility] EMERGENCY DEPARTMENT  ED  Encounter Note  Admit Date/RoomTime: 2025  9:26 AM  ED Room:   NAME: Haven Barber  : 1950  MRN: [id number]  PCP: Jesus Alvarado DO    CHIEF COMPLAINT     Anemia (Hgb 5.1 at oncology)    HISTORY OF PRESENT ILLNESS        Haven Barber is a 75 y.o. female, with a past medical hx of  has a past medical history of Anemia, Arthritis, Behcet's disease (HCC), Cataract, Chronic fatigue syndrome, Depression, Diabetes mellitus (HCC), Fibromyalgia, History of blood transfusion, Hypertension, Memory loss, Mononucleosis, Psoriasis, Sciatica, Sleep apnea, and Vertigo. who presents to the ED by private vehicle for evaluation of a low hemoglobin level that was noticed while here in the hospital at her oncology appointment.  Patient reports that she has got a history of anemia requiring blood transfusions, stated secondary to her autoimmune disease Bechets disease.  She reports that in the last month though she has noticed that she has been feeling little bit more fatigued than baseline and that she has had an increase in black tarry stools.  The complaint has been remaining constant and are mild in severity.  She states that her GI physician is Dr. Tyson and she thinks that the last time she had a upper endoscopy was roughly 6 to 9 months ago.  She reports that her oncology physician is Dr. Decker.  She denies any chest pain, shortness of breath, abdominal pain, nausea, vomiting, fevers, or having any other associated symptoms.    REVIEW OF SYSTEMS     Pertinent positives and negatives are stated within HPI, all other systems reviewed and are negative.    Past Medical History:  has a past medical history of Anemia, Arthritis, Behcet's disease (HCC), Cataract, Chronic fatigue syndrome, Depression, Diabetes mellitus (HCC), Fibromyalgia, History of blood  no administration in time range)   magnesium sulfate 2000 mg in 50 mL IVPB premix (has no administration in time range)   ondansetron (ZOFRAN-ODT) disintegrating tablet 4 mg (has no administration in time range)     Or   ondansetron (ZOFRAN) injection 4 mg (has no administration in time range)   polyethylene glycol (GLYCOLAX) packet 17 g (has no administration in time range)   acetaminophen (TYLENOL) tablet 650 mg (has no administration in time range)     Or   acetaminophen (TYLENOL) suppository 650 mg (has no administration in time range)   pantoprazole (PROTONIX) 40 mg in sodium chloride (PF) 0.9 % 10 mL injection (has no administration in time range)   glucose chewable tablet 16 g (has no administration in time range)   dextrose bolus 10% 125 mL (has no administration in time range)     Or   dextrose bolus 10% 250 mL (has no administration in time range)   glucagon injection 1 mg (has no administration in time range)   dextrose 10 % infusion (has no administration in time range)   insulin lispro (HUMALOG,ADMELOG) injection vial 0-8 Units (has no administration in time range)       PROCEDURES   none    REASSESSMENT   N/A    CONSULTS:  IP CONSULT TO INTERNAL MEDICINE  IP CONSULT TO GI  DIFFERENTIAL DX_MDM   MDM:   Social Determinants : None    Records Reviewed : Source and Inpatient Notes: ED note from 7/11/2025 reviewed patient was seen eval for shortness of breath and ultimately discharged with a diagnosis of anemia requiring transfusions, acute cystitis and hyperglycemia.  ED note from 6/30/2025 reviewed patient was seen evaluate for fatigue and ultimately discharged with diagnosis of anemia, hypocalcemia and hyperglycemia.    CC/HPI Summary, DDx, ED Course, and Reassessment: Patient presents with Anemia (Hgb 5.1 at oncology)   who presents to the ED by private vehicle for evaluation of a low hemoglobin level that was noticed while here in the hospital at her oncology appointment.  Patient reports that she has

## 2025-07-25 ENCOUNTER — ANESTHESIA EVENT (OUTPATIENT)
Dept: ENDOSCOPY | Age: 75
End: 2025-07-25
Payer: MEDICARE

## 2025-07-25 ENCOUNTER — ANESTHESIA (OUTPATIENT)
Dept: ENDOSCOPY | Age: 75
End: 2025-07-25
Payer: MEDICARE

## 2025-07-25 PROBLEM — D64.9 ANEMIA REQUIRING TRANSFUSIONS: Status: ACTIVE | Noted: 2025-07-24

## 2025-07-25 LAB
ANION GAP SERPL CALCULATED.3IONS-SCNC: 8 MMOL/L (ref 7–16)
BASOPHILS # BLD: 0.05 K/UL (ref 0–0.2)
BASOPHILS NFR BLD: 1 % (ref 0–2)
BUN SERPL-MCNC: 9 MG/DL (ref 8–23)
CALCIUM SERPL-MCNC: 8.2 MG/DL (ref 8.8–10.2)
CHLORIDE SERPL-SCNC: 107 MMOL/L (ref 98–107)
CO2 SERPL-SCNC: 21 MMOL/L (ref 22–29)
CREAT SERPL-MCNC: 0.7 MG/DL (ref 0.5–1)
EOSINOPHIL # BLD: 0.14 K/UL (ref 0.05–0.5)
EOSINOPHILS RELATIVE PERCENT: 3 % (ref 0–6)
ERYTHROCYTE [DISTWIDTH] IN BLOOD BY AUTOMATED COUNT: 17.4 % (ref 11.5–15)
GFR, ESTIMATED: >90 ML/MIN/1.73M2
GLUCOSE BLD-MCNC: 141 MG/DL (ref 74–99)
GLUCOSE BLD-MCNC: 152 MG/DL (ref 74–99)
GLUCOSE BLD-MCNC: 193 MG/DL (ref 74–99)
GLUCOSE BLD-MCNC: 333 MG/DL (ref 74–99)
GLUCOSE SERPL-MCNC: 165 MG/DL (ref 74–99)
HBA1C MFR BLD: 8.1 % (ref 4–5.6)
HCT VFR BLD AUTO: 21.7 % (ref 34–48)
HCT VFR BLD AUTO: 28.2 % (ref 34–48)
HGB BLD-MCNC: 6.9 G/DL (ref 11.5–15.5)
HGB BLD-MCNC: 9.2 G/DL (ref 11.5–15.5)
LYMPHOCYTES NFR BLD: 0.24 K/UL (ref 1.5–4)
LYMPHOCYTES RELATIVE PERCENT: 4 % (ref 20–42)
MCH RBC QN AUTO: 26.2 PG (ref 26–35)
MCHC RBC AUTO-ENTMCNC: 31.8 G/DL (ref 32–34.5)
MCV RBC AUTO: 82.5 FL (ref 80–99.9)
MONOCYTES NFR BLD: 0.19 K/UL (ref 0.1–0.95)
MONOCYTES NFR BLD: 4 % (ref 2–12)
NEUTROPHILS NFR BLD: 89 % (ref 43–80)
NEUTS SEG NFR BLD: 4.78 K/UL (ref 1.8–7.3)
NUCLEATED RED BLOOD CELLS: 4 PER 100 WBC
PLATELET # BLD AUTO: 130 K/UL (ref 130–450)
PMV BLD AUTO: 11.1 FL (ref 7–12)
POTASSIUM SERPL-SCNC: 3.8 MMOL/L (ref 3.5–5.1)
RBC # BLD AUTO: 2.63 M/UL (ref 3.5–5.5)
RBC # BLD: ABNORMAL 10*6/UL
SODIUM SERPL-SCNC: 136 MMOL/L (ref 136–145)
WBC OTHER # BLD: 5.4 K/UL (ref 4.5–11.5)

## 2025-07-25 PROCEDURE — 7100000001 HC PACU RECOVERY - ADDTL 15 MIN: Performed by: STUDENT IN AN ORGANIZED HEALTH CARE EDUCATION/TRAINING PROGRAM

## 2025-07-25 PROCEDURE — 82962 GLUCOSE BLOOD TEST: CPT

## 2025-07-25 PROCEDURE — 6360000002 HC RX W HCPCS

## 2025-07-25 PROCEDURE — 85018 HEMOGLOBIN: CPT

## 2025-07-25 PROCEDURE — 3609013000 HC EGD TRANSORAL CONTROL BLEEDING ANY METHOD: Performed by: STUDENT IN AN ORGANIZED HEALTH CARE EDUCATION/TRAINING PROGRAM

## 2025-07-25 PROCEDURE — 85014 HEMATOCRIT: CPT

## 2025-07-25 PROCEDURE — 83036 HEMOGLOBIN GLYCOSYLATED A1C: CPT

## 2025-07-25 PROCEDURE — 6370000000 HC RX 637 (ALT 250 FOR IP): Performed by: NURSE PRACTITIONER

## 2025-07-25 PROCEDURE — 6360000002 HC RX W HCPCS: Performed by: NURSE PRACTITIONER

## 2025-07-25 PROCEDURE — 99232 SBSQ HOSP IP/OBS MODERATE 35: CPT | Performed by: STUDENT IN AN ORGANIZED HEALTH CARE EDUCATION/TRAINING PROGRAM

## 2025-07-25 PROCEDURE — 3700000000 HC ANESTHESIA ATTENDED CARE: Performed by: STUDENT IN AN ORGANIZED HEALTH CARE EDUCATION/TRAINING PROGRAM

## 2025-07-25 PROCEDURE — 7100000000 HC PACU RECOVERY - FIRST 15 MIN: Performed by: STUDENT IN AN ORGANIZED HEALTH CARE EDUCATION/TRAINING PROGRAM

## 2025-07-25 PROCEDURE — 2140000000 HC CCU INTERMEDIATE R&B

## 2025-07-25 PROCEDURE — 85025 COMPLETE CBC W/AUTO DIFF WBC: CPT

## 2025-07-25 PROCEDURE — 6370000000 HC RX 637 (ALT 250 FOR IP): Performed by: STUDENT IN AN ORGANIZED HEALTH CARE EDUCATION/TRAINING PROGRAM

## 2025-07-25 PROCEDURE — 2709999900 HC NON-CHARGEABLE SUPPLY: Performed by: STUDENT IN AN ORGANIZED HEALTH CARE EDUCATION/TRAINING PROGRAM

## 2025-07-25 PROCEDURE — 80048 BASIC METABOLIC PNL TOTAL CA: CPT

## 2025-07-25 PROCEDURE — 3700000001 HC ADD 15 MINUTES (ANESTHESIA): Performed by: STUDENT IN AN ORGANIZED HEALTH CARE EDUCATION/TRAINING PROGRAM

## 2025-07-25 PROCEDURE — 2580000003 HC RX 258

## 2025-07-25 PROCEDURE — 2580000003 HC RX 258: Performed by: NURSE PRACTITIONER

## 2025-07-25 PROCEDURE — 2500000003 HC RX 250 WO HCPCS: Performed by: NURSE PRACTITIONER

## 2025-07-25 PROCEDURE — 36430 TRANSFUSION BLD/BLD COMPNT: CPT

## 2025-07-25 PROCEDURE — 0W3P8ZZ CONTROL BLEEDING IN GASTROINTESTINAL TRACT, VIA NATURAL OR ARTIFICIAL OPENING ENDOSCOPIC: ICD-10-PCS | Performed by: STUDENT IN AN ORGANIZED HEALTH CARE EDUCATION/TRAINING PROGRAM

## 2025-07-25 RX ORDER — SODIUM CHLORIDE 9 MG/ML
INJECTION, SOLUTION INTRAVENOUS PRN
Status: DISCONTINUED | OUTPATIENT
Start: 2025-07-25 | End: 2025-07-26 | Stop reason: HOSPADM

## 2025-07-25 RX ORDER — PROPOFOL 10 MG/ML
INJECTION, EMULSION INTRAVENOUS
Status: DISCONTINUED | OUTPATIENT
Start: 2025-07-25 | End: 2025-07-25 | Stop reason: SDUPTHER

## 2025-07-25 RX ORDER — LIDOCAINE HYDROCHLORIDE 20 MG/ML
INJECTION, SOLUTION INTRAVENOUS
Status: DISCONTINUED | OUTPATIENT
Start: 2025-07-25 | End: 2025-07-25 | Stop reason: SDUPTHER

## 2025-07-25 RX ORDER — HEPARIN 100 UNIT/ML
100 SYRINGE INTRAVENOUS EVERY 12 HOURS PRN
Status: DISCONTINUED | OUTPATIENT
Start: 2025-07-25 | End: 2025-07-26 | Stop reason: HOSPADM

## 2025-07-25 RX ADMIN — ROPINIROLE HYDROCHLORIDE 0.5 MG: 0.5 TABLET, FILM COATED ORAL at 08:02

## 2025-07-25 RX ADMIN — LIDOCAINE HYDROCHLORIDE 100 MG: 20 INJECTION, SOLUTION INTRAVENOUS at 15:51

## 2025-07-25 RX ADMIN — ONDANSETRON 4 MG: 4 TABLET, ORALLY DISINTEGRATING ORAL at 12:00

## 2025-07-25 RX ADMIN — PANTOPRAZOLE SODIUM 40 MG: 40 INJECTION, POWDER, FOR SOLUTION INTRAVENOUS at 08:01

## 2025-07-25 RX ADMIN — SODIUM CHLORIDE: 9 INJECTION, SOLUTION INTRAVENOUS at 15:30

## 2025-07-25 RX ADMIN — INSULIN LISPRO 6 UNITS: 100 INJECTION, SOLUTION INTRAVENOUS; SUBCUTANEOUS at 22:00

## 2025-07-25 RX ADMIN — GABAPENTIN 300 MG: 300 CAPSULE ORAL at 08:02

## 2025-07-25 RX ADMIN — PANTOPRAZOLE SODIUM 40 MG: 40 INJECTION, POWDER, FOR SOLUTION INTRAVENOUS at 21:51

## 2025-07-25 RX ADMIN — FLUOXETINE HYDROCHLORIDE 20 MG: 20 CAPSULE ORAL at 08:02

## 2025-07-25 RX ADMIN — ROPINIROLE HYDROCHLORIDE 0.5 MG: 0.5 TABLET, FILM COATED ORAL at 21:51

## 2025-07-25 RX ADMIN — PROPRANOLOL HYDROCHLORIDE 10 MG: 10 TABLET ORAL at 08:02

## 2025-07-25 RX ADMIN — SODIUM CHLORIDE, PRESERVATIVE FREE 10 ML: 5 INJECTION INTRAVENOUS at 21:52

## 2025-07-25 RX ADMIN — SODIUM BICARBONATE 650 MG: 650 TABLET ORAL at 08:02

## 2025-07-25 RX ADMIN — SODIUM CHLORIDE, PRESERVATIVE FREE 10 ML: 5 INJECTION INTRAVENOUS at 11:58

## 2025-07-25 RX ADMIN — SODIUM CHLORIDE 125 MG: 9 INJECTION, SOLUTION INTRAVENOUS at 08:05

## 2025-07-25 RX ADMIN — SODIUM BICARBONATE 650 MG: 650 TABLET ORAL at 21:51

## 2025-07-25 RX ADMIN — GABAPENTIN 600 MG: 300 CAPSULE ORAL at 21:51

## 2025-07-25 RX ADMIN — PROPOFOL 220 MG: 10 INJECTION, EMULSION INTRAVENOUS at 15:51

## 2025-07-25 NOTE — BRIEF OP NOTE
Brief Postoperative Note      Patient: Haven Barber  YOB: 1950  MRN: [id number]    Date of Procedure: 7/25/2025    Pre-Op Diagnosis Codes:      * GAVE (gastric antral vascular ectasia) [K31.819]     * Anemia requiring transfusions [D64.9]    Post-Op Diagnosis: Same.        Procedure(s):  ESOPHAGOGASTRODUODENOSCOPY CONTROL HEMORRHAGE    Surgeon(s):  Mansoor Tyson MD    Assistant:  * No surgical staff found *    Anesthesia: Monitor Anesthesia Care    Estimated Blood Loss (mL): less than 50     Complications: None    Specimens:   * No specimens in log *    Implants:  * No implants in log *      Drains: * No LDAs found *    Findings:    Normal esophagus.  Possible portal hypertensive gastropathy.   GAVE with bleeding. APC applied.  Normal duodenum.    Electronically signed by Mansoor Tyson MD on 7/25/2025 at 4:32 PM

## 2025-07-25 NOTE — ONCOLOGY
Patient off floor. Received 1 unit of packed RBC. Will order additional unit. Continue Ferrlecit for a total of 8 doses. Order placed.

## 2025-07-25 NOTE — PROGRESS NOTES
Comprehensive Nutrition Assessment    Type and Reason for Visit:  Initial    Nutrition Recommendations/Plan:   Continue NPO, advance as tolerated when medically feasible.   Continue to monitor readiness for diet advancement or nutrition support, weight, labs, and plan of care during admission.      Malnutrition Assessment:  Malnutrition Status: no indicators of malnutrition at this time    Nutrition History and Allergies:   arthritis, anemia, chronic fatigue syndrome, depression, type II DM, HTN, fibromyalgia, and vertigo; NKFA  Past Medical History:   Diagnosis Date    Anemia     Arthritis     Behcet's disease (HCC)     Cataract     right    Chronic fatigue syndrome     Depression     Diabetes mellitus (HCC)     SC injection weekly    Fibromyalgia     History of blood transfusion     Hypertension     Memory loss     Mononucleosis     Psoriasis     Sciatica     Sleep apnea     no c-pap    Vertigo      Allergies   Allergen Reactions    Baclofen Other (See Comments)     Dry mouth, abd pain    Diphenhydramine Other (See Comments)     Other Reaction(s): Mental Status Change    Lethargy    Nickel Rash and Other (See Comments)     Surgical steel    Penicillins Hives     Wt Readings from Last 15 Encounters:   07/24/25 94 kg (207 lb 3.8 oz)   07/22/25 87.1 kg (192 lb)   05/19/25 85.5 kg (188 lb 8 oz)   05/13/25 84.4 kg (186 lb)   04/30/25 84.4 kg (186 lb)   04/14/25 84.6 kg (186 lb 9.6 oz)   02/09/25 81.6 kg (180 lb)   01/31/25 84.8 kg (187 lb)   01/24/25 66 kg (145 lb 8 oz)   01/21/25 81.6 kg (179 lb 14.3 oz)   01/06/25 81.6 kg (180 lb)   12/08/24 86.8 kg (191 lb 5.8 oz)   10/08/24 88.5 kg (195 lb)   12/12/23 87.4 kg (192 lb 9.6 oz)   11/12/23 87.1 kg (192 lb)         Nutrition Assessment:    Assessment secondary to MST of 3, unsure weight loss and loss of appetite noted. Reviewe of EMR and weight trends does not indicate any weight loss. Diet is NPO at this time due to GI Bleed.    Nutrition Related Findings:    trace BLE    Comprehensive Metabolic Panel    Collection Time: 07/24/25  8:10 AM   Result Value Ref Range    Sodium 132 (L) 136 - 145 mmol/L    Potassium 3.7 3.5 - 5.1 mmol/L    Chloride 103 98 - 107 mmol/L    CO2 17 (L) 22 - 29 mmol/L    Anion Gap 12 7 - 16 mmol/L    Glucose 411 (H) 74 - 99 mg/dL    BUN 8 8 - 23 mg/dL    Creatinine 0.7 0.5 - 1.0 mg/dL    Est, Glom Filt Rate >90 >60 mL/min/1.73m2    Calcium 8.1 (L) 8.8 - 10.2 mg/dL    Total Protein 5.2 (L) 6.4 - 8.3 g/dL    Albumin 2.9 (L) 3.5 - 5.2 g/dL    Total Bilirubin 0.6 0.0 - 1.2 mg/dL    Alkaline Phosphatase 94 35 - 104 U/L    ALT 26 0 - 35 U/L    AST 37 (H) 0 - 35 U/L   EKG 12 Lead    Collection Time: 07/24/25  9:59 AM   Result Value Ref Range    Ventricular Rate 85 BPM    Atrial Rate 85 BPM    P-R Interval 198 ms    QRS Duration 72 ms    Q-T Interval 398 ms    QTc Calculation (Bazett) 473 ms    P Axis 51 degrees    R Axis -20 degrees    T Axis -11 degrees   Iron and TIBC    Collection Time: 07/24/25 11:25 AM   Result Value Ref Range    Iron 8 (L) 37 - 145 ug/dL    TIBC 335 250 - 450 ug/dL    Iron % Saturation 2 (L) 15 - 50 %   Ferritin    Collection Time: 07/24/25 11:25 AM   Result Value Ref Range    Ferritin 14 ng/mL   POCT Glucose    Collection Time: 07/24/25 11:34 AM   Result Value Ref Range    POC Glucose 428 (H) 74 - 99 mg/dL   POCT Glucose    Collection Time: 07/24/25  5:25 PM   Result Value Ref Range    POC Glucose 267 (H) 74 - 99 mg/dL   POCT Glucose    Collection Time: 07/24/25  9:04 PM   Result Value Ref Range    POC Glucose 206 (H) 74 - 99 mg/dL   Hemoglobin and Hematocrit    Collection Time: 07/24/25  9:48 PM   Result Value Ref Range    Hemoglobin 7.2 (L) 11.5 - 15.5 g/dL    Hematocrit 21.6 (L) 34.0 - 48.0 %   Basic Metabolic Panel w/ Reflex to MG    Collection Time: 07/24/25 10:00 PM   Result Value Ref Range    Sodium 136 136 - 145 mmol/L    Potassium 3.6 3.5 - 5.1 mmol/L    Chloride 107 98 - 107 mmol/L    CO2 19 (L) 22 - 29 mmol/L    Anion Gap 10 7 - 16          Current Nutrition Intake & Therapies:    Average Meal Intake: NPO  Average Supplements Intake: NPO  Diet NPO Exceptions are: Sips of Water with Meds    Anthropometric Measures:  Height: 160 cm (5' 3\")  Ideal Body Weight (IBW): 115 lbs (52 kg)    Admission Body Weight: 94 kg (207 lb 3.7 oz)  Current Body Weight: 94 kg (207 lb 3.7 oz), 180.2 % IBW. Weight Source: Bed scale  Current BMI (kg/m2): 36.7  Usual Body Weight: 87.1 kg (192 lb)     % Weight Change (Calculated): 7.9  Weight Adjustment For: No Adjustment                 BMI Categories: Obese Class 2 (BMI 35.0 -39.9)    Estimated Daily Nutrient Needs:  Energy Requirements Based On: Kcal/kg  Weight Used for Energy Requirements: Current  Energy (kcal/day): 4520-5340 kcal (20-25 kcal/kg)  Weight Used for Protein Requirements: Current  Protein (g/day):  g (1-1.2 g/kg)  Method Used for Fluid Requirements: 1 ml/kcal  Fluid (ml/day): 0016-3474 mL or per MD    Nutrition Diagnosis:   Inadequate oral intake related to altered GI function as evidenced by NPO or clear liquid status due to medical condition    Nutrition Interventions:   Food and/or Nutrient Delivery: Continue NPO  Nutrition Education/Counseling: No recommendation at this time  Coordination of Nutrition Care: Continue to monitor while inpatient  Plan of Care discussed with: reviewed chart    Goals:  Goals: Meet at least 75% of estimated needs, by next RD assessment  Type of Goal: New goal       Nutrition Monitoring and Evaluation:   Behavioral-Environmental Outcomes: None Identified  Food/Nutrient Intake Outcomes: Food and Nutrient Intake  Physical Signs/Symptoms Outcomes: Biochemical Data, GI Status, Skin, Weight, Fluid Status or Edema, Meal Time Behavior    Discharge Planning:    Too soon to determine     Alejandra Santiago RD  Contact: [phone]

## 2025-07-25 NOTE — CARE COORDINATION
7/25/2025 Transition of care note:  Pt admitted 7/24 with anemia.  Received blood transfusions.  Met with patient and explained CM role and discharge planning.  Pt states she lives at home alone in a 1 story home.  Has a rollator.  Has an aide from [facility] that comes 4 days a week for 3 hours a day.  States would like [facility].  Asked about palliative care as well. Denies any C agency preference. Referrals sent via [facility]-awaiting acceptance. Confirmed pcp is Jesus Hu.  Preferred pharmacy is [facility] in [place].  Pt plans to return home when ready for discharge.  States either a [employer] friend will provide transportation and if not available, she will use [employer] [phone] for transportation. CM will follow for transition of care needs.  Electronically signed by Bernadette Howard RN CM on 7/25/2025 at 11:41 AM    7/25/2025 addendum:  [facility] accepted patient and will offer palliative to patient via [employer] and will send a referral for palliative. [facility] order written. CM will follow.  Electronically signed by Bernadette Howard RN CM on 7/25/2025 at 11:56 AM          Case Management Assessment  Initial Evaluation    Date/Time of Evaluation: 7/25/2025 11:41 AM  Assessment Completed by: Bernadette Howard RN    If patient is discharged prior to next notation, then this note serves as note for discharge by case management.    Patient Name: Haven Barber                   YOB: 1950  Diagnosis: Melena [K92.1]  GI bleed [K92.2]  Anemia requiring transfusions [D64.9]  Symptomatic anemia [D64.9]                   Date / Time: 7/24/2025  9:26 AM    Patient Admission Status: Inpatient   Readmission Risk (Low < 19, Mod (19-27), High > 27): Readmission Risk Score: 21.9    Current PCP: Jesus Alvarado, DO  PCP verified by CM? Yes    Chart Reviewed: Yes      History Provided by:    Patient Orientation: Alert and Oriented    Patient Cognition: Alert    Hospitalization in the last 30 days

## 2025-07-25 NOTE — ANESTHESIA PRE PROCEDURE
Department of Anesthesiology  Preprocedure Note       Name:  Haven Barber   Age:  75 y.o.  :  1950                                          MRN:  [id number]         Date:  2025      Surgeon: Surgeon(s):  Mansoor Tyson MD    Procedure: Procedure(s):  ESOPHAGOGASTRODUODENOSCOPY    Medications prior to admission:   Prior to Admission medications    Medication Sig Start Date End Date Taking? Authorizing Provider   gabapentin (NEURONTIN) 300 MG capsule Take 1 capsule by mouth every morning.   Yes Bautista Arce MD   gabapentin (NEURONTIN) 300 MG capsule Take 2 capsules by mouth at bedtime.   Yes Bautista Arce MD   FLUoxetine (PROZAC) 20 MG capsule Take 1 capsule by mouth daily 25  Yes Jesus Alvarado DO   glipiZIDE (GLUCOTROL) 5 MG tablet Take 1 tablet by mouth daily 25  Yes Thomas Escamilla PA   omeprazole (PRILOSEC) 20 MG delayed release capsule Take 2 caps twice daily 25  Yes Thomas Escamilla PA   propranolol (INDERAL) 10 MG tablet Take 1 tablet by mouth daily Hold for HR <60 and/or SBP <100 6/18/25 12/15/25 Yes Thomas Escamilla PA   rOPINIRole (REQUIP) 0.5 MG tablet Take 1 tablet by mouth 2 times daily 25  Yes Thomas Escamilla PA   fluconazole (DIFLUCAN) 150 MG tablet Oral  Patient not taking: Reported on 2025    Bautista Arce MD   Dulaglutide 4.5 MG/0.5ML SOAJ Inject 4.5 mg into the skin once a week 25   Jesus Alvarado DO   gabapentin (NEURONTIN) 300 MG capsule Take 1 capsule by mouth 3 times daily for 180 doses. 1 capsule in the am 2 capsules in the evening  Patient not taking: Reported on 2025  Thomas Escamilla PA   diclofenac sodium (VOLTAREN) 1 % GEL Apply 2 g topically 4 times daily as needed for Pain (apply to both arms and hands)    Bautista Arce MD   Incontinence Supplies KIT 1 kit by Does not apply route as needed (change after accidents)  Patient not taking: Reported on 2025   Freddy

## 2025-07-25 NOTE — PROGRESS NOTES
[facility] Hospitalist Progress Note    SYNOPSIS: Patient admitted on 2025 for GI bleed    This is a 75-year-old female with a past medical history of  arthritis, anemia, chronic fatigue syndrome, depression, type II DM, HTN, fibromyalgia, and vertigo who presents to the ED due to anemia.  Patient was previously at her oncology appointment when it was noted on her lab work that her hemoglobin was low.  Patient has a history of chronic anemia and requires frequent transfusions secondary to autoimmune Bechets disease.  Patient reports increased fatigue over the past month as well as black tarry stools. She denies any abdominal pain.  She has followed with Dr. Tyson in the past and has known AVMs.      ED course is as follows: Vital signs-T97.5, HR 85, /66, SpO2 98% on room air.  Sodium 132, CO2 17, glucose 411, calcium 8.1, Hgb 5.1.   patient received 2 unit of PRBC on , hemoglobin 6.9 today, receiving 1 unit PRBC.  Hematology, GI has been consulted.  Tentative plan for EGD today.    SUBJECTIVE:  Stable overnight. No other overnight issues reported.   Patient seen and examined  Records reviewed.         Temp (24hrs), Av.2 °F (36.8 °C), Min:97.7 °F (36.5 °C), Max:98.8 °F (37.1 °C)    DIET: Diet NPO Exceptions are: Sips of Water with Meds  CODE: DNR-CCA    Intake/Output Summary (Last 24 hours) at 2025 1211  Last data filed at 2025 1820  Gross per 24 hour   Intake 785.58 ml   Output --   Net 785.58 ml       Review of Systems  All bolded are positive; please see HPI  General:  Fever, chills, diaphoresis, fatigue, malaise, night sweats, weight loss  Psychological:  Anxiety, disorientation, hallucinations.  ENT:  Epistaxis, headaches, vertigo, visual changes.  Cardiovascular:  Chest pain, irregular heartbeats, palpitations, paroxysmal nocturnal dyspnea.  Respiratory:  Shortness of breath, coughing, sputum production, hemoptysis, wheezing, orthopnea.  Gastrointestinal:  Nausea,

## 2025-07-25 NOTE — ACP (ADVANCE CARE PLANNING)
Advance Care Planning   Healthcare Decision Maker:    Primary Decision Maker: CasianoTodd - Child - [phone]    Secondary Decision Maker: Lorrie Barber - Brother/Sister - [phone]    Click here to complete Healthcare Decision Makers including selection of the Healthcare Decision Maker Relationship (ie \"Primary\").

## 2025-07-25 NOTE — PLAN OF CARE
Problem: Nutrition Deficit:  Goal: Optimize nutritional status  Outcome: Progressing  Flowsheets (Taken 7/25/2025 6147)  Nutrient intake appropriate for improving, restoring, or maintaining nutritional needs:   Assess nutritional status and recommend course of action   Monitor oral intake, labs, and treatment plans   Recommend appropriate diets, oral nutritional supplements, and vitamin/mineral supplements

## 2025-07-25 NOTE — PROGRESS NOTES
Spiritual Health History and Assessment/Progress Note  [facility] [place]    (P) Initial Encounter, Spiritual/Emotional Needs,  ,  ,      Name: Haven Barber MRN: [id number]    Age: 75 y.o.     Sex: female   Language: English   Yarsani: [employer]   GI bleed     Date: 7/25/2025                           Spiritual Assessment began in [facility]        Referral/Consult From: (P) Rounding   Encounter Overview/Reason: (P) Initial Encounter, Spiritual/Emotional Needs  Service Provided For: (P) Patient    Mariza, Belief, Meaning:   Patient identifies as spiritual and is connected with a mariza tradition or spiritual practice  Family/Friends No family/friends present      Importance and Influence:  Patient has spiritual/personal beliefs that influence decisions regarding their health  Family/Friends No family/friends present    Community:  Patient is connected with a spiritual community and feels well-supported. Support system includes: Unknown  Family/Friends No family/friends present    Assessment and Plan of Care:     Patient Interventions include: Facilitated expression of thoughts and feelings, Explored spiritual coping/struggle/distress, and Engaged in theological reflection  Family/Friends Interventions include: Facilitated expression of thoughts and feelings and No family/friends present    Patient Plan of Care: Spiritual Care available upon further referral  Family/Friends Plan of Care: No family/friends present    Electronically signed by Chaplain Alonzo II on 7/25/2025 at 1:39 PM

## 2025-07-26 VITALS
HEART RATE: 73 BPM | SYSTOLIC BLOOD PRESSURE: 113 MMHG | WEIGHT: 209.4 LBS | RESPIRATION RATE: 17 BRPM | BODY MASS INDEX: 37.1 KG/M2 | OXYGEN SATURATION: 96 % | HEIGHT: 63 IN | DIASTOLIC BLOOD PRESSURE: 41 MMHG | TEMPERATURE: 98.2 F

## 2025-07-26 LAB
ABO/RH: NORMAL
ANION GAP SERPL CALCULATED.3IONS-SCNC: 8 MMOL/L (ref 7–16)
ANTIBODY SCREEN: NEGATIVE
ARM BAND NUMBER: NORMAL
BASOPHILS # BLD: 0.05 K/UL (ref 0–0.2)
BASOPHILS NFR BLD: 1 % (ref 0–2)
BLOOD BANK BLOOD PRODUCT EXPIRATION DATE: NORMAL
BLOOD BANK DISPENSE STATUS: NORMAL
BLOOD BANK ISBT PRODUCT BLOOD TYPE: 600
BLOOD BANK ISBT PRODUCT BLOOD TYPE: 6200
BLOOD BANK PRODUCT CODE: NORMAL
BLOOD BANK SAMPLE EXPIRATION: NORMAL
BLOOD BANK UNIT TYPE AND RH: NORMAL
BPU ID: NORMAL
BUN SERPL-MCNC: 7 MG/DL (ref 8–23)
CALCIUM SERPL-MCNC: 8.4 MG/DL (ref 8.8–10.2)
CHLORIDE SERPL-SCNC: 108 MMOL/L (ref 98–107)
CO2 SERPL-SCNC: 21 MMOL/L (ref 22–29)
COMPONENT: NORMAL
CREAT SERPL-MCNC: 0.7 MG/DL (ref 0.5–1)
CROSSMATCH RESULT: NORMAL
EOSINOPHIL # BLD: 0.37 K/UL (ref 0.05–0.5)
EOSINOPHILS RELATIVE PERCENT: 6 % (ref 0–6)
ERYTHROCYTE [DISTWIDTH] IN BLOOD BY AUTOMATED COUNT: 17 % (ref 11.5–15)
GFR, ESTIMATED: >90 ML/MIN/1.73M2
GLUCOSE BLD-MCNC: 175 MG/DL (ref 74–99)
GLUCOSE BLD-MCNC: 292 MG/DL (ref 74–99)
GLUCOSE SERPL-MCNC: 183 MG/DL (ref 74–99)
HCT VFR BLD AUTO: 28.5 % (ref 34–48)
HGB BLD-MCNC: 9.3 G/DL (ref 11.5–15.5)
IMM GRANULOCYTES # BLD AUTO: 0.04 K/UL (ref 0–0.58)
IMM GRANULOCYTES NFR BLD: 1 % (ref 0–5)
LYMPHOCYTES NFR BLD: 1.13 K/UL (ref 1.5–4)
LYMPHOCYTES RELATIVE PERCENT: 18 % (ref 20–42)
MCH RBC QN AUTO: 27.3 PG (ref 26–35)
MCHC RBC AUTO-ENTMCNC: 32.6 G/DL (ref 32–34.5)
MCV RBC AUTO: 83.6 FL (ref 80–99.9)
MONOCYTES NFR BLD: 0.78 K/UL (ref 0.1–0.95)
MONOCYTES NFR BLD: 12 % (ref 2–12)
NEUTROPHILS NFR BLD: 62 % (ref 43–80)
NEUTS SEG NFR BLD: 3.93 K/UL (ref 1.8–7.3)
PLATELET # BLD AUTO: 133 K/UL (ref 130–450)
PMV BLD AUTO: 10.9 FL (ref 7–12)
POTASSIUM SERPL-SCNC: 4.1 MMOL/L (ref 3.5–5.1)
RBC # BLD AUTO: 3.41 M/UL (ref 3.5–5.5)
SODIUM SERPL-SCNC: 138 MMOL/L (ref 136–145)
TRANSFUSION STATUS: NORMAL
UNIT DIVISION: 0
UNIT ISSUE DATE/TIME: NORMAL
WBC OTHER # BLD: 6.3 K/UL (ref 4.5–11.5)

## 2025-07-26 PROCEDURE — 2580000003 HC RX 258: Performed by: NURSE PRACTITIONER

## 2025-07-26 PROCEDURE — 6360000002 HC RX W HCPCS: Performed by: NURSE PRACTITIONER

## 2025-07-26 PROCEDURE — 99239 HOSP IP/OBS DSCHRG MGMT >30: CPT | Performed by: STUDENT IN AN ORGANIZED HEALTH CARE EDUCATION/TRAINING PROGRAM

## 2025-07-26 PROCEDURE — 2500000003 HC RX 250 WO HCPCS: Performed by: NURSE PRACTITIONER

## 2025-07-26 PROCEDURE — 80048 BASIC METABOLIC PNL TOTAL CA: CPT

## 2025-07-26 PROCEDURE — 85025 COMPLETE CBC W/AUTO DIFF WBC: CPT

## 2025-07-26 PROCEDURE — 6370000000 HC RX 637 (ALT 250 FOR IP): Performed by: STUDENT IN AN ORGANIZED HEALTH CARE EDUCATION/TRAINING PROGRAM

## 2025-07-26 PROCEDURE — 82962 GLUCOSE BLOOD TEST: CPT

## 2025-07-26 PROCEDURE — 6370000000 HC RX 637 (ALT 250 FOR IP): Performed by: NURSE PRACTITIONER

## 2025-07-26 RX ORDER — POLYETHYLENE GLYCOL 3350 17 G/17G
17 POWDER, FOR SOLUTION ORAL DAILY PRN
Qty: 527 G | Refills: 1 | Status: SHIPPED | OUTPATIENT
Start: 2025-07-26 | End: 2025-09-26

## 2025-07-26 RX ORDER — SODIUM BICARBONATE 650 MG/1
650 TABLET ORAL 2 TIMES DAILY
Qty: 14 TABLET | Refills: 0 | Status: SHIPPED | OUTPATIENT
Start: 2025-07-26 | End: 2025-08-02

## 2025-07-26 RX ORDER — PANTOPRAZOLE SODIUM 40 MG/1
40 TABLET, DELAYED RELEASE ORAL
Qty: 60 TABLET | Refills: 0 | Status: SHIPPED | OUTPATIENT
Start: 2025-07-26

## 2025-07-26 RX ADMIN — INSULIN LISPRO 4 UNITS: 100 INJECTION, SOLUTION INTRAVENOUS; SUBCUTANEOUS at 11:40

## 2025-07-26 RX ADMIN — PANTOPRAZOLE SODIUM 40 MG: 40 INJECTION, POWDER, FOR SOLUTION INTRAVENOUS at 08:09

## 2025-07-26 RX ADMIN — ROPINIROLE HYDROCHLORIDE 0.5 MG: 0.5 TABLET, FILM COATED ORAL at 08:10

## 2025-07-26 RX ADMIN — INSULIN GLARGINE 12 UNITS: 100 INJECTION, SOLUTION SUBCUTANEOUS at 08:13

## 2025-07-26 RX ADMIN — SODIUM CHLORIDE 125 MG: 9 INJECTION, SOLUTION INTRAVENOUS at 08:31

## 2025-07-26 RX ADMIN — FLUOXETINE HYDROCHLORIDE 20 MG: 20 CAPSULE ORAL at 08:28

## 2025-07-26 RX ADMIN — SODIUM BICARBONATE 650 MG: 650 TABLET ORAL at 08:13

## 2025-07-26 RX ADMIN — GABAPENTIN 300 MG: 300 CAPSULE ORAL at 08:28

## 2025-07-26 RX ADMIN — SODIUM CHLORIDE, PRESERVATIVE FREE 10 ML: 5 INJECTION INTRAVENOUS at 08:11

## 2025-07-26 RX ADMIN — PROPRANOLOL HYDROCHLORIDE 10 MG: 10 TABLET ORAL at 08:12

## 2025-07-26 NOTE — DISCHARGE SUMMARY
Hospitalist Discharge Summary    Patient ID: Haven Barber   Patient : 1950  Patient's PCP: Jesus Alvarado DO    Admit Date: 2025   Admitting Physician: Gino Urban MD    Discharge Date:  2025   Discharge Physician: Gino Urban MD   Discharge Condition: Stable  Discharge Disposition: Home      Hospital course in brief:  (Please refer to daily progress notes for a comprehensive review of the hospitalization by requesting medical records)    This is a 75-year-old female with a past medical history of  arthritis, anemia, chronic fatigue syndrome, depression, type II DM, HTN, fibromyalgia, and vertigo who presents to the ED due to anemia.  Patient was previously at her oncology appointment when it was noted on her lab work that her hemoglobin was low.  Patient has a history of chronic anemia and requires frequent transfusions secondary to autoimmune Bechets disease.  Patient reports increased fatigue over the past month as well as black tarry stools. She denies any abdominal pain.  She has followed with Dr. Tyson in the past and has known AVMs.      ED course is as follows: Vital signs-T97.5, HR 85, /66, SpO2 98% on room air.  Sodium 132, CO2 17, glucose 411, calcium 8.1, Hgb 5.1.   patient received 2 unit of PRBC on , hemoglobin 6.9 today, receiving 1 unit PRBC.  Hematology, GI has been consulted.  Tentative plan for EGD today.     patient remained stable.  S/p 4 unit PRBC during hospital stay.  Underwent EGD with control of hemorrhage.  Possible portal hypertensive gastropathy.  GAVE with bleeding.  APC applied.  Hemoglobin stable at 9.3 g/dL.  Tolerating diet.  PPI twice daily.  IV iron supplement at heme-onc clinic.  Also started on sodium bicarb for 7 days for metabolic acidosis.  Follow-up with GI, hematology/oncology, PCP postdischarge.  GI planning for repeat EGD in a month.  Will also benefit from endocrinology follow-up for diabetes management.  A1c  02/07/2023 02:00 AM    SPECGRAV 1.025 04/30/2025 03:06 PM    GLUCOSEU >=1000 07/11/2025 04:06 PM       Imaging:  XR CHEST 1 VIEW  Result Date: 7/11/2025  EXAMINATION: ONE XRAY VIEW OF THE CHEST 7/11/2025 3:17 pm COMPARISON: 02/09/2025 HISTORY: ORDERING SYSTEM PROVIDED HISTORY: SOB TECHNOLOGIST PROVIDED HISTORY: Reason for exam:->SOB FINDINGS: The lungs are without acute focal process.  There is no effusion or pneumothorax. The cardiomediastinal silhouette is without acute process. The osseous structures are without acute process.  Stable position right-sided port a catheter.     No acute process.       Discharge Medications:      Medication List        START taking these medications      pantoprazole 40 MG tablet  Commonly known as: PROTONIX  Take 1 tablet by mouth 2 times daily (before meals)     polyethylene glycol 17 g packet  Commonly known as: GLYCOLAX  Take 1 packet by mouth daily as needed for Constipation     sodium bicarbonate 650 MG tablet  Take 1 tablet by mouth 2 times daily for 7 days            CHANGE how you take these medications      * gabapentin 300 MG capsule  Commonly known as: NEURONTIN  What changed: Another medication with the same name was removed. Continue taking this medication, and follow the directions you see here.     * gabapentin 300 MG capsule  Commonly known as: NEURONTIN  What changed: Another medication with the same name was removed. Continue taking this medication, and follow the directions you see here.           * This list has 2 medication(s) that are the same as other medications prescribed for you. Read the directions carefully, and ask your doctor or other care provider to review them with you.                CONTINUE taking these medications      diclofenac sodium 1 % Gel  Commonly known as: VOLTAREN     Dulaglutide 4.5 MG/0.5ML Soaj  Inject 4.5 mg into the skin once a week     FLUoxetine 20 MG capsule  Commonly known as: PROZAC  Take 1 capsule by mouth daily

## 2025-07-26 NOTE — PLAN OF CARE
Problem: Chronic Conditions and Co-morbidities  Goal: Patient's chronic conditions and co-morbidity symptoms are monitored and maintained or improved  Outcome: Progressing     Problem: Discharge Planning  Goal: Discharge to home or other facility with appropriate resources  Outcome: Progressing     Problem: Safety - Adult  Goal: Free from fall injury  Outcome: Progressing     Problem: Nutrition Deficit:  Goal: Optimize nutritional status  7/25/2025 2308 by Yvette Mora RN  Outcome: Progressing  7/25/2025 1429 by Alejandra Santiago RD  Outcome: Progressing  Flowsheets (Taken 7/25/2025 1429)  Nutrient intake appropriate for improving, restoring, or maintaining nutritional needs:   Assess nutritional status and recommend course of action   Monitor oral intake, labs, and treatment plans   Recommend appropriate diets, oral nutritional supplements, and vitamin/mineral supplements

## 2025-07-26 NOTE — PLAN OF CARE
Problem: Chronic Conditions and Co-morbidities  Goal: Patient's chronic conditions and co-morbidity symptoms are monitored and maintained or improved  7/26/2025 0727 by Nurys Stern RN  Outcome: Progressing  7/25/2025 2308 by Yvette Mora RN  Outcome: Progressing     Problem: Discharge Planning  Goal: Discharge to home or other facility with appropriate resources  7/26/2025 0727 by Nurys Stern RN  Outcome: Progressing  7/25/2025 2308 by Yvette Mora RN  Outcome: Progressing     Problem: Safety - Adult  Goal: Free from fall injury  7/26/2025 0727 by Nurys Stern RN  Outcome: Progressing  7/25/2025 2308 by Yvette Mora RN  Outcome: Progressing     Problem: Nutrition Deficit:  Goal: Optimize nutritional status  7/26/2025 0727 by Nurys Stern RN  Outcome: Progressing  7/25/2025 2308 by Yvette Mora, RN  Outcome: Progressing

## 2025-07-26 NOTE — CARE COORDINATION
CM update: DC order noted. Pt has been arranged to be picked up by [facility] Provide a Ride [phone] between 2pm-4pm at the [facility] entrance. Nursing notified. DOUG ORTEZN, RN  Case Management   [phone]

## 2025-07-26 NOTE — PROGRESS NOTES
CLINICAL PHARMACY NOTE: MEDS TO BEDS    Total # of Prescriptions Filled: 3   The following medications were delivered to the patient:  Pantoprazole 40 mg  Miralax  Sodium bicarbonate 650 mg    Additional Documentation:   RN picked up

## 2025-07-28 ENCOUNTER — CARE COORDINATION (OUTPATIENT)
Dept: CARE COORDINATION | Age: 75
End: 2025-07-28

## 2025-07-28 ENCOUNTER — TELEPHONE (OUTPATIENT)
Dept: FAMILY MEDICINE CLINIC | Age: 75
End: 2025-07-28

## 2025-07-28 DIAGNOSIS — D63.8 ANEMIA DUE TO CHRONIC ILLNESS: Primary | ICD-10-CM

## 2025-07-28 NOTE — CARE COORDINATION
Ambulatory Care Coordination Note     2025 11:15 AM     Patient Current Location:  Home: [address] [place] [address]  [place] [address]     ACM contacted the patient by telephone. Verified name and  with patient as identifiers.         ACM: Shyam Henderson RN     Challenges to be reviewed by the provider   Additional needs identified to be addressed with provider Yes  Patient wishes to remain on omeprazole.  Hospital discharged her with pantoprazole.                 Method of communication with provider: staff message.    Utilization: Has the patient been discharged from the hospital since your last call? yes -   Call within 2 business days of discharge: Yes    Patient: Haven Barber    Patient : 1950   MRN: [id number]    Reason for Admission: anemia/GAVE syndrome  Discharge Date: 25  RURS: Readmission Risk Score: 21      Last Discharge Facility       Date Complaint Diagnosis Description Type Department Provider    25 Anemia Symptomatic anemia ... ED to Hosp-Admission (Discharged) (ADMITTED) [facility] Gino Urban MD; [facility]..            Was this an external facility discharge? No    Ambulatory Care Manager reviewed discharge instructions, medical action plan, and red flags with patient. The patient was given an opportunity to ask questions; all questions answered at this time.. The patient verbalized understanding.   Were discharge instructions available to patient? Yes.   Reviewed appropriate site of care based on symptoms and resources available to patient including: PCP  Specialist  Home health  When to call 911. The patient agrees to contact the primary care provider and/or specialist office for questions related to their healthcare.     Patients top risk factors for readmission: multiple health system providers, stages of grief, and utilization of services    Hospital follow up appointment: Patient does not have a follow up appointment scheduled at time of call. Declined to schedule

## 2025-07-28 NOTE — CARE COORDINATION
[facility] reached out to Awilda GUILLAUME And reviewed case and help with [employer] contacts.       [facility] will email [employer] contact today to see if they can approve a home infusion company or have other programs that might assist.

## 2025-07-28 NOTE — TELEPHONE ENCOUNTER
Care Transitions Initial Follow Up Call    Outreach made within 2 business days of discharge: Yes    Patient: Haven Barber Patient : 1950   MRN: [id number]  Reason for Admission: 25  Discharge Date: 25       Spoke with: Haven, patient  declined visit, due to transportation and multiple appointments.    Discharge department/facility: [facility] Interactive Patient Contact:  Was patient able to fill all prescriptions: Yes  Was patient instructed to bring all medications to the follow-up visit: Yes  Is patient taking all medications as directed in the discharge summary? Yes  Does patient understand their discharge instructions: Yes  Does patient have questions or concerns that need addressed prior to 7-14 day follow up office visit: no    Additional needs identified to be addressed with provider  No needs identified             Scheduled appointment with PCP within 7-14 days    Follow Up  Future Appointments   Date Time Provider Department Center   2025 11:00 AM [facility] FAST TRACK 3 [facility]   2025 11:30 AM Stephen Decker MD MED ONC [facility]   2025 12:00 PM Chirag Singh MD [place] [facility]   2025 11:00 AM Sahra Richards DO [place] [facility]   2026 10:50 AM Jesus Alvarado DO [place] [facility]       Abraham Jennings MA

## 2025-07-28 NOTE — CARE COORDINATION
[facility] placed a call to [facility] as it was listed as having Palliative Care as well.   Spoke with company who states they are only Hospice care and would not be able to do home transfusions.       [facility] placed a call to Haven for follow up and review of her recent hospital admission.  Haven answered but reports [facility] nurse is currently in the home.     [facility] reviewed the appointment for today with Dr. Decker .   Haven states that she isn't going d/t being in the hospital recently and receiving 3 units of blood.  Haven did request a call back later today or this week given that the [facility] nurse was there.  [facility] agreeable and will follow.

## 2025-08-01 ENCOUNTER — CARE COORDINATION (OUTPATIENT)
Dept: CARE COORDINATION | Age: 75
End: 2025-08-01

## 2025-08-01 NOTE — CARE COORDINATION
[facility] placed a follow up call to Haven.  She states she is feeling \"a little bit better, stronger\" but not a whole lot.       Haven states she received her post hospital meals from [facility] today.       Reviewed with Haven the email request [facility] sent to [facility] for assistance with coordination of care at home.  Haven has not been called by anyone as of yet.         Haven states she plans to go into [facility].   [facility] tried to gather information from Haven on who was assisting her with the [facility].  Haven states the SW from [facility] was out and talking to her about it.  Haven states she was looking into the [facility] with [employer] associated with the [facility].         [facility] reviewed how the [facility] waiver works and inquired if Haven has spoken with Bartolo PACEHCO [employer] about this.   Haven states she really isn't sure what all was talked about and/or if she spoke with Bartolo PA.        Haven states she has a [facility] works [facility] and that is comforting for her.  She states she can bring her cat as well.       [facility] placed a merged call to Bartolo PACHECO With [facility] angelia CM; a VM was left regarding Haven's desire for [facility] and call back number for both [facility] and [facility].     This way everyone is on the same page as Haven was having trouble recalling who was out to her home and who she spoke with.        Haven states she has a friend, Nirmala Bruce, that will assist her with going to look at different [facility]'s.  Haven doesn't have a set date but fells it will be early next week that they go.         [facility] reviewed that there is no infusion appointment scheduled for Haven.  Reviewed that Haven canceled her Cardiology appointment today.  Haven states she knows about the heart and doesn't feel cardiology is needed for her at this time.  States it isn't going to fix the bigger problem she has.        [facility] offered to call and help Haven get scheduled for her labs and infusion at [phone].   Haven took the number and states she will call on her

## 2025-08-06 ENCOUNTER — TELEPHONE (OUTPATIENT)
Dept: INFUSION THERAPY | Age: 75
End: 2025-08-06

## 2025-08-06 ENCOUNTER — HOSPITAL ENCOUNTER (INPATIENT)
Age: 75
LOS: 3 days | Discharge: HOME | DRG: 378 | End: 2025-08-09
Attending: EMERGENCY MEDICINE | Admitting: INTERNAL MEDICINE
Payer: MEDICARE

## 2025-08-06 DIAGNOSIS — K92.2 GASTROINTESTINAL HEMORRHAGE, UNSPECIFIED GASTROINTESTINAL HEMORRHAGE TYPE: Primary | ICD-10-CM

## 2025-08-06 DIAGNOSIS — K62.5 RECTAL BLEEDING: ICD-10-CM

## 2025-08-06 PROBLEM — D64.9 ACUTE ANEMIA: Status: ACTIVE | Noted: 2025-08-06

## 2025-08-06 LAB
ABO + RH BLD: NORMAL
ALBUMIN SERPL-MCNC: 3 G/DL (ref 3.5–5.2)
ALP SERPL-CCNC: 100 U/L (ref 35–104)
ALT SERPL-CCNC: 21 U/L (ref 0–35)
ANION GAP SERPL CALCULATED.3IONS-SCNC: 10 MMOL/L (ref 7–16)
ARM BAND NUMBER: NORMAL
AST SERPL-CCNC: 38 U/L (ref 0–35)
BASOPHILS # BLD: 0.04 K/UL (ref 0–0.2)
BASOPHILS NFR BLD: 1 % (ref 0–2)
BILIRUB SERPL-MCNC: 0.5 MG/DL (ref 0–1.2)
BLOOD BANK SAMPLE EXPIRATION: NORMAL
BLOOD GROUP ANTIBODIES SERPL: NEGATIVE
BUN SERPL-MCNC: 13 MG/DL (ref 8–23)
CALCIUM SERPL-MCNC: 8.8 MG/DL (ref 8.8–10.2)
CHLORIDE SERPL-SCNC: 104 MMOL/L (ref 98–107)
CHP ED QC CHECK: NORMAL
CO2 SERPL-SCNC: 19 MMOL/L (ref 22–29)
CREAT SERPL-MCNC: 0.7 MG/DL (ref 0.5–1)
EOSINOPHIL # BLD: 0.37 K/UL (ref 0.05–0.5)
EOSINOPHILS RELATIVE PERCENT: 6 % (ref 0–6)
ERYTHROCYTE [DISTWIDTH] IN BLOOD BY AUTOMATED COUNT: 21.2 % (ref 11.5–15)
GFR, ESTIMATED: >90 ML/MIN/1.73M2
GLUCOSE BLD-MCNC: 330 MG/DL
GLUCOSE BLD-MCNC: 330 MG/DL (ref 74–99)
GLUCOSE SERPL-MCNC: 353 MG/DL (ref 74–99)
HCT VFR BLD AUTO: 25.3 % (ref 34–48)
HCT VFR BLD AUTO: 27.1 % (ref 34–48)
HGB BLD-MCNC: 8.1 G/DL (ref 11.5–15.5)
HGB BLD-MCNC: 8.9 G/DL (ref 11.5–15.5)
IMM GRANULOCYTES # BLD AUTO: 0.03 K/UL (ref 0–0.58)
IMM GRANULOCYTES NFR BLD: 1 % (ref 0–5)
LYMPHOCYTES NFR BLD: 1.11 K/UL (ref 1.5–4)
LYMPHOCYTES RELATIVE PERCENT: 18 % (ref 20–42)
MCH RBC QN AUTO: 29.1 PG (ref 26–35)
MCHC RBC AUTO-ENTMCNC: 32.8 G/DL (ref 32–34.5)
MCV RBC AUTO: 88.6 FL (ref 80–99.9)
MONOCYTES NFR BLD: 0.82 K/UL (ref 0.1–0.95)
MONOCYTES NFR BLD: 13 % (ref 2–12)
NEUTROPHILS NFR BLD: 62 % (ref 43–80)
NEUTS SEG NFR BLD: 3.9 K/UL (ref 1.8–7.3)
PLATELET # BLD AUTO: 121 K/UL (ref 130–450)
PMV BLD AUTO: 10.8 FL (ref 7–12)
POTASSIUM SERPL-SCNC: 3.8 MMOL/L (ref 3.5–5.1)
PROT SERPL-MCNC: 5.6 G/DL (ref 6.4–8.3)
RBC # BLD AUTO: 3.06 M/UL (ref 3.5–5.5)
SODIUM SERPL-SCNC: 134 MMOL/L (ref 136–145)
WBC OTHER # BLD: 6.3 K/UL (ref 4.5–11.5)

## 2025-08-06 PROCEDURE — 36415 COLL VENOUS BLD VENIPUNCTURE: CPT

## 2025-08-06 PROCEDURE — 86900 BLOOD TYPING SEROLOGIC ABO: CPT

## 2025-08-06 PROCEDURE — 82962 GLUCOSE BLOOD TEST: CPT

## 2025-08-06 PROCEDURE — 85014 HEMATOCRIT: CPT

## 2025-08-06 PROCEDURE — 2500000003 HC RX 250 WO HCPCS: Performed by: INTERNAL MEDICINE

## 2025-08-06 PROCEDURE — 85025 COMPLETE CBC W/AUTO DIFF WBC: CPT

## 2025-08-06 PROCEDURE — 85018 HEMOGLOBIN: CPT

## 2025-08-06 PROCEDURE — 6370000000 HC RX 637 (ALT 250 FOR IP): Performed by: INTERNAL MEDICINE

## 2025-08-06 PROCEDURE — 99285 EMERGENCY DEPT VISIT HI MDM: CPT

## 2025-08-06 PROCEDURE — 6360000002 HC RX W HCPCS

## 2025-08-06 PROCEDURE — 80053 COMPREHEN METABOLIC PANEL: CPT

## 2025-08-06 PROCEDURE — 86850 RBC ANTIBODY SCREEN: CPT

## 2025-08-06 PROCEDURE — 2060000000 HC ICU INTERMEDIATE R&B

## 2025-08-06 PROCEDURE — 96374 THER/PROPH/DIAG INJ IV PUSH: CPT

## 2025-08-06 PROCEDURE — 86901 BLOOD TYPING SEROLOGIC RH(D): CPT

## 2025-08-06 PROCEDURE — 99222 1ST HOSP IP/OBS MODERATE 55: CPT | Performed by: INTERNAL MEDICINE

## 2025-08-06 RX ORDER — GABAPENTIN 300 MG/1
300 CAPSULE ORAL EVERY MORNING
Status: DISCONTINUED | OUTPATIENT
Start: 2025-08-07 | End: 2025-08-09 | Stop reason: HOSPADM

## 2025-08-06 RX ORDER — CALCIUM CARBONATE 500 MG/1
500 TABLET, CHEWABLE ORAL 3 TIMES DAILY PRN
Status: DISCONTINUED | OUTPATIENT
Start: 2025-08-06 | End: 2025-08-09 | Stop reason: HOSPADM

## 2025-08-06 RX ORDER — ACETAMINOPHEN 650 MG/1
650 SUPPOSITORY RECTAL EVERY 6 HOURS PRN
Status: DISCONTINUED | OUTPATIENT
Start: 2025-08-06 | End: 2025-08-09 | Stop reason: HOSPADM

## 2025-08-06 RX ORDER — SODIUM CHLORIDE 0.9 % (FLUSH) 0.9 %
5-40 SYRINGE (ML) INJECTION PRN
Status: DISCONTINUED | OUTPATIENT
Start: 2025-08-06 | End: 2025-08-09 | Stop reason: HOSPADM

## 2025-08-06 RX ORDER — POLYETHYLENE GLYCOL 3350 17 G/17G
17 POWDER, FOR SOLUTION ORAL DAILY PRN
Status: DISCONTINUED | OUTPATIENT
Start: 2025-08-06 | End: 2025-08-09 | Stop reason: HOSPADM

## 2025-08-06 RX ORDER — PANTOPRAZOLE SODIUM 40 MG/10ML
80 INJECTION, POWDER, LYOPHILIZED, FOR SOLUTION INTRAVENOUS ONCE
Status: COMPLETED | OUTPATIENT
Start: 2025-08-06 | End: 2025-08-06

## 2025-08-06 RX ORDER — ONDANSETRON 4 MG/1
4 TABLET, ORALLY DISINTEGRATING ORAL EVERY 8 HOURS PRN
Status: DISCONTINUED | OUTPATIENT
Start: 2025-08-06 | End: 2025-08-09 | Stop reason: HOSPADM

## 2025-08-06 RX ORDER — GABAPENTIN 300 MG/1
600 CAPSULE ORAL NIGHTLY
Status: DISCONTINUED | OUTPATIENT
Start: 2025-08-06 | End: 2025-08-09 | Stop reason: HOSPADM

## 2025-08-06 RX ORDER — BENZONATATE 100 MG/1
100 CAPSULE ORAL 3 TIMES DAILY PRN
Status: DISCONTINUED | OUTPATIENT
Start: 2025-08-06 | End: 2025-08-09 | Stop reason: HOSPADM

## 2025-08-06 RX ORDER — GLIPIZIDE 5 MG/1
5 TABLET ORAL DAILY
Status: DISCONTINUED | OUTPATIENT
Start: 2025-08-07 | End: 2025-08-09 | Stop reason: HOSPADM

## 2025-08-06 RX ORDER — DEXTROSE MONOHYDRATE 100 MG/ML
INJECTION, SOLUTION INTRAVENOUS CONTINUOUS PRN
Status: DISCONTINUED | OUTPATIENT
Start: 2025-08-06 | End: 2025-08-09 | Stop reason: HOSPADM

## 2025-08-06 RX ORDER — MAGNESIUM SULFATE IN WATER 40 MG/ML
2000 INJECTION, SOLUTION INTRAVENOUS PRN
Status: DISCONTINUED | OUTPATIENT
Start: 2025-08-06 | End: 2025-08-09 | Stop reason: HOSPADM

## 2025-08-06 RX ORDER — ACETAMINOPHEN 325 MG/1
650 TABLET ORAL EVERY 6 HOURS PRN
Status: DISCONTINUED | OUTPATIENT
Start: 2025-08-06 | End: 2025-08-09 | Stop reason: HOSPADM

## 2025-08-06 RX ORDER — POTASSIUM CHLORIDE 1500 MG/1
40 TABLET, EXTENDED RELEASE ORAL PRN
Status: DISCONTINUED | OUTPATIENT
Start: 2025-08-06 | End: 2025-08-09 | Stop reason: HOSPADM

## 2025-08-06 RX ORDER — GLUCAGON 1 MG/ML
1 KIT INJECTION PRN
Status: DISCONTINUED | OUTPATIENT
Start: 2025-08-06 | End: 2025-08-09 | Stop reason: HOSPADM

## 2025-08-06 RX ORDER — SODIUM CHLORIDE 9 MG/ML
INJECTION, SOLUTION INTRAVENOUS PRN
Status: DISCONTINUED | OUTPATIENT
Start: 2025-08-06 | End: 2025-08-09 | Stop reason: HOSPADM

## 2025-08-06 RX ORDER — PROPRANOLOL HYDROCHLORIDE 10 MG/1
10 TABLET ORAL DAILY
Status: DISCONTINUED | OUTPATIENT
Start: 2025-08-07 | End: 2025-08-09 | Stop reason: HOSPADM

## 2025-08-06 RX ORDER — POTASSIUM CHLORIDE 7.45 MG/ML
10 INJECTION INTRAVENOUS PRN
Status: DISCONTINUED | OUTPATIENT
Start: 2025-08-06 | End: 2025-08-09 | Stop reason: HOSPADM

## 2025-08-06 RX ORDER — PANTOPRAZOLE SODIUM 40 MG/10ML
40 INJECTION, POWDER, LYOPHILIZED, FOR SOLUTION INTRAVENOUS 2 TIMES DAILY
Status: DISCONTINUED | OUTPATIENT
Start: 2025-08-07 | End: 2025-08-09 | Stop reason: HOSPADM

## 2025-08-06 RX ORDER — ONDANSETRON 2 MG/ML
4 INJECTION INTRAMUSCULAR; INTRAVENOUS EVERY 6 HOURS PRN
Status: DISCONTINUED | OUTPATIENT
Start: 2025-08-06 | End: 2025-08-09 | Stop reason: HOSPADM

## 2025-08-06 RX ORDER — ROPINIROLE 0.5 MG/1
0.5 TABLET, FILM COATED ORAL 2 TIMES DAILY
Status: DISCONTINUED | OUTPATIENT
Start: 2025-08-06 | End: 2025-08-09 | Stop reason: HOSPADM

## 2025-08-06 RX ORDER — SODIUM CHLORIDE 0.9 % (FLUSH) 0.9 %
5-40 SYRINGE (ML) INJECTION EVERY 12 HOURS SCHEDULED
Status: DISCONTINUED | OUTPATIENT
Start: 2025-08-06 | End: 2025-08-09 | Stop reason: HOSPADM

## 2025-08-06 RX ORDER — HYDRALAZINE HYDROCHLORIDE 20 MG/ML
10 INJECTION INTRAMUSCULAR; INTRAVENOUS EVERY 6 HOURS PRN
Status: DISCONTINUED | OUTPATIENT
Start: 2025-08-06 | End: 2025-08-09 | Stop reason: HOSPADM

## 2025-08-06 RX ORDER — INSULIN LISPRO 100 [IU]/ML
0-8 INJECTION, SOLUTION INTRAVENOUS; SUBCUTANEOUS
Status: DISCONTINUED | OUTPATIENT
Start: 2025-08-06 | End: 2025-08-09 | Stop reason: HOSPADM

## 2025-08-06 RX ADMIN — PANTOPRAZOLE SODIUM 80 MG: 40 INJECTION, POWDER, FOR SOLUTION INTRAVENOUS at 16:57

## 2025-08-06 RX ADMIN — GABAPENTIN 600 MG: 300 CAPSULE ORAL at 20:53

## 2025-08-06 RX ADMIN — SODIUM CHLORIDE, PRESERVATIVE FREE 10 ML: 5 INJECTION INTRAVENOUS at 21:00

## 2025-08-06 RX ADMIN — INSULIN LISPRO 6 UNITS: 100 INJECTION, SOLUTION INTRAVENOUS; SUBCUTANEOUS at 20:55

## 2025-08-06 ASSESSMENT — PAIN - FUNCTIONAL ASSESSMENT: PAIN_FUNCTIONAL_ASSESSMENT: NONE - DENIES PAIN

## 2025-08-07 ENCOUNTER — TELEPHONE (OUTPATIENT)
Dept: FAMILY MEDICINE CLINIC | Age: 75
End: 2025-08-07
Payer: MEDICARE

## 2025-08-07 DIAGNOSIS — Q27.30 ARTERIOVENOUS MALFORMATION: Primary | ICD-10-CM

## 2025-08-07 PROBLEM — K62.5 RECTAL BLEEDING: Status: ACTIVE | Noted: 2025-08-07

## 2025-08-07 LAB
ALBUMIN SERPL-MCNC: 3 G/DL (ref 3.5–5.2)
ALP SERPL-CCNC: 87 U/L (ref 35–104)
ALT SERPL-CCNC: 20 U/L (ref 0–35)
ANION GAP SERPL CALCULATED.3IONS-SCNC: 11 MMOL/L (ref 7–16)
AST SERPL-CCNC: 34 U/L (ref 0–35)
BILIRUB SERPL-MCNC: 0.6 MG/DL (ref 0–1.2)
BUN SERPL-MCNC: 12 MG/DL (ref 8–23)
CALCIUM SERPL-MCNC: 8.9 MG/DL (ref 8.8–10.2)
CHLORIDE SERPL-SCNC: 108 MMOL/L (ref 98–107)
CHOLEST SERPL-MCNC: 178 MG/DL
CO2 SERPL-SCNC: 21 MMOL/L (ref 22–29)
CREAT SERPL-MCNC: 0.6 MG/DL (ref 0.5–1)
GFR, ESTIMATED: >90 ML/MIN/1.73M2
GLUCOSE BLD-MCNC: 126 MG/DL (ref 74–99)
GLUCOSE BLD-MCNC: 133 MG/DL (ref 74–99)
GLUCOSE BLD-MCNC: 162 MG/DL (ref 74–99)
GLUCOSE BLD-MCNC: 180 MG/DL (ref 74–99)
GLUCOSE SERPL-MCNC: 136 MG/DL (ref 74–99)
HBA1C MFR BLD: 7 % (ref 4–5.6)
HCT VFR BLD AUTO: 25.9 % (ref 34–48)
HCT VFR BLD AUTO: 26.5 % (ref 34–48)
HDLC SERPL-MCNC: 54 MG/DL
HGB BLD-MCNC: 8.2 G/DL (ref 11.5–15.5)
HGB BLD-MCNC: 8.4 G/DL (ref 11.5–15.5)
LDLC SERPL CALC-MCNC: 106 MG/DL
MAGNESIUM SERPL-MCNC: 1.7 MG/DL (ref 1.6–2.4)
PHOSPHATE SERPL-MCNC: 4.3 MG/DL (ref 2.5–4.5)
POTASSIUM SERPL-SCNC: 3.4 MMOL/L (ref 3.5–5.1)
PROT SERPL-MCNC: 5.5 G/DL (ref 6.4–8.3)
SODIUM SERPL-SCNC: 140 MMOL/L (ref 136–145)
TRIGL SERPL-MCNC: 88 MG/DL
TSH SERPL DL<=0.05 MIU/L-ACNC: 3.91 UIU/ML (ref 0.27–4.2)
VLDLC SERPL CALC-MCNC: 18 MG/DL

## 2025-08-07 PROCEDURE — 80061 LIPID PANEL: CPT

## 2025-08-07 PROCEDURE — 83036 HEMOGLOBIN GLYCOSYLATED A1C: CPT

## 2025-08-07 PROCEDURE — 85018 HEMOGLOBIN: CPT

## 2025-08-07 PROCEDURE — 80053 COMPREHEN METABOLIC PANEL: CPT

## 2025-08-07 PROCEDURE — 85014 HEMATOCRIT: CPT

## 2025-08-07 PROCEDURE — 6370000000 HC RX 637 (ALT 250 FOR IP): Performed by: INTERNAL MEDICINE

## 2025-08-07 PROCEDURE — 84443 ASSAY THYROID STIM HORMONE: CPT

## 2025-08-07 PROCEDURE — 99232 SBSQ HOSP IP/OBS MODERATE 35: CPT | Performed by: STUDENT IN AN ORGANIZED HEALTH CARE EDUCATION/TRAINING PROGRAM

## 2025-08-07 PROCEDURE — G0179 MD RECERTIFICATION HHA PT: HCPCS | Performed by: STUDENT IN AN ORGANIZED HEALTH CARE EDUCATION/TRAINING PROGRAM

## 2025-08-07 PROCEDURE — 84100 ASSAY OF PHOSPHORUS: CPT

## 2025-08-07 PROCEDURE — 2500000003 HC RX 250 WO HCPCS: Performed by: INTERNAL MEDICINE

## 2025-08-07 PROCEDURE — 82962 GLUCOSE BLOOD TEST: CPT

## 2025-08-07 PROCEDURE — 6360000002 HC RX W HCPCS: Performed by: INTERNAL MEDICINE

## 2025-08-07 PROCEDURE — 83735 ASSAY OF MAGNESIUM: CPT

## 2025-08-07 PROCEDURE — 99222 1ST HOSP IP/OBS MODERATE 55: CPT | Performed by: NURSE PRACTITIONER

## 2025-08-07 PROCEDURE — 2060000000 HC ICU INTERMEDIATE R&B

## 2025-08-07 RX ADMIN — FLUOXETINE HYDROCHLORIDE 20 MG: 20 CAPSULE ORAL at 10:28

## 2025-08-07 RX ADMIN — GABAPENTIN 300 MG: 300 CAPSULE ORAL at 10:32

## 2025-08-07 RX ADMIN — INSULIN LISPRO 2 UNITS: 100 INJECTION, SOLUTION INTRAVENOUS; SUBCUTANEOUS at 16:43

## 2025-08-07 RX ADMIN — ROPINIROLE HYDROCHLORIDE 0.5 MG: 0.5 TABLET, FILM COATED ORAL at 20:12

## 2025-08-07 RX ADMIN — POTASSIUM CHLORIDE 40 MEQ: 1500 TABLET, EXTENDED RELEASE ORAL at 10:52

## 2025-08-07 RX ADMIN — PROPRANOLOL HYDROCHLORIDE 10 MG: 10 TABLET ORAL at 10:29

## 2025-08-07 RX ADMIN — ROPINIROLE HYDROCHLORIDE 0.5 MG: 0.5 TABLET, FILM COATED ORAL at 10:28

## 2025-08-07 RX ADMIN — SODIUM CHLORIDE, PRESERVATIVE FREE 10 ML: 5 INJECTION INTRAVENOUS at 20:12

## 2025-08-07 RX ADMIN — PANTOPRAZOLE SODIUM 40 MG: 40 INJECTION, POWDER, FOR SOLUTION INTRAVENOUS at 20:12

## 2025-08-07 RX ADMIN — GLIPIZIDE 5 MG: 5 TABLET ORAL at 10:28

## 2025-08-07 RX ADMIN — PANTOPRAZOLE SODIUM 40 MG: 40 INJECTION, POWDER, FOR SOLUTION INTRAVENOUS at 10:29

## 2025-08-07 RX ADMIN — GABAPENTIN 600 MG: 300 CAPSULE ORAL at 20:11

## 2025-08-07 RX ADMIN — SODIUM CHLORIDE, PRESERVATIVE FREE 10 ML: 5 INJECTION INTRAVENOUS at 10:33

## 2025-08-08 ENCOUNTER — ANESTHESIA EVENT (OUTPATIENT)
Dept: ENDOSCOPY | Age: 75
End: 2025-08-08
Payer: MEDICARE

## 2025-08-08 ENCOUNTER — ANESTHESIA (OUTPATIENT)
Dept: ENDOSCOPY | Age: 75
End: 2025-08-08
Payer: MEDICARE

## 2025-08-08 LAB
GLUCOSE BLD-MCNC: 115 MG/DL (ref 74–99)
GLUCOSE BLD-MCNC: 128 MG/DL (ref 74–99)
GLUCOSE BLD-MCNC: 131 MG/DL (ref 74–99)
GLUCOSE BLD-MCNC: 331 MG/DL (ref 74–99)

## 2025-08-08 PROCEDURE — 82962 GLUCOSE BLOOD TEST: CPT

## 2025-08-08 PROCEDURE — 3700000001 HC ADD 15 MINUTES (ANESTHESIA): Performed by: STUDENT IN AN ORGANIZED HEALTH CARE EDUCATION/TRAINING PROGRAM

## 2025-08-08 PROCEDURE — 3609013000 HC EGD TRANSORAL CONTROL BLEEDING ANY METHOD: Performed by: STUDENT IN AN ORGANIZED HEALTH CARE EDUCATION/TRAINING PROGRAM

## 2025-08-08 PROCEDURE — 6360000002 HC RX W HCPCS: Performed by: INTERNAL MEDICINE

## 2025-08-08 PROCEDURE — 2500000003 HC RX 250 WO HCPCS: Performed by: INTERNAL MEDICINE

## 2025-08-08 PROCEDURE — 43255 EGD CONTROL BLEEDING ANY: CPT | Performed by: STUDENT IN AN ORGANIZED HEALTH CARE EDUCATION/TRAINING PROGRAM

## 2025-08-08 PROCEDURE — 2709999900 HC NON-CHARGEABLE SUPPLY: Performed by: STUDENT IN AN ORGANIZED HEALTH CARE EDUCATION/TRAINING PROGRAM

## 2025-08-08 PROCEDURE — 2060000000 HC ICU INTERMEDIATE R&B

## 2025-08-08 PROCEDURE — 2580000003 HC RX 258

## 2025-08-08 PROCEDURE — 3700000000 HC ANESTHESIA ATTENDED CARE: Performed by: STUDENT IN AN ORGANIZED HEALTH CARE EDUCATION/TRAINING PROGRAM

## 2025-08-08 PROCEDURE — 6370000000 HC RX 637 (ALT 250 FOR IP): Performed by: INTERNAL MEDICINE

## 2025-08-08 PROCEDURE — 7100000001 HC PACU RECOVERY - ADDTL 15 MIN: Performed by: STUDENT IN AN ORGANIZED HEALTH CARE EDUCATION/TRAINING PROGRAM

## 2025-08-08 PROCEDURE — 6360000002 HC RX W HCPCS

## 2025-08-08 PROCEDURE — 99232 SBSQ HOSP IP/OBS MODERATE 35: CPT | Performed by: STUDENT IN AN ORGANIZED HEALTH CARE EDUCATION/TRAINING PROGRAM

## 2025-08-08 PROCEDURE — 7100000000 HC PACU RECOVERY - FIRST 15 MIN: Performed by: STUDENT IN AN ORGANIZED HEALTH CARE EDUCATION/TRAINING PROGRAM

## 2025-08-08 PROCEDURE — 2720000010 HC SURG SUPPLY STERILE: Performed by: STUDENT IN AN ORGANIZED HEALTH CARE EDUCATION/TRAINING PROGRAM

## 2025-08-08 RX ORDER — GLYCOPYRROLATE 0.2 MG/ML
INJECTION INTRAMUSCULAR; INTRAVENOUS
Status: DISCONTINUED | OUTPATIENT
Start: 2025-08-08 | End: 2025-08-08 | Stop reason: SDUPTHER

## 2025-08-08 RX ORDER — PROPOFOL 10 MG/ML
INJECTION, EMULSION INTRAVENOUS
Status: DISCONTINUED | OUTPATIENT
Start: 2025-08-08 | End: 2025-08-08 | Stop reason: SDUPTHER

## 2025-08-08 RX ORDER — SODIUM CHLORIDE 9 MG/ML
INJECTION, SOLUTION INTRAVENOUS
Status: DISCONTINUED | OUTPATIENT
Start: 2025-08-08 | End: 2025-08-08 | Stop reason: SDUPTHER

## 2025-08-08 RX ORDER — PHENYLEPHRINE HCL IN 0.9% NACL 1 MG/10 ML
SYRINGE (ML) INTRAVENOUS
Status: DISCONTINUED | OUTPATIENT
Start: 2025-08-08 | End: 2025-08-08 | Stop reason: SDUPTHER

## 2025-08-08 RX ORDER — LIDOCAINE HYDROCHLORIDE 20 MG/ML
INJECTION, SOLUTION INTRAVENOUS
Status: DISCONTINUED | OUTPATIENT
Start: 2025-08-08 | End: 2025-08-08 | Stop reason: SDUPTHER

## 2025-08-08 RX ADMIN — PROPRANOLOL HYDROCHLORIDE 10 MG: 10 TABLET ORAL at 09:42

## 2025-08-08 RX ADMIN — SODIUM CHLORIDE, PRESERVATIVE FREE 10 ML: 5 INJECTION INTRAVENOUS at 09:52

## 2025-08-08 RX ADMIN — SODIUM CHLORIDE: 9 INJECTION, SOLUTION INTRAVENOUS at 13:57

## 2025-08-08 RX ADMIN — GABAPENTIN 600 MG: 300 CAPSULE ORAL at 20:12

## 2025-08-08 RX ADMIN — GABAPENTIN 300 MG: 300 CAPSULE ORAL at 09:51

## 2025-08-08 RX ADMIN — FLUOXETINE HYDROCHLORIDE 20 MG: 20 CAPSULE ORAL at 09:51

## 2025-08-08 RX ADMIN — Medication 100 MCG: at 14:31

## 2025-08-08 RX ADMIN — PANTOPRAZOLE SODIUM 40 MG: 40 INJECTION, POWDER, FOR SOLUTION INTRAVENOUS at 09:51

## 2025-08-08 RX ADMIN — ROPINIROLE HYDROCHLORIDE 0.5 MG: 0.5 TABLET, FILM COATED ORAL at 20:12

## 2025-08-08 RX ADMIN — SODIUM CHLORIDE, PRESERVATIVE FREE 10 ML: 5 INJECTION INTRAVENOUS at 20:13

## 2025-08-08 RX ADMIN — INSULIN LISPRO 6 UNITS: 100 INJECTION, SOLUTION INTRAVENOUS; SUBCUTANEOUS at 20:17

## 2025-08-08 RX ADMIN — ROPINIROLE HYDROCHLORIDE 0.5 MG: 0.5 TABLET, FILM COATED ORAL at 09:51

## 2025-08-08 RX ADMIN — GLIPIZIDE 5 MG: 5 TABLET ORAL at 09:51

## 2025-08-08 RX ADMIN — PROPOFOL 150 MG: 10 INJECTION, EMULSION INTRAVENOUS at 14:26

## 2025-08-08 RX ADMIN — LIDOCAINE HYDROCHLORIDE 100 MG: 20 INJECTION, SOLUTION INTRAVENOUS at 14:28

## 2025-08-08 RX ADMIN — GLYCOPYRROLATE 0.1 MG: 0.2 INJECTION INTRAMUSCULAR; INTRAVENOUS at 14:28

## 2025-08-08 RX ADMIN — PANTOPRAZOLE SODIUM 40 MG: 40 INJECTION, POWDER, FOR SOLUTION INTRAVENOUS at 20:13

## 2025-08-08 ASSESSMENT — PAIN - FUNCTIONAL ASSESSMENT: PAIN_FUNCTIONAL_ASSESSMENT: 0-10

## 2025-08-09 VITALS
HEART RATE: 85 BPM | SYSTOLIC BLOOD PRESSURE: 117 MMHG | HEIGHT: 63 IN | TEMPERATURE: 97.2 F | WEIGHT: 186.6 LBS | OXYGEN SATURATION: 94 % | DIASTOLIC BLOOD PRESSURE: 47 MMHG | BODY MASS INDEX: 33.06 KG/M2 | RESPIRATION RATE: 16 BRPM

## 2025-08-09 LAB
ANION GAP SERPL CALCULATED.3IONS-SCNC: 10 MMOL/L (ref 7–16)
BUN SERPL-MCNC: 12 MG/DL (ref 8–23)
CALCIUM SERPL-MCNC: 8.6 MG/DL (ref 8.8–10.2)
CHLORIDE SERPL-SCNC: 107 MMOL/L (ref 98–107)
CO2 SERPL-SCNC: 20 MMOL/L (ref 22–29)
CREAT SERPL-MCNC: 0.8 MG/DL (ref 0.5–1)
ERYTHROCYTE [DISTWIDTH] IN BLOOD BY AUTOMATED COUNT: 21.8 % (ref 11.5–15)
GFR, ESTIMATED: 77 ML/MIN/1.73M2
GLUCOSE BLD-MCNC: 120 MG/DL (ref 74–99)
GLUCOSE SERPL-MCNC: 268 MG/DL (ref 74–99)
HCT VFR BLD AUTO: 26.3 % (ref 34–48)
HGB BLD-MCNC: 8.1 G/DL (ref 11.5–15.5)
MCH RBC QN AUTO: 28.6 PG (ref 26–35)
MCHC RBC AUTO-ENTMCNC: 30.8 G/DL (ref 32–34.5)
MCV RBC AUTO: 92.9 FL (ref 80–99.9)
PLATELET # BLD AUTO: 153 K/UL (ref 130–450)
PMV BLD AUTO: 11.1 FL (ref 7–12)
POTASSIUM SERPL-SCNC: 4.1 MMOL/L (ref 3.5–5.1)
RBC # BLD AUTO: 2.83 M/UL (ref 3.5–5.5)
SODIUM SERPL-SCNC: 137 MMOL/L (ref 136–145)
WBC OTHER # BLD: 5.7 K/UL (ref 4.5–11.5)

## 2025-08-09 PROCEDURE — 85027 COMPLETE CBC AUTOMATED: CPT

## 2025-08-09 PROCEDURE — 80048 BASIC METABOLIC PNL TOTAL CA: CPT

## 2025-08-09 PROCEDURE — 6360000002 HC RX W HCPCS: Performed by: INTERNAL MEDICINE

## 2025-08-09 PROCEDURE — 82962 GLUCOSE BLOOD TEST: CPT

## 2025-08-09 PROCEDURE — 2500000003 HC RX 250 WO HCPCS: Performed by: INTERNAL MEDICINE

## 2025-08-09 PROCEDURE — 6370000000 HC RX 637 (ALT 250 FOR IP): Performed by: INTERNAL MEDICINE

## 2025-08-09 PROCEDURE — 99239 HOSP IP/OBS DSCHRG MGMT >30: CPT | Performed by: STUDENT IN AN ORGANIZED HEALTH CARE EDUCATION/TRAINING PROGRAM

## 2025-08-09 RX ADMIN — FLUOXETINE HYDROCHLORIDE 20 MG: 20 CAPSULE ORAL at 08:55

## 2025-08-09 RX ADMIN — GLIPIZIDE 5 MG: 5 TABLET ORAL at 08:55

## 2025-08-09 RX ADMIN — GABAPENTIN 300 MG: 300 CAPSULE ORAL at 08:55

## 2025-08-09 RX ADMIN — PROPRANOLOL HYDROCHLORIDE 10 MG: 10 TABLET ORAL at 08:55

## 2025-08-09 RX ADMIN — SODIUM CHLORIDE, PRESERVATIVE FREE 10 ML: 5 INJECTION INTRAVENOUS at 08:56

## 2025-08-09 RX ADMIN — ROPINIROLE HYDROCHLORIDE 0.5 MG: 0.5 TABLET, FILM COATED ORAL at 08:55

## 2025-08-09 RX ADMIN — PANTOPRAZOLE SODIUM 40 MG: 40 INJECTION, POWDER, FOR SOLUTION INTRAVENOUS at 08:55

## 2025-08-09 ASSESSMENT — PAIN SCALES - GENERAL: PAINLEVEL_OUTOF10: 0

## 2025-08-11 ENCOUNTER — TELEPHONE (OUTPATIENT)
Dept: ADMINISTRATIVE | Age: 75
End: 2025-08-11

## 2025-08-11 ENCOUNTER — TELEPHONE (OUTPATIENT)
Dept: FAMILY MEDICINE CLINIC | Age: 75
End: 2025-08-11

## 2025-08-11 ENCOUNTER — CARE COORDINATION (OUTPATIENT)
Dept: CARE COORDINATION | Age: 75
End: 2025-08-11

## 2025-08-11 DIAGNOSIS — D64.9 ACUTE ANEMIA: Primary | ICD-10-CM

## 2025-08-11 PROCEDURE — 1111F DSCHRG MED/CURRENT MED MERGE: CPT | Performed by: STUDENT IN AN ORGANIZED HEALTH CARE EDUCATION/TRAINING PROGRAM

## 2025-08-12 RX ORDER — ROPINIROLE 0.5 MG/1
0.5 TABLET, FILM COATED ORAL 2 TIMES DAILY
Qty: 180 TABLET | Refills: 0 | Status: SHIPPED | OUTPATIENT
Start: 2025-08-12

## 2025-08-15 ENCOUNTER — TELEPHONE (OUTPATIENT)
Age: 75
End: 2025-08-15

## 2025-08-15 ENCOUNTER — OFFICE VISIT (OUTPATIENT)
Dept: FAMILY MEDICINE CLINIC | Age: 75
End: 2025-08-15

## 2025-08-15 VITALS
HEIGHT: 63 IN | DIASTOLIC BLOOD PRESSURE: 60 MMHG | TEMPERATURE: 97.7 F | OXYGEN SATURATION: 95 % | HEART RATE: 84 BPM | BODY MASS INDEX: 33.03 KG/M2 | WEIGHT: 186.4 LBS | SYSTOLIC BLOOD PRESSURE: 118 MMHG

## 2025-08-15 DIAGNOSIS — M54.16 LUMBAR RADICULAR PAIN: ICD-10-CM

## 2025-08-15 DIAGNOSIS — Z09 HOSPITAL DISCHARGE FOLLOW-UP: Primary | ICD-10-CM

## 2025-08-15 DIAGNOSIS — K31.819 GAVE (GASTRIC ANTRAL VASCULAR ECTASIA): ICD-10-CM

## 2025-08-15 RX ORDER — GABAPENTIN 300 MG/1
300 CAPSULE ORAL 2 TIMES DAILY
Qty: 180 CAPSULE | Refills: 1 | Status: SHIPPED | OUTPATIENT
Start: 2025-08-15 | End: 2026-02-11

## 2025-08-15 RX ORDER — OMEPRAZOLE 20 MG/1
20 CAPSULE, DELAYED RELEASE ORAL 2 TIMES DAILY
Qty: 180 CAPSULE | Refills: 2 | Status: SHIPPED | OUTPATIENT
Start: 2025-08-15

## 2025-08-15 RX ORDER — OMEPRAZOLE 20 MG/1
20 CAPSULE, DELAYED RELEASE ORAL 2 TIMES DAILY
COMMUNITY
End: 2025-08-15 | Stop reason: SDUPTHER

## 2025-08-15 ASSESSMENT — ENCOUNTER SYMPTOMS
BLOOD IN STOOL: 1
COUGH: 0
DIARRHEA: 1
NAUSEA: 0
SHORTNESS OF BREATH: 0
ABDOMINAL PAIN: 0

## 2025-08-18 ENCOUNTER — HOSPITAL ENCOUNTER (OUTPATIENT)
Dept: INFUSION THERAPY | Age: 75
Discharge: HOME | End: 2025-08-18

## 2025-08-18 DIAGNOSIS — D62 ACUTE BLOOD LOSS ANEMIA: Primary | ICD-10-CM

## 2025-08-18 RX ORDER — SODIUM CHLORIDE 0.9 % (FLUSH) 0.9 %
5-40 SYRINGE (ML) INJECTION PRN
Status: CANCELLED | OUTPATIENT
Start: 2025-08-18

## 2025-08-18 RX ORDER — SODIUM CHLORIDE 9 MG/ML
25 INJECTION, SOLUTION INTRAVENOUS PRN
Status: CANCELLED | OUTPATIENT
Start: 2025-08-18

## 2025-08-18 RX ORDER — SODIUM CHLORIDE 0.9 % (FLUSH) 0.9 %
5-40 SYRINGE (ML) INJECTION PRN
Status: DISCONTINUED | OUTPATIENT
Start: 2025-08-18 | End: 2025-08-19 | Stop reason: HOSPADM

## 2025-08-18 RX ORDER — HEPARIN 100 UNIT/ML
500 SYRINGE INTRAVENOUS PRN
Status: DISCONTINUED | OUTPATIENT
Start: 2025-08-18 | End: 2025-08-19 | Stop reason: HOSPADM

## 2025-08-18 RX ORDER — HEPARIN 100 UNIT/ML
500 SYRINGE INTRAVENOUS PRN
Status: CANCELLED | OUTPATIENT
Start: 2025-08-18

## 2025-08-19 ENCOUNTER — CARE COORDINATION (OUTPATIENT)
Dept: CARE COORDINATION | Age: 75
End: 2025-08-19

## 2025-08-21 ENCOUNTER — HOSPITAL ENCOUNTER (OUTPATIENT)
Dept: INFUSION THERAPY | Age: 75
Discharge: HOME | End: 2025-08-21

## 2025-08-21 DIAGNOSIS — D62 ACUTE BLOOD LOSS ANEMIA: Primary | ICD-10-CM

## 2025-08-21 RX ORDER — HEPARIN 100 UNIT/ML
500 SYRINGE INTRAVENOUS PRN
Status: CANCELLED | OUTPATIENT
Start: 2025-08-21

## 2025-08-21 RX ORDER — SODIUM CHLORIDE 0.9 % (FLUSH) 0.9 %
5-40 SYRINGE (ML) INJECTION PRN
Status: CANCELLED | OUTPATIENT
Start: 2025-08-21

## 2025-08-21 RX ORDER — HEPARIN 100 UNIT/ML
500 SYRINGE INTRAVENOUS PRN
Status: DISCONTINUED | OUTPATIENT
Start: 2025-08-21 | End: 2025-08-22 | Stop reason: HOSPADM

## 2025-08-21 RX ORDER — SODIUM CHLORIDE 0.9 % (FLUSH) 0.9 %
5-40 SYRINGE (ML) INJECTION PRN
Status: DISCONTINUED | OUTPATIENT
Start: 2025-08-21 | End: 2025-08-22 | Stop reason: HOSPADM

## 2025-08-21 RX ORDER — SODIUM CHLORIDE 9 MG/ML
25 INJECTION, SOLUTION INTRAVENOUS PRN
OUTPATIENT
Start: 2025-08-21

## 2025-08-27 ENCOUNTER — CARE COORDINATION (OUTPATIENT)
Dept: CARE COORDINATION | Age: 75
End: 2025-08-27

## 2025-08-28 ENCOUNTER — HOSPITAL ENCOUNTER (OUTPATIENT)
Dept: INFUSION THERAPY | Age: 75
Discharge: HOME | End: 2025-08-28

## 2025-08-28 DIAGNOSIS — D62 ACUTE BLOOD LOSS ANEMIA: Primary | ICD-10-CM

## 2025-08-28 RX ORDER — SODIUM CHLORIDE 9 MG/ML
25 INJECTION, SOLUTION INTRAVENOUS PRN
OUTPATIENT
Start: 2025-08-28

## 2025-08-28 RX ORDER — SODIUM CHLORIDE 0.9 % (FLUSH) 0.9 %
5-40 SYRINGE (ML) INJECTION PRN
OUTPATIENT
Start: 2025-08-28

## 2025-08-28 RX ORDER — HEPARIN 100 UNIT/ML
500 SYRINGE INTRAVENOUS PRN
Status: DISCONTINUED | OUTPATIENT
Start: 2025-08-28 | End: 2025-08-29 | Stop reason: HOSPADM

## 2025-08-28 RX ORDER — HEPARIN 100 UNIT/ML
500 SYRINGE INTRAVENOUS PRN
OUTPATIENT
Start: 2025-08-28

## 2025-08-28 RX ORDER — SODIUM CHLORIDE 0.9 % (FLUSH) 0.9 %
5-40 SYRINGE (ML) INJECTION PRN
Status: DISCONTINUED | OUTPATIENT
Start: 2025-08-28 | End: 2025-08-29 | Stop reason: HOSPADM

## 2025-08-29 ENCOUNTER — CARE COORDINATION (OUTPATIENT)
Dept: CARE COORDINATION | Age: 75
End: 2025-08-29

## (undated) DEVICE — SET SURG INSTR DISSECT

## (undated) DEVICE — GAUZE,SPONGE,POST-OP,4X3,STRL,LF: Brand: MEDLINE

## (undated) DEVICE — Z DISCONTINUED APPLICATOR SURG PREP 0.35OZ 2% CHG 70% ISO ALC W/ HI LT

## (undated) DEVICE — BITEBLOCK 54FR W/ DENT RIM BLOX

## (undated) DEVICE — STRIP,CLOSURE,WOUND,MEDI-STRIP,1/2X4: Brand: MEDLINE

## (undated) DEVICE — ADAPTER AIR/WATER CHANNEL CLEANING OLYMPUS COMPATIBLE NS

## (undated) DEVICE — GRADUATE TRIANG MEASURE 1000ML BLK PRNT

## (undated) DEVICE — GAUZE,SPONGE,4"X4",8PLY,STRL,LF,10/TRAY: Brand: MEDLINE

## (undated) DEVICE — TUBING, SUCTION, 1/4" X 10', STRAIGHT: Brand: MEDLINE

## (undated) DEVICE — GOWN,SIRUS,NONRNF,SETINSLV,XL,20/CS: Brand: MEDLINE

## (undated) DEVICE — SYRINGE MED 10ML TRNSLUC BRL PLUNG BLK MRK POLYPR CTRL

## (undated) DEVICE — DEFENDO AIR WATER SUCTION AND BIOPSY VALVE KIT FOR  OLYMPUS: Brand: DEFENDO AIR/WATER/SUCTION AND BIOPSY VALVE

## (undated) DEVICE — INTENDED FOR TISSUE SEPARATION, AND OTHER PROCEDURES THAT REQUIRE A SHARP SURGICAL BLADE TO PUNCTURE OR CUT.: Brand: BARD-PARKER ® STAINLESS STEEL BLADES

## (undated) DEVICE — FIAPC® PROBE W/ FILTER 2200 C OD 2.3MM/6.9FR; L 2.2M/7.2FT: Brand: ERBE

## (undated) DEVICE — BLOCK BITE 60FR RUBBER ADLT DENTAL

## (undated) DEVICE — FORCEPS BX L240CM JAW DIA2.4MM ORNG L CAP W/ NDL DISP RAD

## (undated) DEVICE — FORCEPS BX L160CM JAW DIA2.4MM YEL L CAP W/ NDL DISP RAD

## (undated) DEVICE — SNARE ENDOSCP AD L240CM LOOP W10MM SHTH DIA2.4MM RND INSUL

## (undated) DEVICE — SPECIMEN CUP W/LID: Brand: DEROYAL

## (undated) DEVICE — DEFENDO AIR WATER SUCTION AND BIOPSY VALVE KIT: Brand: DEFENDO AIR/WATER/SUCTION AND BIOPSY VALVE

## (undated) DEVICE — ELECTRODE PT RET AD L9FT HI MOIST COND ADH HYDRGEL CORDED

## (undated) DEVICE — LAPAROTOMY PACK WITH POLYREINFORCED GOWNS: Brand: CONVERTORS

## (undated) DEVICE — GAUZE,SPONGE,4"X4",12PLY,STERILE,LF,2'S: Brand: MEDLINE

## (undated) DEVICE — TOWEL,OR,DSP,ST,BLUE,STD,6/PK,12PK/CS: Brand: MEDLINE

## (undated) DEVICE — CVD CANNULA

## (undated) DEVICE — BANDAGE ADH W1XL3IN NAT FAB WVN FLX DURABLE N ADH PD SEAL

## (undated) DEVICE — NEEDLE SPNL L3.5IN PNK HUB S STL REG WALL FIT STYL W/ QNCKE

## (undated) DEVICE — CONTAINER SPEC 60ML PH 7NEUTRAL BUFF FRMLN RDY TO USE

## (undated) DEVICE — CANNULA NSL ORAL AD FOR CAPNOFLEX CO2 O2 AIRLFE

## (undated) DEVICE — NEEDLE HYPO 25GA L1.5IN BLU POLYPR HUB S STL REG BVL STR

## (undated) DEVICE — VALVE E AIR H2O SUCT AND BX DISP DEFENDO

## (undated) DEVICE — AIR/WATER CLEANING ADAPTER FOR OLYMPUS® GI ENDOSCOPE: Brand: BULLDOG®

## (undated) DEVICE — APPLICATOR MEDICATED 26 CC SOLUTION HI LT ORNG CHLORAPREP

## (undated) DEVICE — DRAPE 64X41IN RADIOLOGY C ARM EQUIP STER

## (undated) DEVICE — SPONGE GZ W4XL4IN RAYON POLY CVR W/NONWOVEN FAB STRL 2/PK

## (undated) DEVICE — GAUZE,SPONGE,4"X4",16PLY,XRAY,STRL,LF: Brand: MEDLINE

## (undated) DEVICE — KIT KPT1505 PAKTRY 15/3 FF W/ONESTEP-RB: Brand: KYPHOPAK™ TRAY

## (undated) DEVICE — DOUBLE BASIN SET: Brand: MEDLINE INDUSTRIES, INC.

## (undated) DEVICE — MARKER,SKIN,WI/RULER AND LABELS: Brand: MEDLINE

## (undated) DEVICE — ENDOSCOPIC KIT 1.1+ OP4 NO CP DE

## (undated) DEVICE — SPONGE GZ W4XL4IN 8 PLY 100% COTTON

## (undated) DEVICE — 3 ML SYRINGE LUER-LOCK TIP: Brand: MONOJECT

## (undated) DEVICE — COVER,LIGHT HANDLE,FLX,1/PK: Brand: MEDLINE INDUSTRIES, INC.

## (undated) DEVICE — Device

## (undated) DEVICE — BONE BIOPSY DEVICE F05A BBD SIZE 3: Brand: MEDTRONIC REUSABLE INSTRUMENTS

## (undated) DEVICE — 6 ML SYRINGE LUER-LOCK TIP: Brand: MONOJECT

## (undated) DEVICE — NEEDLE HYPO 18GA L1.5IN PNK POLYPR HUB S STL THN WALL FILL

## (undated) DEVICE — NDL CNTR 40CT FM MAG: Brand: MEDLINE INDUSTRIES, INC.

## (undated) DEVICE — GLOVE ORANGE PI 7 1/2   MSG9075

## (undated) DEVICE — DRAPE THER FLUID WARMING 66X44 IN FLAT SLUSH DBL DISC ORS

## (undated) DEVICE — SOLUTION IV IRRIG POUR BRL 0.9% SODIUM CHL 2F7124

## (undated) DEVICE — ELECTRODE SURG MPLR NEUT SELF ADH PT PLT MULTIGEN

## (undated) DEVICE — CONNECTOR IRRIGATION AUXILIARY H2O JET W/ PRT MTL THRD HYDR

## (undated) DEVICE — Z DISCONTINUED NO SUB IDED TUBING ETCO2 AD L6.5FT NSL ORAL CVD PRNG NONFLARED TIP OVR

## (undated) DEVICE — SHEET DRAPE FULL 70X100